# Patient Record
Sex: MALE | Race: WHITE | NOT HISPANIC OR LATINO | Employment: FULL TIME | URBAN - METROPOLITAN AREA
[De-identification: names, ages, dates, MRNs, and addresses within clinical notes are randomized per-mention and may not be internally consistent; named-entity substitution may affect disease eponyms.]

---

## 2017-01-02 ENCOUNTER — ALLSCRIPTS OFFICE VISIT (OUTPATIENT)
Dept: OTHER | Facility: OTHER | Age: 57
End: 2017-01-02

## 2017-01-02 DIAGNOSIS — L02.511 CUTANEOUS ABSCESS OF RIGHT HAND: ICD-10-CM

## 2017-01-03 ENCOUNTER — APPOINTMENT (OUTPATIENT)
Dept: LAB | Facility: HOSPITAL | Age: 57
End: 2017-01-03
Payer: COMMERCIAL

## 2017-01-03 DIAGNOSIS — L02.511 CUTANEOUS ABSCESS OF RIGHT HAND: ICD-10-CM

## 2017-01-03 PROCEDURE — 87147 CULTURE TYPE IMMUNOLOGIC: CPT

## 2017-01-03 PROCEDURE — 87070 CULTURE OTHR SPECIMN AEROBIC: CPT

## 2017-01-03 PROCEDURE — 87205 SMEAR GRAM STAIN: CPT

## 2017-01-03 PROCEDURE — 87186 SC STD MICRODIL/AGAR DIL: CPT

## 2017-01-05 LAB
BACTERIA WND AEROBE CULT: NORMAL
BACTERIA WND AEROBE CULT: NORMAL
GRAM STN SPEC: NORMAL
GRAM STN SPEC: NORMAL

## 2017-06-27 ENCOUNTER — ALLSCRIPTS OFFICE VISIT (OUTPATIENT)
Dept: OTHER | Facility: OTHER | Age: 57
End: 2017-06-27

## 2018-01-12 VITALS
WEIGHT: 257.06 LBS | HEIGHT: 72 IN | RESPIRATION RATE: 18 BRPM | DIASTOLIC BLOOD PRESSURE: 78 MMHG | HEART RATE: 65 BPM | OXYGEN SATURATION: 98 % | BODY MASS INDEX: 34.82 KG/M2 | TEMPERATURE: 98.2 F | SYSTOLIC BLOOD PRESSURE: 118 MMHG

## 2018-01-14 VITALS
HEART RATE: 97 BPM | HEIGHT: 73 IN | SYSTOLIC BLOOD PRESSURE: 112 MMHG | TEMPERATURE: 96.6 F | DIASTOLIC BLOOD PRESSURE: 68 MMHG | OXYGEN SATURATION: 98 % | RESPIRATION RATE: 18 BRPM | BODY MASS INDEX: 32.76 KG/M2 | WEIGHT: 247.19 LBS

## 2018-06-12 DIAGNOSIS — K21.9 GASTROESOPHAGEAL REFLUX DISEASE WITHOUT ESOPHAGITIS: Primary | ICD-10-CM

## 2018-06-12 RX ORDER — PANTOPRAZOLE SODIUM 40 MG/1
TABLET, DELAYED RELEASE ORAL
Qty: 90 TABLET | Refills: 1 | Status: SHIPPED | OUTPATIENT
Start: 2018-06-12 | End: 2018-12-09 | Stop reason: SDUPTHER

## 2018-07-06 ENCOUNTER — OFFICE VISIT (OUTPATIENT)
Dept: FAMILY MEDICINE CLINIC | Facility: CLINIC | Age: 58
End: 2018-07-06
Payer: COMMERCIAL

## 2018-07-06 VITALS
SYSTOLIC BLOOD PRESSURE: 142 MMHG | WEIGHT: 239.2 LBS | OXYGEN SATURATION: 97 % | TEMPERATURE: 99 F | DIASTOLIC BLOOD PRESSURE: 88 MMHG | BODY MASS INDEX: 31.73 KG/M2 | HEART RATE: 89 BPM

## 2018-07-06 DIAGNOSIS — K52.9 GASTROENTERITIS: Primary | ICD-10-CM

## 2018-07-06 PROBLEM — K27.9 PEPTIC ULCER: Status: ACTIVE | Noted: 2017-01-02

## 2018-07-06 PROCEDURE — 99214 OFFICE O/P EST MOD 30 MIN: CPT | Performed by: FAMILY MEDICINE

## 2018-07-06 RX ORDER — DICYCLOMINE HCL 20 MG
20 TABLET ORAL 3 TIMES DAILY PRN
Qty: 30 TABLET | Refills: 0 | Status: SHIPPED | OUTPATIENT
Start: 2018-07-06 | End: 2019-01-23

## 2018-07-06 RX ORDER — DIPHENOXYLATE HYDROCHLORIDE AND ATROPINE SULFATE 2.5; .025 MG/1; MG/1
1 TABLET ORAL DAILY
COMMUNITY
End: 2018-09-01 | Stop reason: SDUPTHER

## 2018-07-06 RX ORDER — COVID-19 ANTIGEN TEST
KIT MISCELLANEOUS
COMMUNITY
End: 2020-02-12 | Stop reason: ALTCHOICE

## 2018-07-06 NOTE — PROGRESS NOTES
Assessment/Plan:   1  Gastroenteritis  Patient's symptoms appear likely secondary to gastroenteritis  Start bland diet slowly advancing as tolerated  Will start treatment with dicyclomine to take p r n  for symptom relief  If any of his symptoms should persist or worsen, he was advised to follow up  ER precautions were given today  - dicyclomine (BENTYL) 20 mg tablet; Take 1 tablet (20 mg total) by mouth 3 (three) times a day as needed (for abdominal cramping)  Dispense: 30 tablet; Refill: 0     There are no diagnoses linked to this encounter  Subjective:    Chief Complaint   Patient presents with    Diarrhea     x 1 week        Patient ID: Malik Garcia is a 65 y o  male  Patient is a 60-year-old male presents today with CC of diarrhea for the past week  He states that his symptoms started gradually  He states he initially had upper GI symptoms including reflux however was also developing lower GI discomfort  He has been having loose bowel movements  Denies melena or hematochezia  He also denies abdominal discomfort  He states he has not been taking any medications for his current symptoms  Review of Systems   Constitutional: Negative for activity change, chills, fatigue and fever  HENT: Negative for congestion, ear pain, sinus pressure and sore throat  Eyes: Negative for redness, itching and visual disturbance  Respiratory: Negative for cough and shortness of breath  Cardiovascular: Negative for chest pain and palpitations  Gastrointestinal: Negative for abdominal pain, diarrhea and nausea  Endocrine: Negative for cold intolerance and heat intolerance  Genitourinary: Negative for dysuria, flank pain and frequency  Musculoskeletal: Negative for arthralgias, back pain, gait problem and myalgias  Skin: Negative for color change  Allergic/Immunologic: Negative for environmental allergies  Neurological: Negative for dizziness, numbness and headaches  Psychiatric/Behavioral: Negative for behavioral problems and sleep disturbance  The following portions of the patient's history were reviewed and updated as appropriate : past family history, past medical history, past social history and past surgical history  Current Outpatient Prescriptions:     multivitamin (THERAGRAN) TABS, Take 1 tablet by mouth daily, Disp: , Rfl:     Naproxen Sodium (ALEVE) 220 MG CAPS, Take by mouth, Disp: , Rfl:     pantoprazole (PROTONIX) 40 mg tablet, TAKE 1 TABLET DAILY, Disp: 90 tablet, Rfl: 1    Objective:    Vitals:    07/06/18 1526   BP: 142/88   BP Location: Left arm   Patient Position: Sitting   Pulse: 89   Temp: 99 °F (37 2 °C)   TempSrc: Tympanic   SpO2: 97%   Weight: 109 kg (239 lb 3 2 oz)        Physical Exam   Constitutional: He is oriented to person, place, and time  He appears well-developed and well-nourished  HENT:   Head: Normocephalic and atraumatic  Nose: Nose normal    Mouth/Throat: No oropharyngeal exudate  Eyes: Pupils are equal, round, and reactive to light  Right eye exhibits no discharge  Left eye exhibits no discharge  Neck: Normal range of motion  Neck supple  No tracheal deviation present  Cardiovascular: Normal rate, regular rhythm and intact distal pulses  Exam reveals no gallop and no friction rub  No murmur heard  Pulses:       Dorsalis pedis pulses are 2+ on the right side, and 2+ on the left side  Posterior tibial pulses are 2+ on the right side, and 2+ on the left side  Pulmonary/Chest: Effort normal and breath sounds normal  No respiratory distress  He has no wheezes  He has no rales  Abdominal: Soft  Bowel sounds are normal  He exhibits no distension  There is no tenderness  There is no rebound and no guarding  Musculoskeletal: Normal range of motion  He exhibits no edema  Lymphadenopathy:        Head (right side): No submental and no submandibular adenopathy present          Head (left side): No submental and no submandibular adenopathy present  He has no cervical adenopathy  Right cervical: No superficial cervical, no deep cervical and no posterior cervical adenopathy present  Left cervical: No superficial cervical, no deep cervical and no posterior cervical adenopathy present  Neurological: He is alert and oriented to person, place, and time  No cranial nerve deficit or sensory deficit  Skin: Skin is warm, dry and intact  Psychiatric: His speech is normal and behavior is normal  Judgment normal  His mood appears not anxious  Cognition and memory are normal  He does not exhibit a depressed mood  Vitals reviewed

## 2018-08-26 ENCOUNTER — TELEPHONE (OUTPATIENT)
Dept: FAMILY MEDICINE CLINIC | Facility: CLINIC | Age: 58
End: 2018-08-26

## 2018-08-26 NOTE — TELEPHONE ENCOUNTER
Spouse's wife called asking why he was transferred to [facility]  Reason was given since pt's spouse is on his consent

## 2018-08-30 ENCOUNTER — TRANSITIONAL CARE MANAGEMENT (OUTPATIENT)
Dept: FAMILY MEDICINE CLINIC | Facility: CLINIC | Age: 58
End: 2018-08-30

## 2018-08-30 RX ORDER — ACETAMINOPHEN 500 MG
1000 TABLET ORAL
COMMUNITY
Start: 2018-08-29 | End: 2018-09-08

## 2018-09-01 ENCOUNTER — OFFICE VISIT (OUTPATIENT)
Dept: FAMILY MEDICINE CLINIC | Facility: CLINIC | Age: 58
End: 2018-09-01
Payer: COMMERCIAL

## 2018-09-01 VITALS
BODY MASS INDEX: 32.45 KG/M2 | WEIGHT: 244.6 LBS | DIASTOLIC BLOOD PRESSURE: 82 MMHG | SYSTOLIC BLOOD PRESSURE: 122 MMHG | HEART RATE: 66 BPM | OXYGEN SATURATION: 98 % | TEMPERATURE: 97.4 F

## 2018-09-01 DIAGNOSIS — K27.9 PEPTIC ULCER: ICD-10-CM

## 2018-09-01 DIAGNOSIS — H53.9 VISUAL CHANGES: ICD-10-CM

## 2018-09-01 DIAGNOSIS — Z01.818 PREOPERATIVE CLEARANCE: ICD-10-CM

## 2018-09-01 DIAGNOSIS — H40.051 RAISED INTRAOCULAR PRESSURE OF RIGHT EYE: ICD-10-CM
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
## 2018-09-01 DIAGNOSIS — IMO0001: Primary | ICD-10-CM

## 2018-09-01 DIAGNOSIS — Z13.1 SCREENING FOR DIABETES MELLITUS: ICD-10-CM

## 2018-09-01 LAB — SL AMB POCT HEMOGLOBIN AIC: 5.1

## 2018-09-01 PROCEDURE — 99495 TRANSJ CARE MGMT MOD F2F 14D: CPT | Performed by: FAMILY MEDICINE

## 2018-09-01 PROCEDURE — 83036 HEMOGLOBIN GLYCOSYLATED A1C: CPT | Performed by: FAMILY MEDICINE

## 2018-09-01 NOTE — PROGRESS NOTES
Assessment/Plan:    1  Preoperative clearance/Orbit fracture, right, sequela ([address] )  Patient appears well today  He has a good functional capacity and no active cardiac problems  Reviewed his recent testing which was at [facility]   His EKG appeared to show normal sinus rhythm, no signs of ST-elevation MI  His blood work as well appears stable  His electrolyte levels have stabilized  This type of procedure is considered intermediate risk  Patient is cleared with intermediate perioperative cardiovascular risk  No further testing needed today  2    Blood alcohol level of 240 mg/100 ml or more  It appears the patient has been consuming approximately 6-8 beers per day  The he states that he does not believe that he has an alcohol problem  He never uses alcohol for sleep in the morning to start his day  He has been off of any alcohol consumption over the past week  Family has been working on eliminating all alcohol from the household as well he was advised on the importance of refraining from alcohol consumption completely  This is likely the cause of his recent orbital fracture    3  Peptic ulcer  Patient's symptoms appears stable today  He was incidentally found to have a abnormality on his recent CT scan  He was unable to determine the exact cause of this abnormality  This may likely be a mass verses esophageal collapse  Patient was advised that he would need to follow up with Gastroenterology for an upper endoscopy  He states that he will be scheduling         There are no diagnoses linked to this encounter  Subjective:    Chief Complaint   Patient presents with    Transition of Care Management     fell and hit R eye/side of face on concrete birdbath        Patient ID: Ashwini Rodney is a 65 y o  male      Shauna Lao  65 y o   male    SURGEON: Dr Bullock Ke:   Open reduction internal fixation of zygomatic arch right    DATE OF SURGERY: 9/4/18    PRIOR ANESTHESIA:yes    COMPLICATION: no    BLEEDING PROBLEM: no    PERTINENT PMH: no    EXERCISE CAPACITY:   CAN WALK 4 BLOCKS AND OR CLIMB 2 FLIGHTS: Yes    HOME LIVING SITUATION SAFE AND SECURE: Yes      TOBACCO: no     ETOH: no, patient states that he has quit his alcohol consumption 1 week ago  He was drinking approximately 6-8 beers per day  ILLEGAL DRUGS: no        Review of Systems   Constitutional: Negative for activity change, chills, fatigue and fever  HENT: Negative for congestion, ear pain, sinus pressure and sore throat  Eyes: Positive for photophobia, pain, redness and visual disturbance  Negative for itching  Respiratory: Negative for cough and shortness of breath  Cardiovascular: Negative for chest pain and palpitations  Gastrointestinal: Negative for abdominal pain, diarrhea and nausea  Endocrine: Negative for cold intolerance and heat intolerance  Genitourinary: Negative for dysuria, flank pain and frequency  Musculoskeletal: Negative for arthralgias, back pain, gait problem and myalgias  Skin: Negative for color change  Allergic/Immunologic: Negative for environmental allergies  Neurological: Negative for dizziness, numbness and headaches  Psychiatric/Behavioral: Negative for behavioral problems and sleep disturbance  The following portions of the patient's history were reviewed and updated as appropriate : past family history, past medical history, past social history and past surgical history        Current Outpatient Prescriptions:     acetaminophen (TYLENOL) 500 mg tablet, Take 1,000 mg by mouth, Disp: , Rfl:     dicyclomine (BENTYL) 20 mg tablet, Take 1 tablet (20 mg total) by mouth 3 (three) times a day as needed (for abdominal cramping), Disp: 30 tablet, Rfl: 0    Multiple Vitamins-Minerals (MULTIVITAMIN ADULT EXTRA C PO), Take 1 capsule by mouth, Disp: , Rfl:     pantoprazole (PROTONIX) 40 mg tablet, TAKE 1 TABLET DAILY, Disp: 90 tablet, Rfl: 1    Naproxen Sodium (ALEVE) 220 MG CAPS, Take by mouth, Disp: , Rfl:     Objective:    Vitals:    09/01/18 1007   BP: 122/82   BP Location: Left arm   Patient Position: Sitting   Pulse: 66   Temp: (!) 97 4 °F (36 3 °C)   TempSrc: Tympanic   SpO2: 98%   Weight: 111 kg (244 lb 9 6 oz)        Physical Exam   Constitutional: He is oriented to person, place, and time  He appears well-developed and well-nourished  HENT:   Head: Normocephalic and atraumatic  Nose: Nose normal    Mouth/Throat: No oropharyngeal exudate  Eyes: Pupils are equal, round, and reactive to light  Right eye exhibits no discharge  Left eye exhibits no discharge  Neck: Normal range of motion  Neck supple  No tracheal deviation present  Cardiovascular: Normal rate, regular rhythm and intact distal pulses  Exam reveals no gallop and no friction rub  No murmur heard  Pulses:       Dorsalis pedis pulses are 2+ on the right side, and 2+ on the left side  Posterior tibial pulses are 2+ on the right side, and 2+ on the left side  Pulmonary/Chest: Effort normal and breath sounds normal  No respiratory distress  He has no wheezes  He has no rales  Abdominal: Soft  Bowel sounds are normal  He exhibits no distension  There is no tenderness  There is no rebound and no guarding  Musculoskeletal: Normal range of motion  He exhibits no edema  Lymphadenopathy:        Head (right side): No submental and no submandibular adenopathy present  Head (left side): No submental and no submandibular adenopathy present  He has no cervical adenopathy  Right cervical: No superficial cervical, no deep cervical and no posterior cervical adenopathy present  Left cervical: No superficial cervical, no deep cervical and no posterior cervical adenopathy present  Neurological: He is alert and oriented to person, place, and time  No cranial nerve deficit or sensory deficit  Skin: Skin is warm, dry and intact     Psychiatric: His speech is normal and behavior is normal  Judgment normal  His mood appears not anxious  Cognition and memory are normal  He does not exhibit a depressed mood  Vitals reviewed

## 2018-09-01 NOTE — PROGRESS NOTES
Assessment/Plan:   1  Orbit fracture, right, sequela (HCC)/Raised intraocular pressure of right eye/Visual changes  Reviewed patient's records from [facility] as well as [facility] patient states that he is currently not in pain  He has been following up regularly with Ophthalmology/facial surgery  He will be havingORIF this coming week  At this time, he was advised that he should not be driving  At this time he cannot work  After his surgery, he will be following up with Occupational therapy for further evaluation patient visual changes  2  Blood alcohol level of 240 mg/100 ml or more  It appears the patient has been consuming approximately 6-8 beers per day  The he states that he does not believe that he has an alcohol problem  He never uses alcohol for sleep in the morning to start his day  He has been off of any alcohol consumption over the past week  Family has been working on eliminating all alcohol from the household as well he was advised on the importance of refraining from alcohol consumption completely  This is likely the cause of his recent orbital fracture    3  Peptic ulcer  Patient's symptoms appears stable today  He was incidentally found to have a abnormality on his recent CT scan  He was unable to determine the exact cause of this abnormality  This may likely be a mass verses esophageal collapse  Patient was advised that he would need to follow up with Gastroenterology for an upper endoscopy  He states that he will be scheduling after his surgery  4  Screening for diabetes mellitus  Patient's blood sugar was mildly elevated while inpatient  He was also found to have glucose in his urine  His recent A1c today appeared stable at 5 1  Continue with routine monitoring   - POCT hemoglobin A1c     There are no diagnoses linked to this encounter        Subjective:    Chief Complaint   Patient presents with    Transition of Care Management     fell and hit R eye/side of face on concrete [facility]        Patient ID: Yarelis Gomez is a 65 y o  male  Patient is a 67-year-old male presents today for a hospital follow-up  He states that he recently was admitted to [facility] on 08/26 secondary to a traumatic fall  Patient states that he does not recall much of the follow-up  He states that he was consuming alcohol at a party prior to this event  He states that he did fall and struck his face on a bird bath  He believes that he struck his chest on the floor as well  He was taken to the ED and was found to have a right orbital fracture  He also developed significant pressure in his orbit as well secondary to hematoma development  Since his discharge, he states that he has not had any alcohol for 1 week  Due to this trauma, he has been having visual disturbances  He states that he will be having a surgery for open reduction internal fixation of his orbit/zygomatic arch [facility] dated by a facial/plastic surgery and Ophthalmology dot                                                                            Date and time hospital follow up call was made:  8/30/2018  1:12 PM  Hospital care reviewed:  Records reviewed  Patient was hopsitalized at: Other (comment)  Date of admission:  8/26/18  Date of discharge:  8/28/18  Diagnosis:  Orbic fracture   Disposition:  Home  Were the patients medicaitons reviewed and updated:  Yes  Current symptoms:  None  Post hospital issues:  None  Should patient be enrolled in anticoag monitoring?:  No  Scheduled for follow up?:  Yes  Referrals needed:  None   Did you obtain your prescribed medications:  Yes  Do you need help managing your perscriptions or medications:  No  Is transportation to your appointments needed:  No  I have advised the patient to call PCP with any new or worsening symptoms   (please type in name along with any credentials):  David Carter MA   Comments:  pt is scheduled for a TCM  Review of Systems   Constitutional: Negative for activity change, chills, fatigue and fever  HENT: Negative for congestion, ear pain, sinus pressure and sore throat  Eyes: Positive for photophobia, pain, redness and visual disturbance  Negative for itching  Respiratory: Negative for cough and shortness of breath  Cardiovascular: Negative for chest pain and palpitations  Gastrointestinal: Negative for abdominal pain, diarrhea and nausea  Endocrine: Negative for cold intolerance and heat intolerance  Genitourinary: Negative for dysuria, flank pain and frequency  Musculoskeletal: Negative for arthralgias, back pain, gait problem and myalgias  Skin: Negative for color change  Allergic/Immunologic: Negative for environmental allergies  Neurological: Negative for dizziness, numbness and headaches  Psychiatric/Behavioral: Negative for behavioral problems and sleep disturbance  The following portions of the patient's history were reviewed and updated as appropriate : past family history, past medical history, past social history and past surgical history  Current Outpatient Prescriptions:     acetaminophen (TYLENOL) 500 mg tablet, Take 1,000 mg by mouth, Disp: , Rfl:     dicyclomine (BENTYL) 20 mg tablet, Take 1 tablet (20 mg total) by mouth 3 (three) times a day as needed (for abdominal cramping), Disp: 30 tablet, Rfl: 0    Multiple Vitamins-Minerals (MULTIVITAMIN ADULT EXTRA C PO), Take 1 capsule by mouth, Disp: , Rfl:     pantoprazole (PROTONIX) 40 mg tablet, TAKE 1 TABLET DAILY, Disp: 90 tablet, Rfl: 1    Naproxen Sodium (ALEVE) 220 MG CAPS, Take by mouth, Disp: , Rfl:     Objective:    Vitals:    09/01/18 1007   BP: 122/82   BP Location: Left arm   Patient Position: Sitting   Pulse: 66   Temp: (!) 97 4 °F (36 3 °C)   TempSrc: Tympanic   SpO2: 98%   Weight: 111 kg (244 lb 9 6 oz)        Physical Exam   Constitutional: He is oriented to person, place, and time   He appears well-developed and well-nourished  HENT:   Head: Normocephalic and atraumatic  Nose: Nose normal    Mouth/Throat: No oropharyngeal exudate  Eyes: Pupils are equal, round, and reactive to light  Right eye exhibits no discharge  Left eye exhibits no discharge  Neck: Normal range of motion  Neck supple  No tracheal deviation present  Cardiovascular: Normal rate, regular rhythm and intact distal pulses  Exam reveals no gallop and no friction rub  No murmur heard  Pulses:       Dorsalis pedis pulses are 2+ on the right side, and 2+ on the left side  Posterior tibial pulses are 2+ on the right side, and 2+ on the left side  Pulmonary/Chest: Effort normal and breath sounds normal  No respiratory distress  He has no wheezes  He has no rales  Abdominal: Soft  Bowel sounds are normal  He exhibits no distension  There is no tenderness  There is no rebound and no guarding  Musculoskeletal: Normal range of motion  He exhibits no edema  Lymphadenopathy:        Head (right side): No submental and no submandibular adenopathy present  Head (left side): No submental and no submandibular adenopathy present  He has no cervical adenopathy  Right cervical: No superficial cervical, no deep cervical and no posterior cervical adenopathy present  Left cervical: No superficial cervical, no deep cervical and no posterior cervical adenopathy present  Neurological: He is alert and oriented to person, place, and time  No cranial nerve deficit or sensory deficit  Skin: Skin is warm, dry and intact  Psychiatric: His speech is normal and behavior is normal  Judgment normal  His mood appears not anxious  Cognition and memory are normal  He does not exhibit a depressed mood  Vitals reviewed

## 2018-12-09 DIAGNOSIS — K21.9 GASTROESOPHAGEAL REFLUX DISEASE WITHOUT ESOPHAGITIS: ICD-10-CM

## 2018-12-10 RX ORDER — PANTOPRAZOLE SODIUM 40 MG/1
TABLET, DELAYED RELEASE ORAL
Qty: 90 TABLET | Refills: 1 | Status: SHIPPED | OUTPATIENT
Start: 2018-12-10 | End: 2019-06-08 | Stop reason: SDUPTHER

## 2019-01-23 ENCOUNTER — OFFICE VISIT (OUTPATIENT)
Dept: FAMILY MEDICINE CLINIC | Facility: CLINIC | Age: 59
End: 2019-01-23
Payer: COMMERCIAL

## 2019-01-23 VITALS
HEIGHT: 70 IN | TEMPERATURE: 98.3 F | WEIGHT: 245 LBS | HEART RATE: 70 BPM | DIASTOLIC BLOOD PRESSURE: 80 MMHG | RESPIRATION RATE: 16 BRPM | SYSTOLIC BLOOD PRESSURE: 120 MMHG | BODY MASS INDEX: 35.07 KG/M2 | OXYGEN SATURATION: 95 %

## 2019-01-23 DIAGNOSIS — K52.9 GASTROENTERITIS: Primary | ICD-10-CM

## 2019-01-23 PROCEDURE — 99213 OFFICE O/P EST LOW 20 MIN: CPT | Performed by: NURSE PRACTITIONER

## 2019-01-23 NOTE — PROGRESS NOTES
[facility]    ASSESSMENT AND PLAN     1  Gastroenteritis  60-year-old male presents today with signs and symptoms suggestive of a gastroenteritis  Physical assessment is benign, with the exception of mildly increased bowel sounds  As his symptoms are slowly resolving throughout today, I feel this is likely viral in nature and self-limiting  Symptom management reviewed: Increase fluids, may use a Vaseline or Neosporin on his anus for protection and soreness relief, reintroduce foods slowly, BRAT diet  He is to be re-evaluated should his symptoms persist and/or worsen  SUBJECTIVE       Patient ID: Shell Srinivasan is a 63 y o  male  Chief Complaint   Patient presents with    Cold Like Symptoms     chills, vomit  x 2 days, diarrhea       HISTORY OF PRESENT ILLNESS    Patient presents today with diarrhea that started yesterday  He states he had 20 loose stools yesterday that has been slowly resolving today  He states he only had 1 or 2 this morning and they have been "less watery"  He states that his anus quite sore  He  can't think of any food that he had that might have been spoiled  He has not eaten any true food, he has just been drinking tea water and chicken broth  He did have 2 episodes yesterday where he vomited a small amount  No vomiting today  Denies fever  Denies any sinus and/or respiratory distress  The following portions of the patient's history were reviewed and updated as appropriate: allergies, current medications, past family history, past medical history, past social history, past surgical history and problem list     REVIEW OF SYSTEMS  Review of Systems   Constitutional: Negative  Gastrointestinal: Positive for diarrhea, nausea ( intermittent) and vomiting (X2)  Negative for abdominal distention, abdominal pain, anal bleeding and blood in stool  States his rectum hurts from the repeated bowel movements         OBJECTIVE      VITAL SIGNS  /80 (BP Location: Left arm, Patient Position: Sitting, Cuff Size: Adult)   Pulse 70   Temp 98 3 °F (36 8 °C) (Tympanic)   Resp 16   Ht 5' 10 47" (1 79 m)   Wt 111 kg (245 lb)   SpO2 95%   BMI 34 68 kg/m²       PHYSICAL EXAMINATION   Physical Exam   Constitutional: He is oriented to person, place, and time  He appears well-developed and well-nourished  Cardiovascular: Normal rate, regular rhythm and normal heart sounds  Pulmonary/Chest: Effort normal and breath sounds normal  No respiratory distress  He has no decreased breath sounds  He has no wheezes  Abdominal: Soft  Normal appearance  Bowel sounds are increased  There is no tenderness  There is no rigidity, no rebound and no guarding  Neurological: He is alert and oriented to person, place, and time  Psychiatric: He has a normal mood and affect  His speech is normal and behavior is normal  Judgment and thought content normal  Cognition and memory are normal    Nursing note and vitals reviewed

## 2019-03-04 ENCOUNTER — APPOINTMENT (EMERGENCY)
Dept: RADIOLOGY | Facility: HOSPITAL | Age: 59
End: 2019-03-04
Payer: COMMERCIAL

## 2019-03-04 ENCOUNTER — HOSPITAL ENCOUNTER (OUTPATIENT)
Facility: HOSPITAL | Age: 59
Setting detail: OBSERVATION
Discharge: HOME | End: 2019-03-05
Attending: EMERGENCY MEDICINE | Admitting: SURGERY
Payer: COMMERCIAL

## 2019-03-04 DIAGNOSIS — S22.32XA LEFT RIB FRACTURE: Primary | ICD-10-CM
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
## 2019-03-04 LAB
ANION GAP BLD CALC-SCNC: 17 MMOL/L (ref 4–13)
BUN BLD-MCNC: 14 MG/DL (ref 5–25)
CA-I BLD-SCNC: 1.14 MMOL/L (ref 1.12–1.32)
CHLORIDE BLD-SCNC: 102 MMOL/L (ref 100–108)
CREAT BLD-MCNC: 0.9 MG/DL (ref 0.6–1.3)
GFR SERPL CREATININE-BSD FRML MDRD: 94 ML/MIN/1.73SQ M
GLUCOSE SERPL-MCNC: 105 MG/DL (ref 65–140)
HCT VFR BLD CALC: 47 % (ref 36.5–49.3)
HGB BLDA-MCNC: 16 G/DL (ref 12–17)
PCO2 BLD: 26 MMOL/L (ref 21–32)
POTASSIUM BLD-SCNC: 4.1 MMOL/L (ref 3.5–5.3)
SODIUM BLD-SCNC: 139 MMOL/L (ref 136–145)
SPECIMEN SOURCE: ABNORMAL

## 2019-03-04 PROCEDURE — 85014 HEMATOCRIT: CPT

## 2019-03-04 PROCEDURE — 71101 X-RAY EXAM UNILAT RIBS/CHEST: CPT

## 2019-03-04 PROCEDURE — 99285 EMERGENCY DEPT VISIT HI MDM: CPT

## 2019-03-04 PROCEDURE — 74177 CT ABD & PELVIS W/CONTRAST: CPT

## 2019-03-04 PROCEDURE — 71260 CT THORAX DX C+: CPT

## 2019-03-04 PROCEDURE — 73030 X-RAY EXAM OF SHOULDER: CPT

## 2019-03-04 PROCEDURE — 96375 TX/PRO/DX INJ NEW DRUG ADDON: CPT

## 2019-03-04 PROCEDURE — 96374 THER/PROPH/DIAG INJ IV PUSH: CPT

## 2019-03-04 PROCEDURE — 80047 BASIC METABLC PNL IONIZED CA: CPT

## 2019-03-04 PROCEDURE — 99219 PR INITIAL OBSERVATION CARE/DAY 50 MINUTES: CPT | Performed by: SURGERY

## 2019-03-04 RX ORDER — LIDOCAINE 50 MG/G
1 PATCH TOPICAL DAILY
Status: DISCONTINUED | OUTPATIENT
Start: 2019-03-05 | End: 2019-03-05 | Stop reason: HOSPADM

## 2019-03-04 RX ORDER — METHOCARBAMOL 500 MG/1
500 TABLET, FILM COATED ORAL EVERY 6 HOURS SCHEDULED
Status: DISCONTINUED | OUTPATIENT
Start: 2019-03-04 | End: 2019-03-05

## 2019-03-04 RX ORDER — IBUPROFEN 600 MG/1
600 TABLET ORAL EVERY 6 HOURS SCHEDULED
Status: DISCONTINUED | OUTPATIENT
Start: 2019-03-06 | End: 2019-03-04

## 2019-03-04 RX ORDER — ACETAMINOPHEN 325 MG/1
975 TABLET ORAL EVERY 8 HOURS SCHEDULED
Status: DISCONTINUED | OUTPATIENT
Start: 2019-03-04 | End: 2019-03-05 | Stop reason: HOSPADM

## 2019-03-04 RX ORDER — LIDOCAINE 50 MG/G
1 PATCH TOPICAL ONCE
Status: COMPLETED | OUTPATIENT
Start: 2019-03-04 | End: 2019-03-04

## 2019-03-04 RX ORDER — KETOROLAC TROMETHAMINE 30 MG/ML
15 INJECTION, SOLUTION INTRAMUSCULAR; INTRAVENOUS ONCE
Status: COMPLETED | OUTPATIENT
Start: 2019-03-04 | End: 2019-03-04

## 2019-03-04 RX ORDER — MORPHINE SULFATE 10 MG/ML
5 INJECTION, SOLUTION INTRAMUSCULAR; INTRAVENOUS ONCE
Status: COMPLETED | OUTPATIENT
Start: 2019-03-04 | End: 2019-03-04

## 2019-03-04 RX ORDER — METHOCARBAMOL 500 MG/1
1000 TABLET, FILM COATED ORAL ONCE
Status: COMPLETED | OUTPATIENT
Start: 2019-03-04 | End: 2019-03-04

## 2019-03-04 RX ORDER — OXYCODONE HYDROCHLORIDE 10 MG/1
10 TABLET ORAL EVERY 4 HOURS PRN
Status: DISCONTINUED | OUTPATIENT
Start: 2019-03-04 | End: 2019-03-05 | Stop reason: HOSPADM

## 2019-03-04 RX ORDER — KETOROLAC TROMETHAMINE 30 MG/ML
15 INJECTION, SOLUTION INTRAMUSCULAR; INTRAVENOUS EVERY 6 HOURS SCHEDULED
Status: DISCONTINUED | OUTPATIENT
Start: 2019-03-04 | End: 2019-03-04

## 2019-03-04 RX ORDER — HYDROMORPHONE HCL/PF 1 MG/ML
0.5 SYRINGE (ML) INJECTION
Status: DISCONTINUED | OUTPATIENT
Start: 2019-03-04 | End: 2019-03-05

## 2019-03-04 RX ORDER — PANTOPRAZOLE SODIUM 40 MG/1
40 TABLET, DELAYED RELEASE ORAL
Status: DISCONTINUED | OUTPATIENT
Start: 2019-03-04 | End: 2019-03-05 | Stop reason: HOSPADM

## 2019-03-04 RX ORDER — OXYCODONE HYDROCHLORIDE 5 MG/1
5 TABLET ORAL EVERY 4 HOURS PRN
Status: DISCONTINUED | OUTPATIENT
Start: 2019-03-04 | End: 2019-03-05 | Stop reason: HOSPADM

## 2019-03-04 RX ADMIN — HYDROMORPHONE HYDROCHLORIDE 0.5 MG: 1 INJECTION, SOLUTION INTRAMUSCULAR; INTRAVENOUS; SUBCUTANEOUS at 15:07

## 2019-03-04 RX ADMIN — KETOROLAC TROMETHAMINE 15 MG: 30 INJECTION, SOLUTION INTRAMUSCULAR at 13:03

## 2019-03-04 RX ADMIN — OXYCODONE HYDROCHLORIDE 10 MG: 10 TABLET ORAL at 16:50

## 2019-03-04 RX ADMIN — LIDOCAINE 1 PATCH: 50 PATCH CUTANEOUS at 10:58

## 2019-03-04 RX ADMIN — MORPHINE SULFATE 5 MG: 10 INJECTION INTRAVENOUS at 10:58

## 2019-03-04 RX ADMIN — METHOCARBAMOL 500 MG: 500 TABLET, FILM COATED ORAL at 18:16

## 2019-03-04 RX ADMIN — HYDROMORPHONE HYDROCHLORIDE 0.5 MG: 1 INJECTION, SOLUTION INTRAMUSCULAR; INTRAVENOUS; SUBCUTANEOUS at 18:16

## 2019-03-04 RX ADMIN — ACETAMINOPHEN 975 MG: 325 TABLET ORAL at 15:07

## 2019-03-04 RX ADMIN — METHOCARBAMOL 1000 MG: 500 TABLET, FILM COATED ORAL at 13:03

## 2019-03-04 RX ADMIN — METHOCARBAMOL 500 MG: 500 TABLET, FILM COATED ORAL at 23:51

## 2019-03-04 RX ADMIN — ACETAMINOPHEN 975 MG: 325 TABLET ORAL at 22:49

## 2019-03-04 RX ADMIN — IOHEXOL 100 ML: 350 INJECTION, SOLUTION INTRAVENOUS at 12:16

## 2019-03-04 RX ADMIN — PANTOPRAZOLE SODIUM 40 MG: 40 TABLET, DELAYED RELEASE ORAL at 15:07
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
## 2019-03-04 NOTE — ED NOTES
Patient given incentive spirometry at this time as well as instructions on use of incentive spirometry  Patient demonstrated correct use at this time        Girish Gr RN  03/04/19 9134

## 2019-03-04 NOTE — ED NOTES
Patient ambulatory to the bathroom without difficulty at this time        Katelynn Peguero RN  03/04/19 7281

## 2019-03-04 NOTE — ED NOTES
Patient continues to use incentive spirometer without difficulty        Diamond Barroso, RN  03/04/19 9274
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
## 2019-03-05 ENCOUNTER — APPOINTMENT (OUTPATIENT)
Dept: RADIOLOGY | Facility: HOSPITAL | Age: 59
End: 2019-03-05
Payer: COMMERCIAL

## 2019-03-05 VITALS
HEIGHT: 74 IN | WEIGHT: 245 LBS | OXYGEN SATURATION: 96 % | HEART RATE: 54 BPM | DIASTOLIC BLOOD PRESSURE: 57 MMHG | SYSTOLIC BLOOD PRESSURE: 102 MMHG | BODY MASS INDEX: 31.44 KG/M2 | TEMPERATURE: 98.2 F | RESPIRATION RATE: 18 BRPM

## 2019-03-05 PROBLEM — S22.42XA CLOSED FRACTURE OF MULTIPLE RIBS OF LEFT SIDE: Status: ACTIVE | Noted: 2019-03-05

## 2019-03-05 LAB
ANION GAP SERPL CALCULATED.3IONS-SCNC: 7 MMOL/L (ref 4–13)
BASOPHILS # BLD AUTO: 0.03 THOUSANDS/ΜL (ref 0–0.1)
BASOPHILS NFR BLD AUTO: 0 % (ref 0–1)
BUN SERPL-MCNC: 14 MG/DL (ref 5–25)
CALCIUM SERPL-MCNC: 8 MG/DL (ref 8.3–10.1)
CHLORIDE SERPL-SCNC: 104 MMOL/L (ref 100–108)
CO2 SERPL-SCNC: 26 MMOL/L (ref 21–32)
CREAT SERPL-MCNC: 0.85 MG/DL (ref 0.6–1.3)
EOSINOPHIL # BLD AUTO: 0.23 THOUSAND/ΜL (ref 0–0.61)
EOSINOPHIL NFR BLD AUTO: 3 % (ref 0–6)
ERYTHROCYTE [DISTWIDTH] IN BLOOD BY AUTOMATED COUNT: 12.4 % (ref 11.6–15.1)
GFR SERPL CREATININE-BSD FRML MDRD: 97 ML/MIN/1.73SQ M
GLUCOSE SERPL-MCNC: 100 MG/DL (ref 65–140)
HCT VFR BLD AUTO: 41 % (ref 36.5–49.3)
HCV AB SER QL: NORMAL
HGB BLD-MCNC: 13.7 G/DL (ref 12–17)
IMM GRANULOCYTES # BLD AUTO: 0.04 THOUSAND/UL (ref 0–0.2)
IMM GRANULOCYTES NFR BLD AUTO: 0 % (ref 0–2)
LYMPHOCYTES # BLD AUTO: 1.36 THOUSANDS/ΜL (ref 0.6–4.47)
LYMPHOCYTES NFR BLD AUTO: 15 % (ref 14–44)
MCH RBC QN AUTO: 30.9 PG (ref 26.8–34.3)
MCHC RBC AUTO-ENTMCNC: 33.4 G/DL (ref 31.4–37.4)
MCV RBC AUTO: 93 FL (ref 82–98)
MONOCYTES # BLD AUTO: 0.69 THOUSAND/ΜL (ref 0.17–1.22)
MONOCYTES NFR BLD AUTO: 7 % (ref 4–12)
NEUTROPHILS # BLD AUTO: 6.92 THOUSANDS/ΜL (ref 1.85–7.62)
NEUTS SEG NFR BLD AUTO: 75 % (ref 43–75)
NRBC BLD AUTO-RTO: 0 /100 WBCS
PLATELET # BLD AUTO: 207 THOUSANDS/UL (ref 149–390)
PMV BLD AUTO: 10.6 FL (ref 8.9–12.7)
POTASSIUM SERPL-SCNC: 3.6 MMOL/L (ref 3.5–5.3)
RBC # BLD AUTO: 4.43 MILLION/UL (ref 3.88–5.62)
SODIUM SERPL-SCNC: 137 MMOL/L (ref 136–145)
WBC # BLD AUTO: 9.27 THOUSAND/UL (ref 4.31–10.16)

## 2019-03-05 PROCEDURE — 80048 BASIC METABOLIC PNL TOTAL CA: CPT | Performed by: EMERGENCY MEDICINE

## 2019-03-05 PROCEDURE — 97163 PT EVAL HIGH COMPLEX 45 MIN: CPT

## 2019-03-05 PROCEDURE — G8988 SELF CARE GOAL STATUS: HCPCS

## 2019-03-05 PROCEDURE — 99217 PR OBSERVATION CARE DISCHARGE MANAGEMENT: CPT | Performed by: SURGERY

## 2019-03-05 PROCEDURE — 86803 HEPATITIS C AB TEST: CPT | Performed by: ORTHOPAEDIC SURGERY

## 2019-03-05 PROCEDURE — G8979 MOBILITY GOAL STATUS: HCPCS

## 2019-03-05 PROCEDURE — G8978 MOBILITY CURRENT STATUS: HCPCS

## 2019-03-05 PROCEDURE — G8980 MOBILITY D/C STATUS: HCPCS

## 2019-03-05 PROCEDURE — 85025 COMPLETE CBC W/AUTO DIFF WBC: CPT | Performed by: EMERGENCY MEDICINE

## 2019-03-05 PROCEDURE — 97166 OT EVAL MOD COMPLEX 45 MIN: CPT

## 2019-03-05 PROCEDURE — 71046 X-RAY EXAM CHEST 2 VIEWS: CPT

## 2019-03-05 PROCEDURE — G8987 SELF CARE CURRENT STATUS: HCPCS

## 2019-03-05 PROCEDURE — G8989 SELF CARE D/C STATUS: HCPCS

## 2019-03-05 RX ORDER — OXYCODONE HYDROCHLORIDE 5 MG/1
5 TABLET ORAL EVERY 4 HOURS PRN
Qty: 20 TABLET | Refills: 0 | Status: SHIPPED | OUTPATIENT
Start: 2019-03-05 | End: 2019-03-15

## 2019-03-05 RX ORDER — GABAPENTIN 100 MG/1
100 CAPSULE ORAL 3 TIMES DAILY
Status: DISCONTINUED | OUTPATIENT
Start: 2019-03-05 | End: 2019-03-05 | Stop reason: HOSPADM

## 2019-03-05 RX ORDER — METHOCARBAMOL 750 MG/1
750 TABLET, FILM COATED ORAL EVERY 6 HOURS SCHEDULED
Qty: 30 TABLET | Refills: 0 | Status: SHIPPED | OUTPATIENT
Start: 2019-03-05 | End: 2019-03-21 | Stop reason: SDUPTHER

## 2019-03-05 RX ORDER — METHOCARBAMOL 750 MG/1
750 TABLET, FILM COATED ORAL EVERY 6 HOURS SCHEDULED
Status: DISCONTINUED | OUTPATIENT
Start: 2019-03-05 | End: 2019-03-05 | Stop reason: HOSPADM

## 2019-03-05 RX ORDER — GABAPENTIN 100 MG/1
100 CAPSULE ORAL 3 TIMES DAILY
Qty: 30 CAPSULE | Refills: 0 | Status: SHIPPED | OUTPATIENT
Start: 2019-03-05 | End: 2020-02-12 | Stop reason: ALTCHOICE

## 2019-03-05 RX ORDER — ACETAMINOPHEN 325 MG/1
975 TABLET ORAL EVERY 8 HOURS SCHEDULED
Qty: 30 TABLET | Refills: 0 | Status: ON HOLD | OUTPATIENT
Start: 2019-03-05 | End: 2020-02-15 | Stop reason: SDUPTHER

## 2019-03-05 RX ADMIN — OXYCODONE HYDROCHLORIDE 5 MG: 5 TABLET ORAL at 12:40

## 2019-03-05 RX ADMIN — METHOCARBAMOL 750 MG: 750 TABLET, FILM COATED ORAL at 11:33

## 2019-03-05 RX ADMIN — ACETAMINOPHEN 975 MG: 325 TABLET ORAL at 14:21

## 2019-03-05 RX ADMIN — PANTOPRAZOLE SODIUM 40 MG: 40 TABLET, DELAYED RELEASE ORAL at 06:07

## 2019-03-05 RX ADMIN — LIDOCAINE 1 PATCH: 50 PATCH CUTANEOUS at 08:01

## 2019-03-05 RX ADMIN — ACETAMINOPHEN 975 MG: 325 TABLET ORAL at 06:07

## 2019-03-05 RX ADMIN — OXYCODONE HYDROCHLORIDE 10 MG: 10 TABLET ORAL at 08:00

## 2019-03-05 RX ADMIN — HYDROMORPHONE HYDROCHLORIDE 0.5 MG: 1 INJECTION, SOLUTION INTRAMUSCULAR; INTRAVENOUS; SUBCUTANEOUS at 10:11

## 2019-03-05 RX ADMIN — METHOCARBAMOL 500 MG: 500 TABLET, FILM COATED ORAL at 06:07

## 2019-03-05 RX ADMIN — ENOXAPARIN SODIUM 40 MG: 40 INJECTION SUBCUTANEOUS at 08:02

## 2019-03-05 RX ADMIN — GABAPENTIN 100 MG: 100 CAPSULE ORAL at 11:33

## 2019-03-05 RX ADMIN — OXYCODONE HYDROCHLORIDE 5 MG: 5 TABLET ORAL at 03:59

## 2019-03-05 NOTE — PLAN OF CARE
Problem: DISCHARGE PLANNING - CARE MANAGEMENT  Goal: Discharge to post-acute care or home with appropriate resources  Description  INTERVENTIONS:  - Conduct assessment to determine patient/family and health care team treatment goals, and need for post-acute services based on payer coverage, community resources, and patient preferences, and barriers to discharge  - Address psychosocial, clinical, and financial barriers to discharge as identified in assessment in conjunction with the patient/family and health care team  - Arrange appropriate level of post-acute services according to patient's   needs and preference and payer coverage in collaboration with the physician and health care team  - Communicate with and update the patient/family, physician, and health care team regarding progress on the discharge plan  - Arrange appropriate transportation to post-acute venues  -Patient to be evaluated  Pt anticipated for rehab vs home     Outcome: Progressing
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
## 2019-03-05 NOTE — PLAN OF CARE
Problem: PAIN - ADULT  Goal: Verbalizes/displays adequate comfort level or baseline comfort level  Description  Interventions:  - Encourage patient to monitor pain and request assistance  - Assess pain using appropriate pain scale  - Administer analgesics based on type and severity of pain and evaluate response  - Implement non-pharmacological measures as appropriate and evaluate response  - Consider cultural and social influences on pain and pain management  - Notify physician/advanced practitioner if interventions unsuccessful or patient reports new pain  Outcome: Progressing     Problem: INFECTION - ADULT  Goal: Absence or prevention of progression during hospitalization  Description  INTERVENTIONS:  - Assess and monitor for signs and symptoms of infection  - Monitor lab/diagnostic results  - Monitor all insertion sites, i e  indwelling lines, tubes, and drains  - Monitor endotracheal (as able) and nasal secretions for changes in amount and color  - [place] appropriate cooling/warming therapies per order  - Administer medications as ordered  - Instruct and encourage patient and family to use good hand hygiene technique  - Identify and instruct in appropriate isolation precautions for identified infection/condition  Outcome: Progressing  Goal: Absence of fever/infection during neutropenic period  Description  INTERVENTIONS:  - Monitor WBC   Outcome: Progressing     Problem: SAFETY ADULT  Goal: Patient will remain free of falls  Description  INTERVENTIONS:  - Assess patient frequently for physical needs  -  Identify cognitive and physical deficits and behaviors that affect risk of falls    -  [place] fall precautions as indicated by assessment   - Educate patient/family on patient safety including physical limitations  - Instruct patient to call for assistance with activity based on assessment  - Modify environment to reduce risk of injury  - Consider OT/PT consult to assist with strengthening/mobility  Outcome: Progressing  Goal: Maintain or return to baseline ADL function  Description  INTERVENTIONS:  -  Assess patient's ability to carry out ADLs; assess patient's baseline for ADL function and identify physical deficits which impact ability to perform ADLs (bathing, care of mouth/teeth, toileting, grooming, dressing, etc )  - Assess/evaluate cause of self-care deficits   - Assess range of motion  - Assess patient's mobility; develop plan if impaired  - Assess patient's need for assistive devices and provide as appropriate  - Encourage maximum independence but intervene and supervise when necessary  ¯ Involve family in performance of ADLs  ¯ Assess for home care needs following discharge   ¯ Request OT consult to assist with ADL evaluation and planning for discharge  ¯ Provide patient education as appropriate  Outcome: Progressing  Goal: Maintain or return mobility status to optimal level  Description  INTERVENTIONS:  - Assess patient's baseline mobility status (ambulation, transfers, stairs, etc )    - Identify cognitive and physical deficits and behaviors that affect mobility  - Identify mobility aids required to assist with transfers and/or ambulation (gait belt, sit-to-stand, lift, walker, cane, etc )  - [place] fall precautions as indicated by assessment  - Record patient progress and toleration of activity level on Mobility SBAR; progress patient to next Phase/Stage  - Instruct patient to call for assistance with activity based on assessment  - Request Rehabilitation consult to assist with strengthening/weightbearing, etc   Outcome: Progressing     Problem: DISCHARGE PLANNING  Goal: Discharge to home or other facility with appropriate resources  Description  INTERVENTIONS:  - Identify barriers to discharge w/patient and caregiver  - Arrange for needed discharge resources and transportation as appropriate  - Identify discharge learning needs (meds, wound care, etc )  - Arrange for interpretive services to assist at discharge as needed  - Refer to Case Management Department for coordinating discharge planning if the patient needs post-hospital services based on physician/advanced practitioner order or complex needs related to functional status, cognitive ability, or social support system  Outcome: Progressing     Problem: Knowledge Deficit  Goal: Patient/family/caregiver demonstrates understanding of disease process, treatment plan, medications, and discharge instructions  Description  Complete learning assessment and assess knowledge base    Interventions:  - Provide teaching at level of understanding  - Provide teaching via preferred learning methods  Outcome: Progressing     Problem: RESPIRATORY - ADULT  Goal: Achieves optimal ventilation and oxygenation  Description  INTERVENTIONS:  - Assess for changes in respiratory status  - Assess for changes in mentation and behavior  - Position to facilitate oxygenation and minimize respiratory effort  - Oxygen administration by appropriate delivery method based on oxygen saturation (per order) or ABGs  - Initiate smoking cessation education as indicated  - Encourage broncho-pulmonary hygiene including cough, deep breathe, Incentive Spirometry  - Assess the need for suctioning and aspirate as needed  - Assess and instruct to report SOB or any respiratory difficulty  - Respiratory Therapy support as indicated  Outcome: Progressing     Problem: SKIN/TISSUE INTEGRITY - ADULT  Goal: Skin integrity remains intact  Description  INTERVENTIONS  - Identify patients at risk for skin breakdown  - Assess and monitor skin integrity  - Assess and monitor nutrition and hydration status  - Monitor labs (i e  albumin)  - Assess for incontinence   - Turn and reposition patient  - Assist with mobility/ambulation  - Relieve pressure over bony prominences  - Avoid friction and shearing  - Provide appropriate hygiene as needed including keeping skin clean and dry  - Evaluate need for skin moisturizer/barrier cream  - Collaborate with interdisciplinary team (i e  Nutrition, Rehabilitation, etc )   - Patient/family teaching  Outcome: Progressing     Problem: HEMATOLOGIC - ADULT  Goal: Maintains hematologic stability  Description  INTERVENTIONS  - Assess for signs and symptoms of bleeding or hemorrhage  - Monitor labs  - Administer supportive blood products/factors as ordered and appropriate  Outcome: Progressing     Problem: MUSCULOSKELETAL - ADULT  Goal: Maintain or return mobility to safest level of function  Description  INTERVENTIONS:  - Assess patient's ability to carry out ADLs; assess patient's baseline for ADL function and identify physical deficits which impact ability to perform ADLs (bathing, care of mouth/teeth, toileting, grooming, dressing, etc )  - Assess/evaluate cause of self-care deficits   - Assess range of motion  - Assess patient's mobility; develop plan if impaired  - Assess patient's need for assistive devices and provide as appropriate  - Encourage maximum independence but intervene and supervise when necessary  - Involve family in performance of ADLs  - Assess for home care needs following discharge   - Request OT consult to assist with ADL evaluation and planning for discharge  - Provide patient education as appropriate  Outcome: Progressing  Goal: Maintain proper alignment of affected body part  Description  INTERVENTIONS:  - Support, maintain and protect limb and body alignment  - Provide pt/fam with appropriate education  Outcome: Progressing
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
## 2019-03-05 NOTE — PLAN OF CARE
Problem: PAIN - ADULT  Goal: Verbalizes/displays adequate comfort level or baseline comfort level  Description  Interventions:  - Encourage patient to monitor pain and request assistance  - Assess pain using appropriate pain scale  - Administer analgesics based on type and severity of pain and evaluate response  - Implement non-pharmacological measures as appropriate and evaluate response  - Consider cultural and social influences on pain and pain management  - Notify physician/advanced practitioner if interventions unsuccessful or patient reports new pain  Outcome: Progressing     Problem: INFECTION - ADULT  Goal: Absence or prevention of progression during hospitalization  Description  INTERVENTIONS:  - Assess and monitor for signs and symptoms of infection  - Monitor lab/diagnostic results  - Monitor all insertion sites, i e  indwelling lines, tubes, and drains  - Monitor endotracheal (as able) and nasal secretions for changes in amount and color  - [place] appropriate cooling/warming therapies per order  - Administer medications as ordered  - Instruct and encourage patient and family to use good hand hygiene technique  - Identify and instruct in appropriate isolation precautions for identified infection/condition  Outcome: Progressing  Goal: Absence of fever/infection during neutropenic period  Description  INTERVENTIONS:  - Monitor WBC   Outcome: Progressing     Problem: SAFETY ADULT  Goal: Patient will remain free of falls  Description  INTERVENTIONS:  - Assess patient frequently for physical needs  -  Identify cognitive and physical deficits and behaviors that affect risk of falls    -  [place] fall precautions as indicated by assessment   - Educate patient/family on patient safety including physical limitations  - Instruct patient to call for assistance with activity based on assessment  - Modify environment to reduce risk of injury  - Consider OT/PT consult to assist with strengthening/mobility  Outcome: Progressing  Goal: Maintain or return to baseline ADL function  Description  INTERVENTIONS:  -  Assess patient's ability to carry out ADLs; assess patient's baseline for ADL function and identify physical deficits which impact ability to perform ADLs (bathing, care of mouth/teeth, toileting, grooming, dressing, etc )  - Assess/evaluate cause of self-care deficits   - Assess range of motion  - Assess patient's mobility; develop plan if impaired  - Assess patient's need for assistive devices and provide as appropriate  - Encourage maximum independence but intervene and supervise when necessary  ¯ Involve family in performance of ADLs  ¯ Assess for home care needs following discharge   ¯ Request OT consult to assist with ADL evaluation and planning for discharge  ¯ Provide patient education as appropriate  Outcome: Progressing  Goal: Maintain or return mobility status to optimal level  Description  INTERVENTIONS:  - Assess patient's baseline mobility status (ambulation, transfers, stairs, etc )    - Identify cognitive and physical deficits and behaviors that affect mobility  - Identify mobility aids required to assist with transfers and/or ambulation (gait belt, sit-to-stand, lift, walker, cane, etc )  - [place] fall precautions as indicated by assessment  - Record patient progress and toleration of activity level on Mobility SBAR; progress patient to next Phase/Stage  - Instruct patient to call for assistance with activity based on assessment  - Request Rehabilitation consult to assist with strengthening/weightbearing, etc   Outcome: Progressing     Problem: DISCHARGE PLANNING  Goal: Discharge to home or other facility with appropriate resources  Description  INTERVENTIONS:  - Identify barriers to discharge w/patient and caregiver  - Arrange for needed discharge resources and transportation as appropriate  - Identify discharge learning needs (meds, wound care, etc )  - Arrange for interpretive services to assist at discharge as needed  - Refer to Case Management Department for coordinating discharge planning if the patient needs post-hospital services based on physician/advanced practitioner order or complex needs related to functional status, cognitive ability, or social support system  Outcome: Progressing     Problem: Knowledge Deficit  Goal: Patient/family/caregiver demonstrates understanding of disease process, treatment plan, medications, and discharge instructions  Description  Complete learning assessment and assess knowledge base    Interventions:  - Provide teaching at level of understanding  - Provide teaching via preferred learning methods  Outcome: Progressing     Problem: RESPIRATORY - ADULT  Goal: Achieves optimal ventilation and oxygenation  Description  INTERVENTIONS:  - Assess for changes in respiratory status  - Assess for changes in mentation and behavior  - Position to facilitate oxygenation and minimize respiratory effort  - Oxygen administration by appropriate delivery method based on oxygen saturation (per order) or ABGs  - Initiate smoking cessation education as indicated  - Encourage broncho-pulmonary hygiene including cough, deep breathe, Incentive Spirometry  - Assess the need for suctioning and aspirate as needed  - Assess and instruct to report SOB or any respiratory difficulty  - Respiratory Therapy support as indicated  Outcome: Progressing     Problem: SKIN/TISSUE INTEGRITY - ADULT  Goal: Skin integrity remains intact  Description  INTERVENTIONS  - Identify patients at risk for skin breakdown  - Assess and monitor skin integrity  - Assess and monitor nutrition and hydration status  - Monitor labs (i e  albumin)  - Assess for incontinence   - Turn and reposition patient  - Assist with mobility/ambulation  - Relieve pressure over bony prominences  - Avoid friction and shearing  - Provide appropriate hygiene as needed including keeping skin clean and dry  - Evaluate need for skin moisturizer/barrier cream  - Collaborate with interdisciplinary team (i e  Nutrition, Rehabilitation, etc )   - Patient/family teaching  Outcome: Progressing     Problem: HEMATOLOGIC - ADULT  Goal: Maintains hematologic stability  Description  INTERVENTIONS  - Assess for signs and symptoms of bleeding or hemorrhage  - Monitor labs  - Administer supportive blood products/factors as ordered and appropriate  Outcome: Progressing     Problem: MUSCULOSKELETAL - ADULT  Goal: Maintain or return mobility to safest level of function  Description  INTERVENTIONS:  - Assess patient's ability to carry out ADLs; assess patient's baseline for ADL function and identify physical deficits which impact ability to perform ADLs (bathing, care of mouth/teeth, toileting, grooming, dressing, etc )  - Assess/evaluate cause of self-care deficits   - Assess range of motion  - Assess patient's mobility; develop plan if impaired  - Assess patient's need for assistive devices and provide as appropriate  - Encourage maximum independence but intervene and supervise when necessary  - Involve family in performance of ADLs  - Assess for home care needs following discharge   - Request OT consult to assist with ADL evaluation and planning for discharge  - Provide patient education as appropriate  Outcome: Progressing  Goal: Maintain proper alignment of affected body part  Description  INTERVENTIONS:  - Support, maintain and protect limb and body alignment  - Provide pt/fam with appropriate education  Outcome: Progressing
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
## 2019-03-05 NOTE — PLAN OF CARE
Problem: PAIN - ADULT  Goal: Verbalizes/displays adequate comfort level or baseline comfort level  Description  Interventions:  - Encourage patient to monitor pain and request assistance  - Assess pain using appropriate pain scale  - Administer analgesics based on type and severity of pain and evaluate response  - Implement non-pharmacological measures as appropriate and evaluate response  - Consider cultural and social influences on pain and pain management  - Notify physician/advanced practitioner if interventions unsuccessful or patient reports new pain  Outcome: Adequate for Discharge     Problem: INFECTION - ADULT  Goal: Absence or prevention of progression during hospitalization  Description  INTERVENTIONS:  - Assess and monitor for signs and symptoms of infection  - Monitor lab/diagnostic results  - Monitor all insertion sites, i e  indwelling lines, tubes, and drains  - Monitor endotracheal (as able) and nasal secretions for changes in amount and color  - [place] appropriate cooling/warming therapies per order  - Administer medications as ordered  - Instruct and encourage patient and family to use good hand hygiene technique  - Identify and instruct in appropriate isolation precautions for identified infection/condition  Outcome: Adequate for Discharge  Goal: Absence of fever/infection during neutropenic period  Description  INTERVENTIONS:  - Monitor WBC   Outcome: Adequate for Discharge     Problem: SAFETY ADULT  Goal: Patient will remain free of falls  Description  INTERVENTIONS:  - Assess patient frequently for physical needs  -  Identify cognitive and physical deficits and behaviors that affect risk of falls    -  [place] fall precautions as indicated by assessment   - Educate patient/family on patient safety including physical limitations  - Instruct patient to call for assistance with activity based on assessment  - Modify environment to reduce risk of injury  - Consider OT/PT consult to assist with strengthening/mobility  Outcome: Adequate for Discharge  Goal: Maintain or return to baseline ADL function  Description  INTERVENTIONS:  -  Assess patient's ability to carry out ADLs; assess patient's baseline for ADL function and identify physical deficits which impact ability to perform ADLs (bathing, care of mouth/teeth, toileting, grooming, dressing, etc )  - Assess/evaluate cause of self-care deficits   - Assess range of motion  - Assess patient's mobility; develop plan if impaired  - Assess patient's need for assistive devices and provide as appropriate  - Encourage maximum independence but intervene and supervise when necessary  ¯ Involve family in performance of ADLs  ¯ Assess for home care needs following discharge   ¯ Request OT consult to assist with ADL evaluation and planning for discharge  ¯ Provide patient education as appropriate  Outcome: Adequate for Discharge  Goal: Maintain or return mobility status to optimal level  Description  INTERVENTIONS:  - Assess patient's baseline mobility status (ambulation, transfers, stairs, etc )    - Identify cognitive and physical deficits and behaviors that affect mobility  - Identify mobility aids required to assist with transfers and/or ambulation (gait belt, sit-to-stand, lift, walker, cane, etc )  - [place] fall precautions as indicated by assessment  - Record patient progress and toleration of activity level on Mobility SBAR; progress patient to next Phase/Stage  - Instruct patient to call for assistance with activity based on assessment  - Request Rehabilitation consult to assist with strengthening/weightbearing, etc   Outcome: Adequate for Discharge     Problem: DISCHARGE PLANNING  Goal: Discharge to home or other facility with appropriate resources  Description  INTERVENTIONS:  - Identify barriers to discharge w/patient and caregiver  - Arrange for needed discharge resources and transportation as appropriate  - Identify discharge learning needs (meds, wound care, etc )  - Arrange for interpretive services to assist at discharge as needed  - Refer to Case Management Department for coordinating discharge planning if the patient needs post-hospital services based on physician/advanced practitioner order or complex needs related to functional status, cognitive ability, or social support system  Outcome: Adequate for Discharge     Problem: Knowledge Deficit  Goal: Patient/family/caregiver demonstrates understanding of disease process, treatment plan, medications, and discharge instructions  Description  Complete learning assessment and assess knowledge base    Interventions:  - Provide teaching at level of understanding  - Provide teaching via preferred learning methods  Outcome: Adequate for Discharge     Problem: RESPIRATORY - ADULT  Goal: Achieves optimal ventilation and oxygenation  Description  INTERVENTIONS:  - Assess for changes in respiratory status  - Assess for changes in mentation and behavior  - Position to facilitate oxygenation and minimize respiratory effort  - Oxygen administration by appropriate delivery method based on oxygen saturation (per order) or ABGs  - Initiate smoking cessation education as indicated  - Encourage broncho-pulmonary hygiene including cough, deep breathe, Incentive Spirometry  - Assess the need for suctioning and aspirate as needed  - Assess and instruct to report SOB or any respiratory difficulty  - Respiratory Therapy support as indicated  Outcome: Adequate for Discharge     Problem: SKIN/TISSUE INTEGRITY - ADULT  Goal: Skin integrity remains intact  Description  INTERVENTIONS  - Identify patients at risk for skin breakdown  - Assess and monitor skin integrity  - Assess and monitor nutrition and hydration status  - Monitor labs (i e  albumin)  - Assess for incontinence   - Turn and reposition patient  - Assist with mobility/ambulation  - Relieve pressure over bony prominences  - Avoid friction and shearing  - Provide appropriate hygiene as needed including keeping skin clean and dry  - Evaluate need for skin moisturizer/barrier cream  - Collaborate with interdisciplinary team (i e  Nutrition, Rehabilitation, etc )   - Patient/family teaching  Outcome: Adequate for Discharge     Problem: HEMATOLOGIC - ADULT  Goal: Maintains hematologic stability  Description  INTERVENTIONS  - Assess for signs and symptoms of bleeding or hemorrhage  - Monitor labs  - Administer supportive blood products/factors as ordered and appropriate  Outcome: Adequate for Discharge     Problem: MUSCULOSKELETAL - ADULT  Goal: Maintain or return mobility to safest level of function  Description  INTERVENTIONS:  - Assess patient's ability to carry out ADLs; assess patient's baseline for ADL function and identify physical deficits which impact ability to perform ADLs (bathing, care of mouth/teeth, toileting, grooming, dressing, etc )  - Assess/evaluate cause of self-care deficits   - Assess range of motion  - Assess patient's mobility; develop plan if impaired  - Assess patient's need for assistive devices and provide as appropriate  - Encourage maximum independence but intervene and supervise when necessary  - Involve family in performance of ADLs  - Assess for home care needs following discharge   - Request OT consult to assist with ADL evaluation and planning for discharge  - Provide patient education as appropriate  Outcome: Adequate for Discharge  Goal: Maintain proper alignment of affected body part  Description  INTERVENTIONS:  - Support, maintain and protect limb and body alignment  - Provide pt/fam with appropriate education  Outcome: Adequate for Discharge     Problem: DISCHARGE PLANNING - CARE MANAGEMENT  Goal: Discharge to post-acute care or home with appropriate resources  Description  INTERVENTIONS:  - Conduct assessment to determine patient/family and health care team treatment goals, and need for post-acute services based on payer coverage, community resources, and patient preferences, and barriers to discharge  - Address psychosocial, clinical, and financial barriers to discharge as identified in assessment in conjunction with the patient/family and health care team  - Arrange appropriate level of post-acute services according to patient's   needs and preference and payer coverage in collaboration with the physician and health care team  - Communicate with and update the patient/family, physician, and health care team regarding progress on the discharge plan  - Arrange appropriate transportation to post-acute venues  -Patient to be evaluated  Pt anticipated for rehab vs home  Outcome: Adequate for Discharge     Problem: Nutrition/Hydration-ADULT  Goal: Nutrient/Hydration intake appropriate for improving, restoring or maintaining nutritional needs  Description  Monitor and assess patient's nutrition/hydration status for malnutrition (ex- brittle hair, bruises, dry skin, pale skin and conjunctiva, muscle wasting, smooth red tongue, and disorientation)  Collaborate with interdisciplinary team and initiate plan and interventions as ordered  Monitor patient's weight and dietary intake as ordered or per policy  Utilize nutrition screening tool and intervene per policy  Determine patient's food preferences and provide high-protein, high-caloric foods as appropriate       INTERVENTIONS:  - Monitor oral intake, urinary output, labs, and treatment plans  - Assess nutrition and hydration status and recommend course of action  - Evaluate amount of meals eaten  - Assist patient with eating if necessary   - Allow adequate time for meals  - Recommend/ encourage appropriate diets, oral nutritional supplements, and vitamin/mineral supplements  - Order, calculate, and assess calorie counts as needed  - Recommend, monitor, and adjust tube feedings and TPN/PPN based on assessed needs  - Assess need for intravenous fluids  - Provide specific nutrition/hydration education as appropriate  - Include patient/family/caregiver in decisions related to nutrition  Outcome: Adequate for Discharge
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
## 2019-03-06 ENCOUNTER — TRANSITIONAL CARE MANAGEMENT (OUTPATIENT)
Dept: FAMILY MEDICINE CLINIC | Facility: CLINIC | Age: 59
End: 2019-03-06

## 2019-03-07 ENCOUNTER — TELEPHONE (OUTPATIENT)
Dept: FAMILY MEDICINE CLINIC | Facility: CLINIC | Age: 59
End: 2019-03-07
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
## 2019-03-12 ENCOUNTER — OFFICE VISIT (OUTPATIENT)
Dept: FAMILY MEDICINE CLINIC | Facility: CLINIC | Age: 59
End: 2019-03-12
Payer: COMMERCIAL

## 2019-03-12 VITALS
OXYGEN SATURATION: 96 % | HEART RATE: 61 BPM | HEIGHT: 74 IN | WEIGHT: 253 LBS | SYSTOLIC BLOOD PRESSURE: 120 MMHG | TEMPERATURE: 98.4 F | BODY MASS INDEX: 32.47 KG/M2 | DIASTOLIC BLOOD PRESSURE: 80 MMHG

## 2019-03-12 DIAGNOSIS — S22.42XA CLOSED FRACTURE OF MULTIPLE RIBS OF LEFT SIDE, INITIAL ENCOUNTER: Primary | ICD-10-CM

## 2019-03-12 PROCEDURE — 99496 TRANSJ CARE MGMT HIGH F2F 7D: CPT | Performed by: FAMILY MEDICINE
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
## 2019-03-21 ENCOUNTER — OFFICE VISIT (OUTPATIENT)
Dept: SURGERY | Facility: CLINIC | Age: 59
End: 2019-03-21
Payer: COMMERCIAL

## 2019-03-21 VITALS
DIASTOLIC BLOOD PRESSURE: 72 MMHG | SYSTOLIC BLOOD PRESSURE: 114 MMHG | WEIGHT: 255 LBS | BODY MASS INDEX: 32.73 KG/M2 | TEMPERATURE: 98.6 F | HEIGHT: 74 IN

## 2019-03-21 DIAGNOSIS — S22.42XD CLOSED FRACTURE OF MULTIPLE RIBS OF LEFT SIDE WITH ROUTINE HEALING, SUBSEQUENT ENCOUNTER: Primary | ICD-10-CM

## 2019-03-21 DIAGNOSIS — S22.32XA LEFT RIB FRACTURE: ICD-10-CM

## 2019-03-21 PROCEDURE — 99213 OFFICE O/P EST LOW 20 MIN: CPT | Performed by: EMERGENCY MEDICINE

## 2019-03-21 RX ORDER — METHOCARBAMOL 750 MG/1
750 TABLET, FILM COATED ORAL EVERY 6 HOURS SCHEDULED
Qty: 30 TABLET | Refills: 1 | Status: SHIPPED | OUTPATIENT
Start: 2019-03-21 | End: 2020-02-12 | Stop reason: ALTCHOICE

## 2019-03-21 NOTE — LETTER
March 21, 2019     Patient: Roxana Velazquez   YOB: 1960   Date of Visit: 3/21/2019       To Whom it May Concern:    Rosemarie Serrano is under my professional care  He was seen in my office on 3/21/2019  He may return to work on April 1, 2019       If you have any questions or concerns, please don't hesitate to call           Sincerely,          TRUJILLO TRAUMA PROVIDER    Janene SCHMID         CC: Roxana Velazquez

## 2019-03-21 NOTE — PATIENT INSTRUCTIONS
- Add robaxin (muscle relaxer) 750 mg every 6 hours as needed for spasms - for the next 3 days, take three times a day, then decrease to twice a day for a day or two then decrease to PRN dosing as above  - continue to use the incentive spirometer (breathing machine) every hour while awake for the full 6 weeks  - can increase your activity slowly back to your normal activity  - Please call us with any questions or concerns  If you feel you are progressing and getting better you do not need to come back for another appointment but please call us if you feel that you are not healing for any reason or with any concerns

## 2019-03-21 NOTE — ASSESSMENT & PLAN NOTE
- Reports was doing well, describes muscle spasm occurred Tuesday and he remains sore since then but has had no problems with shortness of breath, dyspnea on exertion or with increasing his activity level  - will reorder robaxin for muscle spasms, take TID x 3 days, then BID x 2 days then PRN q 6 hours and encouraged heat compresses and/or bengay/icy hot to the area with gentle range of motion exercises  - pulse ox 100% today  HR 95 bpm    - Advised ok to return to work April 1, 2019 as long as muscle spasms are improved by that time     - PRN follow-up with trauma
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
## 2019-06-08 DIAGNOSIS — K21.9 GASTROESOPHAGEAL REFLUX DISEASE WITHOUT ESOPHAGITIS: ICD-10-CM

## 2019-06-10 RX ORDER — PANTOPRAZOLE SODIUM 40 MG/1
TABLET, DELAYED RELEASE ORAL
Qty: 90 TABLET | Refills: 1 | Status: SHIPPED | OUTPATIENT
Start: 2019-06-10 | End: 2019-12-07 | Stop reason: SDUPTHER
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
## 2019-12-07 DIAGNOSIS — K21.9 GASTROESOPHAGEAL REFLUX DISEASE WITHOUT ESOPHAGITIS: ICD-10-CM

## 2019-12-09 RX ORDER — PANTOPRAZOLE SODIUM 40 MG/1
TABLET, DELAYED RELEASE ORAL
Qty: 90 TABLET | Refills: 4 | Status: SHIPPED | OUTPATIENT
Start: 2019-12-09 | End: 2020-02-15 | Stop reason: HOSPADM

## 2020-02-12 ENCOUNTER — TELEPHONE (OUTPATIENT)
Dept: FAMILY MEDICINE CLINIC | Facility: CLINIC | Age: 60
End: 2020-02-12

## 2020-02-12 ENCOUNTER — ANESTHESIA EVENT (INPATIENT)
Dept: GASTROENTEROLOGY | Facility: HOSPITAL | Age: 60
DRG: 393 | End: 2020-02-12
Payer: COMMERCIAL

## 2020-02-12 ENCOUNTER — APPOINTMENT (EMERGENCY)
Dept: CT IMAGING | Facility: HOSPITAL | Age: 60
DRG: 393 | End: 2020-02-12
Payer: COMMERCIAL

## 2020-02-12 ENCOUNTER — HOSPITAL ENCOUNTER (INPATIENT)
Facility: HOSPITAL | Age: 60
LOS: 3 days | Discharge: HOME | DRG: 393 | End: 2020-02-15
Attending: EMERGENCY MEDICINE | Admitting: INTERNAL MEDICINE
Payer: COMMERCIAL

## 2020-02-12 DIAGNOSIS — K92.2 GI BLEED: Primary | ICD-10-CM

## 2020-02-12 DIAGNOSIS — S22.32XA LEFT RIB FRACTURE: ICD-10-CM

## 2020-02-12 DIAGNOSIS — R93.429 ABNORMAL CT SCAN, KIDNEY: ICD-10-CM

## 2020-02-12 DIAGNOSIS — D62 ACUTE BLOOD LOSS ANEMIA: ICD-10-CM

## 2020-02-12 PROBLEM — Z72.89 ALCOHOL USE: Status: ACTIVE | Noted: 2020-02-12

## 2020-02-12 PROBLEM — Z78.9 ALCOHOL USE: Status: ACTIVE | Noted: 2020-02-12

## 2020-02-12 PROBLEM — Z98.84 HISTORY OF ROUX-EN-Y GASTRIC BYPASS: Status: ACTIVE | Noted: 2020-02-12

## 2020-02-12 PROBLEM — K92.1 GASTROINTESTINAL HEMORRHAGE WITH MELENA: Status: ACTIVE | Noted: 2020-02-12

## 2020-02-12 PROBLEM — R93.5 ABNORMAL CT OF THE ABDOMEN: Status: ACTIVE | Noted: 2020-02-12

## 2020-02-12 LAB
ABO GROUP BLD: NORMAL
ABO GROUP BLD: NORMAL
ALBUMIN SERPL BCP-MCNC: 2.5 G/DL (ref 3.5–5)
ALP SERPL-CCNC: 41 U/L (ref 46–116)
ALT SERPL W P-5'-P-CCNC: 20 U/L (ref 12–78)
ANION GAP SERPL CALCULATED.3IONS-SCNC: 10 MMOL/L (ref 4–13)
AST SERPL W P-5'-P-CCNC: 17 U/L (ref 5–45)
ATRIAL RATE: 78 BPM
BACTERIA UR QL AUTO: NORMAL /HPF
BASOPHILS # BLD AUTO: 0.02 THOUSANDS/ΜL (ref 0–0.1)
BASOPHILS NFR BLD AUTO: 0 % (ref 0–1)
BILIRUB SERPL-MCNC: 0.21 MG/DL (ref 0.2–1)
BILIRUB UR QL STRIP: NEGATIVE
BILIRUB UR QL STRIP: NEGATIVE
BLD GP AB SCN SERPL QL: NEGATIVE
BUN SERPL-MCNC: 25 MG/DL (ref 5–25)
CALCIUM SERPL-MCNC: 7.7 MG/DL (ref 8.3–10.1)
CHLORIDE SERPL-SCNC: 107 MMOL/L (ref 100–108)
CLARITY UR: CLEAR
CLARITY UR: CLEAR
CLARITY, POC: CLEAR
CO2 SERPL-SCNC: 24 MMOL/L (ref 21–32)
COLOR UR: YELLOW
COLOR UR: YELLOW
COLOR, POC: YELLOW
CREAT SERPL-MCNC: 0.93 MG/DL (ref 0.6–1.3)
EOSINOPHIL # BLD AUTO: 0.12 THOUSAND/ΜL (ref 0–0.61)
EOSINOPHIL NFR BLD AUTO: 1 % (ref 0–6)
ERYTHROCYTE [DISTWIDTH] IN BLOOD BY AUTOMATED COUNT: 12.4 % (ref 11.6–15.1)
GFR SERPL CREATININE-BSD FRML MDRD: 90 ML/MIN/1.73SQ M
GLUCOSE SERPL-MCNC: 113 MG/DL (ref 65–140)
GLUCOSE SERPL-MCNC: 175 MG/DL (ref 65–140)
GLUCOSE UR STRIP-MCNC: NEGATIVE MG/DL
GLUCOSE UR STRIP-MCNC: NEGATIVE MG/DL
HCT VFR BLD AUTO: 17.7 % (ref 36.5–49.3)
HCT VFR BLD AUTO: 17.7 % (ref 36.5–49.3)
HGB BLD-MCNC: 5.8 G/DL (ref 12–17)
HGB BLD-MCNC: 5.9 G/DL (ref 12–17)
HGB UR QL STRIP.AUTO: NEGATIVE
HGB UR QL STRIP.AUTO: NEGATIVE
IMM GRANULOCYTES # BLD AUTO: 0.08 THOUSAND/UL (ref 0–0.2)
IMM GRANULOCYTES NFR BLD AUTO: 1 % (ref 0–2)
KETONES UR STRIP-MCNC: NEGATIVE MG/DL
KETONES UR STRIP-MCNC: NEGATIVE MG/DL
LEUKOCYTE ESTERASE UR QL STRIP: NEGATIVE
LEUKOCYTE ESTERASE UR QL STRIP: NEGATIVE
LYMPHOCYTES # BLD AUTO: 1.55 THOUSANDS/ΜL (ref 0.6–4.47)
LYMPHOCYTES NFR BLD AUTO: 17 % (ref 14–44)
MAGNESIUM SERPL-MCNC: 1.9 MG/DL (ref 1.6–2.6)
MCH RBC QN AUTO: 33.5 PG (ref 26.8–34.3)
MCHC RBC AUTO-ENTMCNC: 33.3 G/DL (ref 31.4–37.4)
MCV RBC AUTO: 101 FL (ref 82–98)
MONOCYTES # BLD AUTO: 0.53 THOUSAND/ΜL (ref 0.17–1.22)
MONOCYTES NFR BLD AUTO: 6 % (ref 4–12)
NEUTROPHILS # BLD AUTO: 6.85 THOUSANDS/ΜL (ref 1.85–7.62)
NEUTS SEG NFR BLD AUTO: 75 % (ref 43–75)
NITRITE UR QL STRIP: NEGATIVE
NITRITE UR QL STRIP: NEGATIVE
NON-SQ EPI CELLS URNS QL MICRO: NORMAL /HPF
NRBC BLD AUTO-RTO: 0 /100 WBCS
P AXIS: 46 DEGREES
PH UR STRIP.AUTO: 6.5 [PH]
PH UR STRIP.AUTO: 7 [PH] (ref 4.5–8)
PLATELET # BLD AUTO: 204 THOUSANDS/UL (ref 149–390)
PMV BLD AUTO: 10.8 FL (ref 8.9–12.7)
POTASSIUM SERPL-SCNC: 3.9 MMOL/L (ref 3.5–5.3)
PR INTERVAL: 166 MS
PROT SERPL-MCNC: 5.2 G/DL (ref 6.4–8.2)
PROT UR STRIP-MCNC: NEGATIVE MG/DL
PROT UR STRIP-MCNC: NEGATIVE MG/DL
QRS AXIS: 59 DEGREES
QRSD INTERVAL: 82 MS
QT INTERVAL: 362 MS
QTC INTERVAL: 412 MS
RBC # BLD AUTO: 1.76 MILLION/UL (ref 3.88–5.62)
RBC #/AREA URNS AUTO: NORMAL /HPF
RH BLD: NEGATIVE
RH BLD: NEGATIVE
SODIUM SERPL-SCNC: 141 MMOL/L (ref 136–145)
SP GR UR STRIP.AUTO: 1.01 (ref 1–1.03)
SP GR UR STRIP.AUTO: 1.01 (ref 1–1.03)
SPECIMEN EXPIRATION DATE: NORMAL
T WAVE AXIS: 15 DEGREES
UROBILINOGEN UR QL STRIP.AUTO: 0.2 E.U./DL
UROBILINOGEN UR QL STRIP.AUTO: 0.2 E.U./DL
VENTRICULAR RATE: 78 BPM
WBC # BLD AUTO: 9.15 THOUSAND/UL (ref 4.31–10.16)
WBC #/AREA URNS AUTO: NORMAL /HPF

## 2020-02-12 PROCEDURE — 90682 RIV4 VACC RECOMBINANT DNA IM: CPT | Performed by: STUDENT IN AN ORGANIZED HEALTH CARE EDUCATION/TRAINING PROGRAM

## 2020-02-12 PROCEDURE — 80053 COMPREHEN METABOLIC PANEL: CPT | Performed by: EMERGENCY MEDICINE

## 2020-02-12 PROCEDURE — 85018 HEMOGLOBIN: CPT | Performed by: NURSE PRACTITIONER

## 2020-02-12 PROCEDURE — 86850 RBC ANTIBODY SCREEN: CPT | Performed by: EMERGENCY MEDICINE

## 2020-02-12 PROCEDURE — 81001 URINALYSIS AUTO W/SCOPE: CPT | Performed by: NURSE PRACTITIONER

## 2020-02-12 PROCEDURE — 74177 CT ABD & PELVIS W/CONTRAST: CPT

## 2020-02-12 PROCEDURE — 86920 COMPATIBILITY TEST SPIN: CPT

## 2020-02-12 PROCEDURE — P9016 RBC LEUKOCYTES REDUCED: HCPCS

## 2020-02-12 PROCEDURE — 99285 EMERGENCY DEPT VISIT HI MDM: CPT

## 2020-02-12 PROCEDURE — 86901 BLOOD TYPING SEROLOGIC RH(D): CPT | Performed by: EMERGENCY MEDICINE

## 2020-02-12 PROCEDURE — C9113 INJ PANTOPRAZOLE SODIUM, VIA: HCPCS | Performed by: NURSE PRACTITIONER

## 2020-02-12 PROCEDURE — 82948 REAGENT STRIP/BLOOD GLUCOSE: CPT

## 2020-02-12 PROCEDURE — 83735 ASSAY OF MAGNESIUM: CPT | Performed by: NURSE PRACTITIONER

## 2020-02-12 PROCEDURE — 93005 ELECTROCARDIOGRAM TRACING: CPT

## 2020-02-12 PROCEDURE — 93010 ELECTROCARDIOGRAM REPORT: CPT | Performed by: INTERNAL MEDICINE

## 2020-02-12 PROCEDURE — 36415 COLL VENOUS BLD VENIPUNCTURE: CPT | Performed by: EMERGENCY MEDICINE

## 2020-02-12 PROCEDURE — C9113 INJ PANTOPRAZOLE SODIUM, VIA: HCPCS | Performed by: EMERGENCY MEDICINE

## 2020-02-12 PROCEDURE — 90471 IMMUNIZATION ADMIN: CPT | Performed by: STUDENT IN AN ORGANIZED HEALTH CARE EDUCATION/TRAINING PROGRAM

## 2020-02-12 PROCEDURE — 85025 COMPLETE CBC W/AUTO DIFF WBC: CPT | Performed by: EMERGENCY MEDICINE

## 2020-02-12 PROCEDURE — 30233N1 TRANSFUSION OF NONAUTOLOGOUS RED BLOOD CELLS INTO PERIPHERAL VEIN, PERCUTANEOUS APPROACH: ICD-10-PCS | Performed by: EMERGENCY MEDICINE

## 2020-02-12 PROCEDURE — 85014 HEMATOCRIT: CPT | Performed by: NURSE PRACTITIONER

## 2020-02-12 PROCEDURE — 36430 TRANSFUSION BLD/BLD COMPNT: CPT

## 2020-02-12 PROCEDURE — 99223 1ST HOSP IP/OBS HIGH 75: CPT | Performed by: STUDENT IN AN ORGANIZED HEALTH CARE EDUCATION/TRAINING PROGRAM

## 2020-02-12 PROCEDURE — 99291 CRITICAL CARE FIRST HOUR: CPT | Performed by: EMERGENCY MEDICINE

## 2020-02-12 PROCEDURE — 86900 BLOOD TYPING SEROLOGIC ABO: CPT | Performed by: EMERGENCY MEDICINE

## 2020-02-12 PROCEDURE — 81003 URINALYSIS AUTO W/O SCOPE: CPT

## 2020-02-12 RX ORDER — PANTOPRAZOLE SODIUM 40 MG/1
40 INJECTION, POWDER, FOR SOLUTION INTRAVENOUS EVERY 12 HOURS SCHEDULED
Status: DISCONTINUED | OUTPATIENT
Start: 2020-02-12 | End: 2020-02-14

## 2020-02-12 RX ORDER — PANTOPRAZOLE SODIUM 40 MG/1
40 INJECTION, POWDER, FOR SOLUTION INTRAVENOUS ONCE
Status: COMPLETED | OUTPATIENT
Start: 2020-02-12 | End: 2020-02-12

## 2020-02-12 RX ORDER — ACETAMINOPHEN 325 MG/1
650 TABLET ORAL EVERY 6 HOURS PRN
Status: DISCONTINUED | OUTPATIENT
Start: 2020-02-12 | End: 2020-02-15 | Stop reason: HOSPADM

## 2020-02-12 RX ORDER — SODIUM CHLORIDE 9 MG/ML
125 INJECTION, SOLUTION INTRAVENOUS CONTINUOUS
Status: CANCELLED | OUTPATIENT
Start: 2020-02-12

## 2020-02-12 RX ORDER — ONDANSETRON 2 MG/ML
4 INJECTION INTRAMUSCULAR; INTRAVENOUS EVERY 6 HOURS PRN
Status: DISCONTINUED | OUTPATIENT
Start: 2020-02-12 | End: 2020-02-15 | Stop reason: HOSPADM

## 2020-02-12 RX ORDER — SODIUM CHLORIDE 9 MG/ML
100 INJECTION, SOLUTION INTRAVENOUS CONTINUOUS
Status: DISCONTINUED | OUTPATIENT
Start: 2020-02-12 | End: 2020-02-13

## 2020-02-12 RX ORDER — PANTOPRAZOLE SODIUM 40 MG/1
40 INJECTION, POWDER, FOR SOLUTION INTRAVENOUS EVERY 12 HOURS SCHEDULED
Status: DISCONTINUED | OUTPATIENT
Start: 2020-02-12 | End: 2020-02-12 | Stop reason: SDUPTHER

## 2020-02-12 RX ADMIN — IOHEXOL 100 ML: 350 INJECTION, SOLUTION INTRAVENOUS at 11:13

## 2020-02-12 RX ADMIN — ACETAMINOPHEN 650 MG: 325 TABLET ORAL at 21:15

## 2020-02-12 RX ADMIN — SODIUM CHLORIDE 100 ML/HR: 0.9 INJECTION, SOLUTION INTRAVENOUS at 12:38

## 2020-02-12 RX ADMIN — PANTOPRAZOLE SODIUM 40 MG: 40 INJECTION, POWDER, FOR SOLUTION INTRAVENOUS at 21:15

## 2020-02-12 RX ADMIN — SODIUM CHLORIDE 100 ML/HR: 0.9 INJECTION, SOLUTION INTRAVENOUS at 22:26

## 2020-02-12 RX ADMIN — PANTOPRAZOLE SODIUM 40 MG: 40 INJECTION, POWDER, FOR SOLUTION INTRAVENOUS at 12:39

## 2020-02-12 RX ADMIN — INFLUENZA A VIRUS A/BRISBANE/02/2018 (H1N1) RECOMBINANT HEMAGGLUTININ ANTIGEN, INFLUENZA A VIRUS A/KANSAS/14/2017 (H3N2) RECOMBINANT HEMAGGLUTININ ANTIGEN, INFLUENZA B VIRUS B/PHUKET/3073/2013 RECOMBINANT HEMAGGLUTININ ANTIGEN, AND INFLUENZA B VIRUS B/MARYLAND/15/2016 RECOMBINANT HEMAGGLUTININ ANTIGEN 0.5 ML: 45; 45; 45; 45 INJECTION INTRAMUSCULAR at 16:38

## 2020-02-12 NOTE — ASSESSMENT & PLAN NOTE
· Previous ulcer history and has been maintained on Protonix  He does not take this daily    · IV Protonix BID while here  · Pending GI evaluation  · No abdominal pain

## 2020-02-12 NOTE — ASSESSMENT & PLAN NOTE
· CT with incidental finding of new subtle area of diminished attenuation lower pole of the right kidney is difficult to characterize  Some mild inflammation is possible  Correlation with urinalysis may be helpful as well as follow-up examination in a few months time to exclude other etiologies    · No urinary symptoms  · UA pending

## 2020-02-12 NOTE — ANESTHESIA PREPROCEDURE EVALUATION
Review of Systems/Medical History  Patient summary reviewed  Chart reviewed      Cardiovascular  Negative cardio ROS    Pulmonary  Negative pulmonary ROS        GI/Hepatic    GI bleeding , active, PUD, GERD well controlled, Bariatric surgery,   Comment: Hx alcohol abuse     Negative  ROS        Endo/Other  Negative endo/other ROS      GYN  Negative gynecology ROS          Hematology  Anemia ,     Musculoskeletal  Negative musculoskeletal ROS        Neurology  Negative neurology ROS      Psychology   Negative psychology ROS              Physical Exam    Airway    Mallampati score: II  TM Distance: >3 FB  Neck ROM: full     Dental   No notable dental hx     Cardiovascular  Comment: Negative ROS, Rhythm: regular, Rate: normal, Cardiovascular exam normal    Pulmonary  Pulmonary exam normal Breath sounds clear to auscultation,     Other Findings        Anesthesia Plan  ASA Score- 3 Emergent    Anesthesia Type- IV sedation with anesthesia with ASA Monitors  Additional Monitors:   Airway Plan:     Comment: Patient transfused  Hgb=7 8 today        Plan Factors-Patient not instructed to abstain from smoking on day of procedure  Patient did not smoke on day of surgery  Induction- intravenous  Postoperative Plan-     Informed Consent- Anesthetic plan and risks discussed with patient

## 2020-02-12 NOTE — ED PROVIDER NOTES
History  Chief Complaint   Patient presents with    Dizziness     Pt reports vertigo like dizziness, reports dizzy with positional changes and ambulation described as a room spinning sensation  began on sunday  reports a slip and fall on Wednesday, struck head and lost consciousness for a few seconds  headaches and dizziness since sunday   Shortness of Breath     Pt reports feeling SOB that is worst with exertion, reports unable to walk up flight of stairs without stopping, reports chest discomfort as wel    Black or Bloody Stool     Pt reports black diarrhea like stools, denies vomiting  - blood thinners  Hx of stomach ulcers  62 yo male with h/o R&Y gastric bypass alomst 20 years ago presents with multiple complaints  Starts with onset of BURROWS over the last 4 days, characterized by fatigue and dyspnea with any amount of exertion, increasing since onset, without associated chest pain or diaphoresis  He c/o sensation of lightheadedness and dizziness, upon standing and in the shower this morning, he felt like he was going to pass out  He denies N/V  Onset seems to be after he first noticed after several episodes dark black stool on 2/9/20  He took Immodium and has not any bowel movement since only increased gas  He denies abdominal pain, although he localize discomfort to bilateral hips and groin area  He recalls having an ulcer shortly after his gastric bypass, that was treated with medication, did not require any procedures nor blood transfusion, and has been asymptomatic since then  He also fell about 1 week ago, slipped on his deck, recalls hitting back of head on deck, no LOC, and no c/o dizziness, no extremity pain or neck pain  History provided by:  Patient      Prior to Admission Medications   Prescriptions Last Dose Informant Patient Reported? Taking?    Multiple Vitamins-Minerals (MULTIVITAMIN ADULT EXTRA C PO) Not Taking at Unknown time  Yes No   Sig: Take 1 capsule by mouth acetaminophen (TYLENOL) 325 mg tablet Not Taking at Unknown time  No No   Sig: Take 3 tablets (975 mg total) by mouth every 8 (eight) hours   Patient not taking: Reported on 3/21/2019   pantoprazole (PROTONIX) 40 mg tablet 2/11/2020 at Unknown time  No Yes   Sig: TAKE 1 TABLET DAILY      Facility-Administered Medications: None       Past Medical History:   Diagnosis Date    Acute ulcer of stomach     Rib fractures        Past Surgical History:   Procedure Laterality Date    GASTRIC BYPASS      MANDIBLE FRACTURE SURGERY         Family History   Problem Relation Age of Onset    Seizures Father      I have reviewed and agree with the history as documented  Social History     Tobacco Use    Smoking status: Current Some Day Smoker     Types: Cigars    Smokeless tobacco: Current User   Substance Use Topics    Alcohol use: Yes     Alcohol/week: 7 0 standard drinks     Types: 7 Cans of beer per week    Drug use: No       Review of Systems   Constitutional: Positive for fatigue  Negative for appetite change, chills and fever  HENT: Negative for sore throat  Respiratory: Positive for shortness of breath  Negative for cough and wheezing  Cardiovascular: Negative for chest pain and palpitations  Gastrointestinal: Positive for blood in stool and diarrhea  Negative for abdominal pain, constipation, nausea, rectal pain and vomiting  Genitourinary: Negative for dysuria and hematuria  Musculoskeletal: Negative for neck pain  Skin: Negative for rash  Neurological: Positive for dizziness and light-headedness  Negative for weakness and headaches  Psychiatric/Behavioral: Negative for suicidal ideas  All other systems reviewed and are negative  Physical Exam  Physical Exam   Constitutional: He is oriented to person, place, and time  He appears well-developed and well-nourished  Non-toxic appearance  HENT:   Head: Normocephalic     Right Ear: Tympanic membrane and external ear normal    Left Ear: External ear normal    Nose: Nose normal    Mouth/Throat: Oropharynx is clear and moist    Eyes: Pupils are equal, round, and reactive to light  Conjunctivae, EOM and lids are normal    Neck: Normal range of motion  Neck supple  No JVD present  No Brudzinski's sign and no Kernig's sign noted  Cardiovascular: Normal rate, regular rhythm and normal heart sounds  No murmur heard  Pulmonary/Chest: Effort normal and breath sounds normal  No accessory muscle usage  No tachypnea  No respiratory distress  He has no wheezes  Abdominal: Soft  Normal appearance and bowel sounds are normal  He exhibits no distension and no mass  There is no tenderness  There is no rigidity, no rebound and no guarding  Genitourinary: Rectal exam shows guaiac positive stool (positive, maroon colored melanotic)  Musculoskeletal: Normal range of motion  Lymphadenopathy:        Head (right side): No submental, no submandibular, no preauricular and no posterior auricular adenopathy present  Head (left side): No submental, no submandibular, no preauricular and no posterior auricular adenopathy present  He has no cervical adenopathy  Neurological: He is alert and oriented to person, place, and time  He has normal strength and normal reflexes  No cranial nerve deficit or sensory deficit  Coordination normal  GCS eye subscore is 4  GCS verbal subscore is 5  GCS motor subscore is 6  Skin: Skin is warm and dry  Capillary refill takes more than 3 seconds  No rash noted  He is not diaphoretic  There is pallor  Psychiatric: He has a normal mood and affect  His speech is normal and behavior is normal  Thought content normal  Cognition and memory are normal    Nursing note and vitals reviewed        Vital Signs  ED Triage Vitals   Temperature Pulse Respirations Blood Pressure SpO2   02/12/20 0935 02/12/20 0935 02/12/20 0935 02/12/20 0935 02/12/20 0935   97 8 °F (36 6 °C) 86 (!) 26 128/65 99 %      Temp Source Heart Rate Source Patient Position - Orthostatic VS BP Location FiO2 (%)   02/12/20 0935 02/12/20 0935 02/12/20 1303 02/12/20 0935 --   Oral Monitor Sitting Right arm       Pain Score       02/12/20 0935       3           Vitals:    02/12/20 1230 02/12/20 1243 02/12/20 1303 02/12/20 1315   BP: 111/57 104/51 98/50 103/51   Pulse: 79 83 74 76   Patient Position - Orthostatic VS:   Sitting          Visual Acuity  Visual Acuity      Most Recent Value   L Pupil Size (mm)  4   R Pupil Size (mm)  3          ED Medications  Medications   iohexol (OMNIPAQUE) 350 MG/ML injection (MULTI-DOSE) 100 mL (has no administration in time range)   sodium chloride 0 9 % infusion (100 mL/hr Intravenous New Bag 2/12/20 1238)   ondansetron (ZOFRAN) injection 4 mg (has no administration in time range)   acetaminophen (TYLENOL) tablet 650 mg (has no administration in time range)   pantoprazole (PROTONIX) injection 40 mg (has no administration in time range)   iohexol (OMNIPAQUE) 350 MG/ML injection (SINGLE-DOSE) 100 mL (100 mL Intravenous Given 2/12/20 1113)   pantoprazole (PROTONIX) injection 40 mg (40 mg Intravenous Given 2/12/20 1239)       Diagnostic Studies  Results Reviewed     Procedure Component Value Units Date/Time    Hemoglobin and hematocrit, blood [[id number]]     Lab Status:  No result Specimen:  Blood     Magnesium [[id number]]  (Normal) Collected:  02/12/20 0935    Lab Status:  Final result Specimen:  Blood from Arm, Left Updated:  02/12/20 1252     Magnesium 1 9 mg/dL     POCT urinalysis dipstick [[id number]]  (Normal) Resulted:  02/12/20 1203    Lab Status:  Final result Specimen:  Urine Updated:  02/12/20 1203     Color, UA YELLOW     Clarity, UA CLEAR    Urine Macroscopic, POC [[id number]] Collected:  02/12/20 1200    Lab Status:  Final result Specimen:  Urine Updated:  02/12/20 1202     Color, UA Yellow     Clarity, UA Clear     pH, UA 7 0     Leukocytes, UA Negative     Nitrite, UA Negative     Protein, UA Negative mg/dl      Glucose, UA Negative mg/dl      Ketones, UA Negative mg/dl      Urobilinogen, UA 0 2 E U /dl      Bilirubin, UA Negative     Blood, UA Negative     Specific Gravity, UA 1 010    Narrative:       CLINITEK RESULT    Comprehensive metabolic panel [[id number]]  (Abnormal) Collected:  02/12/20 0935    Lab Status:  Final result Specimen:  Blood from Arm, Left Updated:  02/12/20 1053     Sodium 141 mmol/L      Potassium 3 9 mmol/L      Chloride 107 mmol/L      CO2 24 mmol/L      ANION GAP 10 mmol/L      BUN 25 mg/dL      Creatinine 0 93 mg/dL      Glucose 113 mg/dL      Calcium 7 7 mg/dL      AST 17 U/L      ALT 20 U/L      Alkaline Phosphatase 41 U/L      Total Protein 5 2 g/dL      Albumin 2 5 g/dL      Total Bilirubin 0 21 mg/dL      eGFR 90 ml/min/1 73sq m     Narrative:       National Kid[employer] Disease [employer] guidelines for Chronic Kidney Disease (CKD):     Stage 1 with normal or high GFR (GFR > 90 mL/min/1 73 square meters)    Stage 2 Mild CKD (GFR = 60-89 mL/min/1 73 square meters)    Stage 3A Moderate CKD (GFR = 45-59 mL/min/1 73 square meters)    Stage 3B Moderate CKD (GFR = 30-44 mL/min/1 73 square meters)    Stage 4 Severe CKD (GFR = 15-29 mL/min/1 73 square meters)    Stage 5 End Stage CKD (GFR <15 mL/min/1 73 square meters)  Note: GFR calculation is accurate only with a steady state creatinine    CBC and differential [[id number]]  (Abnormal) Collected:  02/12/20 0935    Lab Status:  Final result Specimen:  Blood from Arm, Left Updated:  02/12/20 1045     WBC 9 15 Thousand/uL      RBC 1 76 Million/uL      Hemoglobin 5 9 g/dL      Hematocrit 17 7 %       fL      MCH 33 5 pg      MCHC 33 3 g/dL      RDW 12 4 %      MPV 10 8 fL      Platelets 054 Thousands/uL      nRBC 0 /100 WBCs      Neutrophils Relative 75 %      Immat GRANS % 1 %      Lymphocytes Relative 17 %      Monocytes Relative 6 %      Eosinophils Relative 1 %      Basophils Relative 0 %      Neutrophils Absolute 6 85 Thousands/µL      Immature Grans Absolute 0 08 Thousand/uL      Lymphocytes Absolute 1 55 Thousands/µL      Monocytes Absolute 0 53 Thousand/µL      Eosinophils Absolute 0 12 Thousand/µL      Basophils Absolute 0 02 Thousands/µL                  CT abdomen pelvis with contrast   Final Result by Julienne Quiñonez MD (02/12 1129)      No evidence of bowel dilatation to suggest obstruction  No obvious mass is seen  New subtle area of diminished attenuation lower pole of the right kidney is difficult to characterize  Some mild inflammation is possible  Correlation with urinalysis may be helpful as well as follow-up examination in a few months time to exclude other    etiologies  The study was marked in EPIC for significant notification with follow-up  Workstation performed: [id number]                    Procedures  ECG 12 Lead Documentation Only  Date/Time: 2/12/2020 9:44 AM  Performed by: Jesse Suarez MD  Authorized by: Jesse Suarez MD     CriticalCare Time  Performed by: Jesse Suarez MD  Authorized by: Jesse Suarez MD     Critical care provider statement:     Critical care time (minutes):  30    Critical care time was exclusive of:  Separately billable procedures and treating other patients and teaching time    Critical care was necessary to treat or prevent imminent or life-threatening deterioration of the following conditions: blood loss anemia, GI bleed      Critical care was time spent personally by me on the following activities:  Blood draw for specimens, obtaining history from patient or surrogate, development of treatment plan with patient or surrogate, discussions with consultants, evaluation of patient's response to treatment, examination of patient, interpretation of cardiac output measurements, ordering and performing treatments and interventions, ordering and review of laboratory studies, ordering and review of radiographic studies, re-evaluation of patient's condition and review of old charts    I assumed direction of critical care for this patient from another provider in my specialty: no               ED Course  ED Course as of Feb 12 1328   Wed Feb 12, 2020   1053 Suspected, T&S sent, I have already discussed transfusion   Hemoglobin(!!): 5 9   1205 No findings requiring urgent intervention, abnormal renal findings, so I added UA for information  I suspect UGI bleeding from ulcer   CT abdomen pelvis with contrast                               MDM      Disposition  Final diagnoses:   GI bleed   Acute blood loss anemia   Abnormal CT scan, kidney     Time reflects when diagnosis was documented in both MDM as applicable and the Disposition within this note     Time User Action Codes Description Comment    2/12/2020 11:50 AM Neva ISBELL Add [K92 2] GI bleed     2/12/2020 11:50 AM Neva Rushi SUKHI Add [D62] Acute blood loss anemia     2/12/2020 11:50 AM Luz Elena Vargas Add [R93 429] Abnormal CT scan, kidney       ED Disposition     ED Disposition Condition Date/Time Comment    Admit Stable Wed Feb 12, 2020 11:50 AM Case was discussed with Bernie Seay and the patient's admission status was agreed to be Admission Status: inpatient status to the service of Dr Joann Proctor          Follow-up Information    None         Patient's Medications   Discharge Prescriptions    No medications on file     No discharge procedures on file      PDMP Review     None          ED Provider  Electronically Signed by           Abhijit Hooper MD  02/12/20 6332

## 2020-02-12 NOTE — CONSULTS
Patient MRN: [id number]  Date of Service: 2/12/2020  Referring Physician: Eleuterio Dominguez  Provider Creating Note: BINU Kerr  PCP: Benjamín Ramirez  Reason for Consult:  GI bleed  HPI  Shaun Christina is a 63 y o  male who was admitted with Gastrointestinal hemorrhage with melena  He states that 4 days ago he passed multiple episodes of black tar rate loose stool  He had no abdominal pain associated with this and no nausea or vomiting  He tells me that he fell a week ago and had hit his head was taking some Advil for this  He stopped taking it but then took 2 more yesterday when he had some shoulder pain  He says that he felt lightheaded yesterday but today when he was preparing to go to his doctor's office he had a near syncopal episode in the shower in decided he should come to the ER  Upon admission he was noted to have and hemoglobin of 5 9 with high normal bilirubin of 25  He does have a history of gastric bypass as well as anastomotic ulcers 18 years ago  He also had strictures dilated twice  He has been on Protonix since that time but does not take it regularly  He says he has not taken it for 2 days  He did have a colonoscopy in 2012 which he reports was normal     Past Medical History:   Diagnosis Date    Acute ulcer of stomach     Rib fractures      Past Surgical History:   Procedure Laterality Date    GASTRIC BYPASS      MANDIBLE FRACTURE SURGERY       Medications  Home Medications:   Prior to Admission medications    Medication Sig Start Date End Date Taking?  Authorizing Provider   pantoprazole (PROTONIX) 40 mg tablet TAKE 1 TABLET DAILY 12/9/19  Yes Bria Trevino DO   acetaminophen (TYLENOL) 325 mg tablet Take 3 tablets (975 mg total) by mouth every 8 (eight) hours  Patient not taking: Reported on 3/21/2019 3/5/19   BINU Haji   Multiple Vitamins-Minerals (MULTIVITAMIN ADULT EXTRA C PO) Take 1 capsule by mouth    Historical Provider, MD   gabapentin (NEURONTIN) 100 mg capsule Take 1 capsule (100 mg total) by mouth 3 (three) times a day for 10 days 3/5/19 2/12/20  BINU Watson   methocarbamol (ROBAXIN) 750 mg tablet Take 1 tablet (750 mg total) by mouth every 6 (six) hours 3/21/19 2/12/20  BINU Beard   Naproxen Sodium (ALEVE) 220 MG CAPS Take by mouth  2/12/20  Historical Provider, MD       Inhouse Medications    Current Facility-Administered Medications:     acetaminophen (TYLENOL) tablet 650 mg, 650 mg, Oral, Q6H PRN    iohexol (OMNIPAQUE) 350 MG/ML injection (MULTI-DOSE) 100 mL, 100 mL, Intravenous, Once in imaging    ondansetron (ZOFRAN) injection 4 mg, 4 mg, Intravenous, Q6H PRN    pantoprazole (PROTONIX) injection 40 mg, 40 mg, Intravenous, Q12H [address]    sodium chloride 0 9 % infusion, 100 mL/hr, Intravenous, Continuous, 100 mL/hr at 02/12/20 1238    Current Outpatient Medications:     pantoprazole (PROTONIX) 40 mg tablet    acetaminophen (TYLENOL) 325 mg tablet    Multiple Vitamins-Minerals (MULTIVITAMIN ADULT EXTRA C PO)    Allergies  No Known Allergies  Social History   reports that he has been smoking cigars  He uses smokeless tobacco  He reports that he drinks about 7 0 standard drinks of alcohol per week  He reports that he does not use drugs  Family History  Family History   Problem Relation Age of Onset    Seizures Father      ROS  ROS: Denies CP, nausea or vomiting or epigastric pain  Positive black tarry stool, lightheadedness and dizziness, shortness of breath All others negative except as noted in HPI  Objective   Vitals  Blood pressure 103/51, pulse 76, temperature 99 1 °F (37 3 °C), temperature source Oral, resp  rate 22, height 6' 3" (1 905 m), weight 116 kg (255 lb 11 7 oz), SpO2 99 %  General: Alert, no apparent distress  Eyes: No scleral icterus  ENT: MMM  Card: RRR no murmur  Lungs: Clear to ascultation b/l  No wheezes, rales, rhonchi  Abdomen: Soft  Nontender  Nondistended  Bowel sounds present and normoactive     Skin: No jaundice, pain  Neuro: Alert and oriented x3        Laboratory Studies  Lab Results   Component Value Date    WBC 9 15 02/12/2020    HGB 5 9 (LL) 02/12/2020    HCT 17 7 (L) 02/12/2020     02/12/2020     (H) 02/12/2020     Lab Results   Component Value Date    CREATININE 0 93 02/12/2020    BUN 25 02/12/2020    SODIUM 141 02/12/2020    K 3 9 02/12/2020     02/12/2020    CO2 24 02/12/2020    GLUCOSE 105 03/04/2019    CALCIUM 7 7 (L) 02/12/2020    ALKPHOS 41 (L) 02/12/2020    AST 17 02/12/2020    ALT 20 02/12/2020     No results found for: PROTIME, INR, APTT    Imaging and Other Studies      Assessment and Plan:  1  Acute upper GI bleed with symptomatic anemia  Patient to receive several units of packed red blood cells to achieve hemoglobin greater than 7  PPI IV b i d  Clear liquid diet  NPO after midnight  Patient will be scheduled for EGD for tomorrow      Frequent H&H    Principal Problem:    Gastrointestinal hemorrhage with melena  Active Problems:    Peptic ulcer    Acute blood loss anemia    History of Eliud-en-Y gastric bypass    Alcohol use    Abnormal CT of the abdomen      BINU Adams

## 2020-02-12 NOTE — PLAN OF CARE
Problem: Potential for Falls  Goal: Patient will remain free of falls  Description  INTERVENTIONS:  - Assess patient frequently for physical needs  -  Identify cognitive and physical deficits and behaviors that affect risk of falls    -  [place] fall precautions as indicated by assessment   - Educate patient/family on patient safety including physical limitations  - Instruct patient to call for assistance with activity based on assessment  - Modify environment to reduce risk of injury  - Consider OT/PT consult to assist with strengthening/mobility  Outcome: Progressing     Problem: PAIN - ADULT  Goal: Verbalizes/displays adequate comfort level or baseline comfort level  Description  Interventions:  - Encourage patient to monitor pain and request assistance  - Assess pain using appropriate pain scale  - Administer analgesics based on type and severity of pain and evaluate response  - Implement non-pharmacological measures as appropriate and evaluate response  - Consider cultural and social influences on pain and pain management  - Notify physician/advanced practitioner if interventions unsuccessful or patient reports new pain  Outcome: Progressing     Problem: INFECTION - ADULT  Goal: Absence or prevention of progression during hospitalization  Description  INTERVENTIONS:  - Assess and monitor for signs and symptoms of infection  - Monitor lab/diagnostic results  - Monitor all insertion sites, i e  indwelling lines, tubes, and drains  - Monitor endotracheal if appropriate and nasal secretions for changes in amount and color  - [place] appropriate cooling/warming therapies per order  - Administer medications as ordered  - Instruct and encourage patient and family to use good hand hygiene technique  - Identify and instruct in appropriate isolation precautions for identified infection/condition  Outcome: Progressing     Problem: SAFETY ADULT  Goal: Patient will remain free of falls  Description  INTERVENTIONS:  - Assess patient frequently for physical needs  -  Identify cognitive and physical deficits and behaviors that affect risk of falls    -  [place] fall precautions as indicated by assessment   - Educate patient/family on patient safety including physical limitations  - Instruct patient to call for assistance with activity based on assessment  - Modify environment to reduce risk of injury  - Consider OT/PT consult to assist with strengthening/mobility  Outcome: Progressing     Problem: DISCHARGE PLANNING  Goal: Discharge to home or other facility with appropriate resources  Description  INTERVENTIONS:  - Identify barriers to discharge w/patient and caregiver  - Arrange for needed discharge resources and transportation as appropriate  - Identify discharge learning needs (meds, wound care, etc )  - Arrange for interpretive services to assist at discharge as needed  - Refer to Case Management Department for coordinating discharge planning if the patient needs post-hospital services based on physician/advanced practitioner order or complex needs related to functional status, cognitive ability, or social support system  Outcome: Progressing     Problem: DISCHARGE PLANNING  Goal: Discharge to home or other facility with appropriate resources  Description  INTERVENTIONS:  - Identify barriers to discharge w/patient and caregiver  - Arrange for needed discharge resources and transportation as appropriate  - Identify discharge learning needs (meds, wound care, etc )  - Arrange for interpretive services to assist at discharge as needed  - Refer to Case Management Department for coordinating discharge planning if the patient needs post-hospital services based on physician/advanced practitioner order or complex needs related to functional status, cognitive ability, or social support system  Outcome: Progressing     Problem: Knowledge Deficit  Goal: Patient/family/caregiver demonstrates understanding of disease process, treatment plan, medications, and discharge instructions  Description  Complete learning assessment and assess knowledge base    Interventions:  - Provide teaching at level of understanding  - Provide teaching via preferred learning methods  Outcome: Progressing     Problem: CARDIOVASCULAR - ADULT  Goal: Absence of cardiac dysrhythmias or at baseline rhythm  Description  INTERVENTIONS:  - Continuous cardiac monitoring, vital signs, obtain 12 lead EKG if ordered  - Administer antiarrhythmic and heart rate control medications as ordered  - Monitor electrolytes and administer replacement therapy as ordered  Outcome: Progressing     Problem: RESPIRATORY - ADULT  Goal: Achieves optimal ventilation and oxygenation  Description  INTERVENTIONS:  - Assess for changes in respiratory status  - Assess for changes in mentation and behavior  - Position to facilitate oxygenation and minimize respiratory effort  - Oxygen administered by appropriate delivery if ordered  - Initiate smoking cessation education as indicated  - Encourage broncho-pulmonary hygiene including cough, deep breathe, Incentive Spirometry  - Assess the need for suctioning and aspirate as needed  - Assess and instruct to report SOB or any respiratory difficulty  - Respiratory Therapy support as indicated  Outcome: Progressing     Problem: GASTROINTESTINAL - ADULT  Goal: Minimal or absence of nausea and/or vomiting  Description  INTERVENTIONS:  - Administer IV fluids if ordered to ensure adequate hydration  - Maintain NPO status until nausea and vomiting are resolved  - Nasogastric tube if ordered  - Administer ordered antiemetic medications as needed  - Provide nonpharmacologic comfort measures as appropriate  - Advance diet as tolerated, if ordered  - Consider nutrition services referral to assist patient with adequate nutrition and appropriate food choices  Outcome: Progressing     Problem: SKIN/TISSUE INTEGRITY - ADULT  Goal: Skin integrity remains intact  Description  INTERVENTIONS  - Identify patients at risk for skin breakdown  - Assess and monitor skin integrity  - Assess and monitor nutrition and hydration status  - Monitor labs (i e  albumin)  - Assess for incontinence   - Turn and reposition patient  - Assist with mobility/ambulation  - Relieve pressure over bony prominences  - Avoid friction and shearing  - Provide appropriate hygiene as needed including keeping skin clean and dry  - Evaluate need for skin moisturizer/barrier cream  - Collaborate with interdisciplinary team (i e  Nutrition, Rehabilitation, etc )   - Patient/family teaching  Outcome: Progressing     Problem: HEMATOLOGIC - ADULT  Goal: Maintains hematologic stability  Description  INTERVENTIONS  - Assess for signs and symptoms of bleeding or hemorrhage  - Monitor labs  - Administer supportive blood products/factors as ordered and appropriate  Outcome: Progressing

## 2020-02-12 NOTE — ASSESSMENT & PLAN NOTE
· Gastric bypass 18-20 years ago at [facility]  · Has had no issues and had substantial weight loss

## 2020-02-12 NOTE — TELEPHONE ENCOUNTER
Patients wife called stating patient is experiencing SOB when he stands up along with dizzyness  Wanted to know if she should keep his appointment today or go to the ER  Spoke with Dr Jacinda Ames and was advised to let patient know if it is extreme SOB when standing then he should be seen in an ER instead of coming in to office  Patients wife will be taking him to ER

## 2020-02-12 NOTE — ASSESSMENT & PLAN NOTE
· Reported use of 3 beers per day, last drink on Friday  · No history of alcohol withdrawal  · Monitor for symptoms

## 2020-02-12 NOTE — H&P
[address] [facility]   H&P- [place] 1960, 63 y o  male MRN: [id number]    Unit/Bed#: ED 19 Encounter: [id number]    Primary Care Provider: Mary Yates DO   Date and time admitted to hospital: 2/12/2020  9:21 AM        * Gastrointestinal hemorrhage with melena  Assessment & Plan  · With 4 episodes of large amount of loose black, tarry stool on Sunday  Today with near syncope / dizziness  · Low hemoglobin on arrival with (+) guaiac on exam  · Consult to GI for endoscopy  · Clear liquids, NPO after midnight  · IV fluid hydration  · Protonix IV BID    Alcohol use  Assessment & Plan  · Reported use of 3 beers per day, last drink on Friday  · No history of alcohol withdrawal  · Monitor for symptoms    History of Eliud-en-Y gastric bypass  Assessment & Plan  · Gastric bypass 18-20 years ago at [facility]  · Has had no issues and had substantial weight loss    Acute blood loss anemia  Assessment & Plan  · Hgb 5 9 on arrival likely 2/2 upper GI bleed with recent melena  · Transfuse 2 units prbc's  · Serial H&H  · GI evaluation    Peptic ulcer  Assessment & Plan  · Previous ulcer history and has been maintained on Protonix  He does not take this daily  · IV Protonix BID while here  · Pending GI evaluation  · No abdominal pain    Abnormal CT of the abdomen  Assessment & Plan  · CT with incidental finding of new subtle area of diminished attenuation lower pole of the right kidney is difficult to characterize  Some mild inflammation is possible  Correlation with urinalysis may be helpful as well as follow-up examination in a few months time to exclude other etiologies    · No urinary symptoms  · UA pending        VTE Prophylaxis: Pharmacologic VTE Prophylaxis contraindicated due to GI bleed  / sequential compression device and reason for no mechanical VTE prophylaxis ambulatory / low risk   Code Status: Full code  POLST: POLST is not applicable to this patient  Discussion with family: Plan of care with patient    Anticipated Length of Stay:  Patient will be admitted on an Inpatient basis with an anticipated length of stay of  More than 2 midnights  Justification for Hospital Stay: treatment for acute blood loss anemia / GI evaluation    Total Time for Visit, including Counseling / Coordination of Care: 1 hour  Greater than 50% of this total time spent on direct patient counseling and coordination of care  Chief Complaint:   Dizziness / dark stool    History of Present Illness:    Martha Back is a 63 y o  male who presents with dizziness / dark stool  Patient reports large amount of loose tarry, black stool x 4 episodes on Sunday  Today, he was showering to go see his primary care provider and had a near syncopal episode with dizziness  He decided to seek emergency medical attention  His history is pertinent for previous peptic ulcer, Eliud-en-Y gastric bypass surgery, daily alcohol use  He was taking Protonix but not daily  He denied abdominal pain  No other complaints  He was evaluated in the ER and found to have a low hemoglobin  CT was without GI findings  He is being transfused with 2 units of packed cells  Serial H&H's will be monitored  He will be started on IV Protonix BID and offered clear liquids  A GI consultation has been requested and are planning on performing EGD tomorrrow  Due to incidental renal finding on CT, a urinalysis is pending  Review of Systems:    Review of Systems   Constitutional: Negative for activity change, appetite change, chills, fatigue and fever  HENT: Negative for congestion and sore throat  Respiratory: Negative for cough, shortness of breath and wheezing  Cardiovascular: Negative for chest pain and palpitations  Gastrointestinal: Positive for blood in stool and diarrhea  Negative for abdominal distention, abdominal pain, constipation, nausea and vomiting  Genitourinary: Negative for difficulty urinating, dysuria, frequency and urgency  Musculoskeletal: Negative for arthralgias and joint swelling  Skin: Negative for rash  Neurological: Positive for dizziness and light-headedness  Negative for syncope, weakness, numbness and headaches  All other systems reviewed and are negative  Past Medical and Surgical History:     Past Medical History:   Diagnosis Date    Acute ulcer of stomach     Rib fractures        Past Surgical History:   Procedure Laterality Date    GASTRIC BYPASS      MANDIBLE FRACTURE SURGERY         Meds/Allergies:    Prior to Admission medications    Medication Sig Start Date End Date Taking? Authorizing Provider   pantoprazole (PROTONIX) 40 mg tablet TAKE 1 TABLET DAILY 12/9/19  Yes Angie Mcghee DO   acetaminophen (TYLENOL) 325 mg tablet Take 3 tablets (975 mg total) by mouth every 8 (eight) hours  Patient not taking: Reported on 3/21/2019 3/5/19   BINU Waldron   Multiple Vitamins-Minerals (MULTIVITAMIN ADULT EXTRA C PO) Take 1 capsule by mouth    Historical Provider, MD   gabapentin (NEURONTIN) 100 mg capsule Take 1 capsule (100 mg total) by mouth 3 (three) times a day for 10 days 3/5/19 2/12/20  BINU Waldron   methocarbamol (ROBAXIN) 750 mg tablet Take 1 tablet (750 mg total) by mouth every 6 (six) hours 3/21/19 2/12/20  BINU Beard   Naproxen Sodium (ALEVE) 220 MG CAPS Take by mouth  2/12/20  Historical Provider, MD MOISE have reviewed home medications with patient personally      Allergies: No Known Allergies    Social History:     Marital Status: /Civil Union   Occupation:   Patient Pre-hospital Living Situation: with spouse  Patient Pre-hospital Level of Mobility: No limitations  Patient Pre-hospital Diet Restrictions: No limitations  Substance Use History:   Social History     Substance and Sexual Activity   Alcohol Use Yes    Alcohol/week: 7 0 standard drinks    Types: 7 Cans of beer per week     Social History     Tobacco Use   Smoking Status Current Some Day Smoker    Types: Cigars   Smokeless Tobacco Current User     Social History     Substance and Sexual Activity   Drug Use No       Family History:    Family History   Problem Relation Age of Onset    Seizures Father        Physical Exam:     Vitals:   Blood Pressure: 111/57 (02/12/20 1230)  Pulse: 79 (02/12/20 1230)  Temperature: 99 4 °F (37 4 °C) (02/12/20 1230)  Temp Source: Oral (02/12/20 1230)  Respirations: 20 (02/12/20 1230)  Height: 6' 3" (190 5 cm) (02/12/20 0935)  Weight - Scale: 116 kg (255 lb 11 7 oz) (02/12/20 0935)  SpO2: 98 % (02/12/20 1230)    Physical Exam   Constitutional: He is oriented to person, place, and time  He appears well-developed and well-nourished  No distress  HENT:   Head: Normocephalic and atraumatic  Eyes: Conjunctivae are normal  No scleral icterus  Cardiovascular: Normal rate, regular rhythm and normal heart sounds  No murmur heard  Pulmonary/Chest: Effort normal and breath sounds normal  No respiratory distress  He has no wheezes  He has no rales  Abdominal: Soft  Bowel sounds are normal  He exhibits no distension  There is no tenderness  Musculoskeletal: Normal range of motion  He exhibits no edema or tenderness  Neurological: He is alert and oriented to person, place, and time  Skin: Skin is warm and dry  He is not diaphoretic  There is pallor  Psychiatric: He has a normal mood and affect  His behavior is normal    Nursing note and vitals reviewed  Additional Data:     Lab Results: I have personally reviewed pertinent reports        Results from last 7 days   Lab Units 02/12/20  0935   WBC Thousand/uL 9 15   HEMOGLOBIN g/dL 5 9*   HEMATOCRIT % 17 7*   PLATELETS Thousands/uL 204   NEUTROS PCT % 75   LYMPHS PCT % 17   MONOS PCT % 6   EOS PCT % 1     Results from last 7 days   Lab Units 02/12/20  0935   SODIUM mmol/L 141   POTASSIUM mmol/L 3 9   CHLORIDE mmol/L 107   CO2 mmol/L 24   BUN mg/dL 25   CREATININE mg/dL 0 93   ANION GAP mmol/L 10   CALCIUM mg/dL 7 7*   ALBUMIN g/dL 2 5*   TOTAL BILIRUBIN mg/dL 0 21   ALK PHOS U/L 41*   ALT U/L 20   AST U/L 17   GLUCOSE RANDOM mg/dL 113                       Imaging: I have personally reviewed pertinent reports  CT abdomen pelvis with contrast   Final Result by Joe Elena MD (02/12 1129)      No evidence of bowel dilatation to suggest obstruction  No obvious mass is seen  New subtle area of diminished attenuation lower pole of the right kidney is difficult to characterize  Some mild inflammation is possible  Correlation with urinalysis may be helpful as well as follow-up examination in a few months time to exclude other    etiologies  The study was marked in EPIC for significant notification with follow-up  Workstation performed: [id number]             EKG, Pathology, and Other Studies Reviewed on Admission:   · EKG: None    Allscripts / Epic Records Reviewed: Yes     ** Please Note: This note has been constructed using a voice recognition system   **

## 2020-02-12 NOTE — ASSESSMENT & PLAN NOTE
· With 4 episodes of large amount of loose black, tarry stool on Sunday  Today with near syncope / dizziness    · Low hemoglobin on arrival with (+) guaiac on exam  · Consult to GI for endoscopy  · Clear liquids, NPO after midnight  · IV fluid hydration  · Protonix IV BID

## 2020-02-12 NOTE — H&P (VIEW-ONLY)
Patient MRN: [id number]  Date of Service: 2/12/2020  Referring Physician: Joel Ortega  Provider Creating Note: Rumalda Romberg, CRNP  PCP: Manfred Camacho  Reason for Consult:  GI bleed  HPI  Zaid Gupta is a 63 y o  male who was admitted with Gastrointestinal hemorrhage with melena  He states that 4 days ago he passed multiple episodes of black tar rate loose stool  He had no abdominal pain associated with this and no nausea or vomiting  He tells me that he fell a week ago and had hit his head was taking some Advil for this  He stopped taking it but then took 2 more yesterday when he had some shoulder pain  He says that he felt lightheaded yesterday but today when he was preparing to go to his doctor's office he had a near syncopal episode in the shower in decided he should come to the ER  Upon admission he was noted to have and hemoglobin of 5 9 with high normal bilirubin of 25  He does have a history of gastric bypass as well as anastomotic ulcers 18 years ago  He also had strictures dilated twice  He has been on Protonix since that time but does not take it regularly  He says he has not taken it for 2 days  He did have a colonoscopy in 2012 which he reports was normal     Past Medical History:   Diagnosis Date    Acute ulcer of stomach     Rib fractures      Past Surgical History:   Procedure Laterality Date    GASTRIC BYPASS      MANDIBLE FRACTURE SURGERY       Medications  Home Medications:   Prior to Admission medications    Medication Sig Start Date End Date Taking?  Authorizing Provider   pantoprazole (PROTONIX) 40 mg tablet TAKE 1 TABLET DAILY 12/9/19  Yes João Metcalf DO   acetaminophen (TYLENOL) 325 mg tablet Take 3 tablets (975 mg total) by mouth every 8 (eight) hours  Patient not taking: Reported on 3/21/2019 3/5/19   Cherylene Fury, CRNP   Multiple Vitamins-Minerals (MULTIVITAMIN ADULT EXTRA C PO) Take 1 capsule by mouth    Historical Provider, MD   gabapentin (NEURONTIN) 100 mg capsule Take 1 capsule (100 mg total) by mouth 3 (three) times a day for 10 days 3/5/19 2/12/20  BINU Tejada   methocarbamol (ROBAXIN) 750 mg tablet Take 1 tablet (750 mg total) by mouth every 6 (six) hours 3/21/19 2/12/20  BINU Beard   Naproxen Sodium (ALEVE) 220 MG CAPS Take by mouth  2/12/20  Historical Provider, MD       Inhouse Medications    Current Facility-Administered Medications:     acetaminophen (TYLENOL) tablet 650 mg, 650 mg, Oral, Q6H PRN    iohexol (OMNIPAQUE) 350 MG/ML injection (MULTI-DOSE) 100 mL, 100 mL, Intravenous, Once in imaging    ondansetron (ZOFRAN) injection 4 mg, 4 mg, Intravenous, Q6H PRN    pantoprazole (PROTONIX) injection 40 mg, 40 mg, Intravenous, Q12H [facility]    sodium chloride 0 9 % infusion, 100 mL/hr, Intravenous, Continuous, 100 mL/hr at 02/12/20 1238    Current Outpatient Medications:     pantoprazole (PROTONIX) 40 mg tablet    acetaminophen (TYLENOL) 325 mg tablet    Multiple Vitamins-Minerals (MULTIVITAMIN ADULT EXTRA C PO)    Allergies  No Known Allergies  Social History   reports that he has been smoking cigars  He uses smokeless tobacco  He reports that he drinks about 7 0 standard drinks of alcohol per week  He reports that he does not use drugs  Family History  Family History   Problem Relation Age of Onset    Seizures Father      ROS  ROS: Denies CP, nausea or vomiting or epigastric pain  Positive black tarry stool, lightheadedness and dizziness, shortness of breath All others negative except as noted in HPI  Objective   Vitals  Blood pressure 103/51, pulse 76, temperature 99 1 °F (37 3 °C), temperature source Oral, resp  rate 22, height 6' 3" (1 905 m), weight 116 kg (255 lb 11 7 oz), SpO2 99 %  General: Alert, no apparent distress  Eyes: No scleral icterus  ENT: MMM  Card: RRR no murmur  Lungs: Clear to ascultation b/l  No wheezes, rales, rhonchi  Abdomen: Soft  Nontender  Nondistended  Bowel sounds present and normoactive     Skin: No jaundice, pain  Neuro: Alert and oriented x3        Laboratory Studies  Lab Results   Component Value Date    WBC 9 15 02/12/2020    HGB 5 9 (LL) 02/12/2020    HCT 17 7 (L) 02/12/2020     02/12/2020     (H) 02/12/2020     Lab Results   Component Value Date    CREATININE 0 93 02/12/2020    BUN 25 02/12/2020    SODIUM 141 02/12/2020    K 3 9 02/12/2020     02/12/2020    CO2 24 02/12/2020    GLUCOSE 105 03/04/2019    CALCIUM 7 7 (L) 02/12/2020    ALKPHOS 41 (L) 02/12/2020    AST 17 02/12/2020    ALT 20 02/12/2020     No results found for: PROTIME, INR, APTT    Imaging and Other Studies      Assessment and Plan:  1  Acute upper GI bleed with symptomatic anemia  Patient to receive several units of packed red blood cells to achieve hemoglobin greater than 7  PPI IV b i d  Clear liquid diet  NPO after midnight  Patient will be scheduled for EGD for tomorrow      Frequent H&H    Principal Problem:    Gastrointestinal hemorrhage with melena  Active Problems:    Peptic ulcer    Acute blood loss anemia    History of Eliud-en-Y gastric bypass    Alcohol use    Abnormal CT of the abdomen      BINU Garza

## 2020-02-13 ENCOUNTER — APPOINTMENT (INPATIENT)
Dept: GASTROENTEROLOGY | Facility: HOSPITAL | Age: 60
DRG: 393 | End: 2020-02-13
Payer: COMMERCIAL

## 2020-02-13 ENCOUNTER — ANESTHESIA (INPATIENT)
Dept: GASTROENTEROLOGY | Facility: HOSPITAL | Age: 60
DRG: 393 | End: 2020-02-13
Payer: COMMERCIAL

## 2020-02-13 LAB
ABO GROUP BLD BPU: NORMAL
ANION GAP SERPL CALCULATED.3IONS-SCNC: 7 MMOL/L (ref 4–13)
BPU ID: NORMAL
BUN SERPL-MCNC: 15 MG/DL (ref 5–25)
CALCIUM SERPL-MCNC: 7.4 MG/DL (ref 8.3–10.1)
CHLORIDE SERPL-SCNC: 110 MMOL/L (ref 100–108)
CO2 SERPL-SCNC: 23 MMOL/L (ref 21–32)
CREAT SERPL-MCNC: 0.84 MG/DL (ref 0.6–1.3)
CROSSMATCH: NORMAL
ERYTHROCYTE [DISTWIDTH] IN BLOOD BY AUTOMATED COUNT: 14.3 % (ref 11.6–15.1)
FOLATE SERPL-MCNC: >20 NG/ML (ref 3.1–17.5)
GFR SERPL CREATININE-BSD FRML MDRD: 97 ML/MIN/1.73SQ M
GLUCOSE SERPL-MCNC: 98 MG/DL (ref 65–140)
HCT VFR BLD AUTO: 21.6 % (ref 36.5–49.3)
HCT VFR BLD AUTO: 21.6 % (ref 36.5–49.3)
HCT VFR BLD AUTO: 23.5 % (ref 36.5–49.3)
HCT VFR BLD AUTO: 26.1 % (ref 36.5–49.3)
HGB BLD-MCNC: 7.2 G/DL (ref 12–17)
HGB BLD-MCNC: 7.3 G/DL (ref 12–17)
HGB BLD-MCNC: 7.8 G/DL (ref 12–17)
HGB BLD-MCNC: 8.7 G/DL (ref 12–17)
IRON SATN MFR SERPL: 6 %
IRON SERPL-MCNC: 21 UG/DL (ref 65–175)
MCH RBC QN AUTO: 32.4 PG (ref 26.8–34.3)
MCHC RBC AUTO-ENTMCNC: 33.8 G/DL (ref 31.4–37.4)
MCV RBC AUTO: 96 FL (ref 82–98)
PLATELET # BLD AUTO: 142 THOUSANDS/UL (ref 149–390)
PMV BLD AUTO: 10.1 FL (ref 8.9–12.7)
POTASSIUM SERPL-SCNC: 4 MMOL/L (ref 3.5–5.3)
RBC # BLD AUTO: 2.25 MILLION/UL (ref 3.88–5.62)
SODIUM SERPL-SCNC: 140 MMOL/L (ref 136–145)
TIBC SERPL-MCNC: 355 UG/DL (ref 250–450)
UNIT DISPENSE STATUS: NORMAL
UNIT PRODUCT CODE: NORMAL
UNIT RH: NORMAL
VIT B12 SERPL-MCNC: 174 PG/ML (ref 100–900)
WBC # BLD AUTO: 6.68 THOUSAND/UL (ref 4.31–10.16)

## 2020-02-13 PROCEDURE — 85014 HEMATOCRIT: CPT | Performed by: INTERNAL MEDICINE

## 2020-02-13 PROCEDURE — 0DB68ZX EXCISION OF STOMACH, VIA NATURAL OR ARTIFICIAL OPENING ENDOSCOPIC, DIAGNOSTIC: ICD-10-PCS | Performed by: INTERNAL MEDICINE

## 2020-02-13 PROCEDURE — 88305 TISSUE EXAM BY PATHOLOGIST: CPT | Performed by: PATHOLOGY

## 2020-02-13 PROCEDURE — 99232 SBSQ HOSP IP/OBS MODERATE 35: CPT | Performed by: INTERNAL MEDICINE

## 2020-02-13 PROCEDURE — 82607 VITAMIN B-12: CPT | Performed by: INTERNAL MEDICINE

## 2020-02-13 PROCEDURE — C9113 INJ PANTOPRAZOLE SODIUM, VIA: HCPCS | Performed by: NURSE PRACTITIONER

## 2020-02-13 PROCEDURE — 85018 HEMOGLOBIN: CPT | Performed by: NURSE PRACTITIONER

## 2020-02-13 PROCEDURE — 85018 HEMOGLOBIN: CPT | Performed by: INTERNAL MEDICINE

## 2020-02-13 PROCEDURE — P9016 RBC LEUKOCYTES REDUCED: HCPCS

## 2020-02-13 PROCEDURE — 85014 HEMATOCRIT: CPT | Performed by: NURSE PRACTITIONER

## 2020-02-13 PROCEDURE — 80048 BASIC METABOLIC PNL TOTAL CA: CPT | Performed by: NURSE PRACTITIONER

## 2020-02-13 PROCEDURE — 83540 ASSAY OF IRON: CPT | Performed by: INTERNAL MEDICINE

## 2020-02-13 PROCEDURE — 82746 ASSAY OF FOLIC ACID SERUM: CPT | Performed by: INTERNAL MEDICINE

## 2020-02-13 PROCEDURE — 83550 IRON BINDING TEST: CPT | Performed by: INTERNAL MEDICINE

## 2020-02-13 PROCEDURE — 85027 COMPLETE CBC AUTOMATED: CPT | Performed by: NURSE PRACTITIONER

## 2020-02-13 RX ORDER — PROPOFOL 10 MG/ML
INJECTION, EMULSION INTRAVENOUS AS NEEDED
Status: DISCONTINUED | OUTPATIENT
Start: 2020-02-13 | End: 2020-02-13 | Stop reason: SURG

## 2020-02-13 RX ORDER — SODIUM CHLORIDE 9 MG/ML
125 INJECTION, SOLUTION INTRAVENOUS CONTINUOUS
Status: DISCONTINUED | OUTPATIENT
Start: 2020-02-13 | End: 2020-02-13

## 2020-02-13 RX ADMIN — PROPOFOL 150 MG: 10 INJECTION, EMULSION INTRAVENOUS at 15:49

## 2020-02-13 RX ADMIN — PANTOPRAZOLE SODIUM 40 MG: 40 INJECTION, POWDER, FOR SOLUTION INTRAVENOUS at 20:12

## 2020-02-13 RX ADMIN — IRON SUCROSE 200 MG: 20 INJECTION, SOLUTION INTRAVENOUS at 20:11

## 2020-02-13 RX ADMIN — LIDOCAINE HYDROCHLORIDE 100 MG: 20 INJECTION, SOLUTION INTRAVENOUS at 15:48

## 2020-02-13 RX ADMIN — PROPOFOL 50 MG: 10 INJECTION, EMULSION INTRAVENOUS at 15:53

## 2020-02-13 RX ADMIN — SODIUM CHLORIDE: 0.9 INJECTION, SOLUTION INTRAVENOUS at 15:40

## 2020-02-13 RX ADMIN — PANTOPRAZOLE SODIUM 40 MG: 40 INJECTION, POWDER, FOR SOLUTION INTRAVENOUS at 08:14

## 2020-02-13 NOTE — ASSESSMENT & PLAN NOTE
· CT findings with diminished attenuation right kidney lower pole  UA unremarkable      Results from last 7 days   Lab Units 02/12/20  1410   COLOR UA  Yellow   CLARITY UA  Clear   SPEC GRAV UA  1 010   PH UA  6 5   LEUKOCYTES UA  Negative   NITRITE UA  Negative   GLUCOSE UA mg/dl Negative   KETONES UA mg/dl Negative   BILIRUBIN UA  Negative   BLOOD UA  Negative      Results from last 7 days   Lab Units 02/12/20  1410   RBC UA /hpf None Seen   WBC UA /hpf None Seen   EPITHELIAL CELLS WET PREP /hpf None Seen   BACTERIA UA /hpf None Seen

## 2020-02-13 NOTE — PLAN OF CARE
Problem: Potential for Falls  Goal: Patient will remain free of falls  Description  INTERVENTIONS:  - Assess patient frequently for physical needs  -  Identify cognitive and physical deficits and behaviors that affect risk of falls    -  [place] fall precautions as indicated by assessment   - Educate patient/family on patient safety including physical limitations  - Instruct patient to call for assistance with activity based on assessment  - Modify environment to reduce risk of injury  - Consider OT/PT consult to assist with strengthening/mobility  Outcome: Progressing

## 2020-02-13 NOTE — PROGRESS NOTES
Progress Note - Brian Rhodes 1960, 63 y o  male MRN: [id number]    Unit/Bed#: E5 -01 Encounter: [id number]    Primary Care Provider: Jm Mayfield DO   Date and time admitted to hospital: 2/12/2020  9:21 AM        * Gastrointestinal hemorrhage with melena  Assessment & Plan  · GI bleed with melena  For EGD today  · Continue IV pantoprazole  Acute blood loss anemia  Assessment & Plan  · Acute blood loss anemia secondary to upper GI bleed with melena  · For EGD today  Continue pantoprazole  · Status post 4 unit PRBC  Will transfuse additional unit today  Results from last 7 days   Lab Units 02/13/20  0534 02/13/20  0141 02/12/20  1732 02/12/20  0935   HEMOGLOBIN g/dL 7 3* 7 2* 5 8* 5 9*       Abnormal CT of the abdomen  Assessment & Plan  · CT findings with diminished attenuation right kidney lower pole  UA unremarkable  Results from last 7 days   Lab Units 02/12/20  1410   COLOR UA  Yellow   CLARITY UA  Clear   SPEC GRAV UA  1 010   PH UA  6 5   LEUKOCYTES UA  Negative   NITRITE UA  Negative   GLUCOSE UA mg/dl Negative   KETONES UA mg/dl Negative   BILIRUBIN UA  Negative   BLOOD UA  Negative      Results from last 7 days   Lab Units 02/12/20  1410   RBC UA /hpf None Seen   WBC UA /hpf None Seen   EPITHELIAL CELLS WET PREP /hpf None Seen   BACTERIA UA /hpf None Seen       Alcohol use  Assessment & Plan  · Alcohol use without any withdrawal symptoms  History of Eliud-en-Y gastric bypass  Assessment & Plan  · History of gastric bypass 20 years ago at [facility]      VTE Pharmacologic Prophylaxis: Pharmacologic VTE Prophylaxis contraindicated due to GI bleed    Patient Centered Rounds: I have performed bedside rounds with nursing staff today  Discussions with Specialists or Other Care Team Provider:  Gastroenterology  Education and Discussions with Family / Patient:     Time Spent for Care: 25 mins    More than 50% of total time spent on counseling and coordination of care as described above     Current Length of Stay: 1 day(s)  Current Patient Status: Inpatient     Certification Statement: The patient will continue to require additional inpatient hospital stay due to Gastrointestinal hemorrhage with melena  Discharge Plan / Estimated Discharge Date:     Code Status: Level 1 - Full Code  ______________________________________________________________________________    Subjective:   Patient seen and examined  No new complaints  Objective:   Vitals: Blood pressure 97/67, pulse 65, temperature 97 8 °F (36 6 °C), temperature source Temporal, resp  rate 12, height 6' 3" (1 905 m), weight 116 kg (255 lb 11 7 oz), SpO2 100 %      Physical Exam:   General appearance: alert, appears stated age and cooperative  Head: Normocephalic, without obvious abnormality, atraumatic  Lungs: clear to auscultation bilaterally  Heart: regular rate and rhythm  Abdomen: soft, non-tender, positive bowel sounds   Back: negative  Extremities: extremities atraumatic, no cyanosis or edema  Neurologic: Grossly normal    Additional Data:   Labs:  Results from last 7 days   Lab Units 02/13/20  0534 02/13/20  0141 02/12/20  1732 02/12/20  0935   WBC Thousand/uL 6 68  --   --  9 15   HEMOGLOBIN g/dL 7 3* 7 2* 5 8* 5 9*   HEMATOCRIT % 21 6* 21 6* 17 7* 17 7*   MCV fL 96  --   --  101*   PLATELETS Thousands/uL 142*  --   --  204     Results from last 7 days   Lab Units 02/13/20  0534 02/12/20  0935   SODIUM mmol/L 140 141   POTASSIUM mmol/L 4 0 3 9   CHLORIDE mmol/L 110* 107   CO2 mmol/L 23 24   ANION GAP mmol/L 7 10   BUN mg/dL 15 25   CREATININE mg/dL 0 84 0 93   CALCIUM mg/dL 7 4* 7 7*   ALBUMIN g/dL  --  2 5*   TOTAL BILIRUBIN mg/dL  --  0 21   ALK PHOS U/L  --  41*   ALT U/L  --  20   AST U/L  --  17   EGFR ml/min/1 73sq m 97 90   GLUCOSE RANDOM mg/dL 98 113     Results from last 7 days   Lab Units 02/12/20  0935   MAGNESIUM mg/dL 1 9                  Results from last 7 days   Lab Units 02/12/20  1722   POC GLUCOSE mg/dl 175* * I Have Reviewed All Lab Data Listed Above  Cultures:               Imaging:  Imaging Reports Reviewed Today Include:   Ct Abdomen Pelvis With Contrast    Result Date: 2/12/2020  Impression: No evidence of bowel dilatation to suggest obstruction  No obvious mass is seen  New subtle area of diminished attenuation lower pole of the right kidney is difficult to characterize  Some mild inflammation is possible  Correlation with urinalysis may be helpful as well as follow-up examination in a few months time to exclude other  etiologies  The study was marked in EPIC for significant notification with follow-up  Workstation performed: [id number]     Scheduled Meds:  Current Facility-Administered Medications:  acetaminophen 650 mg Oral Q6H PRN Romilda Duster, CRNP    iohexol 100 mL Intravenous Once in imaging Romilda Duster, CRNP    ondansetron 4 mg Intravenous Q6H PRN Romilda Duster, CRNP    pantoprazole 40 mg Intravenous Q12H [address] [employer], CRNP    sodium chloride 100 mL/hr Intravenous Continuous Romilda Duster, CRNP Last Rate: 100 mL/hr (02/12/20 2223)       DO Fausto Tapia 73 Internal Medicine  Hospitalist    ** Please Note: This note has been constructed using a voice recognition system   **

## 2020-02-13 NOTE — ASSESSMENT & PLAN NOTE
· Acute blood loss anemia secondary to upper GI bleed with melena  · For EGD today  Continue pantoprazole  · Status post 4 unit PRBC  Will transfuse additional unit today      Results from last 7 days   Lab Units 02/13/20  0534 02/13/20  0141 02/12/20  1732 02/12/20  0935   HEMOGLOBIN g/dL 7 3* 7 2* 5 8* 5 9*

## 2020-02-13 NOTE — NURSING NOTE
Agree with previous nurse's assessment  Patient resting comfortably in bed with no complaints of pain or discomfort at this time  Bed low and locked with call bell and belongings within reach, bed alarm activated  Will continue to monitor

## 2020-02-13 NOTE — UTILIZATION REVIEW
Initial Clinical Review    Admission: Date/Time/Statement: Admission Orders (From admission, onward)     Ordered        02/12/20 1155  Inpatient Admission  Once         02/12/20 1151  Inpatient Admission  Once                   02/12/20 1152  Inpatient Admission Once     Transfer Service: Hospitalist       Question Answer Comment   Admitting Physician Santy Forrester of Care Med Surg    Estimated length of stay More than 2 Midnights    Certification I certify that inpatient services are medically necessary for this patient for a duration of greater than two midnights  See H&P and MD Progress Notes for additional information about the patient's course of treatment  02/12/20 1155         ED Arrival Information     Expected Arrival Acuity Means of Arrival Escorted By Service Admission Type    - 2/12/2020 09:17 Emergent Walk-In Self Hospitalist Emergency    Arrival Complaint    weakness, dizzyness, difficulty in breathing, hip pain        Chief Complaint   Patient presents with    Dizziness     Pt reports vertigo like dizziness, reports dizzy with positional changes and ambulation described as a room spinning sensation  began on sunday  reports a slip and fall on Wednesday, struck head and lost consciousness for a few seconds  headaches and dizziness since sunday   Shortness of Breath     Pt reports feeling SOB that is worst with exertion, reports unable to walk up flight of stairs without stopping, reports chest discomfort as wel    Black or Bloody Stool     Pt reports black diarrhea like stools, denies vomiting  - blood thinners  Hx of stomach ulcers  Assessment/Plan:      63 Y O male  Presents to ED from home  With dizziness and dark stools  Had  4 episodes  Of loose, tarry, black stools 3 days  Prior to admission  The day of admission, was  Getting ready to go to PCP and had a  Near syncopal episode with dizziness  Decided to go to ED    PMH  Is  Peptic  Ulcer, daily  Alcohol use and  Eliud en Y gastric  Bypass surgery  Ed labs  Showed a low  Hemoglobin  CT   Unremarkable for  GI findings  Admit  Ip with   Gastrointestinal hemorrhage  With  Melena, acute blood loss anemia, peptic  Ulcer and  Alcohol use and plan is   IVF, GI consult, 2 U PRBC,  Frequent    Hemoglobin check  And plan  Endoscopy  Per  GI Consult:  Presents with likely  Upper  GI bleed  Over several days  Needs  Transfusion and  IVF  Hemoglobin  Needs to be  >  7  Plan  EGD  2/14          ED Triage Vitals   Temperature Pulse Respirations Blood Pressure SpO2   02/12/20 0935 02/12/20 0935 02/12/20 0935 02/12/20 0935 02/12/20 0935   97 8 °F (36 6 °C) 86 (!) 26 128/65 99 %      Temp Source Heart Rate Source Patient Position - Orthostatic VS BP Location FiO2 (%)   02/12/20 0935 02/12/20 0935 02/12/20 1303 02/12/20 0935 --   Oral Monitor Sitting Right arm       Pain Score       02/12/20 0935       3        Wt Readings from Last 1 Encounters:   02/12/20 116 kg (255 lb 11 7 oz)     Additional Vital Signs:   02/12/20 2155  98 8 °F (37 1 °C)    20  115/55    98 %  None (Room air)  Lying   02/12/20 2022  98 9 °F (37 2 °C)  78  20  114/52    100 %  None (Room air)  Lying   02/12/20 1927              None (Room air)     02/12/20 1915  98 4 °F (36 9 °C)  78  20  113/65    98 %  None (Room air)     02/12/20 1543  98 5 °F (36 9 °C)  75  20  110/52    100 %  None (Room air)  Sitting   02/12/20 1440  97 5 °F (36 4 °C)  66  20  121/67    100 %  None (Room air)  Sitting   02/12/20 1415  99 4 °F (37 4 °C)  78  20  111/56  77  100 %  None (Room air)     02/12/20 1407  99 5 °F (37 5 °C)  85  20  108/49Abnormal     100 %  None (Room air)     02/12/20 1348  99 8 °F (37 7 °C)  76  18  104/51    99 %       02/12/20 1315    76  22  103/51  73  99 %       02/12/20 1303  99 1 °F (37 3 °C)  74  16  98/50    99 %  None (Room air)  Sitting   02/12/20 1243  99 6 °F (37 6 °C)  83  20  104/51    98 %  None (Room air)     02/12/20 1230 99 4 °F (37 4 °C)  79  20  111/57    98 %  None (Room air)     02/12/20 1212  99 3 °F (37 4 °C)  80  20  111/54    98 %  None (Room air)     02/12/20 1149    85  20  111/55    98 %  None (Room air)     02/12/20 1053    88  22  112/56  80  100 %  None (Room air)     02/12/20 0935  97 8 °F (36 6 °C)  86  26Abnormal   128/65  88  99 %  None (Room air)           Pertinent Labs/Diagnostic Test Results:   Ct  Abd/pelvis  ( 2/12)     No evidence of bowel dilatation to suggest obstruction   No obvious mass is seen  New subtle area of diminished attenuation lower pole of the right kidney is difficult to characterize   Some mild inflammation is possible   Correlation with urinalysis may be helpful as well as follow-up examination in a few months time to exclude other   etiologies    Results from last 7 days   Lab Units 02/13/20  0534 02/13/20  0141 02/12/20  1732 02/12/20  0935   WBC Thousand/uL 6 68  --   --  9 15   HEMOGLOBIN g/dL 7 3* 7 2* 5 8* 5 9*   HEMATOCRIT % 21 6* 21 6* 17 7* 17 7*   PLATELETS Thousands/uL 142*  --   --  204   NEUTROS ABS Thousands/µL  --   --   --  6 85         Results from last 7 days   Lab Units 02/13/20  0534 02/12/20  0935   SODIUM mmol/L 140 141   POTASSIUM mmol/L 4 0 3 9   CHLORIDE mmol/L 110* 107   CO2 mmol/L 23 24   ANION GAP mmol/L 7 10   BUN mg/dL 15 25   CREATININE mg/dL 0 84 0 93   EGFR ml/min/1 73sq m 97 90   CALCIUM mg/dL 7 4* 7 7*   MAGNESIUM mg/dL  --  1 9     Results from last 7 days   Lab Units 02/12/20  0935   AST U/L 17   ALT U/L 20   ALK PHOS U/L 41*   TOTAL PROTEIN g/dL 5 2*   ALBUMIN g/dL 2 5*   TOTAL BILIRUBIN mg/dL 0 21     Results from last 7 days   Lab Units 02/12/20  1722   POC GLUCOSE mg/dl 175*     Results from last 7 days   Lab Units 02/13/20  0534 02/12/20  0935   GLUCOSE RANDOM mg/dL 98 113           Results from last 7 days   Lab Units 02/12/20  1410 02/12/20  1203 02/12/20  1200   CLARITY UA  Clear CLEAR Clear   COLOR UA  Yellow YELLOW Yellow   SPEC GRAV UA  1 010  --  1 010   PH UA  6 5  --  7 0   GLUCOSE UA mg/dl Negative  --  Negative   KETONES UA mg/dl Negative  --  Negative   BLOOD UA  Negative  --  Negative   PROTEIN UA mg/dl Negative  --  Negative   NITRITE UA  Negative  --  Negative   BILIRUBIN UA  Negative  --  Negative   UROBILINOGEN UA E U /dl 0 2  --  0 2   LEUKOCYTES UA  Negative  --  Negative   WBC UA /hpf None Seen  --   --    RBC UA /hpf None Seen  --   --    BACTERIA UA /hpf None Seen  --   --    EPITHELIAL CELLS WET PREP /hpf None Seen  --   --          ED Treatment:   Medication Administration from 02/12/2020 0917 to 02/12/2020 1424       Date/Time Order Dose Route Action Comments     02/12/2020 1113 iohexol (OMNIPAQUE) 350 MG/ML injection (SINGLE-DOSE) 100 mL 100 mL Intravenous Given      02/12/2020 1238 sodium chloride 0 9 % infusion 100 mL/hr Intravenous New Bag      02/12/2020 1239 pantoprazole (PROTONIX) injection 40 mg 40 mg Intravenous Given         Present on Admission:   Peptic ulcer      Admitting Diagnosis: Acute blood loss anemia [D62]  Dizziness [R42]  Hip pain [M25 559]  Weakness [R53 1]  GI bleed [K92 2]  Difficulty breathing [R06 89]  Abnormal CT scan, kidney [R93 429]  Age/Sex: 63 y o  male  Admission Orders:  Scheduled Medications:    Medications:  pantoprazole 40 mg Intravenous Q12H [address]     Continuous IV Infusions:    sodium chloride 100 mL/hr Intravenous Continuous     PRN Meds:    acetaminophen 650 mg Oral Q6H PRN   iohexol 100 mL Intravenous Once in imaging   ondansetron 4 mg Intravenous Q6H PRN       IP CONSULT TO GASTROENTEROLOGY   NPO  Orthostatic  Blood pressure X 1  H/H  Q 8 hrs  1 U PRBC    Network Utilization Review Department  [email or web address]  ATTENTION: Please call with any questions or concerns to [phone] and carefully listen to the prompts so that you are directed to the right person   All voicemails are confidential   Tian Avendaño all requests for admission clinical reviews, approved or denied determinations and any other requests to dedicated fax number below belonging to the campus where the patient is receiving treatment   List of dedicated fax numbers for the Facilities:  [address] [place] [address] DENIALS (Administrative/Medical Necessity) [phone]   [address]  (Maternity/NICU/Pediatrics) [phone]   Maralsidra Seymourippo [phone]   Tulio Guerrier [phone]   Martha Cruz [phone]   Jes Ureña [phone]   [address] [place] [address] [phone]   [facility]  [phone]   [address] [facility] [address]  [address] [facility] [address] [facility] [phone]

## 2020-02-13 NOTE — PROGRESS NOTES
Pt had family member notify nurse that he was seeing stars  Upon entering room pt stated that this happens every once in a while but because he is receiving blood wanted to notify the nurse  Provider notified  Vitals signs are stable, lungs are clear, pt resting comfortably vision is now clear  Pt in bed call bell within reach will continue to monitor

## 2020-02-13 NOTE — QUICK NOTE
Patient had small anastomotic ulcer  No blood seen  Okay to feed and discharge when tolerates  These b i d  PPI  Would make sure has repeat hemoglobin in 1 week    Will Rx the H pylori if positive

## 2020-02-13 NOTE — INTERVAL H&P NOTE
H&P reviewed  After examining the patient I find no changes in the patients condition since the H&P had been written      Vitals:    02/13/20 1212   BP: 102/63   Pulse: 66   Resp: 18   Temp: 97 8 °F (36 6 °C)   SpO2: 99%

## 2020-02-13 NOTE — ANESTHESIA POSTPROCEDURE EVALUATION
Post-Op Assessment Note    CV Status:  Stable    Pain management: adequate     Mental Status:  Alert and awake   Hydration Status:  Euvolemic   PONV Controlled:  Controlled   Airway Patency:  Patent   Post Op Vitals Reviewed: Yes      Staff: Anesthesiologist           /52 (02/13/20 1603)    Temp      Pulse 69 (02/13/20 1603)   Resp 18 (02/13/20 1603)    SpO2 97 % (02/13/20 1603)

## 2020-02-13 NOTE — PLAN OF CARE
Problem: Potential for Falls  Goal: Patient will remain free of falls  Description  INTERVENTIONS:  - Assess patient frequently for physical needs  -  Identify cognitive and physical deficits and behaviors that affect risk of falls    -  [address] fall precautions as indicated by assessment   - Educate patient/family on patient safety including physical limitations  - Instruct patient to call for assistance with activity based on assessment  - Modify environment to reduce risk of injury  - Consider OT/PT consult to assist with strengthening/mobility  Outcome: Progressing     Problem: PAIN - ADULT  Goal: Verbalizes/displays adequate comfort level or baseline comfort level  Description  Interventions:  - Encourage patient to monitor pain and request assistance  - Assess pain using appropriate pain scale  - Administer analgesics based on type and severity of pain and evaluate response  - Implement non-pharmacological measures as appropriate and evaluate response  - Consider cultural and social influences on pain and pain management  - Notify physician/advanced practitioner if interventions unsuccessful or patient reports new pain  Outcome: Progressing     Problem: INFECTION - ADULT  Goal: Absence or prevention of progression during hospitalization  Description  INTERVENTIONS:  - Assess and monitor for signs and symptoms of infection  - Monitor lab/diagnostic results  - Monitor all insertion sites, i e  indwelling lines, tubes, and drains  - Monitor endotracheal if appropriate and nasal secretions for changes in amount and color  - [address] appropriate cooling/warming therapies per order  - Administer medications as ordered  - Instruct and encourage patient and family to use good hand hygiene technique  - Identify and instruct in appropriate isolation precautions for identified infection/condition  Outcome: Progressing     Problem: SAFETY ADULT  Goal: Patient will remain free of falls  Description  INTERVENTIONS:  - Assess patient frequently for physical needs  -  Identify cognitive and physical deficits and behaviors that affect risk of falls  -  [place] fall precautions as indicated by assessment   - Educate patient/family on patient safety including physical limitations  - Instruct patient to call for assistance with activity based on assessment  - Modify environment to reduce risk of injury  - Consider OT/PT consult to assist with strengthening/mobility  Outcome: Progressing     Problem: DISCHARGE PLANNING  Goal: Discharge to home or other facility with appropriate resources  Description  INTERVENTIONS:  - Identify barriers to discharge w/patient and caregiver  - Arrange for needed discharge resources and transportation as appropriate  - Identify discharge learning needs (meds, wound care, etc )  - Arrange for interpretive services to assist at discharge as needed  - Refer to Case Management Department for coordinating discharge planning if the patient needs post-hospital services based on physician/advanced practitioner order or complex needs related to functional status, cognitive ability, or social support system  Outcome: Progressing     Problem: Knowledge Deficit  Goal: Patient/family/caregiver demonstrates understanding of disease process, treatment plan, medications, and discharge instructions  Description  Complete learning assessment and assess knowledge base    Interventions:  - Provide teaching at level of understanding  - Provide teaching via preferred learning methods  Outcome: Progressing     Problem: CARDIOVASCULAR - ADULT  Goal: Absence of cardiac dysrhythmias or at baseline rhythm  Description  INTERVENTIONS:  - Continuous cardiac monitoring, vital signs, obtain 12 lead EKG if ordered  - Administer antiarrhythmic and heart rate control medications as ordered  - Monitor electrolytes and administer replacement therapy as ordered  Outcome: Progressing     Problem: RESPIRATORY - ADULT  Goal: Achieves optimal ventilation and oxygenation  Description  INTERVENTIONS:  - Assess for changes in respiratory status  - Assess for changes in mentation and behavior  - Position to facilitate oxygenation and minimize respiratory effort  - Oxygen administered by appropriate delivery if ordered  - Initiate smoking cessation education as indicated  - Encourage broncho-pulmonary hygiene including cough, deep breathe, Incentive Spirometry  - Assess the need for suctioning and aspirate as needed  - Assess and instruct to report SOB or any respiratory difficulty  - Respiratory Therapy support as indicated  Outcome: Progressing     Problem: GASTROINTESTINAL - ADULT  Goal: Minimal or absence of nausea and/or vomiting  Description  INTERVENTIONS:  - Administer IV fluids if ordered to ensure adequate hydration  - Maintain NPO status until nausea and vomiting are resolved  - Nasogastric tube if ordered  - Administer ordered antiemetic medications as needed  - Provide nonpharmacologic comfort measures as appropriate  - Advance diet as tolerated, if ordered  - Consider nutrition services referral to assist patient with adequate nutrition and appropriate food choices  Outcome: Progressing     Problem: SKIN/TISSUE INTEGRITY - ADULT  Goal: Skin integrity remains intact  Description  INTERVENTIONS  - Identify patients at risk for skin breakdown  - Assess and monitor skin integrity  - Assess and monitor nutrition and hydration status  - Monitor labs (i e  albumin)  - Assess for incontinence   - Turn and reposition patient  - Assist with mobility/ambulation  - Relieve pressure over bony prominences  - Avoid friction and shearing  - Provide appropriate hygiene as needed including keeping skin clean and dry  - Evaluate need for skin moisturizer/barrier cream  - Collaborate with interdisciplinary team (i e  Nutrition, Rehabilitation, etc )   - Patient/family teaching  Outcome: Progressing     Problem: HEMATOLOGIC - ADULT  Goal: Maintains hematologic stability  Description  INTERVENTIONS  - Assess for signs and symptoms of bleeding or hemorrhage  - Monitor labs  - Administer supportive blood products/factors as ordered and appropriate  Outcome: Progressing

## 2020-02-13 NOTE — QUICK NOTE
Would likely greatly benefit from IV iron as he would likely also has an inability to absorb iron really well secondary to gastric bypass   Would give before discharge

## 2020-02-13 NOTE — PROGRESS NOTES
Patient Name: Carmen Gipson  Patient MRN: [id number]  Date: 02/13/20  Service: [facility]    Subjective   Carmen Gipson is a 63 y o  male who was admitted with Gastrointestinal hemorrhage with melena  He received 4 units of packed red blood cells and currently his hemoglobin is 7 3  He did have an episode of loose black stool this morning, he has no complaints of any abdominal pain  Objective     Vitals  Blood pressure 107/61, pulse 62, temperature (!) 97 1 °F (36 2 °C), temperature source Temporal, resp  rate 20, height 6' 3" (1 905 m), weight 116 kg (255 lb 11 7 oz), SpO2 99 %  General: Alert, no apparent distress  Eyes: No scleral icterus  ENT: MMM  Card: RRR no murmur  Lungs: Clear to ascultation b/l  No wheezes, rales, rhonchi  Abdomen: Soft  Nontender  Nondistended  Bowel sounds present and normoactive    Skin: No jaundice  Neuro: Alert and oriented x3        Laboratory Studies  Lab Results   Component Value Date    CREATININE 0 84 02/13/2020    BUN 15 02/13/2020    SODIUM 140 02/13/2020    K 4 0 02/13/2020     (H) 02/13/2020    CO2 23 02/13/2020    GLUCOSE 105 03/04/2019    CALCIUM 7 4 (L) 02/13/2020    ALKPHOS 41 (L) 02/12/2020    AST 17 02/12/2020    ALT 20 02/12/2020     Lab Results   Component Value Date    WBC 6 68 02/13/2020    HGB 7 3 (L) 02/13/2020    HCT 21 6 (L) 02/13/2020     (L) 02/13/2020    MCV 96 02/13/2020     No results found for: PROTIME, INR, APTT    Imaging and Other Studies    Inhouse Medications       Current Facility-Administered Medications:     acetaminophen (TYLENOL) tablet 650 mg, 650 mg, Oral, Q6H PRN, 650 mg at 02/12/20 2115    iohexol (OMNIPAQUE) 350 MG/ML injection (MULTI-DOSE) 100 mL, 100 mL, Intravenous, Once in imaging    ondansetron (ZOFRAN) injection 4 mg, 4 mg, Intravenous, Q6H PRN    pantoprazole (PROTONIX) injection 40 mg, 40 mg, Intravenous, Q12H DAMION, 40 mg at 02/13/20 0814    sodium chloride 0 9 % infusion, 100 mL/hr, Intravenous, Continuous, 100 mL/hr at 02/12/20 5884      Assessment/Plan:  1  Symptomatic anemia with melena  Patient is scheduled for an EGD today  Continue to monitor hemoglobin a frequently and transfuse as needed  NPO with IV hydration  PPI  IV b i d      BINU Kim

## 2020-02-13 NOTE — ASSESSMENT & PLAN NOTE
· History of gastric bypass 20 years ago at [facility] 321

## 2020-02-14 LAB
ABO GROUP BLD BPU: NORMAL
ANION GAP SERPL CALCULATED.3IONS-SCNC: 8 MMOL/L (ref 4–13)
BPU ID: NORMAL
BUN SERPL-MCNC: 10 MG/DL (ref 5–25)
CALCIUM SERPL-MCNC: 7.5 MG/DL (ref 8.3–10.1)
CHLORIDE SERPL-SCNC: 107 MMOL/L (ref 100–108)
CO2 SERPL-SCNC: 24 MMOL/L (ref 21–32)
CREAT SERPL-MCNC: 0.8 MG/DL (ref 0.6–1.3)
CROSSMATCH: NORMAL
GFR SERPL CREATININE-BSD FRML MDRD: 98 ML/MIN/1.73SQ M
GLUCOSE SERPL-MCNC: 94 MG/DL (ref 65–140)
HCT VFR BLD AUTO: 23.8 % (ref 36.5–49.3)
HGB BLD-MCNC: 7.9 G/DL (ref 12–17)
POTASSIUM SERPL-SCNC: 3.8 MMOL/L (ref 3.5–5.3)
SODIUM SERPL-SCNC: 139 MMOL/L (ref 136–145)
UNIT DISPENSE STATUS: NORMAL
UNIT PRODUCT CODE: NORMAL
UNIT RH: NORMAL

## 2020-02-14 PROCEDURE — 85018 HEMOGLOBIN: CPT | Performed by: INTERNAL MEDICINE

## 2020-02-14 PROCEDURE — C9113 INJ PANTOPRAZOLE SODIUM, VIA: HCPCS | Performed by: NURSE PRACTITIONER

## 2020-02-14 PROCEDURE — 99232 SBSQ HOSP IP/OBS MODERATE 35: CPT | Performed by: INTERNAL MEDICINE

## 2020-02-14 PROCEDURE — 80048 BASIC METABOLIC PNL TOTAL CA: CPT | Performed by: INTERNAL MEDICINE

## 2020-02-14 PROCEDURE — 85014 HEMATOCRIT: CPT | Performed by: INTERNAL MEDICINE

## 2020-02-14 RX ORDER — PANTOPRAZOLE SODIUM 40 MG/1
40 TABLET, DELAYED RELEASE ORAL EVERY 12 HOURS
Status: DISCONTINUED | OUTPATIENT
Start: 2020-02-14 | End: 2020-02-15 | Stop reason: HOSPADM

## 2020-02-14 RX ADMIN — PANTOPRAZOLE SODIUM 40 MG: 40 TABLET, DELAYED RELEASE ORAL at 20:49

## 2020-02-14 RX ADMIN — PANTOPRAZOLE SODIUM 40 MG: 40 INJECTION, POWDER, FOR SOLUTION INTRAVENOUS at 08:21

## 2020-02-14 NOTE — ASSESSMENT & PLAN NOTE
· History of gastric bypass 20 years ago at [facility] [place] [facility] 321

## 2020-02-14 NOTE — ASSESSMENT & PLAN NOTE
· Acute blood loss anemia secondary to upper GI bleed with melena  · Continue pantoprazole  · Status post 5 unit PRBC       Results from last 7 days   Lab Units 02/14/20  0536 02/13/20  2205 02/13/20  1414 02/13/20  0534 02/13/20  0141 02/12/20  1732 02/12/20  0935   HEMOGLOBIN g/dL 7 9* 8 7* 7 8* 7 3* 7 2* 5 8* 5 9*

## 2020-02-14 NOTE — PLAN OF CARE
Problem: Potential for Falls  Goal: Patient will remain free of falls  Description  INTERVENTIONS:  - Assess patient frequently for physical needs  -  Identify cognitive and physical deficits and behaviors that affect risk of falls    -  [place] fall precautions as indicated by assessment   - Educate patient/family on patient safety including physical limitations  - Instruct patient to call for assistance with activity based on assessment  - Modify environment to reduce risk of injury  - Consider OT/PT consult to assist with strengthening/mobility  Outcome: Progressing     Problem: PAIN - ADULT  Goal: Verbalizes/displays adequate comfort level or baseline comfort level  Description  Interventions:  - Encourage patient to monitor pain and request assistance  - Assess pain using appropriate pain scale  - Administer analgesics based on type and severity of pain and evaluate response  - Implement non-pharmacological measures as appropriate and evaluate response  - Consider cultural and social influences on pain and pain management  - Notify physician/advanced practitioner if interventions unsuccessful or patient reports new pain  Outcome: Progressing     Problem: INFECTION - ADULT  Goal: Absence or prevention of progression during hospitalization  Description  INTERVENTIONS:  - Assess and monitor for signs and symptoms of infection  - Monitor lab/diagnostic results  - Monitor all insertion sites, i e  indwelling lines, tubes, and drains  - Monitor endotracheal if appropriate and nasal secretions for changes in amount and color  - [place] appropriate cooling/warming therapies per order  - Administer medications as ordered  - Instruct and encourage patient and family to use good hand hygiene technique  - Identify and instruct in appropriate isolation precautions for identified infection/condition  Outcome: Progressing     Problem: SAFETY ADULT  Goal: Patient will remain free of falls  Description  INTERVENTIONS:  - Assess patient frequently for physical needs  -  Identify cognitive and physical deficits and behaviors that affect risk of falls  -  [place] fall precautions as indicated by assessment   - Educate patient/family on patient safety including physical limitations  - Instruct patient to call for assistance with activity based on assessment  - Modify environment to reduce risk of injury  - Consider OT/PT consult to assist with strengthening/mobility  Outcome: Progressing     Problem: DISCHARGE PLANNING  Goal: Discharge to home or other facility with appropriate resources  Description  INTERVENTIONS:  - Identify barriers to discharge w/patient and caregiver  - Arrange for needed discharge resources and transportation as appropriate  - Identify discharge learning needs (meds, wound care, etc )  - Arrange for interpretive services to assist at discharge as needed  - Refer to Case Management Department for coordinating discharge planning if the patient needs post-hospital services based on physician/advanced practitioner order or complex needs related to functional status, cognitive ability, or social support system  Outcome: Progressing     Problem: Knowledge Deficit  Goal: Patient/family/caregiver demonstrates understanding of disease process, treatment plan, medications, and discharge instructions  Description  Complete learning assessment and assess knowledge base    Interventions:  - Provide teaching at level of understanding  - Provide teaching via preferred learning methods  Outcome: Progressing     Problem: CARDIOVASCULAR - ADULT  Goal: Absence of cardiac dysrhythmias or at baseline rhythm  Description  INTERVENTIONS:  - Continuous cardiac monitoring, vital signs, obtain 12 lead EKG if ordered  - Administer antiarrhythmic and heart rate control medications as ordered  - Monitor electrolytes and administer replacement therapy as ordered  Outcome: Progressing     Problem: RESPIRATORY - ADULT  Goal: Achieves optimal ventilation and oxygenation  Description  INTERVENTIONS:  - Assess for changes in respiratory status  - Assess for changes in mentation and behavior  - Position to facilitate oxygenation and minimize respiratory effort  - Oxygen administered by appropriate delivery if ordered  - Initiate smoking cessation education as indicated  - Encourage broncho-pulmonary hygiene including cough, deep breathe, Incentive Spirometry  - Assess the need for suctioning and aspirate as needed  - Assess and instruct to report SOB or any respiratory difficulty  - Respiratory Therapy support as indicated  Outcome: Progressing     Problem: GASTROINTESTINAL - ADULT  Goal: Minimal or absence of nausea and/or vomiting  Description  INTERVENTIONS:  - Administer IV fluids if ordered to ensure adequate hydration  - Maintain NPO status until nausea and vomiting are resolved  - Nasogastric tube if ordered  - Administer ordered antiemetic medications as needed  - Provide nonpharmacologic comfort measures as appropriate  - Advance diet as tolerated, if ordered  - Consider nutrition services referral to assist patient with adequate nutrition and appropriate food choices  Outcome: Progressing     Problem: SKIN/TISSUE INTEGRITY - ADULT  Goal: Skin integrity remains intact  Description  INTERVENTIONS  - Identify patients at risk for skin breakdown  - Assess and monitor skin integrity  - Assess and monitor nutrition and hydration status  - Monitor labs (i e  albumin)  - Assess for incontinence   - Turn and reposition patient  - Assist with mobility/ambulation  - Relieve pressure over bony prominences  - Avoid friction and shearing  - Provide appropriate hygiene as needed including keeping skin clean and dry  - Evaluate need for skin moisturizer/barrier cream  - Collaborate with interdisciplinary team (i e  Nutrition, Rehabilitation, etc )   - Patient/family teaching  Outcome: Progressing     Problem: HEMATOLOGIC - ADULT  Goal: Maintains hematologic stability  Description  INTERVENTIONS  - Assess for signs and symptoms of bleeding or hemorrhage  - Monitor labs  - Administer supportive blood products/factors as ordered and appropriate  Outcome: Progressing

## 2020-02-14 NOTE — PROGRESS NOTES
Progress Note - Amy William 1960, 63 y o  male MRN: [id number]    Unit/Bed#: E5 -01 Encounter: [id number]    Primary Care Provider: Sabiha Almaraz DO   Date and time admitted to hospital: 2/12/2020  9:21 AM        * Gastrointestinal hemorrhage with melena  Assessment & Plan  · GI bleed with melena  Status post EGD with healing ulcer  · Patient was taking NSAIDs and only pantoprazole sporadically  · Transition IV to oral pantoprazole  Acute blood loss anemia  Assessment & Plan  · Acute blood loss anemia secondary to upper GI bleed with melena  · Continue pantoprazole  · Status post 5 unit PRBC  Results from last 7 days   Lab Units 02/14/20  0536 02/13/20  2205 02/13/20  1414 02/13/20  0534 02/13/20  0141 02/12/20  1732 02/12/20  0935   HEMOGLOBIN g/dL 7 9* 8 7* 7 8* 7 3* 7 2* 5 8* 5 9*       Abnormal CT of the abdomen  Assessment & Plan  · CT findings with diminished attenuation right kidney lower pole  UA unremarkable  Results from last 7 days   Lab Units 02/12/20  1410   COLOR UA  Yellow   CLARITY UA  Clear   SPEC GRAV UA  1 010   PH UA  6 5   LEUKOCYTES UA  Negative   NITRITE UA  Negative   GLUCOSE UA mg/dl Negative   KETONES UA mg/dl Negative   BILIRUBIN UA  Negative   BLOOD UA  Negative      Results from last 7 days   Lab Units 02/12/20  1410   RBC UA /hpf None Seen   WBC UA /hpf None Seen   EPITHELIAL CELLS WET PREP /hpf None Seen   BACTERIA UA /hpf None Seen       Alcohol use  Assessment & Plan  · Alcohol use without any withdrawal symptoms  History of Eliud-en-Y gastric bypass  Assessment & Plan  · History of gastric bypass 20 years ago at [facility]      VTE Pharmacologic Prophylaxis: Pharmacologic VTE Prophylaxis contraindicated due to GI bleed    Patient Centered Rounds: I have performed bedside rounds with nursing staff today  Discussions with Specialists or Other Care Team Provider:   Education and Discussions with Family / Patient:  Spouse    Time Spent for Care: 25 mins  More than 50% of total time spent on counseling and coordination of care as described above  Current Length of Stay: 2 day(s)  Current Patient Status: Inpatient     Certification Statement: The patient will continue to require additional inpatient hospital stay due to Gastrointestinal hemorrhage with melena  Discharge Plan / Estimated Discharge Date:  If hemoglobin stable possibly next 24 hours    Code Status: Level 1 - Full Code  ______________________________________________________________________________    Subjective:   Patient seen and examined  No new complaints  Tolerating solid diet    Objective:   Vitals: Blood pressure 104/55, pulse 70, temperature 97 6 °F (36 4 °C), temperature source Temporal, resp  rate 18, height 6' 3" (1 905 m), weight 111 kg (245 lb), SpO2 95 %      Physical Exam:   General appearance: alert, appears stated age and cooperative  Head: Normocephalic, without obvious abnormality, atraumatic  Lungs: clear to auscultation bilaterally  Heart: regular rate and rhythm  Abdomen: soft, non-tender, positive bowel sounds   Back: negative  Extremities: extremities atraumatic, no cyanosis or edema  Neurologic: Grossly normal    Additional Data:   Labs:  Results from last 7 days   Lab Units 02/14/20  0536 02/13/20  2205 02/13/20  1414 02/13/20  0534 02/13/20  0141 02/12/20  1732 02/12/20  0935   WBC Thousand/uL  --   --   --  6 68  --   --  9 15   HEMOGLOBIN g/dL 7 9* 8 7* 7 8* 7 3* 7 2* 5 8* 5 9*   HEMATOCRIT % 23 8* 26 1* 23 5* 21 6* 21 6* 17 7* 17 7*   MCV fL  --   --   --  96  --   --  101*   PLATELETS Thousands/uL  --   --   --  142*  --   --  204     Results from last 7 days   Lab Units 02/14/20  0536 02/13/20  0534 02/12/20  0935   SODIUM mmol/L 139 140 141   POTASSIUM mmol/L 3 8 4 0 3 9   CHLORIDE mmol/L 107 110* 107   CO2 mmol/L 24 23 24   ANION GAP mmol/L 8 7 10   BUN mg/dL 10 15 25   CREATININE mg/dL 0 80 0 84 0 93   CALCIUM mg/dL 7 5* 7 4* 7 7*   ALBUMIN g/dL  --   --  2 5* TOTAL BILIRUBIN mg/dL  --   --  0 21   ALK PHOS U/L  --   --  41*   ALT U/L  --   --  20   AST U/L  --   --  17   EGFR ml/min/1 73sq m 98 97 90   GLUCOSE RANDOM mg/dL 94 98 113     Results from last 7 days   Lab Units 02/12/20  0935   MAGNESIUM mg/dL 1 9                  Results from last 7 days   Lab Units 02/12/20  1722   POC GLUCOSE mg/dl 175*             * I Have Reviewed All Lab Data Listed Above  Cultures:               Imaging:  Imaging Reports Reviewed Today Include:   Ct Abdomen Pelvis With Contrast    Result Date: 2/12/2020  Impression: No evidence of bowel dilatation to suggest obstruction  No obvious mass is seen  New subtle area of diminished attenuation lower pole of the right kidney is difficult to characterize  Some mild inflammation is possible  Correlation with urinalysis may be helpful as well as follow-up examination in a few months time to exclude other  etiologies  The study was marked in EPIC for significant notification with follow-up  Workstation performed: [id number]     Scheduled Meds:  Current Facility-Administered Medications:  acetaminophen 650 mg Oral Q6H PRN BINU Sweet   iohexol 100 mL Intravenous Once in imaging BINU Sweet   ondansetron 4 mg Intravenous Q6H PRN BINU Sweet   pantoprazole 40 mg Intravenous Q12H BINU [place]       [address] [facility]  Hospitalist    ** Please Note: This note has been constructed using a voice recognition system   **

## 2020-02-14 NOTE — ASSESSMENT & PLAN NOTE
· GI bleed with melena  Status post EGD with healing ulcer  · Patient was taking NSAIDs and only pantoprazole sporadically  · Transition IV to oral pantoprazole

## 2020-02-14 NOTE — PLAN OF CARE
Problem: Potential for Falls  Goal: Patient will remain free of falls  Description  INTERVENTIONS:  - Assess patient frequently for physical needs  -  Identify cognitive and physical deficits and behaviors that affect risk of falls    -  [place] fall precautions as indicated by assessment   - Educate patient/family on patient safety including physical limitations  - Instruct patient to call for assistance with activity based on assessment  - Modify environment to reduce risk of injury  - Consider OT/PT consult to assist with strengthening/mobility  2/14/2020 0024 by Gaylene Dance, RN  Outcome: Progressing  2/14/2020 0023 by Gaylene Dance, RN  Outcome: Progressing     Problem: PAIN - ADULT  Goal: Verbalizes/displays adequate comfort level or baseline comfort level  Description  Interventions:  - Encourage patient to monitor pain and request assistance  - Assess pain using appropriate pain scale  - Administer analgesics based on type and severity of pain and evaluate response  - Implement non-pharmacological measures as appropriate and evaluate response  - Consider cultural and social influences on pain and pain management  - Notify physician/advanced practitioner if interventions unsuccessful or patient reports new pain  2/14/2020 0024 by Gaylene Dance, RN  Outcome: Progressing  2/14/2020 0023 by Gaylene Dance, RN  Outcome: Progressing     Problem: INFECTION - ADULT  Goal: Absence or prevention of progression during hospitalization  Description  INTERVENTIONS:  - Assess and monitor for signs and symptoms of infection  - Monitor lab/diagnostic results  - Monitor all insertion sites, i e  indwelling lines, tubes, and drains  - Monitor endotracheal if appropriate and nasal secretions for changes in amount and color  - [place] appropriate cooling/warming therapies per order  - Administer medications as ordered  - Instruct and encourage patient and family to use good hand hygiene technique  - Identify and instruct in appropriate isolation precautions for identified infection/condition  2/14/2020 0024 by Ousmane Davila RN  Outcome: Progressing  2/14/2020 0023 by Ousmane Davila RN  Outcome: Progressing     Problem: SAFETY ADULT  Goal: Patient will remain free of falls  Description  INTERVENTIONS:  - Assess patient frequently for physical needs  -  Identify cognitive and physical deficits and behaviors that affect risk of falls  -  [place] fall precautions as indicated by assessment   - Educate patient/family on patient safety including physical limitations  - Instruct patient to call for assistance with activity based on assessment  - Modify environment to reduce risk of injury  - Consider OT/PT consult to assist with strengthening/mobility  2/14/2020 0024 by Ousmane Davila RN  Outcome: Progressing  2/14/2020 0023 by Ousmane Davila RN  Outcome: Progressing     Problem: DISCHARGE PLANNING  Goal: Discharge to home or other facility with appropriate resources  Description  INTERVENTIONS:  - Identify barriers to discharge w/patient and caregiver  - Arrange for needed discharge resources and transportation as appropriate  - Identify discharge learning needs (meds, wound care, etc )  - Arrange for interpretive services to assist at discharge as needed  - Refer to Case Management Department for coordinating discharge planning if the patient needs post-hospital services based on physician/advanced practitioner order or complex needs related to functional status, cognitive ability, or social support system  2/14/2020 0024 by Ousmane Davila RN  Outcome: Progressing  2/14/2020 0023 by Ousmane Davila RN  Outcome: Progressing     Problem: Knowledge Deficit  Goal: Patient/family/caregiver demonstrates understanding of disease process, treatment plan, medications, and discharge instructions  Description  Complete learning assessment and assess knowledge base    Interventions:  - Provide teaching at level of understanding  - Provide teaching via preferred learning methods  2/14/2020 0024 by Aby Marley RN  Outcome: Progressing  2/14/2020 0023 by Aby Marley RN  Outcome: Progressing     Problem: CARDIOVASCULAR - ADULT  Goal: Absence of cardiac dysrhythmias or at baseline rhythm  Description  INTERVENTIONS:  - Continuous cardiac monitoring, vital signs, obtain 12 lead EKG if ordered  - Administer antiarrhythmic and heart rate control medications as ordered  - Monitor electrolytes and administer replacement therapy as ordered  2/14/2020 0024 by Aby Marley RN  Outcome: Progressing  2/14/2020 0023 by Aby Marley RN  Outcome: Progressing     Problem: RESPIRATORY - ADULT  Goal: Achieves optimal ventilation and oxygenation  Description  INTERVENTIONS:  - Assess for changes in respiratory status  - Assess for changes in mentation and behavior  - Position to facilitate oxygenation and minimize respiratory effort  - Oxygen administered by appropriate delivery if ordered  - Initiate smoking cessation education as indicated  - Encourage broncho-pulmonary hygiene including cough, deep breathe, Incentive Spirometry  - Assess the need for suctioning and aspirate as needed  - Assess and instruct to report SOB or any respiratory difficulty  - Respiratory Therapy support as indicated  2/14/2020 0024 by Aby Marley RN  Outcome: Progressing  2/14/2020 0023 by Aby Marley RN  Outcome: Progressing     Problem: GASTROINTESTINAL - ADULT  Goal: Minimal or absence of nausea and/or vomiting  Description  INTERVENTIONS:  - Administer IV fluids if ordered to ensure adequate hydration  - Maintain NPO status until nausea and vomiting are resolved  - Nasogastric tube if ordered  - Administer ordered antiemetic medications as needed  - Provide nonpharmacologic comfort measures as appropriate  - Advance diet as tolerated, if ordered  - Consider nutrition services referral to assist patient with adequate nutrition and appropriate food choices  2/14/2020 0024 by Roshni Drummond RN  Outcome: Progressing  2/14/2020 0023 by Roshni Drummond RN  Outcome: Progressing     Problem: SKIN/TISSUE INTEGRITY - ADULT  Goal: Skin integrity remains intact  Description  INTERVENTIONS  - Identify patients at risk for skin breakdown  - Assess and monitor skin integrity  - Assess and monitor nutrition and hydration status  - Monitor labs (i e  albumin)  - Assess for incontinence   - Turn and reposition patient  - Assist with mobility/ambulation  - Relieve pressure over bony prominences  - Avoid friction and shearing  - Provide appropriate hygiene as needed including keeping skin clean and dry  - Evaluate need for skin moisturizer/barrier cream  - Collaborate with interdisciplinary team (i e  Nutrition, Rehabilitation, etc )   - Patient/family teaching  2/14/2020 0024 by Roshni Drummond RN  Outcome: Progressing  2/14/2020 0023 by Roshni Drummond RN  Outcome: Progressing     Problem: HEMATOLOGIC - ADULT  Goal: Maintains hematologic stability  Description  INTERVENTIONS  - Assess for signs and symptoms of bleeding or hemorrhage  - Monitor labs  - Administer supportive blood products/factors as ordered and appropriate  2/14/2020 0024 by Roshni Drummond RN  Outcome: Progressing  2/14/2020 0023 by Roshni Drummond RN  Outcome: Progressing

## 2020-02-14 NOTE — PLAN OF CARE
Problem: Potential for Falls  Goal: Patient will remain free of falls  Description  INTERVENTIONS:  - Assess patient frequently for physical needs  -  Identify cognitive and physical deficits and behaviors that affect risk of falls    -  [place] fall precautions as indicated by assessment   - Educate patient/family on patient safety including physical limitations  - Instruct patient to call for assistance with activity based on assessment  - Modify environment to reduce risk of injury  - Consider OT/PT consult to assist with strengthening/mobility  Outcome: Progressing     Problem: PAIN - ADULT  Goal: Verbalizes/displays adequate comfort level or baseline comfort level  Description  Interventions:  - Encourage patient to monitor pain and request assistance  - Assess pain using appropriate pain scale  - Administer analgesics based on type and severity of pain and evaluate response  - Implement non-pharmacological measures as appropriate and evaluate response  - Consider cultural and social influences on pain and pain management  - Notify physician/advanced practitioner if interventions unsuccessful or patient reports new pain  Outcome: Progressing     Problem: INFECTION - ADULT  Goal: Absence or prevention of progression during hospitalization  Description  INTERVENTIONS:  - Assess and monitor for signs and symptoms of infection  - Monitor lab/diagnostic results  - Monitor all insertion sites, i e  indwelling lines, tubes, and drains  - Monitor endotracheal if appropriate and nasal secretions for changes in amount and color  - [place] appropriate cooling/warming therapies per order  - Administer medications as ordered  - Instruct and encourage patient and family to use good hand hygiene technique  - Identify and instruct in appropriate isolation precautions for identified infection/condition  Outcome: Progressing     Problem: SAFETY ADULT  Goal: Patient will remain free of falls  Description  INTERVENTIONS:  - Assess patient frequently for physical needs  -  Identify cognitive and physical deficits and behaviors that affect risk of falls  -  [place] fall precautions as indicated by assessment   - Educate patient/family on patient safety including physical limitations  - Instruct patient to call for assistance with activity based on assessment  - Modify environment to reduce risk of injury  - Consider OT/PT consult to assist with strengthening/mobility  Outcome: Progressing     Problem: DISCHARGE PLANNING  Goal: Discharge to home or other facility with appropriate resources  Description  INTERVENTIONS:  - Identify barriers to discharge w/patient and caregiver  - Arrange for needed discharge resources and transportation as appropriate  - Identify discharge learning needs (meds, wound care, etc )  - Arrange for interpretive services to assist at discharge as needed  - Refer to Case Management Department for coordinating discharge planning if the patient needs post-hospital services based on physician/advanced practitioner order or complex needs related to functional status, cognitive ability, or social support system  Outcome: Progressing     Problem: Knowledge Deficit  Goal: Patient/family/caregiver demonstrates understanding of disease process, treatment plan, medications, and discharge instructions  Description  Complete learning assessment and assess knowledge base    Interventions:  - Provide teaching at level of understanding  - Provide teaching via preferred learning methods  Outcome: Progressing     Problem: CARDIOVASCULAR - ADULT  Goal: Absence of cardiac dysrhythmias or at baseline rhythm  Description  INTERVENTIONS:  - Continuous cardiac monitoring, vital signs, obtain 12 lead EKG if ordered  - Administer antiarrhythmic and heart rate control medications as ordered  - Monitor electrolytes and administer replacement therapy as ordered  Outcome: Progressing     Problem: RESPIRATORY - ADULT  Goal: Achieves optimal ventilation and oxygenation  Description  INTERVENTIONS:  - Assess for changes in respiratory status  - Assess for changes in mentation and behavior  - Position to facilitate oxygenation and minimize respiratory effort  - Oxygen administered by appropriate delivery if ordered  - Initiate smoking cessation education as indicated  - Encourage broncho-pulmonary hygiene including cough, deep breathe, Incentive Spirometry  - Assess the need for suctioning and aspirate as needed  - Assess and instruct to report SOB or any respiratory difficulty  - Respiratory Therapy support as indicated  Outcome: Progressing     Problem: GASTROINTESTINAL - ADULT  Goal: Minimal or absence of nausea and/or vomiting  Description  INTERVENTIONS:  - Administer IV fluids if ordered to ensure adequate hydration  - Maintain NPO status until nausea and vomiting are resolved  - Nasogastric tube if ordered  - Administer ordered antiemetic medications as needed  - Provide nonpharmacologic comfort measures as appropriate  - Advance diet as tolerated, if ordered  - Consider nutrition services referral to assist patient with adequate nutrition and appropriate food choices  Outcome: Progressing     Problem: SKIN/TISSUE INTEGRITY - ADULT  Goal: Skin integrity remains intact  Description  INTERVENTIONS  - Identify patients at risk for skin breakdown  - Assess and monitor skin integrity  - Assess and monitor nutrition and hydration status  - Monitor labs (i e  albumin)  - Assess for incontinence   - Turn and reposition patient  - Assist with mobility/ambulation  - Relieve pressure over bony prominences  - Avoid friction and shearing  - Provide appropriate hygiene as needed including keeping skin clean and dry  - Evaluate need for skin moisturizer/barrier cream  - Collaborate with interdisciplinary team (i e  Nutrition, Rehabilitation, etc )   - Patient/family teaching  Outcome: Progressing     Problem: HEMATOLOGIC - ADULT  Goal: Maintains hematologic stability  Description  INTERVENTIONS  - Assess for signs and symptoms of bleeding or hemorrhage  - Monitor labs  - Administer supportive blood products/factors as ordered and appropriate  Outcome: Progressing

## 2020-02-14 NOTE — PROGRESS NOTES
Patient Name: Jenny Adame  Patient MRN: [id number]  Date: 02/14/20  Service: [facility]    Subjective  patient states stools are getting lighter cannot is dark  Hemoglobin noted relatively stable  One over findings of endoscopy again with him  Also discussed need for eventual colonoscopy as outpatient  He apparently did receive IV iron      Vitals  Blood pressure 119/61, pulse (!) 53, temperature 97 6 °F (36 4 °C), temperature source Temporal, resp  rate 18, height 6' 3" (1 905 m), weight 111 kg (245 lb), SpO2 97 %  HEENT negative icterus  Abdomen soft nontender without masses  Extremities well clubbing cyanosis edema  Neurologic a and O times three    Laboratory Studies  Results from last 7 days   Lab Units 02/14/20  0536 02/13/20  2205 02/13/20  1414 02/13/20  0534 02/13/20  0141 02/12/20  1732 02/12/20  0935   WBC Thousand/uL  --   --   --  6 68  --   --  9 15   HEMOGLOBIN g/dL 7 9* 8 7* 7 8* 7 3* 7 2* 5 8* 5 9*   HEMATOCRIT % 23 8* 26 1* 23 5* 21 6* 21 6* 17 7* 17 7*   PLATELETS Thousands/uL  --   --   --  142*  --   --  204     Results from last 7 days   Lab Units 02/14/20  0536 02/13/20  0534 02/12/20  0935   POTASSIUM mmol/L 3 8 4 0 3 9   CHLORIDE mmol/L 107 110* 107   CO2 mmol/L 24 23 24   BUN mg/dL 10 15 25   CREATININE mg/dL 0 80 0 84 0 93   CALCIUM mg/dL 7 5* 7 4* 7 7*   ALK PHOS U/L  --   --  41*   ALT U/L  --   --  20   AST U/L  --   --  17       Imaging and Other Studies      Inhouse Medications     Current Facility-Administered Medications:     acetaminophen (TYLENOL) tablet 650 mg, 650 mg, Oral, Q6H PRN, 650 mg at 02/12/20 2115    iohexol (OMNIPAQUE) 350 MG/ML injection (MULTI-DOSE) 100 mL, 100 mL, Intravenous, Once in imaging    ondansetron (ZOFRAN) injection 4 mg, 4 mg, Intravenous, Q6H PRN    pantoprazole (PROTONIX) injection 40 mg, 40 mg, Intravenous, Q12H DAMION, 40 mg at 02/14/20 0276      Assessment/Plan:  1   Status post GI bleed probably from anastomotic ulcer although no active bleeding seen at ulcer did not have stigmata  2  Probable element of iron deficiency status post IV iron  From my standpoint okay for discharge will need follow-up CBC in 1 week  We will be happy to see him as an outpatient for colonoscopy    Would continue PPI b i d  PO at discharge until recently in our office          Kermit Rodney MD

## 2020-02-15 VITALS
TEMPERATURE: 98.7 F | WEIGHT: 245 LBS | SYSTOLIC BLOOD PRESSURE: 119 MMHG | RESPIRATION RATE: 18 BRPM | OXYGEN SATURATION: 99 % | HEART RATE: 67 BPM | BODY MASS INDEX: 30.46 KG/M2 | HEIGHT: 75 IN | DIASTOLIC BLOOD PRESSURE: 69 MMHG

## 2020-02-15 LAB
GLUCOSE SERPL-MCNC: 131 MG/DL (ref 65–140)
HCT VFR BLD AUTO: 26.8 % (ref 36.5–49.3)
HGB BLD-MCNC: 8.8 G/DL (ref 12–17)

## 2020-02-15 PROCEDURE — 99239 HOSP IP/OBS DSCHRG MGMT >30: CPT | Performed by: INTERNAL MEDICINE

## 2020-02-15 PROCEDURE — 82948 REAGENT STRIP/BLOOD GLUCOSE: CPT

## 2020-02-15 PROCEDURE — 85018 HEMOGLOBIN: CPT | Performed by: INTERNAL MEDICINE

## 2020-02-15 PROCEDURE — 85014 HEMATOCRIT: CPT | Performed by: INTERNAL MEDICINE

## 2020-02-15 PROCEDURE — 99232 SBSQ HOSP IP/OBS MODERATE 35: CPT | Performed by: INTERNAL MEDICINE

## 2020-02-15 RX ORDER — ACETAMINOPHEN 325 MG/1
650 TABLET ORAL EVERY 6 HOURS PRN
Qty: 30 TABLET | Refills: 0 | Status: SHIPPED | OUTPATIENT
Start: 2020-02-15 | End: 2020-02-15 | Stop reason: SDUPTHER

## 2020-02-15 RX ORDER — ACETAMINOPHEN 325 MG/1
650 TABLET ORAL EVERY 6 HOURS PRN
Qty: 30 TABLET | Refills: 0
Start: 2020-02-15 | End: 2022-02-26

## 2020-02-15 RX ORDER — PANTOPRAZOLE SODIUM 40 MG/1
40 TABLET, DELAYED RELEASE ORAL EVERY 12 HOURS
Qty: 60 TABLET | Refills: 2 | Status: SHIPPED | OUTPATIENT
Start: 2020-02-15 | End: 2020-11-19 | Stop reason: SDUPTHER

## 2020-02-15 RX ADMIN — PANTOPRAZOLE SODIUM 40 MG: 40 TABLET, DELAYED RELEASE ORAL at 05:45

## 2020-02-15 NOTE — PLAN OF CARE
Problem: Potential for Falls  Goal: Patient will remain free of falls  Description  INTERVENTIONS:  - Assess patient frequently for physical needs  -  Identify cognitive and physical deficits and behaviors that affect risk of falls    -  [place] fall precautions as indicated by assessment   - Educate patient/family on patient safety including physical limitations  - Instruct patient to call for assistance with activity based on assessment  - Modify environment to reduce risk of injury  - Consider OT/PT consult to assist with strengthening/mobility  Outcome: Progressing     Problem: PAIN - ADULT  Goal: Verbalizes/displays adequate comfort level or baseline comfort level  Description  Interventions:  - Encourage patient to monitor pain and request assistance  - Assess pain using appropriate pain scale  - Administer analgesics based on type and severity of pain and evaluate response  - Implement non-pharmacological measures as appropriate and evaluate response  - Consider cultural and social influences on pain and pain management  - Notify physician/advanced practitioner if interventions unsuccessful or patient reports new pain  Outcome: Progressing     Problem: INFECTION - ADULT  Goal: Absence or prevention of progression during hospitalization  Description  INTERVENTIONS:  - Assess and monitor for signs and symptoms of infection  - Monitor lab/diagnostic results  - Monitor all insertion sites, i e  indwelling lines, tubes, and drains  - Monitor endotracheal if appropriate and nasal secretions for changes in amount and color  - [place] appropriate cooling/warming therapies per order  - Administer medications as ordered  - Instruct and encourage patient and family to use good hand hygiene technique  - Identify and instruct in appropriate isolation precautions for identified infection/condition  Outcome: Progressing     Problem: SAFETY ADULT  Goal: Patient will remain free of falls  Description  INTERVENTIONS:  - Assess patient frequently for physical needs  -  Identify cognitive and physical deficits and behaviors that affect risk of falls  -  [place] fall precautions as indicated by assessment   - Educate patient/family on patient safety including physical limitations  - Instruct patient to call for assistance with activity based on assessment  - Modify environment to reduce risk of injury  - Consider OT/PT consult to assist with strengthening/mobility  Outcome: Progressing     Problem: DISCHARGE PLANNING  Goal: Discharge to home or other facility with appropriate resources  Description  INTERVENTIONS:  - Identify barriers to discharge w/patient and caregiver  - Arrange for needed discharge resources and transportation as appropriate  - Identify discharge learning needs (meds, wound care, etc )  - Arrange for interpretive services to assist at discharge as needed  - Refer to Case Management Department for coordinating discharge planning if the patient needs post-hospital services based on physician/advanced practitioner order or complex needs related to functional status, cognitive ability, or social support system  Outcome: Progressing     Problem: Knowledge Deficit  Goal: Patient/family/caregiver demonstrates understanding of disease process, treatment plan, medications, and discharge instructions  Description  Complete learning assessment and assess knowledge base    Interventions:  - Provide teaching at level of understanding  - Provide teaching via preferred learning methods  Outcome: Progressing     Problem: CARDIOVASCULAR - ADULT  Goal: Absence of cardiac dysrhythmias or at baseline rhythm  Description  INTERVENTIONS:  - Continuous cardiac monitoring, vital signs, obtain 12 lead EKG if ordered  - Administer antiarrhythmic and heart rate control medications as ordered  - Monitor electrolytes and administer replacement therapy as ordered  Outcome: Progressing     Problem: GASTROINTESTINAL - ADULT  Goal: Minimal or absence of nausea and/or vomiting  Description  INTERVENTIONS:  - Administer IV fluids if ordered to ensure adequate hydration  - Maintain NPO status until nausea and vomiting are resolved  - Nasogastric tube if ordered  - Administer ordered antiemetic medications as needed  - Provide nonpharmacologic comfort measures as appropriate  - Advance diet as tolerated, if ordered  - Consider nutrition services referral to assist patient with adequate nutrition and appropriate food choices  Outcome: Progressing     Problem: SKIN/TISSUE INTEGRITY - ADULT  Goal: Skin integrity remains intact  Description  INTERVENTIONS  - Identify patients at risk for skin breakdown  - Assess and monitor skin integrity  - Assess and monitor nutrition and hydration status  - Monitor labs (i e  albumin)  - Assess for incontinence   - Turn and reposition patient  - Assist with mobility/ambulation  - Relieve pressure over bony prominences  - Avoid friction and shearing  - Provide appropriate hygiene as needed including keeping skin clean and dry  - Evaluate need for skin moisturizer/barrier cream  - Collaborate with interdisciplinary team (i e  Nutrition, Rehabilitation, etc )   - Patient/family teaching  Outcome: Progressing     Problem: HEMATOLOGIC - ADULT  Goal: Maintains hematologic stability  Description  INTERVENTIONS  - Assess for signs and symptoms of bleeding or hemorrhage  - Monitor labs  - Administer supportive blood products/factors as ordered and appropriate  Outcome: Progressing

## 2020-02-15 NOTE — PROGRESS NOTES
Progress Note - Amy William 63 y o  male MRN: [id number]    Unit/Bed#: E5 -01 Encounter: [id number]    Subjective:     Patient seen and examined at bedside  He reports feeling well  His stool is brown but there is red blood in the toilet water likely hemorrhoidal bleeding  Objective:     Vitals: Blood pressure 105/62, pulse 62, temperature 97 6 °F (36 4 °C), temperature source Temporal, resp  rate 18, height 6' 3" (1 905 m), weight 111 kg (245 lb), SpO2 99 %  ,Body mass index is 30 62 kg/m²  No intake or output data in the 24 hours ending 02/15/20 1520    Physical Exam:     General Appearance: Alert, appears stated age and cooperative  Lungs: Clear to auscultation bilaterally  Heart: Regular rate and rhythm  Abdomen: Soft, non-tender, non-distended; bowel sounds normal  Extremities: No cyanosis, edema    Invasive Devices     Peripheral Intravenous Line            Peripheral IV 02/12/20 Left Antecubital 3 days    Peripheral IV 02/12/20 Right Hand 3 days                Lab Results:  Results from last 7 days   Lab Units 02/15/20  0444  02/13/20  0534  02/12/20  0935   WBC Thousand/uL  --   --  6 68  --  9 15   HEMOGLOBIN g/dL 8 8*   < > 7 3*   < > 5 9*   HEMATOCRIT % 26 8*   < > 21 6*   < > 17 7*   PLATELETS Thousands/uL  --   --  142*  --  204   NEUTROS PCT %  --   --   --   --  75   LYMPHS PCT %  --   --   --   --  17   MONOS PCT %  --   --   --   --  6   EOS PCT %  --   --   --   --  1    < > = values in this interval not displayed  Results from last 7 days   Lab Units 02/14/20  0536  02/12/20  0935   POTASSIUM mmol/L 3 8   < > 3 9   CHLORIDE mmol/L 107   < > 107   CO2 mmol/L 24   < > 24   BUN mg/dL 10   < > 25   CREATININE mg/dL 0 80   < > 0 93   CALCIUM mg/dL 7 5*   < > 7 7*   ALK PHOS U/L  --   --  41*   ALT U/L  --   --  20   AST U/L  --   --  17    < > = values in this interval not displayed  Imaging Studies: I have personally reviewed pertinent imaging studies      Ct Abdomen Pelvis With Contrast    Result Date: 2/12/2020  Impression: No evidence of bowel dilatation to suggest obstruction  No obvious mass is seen  New subtle area of diminished attenuation lower pole of the right kidney is difficult to characterize  Some mild inflammation is possible  Correlation with urinalysis may be helpful as well as follow-up examination in a few months time to exclude other  etiologies  The study was marked in EPIC for significant notification with follow-up  Workstation performed: [id number]       Assessment and Plan:    1) Melena and blood loss anemia: EGD 2/13/20 showed anastomotic ulcer without active bleeding  He denies further melena, but small amount of BRBPR likely hemorrhoidal bleeding  He was transfused total 4 units of blood during admission  Hemoglobin stable 8 8  Vital signs stable  -Continue PPI twice daily  -Avoid NSAIDs    2) Bright red blood per rectum: Likely hemorrhoids given stable hemoglobin    -He will need colonoosocpy in the next few weeks to rule out AVM, polyp, malignancy  -He can follow-up with Dr Dylan Adams for discharge from GI standpoint

## 2020-02-15 NOTE — PLAN OF CARE
Problem: Potential for Falls  Goal: Patient will remain free of falls  Description  INTERVENTIONS:  - Assess patient frequently for physical needs  -  Identify cognitive and physical deficits and behaviors that affect risk of falls  -  [place] fall precautions as indicated by assessment   - Educate patient/family on patient safety including physical limitations  - Instruct patient to call for assistance with activity based on assessment  - Modify environment to reduce risk of injury  - Consider OT/PT consult to assist with strengthening/mobility  Outcome: Progressing     Problem: SAFETY ADULT  Goal: Patient will remain free of falls  Description  INTERVENTIONS:  - Assess patient frequently for physical needs  -  Identify cognitive and physical deficits and behaviors that affect risk of falls    -  [place] fall precautions as indicated by assessment   - Educate patient/family on patient safety including physical limitations  - Instruct patient to call for assistance with activity based on assessment  - Modify environment to reduce risk of injury  - Consider OT/PT consult to assist with strengthening/mobility  Outcome: Progressing     Problem: DISCHARGE PLANNING  Goal: Discharge to home or other facility with appropriate resources  Description  INTERVENTIONS:  - Identify barriers to discharge w/patient and caregiver  - Arrange for needed discharge resources and transportation as appropriate  - Identify discharge learning needs (meds, wound care, etc )  - Arrange for interpretive services to assist at discharge as needed  - Refer to Case Management Department for coordinating discharge planning if the patient needs post-hospital services based on physician/advanced practitioner order or complex needs related to functional status, cognitive ability, or social support system  Outcome: Progressing     Problem: Knowledge Deficit  Goal: Patient/family/caregiver demonstrates understanding of disease process, treatment plan, medications, and discharge instructions  Description  Complete learning assessment and assess knowledge base    Interventions:  - Provide teaching at level of understanding  - Provide teaching via preferred learning methods  Outcome: Progressing     Problem: CARDIOVASCULAR - ADULT  Goal: Absence of cardiac dysrhythmias or at baseline rhythm  Description  INTERVENTIONS:  - Continuous cardiac monitoring, vital signs, obtain 12 lead EKG if ordered  - Administer antiarrhythmic and heart rate control medications as ordered  - Monitor electrolytes and administer replacement therapy as ordered  Outcome: Progressing     Problem: GASTROINTESTINAL - ADULT  Goal: Minimal or absence of nausea and/or vomiting  Description  INTERVENTIONS:  - Administer IV fluids if ordered to ensure adequate hydration  - Maintain NPO status until nausea and vomiting are resolved  - Nasogastric tube if ordered  - Administer ordered antiemetic medications as needed  - Provide nonpharmacologic comfort measures as appropriate  - Advance diet as tolerated, if ordered  - Consider nutrition services referral to assist patient with adequate nutrition and appropriate food choices  Outcome: Progressing     Problem: HEMATOLOGIC - ADULT  Goal: Maintains hematologic stability  Description  INTERVENTIONS  - Assess for signs and symptoms of bleeding or hemorrhage  - Monitor labs  - Administer supportive blood products/factors as ordered and appropriate  Outcome: Progressing

## 2020-02-15 NOTE — DISCHARGE SUMMARY
Discharge- Linda Loyd 1960, 63 y o  male MRN: [id number]  Unit/Bed#: E5 -01 Encounter: [id number]  Primary Care Provider: Jairo Burnett DO   Date and time admitted to hospital: 2/12/2020  9:21 AM      Admitting Provider:  Shauna Sharif DO  Discharge Provider:  Shauna Sharif DO  Admission Date: 2/12/2020       Discharge Date: 02/15/20   LOS: 3  Primary Care Physician at Discharge: Jairo Burnett, [id number]:  Linda Loyd is a 63 y o  male who presented to the hospital for dark tardy stool  The patient does have a history of Eliud-en-Y gastric bypass who was an  and fell off a porch two weeks ago  He has been taking extra ibuprofen and has not been using pantoprazole as prescribed only up to 3 times weekly  He came to the emergency department where he was admitted and found to have hemoglobin 5 8  He did undergo EGD which did demonstrate anastomotic ulcer without active bleeding  His hemoglobin stabilized after transfusion  He did have BRBPR at time of discharge however it was likely hemorrhoidal given stable hemoglobin and was RE-evaluated by GI  Will need colonoscopy after discharge    Please see problem list listed below  DISCHARGE DIAGNOSES    CONSULTING PROVIDERS   IP CONSULT TO GASTROENTEROLOGY    PROCEDURES PERFORMED  * No surgery found *    RADIOLOGY RESULTS  Ct Abdomen Pelvis With Contrast    Result Date: 2/12/2020  Impression: No evidence of bowel dilatation to suggest obstruction  No obvious mass is seen  New subtle area of diminished attenuation lower pole of the right kidney is difficult to characterize  Some mild inflammation is possible  Correlation with urinalysis may be helpful as well as follow-up examination in a few months time to exclude other  etiologies  The study was marked in EPIC for significant notification with follow-up   Workstation performed: [id number]       LABS  Results from last 7 days   Lab Units 02/15/20  0442 02/14/20  0536 02/13/20  2205 02/13/20  1414 02/13/20  0534 02/13/20  0141 02/12/20  1732 02/12/20  0935   WBC Thousand/uL  --   --   --   --  6 68  --   --  9 15   HEMOGLOBIN g/dL 8 8* 7 9* 8 7* 7 8* 7 3* 7 2* 5 8* 5 9*   HEMATOCRIT % 26 8* 23 8* 26 1* 23 5* 21 6* 21 6* 17 7* 17 7*   MCV fL  --   --   --   --  96  --   --  101*   PLATELETS Thousands/uL  --   --   --   --  142*  --   --  204     Results from last 7 days   Lab Units 02/14/20  0536 02/13/20  0534 02/12/20  0935   SODIUM mmol/L 139 140 141   POTASSIUM mmol/L 3 8 4 0 3 9   CHLORIDE mmol/L 107 110* 107   CO2 mmol/L 24 23 24   BUN mg/dL 10 15 25   CREATININE mg/dL 0 80 0 84 0 93   CALCIUM mg/dL 7 5* 7 4* 7 7*   ALBUMIN g/dL  --   --  2 5*   TOTAL BILIRUBIN mg/dL  --   --  0 21   ALK PHOS U/L  --   --  41*   ALT U/L  --   --  20   AST U/L  --   --  17   EGFR ml/min/1 73sq m 98 97 90   GLUCOSE RANDOM mg/dL 94 98 113                  Results from last 7 days   Lab Units 02/15/20  1125 02/12/20  1722   POC GLUCOSE mg/dl 131 175*       Results from last 7 days   Lab Units 02/12/20  1410   COLOR UA  Yellow   CLARITY UA  Clear   SPEC GRAV UA  1 010   PH UA  6 5   LEUKOCYTES UA  Negative   NITRITE UA  Negative   GLUCOSE UA mg/dl Negative   KETONES UA mg/dl Negative   BILIRUBIN UA  Negative   BLOOD UA  Negative      Results from last 7 days   Lab Units 02/12/20  1410   RBC UA /hpf None Seen   WBC UA /hpf None Seen   EPITHELIAL CELLS WET PREP /hpf None Seen   BACTERIA UA /hpf None Seen            PHYSICAL EXAM:  Vitals:   Blood Pressure: 119/69 (02/15/20 1500)  Pulse: 67 (02/15/20 1500)  Temperature: 98 7 °F (37 1 °C) (02/15/20 1500)  Temp Source: Tympanic (02/15/20 1500)  Respirations: 18 (02/15/20 1500)  Height: 6' 3" (190 5 cm) (02/13/20 1504)  Weight - Scale: 111 kg (245 lb) (02/13/20 1504)  SpO2: 99 % (02/15/20 1500)    General appearance: alert, appears stated age and cooperative  Eyes: conjunctivae/corneas clear  PERRL, EOM's intact    Lungs: diminished breath sounds  Heart: regular rate and rhythm  Abdomen: soft, non-tender; bowel sounds normal; no masses,  no organomegaly  Back: symmetric, no curvature  ROM normal  No CVA tenderness  Extremities: extremities normal, atraumatic, no cyanosis or edema  Neurologic: Grossly normal    Planned Re-admission:  No  Discharge Disposition: Home/Self Care    Test Results Pending at Discharge:    Order Current Status    Tissue Exam In process      Incidental findings:     Medications   · Summary of Medication Adjustments made as a result of this hospitalization:   · Peptic ulcer disease  Pantoprazole b i d   · Medication Dosing Tapers - Please refer to Discharge Medication List for details on any medication dosing tapers (if applicable to patient)  · Discharge Medication List: See after visit summary for reconciled discharge medications  Diet restrictions:  Bariatric diet   Activity restrictions: No strenuous activity  Discharge Condition: stable    Outpatient Follow-Up and Discharge Instructions  See after visit summary section titled Discharge Instructions for information provided to patient and family  Code Status: Level 1 - Full Code  Discharge Statement   I spent 35 minutes discharging the patient  This time was spent on the day of discharge  Greater than 50% of total time was spent with the patient and / or family counseling and / or coordination of care  ** Please Note: This note has been constructed using a voice recognition system   **

## 2020-02-15 NOTE — DISCHARGE INSTRUCTIONS
How to Stop Smoking   WHAT YOU NEED TO KNOW:   You will improve your health and the health of others around you if you stop smoking  Your risk for heart and lung disease, cancer, stroke, heart attack, and vision problems will also decrease  You can benefit from quitting no matter how long you have smoked  DISCHARGE INSTRUCTIONS:   Prepare to stop smoking:  Nicotine is a highly addictive drug found in cigarettes  Withdrawal symptoms can happen when you stop smoking and make it hard to quit  These include anxiety, depression, irritability, trouble sleeping, and increased appetite  You increase your chances of success if you prepare to quit  · Set a quit date  Usha Hamlin a date that is within the next 2 weeks  Do not pick a day that you think may be stressful or busy  Write down the day or [place] it on your calender  · Tell friends and family that you plan to quit  Explain that you may have withdrawal symptoms when you try to quit  Ask them to support you  They may be able to encourage you and help reduce your stress to make it easier for you to quit  · Make a list of your reasons for quitting  Put the list somewhere you will see it every day, such as your refrigerator  You can look at the list when you have a craving  · Remove all tobacco and nicotine products from your home, car, and workplace  Also, remove anything else that will tempt you to smoke, such as lighters, matches, or ashtrays  Clean your car, home, and places at work that smell like smoke  The smell of smoke can trigger a craving  · Identify triggers that make you want to smoke  This may include activities, feelings, or people  Also write down 1 way you can deal with each of your triggers  For example, if you want to smoke as soon as you wake up, plan another activity during this time, such as exercise  · Make a plan for how you will quit    Learn about the tools that can help you quit, such as medicine, counseling, or nicotine replacement therapy  Choose at least 2 options to help you quit  Tools to help you stop smoking:   · Counseling  from a trained healthcare provider can provide you with support and skills to quit smoking  The provider will also teach you to manage your withdrawal symptoms and cravings  You may receive counseling from one counselor, in group therapy, or through phone therapy called a quit line  · Nicotine replacement therapy (NRT)  such as nicotine patches, gum, or lozenges may help reduce your nicotine cravings  You may get these without a doctor's order  Do not use e-cigarettes or smokeless tobacco in place of cigarettes or to help you quit  They still contain nicotine  · Prescription medicines  such as nasal sprays or nicotine inhalers may help reduce your withdrawal symptoms  Other medicines may also be used to reduce your urge to smoke  Ask your healthcare provider about these medicines  You may need to start certain medicines 2 weeks before your quit date for them to work well  · Hypnosis  is a practice that helps guide you through thoughts and feelings  Hypnosis may help decrease your cravings and make you more willing to quit  · Acupuncture therapy  uses very thin needles to balance energy channels in the body  This is thought to help decrease cravings and symptoms of nicotine withdrawal      · Support groups  let you talk to others who are trying to quit or have already quit  It may be helpful to speak with others about how they quit  Manage your cravings:   · Avoid situations, people, and places that tempt you to smoke  Go to nonsmoking places, such as libraries or restaurants  Understand what tempts you and try to avoid these things  · Keep your hands busy  Hold things such as a stress ball or pen  · Put candy or toothpicks in your mouth  Keep lollipops, sugarless gum, or toothpicks with you at all times  · Do not have alcohol or caffeine    These drinks may tempt you to smoke  Drink healthy liquids such as water or juice instead  · Reward yourself when you resist your cravings  Rewards will motivate you and help you stay positive  · Do an activity that distracts you from your craving  Examples include going for a walk, exercising, or cleaning  Prevent weight gain after you quit:  You may gain a few pounds after you quit smoking  It is healthier for you to gain a few pounds than to continue to smoke  The following can help you prevent weight gain:  · Eat healthy foods  These include fruits, vegetables, whole-grain breads, low-fat dairy products, beans, lean meats, and fish  Eat healthy snacks, such as low-fat yogurt, if you get hungry between meals  · Drink water before, during, and between meals  This will make your stomach feel full and help prevent you from overeating  Ask your healthcare provider how much liquid to drink each day and which liquids are best for you  · Exercise  Take a walk or do some kind of exercise every day  Ask your healthcare provider what exercise is right for you  This may help reduce your cravings and reduce stress  For support and more information:   · Smokefree  gov  Phone: 2- 821 - 701-5845  Web Address: www smokefree  gov  © 2017 [address] Information is for End User's use only and may not be sold, redistributed or otherwise used for commercial purposes  All illustrations and images included in CareNotes® are the copyrighted property of [employer]  or [employer]  The above information is an  only  It is not intended as medical advice for individual conditions or treatments  Talk to your doctor, nurse or pharmacist before following any medical regimen to see if it is safe and effective for you

## 2020-02-15 NOTE — ASSESSMENT & PLAN NOTE
· GI bleed with melena  Status post EGD with healing ulcer  · Patient was taking NSAIDs and only pantoprazole sporadically  · Transitioned IV to oral pantoprazole    · Discussed with gastroenterology; did have BRBPR but likely hemorrhoidal and given stable hemoglobin recommendation is outpatient colonoscopy

## 2020-02-15 NOTE — ASSESSMENT & PLAN NOTE
· Acute blood loss anemia secondary to upper GI bleed with melena  · Continue pantoprazole  · Status post 5 unit PRBC       Results from last 7 days   Lab Units 02/15/20  0444 02/14/20  0536 02/13/20  2205 02/13/20  1414 02/13/20  0534 02/13/20  0141 02/12/20  1732 02/12/20  0935   HEMOGLOBIN g/dL 8 8* 7 9* 8 7* 7 8* 7 3* 7 2* 5 8* 5 9*

## 2020-02-19 ENCOUNTER — APPOINTMENT (OUTPATIENT)
Dept: LAB | Facility: CLINIC | Age: 60
End: 2020-02-19
Payer: COMMERCIAL

## 2020-02-19 ENCOUNTER — TRANSITIONAL CARE MANAGEMENT (OUTPATIENT)
Dept: FAMILY MEDICINE CLINIC | Facility: CLINIC | Age: 60
End: 2020-02-19

## 2020-02-19 DIAGNOSIS — K92.2 GI BLEED: ICD-10-CM

## 2020-02-19 LAB
ERYTHROCYTE [DISTWIDTH] IN BLOOD BY AUTOMATED COUNT: 13.8 % (ref 11.6–15.1)
HCT VFR BLD AUTO: 28 % (ref 36.5–49.3)
HGB BLD-MCNC: 9.1 G/DL (ref 12–17)
MCH RBC QN AUTO: 31.2 PG (ref 26.8–34.3)
MCHC RBC AUTO-ENTMCNC: 32.5 G/DL (ref 31.4–37.4)
MCV RBC AUTO: 96 FL (ref 82–98)
PLATELET # BLD AUTO: 370 THOUSANDS/UL (ref 149–390)
PMV BLD AUTO: 10.1 FL (ref 8.9–12.7)
RBC # BLD AUTO: 2.92 MILLION/UL (ref 3.88–5.62)
WBC # BLD AUTO: 7.37 THOUSAND/UL (ref 4.31–10.16)

## 2020-02-19 PROCEDURE — 85027 COMPLETE CBC AUTOMATED: CPT

## 2020-02-19 PROCEDURE — 36415 COLL VENOUS BLD VENIPUNCTURE: CPT

## 2020-02-29 ENCOUNTER — OFFICE VISIT (OUTPATIENT)
Dept: FAMILY MEDICINE CLINIC | Facility: CLINIC | Age: 60
End: 2020-02-29
Payer: COMMERCIAL

## 2020-02-29 VITALS
OXYGEN SATURATION: 97 % | TEMPERATURE: 97.9 F | DIASTOLIC BLOOD PRESSURE: 70 MMHG | BODY MASS INDEX: 34.27 KG/M2 | HEIGHT: 73 IN | HEART RATE: 79 BPM | WEIGHT: 258.6 LBS | SYSTOLIC BLOOD PRESSURE: 130 MMHG

## 2020-02-29 DIAGNOSIS — K92.1 GASTROINTESTINAL HEMORRHAGE WITH MELENA: Primary | ICD-10-CM

## 2020-02-29 DIAGNOSIS — K27.9 PEPTIC ULCER: ICD-10-CM

## 2020-02-29 DIAGNOSIS — D62 ACUTE BLOOD LOSS ANEMIA: ICD-10-CM

## 2020-02-29 PROCEDURE — 99495 TRANSJ CARE MGMT MOD F2F 14D: CPT | Performed by: FAMILY MEDICINE

## 2020-02-29 PROCEDURE — 1111F DSCHRG MED/CURRENT MED MERGE: CPT | Performed by: FAMILY MEDICINE

## 2020-02-29 RX ORDER — FERROUS SULFATE 325(65) MG
TABLET ORAL
COMMUNITY
Start: 2015-03-05 | End: 2022-04-24

## 2020-02-29 NOTE — PROGRESS NOTES
Assessment/Plan:   1  Gastrointestinal hemorrhage with melena/Peptic ulcer/Acute blood loss anemia  Reviewed patient's hospital record with him  At this time, he does not have any active signs of bleeding  His symptoms appear likely secondary to gastric ulcer with active bleeding  He did have a EGD while inpatient  At this time, he was restarted on his pantoprazole  He was advised he should avoid all NSAIDs completely  He must avoid alcohol use as well as chewing on his cigars as well  Will continue with his current treatment  He was advised he would benefit from having a colonoscopy as an outpatient  He will be following up with [facility] this week further evaluate this problem  Will recheck his CBC to assess proper resolution of his anemia  BMI Counseling: Body mass index is 34 12 kg/m²  The BMI is above normal  Nutrition recommendations include decreasing portion sizes, encouraging healthy choices of fruits and vegetables, decreasing fast food intake, consuming healthier snacks and limiting drinks that contain sugar  Exercise recommendations include moderate physical activity 150 minutes/week and exercising 3-5 times per week  No pharmacotherapy was ordered  Tobacco Cessation Counseling: Tobacco cessation counseling was provided  The patient is sincerely urged to quit consumption of tobacco  He is not ready to quit tobacco          There are no diagnoses linked to this encounter  Subjective:       Chief Complaint   Patient presents with    Transition of Care Management     SLA 2/12-2/15 GI bleed      Patient ID: Filiberto Viramontes is a 63 y o  male  He presents today for a hospital follow-up  He was admitted on February 12th and subsequently discharged on February 25th  He was admitted secondary to gastrointestinal hemorrhage with melena  He presented to the ED with black tarry stools  Upon his admission, he was found to have a hemoglobin of 5 8    Gastroenterology was consulted during his hospital stay and he did have an upper EGD which did show a active bleeding ulcer  During his hospital stay he was found to have hematochezia as well  He was advised at that time that he would need to have a colonoscopy however this can be addressed as an outpatient as his symptoms appear likely secondary to hemorrhoid formation  TCM Call (since 1/29/2020)     Date and time call was made  2/18/2020  2:30 PM    Hospital care reviewed  Records reviewed    [facility]    Date of Admission  02/12/20    Date of discharge  02/15/20    Diagnosis  GI bleed    Disposition  Home    Were the patients medications reviewed and updated  Yes    Current Symptoms  None      TCM Call (since 1/29/2020)     Post hospital issues  None    Should patient be enrolled in anticoag monitoring? No    Scheduled for follow up? Yes    Patients specialists  Other (comment)    Other specialists names  Dr Frantz Arshad you need help managing your prescriptions or medications  No    Is transportation to your appointment needed  No    I have advised the patient to call PCP with any new or worsening symptoms    [facility] or [facility] other    Are you recieving any outpatient services  No    Are you recieving home care services  No    Are you using any community resources  No    Current waiver services  No    Have you fallen in the last 12 months  No    Interperter language line needed  No            Review of Systems   Constitutional: Negative for activity change, chills, fatigue and fever  HENT: Negative for congestion, ear pain, sinus pressure and sore throat  Eyes: Negative for redness, itching and visual disturbance  Respiratory: Negative for cough and shortness of breath  Cardiovascular: Negative for chest pain and palpitations  Gastrointestinal: Negative for abdominal pain, diarrhea and nausea     Endocrine: Negative for cold intolerance and heat intolerance  Genitourinary: Negative for dysuria, flank pain and frequency  Musculoskeletal: Negative for arthralgias, back pain, gait problem and myalgias  Skin: Negative for color change  Allergic/Immunologic: Negative for environmental allergies  Neurological: Negative for dizziness, numbness and headaches  Psychiatric/Behavioral: Negative for behavioral problems and sleep disturbance  The following portions of the patient's history were reviewed and updated as appropriate : past family history, past medical history, past social history and past surgical history  Current Outpatient Medications:     ferrous sulfate 325 (65 Fe) mg tablet, 1 PO QD, Disp: , Rfl:     Multiple Vitamins-Minerals (MULTIVITAMIN ADULT EXTRA C PO), Take 1 capsule by mouth, Disp: , Rfl:     pantoprazole (PROTONIX) 40 mg tablet, Take 1 tablet (40 mg total) by mouth every 12 (twelve) hours, Disp: 60 tablet, Rfl: 2    acetaminophen (TYLENOL) 325 mg tablet, Take 2 tablets (650 mg total) by mouth every 6 (six) hours as needed for mild pain (Patient not taking: Reported on 2/29/2020), Disp: 30 tablet, Rfl: 0         Objective:         Vitals:    02/29/20 0935   BP: 130/70   BP Location: Left arm   Patient Position: Sitting   Cuff Size: Adult   Pulse: 79   Temp: 97 9 °F (36 6 °C)   TempSrc: Tympanic   SpO2: 97%   Weight: 117 kg (258 lb 9 6 oz)   Height: 6' 1" (1 854 m)     Physical Exam   Constitutional: He is oriented to person, place, and time  He appears well-developed and well-nourished  HENT:   Head: Normocephalic and atraumatic  Nose: Nose normal    Mouth/Throat: No oropharyngeal exudate  Eyes: Pupils are equal, round, and reactive to light  Right eye exhibits no discharge  Left eye exhibits no discharge  Neck: Normal range of motion  Neck supple  No tracheal deviation present  Cardiovascular: Normal rate, regular rhythm and intact distal pulses   Exam reveals no gallop and no friction rub    No murmur heard  Pulses:       Dorsalis pedis pulses are 2+ on the right side, and 2+ on the left side  Posterior tibial pulses are 2+ on the right side, and 2+ on the left side  Pulmonary/Chest: Effort normal and breath sounds normal  No respiratory distress  He has no wheezes  He has no rales  Abdominal: Soft  Bowel sounds are normal  He exhibits no distension  There is no tenderness  There is no rebound and no guarding  Musculoskeletal: Normal range of motion  He exhibits no edema  Lymphadenopathy:        Head (right side): No submental and no submandibular adenopathy present  Head (left side): No submental and no submandibular adenopathy present  He has no cervical adenopathy  Right cervical: No superficial cervical, no deep cervical and no posterior cervical adenopathy present  Left cervical: No superficial cervical, no deep cervical and no posterior cervical adenopathy present  Neurological: He is alert and oriented to person, place, and time  No cranial nerve deficit or sensory deficit  Skin: Skin is warm, dry and intact  Psychiatric: His speech is normal and behavior is normal  Judgment normal  His mood appears not anxious  Cognition and memory are normal  He does not exhibit a depressed mood  Vitals reviewed

## 2020-03-11 ENCOUNTER — APPOINTMENT (OUTPATIENT)
Dept: LAB | Facility: CLINIC | Age: 60
End: 2020-03-11
Payer: COMMERCIAL

## 2020-03-11 DIAGNOSIS — D62 ACUTE BLOOD LOSS ANEMIA: ICD-10-CM

## 2020-03-11 DIAGNOSIS — K27.9 PEPTIC ULCER: ICD-10-CM

## 2020-03-11 DIAGNOSIS — K92.1 GASTROINTESTINAL HEMORRHAGE WITH MELENA: ICD-10-CM

## 2020-03-11 LAB
ERYTHROCYTE [DISTWIDTH] IN BLOOD BY AUTOMATED COUNT: 14.6 % (ref 11.6–15.1)
HCT VFR BLD AUTO: 32.8 % (ref 36.5–49.3)
HGB BLD-MCNC: 10.2 G/DL (ref 12–17)
MCH RBC QN AUTO: 27.4 PG (ref 26.8–34.3)
MCHC RBC AUTO-ENTMCNC: 31.1 G/DL (ref 31.4–37.4)
MCV RBC AUTO: 88 FL (ref 82–98)
PLATELET # BLD AUTO: 281 THOUSANDS/UL (ref 149–390)
PMV BLD AUTO: 9.9 FL (ref 8.9–12.7)
RBC # BLD AUTO: 3.72 MILLION/UL (ref 3.88–5.62)
WBC # BLD AUTO: 6.88 THOUSAND/UL (ref 4.31–10.16)

## 2020-03-11 PROCEDURE — 85027 COMPLETE CBC AUTOMATED: CPT

## 2020-03-11 PROCEDURE — 36415 COLL VENOUS BLD VENIPUNCTURE: CPT

## 2020-03-19 ENCOUNTER — TRANSCRIBE ORDERS (OUTPATIENT)
Dept: ADMINISTRATIVE | Facility: HOSPITAL | Age: 60
End: 2020-03-19

## 2020-03-19 ENCOUNTER — APPOINTMENT (OUTPATIENT)
Dept: LAB | Facility: CLINIC | Age: 60
End: 2020-03-19
Payer: COMMERCIAL

## 2020-03-19 DIAGNOSIS — Z83.71 FAMILY HISTORY OF COLONIC POLYPS: ICD-10-CM

## 2020-03-19 DIAGNOSIS — D50.9 IRON DEFICIENCY ANEMIA, UNSPECIFIED IRON DEFICIENCY ANEMIA TYPE: ICD-10-CM

## 2020-03-19 DIAGNOSIS — K92.2 GASTRIC HEMORRHAGE: ICD-10-CM

## 2020-03-19 DIAGNOSIS — K25.9 GASTRIC ULCER, UNSPECIFIED CHRONICITY, UNSPECIFIED WHETHER GASTRIC ULCER HEMORRHAGE OR PERFORATION PRESENT: ICD-10-CM

## 2020-03-19 DIAGNOSIS — K25.9 GASTRIC ULCER, UNSPECIFIED CHRONICITY, UNSPECIFIED WHETHER GASTRIC ULCER HEMORRHAGE OR PERFORATION PRESENT: Primary | ICD-10-CM

## 2020-03-19 LAB
BASOPHILS # BLD AUTO: 0.05 THOUSANDS/ΜL (ref 0–0.1)
BASOPHILS NFR BLD AUTO: 1 % (ref 0–1)
EOSINOPHIL # BLD AUTO: 0.23 THOUSAND/ΜL (ref 0–0.61)
EOSINOPHIL NFR BLD AUTO: 3 % (ref 0–6)
ERYTHROCYTE [DISTWIDTH] IN BLOOD BY AUTOMATED COUNT: 15.1 % (ref 11.6–15.1)
HCT VFR BLD AUTO: 34.5 % (ref 36.5–49.3)
HGB BLD-MCNC: 10.8 G/DL (ref 12–17)
IMM GRANULOCYTES # BLD AUTO: 0.01 THOUSAND/UL (ref 0–0.2)
IMM GRANULOCYTES NFR BLD AUTO: 0 % (ref 0–2)
LYMPHOCYTES # BLD AUTO: 1.5 THOUSANDS/ΜL (ref 0.6–4.47)
LYMPHOCYTES NFR BLD AUTO: 20 % (ref 14–44)
MCH RBC QN AUTO: 26.9 PG (ref 26.8–34.3)
MCHC RBC AUTO-ENTMCNC: 31.3 G/DL (ref 31.4–37.4)
MCV RBC AUTO: 86 FL (ref 82–98)
MONOCYTES # BLD AUTO: 0.72 THOUSAND/ΜL (ref 0.17–1.22)
MONOCYTES NFR BLD AUTO: 10 % (ref 4–12)
NEUTROPHILS # BLD AUTO: 5.03 THOUSANDS/ΜL (ref 1.85–7.62)
NEUTS SEG NFR BLD AUTO: 66 % (ref 43–75)
NRBC BLD AUTO-RTO: 0 /100 WBCS
PLATELET # BLD AUTO: 382 THOUSANDS/UL (ref 149–390)
PMV BLD AUTO: 10.2 FL (ref 8.9–12.7)
RBC # BLD AUTO: 4.01 MILLION/UL (ref 3.88–5.62)
WBC # BLD AUTO: 7.54 THOUSAND/UL (ref 4.31–10.16)

## 2020-03-19 PROCEDURE — 36415 COLL VENOUS BLD VENIPUNCTURE: CPT

## 2020-03-19 PROCEDURE — 85025 COMPLETE CBC W/AUTO DIFF WBC: CPT

## 2020-04-07 ENCOUNTER — TELEPHONE (OUTPATIENT)
Dept: GASTROENTEROLOGY | Facility: CLINIC | Age: 60
End: 2020-04-07

## 2020-06-05 ENCOUNTER — TRANSCRIBE ORDERS (OUTPATIENT)
Dept: ADMINISTRATIVE | Facility: HOSPITAL | Age: 60
End: 2020-06-05

## 2020-06-05 ENCOUNTER — APPOINTMENT (OUTPATIENT)
Dept: LAB | Facility: CLINIC | Age: 60
End: 2020-06-05
Payer: COMMERCIAL

## 2020-06-05 DIAGNOSIS — D50.9 IRON DEFICIENCY ANEMIA, UNSPECIFIED IRON DEFICIENCY ANEMIA TYPE: ICD-10-CM

## 2020-06-05 DIAGNOSIS — D50.9 IRON DEFICIENCY ANEMIA, UNSPECIFIED IRON DEFICIENCY ANEMIA TYPE: Primary | ICD-10-CM

## 2020-06-05 LAB
BASOPHILS # BLD AUTO: 0.04 THOUSANDS/ΜL (ref 0–0.1)
BASOPHILS NFR BLD AUTO: 1 % (ref 0–1)
EOSINOPHIL # BLD AUTO: 0.29 THOUSAND/ΜL (ref 0–0.61)
EOSINOPHIL NFR BLD AUTO: 4 % (ref 0–6)
ERYTHROCYTE [DISTWIDTH] IN BLOOD BY AUTOMATED COUNT: 18.9 % (ref 11.6–15.1)
HCT VFR BLD AUTO: 36.9 % (ref 36.5–49.3)
HGB BLD-MCNC: 11 G/DL (ref 12–17)
IMM GRANULOCYTES # BLD AUTO: 0.02 THOUSAND/UL (ref 0–0.2)
IMM GRANULOCYTES NFR BLD AUTO: 0 % (ref 0–2)
LYMPHOCYTES # BLD AUTO: 1.82 THOUSANDS/ΜL (ref 0.6–4.47)
LYMPHOCYTES NFR BLD AUTO: 22 % (ref 14–44)
MCH RBC QN AUTO: 22 PG (ref 26.8–34.3)
MCHC RBC AUTO-ENTMCNC: 29.8 G/DL (ref 31.4–37.4)
MCV RBC AUTO: 74 FL (ref 82–98)
MONOCYTES # BLD AUTO: 0.67 THOUSAND/ΜL (ref 0.17–1.22)
MONOCYTES NFR BLD AUTO: 8 % (ref 4–12)
NEUTROPHILS # BLD AUTO: 5.28 THOUSANDS/ΜL (ref 1.85–7.62)
NEUTS SEG NFR BLD AUTO: 65 % (ref 43–75)
NRBC BLD AUTO-RTO: 0 /100 WBCS
PLATELET # BLD AUTO: 341 THOUSANDS/UL (ref 149–390)
PMV BLD AUTO: 10.3 FL (ref 8.9–12.7)
RBC # BLD AUTO: 5 MILLION/UL (ref 3.88–5.62)
WBC # BLD AUTO: 8.12 THOUSAND/UL (ref 4.31–10.16)

## 2020-06-05 PROCEDURE — 85025 COMPLETE CBC W/AUTO DIFF WBC: CPT

## 2020-06-05 PROCEDURE — 36415 COLL VENOUS BLD VENIPUNCTURE: CPT

## 2020-10-07 ENCOUNTER — TELEPHONE (OUTPATIENT)
Dept: FAMILY MEDICINE CLINIC | Facility: CLINIC | Age: 60
End: 2020-10-07

## 2020-10-12 ENCOUNTER — TELEPHONE (OUTPATIENT)
Dept: FAMILY MEDICINE CLINIC | Facility: CLINIC | Age: 60
End: 2020-10-12

## 2020-11-19 DIAGNOSIS — K92.2 GI BLEED: ICD-10-CM

## 2020-11-21 RX ORDER — PANTOPRAZOLE SODIUM 40 MG/1
40 TABLET, DELAYED RELEASE ORAL EVERY 12 HOURS
Qty: 180 TABLET | Refills: 1 | Status: SHIPPED | OUTPATIENT
Start: 2020-11-21 | End: 2020-11-23 | Stop reason: SDUPTHER

## 2020-11-23 ENCOUNTER — OFFICE VISIT (OUTPATIENT)
Dept: FAMILY MEDICINE CLINIC | Facility: CLINIC | Age: 60
End: 2020-11-23
Payer: COMMERCIAL

## 2020-11-23 VITALS
SYSTOLIC BLOOD PRESSURE: 138 MMHG | RESPIRATION RATE: 19 BRPM | HEIGHT: 73 IN | DIASTOLIC BLOOD PRESSURE: 82 MMHG | BODY MASS INDEX: 35.41 KG/M2 | TEMPERATURE: 98.6 F | OXYGEN SATURATION: 97 % | HEART RATE: 69 BPM | WEIGHT: 267.2 LBS

## 2020-11-23 DIAGNOSIS — K27.9 PEPTIC ULCER: ICD-10-CM

## 2020-11-23 DIAGNOSIS — K92.1 GASTROINTESTINAL HEMORRHAGE WITH MELENA: Primary | ICD-10-CM

## 2020-11-23 DIAGNOSIS — K92.2 GI BLEED: ICD-10-CM

## 2020-11-23 DIAGNOSIS — Z13.1 SCREENING FOR DIABETES MELLITUS: ICD-10-CM

## 2020-11-23 DIAGNOSIS — Z13.220 SCREENING FOR CHOLESTEROL LEVEL: ICD-10-CM

## 2020-11-23 DIAGNOSIS — Z13.29 SCREENING FOR THYROID DISORDER: ICD-10-CM

## 2020-11-23 DIAGNOSIS — Z13.0 SCREENING FOR IRON DEFICIENCY ANEMIA: ICD-10-CM

## 2020-11-23 DIAGNOSIS — J31.0 CHRONIC RHINITIS: ICD-10-CM

## 2020-11-23 PROCEDURE — 99213 OFFICE O/P EST LOW 20 MIN: CPT | Performed by: FAMILY MEDICINE

## 2020-11-23 PROCEDURE — 1036F TOBACCO NON-USER: CPT | Performed by: FAMILY MEDICINE

## 2020-11-23 RX ORDER — PANTOPRAZOLE SODIUM 40 MG/1
40 TABLET, DELAYED RELEASE ORAL EVERY 12 HOURS
Qty: 180 TABLET | Refills: 1 | Status: SHIPPED | OUTPATIENT
Start: 2020-11-23 | End: 2021-05-23

## 2021-05-11 ENCOUNTER — OFFICE VISIT (OUTPATIENT)
Dept: FAMILY MEDICINE CLINIC | Facility: CLINIC | Age: 61
End: 2021-05-11
Payer: COMMERCIAL

## 2021-05-11 VITALS
WEIGHT: 266.6 LBS | HEIGHT: 72 IN | HEART RATE: 78 BPM | TEMPERATURE: 98.8 F | RESPIRATION RATE: 18 BRPM | OXYGEN SATURATION: 98 % | DIASTOLIC BLOOD PRESSURE: 84 MMHG | SYSTOLIC BLOOD PRESSURE: 138 MMHG | BODY MASS INDEX: 36.11 KG/M2

## 2021-05-11 DIAGNOSIS — Z13.1 SCREENING FOR DIABETES MELLITUS: ICD-10-CM

## 2021-05-11 DIAGNOSIS — Z13.220 SCREENING FOR CHOLESTEROL LEVEL: ICD-10-CM

## 2021-05-11 DIAGNOSIS — R53.83 FATIGUE, UNSPECIFIED TYPE: ICD-10-CM

## 2021-05-11 DIAGNOSIS — D62 ACUTE BLOOD LOSS ANEMIA: ICD-10-CM

## 2021-05-11 DIAGNOSIS — M89.8X6 TIBIAL MASS: ICD-10-CM

## 2021-05-11 DIAGNOSIS — R06.02 SOB (SHORTNESS OF BREATH): Primary | ICD-10-CM

## 2021-05-11 DIAGNOSIS — Z13.29 SCREENING FOR THYROID DISORDER: ICD-10-CM

## 2021-05-11 PROCEDURE — 3008F BODY MASS INDEX DOCD: CPT | Performed by: FAMILY MEDICINE

## 2021-05-11 PROCEDURE — 1036F TOBACCO NON-USER: CPT | Performed by: FAMILY MEDICINE

## 2021-05-11 PROCEDURE — 3725F SCREEN DEPRESSION PERFORMED: CPT | Performed by: FAMILY MEDICINE

## 2021-05-11 PROCEDURE — 99214 OFFICE O/P EST MOD 30 MIN: CPT | Performed by: FAMILY MEDICINE

## 2021-05-11 NOTE — PROGRESS NOTES
Assessment/Plan:   1  SOB (shortness of breath)/Fatigue, unspecified type  Is reviewed patient's symptoms today  At this time, the exact cause of his shortness breast   At this time, symptoms may possibly be due to deconditioning  He has had history of GI bleeds in the past   This time, will check routine blood work to further rule out any gross abnormalities  If he notes any worsening symptoms, he was advised to call or follow up immediately  2  Tibial mass    Reviewed patient's symptoms today  At this time, he has had the symptoms for a significant duration  He had a crushing injury many years ago  He has noticed swelling in this area  He denies any pain or changes in size  He would like to have this assessed  At this time, he was advised he may highly benefit from a ultrasound to further evaluate this area  He would like to discuss this further with his wife  BMI Counseling: Body mass index is 35 91 kg/m²  The BMI is above normal  Nutrition recommendations include decreasing portion sizes, encouraging healthy choices of fruits and vegetables, decreasing fast food intake, consuming healthier snacks and limiting drinks that contain sugar  Exercise recommendations include moderate physical activity 150 minutes/week and exercising 3-5 times per week  No pharmacotherapy was ordered  Patient referred to PCP due to patient being overweight  Depression Screening and Follow-up Plan: Patient's depression screening was positive with a PHQ-2 score of 0  Clincally patient does not have depression  No treatment is required  Patient assessed for underlying major depression  Brief counseling provided and recommend additional follow-up/re-evaluation next office visit  There are no diagnoses linked to this encounter  Subjective:       Chief Complaint   Patient presents with    Shortness of Breath    Fatigue    Mass     R brunson       Patient ID: Magali Cardoso is a 62 y o  male      Shortness of Breath  This is a new problem  Episode onset: few months  The problem occurs intermittently  The problem has been unchanged  Pertinent negatives include no abdominal pain, chest pain, claudication, ear pain, fever, headaches, hemoptysis, leg pain, rhinorrhea, sore throat, sputum production or wheezing  The symptoms are aggravated by any activity  Associated symptoms comments: dizziness  He has tried rest for the symptoms  Fatigue  Associated symptoms include fatigue  Pertinent negatives include no abdominal pain, arthralgias, chest pain, chills, congestion, coughing, fever, headaches, myalgias, nausea, numbness or sore throat  Review of Systems   Constitutional: Positive for fatigue  Negative for activity change, chills and fever  HENT: Negative for congestion, ear pain, rhinorrhea, sinus pressure and sore throat  Eyes: Negative for redness, itching and visual disturbance  Respiratory: Positive for shortness of breath  Negative for cough, hemoptysis, sputum production and wheezing  Cardiovascular: Negative for chest pain, palpitations and claudication  Gastrointestinal: Negative for abdominal pain, diarrhea and nausea  Endocrine: Negative for cold intolerance and heat intolerance  Genitourinary: Negative for dysuria, flank pain and frequency  Musculoskeletal: Negative for arthralgias, back pain, gait problem and myalgias  Skin: Negative for color change  Allergic/Immunologic: Negative for environmental allergies  Neurological: Negative for dizziness, numbness and headaches  Psychiatric/Behavioral: Negative for behavioral problems and sleep disturbance  The following portions of the patient's history were reviewed and updated as appropriate : past family history, past medical history, past social history and past surgical history      Current Outpatient Medications:     acetaminophen (TYLENOL) 325 mg tablet, Take 2 tablets (650 mg total) by mouth every 6 (six) hours as needed for mild pain, Disp: 30 tablet, Rfl: 0    Chlorpheniramine-Phenylephrine (SINUS/ALLERGY PE PO), Take by mouth, Disp: , Rfl:     Multiple Vitamins-Minerals (MULTIVITAMIN ADULT EXTRA C PO), Take 1 capsule by mouth, Disp: , Rfl:     pantoprazole (PROTONIX) 40 mg tablet, Take 1 tablet (40 mg total) by mouth every 12 (twelve) hours, Disp: 180 tablet, Rfl: 1    ferrous sulfate 325 (65 Fe) mg tablet, 1 PO QD, Disp: , Rfl:          Objective:         Vitals:    05/11/21 1835   BP: 138/84   BP Location: Left arm   Patient Position: Sitting   Cuff Size: Large   Pulse: 78   Resp: 18   Temp: 98 8 °F (37 1 °C)   TempSrc: Tympanic   SpO2: 98%   Weight: 121 kg (266 lb 9 6 oz)   Height: 6' 0 25" (1 835 m)     Physical Exam  Vitals signs reviewed  Constitutional:       Appearance: He is well-developed  HENT:      Head: Normocephalic and atraumatic  Nose: Nose normal       Mouth/Throat:      Pharynx: No oropharyngeal exudate  Eyes:      General: No scleral icterus  Right eye: No discharge  Left eye: No discharge  Pupils: Pupils are equal, round, and reactive to light  Neck:      Musculoskeletal: Normal range of motion and neck supple  Trachea: No tracheal deviation  Cardiovascular:      Rate and Rhythm: Normal rate and regular rhythm  Pulses:           Dorsalis pedis pulses are 2+ on the right side and 2+ on the left side  Posterior tibial pulses are 2+ on the right side and 2+ on the left side  Heart sounds: Normal heart sounds  No murmur  No friction rub  No gallop  Pulmonary:      Effort: Pulmonary effort is normal  No respiratory distress  Breath sounds: Normal breath sounds  No wheezing or rales  Abdominal:      General: Bowel sounds are normal  There is no distension  Palpations: Abdomen is soft  Tenderness: There is no abdominal tenderness  There is no guarding or rebound  Musculoskeletal: Normal range of motion     Lymphadenopathy: Head:      Right side of head: No submental or submandibular adenopathy  Left side of head: No submental or submandibular adenopathy  Cervical: No cervical adenopathy  Right cervical: No superficial, deep or posterior cervical adenopathy  Left cervical: No superficial, deep or posterior cervical adenopathy  Skin:     General: Skin is warm and dry  Findings: No erythema  Neurological:      Mental Status: He is alert and oriented to person, place, and time  Cranial Nerves: No cranial nerve deficit  Sensory: No sensory deficit  Psychiatric:         Mood and Affect: Mood is not anxious or depressed  Speech: Speech normal          Behavior: Behavior normal          Thought Content:  Thought content normal          Judgment: Judgment normal

## 2021-05-12 ENCOUNTER — TELEPHONE (OUTPATIENT)
Dept: FAMILY MEDICINE CLINIC | Facility: CLINIC | Age: 61
End: 2021-05-12

## 2021-05-12 DIAGNOSIS — Z12.5 SCREENING FOR PROSTATE CANCER: Primary | ICD-10-CM

## 2021-05-13 ENCOUNTER — APPOINTMENT (OUTPATIENT)
Dept: LAB | Facility: CLINIC | Age: 61
End: 2021-05-13
Payer: COMMERCIAL

## 2021-05-13 DIAGNOSIS — Z13.1 SCREENING FOR DIABETES MELLITUS: ICD-10-CM

## 2021-05-13 DIAGNOSIS — Z13.220 SCREENING FOR CHOLESTEROL LEVEL: ICD-10-CM

## 2021-05-13 DIAGNOSIS — Z13.29 SCREENING FOR THYROID DISORDER: ICD-10-CM

## 2021-05-13 DIAGNOSIS — Z12.5 SCREENING FOR PROSTATE CANCER: ICD-10-CM

## 2021-05-13 DIAGNOSIS — D62 ACUTE BLOOD LOSS ANEMIA: ICD-10-CM

## 2021-05-13 LAB
ALBUMIN SERPL BCP-MCNC: 3.6 G/DL (ref 3.5–5)
ALP SERPL-CCNC: 63 U/L (ref 46–116)
ALT SERPL W P-5'-P-CCNC: 28 U/L (ref 12–78)
ANION GAP SERPL CALCULATED.3IONS-SCNC: 7 MMOL/L (ref 4–13)
AST SERPL W P-5'-P-CCNC: 19 U/L (ref 5–45)
BILIRUB SERPL-MCNC: 0.43 MG/DL (ref 0.2–1)
BUN SERPL-MCNC: 14 MG/DL (ref 5–25)
CALCIUM SERPL-MCNC: 8.8 MG/DL (ref 8.3–10.1)
CHLORIDE SERPL-SCNC: 107 MMOL/L (ref 100–108)
CHOLEST SERPL-MCNC: 185 MG/DL (ref 50–200)
CO2 SERPL-SCNC: 25 MMOL/L (ref 21–32)
CREAT SERPL-MCNC: 0.89 MG/DL (ref 0.6–1.3)
ERYTHROCYTE [DISTWIDTH] IN BLOOD BY AUTOMATED COUNT: 15.9 % (ref 11.6–15.1)
EST. AVERAGE GLUCOSE BLD GHB EST-MCNC: 111 MG/DL
FERRITIN SERPL-MCNC: 6 NG/ML (ref 8–388)
GFR SERPL CREATININE-BSD FRML MDRD: 94 ML/MIN/1.73SQ M
GLUCOSE P FAST SERPL-MCNC: 80 MG/DL (ref 65–99)
HBA1C MFR BLD: 5.5 %
HCT VFR BLD AUTO: 41.4 % (ref 36.5–49.3)
HDLC SERPL-MCNC: 74 MG/DL
HGB BLD-MCNC: 12.7 G/DL (ref 12–17)
IRON SATN MFR SERPL: 4 %
IRON SERPL-MCNC: 20 UG/DL (ref 65–175)
LDLC SERPL CALC-MCNC: 100 MG/DL (ref 0–100)
MCH RBC QN AUTO: 25.6 PG (ref 26.8–34.3)
MCHC RBC AUTO-ENTMCNC: 30.7 G/DL (ref 31.4–37.4)
MCV RBC AUTO: 83 FL (ref 82–98)
PLATELET # BLD AUTO: 313 THOUSANDS/UL (ref 149–390)
PMV BLD AUTO: 10.4 FL (ref 8.9–12.7)
POTASSIUM SERPL-SCNC: 4.4 MMOL/L (ref 3.5–5.3)
PROT SERPL-MCNC: 6.9 G/DL (ref 6.4–8.2)
PSA SERPL-MCNC: 0.8 NG/ML (ref 0–4)
RBC # BLD AUTO: 4.97 MILLION/UL (ref 3.88–5.62)
SODIUM SERPL-SCNC: 139 MMOL/L (ref 136–145)
TIBC SERPL-MCNC: 487 UG/DL (ref 250–450)
TRIGL SERPL-MCNC: 55 MG/DL
TSH SERPL DL<=0.05 MIU/L-ACNC: 1.91 UIU/ML (ref 0.36–3.74)
WBC # BLD AUTO: 6.77 THOUSAND/UL (ref 4.31–10.16)

## 2021-05-13 PROCEDURE — 83036 HEMOGLOBIN GLYCOSYLATED A1C: CPT

## 2021-05-13 PROCEDURE — 82728 ASSAY OF FERRITIN: CPT

## 2021-05-13 PROCEDURE — 85027 COMPLETE CBC AUTOMATED: CPT

## 2021-05-13 PROCEDURE — 84443 ASSAY THYROID STIM HORMONE: CPT

## 2021-05-13 PROCEDURE — G0103 PSA SCREENING: HCPCS

## 2021-05-13 PROCEDURE — 36415 COLL VENOUS BLD VENIPUNCTURE: CPT

## 2021-05-13 PROCEDURE — 80053 COMPREHEN METABOLIC PANEL: CPT

## 2021-05-13 PROCEDURE — 80061 LIPID PANEL: CPT

## 2021-05-13 PROCEDURE — 83540 ASSAY OF IRON: CPT

## 2021-05-13 PROCEDURE — 83550 IRON BINDING TEST: CPT

## 2021-05-22 DIAGNOSIS — K27.9 PEPTIC ULCER: ICD-10-CM

## 2021-05-22 DIAGNOSIS — K92.1 GASTROINTESTINAL HEMORRHAGE WITH MELENA: ICD-10-CM

## 2021-05-23 RX ORDER — PANTOPRAZOLE SODIUM 40 MG/1
TABLET, DELAYED RELEASE ORAL
Qty: 180 TABLET | Refills: 1 | Status: SHIPPED | OUTPATIENT
Start: 2021-05-23 | End: 2021-11-19

## 2021-08-21 ENCOUNTER — OFFICE VISIT (OUTPATIENT)
Dept: URGENT CARE | Facility: MEDICAL CENTER | Age: 61
End: 2021-08-21
Payer: COMMERCIAL

## 2021-08-21 VITALS
OXYGEN SATURATION: 97 % | WEIGHT: 255 LBS | HEART RATE: 65 BPM | RESPIRATION RATE: 20 BRPM | TEMPERATURE: 98.3 F | BODY MASS INDEX: 31.71 KG/M2 | HEIGHT: 75 IN

## 2021-08-21 DIAGNOSIS — Z11.59 SPECIAL SCREENING EXAMINATION FOR UNSPECIFIED VIRAL DISEASE: Primary | ICD-10-CM

## 2021-08-21 LAB — SARS-COV-2 RNA RESP QL NAA+PROBE: NEGATIVE

## 2021-08-21 PROCEDURE — 99213 OFFICE O/P EST LOW 20 MIN: CPT | Performed by: PHYSICIAN ASSISTANT

## 2021-08-21 PROCEDURE — 87635 SARS-COV-2 COVID-19 AMP PRB: CPT | Performed by: PHYSICIAN ASSISTANT

## 2021-08-21 NOTE — PATIENT INSTRUCTIONS
Viral Syndrome   WHAT YOU NEED TO KNOW:   Viral syndrome is a term used for symptoms of an infection caused by a virus  Viruses are spread easily from person to person through the air and on shared items  DISCHARGE INSTRUCTIONS:   Call your local emergency number (911 in the [address] [facility]) or have someone else call if:   · You have a seizure  · You cannot be woken  · You have chest pain or trouble breathing  Return to the emergency department if:   · You have a stiff neck, a bad headache, and sensitivity to light  · You feel weak, dizzy, or confused  · You stop urinating or urinate a lot less than usual     · You cough up blood or thick yellow or green mucus  · You have severe abdominal pain or your abdomen is larger than usual     Call your doctor if:   · Your symptoms do not get better with treatment or get worse after 3 days  · You have a rash or ear pain  · You have burning when you urinate  · You have questions or concerns about your condition or care  Medicines: You may  need any of the following:  · Acetaminophen  decreases pain and fever  It is available without a doctor's order  Ask how much to take and how often to take it  Follow directions  Read the labels of all other medicines you are using to see if they also contain acetaminophen, or ask your doctor or pharmacist  Acetaminophen can cause liver damage if not taken correctly  Do not use more than 4 grams (4,000 milligrams) total of acetaminophen in one day  · NSAIDs , such as ibuprofen, help decrease swelling, pain, and fever  NSAIDs can cause stomach bleeding or kidney problems in certain people  If you take blood thinner medicine, always ask your healthcare provider if NSAIDs are safe for you  Always read the medicine label and follow directions  · Cold medicine  helps decrease swelling, control a cough, and relieve chest or nasal congestion  · Saline nasal spray  helps decrease nasal congestion       · Take your medicine as directed  Contact your healthcare provider if you think your medicine is not helping or if you have side effects  Tell him of her if you are allergic to any medicine  Keep a list of the medicines, vitamins, and herbs you take  Include the amounts, and when and why you take them  Bring the list or the pill bottles to follow-up visits  Carry your medicine list with you in case of an emergency  Manage your symptoms:   · Drink liquids as directed to prevent dehydration  Ask how much liquid to drink each day and which liquids are best for you  Ask if you should drink an oral rehydration solution (ORS)  An ORS has the right amounts of water, salts, and sugar you need to replace body fluids  This may help prevent dehydration caused by vomiting or diarrhea  Do not drink liquids with caffeine  Liquids with caffeine can make dehydration worse  · Get plenty of rest to help your body heal   Take naps throughout the day  Ask your healthcare provider when you can return to work and your normal activities  · Use a cool mist humidifier to help you breathe easier  Ask your healthcare provider how to use a cool mist humidifier  · Eat honey or use cough drops for a sore throat  Cough drops are available without a doctor's order  Follow directions for taking cough drops  · Do not smoke or be close to anyone who is smoking  Nicotine and other chemicals in cigarettes and cigars can cause lung damage  Smoking can also delay healing  Ask your healthcare provider for information if you currently smoke and need help to quit  E-cigarettes or smokeless tobacco still contain nicotine  Talk to your healthcare provider before you use these products  Prevent the spread of germs:       · Wash your hands often  Wash your hands several times each day  Wash after you use the bathroom, change a child's diaper, and before you prepare or eat food  Use soap and water every time   Rub your soapy hands together, lacing your fingers  Wash the front and back of your hands, and in between your fingers  Use the fingers of one hand to scrub under the fingernails of the other hand  Wash for at least 20 seconds  Rinse with warm, running water for several seconds  Then dry your hands with a clean towel or paper towel  Use hand  that contains alcohol if soap and water are not available  Do not touch your eyes, nose, or mouth without washing your hands first          · Cover a sneeze or cough  Use a tissue that covers your mouth and nose  Throw the tissue away in a trash can right away  Use the bend of your arm if a tissue is not available  Wash your hands well with soap and water or use a hand   · Stay away from others while you are sick  Avoid crowds as much as possible  · Ask about vaccines you may need  Talk to your healthcare provider about your vaccine history  He or she will tell you which vaccines you need, and when to get them  ? Get the influenza (flu) vaccine as soon as recommended each year  The flu vaccine is available starting in September or October  Flu viruses change, so it is important to get a flu vaccine every year  ? Get the pneumonia vaccine if recommended  This vaccine is usually recommended every 5 years  Your provider will tell you when to get this vaccine, if needed  Follow up with your doctor as directed:  Write down your questions so you remember to ask them during your visits  © Copyright [employer] 2021 Information is for End User's use only and may not be sold, redistributed or otherwise used for commercial purposes  All illustrations and images included in CareNotes® are the copyrighted property of A D A M [employer] Inc  or Karen Olivarez  The above information is an  only  It is not intended as medical advice for individual conditions or treatments   Talk to your doctor, nurse or pharmacist before following any medical regimen to see if it is safe and effective for you

## 2021-08-21 NOTE — PROGRESS NOTES
[facility]        NAME: Mandi Arshad is a 62 y o  male  : 1960    MRN: [id number]  DATE: 2021  TIME: 9:55 AM    Assessment and Plan   Special screening examination for unspecified viral disease [Z11 59]  1  Special screening examination for unspecified viral disease  Novel Coronavirus (Covid-19),PCR [facility] - Office Collection         Patient Instructions        increase fluids  Remain quarantined and   Until report is back  Chief Complaint     Chief Complaint   Patient presents with    Fatigue     Patient states he was exposed to children who are sick  He started Thursday with fatigue, sore throat, and cough         History of Present Illness        Patient has been seen both of his COVID-19 vaccines  He was exposed to COVID-19 in the household from a grandson's friend  He complains of myalgias headaches and sore throat  Exposure was 5 days ago  He also complains of fatigue  No other complaints  Review of Systems   Review of Systems   Constitutional: Positive for fatigue  All other systems reviewed and are negative          Current Medications       Current Outpatient Medications:     acetaminophen (TYLENOL) 325 mg tablet, Take 2 tablets (650 mg total) by mouth every 6 (six) hours as needed for mild pain, Disp: 30 tablet, Rfl: 0    Chlorpheniramine-Phenylephrine (SINUS/ALLERGY PE PO), Take by mouth, Disp: , Rfl:     ferrous sulfate 325 (65 Fe) mg tablet, 1 PO QD, Disp: , Rfl:     Multiple Vitamins-Minerals (MULTIVITAMIN ADULT EXTRA C PO), Take 1 capsule by mouth, Disp: , Rfl:     pantoprazole (PROTONIX) 40 mg tablet, TAKE 1 TABLET EVERY 12 HOURS, Disp: 180 tablet, Rfl: 1    Current Allergies     Allergies as of 2021    (No Known Allergies)            The following portions of the patient's history were reviewed and updated as appropriate: allergies, current medications, past family history, past medical history, past social history, past surgical history and problem list      Past Medical History:   Diagnosis Date    Acute ulcer of stomach     GERD (gastroesophageal reflux disease)     Rib fractures        Past Surgical History:   Procedure Laterality Date    COLONOSCOPY      ESOPHAGOGASTRODUODENOSCOPY      GASTRIC BYPASS      MANDIBLE FRACTURE SURGERY         Family History   Problem Relation Age of Onset    Seizures Father          Medications have been verified  Objective   Pulse 65   Temp 98 3 °F (36 8 °C)   Resp 20   Ht 6' 3" (1 905 m)   Wt 116 kg (255 lb)   SpO2 97%   BMI 31 87 kg/m²   No LMP for male patient  Physical Exam     Physical Exam  Vitals and nursing note reviewed  Constitutional:       Appearance: Normal appearance  He is obese  HENT:      Right Ear: Tympanic membrane normal       Left Ear: Tympanic membrane normal       Nose: Congestion and rhinorrhea present  Cardiovascular:      Rate and Rhythm: Normal rate and regular rhythm  Pulses: Normal pulses  Heart sounds: Normal heart sounds  Pulmonary:      Effort: Pulmonary effort is normal       Breath sounds: Normal breath sounds  Lymphadenopathy:      Cervical: No cervical adenopathy  Neurological:      Mental Status: He is alert

## 2021-11-19 DIAGNOSIS — K27.9 PEPTIC ULCER: ICD-10-CM

## 2021-11-19 DIAGNOSIS — K92.1 GASTROINTESTINAL HEMORRHAGE WITH MELENA: ICD-10-CM

## 2021-11-19 RX ORDER — PANTOPRAZOLE SODIUM 40 MG/1
TABLET, DELAYED RELEASE ORAL
Qty: 180 TABLET | Refills: 3 | Status: SHIPPED | OUTPATIENT
Start: 2021-11-19

## 2021-12-19 ENCOUNTER — OFFICE VISIT (OUTPATIENT)
Dept: URGENT CARE | Facility: MEDICAL CENTER | Age: 61
End: 2021-12-19
Payer: COMMERCIAL

## 2021-12-19 VITALS
HEART RATE: 71 BPM | TEMPERATURE: 99 F | BODY MASS INDEX: 31.08 KG/M2 | OXYGEN SATURATION: 97 % | RESPIRATION RATE: 18 BRPM | HEIGHT: 75 IN | WEIGHT: 250 LBS

## 2021-12-19 DIAGNOSIS — R68.89 FLU-LIKE SYMPTOMS: ICD-10-CM

## 2021-12-19 DIAGNOSIS — J01.90 ACUTE NON-RECURRENT SINUSITIS, UNSPECIFIED LOCATION: Primary | ICD-10-CM

## 2021-12-19 PROCEDURE — 99213 OFFICE O/P EST LOW 20 MIN: CPT | Performed by: PHYSICIAN ASSISTANT

## 2021-12-19 PROCEDURE — 87636 SARSCOV2 & INF A&B AMP PRB: CPT | Performed by: PHYSICIAN ASSISTANT

## 2021-12-19 RX ORDER — AZITHROMYCIN 250 MG/1
TABLET, FILM COATED ORAL
Qty: 6 TABLET | Refills: 0 | Status: SHIPPED | OUTPATIENT
Start: 2021-12-19 | End: 2021-12-23

## 2021-12-19 RX ORDER — FLUTICASONE PROPIONATE 50 MCG
2 SPRAY, SUSPENSION (ML) NASAL DAILY
Qty: 16 G | Refills: 0 | Status: SHIPPED | OUTPATIENT
Start: 2021-12-19 | End: 2022-04-24

## 2021-12-19 NOTE — LETTER
December 19, 2021     Patient: Roel Burnett   YOB: 1960   Date of Visit: 12/19/2021       To Whom it May Concern:    Bryanna Flower was seen in my clinic on 12/19/2021  He may return to work provided he has a negative COVID/Influenza test and is fever free for at least 24 hours  If you have any questions or concerns, please don't hesitate to call           Sincerely,          Lucina Salas PA-C        CC: No Recipients

## 2021-12-19 NOTE — PROGRESS NOTES
[facility]        NAME: Holly Mckeon is a 62 y o  male  : 1960    MRN: [id number]  DATE: 2021  TIME: 5:34 PM    Assessment and Plan   Acute non-recurrent sinusitis, unspecified location [J01 90]  1  Acute non-recurrent sinusitis, unspecified location  COVID/FLU- 24 hour TAT    COVID/FLU- 24 hour TAT    azithromycin (ZITHROMAX) 250 mg tablet    fluticasone (FLONASE) 50 mcg/act nasal spray   2  Flu-like symptoms           Patient Instructions     Discussed condition with patient  He/she has recent onset of URI/flu-like symptoms with/without direct exposure to COVID-19  He/she will be swabbed for COVID-19 today and quarantine instructions were given  Will be notified of result once obtained  He also appears to have sinusitis which I will treat with a combination of Z-Meño and Flonase  Rec hydration, rest, discussed OTC cough/cold meds, fever management, and close observation  Should be reevaluated if condition persists/worsens  Follow up with PCP in 3-5 days  Proceed to  ER if symptoms worsen  Chief Complaint     Chief Complaint   Patient presents with    Cold Like Symptoms     fever, chills, runny nose, and congestion x yesterday  is covid vaccinated, not flu vaccinated  History of Present Illness       Patient presents recent onset of congestion and sinus issues that has now gotten worse and he now has fever and chills  Denies any significant cough, N/V/D, or known direct COVID exposure in his vaccinated for COVID but not influenza  Has been managing symptoms conservatively since onset  Review of Systems   Review of Systems   Constitutional: Positive for chills and fever  HENT: Positive for congestion and rhinorrhea  Respiratory: Negative  Cardiovascular: Negative  Gastrointestinal: Negative  Genitourinary: Negative            Current Medications       Current Outpatient Medications:     acetaminophen (TYLENOL) 325 mg tablet, Take 2 tablets (650 mg total) by mouth every 6 (six) hours as needed for mild pain, Disp: 30 tablet, Rfl: 0    Chlorpheniramine-Phenylephrine (SINUS/ALLERGY PE PO), Take by mouth, Disp: , Rfl:     ferrous sulfate 325 (65 Fe) mg tablet, 1 PO QD, Disp: , Rfl:     Multiple Vitamins-Minerals (MULTIVITAMIN ADULT EXTRA C PO), Take 1 capsule by mouth, Disp: , Rfl:     pantoprazole (PROTONIX) 40 mg tablet, TAKE 1 TABLET EVERY 12 HOURS, Disp: 180 tablet, Rfl: 3    fluticasone (FLONASE) 50 mcg/act nasal spray, 2 sprays into each nostril daily, Disp: 16 g, Rfl: 0    Current Allergies     Allergies as of 12/19/2021    (No Known Allergies)            The following portions of the patient's history were reviewed and updated as appropriate: allergies, current medications, past family history, past medical history, past social history, past surgical history and problem list      Past Medical History:   Diagnosis Date    Acute ulcer of stomach     GERD (gastroesophageal reflux disease)     Rib fractures        Past Surgical History:   Procedure Laterality Date    COLONOSCOPY      ESOPHAGOGASTRODUODENOSCOPY      GASTRIC BYPASS      MANDIBLE FRACTURE SURGERY         Family History   Problem Relation Age of Onset    Seizures Father          Medications have been verified  Objective   Pulse 71   Temp 99 °F (37 2 °C)   Resp 18   Ht 6' 3" (1 905 m)   Wt 113 kg (250 lb)   SpO2 97%   BMI 31 25 kg/m²   No LMP for male patient  Physical Exam     Physical Exam  Vitals reviewed  Constitutional:       General: He is not in acute distress  Appearance: He is well-developed  HENT:      Right Ear: Hearing, tympanic membrane, ear canal and external ear normal       Left Ear: Hearing, tympanic membrane, ear canal and external ear normal       Nose: Mucosal edema (B/L boggy turbinates) and congestion present  Mouth/Throat:      Mouth: Mucous membranes are moist       Pharynx: Oropharynx is clear     Cardiovascular: Rate and Rhythm: Normal rate and regular rhythm  Heart sounds: Normal heart sounds  No murmur heard  Pulmonary:      Effort: Pulmonary effort is normal  No respiratory distress  Breath sounds: Normal breath sounds  Musculoskeletal:      Cervical back: Neck supple  Lymphadenopathy:      Cervical: No cervical adenopathy  Neurological:      Mental Status: He is alert and oriented to person, place, and time

## 2021-12-19 NOTE — PATIENT INSTRUCTIONS

## 2021-12-21 LAB
FLUAV RNA RESP QL NAA+PROBE: NEGATIVE
FLUBV RNA RESP QL NAA+PROBE: NEGATIVE
SARS-COV-2 RNA RESP QL NAA+PROBE: NEGATIVE

## 2022-01-17 PROCEDURE — U0005 INFEC AGEN DETEC AMPLI PROBE: HCPCS | Performed by: FAMILY MEDICINE

## 2022-01-17 PROCEDURE — U0003 INFECTIOUS AGENT DETECTION BY NUCLEIC ACID (DNA OR RNA); SEVERE ACUTE RESPIRATORY SYNDROME CORONAVIRUS 2 (SARS-COV-2) (CORONAVIRUS DISEASE [COVID-19]), AMPLIFIED PROBE TECHNIQUE, MAKING USE OF HIGH THROUGHPUT TECHNOLOGIES AS DESCRIBED BY CMS-2020-01-R: HCPCS | Performed by: FAMILY MEDICINE

## 2022-02-10 ENCOUNTER — TELEMEDICINE (OUTPATIENT)
Dept: FAMILY MEDICINE CLINIC | Facility: CLINIC | Age: 62
End: 2022-02-10
Payer: COMMERCIAL

## 2022-02-10 DIAGNOSIS — U07.1 COVID-19: Primary | ICD-10-CM

## 2022-02-10 PROCEDURE — 99213 OFFICE O/P EST LOW 20 MIN: CPT | Performed by: NURSE PRACTITIONER

## 2022-02-10 RX ORDER — ALBUTEROL SULFATE 90 UG/1
2 AEROSOL, METERED RESPIRATORY (INHALATION) EVERY 6 HOURS PRN
Qty: 6.7 G | Refills: 0 | Status: SHIPPED | OUTPATIENT
Start: 2022-02-10 | End: 2022-03-01

## 2022-02-10 NOTE — PROGRESS NOTES
COVID-19 Outpatient Progress Note    Assessment/Plan:    Problem List Items Addressed This Visit     None         Disposition:     Home test positive today  symptoms started Sunday and have been slowly resolving  CDC guidelines/isolation recommendations reviewed  Has been staying home  Advised to maintain good hydration  Discussed vitamins/ immune building supplements  May continue Sabina Mike  Rx for albuterol sent - dose/use reviewed  Discussed infusion therapy - pt declined  Advised to follow up with any further problems concerns    I have spent 20 minutes directly with the patient  Greater than 50% of this time was spent in counseling/coordination of care regarding: instructions for management and patient and family education  Encounter provider BINU Joshua    Provider located at [place] [address]  [address] [facility] [address] [place] [address]  [phone]    Recent Visits  No visits were found meeting these conditions  Showing recent visits within past 7 days and meeting all other requirements  Future Appointments  No visits were found meeting these conditions  Showing future appointments within next 150 days and meeting all other requirements     This virtual check-in was done via [employer] and patient was informed that this is a secure, HIPAA-compliant platform  He agrees to proceed  Patient agrees to participate in a virtual check in via telephone or video visit instead of presenting to the office to address urgent/immediate medical needs  Patient is aware this is a billable service  After connecting through [facility], the patient was identified by name and date of birth  Ra Nice was informed that this was a telemedicine visit and that the exam was being conducted confidentially over secure lines  My office door was closed  No one else was in the room   Ra Nice acknowledged consent and understanding of privacy and security of the telemedicine visit  I informed the patient that I have reviewed his record in Epic and presented the opportunity for him to ask any questions regarding the visit today  The patient agreed to participate  Verification of patient location:  Patient is located in the following state in which I hold an active license: [place]    Subjective:   Eden Sarabia is a 62 y o  male who is concerned about COVID-19  Patient's symptoms include chills, fatigue, nasal congestion, sore throat, loss of taste, cough, shortness of breath and headache  Patient denies anosmia, abdominal pain, nausea (Sunday/monday), vomiting (sunday/monday) and diarrhea (resolved)  Fever: unsure   Chest tightness: feels like he is wheezing      - Date of symptom onset: 2/6/2022      COVID-19 vaccination status: Fully vaccinated (primary series)    Exposure:   Contact with a person who is under investigation (PUI) for or who is positive for COVID-19 within the last 14 days?: No    Hospitalized recently for fever and/or lower respiratory symptoms?: No      Currently a healthcare worker that is involved in direct patient care?: No      Works in a special setting where the risk of COVID-19 transmission may be high? (this may include long-term care, correctional and penitentiary facilities; homeless shelters; assisted-living facilities and group homes ): No      Resident in a special setting where the risk of COVID-19 transmission may be high? (this may include long-term care, correctional and penitentiary facilities; homeless shelters; assisted-living facilities and group homes ): No      Home test positive today  Symptoms started Sunday  Slowly improving  Taking Mucinex DM and Sabina Davis night time  Using Vicks vapo rub    Lab Results   Component Value Date    SARSCOV2 Negative 01/17/2022     Past Medical History:   Diagnosis Date    Acute ulcer of stomach     GERD (gastroesophageal reflux disease)     Rib fractures      Past Surgical History: Procedure Laterality Date    COLONOSCOPY      ESOPHAGOGASTRODUODENOSCOPY      GASTRIC BYPASS      MANDIBLE FRACTURE SURGERY       Current Outpatient Medications   Medication Sig Dispense Refill    acetaminophen (TYLENOL) 325 mg tablet Take 2 tablets (650 mg total) by mouth every 6 (six) hours as needed for mild pain 30 tablet 0    Chlorpheniramine-Phenylephrine (SINUS/ALLERGY PE PO) Take by mouth      ferrous sulfate 325 (65 Fe) mg tablet 1 PO QD      fluticasone (FLONASE) 50 mcg/act nasal spray 2 sprays into each nostril daily 16 g 0    Multiple Vitamins-Minerals (MULTIVITAMIN ADULT EXTRA C PO) Take 1 capsule by mouth      pantoprazole (PROTONIX) 40 mg tablet TAKE 1 TABLET EVERY 12 HOURS 180 tablet 3     No current facility-administered medications for this visit  No Known Allergies    Review of Systems   Constitutional: Positive for chills and fatigue  Fever: unsure  HENT: Positive for congestion and sore throat  Respiratory: Positive for cough and shortness of breath  Chest tightness: feels like he is wheezing  Gastrointestinal: Negative for abdominal pain, diarrhea (resolved), nausea (Sunday/monday) and vomiting (sunday/monday)  Neurological: Positive for headaches  Objective: There were no vitals filed for this visit  Physical Exam  Constitutional:       General: He is not in acute distress  Appearance: Normal appearance  He is not ill-appearing  Pulmonary:      Effort: Pulmonary effort is normal  No respiratory distress  Neurological:      Mental Status: He is alert and oriented to person, place, and time  Psychiatric:         Attention and Perception: Attention normal          Mood and Affect: Mood normal          Speech: Speech normal          Behavior: Behavior normal          VIRTUAL VISIT DISCLAIMER    Devanprudencesal Mitchell verbally agrees to participate in [employer]   Pt is aware that Virtual Care Services could be limited without vital signs or the ability to perform a full hands-on physical exam  Kiko Morris Hopes understands he or the provider may request at any time to terminate the video visit and request the patient to seek care or treatment in person

## 2022-02-13 ENCOUNTER — APPOINTMENT (EMERGENCY)
Dept: RADIOLOGY | Facility: HOSPITAL | Age: 62
DRG: 309 | End: 2022-02-13
Payer: COMMERCIAL

## 2022-02-13 ENCOUNTER — HOSPITAL ENCOUNTER (INPATIENT)
Facility: HOSPITAL | Age: 62
LOS: 1 days | Discharge: HOME | DRG: 309 | End: 2022-02-14
Attending: EMERGENCY MEDICINE | Admitting: INTERNAL MEDICINE
Payer: COMMERCIAL

## 2022-02-13 DIAGNOSIS — U07.1 COVID: ICD-10-CM

## 2022-02-13 DIAGNOSIS — I48.91 ATRIAL FIBRILLATION WITH RVR (HCC): Primary | ICD-10-CM

## 2022-02-13 PROBLEM — D58.2 ELEVATED HEMOGLOBIN (HCC): Status: ACTIVE | Noted: 2022-02-13

## 2022-02-13 LAB
2HR DELTA HS TROPONIN: -2 NG/L
4HR DELTA HS TROPONIN: -1 NG/L
ALBUMIN SERPL BCP-MCNC: 3.2 G/DL (ref 3.5–5)
ALP SERPL-CCNC: 75 U/L (ref 46–116)
ALT SERPL W P-5'-P-CCNC: 34 U/L (ref 12–78)
ANION GAP SERPL CALCULATED.3IONS-SCNC: 9 MMOL/L (ref 4–13)
APTT PPP: 28 SECONDS (ref 23–37)
AST SERPL W P-5'-P-CCNC: 28 U/L (ref 5–45)
ATRIAL RATE: 174 BPM
ATRIAL RATE: 416 BPM
BASOPHILS # BLD AUTO: 0.02 THOUSANDS/ΜL (ref 0–0.1)
BASOPHILS NFR BLD AUTO: 0 % (ref 0–1)
BILIRUB SERPL-MCNC: 0.61 MG/DL (ref 0.2–1)
BUN SERPL-MCNC: 17 MG/DL (ref 5–25)
CALCIUM ALBUM COR SERPL-MCNC: 9.2 MG/DL (ref 8.3–10.1)
CALCIUM SERPL-MCNC: 8.6 MG/DL (ref 8.3–10.1)
CARDIAC TROPONIN I PNL SERPL HS: 7 NG/L
CARDIAC TROPONIN I PNL SERPL HS: 8 NG/L
CARDIAC TROPONIN I PNL SERPL HS: 9 NG/L
CHLORIDE SERPL-SCNC: 105 MMOL/L (ref 100–108)
CO2 SERPL-SCNC: 26 MMOL/L (ref 21–32)
CREAT SERPL-MCNC: 1.08 MG/DL (ref 0.6–1.3)
D DIMER PPP FEU-MCNC: 0.66 UG/ML FEU
EOSINOPHIL # BLD AUTO: 0.06 THOUSAND/ΜL (ref 0–0.61)
EOSINOPHIL NFR BLD AUTO: 1 % (ref 0–6)
ERYTHROCYTE [DISTWIDTH] IN BLOOD BY AUTOMATED COUNT: 12.1 % (ref 11.6–15.1)
FLUAV RNA RESP QL NAA+PROBE: NEGATIVE
FLUBV RNA RESP QL NAA+PROBE: NEGATIVE
GFR SERPL CREATININE-BSD FRML MDRD: 74 ML/MIN/1.73SQ M
GLUCOSE SERPL-MCNC: 94 MG/DL (ref 65–140)
HCT VFR BLD AUTO: 49.4 % (ref 36.5–49.3)
HGB BLD-MCNC: 17.1 G/DL (ref 12–17)
IMM GRANULOCYTES # BLD AUTO: 0.02 THOUSAND/UL (ref 0–0.2)
IMM GRANULOCYTES NFR BLD AUTO: 0 % (ref 0–2)
INR PPP: 1.05 (ref 0.84–1.19)
LYMPHOCYTES # BLD AUTO: 1.23 THOUSANDS/ΜL (ref 0.6–4.47)
LYMPHOCYTES NFR BLD AUTO: 18 % (ref 14–44)
MCH RBC QN AUTO: 32.9 PG (ref 26.8–34.3)
MCHC RBC AUTO-ENTMCNC: 34.6 G/DL (ref 31.4–37.4)
MCV RBC AUTO: 95 FL (ref 82–98)
MONOCYTES # BLD AUTO: 0.65 THOUSAND/ΜL (ref 0.17–1.22)
MONOCYTES NFR BLD AUTO: 10 % (ref 4–12)
NEUTROPHILS # BLD AUTO: 4.83 THOUSANDS/ΜL (ref 1.85–7.62)
NEUTS SEG NFR BLD AUTO: 71 % (ref 43–75)
NRBC BLD AUTO-RTO: 0 /100 WBCS
PLATELET # BLD AUTO: 273 THOUSANDS/UL (ref 149–390)
PMV BLD AUTO: 10.3 FL (ref 8.9–12.7)
POTASSIUM SERPL-SCNC: 4.2 MMOL/L (ref 3.5–5.3)
PROT SERPL-MCNC: 7.3 G/DL (ref 6.4–8.2)
PROTHROMBIN TIME: 13.5 SECONDS (ref 11.6–14.5)
QRS AXIS: 57 DEGREES
QRS AXIS: 59 DEGREES
QRSD INTERVAL: 82 MS
QRSD INTERVAL: 82 MS
QT INTERVAL: 278 MS
QT INTERVAL: 288 MS
QTC INTERVAL: 398 MS
QTC INTERVAL: 409 MS
RBC # BLD AUTO: 5.2 MILLION/UL (ref 3.88–5.62)
RSV RNA RESP QL NAA+PROBE: NEGATIVE
SARS-COV-2 RNA RESP QL NAA+PROBE: POSITIVE
SODIUM SERPL-SCNC: 140 MMOL/L (ref 136–145)
T WAVE AXIS: 123 DEGREES
T WAVE AXIS: 269 DEGREES
TSH SERPL DL<=0.05 MIU/L-ACNC: 2.94 UIU/ML (ref 0.36–3.74)
VENTRICULAR RATE: 115 BPM
VENTRICULAR RATE: 130 BPM
WBC # BLD AUTO: 6.81 THOUSAND/UL (ref 4.31–10.16)

## 2022-02-13 PROCEDURE — 99285 EMERGENCY DEPT VISIT HI MDM: CPT | Performed by: PHYSICIAN ASSISTANT

## 2022-02-13 PROCEDURE — 85025 COMPLETE CBC W/AUTO DIFF WBC: CPT | Performed by: PHYSICIAN ASSISTANT

## 2022-02-13 PROCEDURE — 93010 ELECTROCARDIOGRAM REPORT: CPT | Performed by: INTERNAL MEDICINE

## 2022-02-13 PROCEDURE — 85379 FIBRIN DEGRADATION QUANT: CPT | Performed by: PHYSICIAN ASSISTANT

## 2022-02-13 PROCEDURE — 99285 EMERGENCY DEPT VISIT HI MDM: CPT

## 2022-02-13 PROCEDURE — 96361 HYDRATE IV INFUSION ADD-ON: CPT

## 2022-02-13 PROCEDURE — 96374 THER/PROPH/DIAG INJ IV PUSH: CPT

## 2022-02-13 PROCEDURE — 84443 ASSAY THYROID STIM HORMONE: CPT | Performed by: PHYSICIAN ASSISTANT

## 2022-02-13 PROCEDURE — 93005 ELECTROCARDIOGRAM TRACING: CPT

## 2022-02-13 PROCEDURE — 71045 X-RAY EXAM CHEST 1 VIEW: CPT

## 2022-02-13 PROCEDURE — 85610 PROTHROMBIN TIME: CPT | Performed by: PHYSICIAN ASSISTANT

## 2022-02-13 PROCEDURE — 85730 THROMBOPLASTIN TIME PARTIAL: CPT | Performed by: PHYSICIAN ASSISTANT

## 2022-02-13 PROCEDURE — 0241U HB NFCT DS VIR RESP RNA 4 TRGT: CPT | Performed by: PHYSICIAN ASSISTANT

## 2022-02-13 PROCEDURE — 99223 1ST HOSP IP/OBS HIGH 75: CPT | Performed by: PHYSICIAN ASSISTANT

## 2022-02-13 PROCEDURE — 84484 ASSAY OF TROPONIN QUANT: CPT | Performed by: PHYSICIAN ASSISTANT

## 2022-02-13 PROCEDURE — 80053 COMPREHEN METABOLIC PANEL: CPT | Performed by: PHYSICIAN ASSISTANT

## 2022-02-13 PROCEDURE — 36415 COLL VENOUS BLD VENIPUNCTURE: CPT | Performed by: PHYSICIAN ASSISTANT

## 2022-02-13 RX ORDER — CALCIUM CARBONATE 200(500)MG
1000 TABLET,CHEWABLE ORAL DAILY PRN
Status: DISCONTINUED | OUTPATIENT
Start: 2022-02-13 | End: 2022-02-14 | Stop reason: HOSPADM

## 2022-02-13 RX ORDER — ALBUTEROL SULFATE 90 UG/1
2 AEROSOL, METERED RESPIRATORY (INHALATION) EVERY 4 HOURS PRN
Status: DISCONTINUED | OUTPATIENT
Start: 2022-02-13 | End: 2022-02-14 | Stop reason: HOSPADM

## 2022-02-13 RX ORDER — ASPIRIN 81 MG/1
81 TABLET ORAL DAILY
Status: DISCONTINUED | OUTPATIENT
Start: 2022-02-14 | End: 2022-02-14 | Stop reason: HOSPADM

## 2022-02-13 RX ORDER — PANTOPRAZOLE SODIUM 40 MG/1
40 TABLET, DELAYED RELEASE ORAL
Status: DISCONTINUED | OUTPATIENT
Start: 2022-02-13 | End: 2022-02-14 | Stop reason: HOSPADM

## 2022-02-13 RX ORDER — DILTIAZEM HYDROCHLORIDE 5 MG/ML
15 INJECTION INTRAVENOUS ONCE
Status: COMPLETED | OUTPATIENT
Start: 2022-02-13 | End: 2022-02-13

## 2022-02-13 RX ORDER — ONDANSETRON 2 MG/ML
4 INJECTION INTRAMUSCULAR; INTRAVENOUS EVERY 6 HOURS PRN
Status: DISCONTINUED | OUTPATIENT
Start: 2022-02-13 | End: 2022-02-14 | Stop reason: HOSPADM

## 2022-02-13 RX ORDER — ACETAMINOPHEN 325 MG/1
650 TABLET ORAL EVERY 6 HOURS PRN
Status: DISCONTINUED | OUTPATIENT
Start: 2022-02-13 | End: 2022-02-14 | Stop reason: HOSPADM

## 2022-02-13 RX ADMIN — ENOXAPARIN SODIUM 30 MG: 30 INJECTION, SOLUTION INTRAVENOUS; SUBCUTANEOUS at 20:52

## 2022-02-13 RX ADMIN — SODIUM CHLORIDE 500 ML: 0.9 INJECTION, SOLUTION INTRAVENOUS at 13:31

## 2022-02-13 RX ADMIN — DILTIAZEM HYDROCHLORIDE 15 MG: 5 INJECTION INTRAVENOUS at 13:36

## 2022-02-13 RX ADMIN — DILTIAZEM HYDROCHLORIDE 2.5 MG/HR: 5 INJECTION INTRAVENOUS at 14:49

## 2022-02-13 RX ADMIN — PANTOPRAZOLE SODIUM 40 MG: 40 TABLET, DELAYED RELEASE ORAL at 17:04

## 2022-02-13 RX ADMIN — METOPROLOL TARTRATE 25 MG: 25 TABLET, FILM COATED ORAL at 17:04

## 2022-02-13 NOTE — ASSESSMENT & PLAN NOTE
· Patient tested positive for COVID-19 in the emergency room  · He reports he also took a home test on Wednesday which was positive  · He also notes his entire family was positive in mid January, question at this is a continued positive test versus new infection  · Patient is not requiring supplemental oxygen at time of admission, hold off on IV remdesivir and steroids  · May be contributing to patient's AFib with RVR although he notes these similar symptoms since last May intermittently  · Will continue mucinex, tessalon and albuterol for patient's continued cough  · Does note he was having vomiting and diarrhea earlier this week but notes resolution

## 2022-02-13 NOTE — ASSESSMENT & PLAN NOTE
· Hospital of palpitations, shortness of breath and dizziness  · Was found to be in atrial fibrillation rapid ventricular response and the 160s  · Patient notes he has been experiencing intermittent symptoms nearly daily for the past year, likely undiagnosed AFib  · Initially improved with bolus Cardizem, requiring Cardizem drip at time of admission, continue  · Will start metoprolol 25 mg q 12 hours orally  · Cardiology consult  · Telemetry monitoring  · Chads Vasc score of 0, will defer need for anticoagulation to Cardiology  · DVT prophylaxis with Lovenox

## 2022-02-13 NOTE — ED NOTES
Report called to [facility] on S2, aware pt will be en route shortly        [place] JILLIAN Davis  02/13/22 8522

## 2022-02-13 NOTE — ASSESSMENT & PLAN NOTE
· Hemoglobin 17 1 at time of admission  · Likely secondary to dehydration in the setting of nausea, vomiting and diarrhea for the past few days  · IV fluids  · Recheck in a m

## 2022-02-13 NOTE — PLAN OF CARE
Problem: CARDIOVASCULAR - ADULT  Goal: Maintains optimal cardiac output and hemodynamic stability  Description: INTERVENTIONS:  - Monitor I/O, vital signs and rhythm  - Monitor for S/S and trends of decreased cardiac output  - Administer and titrate ordered vasoactive medications to optimize hemodynamic stability  - Assess quality of pulses, skin color and temperature  - Assess for signs of decreased coronary artery perfusion  - Instruct patient to report change in severity of symptoms  Outcome: Progressing  Goal: Absence of cardiac dysrhythmias or at baseline rhythm  Description: INTERVENTIONS:  - Continuous cardiac monitoring, vital signs, obtain 12 lead EKG if ordered  - Administer antiarrhythmic and heart rate control medications as ordered  - Monitor electrolytes and administer replacement therapy as ordered  Outcome: Progressing     Problem: RESPIRATORY - ADULT  Goal: Achieves optimal ventilation and oxygenation  Description: INTERVENTIONS:  - Assess for changes in respiratory status  - Assess for changes in mentation and behavior  - Position to facilitate oxygenation and minimize respiratory effort  - Oxygen administered by appropriate delivery if ordered  - Initiate smoking cessation education as indicated  - Encourage broncho-pulmonary hygiene including cough, deep breathe, Incentive Spirometry  - Assess the need for suctioning and aspirate as needed  - Assess and instruct to report SOB or any respiratory difficulty  - Respiratory Therapy support as indicated  Outcome: Progressing     Problem: PAIN - ADULT  Goal: Verbalizes/displays adequate comfort level or baseline comfort level  Description: Interventions:  - Encourage patient to monitor pain and request assistance  - Assess pain using appropriate pain scale  - Administer analgesics based on type and severity of pain and evaluate response  - Implement non-pharmacological measures as appropriate and evaluate response  - Consider cultural and social influences on pain and pain management  - Notify physician/advanced practitioner if interventions unsuccessful or patient reports new pain  Outcome: Progressing     Problem: DISCHARGE PLANNING  Goal: Discharge to home or other facility with appropriate resources  Description: INTERVENTIONS:  - Identify barriers to discharge w/patient and caregiver  - Arrange for needed discharge resources and transportation as appropriate  - Identify discharge learning needs (meds, wound care, etc )  - Arrange for interpretive services to assist at discharge as needed  - Refer to Case Management Department for coordinating discharge planning if the patient needs post-hospital services based on physician/advanced practitioner order or complex needs related to functional status, cognitive ability, or social support system  Outcome: Progressing     Problem: Knowledge Deficit  Goal: Patient/family/caregiver demonstrates understanding of disease process, treatment plan, medications, and discharge instructions  Description: Complete learning assessment and assess knowledge base    Interventions:  - Provide teaching at level of understanding  - Provide teaching via preferred learning methods  Outcome: Progressing

## 2022-02-13 NOTE — H&P
[facility]  H&P- Amanda Duran 1960, 62 y o  male MRN: [id number]  Unit/Bed#: ED 04 Encounter: [id number]  Primary Care Provider: Zeke Pineda DO   Date and time admitted to hospital: 2/13/2022 12:22 PM    * Atrial fibrillation with RVR [facility])  Assessment & Plan  · Hospital of palpitations, shortness of breath and dizziness  · Was found to be in atrial fibrillation rapid ventricular response and the 160s  · Patient notes he has been experiencing intermittent symptoms nearly daily for the past year, likely undiagnosed AFib  · Initially improved with bolus Cardizem, requiring Cardizem drip at time of admission, continue  · Will start metoprolol 25 mg q 12 hours orally  · Cardiology consult  · Telemetry monitoring  · Chads Vasc score of 0, will defer need for anticoagulation to Cardiology  · DVT prophylaxis with Lovenox    Elevated hemoglobin (HCC)  Assessment & Plan  · Hemoglobin 17 1 at time of admission  · Likely secondary to dehydration in the setting of nausea, vomiting and diarrhea for the past few days  · IV fluids  · Recheck in a m      COVID-19  Assessment & Plan  · Patient tested positive for COVID-19 in the emergency room  · He reports he also took a home test on Wednesday which was positive  · He also notes his entire family was positive in mid January, question at this is a continued positive test versus new infection  · Patient is not requiring supplemental oxygen at time of admission, hold off on IV remdesivir and steroids  · May be contributing to patient's AFib with RVR although he notes these similar symptoms since last May intermittently  · Will continue mucinex, tessalon and albuterol for patient's continued cough  · Does note he was having vomiting and diarrhea earlier this week but notes resolution     History of Eliud-en-Y gastric bypass  Assessment & Plan  · Noted history of Eliud-en-Y gastric bypass  · Does have history of GI bleed with NSAID use after his surgery, notes no further bleeding and denies NSAID use    VTE Pharmacologic Prophylaxis: VTE Score: 3 Moderate Risk (Score 3-4) - Pharmacological DVT Prophylaxis Ordered: enoxaparin (Lovenox)  Code Status: Prior full code  Discussion with family: None  Anticipated Length of Stay: Patient will be admitted on an inpatient basis with an anticipated length of stay of greater than 2 midnights secondary to AFib with RVR  Total Time for Visit, including Counseling / Coordination of Care: 60 minutes Greater than 50% of this total time spent on direct patient counseling and coordination of care  Chief Complaint:  Palpitations, dizziness and shortness of breath    History of Present Illness:  Yenni Mcclure is a 62 y o  male with a PMH of obesity status post Eliud-en-Y gastric bypass who presents with palpitations, dizziness and shortness of breath  Patient reports he was working around his house trying to get his generator set up when he developed acute onset dizziness and became very short of breath  He did note associated palpitations as well as numbness and tingling in his fingers and toes  He reports this was after he climbed the stairs many times  He does report he has been feeling similar symptoms for the past 9 months when he exerts himself  Upon ED presentation, he was found to be in AFib with RVR  He denies any prior history of known arrhythmia as  He did test positive for COVID on Wednesday, was tested as he was having vomiting and diarrhea as well as a persistent cough  He notes he was also around his COVID positive family in the middle of January and was told to quarantine as a presumed positive  He denies any recent fevers or chills  He denies home oxygen use, no prior history of SRIKANTH  Review of Systems:  Review of Systems   Constitutional: Negative for chills and fever  HENT: Negative for trouble swallowing  Eyes: Negative for visual disturbance     Respiratory: Positive for cough and shortness of breath  Cardiovascular: Positive for palpitations  Negative for chest pain  Gastrointestinal: Positive for diarrhea  Negative for abdominal pain, nausea and vomiting  Genitourinary: Negative for difficulty urinating  Musculoskeletal: Negative for arthralgias and back pain  Skin: Negative for rash  Neurological: Positive for dizziness and numbness  Psychiatric/Behavioral: Negative for sleep disturbance  Past Medical and Surgical History:   Past Medical History:   Diagnosis Date    Acute ulcer of stomach     GERD (gastroesophageal reflux disease)     Rib fractures        Past Surgical History:   Procedure Laterality Date    COLONOSCOPY      ESOPHAGOGASTRODUODENOSCOPY      GASTRIC BYPASS      MANDIBLE FRACTURE SURGERY         Meds/Allergies:  Prior to Admission medications    Medication Sig Start Date End Date Taking? Authorizing Provider   albuterol (Proventil HFA) 90 mcg/act inhaler Inhale 2 puffs every 6 (six) hours as needed for wheezing 2/10/22  Yes BINU Osman   Multiple Vitamins-Minerals (MULTIVITAMIN ADULT EXTRA C PO) Take 1 capsule by mouth   Yes Historical Provider, MD   pantoprazole (PROTONIX) 40 mg tablet TAKE 1 TABLET EVERY 12 HOURS 11/19/21  Yes Rigoberto Hernandez DO   acetaminophen (TYLENOL) 325 mg tablet Take 2 tablets (650 mg total) by mouth every 6 (six) hours as needed for mild pain 2/15/20   Chau Zuñiga DO   Chlorpheniramine-Phenylephrine (SINUS/ALLERGY PE PO) Take by mouth    Historical Provider, MD   ferrous sulfate 325 (65 Fe) mg tablet 1 PO QD 3/5/15   Historical Provider, MD   fluticasone (FLONASE) 50 mcg/act nasal spray 2 sprays into each nostril daily 12/19/21   Ger Morris PA-C     I have reviewed home medications with patient personally      Allergies: No Known Allergies    Social History:  Marital Status: /Civil Union   Occupation:    Patient Pre-hospital Living Situation: Home  Patient Pre-hospital Level of Mobility: bethany  Patient Pre-hospital Diet Restrictions: none  Substance Use History:   Social History     Substance and Sexual Activity   Alcohol Use Yes    Alcohol/week: 7 0 standard drinks    Types: 7 Cans of beer per week     Social History     Tobacco Use   Smoking Status Former Smoker    Types: Cigars   Smokeless Tobacco Former User    Types: Chew     Social History     Substance and Sexual Activity   Drug Use No       Family History:  Family History   Problem Relation Age of Onset    Seizures Father        Physical Exam:     Vitals:   Blood Pressure: 126/74 (02/13/22 1528)  Pulse: 103 (02/13/22 1528)  Temperature: 97 9 °F (36 6 °C) (02/13/22 1207)  Temp Source: Oral (02/13/22 1207)  Respirations: 18 (02/13/22 1528)  Weight - Scale: 125 kg (276 lb 0 3 oz) (02/13/22 1207)  SpO2: 98 % (02/13/22 1528)    Physical Exam  Vitals reviewed  Constitutional:       General: He is not in acute distress  HENT:      Head: Normocephalic and atraumatic  Eyes:      General: No scleral icterus  Conjunctiva/sclera: Conjunctivae normal    Cardiovascular:      Rate and Rhythm: Tachycardia present  Rhythm irregular  Heart sounds: No murmur heard  Pulmonary:      Effort: Pulmonary effort is normal  No respiratory distress  Breath sounds: Normal breath sounds  Abdominal:      General: Bowel sounds are normal  There is no distension  Palpations: Abdomen is soft  Tenderness: There is no abdominal tenderness  Musculoskeletal:      Cervical back: Neck supple  Right lower leg: No edema  Left lower leg: No edema  Skin:     General: Skin is warm and dry  Neurological:      Mental Status: He is alert and oriented to person, place, and time     Psychiatric:         Mood and Affect: Mood normal          Behavior: Behavior normal           Additional Data:     Lab Results:  Results from last 7 days   Lab Units 02/13/22  1312   WBC Thousand/uL 6 81   HEMOGLOBIN g/dL 17 1*   HEMATOCRIT % 49 4* PLATELETS Thousands/uL 273   NEUTROS PCT % 71   LYMPHS PCT % 18   MONOS PCT % 10   EOS PCT % 1     Results from last 7 days   Lab Units 02/13/22  1312   SODIUM mmol/L 140   POTASSIUM mmol/L 4 2   CHLORIDE mmol/L 105   CO2 mmol/L 26   BUN mg/dL 17   CREATININE mg/dL 1 08   ANION GAP mmol/L 9   CALCIUM mg/dL 8 6   ALBUMIN g/dL 3 2*   TOTAL BILIRUBIN mg/dL 0 61   ALK PHOS U/L 75   ALT U/L 34   AST U/L 28   GLUCOSE RANDOM mg/dL 94     Results from last 7 days   Lab Units 02/13/22  1312   INR  1 05                   Imaging: Personally reviewed the following imaging: chest xray  XR chest 1 view portable    (Results Pending)       EKG and Other Studies Reviewed on Admission:   · EKG: Atrial fibrillation    ** Please Note: This note has been constructed using a voice recognition system   **

## 2022-02-13 NOTE — ED PROVIDER NOTES
History  Chief Complaint   Patient presents with    Shortness of Breath     pt c/o SOB that started this morning at 10:00 this morning  pt states that he has covid but has felt good up to this point  pt states that his symptoms have gotten bettere  pt denies cp/sob, fevers/n/v/d     Patient is a 63 y/o male with a PMH of GERD, hx gastric bypass surgery 20yrs ago, hx of GI bleed after NSAID use in 2020 require transfusion presents with 1 week hx of nausea, vomiting, diarrhea, bodyaches, and subjective fever/chills  He denies blood in the emesis or stools  Denies dark tarry stools, melana, coffee ground emesis, or bright red blood  He states he took OTC medications and was doing better  He states he took an OTC home covid test that was positive  He does note that other family members are also currently sick  He states that in mid Jan he and his family were sick and were all covid tested- He was negative at that time but other members of his household were positive so he was told to assume he was a false negative  He's unsure if this week's positive covid test is a new positive or if it is still positive from last month  He states he was feeling better and the nausea/vomiting/diarrhea was improving  He states today the power went out and he went outside to [facility] on the generator  He states he was going up and down stairs and getting ready to snow blow but he had an episode of shortness of breath, lightheadness, dizziness, and near syncope  He states he was able to sit down and rest  He states his symptoms resolved on the way to the ED  He states his symptoms lasted about 30 mins  He denies other associated symptoms during this episode such as diaphoresis, chest pain, palpitations, headache, vision changes, LOC, weakness  He does admit that he has been getting episodes of exertional dyspnea and lightheadedness/dizziness over the past year   He states he thought it was just a result of when he had the blood loss from the bleeding ulcer  His doctor checked some bloodwork which was normal so he didn't get it checked further  He has not seen cardiology  Denies hx of asthma  Denies recent surgeries/immobilizations, hemoptysis, leg swelling/calf pain  No hx of DVT/PE or MI/CAD patient states he is not currently having any symptoms  Prior to Admission Medications   Prescriptions Last Dose Informant Patient Reported? Taking? Chlorpheniramine-Phenylephrine (SINUS/ALLERGY PE PO)   Yes No   Sig: Take by mouth   Multiple Vitamins-Minerals (MULTIVITAMIN ADULT EXTRA C PO)   Yes Yes   Sig: Take 1 capsule by mouth   acetaminophen (TYLENOL) 325 mg tablet   No No   Sig: Take 2 tablets (650 mg total) by mouth every 6 (six) hours as needed for mild pain   albuterol (Proventil HFA) 90 mcg/act inhaler   No Yes   Sig: Inhale 2 puffs every 6 (six) hours as needed for wheezing   ferrous sulfate 325 (65 Fe) mg tablet   Yes No   Si PO QD   fluticasone (FLONASE) 50 mcg/act nasal spray   No No   Si sprays into each nostril daily   pantoprazole (PROTONIX) 40 mg tablet   No Yes   Sig: TAKE 1 TABLET EVERY 12 HOURS      Facility-Administered Medications: None       Past Medical History:   Diagnosis Date    Acute ulcer of stomach     GERD (gastroesophageal reflux disease)     Rib fractures        Past Surgical History:   Procedure Laterality Date    COLONOSCOPY      ESOPHAGOGASTRODUODENOSCOPY      GASTRIC BYPASS      MANDIBLE FRACTURE SURGERY         Family History   Problem Relation Age of Onset    Seizures Father      I have reviewed and agree with the history as documented  E-Cigarette/Vaping    E-Cigarette Use Never User      E-Cigarette/Vaping Substances     Social History     Tobacco Use    Smoking status: Former Smoker     Types: Cigars    Smokeless tobacco: Former User     Types: Chew   Vaping Use    Vaping Use: Never used   Substance Use Topics    Alcohol use:  Yes     Alcohol/week: 7 0 standard drinks     Types: 7 Cans of beer per week    Drug use: No       Review of Systems   Constitutional: Positive for chills and fever  HENT: Positive for congestion and sore throat  Eyes: Negative for visual disturbance  Respiratory: Positive for cough and shortness of breath  Cardiovascular: Negative for chest pain, palpitations and leg swelling  Gastrointestinal: Positive for diarrhea, nausea and vomiting  Negative for abdominal pain  Genitourinary: Negative for difficulty urinating, dysuria, frequency and hematuria  Neurological: Positive for dizziness and light-headedness  Negative for syncope, weakness, numbness and headaches  All other systems reviewed and are negative  Physical Exam  Physical Exam  Vitals and nursing note reviewed  Constitutional:       General: He is not in acute distress  Appearance: He is well-developed and normal weight  He is not ill-appearing, toxic-appearing or diaphoretic  HENT:      Head: Normocephalic and atraumatic  Right Ear: External ear normal       Left Ear: External ear normal    Eyes:      Conjunctiva/sclera: Conjunctivae normal    Cardiovascular:      Rate and Rhythm: Rhythm irregularly irregular  Heart sounds: Normal heart sounds  No murmur heard  Pulmonary:      Effort: Pulmonary effort is normal  No respiratory distress  Breath sounds: Normal breath sounds  No stridor  No wheezing or rhonchi  Abdominal:      General: Abdomen is flat  Bowel sounds are normal  There is no distension  Palpations: Abdomen is soft  Tenderness: There is no abdominal tenderness  There is no guarding  Musculoskeletal:         General: Normal range of motion  Cervical back: Normal range of motion  Skin:     General: Skin is warm and dry  Neurological:      Mental Status: He is alert     Psychiatric:         Mood and Affect: Mood normal          Vital Signs  ED Triage Vitals [02/13/22 1207]   Temperature Pulse Respirations Blood Pressure SpO2   97 9 °F (36 6 °C) 57 18 128/84 99 %      Temp Source Heart Rate Source Patient Position - Orthostatic VS BP Location FiO2 (%)   Oral Monitor Sitting Right arm --      Pain Score       No Pain           Vitals:    02/13/22 1528 02/13/22 1551 02/13/22 1612 02/13/22 1638   BP: 126/74 151/71 124/67 119/71   Pulse: 103 (!) 130 (!) 124 (!) 122   Patient Position - Orthostatic VS:             Visual Acuity      ED Medications  Medications   diltiazem (CARDIZEM) 125 mg in sodium chloride 0 9 % 125 mL infusion (has no administration in time range)   acetaminophen (TYLENOL) tablet 650 mg (has no administration in time range)   albuterol (PROVENTIL HFA,VENTOLIN HFA) inhaler 2 puff (has no administration in time range)   pantoprazole (PROTONIX) EC tablet 40 mg (has no administration in time range)   ondansetron (ZOFRAN) injection 4 mg (has no administration in time range)   calcium carbonate (TUMS) chewable tablet 1,000 mg (has no administration in time range)   enoxaparin (LOVENOX) subcutaneous injection 30 mg (has no administration in time range)   metoprolol tartrate (LOPRESSOR) tablet 25 mg (has no administration in time range)   sodium chloride 0 9 % bolus 500 mL (0 mL Intravenous Stopped 2/13/22 1454)   diltiazem (CARDIZEM) injection 15 mg (15 mg Intravenous Given 2/13/22 1336)   diltiazem (CARDIZEM) 125 mg in sodium chloride 0 9 % 125 mL infusion (5 mg/hr Intravenous Rate/Dose Change 2/13/22 1548)       Diagnostic Studies  Results Reviewed     Procedure Component Value Units Date/Time    HS Troponin I 2hr [[id number]]  (Normal) Collected: 02/13/22 1519    Lab Status: Final result Specimen: Blood from Arm, Right Updated: 02/13/22 1549     hs TnI 2hr 7 ng/L      Delta 2hr hsTnI -2 ng/L     HS Troponin I 4hr [[id number]]     Lab Status: No result Specimen: Blood     D-Dimer [[id number]]  (Abnormal) Collected: 02/13/22 1312    Lab Status: Final result Specimen: Blood from Arm, Left Updated: 02/13/22 1410     D-Dimer, Quant 0 66 ug/ml FEU     COVID/FLU/RSV - 2 hour TAT [[id number]]  (Abnormal) Collected: 02/13/22 1312    Lab Status: Final result Specimen: Nares from Nose Updated: 02/13/22 1402     SARS-CoV-2 Positive     INFLUENZA A PCR Negative     INFLUENZA B PCR Negative     RSV PCR Negative    Narrative:      FOR PEDIATRIC PATIENTS - copy/paste COVID Guidelines URL to browser: [email or web address]    SARS-CoV-2 assay is a Nucleic Acid Amplification assay intended for the  qualitative detection of nucleic acid from SARS-CoV-2 in nasopharyngeal  swabs  Results are for the presumptive identification of SARS-CoV-2 RNA  Positive results are indicative of infection with SARS-CoV-2, the virus  causing COVID-19, but do not rule out bacterial infection or co-infection  with other viruses  Laboratories within the [place] and its  territories are required to report all positive results to the appropriate  public health authorities  Negative results do not preclude SARS-CoV-2  infection and should not be used as the sole basis for treatment or other  patient management decisions  Negative results must be combined with  clinical observations, patient history, and epidemiological information  This test has not been FDA cleared or approved  This test has been authorized by FDA under an Emergency Use Authorization  (EUA)  This test is only authorized for the duration of time the  declaration that circumstances exist justifying the authorization of the  emergency use of an in vitro diagnostic tests for detection of SARS-CoV-2  virus and/or diagnosis of COVID-19 infection under section 564(b)(1) of  the Act, 21 U  S C  423XHU-4(D)(4), unless the authorization is terminated  or revoked sooner  The test has been validated but independent review by FDA  and CLIA is pending  Test performed using [employer]: This RT-PCR assay targets N2,  a region unique to SARS-CoV-2   A conserved region in the E-gene was chosen  for pan-Sarbecovirus detection which includes SARS-CoV-2  TSH [[id number]]  (Normal) Collected: 02/13/22 1312    Lab Status: Final result Specimen: Blood from Arm, Left Updated: 02/13/22 1349     TSH 3RD GENERATON 2 945 uIU/mL     Narrative:      Patients undergoing fluorescein dye angiography may retain small amounts of fluorescein in the body for 48-72 hours post procedure  Samples containing fluorescein can produce falsely depressed TSH values  If the patient had this procedure,a specimen should be resubmitted post fluorescein clearance        HS Troponin 0hr (reflex protocol) [[id number]]  (Normal) Collected: 02/13/22 1312    Lab Status: Final result Specimen: Blood from Arm, Left Updated: 02/13/22 1346     hs TnI 0hr 9 ng/L     Comprehensive metabolic panel [[id number]]  (Abnormal) Collected: 02/13/22 1312    Lab Status: Final result Specimen: Blood from Arm, Left Updated: 02/13/22 1340     Sodium 140 mmol/L      Potassium 4 2 mmol/L      Chloride 105 mmol/L      CO2 26 mmol/L      ANION GAP 9 mmol/L      BUN 17 mg/dL      Creatinine 1 08 mg/dL      Glucose 94 mg/dL      Calcium 8 6 mg/dL      Corrected Calcium 9 2 mg/dL      AST 28 U/L      ALT 34 U/L      Alkaline Phosphatase 75 U/L      Total Protein 7 3 g/dL      Albumin 3 2 g/dL      Total Bilirubin 0 61 mg/dL      eGFR 74 ml/min/1 73sq m     Narrative:      [place] guidelines for Chronic Kidney Disease (CKD):     Stage 1 with normal or high GFR (GFR > 90 mL/min/1 73 square meters)    Stage 2 Mild CKD (GFR = 60-89 mL/min/1 73 square meters)    Stage 3A Moderate CKD (GFR = 45-59 mL/min/1 73 square meters)    Stage 3B Moderate CKD (GFR = 30-44 mL/min/1 73 square meters)    Stage 4 Severe CKD (GFR = 15-29 mL/min/1 73 square meters)    Stage 5 End Stage CKD (GFR <15 mL/min/1 73 square meters)  Note: GFR calculation is accurate only with a steady state creatinine    Protime-INR [[id number]] (Normal) Collected: 02/13/22 1312    Lab Status: Final result Specimen: Blood from Arm, Left Updated: 02/13/22 1335     Protime 13 5 seconds      INR 1 05    APTT [[id number]]  (Normal) Collected: 02/13/22 1312    Lab Status: Final result Specimen: Blood from Arm, Left Updated: 02/13/22 1335     PTT 28 seconds     CBC and differential [[id number]]  (Abnormal) Collected: 02/13/22 1312    Lab Status: Final result Specimen: Blood from Arm, Left Updated: 02/13/22 1318     WBC 6 81 Thousand/uL      RBC 5 20 Million/uL      Hemoglobin 17 1 g/dL      Hematocrit 49 4 %      MCV 95 fL      MCH 32 9 pg      MCHC 34 6 g/dL      RDW 12 1 %      MPV 10 3 fL      Platelets 033 Thousands/uL      nRBC 0 /100 WBCs      Neutrophils Relative 71 %      Immat GRANS % 0 %      Lymphocytes Relative 18 %      Monocytes Relative 10 %      Eosinophils Relative 1 %      Basophils Relative 0 %      Neutrophils Absolute 4 83 Thousands/µL      Immature Grans Absolute 0 02 Thousand/uL      Lymphocytes Absolute 1 23 Thousands/µL      Monocytes Absolute 0 65 Thousand/µL      Eosinophils Absolute 0 06 Thousand/µL      Basophils Absolute 0 02 Thousands/µL                  XR chest 1 view portable   Final Result by Cm Barry MD (02/13 1555)      No acute cardiopulmonary disease  Workstation performed: [id number]                    Procedures  Procedures         ED Course                               SBIRT 22yo+      Most Recent Value   SBIRT (25 yo +)    In order to provide better care to our patients, we are screening all of our patients for alcohol and drug use  Would it be okay to ask you these screening questions? No Filed at: 02/13/2022 1324   Initial Alcohol Screen: US AUDIT-C     1  How often do you have a drink containing alcohol? 0 Filed at: 02/13/2022 1324   2  How many drinks containing alcohol do you have on a typical day you are drinking? 0 Filed at: 02/13/2022 1324   3a  Male UNDER 65:  How often do you have five or more drinks on one occasion? 0 Filed at: 02/13/2022 1324   3b  FEMALE Any Age, or MALE 65+: How often do you have 4 or more drinks on one occassion? 0 Filed at: 02/13/2022 1324   Audit-C Score 0 Filed at: 02/13/2022 1324   VITO: How many times in the past year have you    Used an illegal drug or used a prescription medication for non-medical reasons? Never Filed at: 02/13/2022 1324                    MDM  Number of Diagnoses or Management Options  Atrial fibrillation with RVR ([facility] [address] )  COVID  Diagnosis management comments: Given history and PE suspect patient is in A  Fib and having episodes of rapid A Fib/symptomatic A  Fib  Will check labs, EKG, troponin, CXR and send covid/flu/rsv  Continuous cardiac monitoring  Patient currently asymptomatic  EKG with A  Fib with RVR rate 130  CBC with no leukocytosis  Hemeconcentrated with hgb 17 1- may be from dehydration given hx of NVD-Will start with 500ml bolus given possible hx of covid  (Patient had negative covid test in 1/17/22 but states had a positive home test on 2/9/22)  Will order cardizem bolus and infusion  Chest x-ray reviewed by me and interpreted as no acute cardiopulmonary disease  Remainder of lab evaluation unremarkable except for a mildly elevated d dimer at 0 66 however may be related to covid diagnosis  No s/sx of DVT, no recent surgery/immobilzation, no hx of PE, No hemoptysis  No other risk factors  Will hold off on CT at this time and discuss with [facility] for admission  Discussed all results, diagnosis, and plan with patient who is in agreement with admission  Rate improved with cardizem  Discussed with Dr Ric Cook for admission- will admit tele    He is well appearing, non toxic and in NAD         Amount and/or Complexity of Data Reviewed  Clinical lab tests: ordered and reviewed  Tests in the radiology section of CPT®: ordered and reviewed    Patient Progress  Patient progress: stable      Disposition  Final diagnoses:   Atrial fibrillation with RVR ([facility] [address] )   COVID     Time reflects when diagnosis was documented in both MDM as applicable and the Disposition within this note     Time User Action Codes Description Comment    2/13/2022  2:25 PM Whitney Show Add [I48 [id number]] Atrial fibrillation with RVR ([address] )     2/13/2022  2:25 PM Whitney Show [facility]U07 1] [facility]       ED Disposition     ED Disposition Condition Date/Time Comment    Admit Stable Sun Feb 13, 2022  2:28 PM Case was discussed with Dr Zach Cortez and the patient's admission status was agreed to be Admission Status: inpatient status to the service of Dr Zach Cortez   Follow-up Information    None         Current Discharge Medication List      CONTINUE these medications which have NOT CHANGED    Details   albuterol (Proventil HFA) 90 mcg/act inhaler Inhale 2 puffs every 6 (six) hours as needed for wheezing  Qty: 6 7 g, Refills: 0    Comments: Substitution to a formulary equivalent within the same pharmaceutical class is authorized  Associated Diagnoses: COVID-19      Multiple Vitamins-Minerals (MULTIVITAMIN ADULT EXTRA C PO) Take 1 capsule by mouth      pantoprazole (PROTONIX) 40 mg tablet TAKE 1 TABLET EVERY 12 HOURS  Qty: 180 tablet, Refills: 3    Associated Diagnoses: Gastrointestinal hemorrhage with melena; Peptic ulcer      acetaminophen (TYLENOL) 325 mg tablet Take 2 tablets (650 mg total) by mouth every 6 (six) hours as needed for mild pain  Qty: 30 tablet, Refills: 0    Associated Diagnoses: Left rib fracture      Chlorpheniramine-Phenylephrine (SINUS/ALLERGY PE PO) Take by mouth      ferrous sulfate 325 (65 Fe) mg tablet 1 PO QD      fluticasone (FLONASE) 50 mcg/act nasal spray 2 sprays into each nostril daily  Qty: 16 g, Refills: 0    Associated Diagnoses: Acute non-recurrent sinusitis, unspecified location             No discharge procedures on file      PDMP Review     None          ED Provider  Electronically Signed by           Eyal Rock PA-C  02/13/22 2203

## 2022-02-13 NOTE — ASSESSMENT & PLAN NOTE
· Noted history of Eliud-en-Y gastric bypass  · Does have history of GI bleed with NSAID use after his surgery, notes no further bleeding and denies NSAID use

## 2022-02-14 ENCOUNTER — APPOINTMENT (INPATIENT)
Dept: CT IMAGING | Facility: HOSPITAL | Age: 62
DRG: 309 | End: 2022-02-14
Payer: COMMERCIAL

## 2022-02-14 VITALS
RESPIRATION RATE: 18 BRPM | OXYGEN SATURATION: 96 % | BODY MASS INDEX: 34.5 KG/M2 | WEIGHT: 276.02 LBS | HEART RATE: 72 BPM | DIASTOLIC BLOOD PRESSURE: 72 MMHG | TEMPERATURE: 97.7 F | SYSTOLIC BLOOD PRESSURE: 119 MMHG

## 2022-02-14 PROBLEM — R93.89 ABNORMAL CT OF THE CHEST: Status: ACTIVE | Noted: 2022-02-14

## 2022-02-14 PROBLEM — D58.2 ELEVATED HEMOGLOBIN (HCC): Status: RESOLVED | Noted: 2022-02-13 | Resolved: 2022-02-14

## 2022-02-14 LAB
ANION GAP SERPL CALCULATED.3IONS-SCNC: 7 MMOL/L (ref 4–13)
BUN SERPL-MCNC: 14 MG/DL (ref 5–25)
CALCIUM SERPL-MCNC: 8.4 MG/DL (ref 8.3–10.1)
CHLORIDE SERPL-SCNC: 105 MMOL/L (ref 100–108)
CO2 SERPL-SCNC: 26 MMOL/L (ref 21–32)
CREAT SERPL-MCNC: 0.99 MG/DL (ref 0.6–1.3)
ERYTHROCYTE [DISTWIDTH] IN BLOOD BY AUTOMATED COUNT: 12.4 % (ref 11.6–15.1)
GFR SERPL CREATININE-BSD FRML MDRD: 82 ML/MIN/1.73SQ M
GLUCOSE SERPL-MCNC: 98 MG/DL (ref 65–140)
HCT VFR BLD AUTO: 43 % (ref 36.5–49.3)
HGB BLD-MCNC: 14.8 G/DL (ref 12–17)
MCH RBC QN AUTO: 32.2 PG (ref 26.8–34.3)
MCHC RBC AUTO-ENTMCNC: 34.4 G/DL (ref 31.4–37.4)
MCV RBC AUTO: 94 FL (ref 82–98)
PLATELET # BLD AUTO: 266 THOUSANDS/UL (ref 149–390)
PMV BLD AUTO: 9.8 FL (ref 8.9–12.7)
POTASSIUM SERPL-SCNC: 3.9 MMOL/L (ref 3.5–5.3)
RBC # BLD AUTO: 4.6 MILLION/UL (ref 3.88–5.62)
SODIUM SERPL-SCNC: 138 MMOL/L (ref 136–145)
WBC # BLD AUTO: 6.86 THOUSAND/UL (ref 4.31–10.16)

## 2022-02-14 PROCEDURE — 85027 COMPLETE CBC AUTOMATED: CPT | Performed by: PHYSICIAN ASSISTANT

## 2022-02-14 PROCEDURE — 80048 BASIC METABOLIC PNL TOTAL CA: CPT | Performed by: PHYSICIAN ASSISTANT

## 2022-02-14 PROCEDURE — RECHECK: Performed by: PHYSICIAN ASSISTANT

## 2022-02-14 PROCEDURE — 71275 CT ANGIOGRAPHY CHEST: CPT

## 2022-02-14 PROCEDURE — G1004 CDSM NDSC: HCPCS

## 2022-02-14 PROCEDURE — 99239 HOSP IP/OBS DSCHRG MGMT >30: CPT | Performed by: PHYSICIAN ASSISTANT

## 2022-02-14 PROCEDURE — 99222 1ST HOSP IP/OBS MODERATE 55: CPT | Performed by: PHYSICIAN ASSISTANT

## 2022-02-14 RX ADMIN — METOPROLOL TARTRATE 25 MG: 25 TABLET, FILM COATED ORAL at 08:26

## 2022-02-14 RX ADMIN — PANTOPRAZOLE SODIUM 40 MG: 40 TABLET, DELAYED RELEASE ORAL at 06:36

## 2022-02-14 RX ADMIN — ENOXAPARIN SODIUM 30 MG: 30 INJECTION, SOLUTION INTRAVENOUS; SUBCUTANEOUS at 08:26

## 2022-02-14 RX ADMIN — IOHEXOL 85 ML: 350 INJECTION, SOLUTION INTRAVENOUS at 11:04

## 2022-02-14 RX ADMIN — ASPIRIN 81 MG: 81 TABLET, COATED ORAL at 08:26

## 2022-02-14 NOTE — CONSULTS
Consult - Cardiology   Jase Lao 62 y o  male MRN: [id number]  Unit/Bed#: [facility] 2 -01 Encounter: [id number]        Reason For Consult:  Atrial fibrillation                 Assessment:  Atrial fibrillation - Accelerated ventricular rate present on arrival:  Spontaneous return of sinus rhythm   New diagnosis   Heart rate rhythm does seem reliably felt by the patient   TSH within normal limits   Patient denies history, signs/symptoms of SRIKANTH   ETOH - approximately 3/d  COVID-19 infection   Positive home test 2/10/2022    Other  History Eliud-en-Y gastric bypass  Hx GI bleed requiring transfusion after NSAID use-2020    Discussion / Plan:  #  new diagnosis of paroxysmal atrial fibrillation accelerated ventricular rate present on arrival now spontaneous return of sinus rhythm        With chads Vasc score of <1 and spontaneous return of sinus rhythm anticoagulation not currently indicated or begun   Agree with initiation of beta-blocker with recommendation to continue same at time of discharge   Echocardiogram advised though this can probably be deferred to the outpatient setting        History Of Present Illness:  Shantel Hall is a 59-year-old patient of Stacy Mart (PCP) without regular care by cardiologist     On Thursday, 2/10, patient contacted his PCP reporting a positive COVID test that day and 3-4 days of loss of taste, resolving emesis and diarrhea, mild dyspnea, and improving cold-type symptoms  Infusion therapy was offered to the patient but he declined  He was prescribed albuterol with recommendation for home isolation and hydration  On 2/13/2022 the patient was experiencing several hours of ncreased symptoms of effort dyspnea for which he came to the emergency department to be evaluated  He presented with normal room air saturations which have since admission remained at or above 95%    A CTA of the chest showed no pulmonary embolism with bilateral multilobar infiltrates in a pattern seen with COVID-19  In the emergency department he was observed to be tachycardic with a new diagnosis of atrial fibrillation  He was initiated on a diltiazem infusion and aspirin and a consultation has been requested  High sensitivity troponin levels are 9, 7, and 8    Patient denies to me any symptoms of chest pain  He does describe some episodes of varying effort tolerance but without any complaints of dyspnea at rest, orthopnea, PND, or lower extremity edema  He may have felt some mild tachycardia on the day of admission though this does not seem reliably perceived  Past Medical History:        Past Medical History:   Diagnosis Date    Acute ulcer of stomach     GERD (gastroesophageal reflux disease)     Rib fractures       Past Surgical History:   Procedure Laterality Date    COLONOSCOPY      ESOPHAGOGASTRODUODENOSCOPY      GASTRIC BYPASS      MANDIBLE FRACTURE SURGERY          Allergy:        No Known Allergies    Medications:       Prior to Admission medications    Medication Sig Start Date End Date Taking?  Authorizing Provider   albuterol (Proventil HFA) 90 mcg/act inhaler Inhale 2 puffs every 6 (six) hours as needed for wheezing 2/10/22  Yes BINU Osman   Multiple Vitamins-Minerals (MULTIVITAMIN ADULT EXTRA C PO) Take 1 capsule by mouth   Yes Historical Provider, MD   pantoprazole (PROTONIX) 40 mg tablet TAKE 1 TABLET EVERY 12 HOURS 11/19/21  Yes Ihab Abdelaal, DO   acetaminophen (TYLENOL) 325 mg tablet Take 2 tablets (650 mg total) by mouth every 6 (six) hours as needed for mild pain 2/15/20   Og Player, DO   Chlorpheniramine-Phenylephrine (SINUS/ALLERGY PE PO) Take by mouth    Historical Provider, MD   ferrous sulfate 325 (65 Fe) mg tablet 1 PO QD 3/5/15   Historical Provider, MD   fluticasone (FLONASE) 50 mcg/act nasal spray 2 sprays into each nostril daily 12/19/21   Calos Del Angel PA-C       Family History:     Family History   Problem Relation Age of Onset    Seizures Father         Social History:       Social History     Socioeconomic History    Marital status: /Civil Union     Spouse name: None    Number of children: None    Years of education: None    Highest education level: None   Occupational History    None   Tobacco Use    Smoking status: Former Smoker     Types: Cigars    Smokeless tobacco: Former User     Types: Chew   Vaping Use    Vaping Use: Never used   Substance and Sexual Activity    Alcohol use: Yes     Alcohol/week: 7 0 standard drinks     Types: 7 Cans of beer per week    Drug use: No    Sexual activity: None   Other Topics Concern    None   Social History Narrative    Always uses seat belt    Feels safe at home    No guns in the home     Social Determinants of Health     Financial Resource Strain: Not on file   Food Insecurity: Not on file   Transportation Needs: Not on file   Physical Activity: Not on file   Stress: Not on file   Social Connections: Not on file   Intimate Partner Violence: Not on file   Housing Stability: Not on file       ROS:  Symptoms per HPI  The remainder of the review of systems is negative    Exam:  General:  Alert, normally conversant, comfortable appearing  Head: Normocephalic, atraumatic  Eyes:  EOMI  Pupils - equal, round, reactive to accomodation  No icterus  Normal Conjunctiva  Oropharynx: Moist without lesion  Neck:  No gross bruit, JVD, thyromegaly, or lymphadenopathy  Heart:  Regular with controlled rate  No rub nor pathologic murmur  Lungs:  Clear without rales/rhonchi/wheeze  Abdomen:  Soft and nontender with normal bowel sounds  No organomegaly or mass  Lower Limbs:  No edema  Pulses[de-identified]  RLE - DP:  2+                 LLE - DP:  2+  Musculoskeletal: Independent movement of limbs observed, Formal ROM and strength eval not performed  Neurologic:    Oriented to: person, place, situation  Cranial Nerves: grossly intact - vision, smell, taste, and hearing were not tested       Motor function: grossly normal, symmetric   Sensation: Was not tested    Vitals:    02/14/22 0329 02/14/22 0748 02/14/22 0748 02/14/22 0800   BP: 106/62 122/77 122/77    BP Location:   Right arm    Pulse: 68 70 69    Resp: 16      Temp: 98 6 °F (37 °C) (!) 97 °F (36 1 °C) (!) 97 °F (36 1 °C)    TempSrc:   Oral    SpO2: 96% 96% 96% 95%   Weight:               DATA:      -----------    ECG:                      -----------------------------------------------------------------------------------------------------------------------------------------------  Telemetry:   Atrial fibrillation with ventricular rate approximately 100 beats per minute with spontaneous return of sinus rhythm with heart rate trend in the 60s         -----------------------------------------------------------------------------------------------------------------------------------------------  Echocardiogram    -----------------------------------------------------------------------------------------------------------------------------------------------  Ischemic Testing:  Stress test         Catheterization    -----------------------------------------------------------------------------------------------------------------------------------------------  Weights: Wt Readings from Last 20 Encounters:   02/13/22 125 kg (276 lb 0 3 oz)   12/19/21 113 kg (250 lb)   08/21/21 116 kg (255 lb)   05/11/21 121 kg (266 lb 9 6 oz)   11/23/20 121 kg (267 lb 3 2 oz)   02/29/20 117 kg (258 lb 9 6 oz)   02/13/20 111 kg (245 lb)   03/21/19 116 kg (255 lb)   03/12/19 115 kg (253 lb)   03/04/19 111 kg (245 lb)   01/23/19 111 kg (245 lb)   09/01/18 111 kg (244 lb 9 6 oz)   07/06/18 109 kg (239 lb 3 2 oz)   06/27/17 112 kg (247 lb 3 oz)   01/02/17 117 kg (257 lb 0 9 oz)   , Body mass index is 34 5 kg/m²           Lab Studies:               Results from last 7 days   Lab Units 02/14/22  0517 02/13/22  1312   WBC Thousand/uL 6 86 6 81   HEMOGLOBIN g/dL 14 8 17 1*   HEMATOCRIT % 43 0 49 4* PLATELETS Thousands/uL 266 273   ,   Results from last 7 days   Lab Units 02/14/22  0517 02/13/22  1312   POTASSIUM mmol/L 3 9 4 2   CHLORIDE mmol/L 105 105   CO2 mmol/L 26 26   BUN mg/dL 14 17   CREATININE mg/dL 0 99 1 08   CALCIUM mg/dL 8 4 8 6   ALK PHOS U/L  --  75   ALT U/L  --  34   AST U/L  --  28

## 2022-02-14 NOTE — ASSESSMENT & PLAN NOTE
· Patient tested positive for COVID-19 in the emergency room  · He reports he also took a home test on Wednesday which was positive  · He also notes his entire family was positive in mid January, question at this is a continued positive test versus new infection  · He has remained stable on room air since admission   · No indication for treatment   · D-dimer 0 66, obtained CTA chest negative for PE   · Continue supportive care  · Return to the hospital with any shortness of breath, difficulty breathing

## 2022-02-14 NOTE — UTILIZATION REVIEW
Initial Clinical Review    Admission: Date/Time/Statement:   Admission Orders (From admission, onward)     Ordered        02/13/22 1454  [address] [place]  Once                      Orders Placed This Encounter   Procedures    INPATIENT ADMISSION     Standing Status:   Standing     Number of Occurrences:   1     Order Specific Question:   Level of Care     Answer:   Level 2 Stepdown / HOT [14]     Order Specific Question:   Estimated length of stay     Answer:   More than 2 Midnights     Order Specific Question:   Certification     Answer:   I certify that inpatient services are medically necessary for this patient for a duration of greater than two midnights  See H&P and MD Progress Notes for additional information about the patient's course of treatment  ED Arrival Information     Expected Arrival Acuity    - 2/13/2022 12:00 Urgent         Means of arrival Escorted by Service Admission type    Walk-In Family Member Hospitalist Urgent         Arrival complaint    SOB         Chief Complaint   Patient presents with    Shortness of Breath     pt c/o SOB that started this morning at 10:00 this morning  pt states that he has covid but has felt good up to this point  pt states that his symptoms have gotten bettere  pt denies cp/sob, fevers/n/v/d       Initial Presentation: 63 yo male to ED from home w/ palpitations, dizziness and SOB   In ED found to be in afib w/ RVR   Tested + COVID on Wed , having vomiting and diarrhea and persistent cough   Family was + mid Jan, unsure if new infection or cont +   Admitted IP status w? afib w/ RVR plan to start lopressor , cardiology consult , tele , DVT prophy w/ lovenox  Hgb 17 1 likely sec to dehydration , IVF and recheck in am   Cont mucinex , tessalon and albuterol for cough   Date:  2/14  Day 2: cont stay for monitoring of afib and COVID   Cont supportive care    Start lopressor and asa      2/14 Cardiology Consult   new diagnosis of paroxysmal atrial fibrillation accelerated ventricular rate present on arrival now spontaneous return of sinus rhythm  Agree w/ initiation of BB and cont on DC   Echo can be done as OP   ED Triage Vitals [02/13/22 1207]   Temperature Pulse Respirations Blood Pressure SpO2   97 9 °F (36 6 °C) 57 18 128/84 99 %      Temp Source Heart Rate Source Patient Position - Orthostatic VS BP Location FiO2 (%)   Oral Monitor Sitting Right arm --      Pain Score       No Pain          Wt Readings from Last 1 Encounters:   02/13/22 125 kg (276 lb 0 3 oz)     Additional Vital Signs:   02/14/22 07:48:32 97 °F (36 1 °C) Abnormal  69 -- 122/77 92 96 % None (Room air) Lying   02/14/22 07:48:14 97 °F (36 1 °C) Abnormal  70 -- 122/77 92 96 % -- --   02/14/22 03:29:09 98 6 °F (37 °C) 68 16 106/62 77 96 % -- --   02/14/22 00:17:19 -- 67 -- 105/62 76 95 % -- --   02/13/22 2259 -- 80 -- -- -- -- -- --   02/13/22 20:59:59 97 8 °F (36 6 °C) 50 Abnormal  -- 104/60 75 95 % -- --   02/13/22 1945 -- 75 -- -- -- -- -- --   02/13/22 1930 -- 105 -- -- -- -- -- --   02/13/22 1915 -- 97 -- -- -- -- -- --   02/13/22 1900 -- 71 -- -- -- -- -- --   02/13/22 18:08:19 -- 115 Abnormal  19 147/85 106 95 % -- --   02/13/22 17:12:46 -- 120 Abnormal  -- 135/87 103 96 % -- --   02/13/22 16:38:47 -- 122 Abnormal  -- 119/71 87 94 % -- --   02/13/22 1630 -- -- -- -- -- -- None (Room air) --   02/13/22 1612 -- 124 Abnormal  18 124/67 -- 97 % None (Room air) --   02/13/22 1551 -- 130 Abnormal  18 151/71 -- 97 % None (Room air) --   02/13/22 1528 -- 103 18 126/74 -- 98 % None (Room air) --   02/13/22 1406 -- 105 16 115/58 -- -- -- --   02/13/22 1350 -- 98 18 100/64 -- 96 % None (Room air) Sitting   02/13/22 1345 -- 101 16 112/60 -- 98 % None (Room air) Sitting   02/13/22 1329 -- 166 Abnormal  18 -- -- -- -- --   02/13/22 1322 -- -- 18 125/77 -- 97 % None (Room air) Lying       Pertinent Labs/Diagnostic Test Results:   2/12 CT abd No evidence of bowel dilatation to suggest obstruction    No obvious mass is seen    New subtle area of diminished attenuation lower pole of the right kidney is difficult to characterize  Some mild inflammation is possible  Correlation with urinalysis may be helpful as well as follow-up examination in a few months time to exclude other   etiologies    2/13 PCXR No acute cardiopulmonary disease  2/13 EKG NSR   Results from last 7 days   Lab Units 02/13/22  1312   SARS-COV-2  Positive*     Results from last 7 days   Lab Units 02/14/22  0517 02/13/22  1312   WBC Thousand/uL 6 86 6 81   HEMOGLOBIN g/dL 14 8 17 1*   HEMATOCRIT % 43 0 49 4*   PLATELETS Thousands/uL 266 273   NEUTROS ABS Thousands/µL  --  4 83     Results from last 7 days   Lab Units 02/14/22  0517 02/13/22  1312   SODIUM mmol/L 138 140   POTASSIUM mmol/L 3 9 4 2   CHLORIDE mmol/L 105 105   CO2 mmol/L 26 26   ANION GAP mmol/L 7 9   BUN mg/dL 14 17   CREATININE mg/dL 0 99 1 08   EGFR ml/min/1 73sq m 82 74   CALCIUM mg/dL 8 4 8 6     Results from last 7 days   Lab Units 02/13/22  1312   AST U/L 28   ALT U/L 34   ALK PHOS U/L 75   TOTAL PROTEIN g/dL 7 3   ALBUMIN g/dL 3 2*   TOTAL BILIRUBIN mg/dL 0 61     Results from last 7 days   Lab Units 02/14/22  0517 02/13/22  1312   GLUCOSE RANDOM mg/dL 98 94     Results from last 7 days   Lab Units 02/13/22  1719 02/13/22  1519 02/13/22  1312   HS TNI 0HR ng/L  --   --  9   HS TNI 2HR ng/L  --  7  --    HSTNI D2 ng/L  --  -2  --    HS TNI 4HR ng/L 8  --   --    HSTNI D4 ng/L -1  --   --      Results from last 7 days   Lab Units 02/13/22  1312   D-DIMER QUANTITATIVE ug/ml FEU 0 66*     Results from last 7 days   Lab Units 02/13/22  1312   PROTIME seconds 13 5   INR  1 05   PTT seconds 28     Results from last 7 days   Lab Units 02/13/22  1312   TSH 3RD GENERATON uIU/mL 2 945       Results from last 7 days   Lab Units 02/13/22  1312   INFLUENZA A PCR  Negative   INFLUENZA B PCR  Negative   RSV PCR  Negative     ED Treatment:   Medication Administration from 02/13/2022 1200 to 02/13/2022 1632       Date/Time Order Dose Route Action     02/13/2022 1331 sodium chloride 0 9 % bolus 500 mL 500 mL Intravenous New Bag     02/13/2022 1336 diltiazem (CARDIZEM) injection 15 mg 15 mg Intravenous Given     02/13/2022 1548 diltiazem (CARDIZEM) 125 mg in sodium chloride 0 9 % 125 mL infusion 5 mg/hr Intravenous Rate/Dose Change     02/13/2022 1449 diltiazem (CARDIZEM) 125 mg in sodium chloride 0 9 % 125 mL infusion 2 5 mg/hr Intravenous New Bag        Past Medical History:   Diagnosis Date    Acute ulcer of stomach     GERD (gastroesophageal reflux disease)     Rib fractures      Present on Admission:  **None**      Admitting Diagnosis: Shortness of breath [R06 02]  Atrial fibrillation with RVR (HCC) [I48 91]  COVID [U07 1]  Age/Sex: 62 y o  male  Admission Orders:  Scheduled Medications:  aspirin, 81 mg, Oral, Daily  enoxaparin, 30 mg, Subcutaneous, Q12H [address]  metoprolol tartrate, 25 mg, Oral, Q12H [place]  pantoprazole, 40 mg, Oral, BID AC      Continuous IV Infusions:  diltiazem, 1-15 mg/hr, Intravenous, Titrated      PRN Meds:  acetaminophen, 650 mg, Oral, Q6H PRN  albuterol, 2 puff, Inhalation, Q4H PRN  calcium carbonate, 1,000 mg, Oral, Daily PRN  ondansetron, 4 mg, Intravenous, Q6H PRN    Tele   Keep O2 sat >90 %     IP CONSULT TO CARDIOLOGY    Network Utilization Review Department  ATTENTION: Please call with any questions or concerns to [phone] and carefully listen to the prompts so that you are directed to the right person  All voicemails are confidential   Meghna Dennis all requests for admission clinical reviews, approved or denied determinations and any other requests to dedicated fax number below belonging to the campus where the patient is receiving treatment   List of dedicated fax numbers for the Facilities:  [address] DENIALS (Administrative/Medical Necessity) [phone]   [address] (Maternity/NICU/Pediatrics) [phone] 5000 [facility] Megan Toro [phone]   [address] [phone]   [address] [place] [address] [facility] [id number] [place] [address] [address] [phone]   [address] [phone]

## 2022-02-14 NOTE — ASSESSMENT & PLAN NOTE
· Patient tested positive for COVID-19 in the emergency room  · He reports he also took a home test on Wednesday which was positive  · He also notes his entire family was positive in mid January, question at this is a continued positive test versus new infection  · He has remained stable on room air since admission   · No indication for treatment   · D-dimer 0 66, obtained CTA chest  · Continue supportive care

## 2022-02-14 NOTE — ASSESSMENT & PLAN NOTE
· Noted history of Eliud-en-Y gastric bypass  · Does have history of GI bleed with NSAID use after his surgery, notes no further bleeding  · Continue protonix

## 2022-02-14 NOTE — ASSESSMENT & PLAN NOTE
CT chest: 4 mm nodule in the left upper lobe new since March 2019 likely reactive intrapulmonary lymph node  Based on current Fleischner Society 2017 Guidelines on incidental pulmonary nodule, no routine follow-up is needed if the patient is low risk  If the patient is high risk, optional follow-up chest CT at 12 months can be considered    New mild mediastinal adenopathy likely reactive  This could be followed up with chest CT in 3-6 months if clinically warranted

## 2022-02-14 NOTE — DISCHARGE INSTRUCTIONS
You were started on a medication to control your hear rate: metoprolol tartrate 25 mg twice daily  If you experience any worsening shortness of breath, trouble breathing or chest pain please return to the nearest ED  You will need Cardiology follow up outpatient within 2 weeks  You should avoid caffeine beverages, energy drinks, amphetamines, cocaine,  alcohol and any other stimulants  It is advised that you quit all tobacco products  As we discussed, your CT chest noted incidental finding of a lung nodule in your left lung as well as mild mediastinal adenopathy  These can be benign findings however a repeat CT is recommended outpatient with your Primary care doctor in 3-6 months  Given your history of smoking the lung nodule will need to monitored with CT chest in 12 months as well  A-fib (Atrial Fibrillation)   WHAT YOU NEED TO KNOW:   A-fib may come and go, or it may be a long-term condition  A-fib can cause blood clots, stroke, or heart failure  These conditions may become life-threatening  It is important to treat and manage A-fib to help prevent a blood clot, stroke, or heart failure  DISCHARGE INSTRUCTIONS:   Call your local emergency number (911 in the [address] [place] [address],[address]) or have someone call if:   · You have any of the following signs of a heart attack:      ? Squeezing, pressure, or pain in your chest    ? You may  also have any of the following:     § Discomfort or pain in your back, neck, jaw, stomach, or arm    § Shortness of breath    § Nausea or vomiting    § Lightheadedness or a sudden cold sweat    · You have any of the following signs of a stroke:      ? Numbness or drooping on one side of your face     ? Weakness in an arm or leg    ? Confusion or difficulty speaking    ? Dizziness, a severe headache, or vision loss    Call your doctor or cardiologist if:   · Your arm or leg feels warm, tender, and painful  It may look swollen and red      · Your heart rate is more than 110 beats per minute  · You have new or worsening swelling in your legs, feet, ankles, or abdomen  · You are short of breath, even at rest     · You have questions or concerns about your condition or care  Medicines: You may need any of the following:  · Heart medicines  help control your heart rate or rhythm  You may need more than one medicine to treat your symptoms  · Blood thinners  help prevent blood clots  Clots can cause strokes, heart attacks, and death  The following are general safety guidelines to follow while you are taking a blood thinner:    ? Watch for bleeding and bruising while you take blood thinners  Watch for bleeding from your gums or nose  Watch for blood in your urine and bowel movements  Use a soft washcloth on your skin, and a soft toothbrush to brush your teeth  This can keep your skin and gums from bleeding  If you shave, use an electric shaver  Do not play contact sports  ? Tell your dentist and other healthcare providers that you take a blood thinner  Wear a bracelet or necklace that says you take this medicine  ? Do not start or stop any other medicines unless your healthcare provider tells you to  Many medicines cannot be used with blood thinners  ? Take your blood thinner exactly as prescribed by your healthcare provider  Do not skip does or take less than prescribed  Tell your provider right away if you forget to take your blood thinner, or if you take too much  ? Warfarin  is a blood thinner that you may need to take  The following are things you should be aware of if you take warfarin:     § Foods and medicines can affect the amount of warfarin in your blood  Do not make major changes to your diet while you take warfarin  Warfarin works best when you eat about the same amount of vitamin K every day  Vitamin K is found in green leafy vegetables and certain other foods  Ask for more information about what to eat when you are taking warfarin      § You will need to see your healthcare provider for follow-up visits when you are on warfarin  You will need regular blood tests  These tests are used to decide how much medicine you need  · Antiplatelets , such as aspirin, help prevent blood clots  Take your antiplatelet medicine exactly as directed  These medicines make it more likely for you to bleed or bruise  If you are told to take aspirin, do not take acetaminophen or ibuprofen instead  · Take your medicine as directed  Contact your healthcare provider if you think your medicine is not helping or if you have side effects  Tell him or her if you are allergic to any medicine  Keep a list of the medicines, vitamins, and herbs you take  Include the amounts, and when and why you take them  Bring the list or the pill bottles to follow-up visits  Carry your medicine list with you in case of an emergency  Manage A-fib:   · Know your target heart rate  Learn how to check your pulse and monitor your heart rate  · Know the risks if you choose to drink alcohol  Alcohol can increase your risk for A-fib or make A-fib harder to manage  Ask your healthcare provider if it is okay for you to drink any alcohol  He or she can help you set limits for the number of drinks you have in 24 hours and in a week  A drink of alcohol is 12 ounces of beer, 5 ounces of wine, or 1½ ounces of liquor  · Do not smoke  Nicotine can cause heart damage and make it more difficult to manage your A-fib  Do not use e-cigarettes or smokeless tobacco in place of cigarettes or to help you quit  They still contain nicotine  Ask your healthcare provider for information if you currently smoke and need help quitting  · Eat heart-healthy foods  Heart healthy foods will help keep your cholesterol low  These include fruits, vegetables, whole-grain breads, low-fat dairy products, beans, lean meats, and fish  Replace butter and margarine with heart-healthy oils such as olive oil and canola oil  · Maintain a healthy weight  Ask your healthcare provider what a healthy weight is for you  Ask him or her to help you create a safe weight loss plan if you are overweight  Even a small goal of a 10% weight loss can improve your heart health  · Get regular physical activity  Physical activity helps improve your heart health  Get at least 150 minutes of moderate aerobic physical activity each week  Your healthcare provider can help you create an activity plan  · Manage other health conditions  This includes high blood pressure or cholesterol, sleep apnea, diabetes, and other heart conditions  Take medicine as directed and follow your treatment plan  Your healthcare provider may need to change a medicine you are taking if it is causing your A-fib  Do not  stop taking any medicine unless directed by your provider  Follow up with your doctor or cardiologist as directed: You will need regular blood tests and monitoring  Write down your questions so you remember to ask them during your visits  © [employer] 2021 Information is for End User's use only and may not be sold, redistributed or otherwise used for commercial purposes  All illustrations and images included in CareNotes® are the copyrighted property of [employer]  or [facility] Felicitas Oquendo   The above information is an  only  It is not intended as medical advice for individual conditions or treatments  Talk to your doctor, nurse or pharmacist before following any medical regimen to see if it is safe and effective for you

## 2022-02-14 NOTE — INCIDENTAL FINDINGS
The following findings require follow up:  Radiographic finding   Finding and Follow up Required:    4 mm nodule in the left upper lobe new since March 2019 likely reactive intrapulmonary lymph node  Based on current Fleischner Society 2017 Guidelines on incidental pulmonary nodule, no routine follow-up is needed if the patient is low risk  If the   patient is high risk, optional follow-up chest CT at 12 months can be considered      New mild mediastinal adenopathy likely reactive  This could be followed up with chest CT in 3-6 months if clinically warranted       Follow up should be done within 1 week(s)    Please notify the following clinician to assist with the follow up:   Dr Mary Stoddard

## 2022-02-14 NOTE — DISCHARGE SUMMARY
[facility]  Discharge- Roel Burnett 1960, 62 y o  male MRN: [id number]  Unit/Bed#: [place] [address] -01 Encounter: [id number]  Primary Care Provider: Meseret Quinones DO   Date and time admitted to hospital: 2/13/2022 12:22 PM    * Atrial fibrillation with RVR [facility])  Assessment & Plan  · Hospital of palpitations for about 2 years intermittently  · Presented with acute palpitations symptoms, shortness of breath and dizziness  · Was found to be in atrial fibrillation rapid ventricular response with HR in 160's   · Placed on cardizem drip on admission and converted back to NSR   · remains in NSR with HR in 60's off cardizem drip   · Started on metoprolol tartrate 25 mg q 12 hours orally  · Cardiology consult  · Low Chadsvasc score, no need for anticoagulation   · TSH 2 9   · Given COVID positive status plan for outpatient echo once off isolation with cardiology outpatient     Abnormal CT of the chest  Assessment & Plan  CT chest: 4 mm nodule in the left upper lobe new since March 2019 likely reactive intrapulmonary lymph node  Based on current Fleischner Society 2017 Guidelines on incidental pulmonary nodule, no routine follow-up is needed if the patient is low risk  If the patient is high risk, optional follow-up chest CT at 12 months can be considered    New mild mediastinal adenopathy likely reactive  This could be followed up with chest CT in 3-6 months if clinically warranted    Given you have a history of smoking you should have outpatient surveillance of the lung nodule with repeat CT chest in 12 months as well   A message has been sent to your PCP for follow up   Above was discussed with patient during hospital course and he demonstrated understanding     COVID-19  Assessment & Plan  · Patient tested positive for COVID-19 in the emergency room  · He reports he also took a home test on Wednesday which was positive  · He also notes his entire family was positive in mid January, question at this is a continued positive test versus new infection  · He has remained stable on room air since admission   · No indication for treatment   · D-dimer 0 66, obtained CTA chest negative for PE   · Continue supportive care  · Return to the hospital with any shortness of breath, difficulty breathing        History of Eliud-en-Y gastric bypass  Assessment & Plan  · Noted history of Eliud-en-Y gastric bypass  · Does have history of GI bleed with NSAID use after his surgery, notes no further bleeding  · Continue protonix      Medical Problems             Resolved Problems  Date Reviewed: 2/14/2022          Resolved    Elevated hemoglobin ([facility] [address] ) 2/14/2022     Resolved by  Damon Schilder, PA-C              Discharging Physician / Practitioner: Damon Schilder, PA-C  PCP: Nasrin Rosales DO  Admission Date:   Admission Orders (From admission, onward)     Ordered        02/13/22 1454  INPATIENT ADMISSION  Once                      Discharge Date: 02/14/22    Consultations During Hospital Stay:  · Cardiology     Procedures Performed:   · none    Significant Findings / Test Results:   · CTA chest:   FINDINGS:     PULMONARY ARTERIAL TREE:  No pulmonary embolus is seen       LUNGS:  Multi lobar peripheral predominant groundglass infiltrates more prominent in the lower lobes  No cavitary airspace disease  New 4 mm nodule posterior left upper lobe likely intrapulmonary lymph node associated with an accessory fissure image 57   series 3 and image 57 series 603      Central airways are clear      PLEURA:  Unremarkable      HEART/GREAT VESSELS:  Unremarkable for patient's age   No thoracic aortic aneurysm      MEDIASTINUM AND TONY:  New enlarged right subcarinal lymph node measuring 1 4 cm short axis and only prominent subcentimeter short axis mediastinal and left hilar lymph nodes likely reactive      CHEST WALL AND LOWER NECK:   Stable minimal bilateral gynecomastia, left greater than right      VISUALIZED STRUCTURES IN THE UPPER ABDOMEN:  Post Eliud-en-Y gastric bypass  Probable cholelithiasis      OSSEOUS STRUCTURES:  No acute fracture or osseous destructive lesion identified  Mild degenerative changes of the spine  Old healed left rib fractures      IMPRESSION:     No pulmonary embolus      Bilateral multi lobar peripheral predominant infiltrates  In the setting of clinically suspected/proven COVID-19, the above lung parenchymal findings on CT indicate high confidence level for COVID-19      4 mm nodule in the left upper lobe new since March 2019 likely reactive intrapulmonary lymph node  Based on current Fleischner Society 2017 Guidelines on incidental pulmonary nodule, no routine follow-up is needed if the patient is low risk  If the   patient is high risk, optional follow-up chest CT at 12 months can be considered      New mild mediastinal adenopathy likely reactive  This could be followed up with chest CT in 3-6 months if clinically warranted      This study demonstrates a significant  finding and was documented as such in [facility] for liaison and referring practitioner notification  · CXR: no acute cardiopulmonary disease   · TSH 2 9     Incidental Findings:   CT chest:   4 mm nodule in the left upper lobe new since March 2019 likely reactive intrapulmonary lymph node  Based on current Fleischner Society 2017 Guidelines on incidental pulmonary nodule, no routine follow-up is needed if the patient is low risk  If the   patient is high risk, optional follow-up chest CT at 12 months can be considered      New mild mediastinal adenopathy likely reactive  This could be followed up with chest CT in 3-6 months if clinically warranted      Test Results Pending at Discharge (will require follow up):   · Outpatient echo with cardiology      Outpatient Tests Requested:  · Cardiology   · PCP     Complications:      Reason for Admission: Atrial Fibrillation with RVR     Hospital Course:   Shantel Hall is a 62 y o  male patient who originally presented to the hospital on 2/13/2022 due to shortness of breath, palpitations, dizziness  He reports 2 years of intermittent palpitations  He presented in Atrial Fibrillation with RVR, this is a new formal diagnosis for the patient  Hi heart rate was in the 160's on arrival therefore he was placed on cardizem gtt  He has spontaneous conversion back to normal sinus rhythm  He was started on metoprolol tartrate 25 mg q 12 hours  He will need an echocardiogram outpatient with Cardiology once off isolation from [facility]  His chadsvasc score <1 therefore anticoagulation was not indicated especially in setting of history of GI bleeding  He will need outpatient follow up with cardiology in the office     He was noted to be COVID positive on admission with exposure at home  He was stable on room air during his hospital course and did not require any specific medical treatment  He should continue supportive care  He should return to the hospital with any worsening or increased shortness of breath or difficulty breathing  Patient d-dimer was elevated at 0 66 on admission  CTA was performed without evidence for PE  It did note incidental finding of "4 mm nodule in the left upper lobe new since March 2019 likely reactive intrapulmonary lymph node  New mild mediastinal adenopathy likely reactive  This could be followed up with chest CT in 3-6 months if clinically warranted  " A message was sen to PCP upon discharge for appropriate follow up  Patient was notified of the incidental findings as noted above  Patient is being discharged earlier than originally expected as he has converted to normal sinus rhythm and is cleared from Cardiology standpoint  Please see above list of diagnoses and related plan for additional information  Condition at Discharge: stable    Discharge Day Visit / Exam:   Subjective:  Doing well  No acute complaints  Patient is resting in bed  No acute complaitns   No further palitations or SOB  No chest pain  He is on room air  No leg swelling or pain  Vitals: Blood Pressure: 122/77 (02/14/22 0748)  Pulse: 69 (02/14/22 0748)  Temperature: (!) 97 °F (36 1 °C) (02/14/22 0748)  Temp Source: Oral (02/14/22 0748)  Respirations: 16 (02/14/22 0329)  Weight - Scale: 125 kg (276 lb 0 3 oz) (02/13/22 1207)  SpO2: 95 % (02/14/22 0800)  Exam:   Physical Exam   Vitals and nursing note reviewed  Constitutional:       General: He is not in acute distress  Appearance: He is not ill-appearing or toxic-appearing  HENT:      Head: Normocephalic  Eyes:      Conjunctiva/sclera: Conjunctivae normal    Cardiovascular:      Rate and Rhythm: Normal rate and regular rhythm  Pulmonary:      Effort: Pulmonary effort is normal  No respiratory distress  Breath sounds: Normal breath sounds  No wheezing or rales  Comments: Room air   Abdominal:      General: Bowel sounds are normal       Palpations: Abdomen is soft  Musculoskeletal:         General: No tenderness  Left lower leg: No edema  Skin:     General: Skin is warm  Neurological:      Mental Status: He is alert  Mental status is at baseline  Psychiatric:         Mood and Affect: Mood normal       Comments: Very pleasant      Called wife twice today, no answer, left voice message       Discharge instructions/Information to patient and family:   See after visit summary for information provided to patient and family  Provisions for Follow-Up Care:  See after visit summary for information related to follow-up care and any pertinent home health orders  Disposition:   Home    Planned Readmission: none     Discharge Statement:  I spent 38 minutes discharging the patient  This time was spent on the day of discharge  I had direct contact with the patient on the day of discharge   Greater than 50% of the total time was spent examining patient, answering all patient questions, arranging and discussing plan of care with patient as well as directly providing post-discharge instructions  Additional time then spent on discharge activities  Discharge Medications:  See after visit summary for reconciled discharge medications provided to patient and/or family        **Please Note: This note may have been constructed using a voice recognition system**

## 2022-02-14 NOTE — PLAN OF CARE
Problem: CARDIOVASCULAR - ADULT  Goal: Maintains optimal cardiac output and hemodynamic stability  Description: INTERVENTIONS:  - Monitor I/O, vital signs and rhythm  - Monitor for S/S and trends of decreased cardiac output  - Administer and titrate ordered vasoactive medications to optimize hemodynamic stability  - Assess quality of pulses, skin color and temperature  - Assess for signs of decreased coronary artery perfusion  - Instruct patient to report change in severity of symptoms  2/14/2022 1720 by Yajaira Santiago RN  Outcome: Adequate for Discharge  2/14/2022 1122 by Yajaira Santiago RN  Outcome: Progressing  Goal: Absence of cardiac dysrhythmias or at baseline rhythm  Description: INTERVENTIONS:  - Continuous cardiac monitoring, vital signs, obtain 12 lead EKG if ordered  - Administer antiarrhythmic and heart rate control medications as ordered  - Monitor electrolytes and administer replacement therapy as ordered  2/14/2022 1720 by Yajaira Santiago RN  Outcome: Adequate for Discharge  2/14/2022 1122 by Yajaira Santiago RN  Outcome: Progressing     Problem: RESPIRATORY - ADULT  Goal: Achieves optimal ventilation and oxygenation  Description: INTERVENTIONS:  - Assess for changes in respiratory status  - Assess for changes in mentation and behavior  - Position to facilitate oxygenation and minimize respiratory effort  - Oxygen administered by appropriate delivery if ordered  - Initiate smoking cessation education as indicated  - Encourage broncho-pulmonary hygiene including cough, deep breathe, Incentive Spirometry  - Assess the need for suctioning and aspirate as needed  - Assess and instruct to report SOB or any respiratory difficulty  - Respiratory Therapy support as indicated  2/14/2022 1720 by Yajaira Santiago RN  Outcome: Adequate for Discharge  2/14/2022 1122 by Yajaira Santiago RN  Outcome: Progressing     Problem: PAIN - ADULT  Goal: Verbalizes/displays adequate comfort level or baseline comfort level  Description: Interventions:  - Encourage patient to monitor pain and request assistance  - Assess pain using appropriate pain scale  - Administer analgesics based on type and severity of pain and evaluate response  - Implement non-pharmacological measures as appropriate and evaluate response  - Consider cultural and social influences on pain and pain management  - Notify physician/advanced practitioner if interventions unsuccessful or patient reports new pain  2/14/2022 1720 by Mt Tinoco RN  Outcome: Adequate for Discharge  2/14/2022 1122 by tM Tinoco RN  Outcome: Progressing     Problem: DISCHARGE PLANNING  Goal: Discharge to home or other facility with appropriate resources  Description: INTERVENTIONS:  - Identify barriers to discharge w/patient and caregiver  - Arrange for needed discharge resources and transportation as appropriate  - Identify discharge learning needs (meds, wound care, etc )  - Arrange for interpretive services to assist at discharge as needed  - Refer to Case Management Department for coordinating discharge planning if the patient needs post-hospital services based on physician/advanced practitioner order or complex needs related to functional status, cognitive ability, or social support system  2/14/2022 1720 by Mt Tinoco RN  Outcome: Adequate for Discharge  2/14/2022 1122 by Mt Tinoco RN  Outcome: Progressing     Problem: Knowledge Deficit  Goal: Patient/family/caregiver demonstrates understanding of disease process, treatment plan, medications, and discharge instructions  Description: Complete learning assessment and assess knowledge base    Interventions:  - Provide teaching at level of understanding  - Provide teaching via preferred learning methods  2/14/2022 1720 by Mt Tinoco RN  Outcome: Adequate for Discharge  2/14/2022 1122 by Mt Tinoco RN  Outcome: Progressing

## 2022-02-14 NOTE — ASSESSMENT & PLAN NOTE
· Hospital [facility]ations for about 2 years intermittently  · Presented with acute palpitations symptoms, shortness of breath and dizziness  · Was found to be in atrial fibrillation rapid ventricular response with HR in 160's   · Placed on cardizem drip on admission   · Is now in NSR with HR in 60's off cardizem drip   · Started on metoprolol tartrate 25 mg q 12 hours orally  · Cardiology consult  · TSH 2 9   · Given COVID positive status plan for outpatient echo once off isolation   · Chads Vasc score of 0  · Started on aspirin 81 mg daily

## 2022-02-14 NOTE — NURSING NOTE
Patient discharged to home  Discharge instructions were reviewed with patient  Patient was instructed to follow up with PCP and cardiology in 1 week  IV was removed and patient was ambulatory @ time of d/c

## 2022-02-14 NOTE — ASSESSMENT & PLAN NOTE
· Hospital of pal[facility]ations for about 2 years intermittently  · Presented with acute palpitations symptoms, shortness of breath and dizziness  · Was found to be in atrial fibrillation rapid ventricular response with HR in 160's   · Placed on cardizem drip on admission and converted back to NSR   · remains in NSR with HR in 60's off cardizem drip   · Started on metoprolol tartrate 25 mg q 12 hours orally  · Cardiology consult  · Low Chadsvasc score, no need for anticoagulation   · TSH 2 9   · Given COVID positive status plan for outpatient echo once off isolation with cardiology outpatient

## 2022-02-14 NOTE — PLAN OF CARE
Problem: CARDIOVASCULAR - ADULT  Goal: Maintains optimal cardiac output and hemodynamic stability  Description: INTERVENTIONS:  - Monitor I/O, vital signs and rhythm  - Monitor for S/S and trends of decreased cardiac output  - Administer and titrate ordered vasoactive medications to optimize hemodynamic stability  - Assess quality of pulses, skin color and temperature  - Assess for signs of decreased coronary artery perfusion  - Instruct patient to report change in severity of symptoms  Outcome: Progressing  Goal: Absence of cardiac dysrhythmias or at baseline rhythm  Description: INTERVENTIONS:  - Continuous cardiac monitoring, vital signs, obtain 12 lead EKG if ordered  - Administer antiarrhythmic and heart rate control medications as ordered  - Monitor electrolytes and administer replacement therapy as ordered  Outcome: Progressing     Problem: PAIN - ADULT  Goal: Verbalizes/displays adequate comfort level or baseline comfort level  Description: Interventions:  - Encourage patient to monitor pain and request assistance  - Assess pain using appropriate pain scale  - Administer analgesics based on type and severity of pain and evaluate response  - Implement non-pharmacological measures as appropriate and evaluate response  - Consider cultural and social influences on pain and pain management  - Notify physician/advanced practitioner if interventions unsuccessful or patient reports new pain  Outcome: Progressing     Problem: DISCHARGE PLANNING  Goal: Discharge to home or other facility with appropriate resources  Description: INTERVENTIONS:  - Identify barriers to discharge w/patient and caregiver  - Arrange for needed discharge resources and transportation as appropriate  - Identify discharge learning needs (meds, wound care, etc )  - Arrange for interpretive services to assist at discharge as needed  - Refer to Case Management Department for coordinating discharge planning if the patient needs post-hospital services based on physician/advanced practitioner order or complex needs related to functional status, cognitive ability, or social support system  Outcome: Progressing     Problem: Knowledge Deficit  Goal: Patient/family/caregiver demonstrates understanding of disease process, treatment plan, medications, and discharge instructions  Description: Complete learning assessment and assess knowledge base    Interventions:  - Provide teaching at level of understanding  - Provide teaching via preferred learning methods  Outcome: Progressing     Problem: RESPIRATORY - ADULT  Goal: Achieves optimal ventilation and oxygenation  Description: INTERVENTIONS:  - Assess for changes in respiratory status  - Assess for changes in mentation and behavior  - Position to facilitate oxygenation and minimize respiratory effort  - Oxygen administered by appropriate delivery if ordered  - Initiate smoking cessation education as indicated  - Encourage broncho-pulmonary hygiene including cough, deep breathe, Incentive Spirometry  - Assess the need for suctioning and aspirate as needed  - Assess and instruct to report SOB or any respiratory difficulty  - Respiratory Therapy support as indicated  Outcome: Progressing

## 2022-02-14 NOTE — PROGRESS NOTES
[facility]  Progress Note - Amanda Duran 1960, 62 y o  male MRN: [id number]  Unit/Bed#: [place] [address] -01 Encounter: [id number]  Primary Care Provider: Zeke Pineda DO   Date and time admitted to hospital: 2/13/2022 12:22 PM    * Atrial fibrillation with RVR [facility])  Assessment & Plan  · Hospital of palpitations for about 2 years intermittently  · Presented with acute palpitations symptoms, shortness of breath and dizziness  · Was found to be in atrial fibrillation rapid ventricular response with HR in 160's   · Placed on cardizem drip on admission   · Is now in NSR with HR in 60's off cardizem drip   · Started on metoprolol tartrate 25 mg q 12 hours orally  · Cardiology consult  · TSH 2 9   · Given COVID positive status defer echo here for outpatient once off isolation to cardiology team   · Chads Vasc score of 0  · Started on aspirin 81 mg daily   · Await cards recs,   · Will need outpatient Cardiology follow, possible discharge later today versus tomorrow   · Follows up CTA chest     COVID-19  Assessment & Plan  · Patient tested positive for COVID-19 in the emergency room  · He reports he also took a home test on Wednesday which was positive  · He also notes his entire family was positive in mid January, question at this is a continued positive test versus new infection  · He has remained stable on room air since admission   · No indication for treatment   · D-dimer 0 66, obtained CTA chest  · Continue supportive care       History of Eliud-en-Y gastric bypass  Assessment & Plan  · Noted history of Eliud-en-Y gastric bypass  · Does have history of GI bleed with NSAID use after his surgery, notes no further bleeding and denies NSAID use      VTE Pharmacologic Prophylaxis: VTE Score: 3 Moderate Risk (Score 3-4) - Pharmacological DVT Prophylaxis Ordered: enoxaparin (Lovenox)  Patient Centered Rounds: I performed bedside rounds with nursing staff today      Education and Discussions with Family / Patient: Attempted to update  (wife) via phone  Left voicemail  called and left voice message at 11:33 am     Time Spent for Care: 20 minutes  More than 50% of total time spent on counseling and coordination of care as described above  Current Length of Stay: 1 day(s)  Current Patient Status: Inpatient   Certification Statement: The patient will continue to require additional inpatient hospital stay due to Afib, COVID  Discharge Plan: Anticipate discharge tomorrow to home  Code Status: Level 1 - Full Code    Subjective:   Patient is resting in bed  No acute complaitns  No further palitations or SOB  No chest pain  He is on room air  No leg swelling or pain  Objective:     Vitals:   Temp (24hrs), Av 7 °F (36 5 °C), Min:97 °F (36 1 °C), Max:98 6 °F (37 °C)    Temp:  [97 °F (36 1 °C)-98 6 °F (37 °C)] 97 °F (36 1 °C)  HR:  [] 69  Resp:  [16-19] 16  BP: (100-151)/(58-87) 122/77  SpO2:  [94 %-99 %] 95 %  Body mass index is 34 5 kg/m²  Input and Output Summary (last 24 hours): Intake/Output Summary (Last 24 hours) at 2022 1130  Last data filed at 2022 1454  Gross per 24 hour   Intake 500 ml   Output --   Net 500 ml       Physical Exam:   Physical Exam  Vitals and nursing note reviewed  Constitutional:       General: He is not in acute distress  Appearance: He is not ill-appearing or toxic-appearing  HENT:      Head: Normocephalic  Eyes:      Conjunctiva/sclera: Conjunctivae normal    Cardiovascular:      Rate and Rhythm: Normal rate and regular rhythm  Pulmonary:      Effort: Pulmonary effort is normal  No respiratory distress  Breath sounds: Normal breath sounds  No wheezing or rales  Comments: Room air   Abdominal:      General: Bowel sounds are normal       Palpations: Abdomen is soft  Musculoskeletal:         General: No tenderness  Left lower leg: No edema  Skin:     General: Skin is warm     Neurological: Mental Status: He is alert  Mental status is at baseline  Psychiatric:         Mood and Affect: Mood normal       Comments: Very pleasant             Additional Data:     Labs:  Results from last 7 days   Lab Units 02/14/22 0517 02/13/22  1312 02/13/22  1312   WBC Thousand/uL 6 86   < > 6 81   HEMOGLOBIN g/dL 14 8   < > 17 1*   HEMATOCRIT % 43 0   < > 49 4*   PLATELETS Thousands/uL 266   < > 273   NEUTROS PCT %  --   --  71   LYMPHS PCT %  --   --  18   MONOS PCT %  --   --  10   EOS PCT %  --   --  1    < > = values in this interval not displayed  Results from last 7 days   Lab Units 02/14/22 0517 02/13/22  1312 02/13/22  1312   SODIUM mmol/L 138   < > 140   POTASSIUM mmol/L 3 9   < > 4 2   CHLORIDE mmol/L 105   < > 105   CO2 mmol/L 26   < > 26   BUN mg/dL 14   < > 17   CREATININE mg/dL 0 99   < > 1 08   ANION GAP mmol/L 7   < > 9   CALCIUM mg/dL 8 4   < > 8 6   ALBUMIN g/dL  --   --  3 2*   TOTAL BILIRUBIN mg/dL  --   --  0 61   ALK PHOS U/L  --   --  75   ALT U/L  --   --  34   AST U/L  --   --  28   GLUCOSE RANDOM mg/dL 98   < > 94    < > = values in this interval not displayed       Results from last 7 days   Lab Units 02/13/22  1312   INR  1 05                   Lines/Drains:  Invasive Devices  Report    Peripheral Intravenous Line            Peripheral IV 02/13/22 Distal;Left;Upper;Ventral (anterior) Arm <1 day                  Telemetry:  Telemetry Orders (From admission, onward)             48 Hour Telemetry Monitoring  Continuous x 48 hours        References:    Telemetry Guidelines   Question:  Reason for 48 Hour Telemetry  Answer:  Arrhythmias Requiring Medical Therapy (eg  SVT, Vtach/fib, Bradycardia, Uncontrolled A-fib)                 Telemetry Reviewed: Normal Sinus Rhythm               Imaging: Reviewed radiology reports from this admission including: chest xray    Recent Cultures (last 7 days):         Last 24 Hours Medication List:   Current Facility-Administered Medications   Medication Dose Route Frequency Provider Last Rate    acetaminophen  650 mg Oral Q6H PRN Francies Quick, JENIFER      albuterol  2 puff Inhalation Q4H PRN Francies QuickJENIFER      aspirin  81 mg Oral Daily [place] Gabrielle, DO      calcium carbonate  1,000 mg Oral Daily PRN Francies Quick, JENIFER      diltiazem  1-15 mg/hr Intravenous Titrated Jalen Arias PA-C Stopped (02/14/22 0322)    enoxaparin  30 mg Subcutaneous Q12H [address] Francies QuickJENIFER      metoprolol tartrate  25 mg Oral Q12H [address] Francies QuickJENIFER      ondansetron  4 mg Intravenous Q6H PRN Jalen QuickJENIFER      pantoprazole  40 mg Oral BID AC Jalen Arias PA-C          Today, Patient Was Seen By: Ximena Lora PA-C    **Please Note: This note may have been constructed using a voice recognition system  **

## 2022-02-15 ENCOUNTER — TRANSITIONAL CARE MANAGEMENT (OUTPATIENT)
Dept: FAMILY MEDICINE CLINIC | Facility: CLINIC | Age: 62
End: 2022-02-15

## 2022-02-21 ENCOUNTER — RA CDI HCC (OUTPATIENT)
Dept: OTHER | Facility: HOSPITAL | Age: 62
End: 2022-02-21

## 2022-02-21 NOTE — PROGRESS NOTES
[facility]  coding opportunities       Chart reviewed, no opportunity found: CHART REVIEWED, NO OPPORTUNITY FOUND                        Patients insurance company: [employer] (Medicare Advantage and Commercial)

## 2022-02-24 ENCOUNTER — TELEPHONE (OUTPATIENT)
Dept: CARDIOLOGY CLINIC | Facility: CLINIC | Age: 62
End: 2022-02-24

## 2022-02-24 ENCOUNTER — APPOINTMENT (EMERGENCY)
Dept: RADIOLOGY | Facility: HOSPITAL | Age: 62
End: 2022-02-24
Payer: COMMERCIAL

## 2022-02-24 ENCOUNTER — OFFICE VISIT (OUTPATIENT)
Dept: CARDIOLOGY CLINIC | Facility: CLINIC | Age: 62
End: 2022-02-24
Payer: COMMERCIAL

## 2022-02-24 ENCOUNTER — HOSPITAL ENCOUNTER (EMERGENCY)
Facility: HOSPITAL | Age: 62
Discharge: HOME | End: 2022-02-24
Attending: EMERGENCY MEDICINE | Admitting: EMERGENCY MEDICINE
Payer: COMMERCIAL

## 2022-02-24 VITALS
OXYGEN SATURATION: 99 % | RESPIRATION RATE: 18 BRPM | DIASTOLIC BLOOD PRESSURE: 76 MMHG | HEART RATE: 90 BPM | TEMPERATURE: 97.9 F | SYSTOLIC BLOOD PRESSURE: 120 MMHG

## 2022-02-24 VITALS
HEART RATE: 121 BPM | BODY MASS INDEX: 35 KG/M2 | DIASTOLIC BLOOD PRESSURE: 60 MMHG | WEIGHT: 280 LBS | SYSTOLIC BLOOD PRESSURE: 90 MMHG

## 2022-02-24 DIAGNOSIS — I48.91 ATRIAL FIBRILLATION WITH RVR (HCC): ICD-10-CM

## 2022-02-24 DIAGNOSIS — I48.91 ATRIAL FIBRILLATION WITH RAPID VENTRICULAR RESPONSE (HCC): Primary | ICD-10-CM

## 2022-02-24 LAB
ANION GAP SERPL CALCULATED.3IONS-SCNC: 8 MMOL/L (ref 4–13)
ATRIAL RATE: 234 BPM
BASOPHILS # BLD AUTO: 0.03 THOUSANDS/ΜL (ref 0–0.1)
BASOPHILS NFR BLD AUTO: 0 % (ref 0–1)
BUN SERPL-MCNC: 13 MG/DL (ref 5–25)
CALCIUM SERPL-MCNC: 8.5 MG/DL (ref 8.3–10.1)
CARDIAC TROPONIN I PNL SERPL HS: 3 NG/L
CHLORIDE SERPL-SCNC: 105 MMOL/L (ref 100–108)
CO2 SERPL-SCNC: 26 MMOL/L (ref 21–32)
CREAT SERPL-MCNC: 0.96 MG/DL (ref 0.6–1.3)
EOSINOPHIL # BLD AUTO: 0.14 THOUSAND/ΜL (ref 0–0.61)
EOSINOPHIL NFR BLD AUTO: 2 % (ref 0–6)
ERYTHROCYTE [DISTWIDTH] IN BLOOD BY AUTOMATED COUNT: 11.9 % (ref 11.6–15.1)
GFR SERPL CREATININE-BSD FRML MDRD: 85 ML/MIN/1.73SQ M
GLUCOSE SERPL-MCNC: 91 MG/DL (ref 65–140)
HCT VFR BLD AUTO: 42.4 % (ref 36.5–49.3)
HGB BLD-MCNC: 15.1 G/DL (ref 12–17)
IMM GRANULOCYTES # BLD AUTO: 0.03 THOUSAND/UL (ref 0–0.2)
IMM GRANULOCYTES NFR BLD AUTO: 0 % (ref 0–2)
LYMPHOCYTES # BLD AUTO: 1.57 THOUSANDS/ΜL (ref 0.6–4.47)
LYMPHOCYTES NFR BLD AUTO: 21 % (ref 14–44)
MCH RBC QN AUTO: 32.6 PG (ref 26.8–34.3)
MCHC RBC AUTO-ENTMCNC: 35.6 G/DL (ref 31.4–37.4)
MCV RBC AUTO: 92 FL (ref 82–98)
MONOCYTES # BLD AUTO: 0.62 THOUSAND/ΜL (ref 0.17–1.22)
MONOCYTES NFR BLD AUTO: 8 % (ref 4–12)
NEUTROPHILS # BLD AUTO: 5.15 THOUSANDS/ΜL (ref 1.85–7.62)
NEUTS SEG NFR BLD AUTO: 69 % (ref 43–75)
NRBC BLD AUTO-RTO: 0 /100 WBCS
PLATELET # BLD AUTO: 276 THOUSANDS/UL (ref 149–390)
PMV BLD AUTO: 9.9 FL (ref 8.9–12.7)
POTASSIUM SERPL-SCNC: 4 MMOL/L (ref 3.5–5.3)
QRS AXIS: 46 DEGREES
QRSD INTERVAL: 82 MS
QT INTERVAL: 292 MS
QTC INTERVAL: 405 MS
RBC # BLD AUTO: 4.63 MILLION/UL (ref 3.88–5.62)
SODIUM SERPL-SCNC: 139 MMOL/L (ref 136–145)
T WAVE AXIS: 117 DEGREES
VENTRICULAR RATE: 116 BPM
WBC # BLD AUTO: 7.54 THOUSAND/UL (ref 4.31–10.16)

## 2022-02-24 PROCEDURE — 85025 COMPLETE CBC W/AUTO DIFF WBC: CPT | Performed by: EMERGENCY MEDICINE

## 2022-02-24 PROCEDURE — 99285 EMERGENCY DEPT VISIT HI MDM: CPT

## 2022-02-24 PROCEDURE — 84484 ASSAY OF TROPONIN QUANT: CPT | Performed by: EMERGENCY MEDICINE

## 2022-02-24 PROCEDURE — 93010 ELECTROCARDIOGRAM REPORT: CPT | Performed by: INTERNAL MEDICINE

## 2022-02-24 PROCEDURE — 93000 ELECTROCARDIOGRAM COMPLETE: CPT | Performed by: INTERNAL MEDICINE

## 2022-02-24 PROCEDURE — 99291 CRITICAL CARE FIRST HOUR: CPT | Performed by: EMERGENCY MEDICINE

## 2022-02-24 PROCEDURE — 99214 OFFICE O/P EST MOD 30 MIN: CPT | Performed by: INTERNAL MEDICINE

## 2022-02-24 PROCEDURE — 1111F DSCHRG MED/CURRENT MED MERGE: CPT | Performed by: INTERNAL MEDICINE

## 2022-02-24 PROCEDURE — 80048 BASIC METABOLIC PNL TOTAL CA: CPT | Performed by: EMERGENCY MEDICINE

## 2022-02-24 PROCEDURE — 96376 TX/PRO/DX INJ SAME DRUG ADON: CPT

## 2022-02-24 PROCEDURE — 93005 ELECTROCARDIOGRAM TRACING: CPT

## 2022-02-24 PROCEDURE — 71045 X-RAY EXAM CHEST 1 VIEW: CPT

## 2022-02-24 PROCEDURE — 96374 THER/PROPH/DIAG INJ IV PUSH: CPT

## 2022-02-24 PROCEDURE — 36415 COLL VENOUS BLD VENIPUNCTURE: CPT | Performed by: EMERGENCY MEDICINE

## 2022-02-24 RX ORDER — METOPROLOL TARTRATE 5 MG/5ML
5 INJECTION INTRAVENOUS ONCE
Status: COMPLETED | OUTPATIENT
Start: 2022-02-24 | End: 2022-02-24

## 2022-02-24 RX ORDER — SODIUM CHLORIDE 9 MG/ML
3 INJECTION INTRAVENOUS
Status: DISCONTINUED | OUTPATIENT
Start: 2022-02-24 | End: 2022-02-24 | Stop reason: HOSPADM

## 2022-02-24 RX ADMIN — METOROPROLOL TARTRATE 5 MG: 5 INJECTION, SOLUTION INTRAVENOUS at 15:41

## 2022-02-24 RX ADMIN — METOPROLOL TARTRATE 25 MG: 25 TABLET, FILM COATED ORAL at 15:34

## 2022-02-24 RX ADMIN — METOROPROLOL TARTRATE 5 MG: 5 INJECTION, SOLUTION INTRAVENOUS at 16:36

## 2022-02-24 NOTE — DISCHARGE INSTRUCTIONS
A-fib (Atrial Fibrillation)   Call 911 for any of the following: You have any of the following signs of a heart attack:      Squeezing, pressure, or pain in your chest that lasts longer than 5 minutes or returns    Discomfort or pain in your back, neck, jaw, stomach, or arm     Trouble breathing    Nausea or vomiting    Lightheadedness or a sudden cold sweat, especially with chest pain or trouble breathing    You have any of the following signs of a stroke:      Numbness or drooping on one side of your face     Weakness in an arm or leg    Confusion or difficulty speaking    Dizziness, a severe headache, or vision loss  You have questions or concerns about your condition or care

## 2022-02-24 NOTE — TELEPHONE ENCOUNTER
Left message patient voice mail to move appt up from 4:20pm to 2PM   Requested patient to return call

## 2022-02-24 NOTE — ED PROCEDURE NOTE
PROCEDURE  CriticalCare Time  Performed by: Rebecca Estrada DO  Authorized by:  Rebecca Estrada DO     Critical care provider statement:     Critical care time (minutes):  32    Critical care was necessary to treat or prevent imminent or life-threatening deterioration of the following conditions:  Cardiac failure (atrial fibrillation with RVR)    Critical care was time spent personally by me on the following activities:  Blood draw for specimens, obtaining history from patient or surrogate, development of treatment plan with patient or surrogate, evaluation of patient's response to treatment, examination of patient, review of old charts, re-evaluation of patient's condition, ordering and review of radiographic studies, ordering and review of laboratory studies and ordering and performing treatments and interventions    I assumed direction of critical care for this patient from another provider in my specialty: no           Rebecca Estrada DO  02/24/22 7936

## 2022-02-24 NOTE — ED PROVIDER NOTES
Emergency Department Note- Dejan Medrano 62 y o  male MRN: [id number]    Unit/Bed#: ED 30 Encounter: [id number]        History of Present Illness     Patient is a 81-year-old male, hospitalized February 13th through 14th for AFib with RVR  Has a history of paroxysmal atrial fibrillation, found to be in AFib with RVR, treated with IV Cardizem bolus and drip, subsequently converted to sinus rhythm  Patient was COVID positive, previous home test were positive, said the entire family was positive in mid January, unclear whether this was a new acute of infection verses residual positive test result  CTA of the chest was negative for pulmonary embolism  Was discharged home on metoprolol 25 mg b i d  Orally  Not felt to require anticoagulation given hx of prior GI bleed and CHADSVASC 1   Today was seen for follow-up in the office by cardiology GIDEON, noted to be again in AFib with RVR,blood pressure of 90/60 so was sent to the ED  Patient says he feels about the same as he felt when he went to the emergency department the 1st time on February 13th, he can occasionally feel some palpitations, currently does not feel dizzy or lightheaded  He is able stand and ambulate, was able to drive himself to the urgent care  He says that starting about noon he noticed a feeling of a little heartburn and indigestion, it has not been there before, feels like palpitations somewhat  It is not pleuritic in nature, not knife-like, ripping, or tearing  There is no orthopnea  No chest pain, no diaphoresis or nausea      REVIEW OF SYSTEMS     Constitutional:  No recent weight  gains or losses   Eyes:  No visual changes   ENT:  No tinnitus or hearing changes   Cardiac: As per HPI   Respiratory:  No cough or shortness of breath   Abdominal:  No nausea or vomiting   Urinary: No dysuria or hematuria   Hematologic: No easy bruising or bleeding   Skin: No rash   Musculoskeletal: No aches or pains   Neurologic: No weakness or sensory changes   Psychiatric: No mood changes      Historical Information   Past Medical History:   Diagnosis Date    Acute ulcer of stomach     GERD (gastroesophageal reflux disease)     Rib fractures      Past Surgical History:   Procedure Laterality Date    COLONOSCOPY      ESOPHAGOGASTRODUODENOSCOPY      GASTRIC BYPASS      MANDIBLE FRACTURE SURGERY       Social History   Social History     Substance and Sexual Activity   Alcohol Use Yes    Alcohol/week: 7 0 standard drinks    Types: 7 Cans of beer per week     Social History     Substance and Sexual Activity   Drug Use No     Social History     Tobacco Use   Smoking Status Former Smoker    Types: Cigars   Smokeless Tobacco Former User    Types: Chew     Family History:   Family History   Problem Relation Age of Onset    Seizures Father        MEDICATIONS:  No current facility-administered medications on file prior to encounter       Current Outpatient Medications on File Prior to Encounter   Medication Sig Dispense Refill    acetaminophen (TYLENOL) 325 mg tablet Take 2 tablets (650 mg total) by mouth every 6 (six) hours as needed for mild pain 30 tablet 0    albuterol (Proventil HFA) 90 mcg/act inhaler Inhale 2 puffs every 6 (six) hours as needed for wheezing 6 7 g 0    Chlorpheniramine-Phenylephrine (SINUS/ALLERGY PE PO) Take by mouth      ferrous sulfate 325 (65 Fe) mg tablet 1 PO QD      fluticasone (FLONASE) 50 mcg/act nasal spray 2 sprays into each nostril daily 16 g 0    metoprolol tartrate (LOPRESSOR) 25 mg tablet Take 1 tablet (25 mg total) by mouth every 12 (twelve) hours 60 tablet 0    Multiple Vitamins-Minerals (MULTIVITAMIN ADULT EXTRA C PO) Take 1 capsule by mouth      pantoprazole (PROTONIX) 40 mg tablet TAKE 1 TABLET EVERY 12 HOURS (Patient taking differently: daily One tablet daily ) 180 tablet 3     ALLERGIES:  No Known Allergies    Vitals:    02/24/22 1519 02/24/22 1520 02/24/22 1635 02/24/22 1658   BP: 114/97  120/76    TempSrc: Oral     Pulse: (!) 130  (!) 128 90   Resp: 18  18    Patient Position - Orthostatic VS: Sitting  Lying    Temp:  97 9 °F (36 6 °C)         PHYSICAL EXAM    General:  Patient is well-appearing  Head:  Atraumatic  Eyes:  Conjunctiva pink  ENT:  Mucous membranes are moist  Neck:  Supple  Cardiac:  S1-S2, without murmurs  Lungs:  Clear to auscultation bilaterally  Abdomen:  Soft, nontender, normal bowel sounds, no CVA tenderness, no tympany, no rigidity, no guarding  Extremities:  Normal range of motion, no pedal edema or calf asymmetry, radial pulses are equal and symmetric bilaterally  Neurologic:  Awake, fluent speech, normal comprehension, AAOx3  Skin:  Pink warm and dry  Psychiatric:  Alert, pleasant, cooperative      Labs Reviewed   HS TROPONIN I 0HR - Normal       Result Value Ref Range Status    hs TnI 0hr 3  "Refer to ACS Flowchart"- see link ng/L Final    Comment:                                              Initial (time 0) result  If >=50 ng/L, Myocardial injury suggested ;  Type of myocardial injury and treatment strategy  to be determined  If 5-49 ng/L, a delta result at 2 hours and or 4 hours will be needed to further evaluate  If <4 ng/L, and chest pain has been >3 hours since onset, patient may qualify for discharge based on the HEART score in the ED  If <5 ng/L and <3hours since onset of chest pain, a delta result at 2 hours will be needed to further evaluate  HS Troponin 99th Percentile URL of a Health Population=12 ng/L with a 95% Confidence Interval of 8-18 ng/L  Second Troponin (time 2 hours)  If calculated delta >= 20 ng/L,  Myocardial injury suggested ; Type of myocardial injury and treatment strategy to be determined  If 5-49 ng/L and the calculated delta is 5-19 ng/L, consult medical service for evaluation  Continue evaluation for ischemia on ecg and other possible etiology and repeat hs troponin at 4 hours    If delta is <5 ng/L at 2 hours, consider discharge based on risk stratification via the HEART score (if in ED), or WILD risk score in IP/Observation  HS Troponin 99th Percentile URL of a Health Population=12 ng/L with a 95% Confidence Interval of 8-18 ng/L    CBC AND DIFFERENTIAL    WBC 7 54  4 31 - 10 16 Thousand/uL Final    RBC 4 63  3 88 - 5 62 Million/uL Final    Hemoglobin 15 1  12 0 - 17 0 g/dL Final    Hematocrit 42 4  36 5 - 49 3 % Final    MCV 92  82 - 98 fL Final    MCH 32 6  26 8 - 34 3 pg Final    MCHC 35 6  31 4 - 37 4 g/dL Final    RDW 11 9  11 6 - 15 1 % Final    MPV 9 9  8 9 - 12 7 fL Final    Platelets 316  033 - 390 Thousands/uL Final    nRBC 0  /100 WBCs Final    Neutrophils Relative 69  43 - 75 % Final    Immat GRANS % 0  0 - 2 % Final    Lymphocytes Relative 21  14 - 44 % Final    Monocytes Relative 8  4 - 12 % Final    Eosinophils Relative 2  0 - 6 % Final    Basophils Relative 0  0 - 1 % Final    Neutrophils Absolute 5 15  1 85 - 7 62 Thousands/µL Final    Immature Grans Absolute 0 03  0 00 - 0 20 Thousand/uL Final    Lymphocytes Absolute 1 57  0 60 - 4 47 Thousands/µL Final    Monocytes Absolute 0 62  0 17 - 1 22 Thousand/µL Final    Eosinophils Absolute 0 14  0 00 - 0 61 Thousand/µL Final    Basophils Absolute 0 03  0 00 - 0 10 Thousands/µL Final   BASIC METABOLIC PANEL    Sodium 045  136 - 145 mmol/L Final    Potassium 4 0  3 5 - 5 3 mmol/L Final    Chloride 105  100 - 108 mmol/L Final    CO2 26  21 - 32 mmol/L Final    ANION GAP 8  4 - 13 mmol/L Final    BUN 13  5 - 25 mg/dL Final    Creatinine 0 96  0 60 - 1 30 mg/dL Final    Comment: Standardized to IDMS reference method    Glucose 91  65 - 140 mg/dL Final    Comment: If the patient is fasting, the ADA then defines impaired fasting glucose as > 100 mg/dL and diabetes as > or equal to 123 mg/dL  Specimen collection should occur prior to Sulfasalazine administration due to the potential for falsely depressed results   Specimen collection should occur prior to Sulfapyridine administration due to the potential for falsely elevated results  Calcium 8 5  8 3 - 10 1 mg/dL Final    eGFR 85  ml/min/1 73sq m Final    Narrative:     [place] guidelines for Chronic Kidney Disease (CKD):     Stage 1 with normal or high GFR (GFR > 90 mL/min/1 73 square meters)    Stage 2 Mild CKD (GFR = 60-89 mL/min/1 73 square meters)    Stage 3A Moderate CKD (GFR = 45-59 mL/min/1 73 square meters)    Stage 3B Moderate CKD (GFR = 30-44 mL/min/1 73 square meters)    Stage 4 Severe CKD (GFR = 15-29 mL/min/1 73 square meters)    Stage 5 End Stage CKD (GFR <15 mL/min/1 73 square meters)  Note: GFR calculation is accurate only with a steady state creatinine       Medications   sodium chloride (PF) 0 9 % injection 3 mL (has no administration in time range)   metoprolol tartrate (LOPRESSOR) tablet 25 mg (25 mg Oral Given 2/24/22 1534)   metoprolol (LOPRESSOR) injection 5 mg (5 mg Intravenous Given 2/24/22 1541)   metoprolol (LOPRESSOR) injection 5 mg (5 mg Intravenous Given 2/24/22 1636)       X-ray chest 1 view portable   ED Interpretation   Chest x-ray interpreted me shows no acute cardiopulmonary disease, no change from February 13, 2022          ED Course as of 02/24/22 1702   Thu Feb 24, 2022   1527 ECG interpreted me, atrial fibrillation, rapid ventricular response, rate between 100-150, normal axis, no acute change from February 13, 2012   1609 On reassessment, no change from the above findings, patient said he still felt a little bit of heartburn and occasional palpitations but no chest pressure or discomfort  Did not feel lightheaded    His heart rate was between 100-130, still in atrial fibrillation   1655 On reassessment, patient feeling comfortable, no symptoms, no palpitations, not lightheaded       Assessment/Plan     ED Medical Decision Making:    Patient presented tachycardic in atrial fibrillation with rapid ventricular response, given metoprolol IV and oral metoprolol, reassessed again required additional dose of IV medication  After 2nd dose of IV metoprolol, heart rate well controlled, patient feeling comfortable, asymptomatic  Suspect patient is having exacerbation of his underlying paroxysmal atrial fibrillation  ACS unlikely given patient's unremarkable troponin  Patient recently had cardiology evaluation Hospital, felt not to require anticoagulation at this point  Believe discharge home with outpatient follow-up and return with worsening signs or symptoms appropriate and patient agrees with that plan  Supportive care, importance of follow-up and return precautions were discussed with the patient, who expressed understanding  Critical care time:  32 minutes  Please see my separate procedure note for details      Time reflects when diagnosis was documented in both MDM as applicable and the Disposition within this note     Time User Action Codes Description Comment    2/24/2022  4:58 PM Nelida Goodpasture Add [I48 91] Atrial fibrillation with rapid ventricular response [facility])       ED Disposition     ED Disposition Condition Date/Time Comment    Discharge Stable u Feb 24, 2022  4:58 PM [address] [place] [address] discharge to home/self care              Follow-up Information     Follow up With Specialties Details Why Contact Info    Your cardiologist   Make an appointment to be seen for follow up within the next 1 week           New Prescriptions    No medications on file            Stacie Lee DO  02/24/22 2051

## 2022-02-24 NOTE — PROGRESS NOTES
Cardiology Office Follow Up  [address] [place] [address]  1960  [id number]    ASSESSMENT:  Newly discovered atrial fibrillation with rapid ventricular response   - with spontaneous return of sinus rhythm during hospitalization  - with recurrence during office visit  - given chads Vasc score of < 1, spontaneous return of sinus rhythm, anticoagulation was not indicated or initiated inpatient  - current AV blocking Rx, metoprolol tartrate 25 mg BID  Hypotension  Dyspnea on exertion   Lightheadedness   Recent COVID-19 infection, positive home test on 2/10/22  History of GI bleed during transfusion after NSAID use, 2020  History of Eliud-en-Y gastric bypass    PLAN:  · ADT order has been placed and patient is going to the emergency department for evaluation  · In addition to rate and rhythm control, patient will also need echocardiogram to assess cardiac structure and function  · Patient has already been scheduled a follow-up appointment with Dr Brenda Jaimes on 3/28/22  Interval History/ HPI:   79-year-old male presented to [facility] due to palpitations, dizziness, shortness of breath  Patient reports that he has has intermittent palpitations for 2 years  During hospitalization, patient was found to be in rapid atrial fibrillation  He did have a spontaneous return to sinus rhythm with the use of IV Cardizem  Patient was discharged on metoprolol tartrate 25 mg BID  As noted above, anticoagulation was not warranted  Upon assessment and evaluation, patient continues to have dizziness, lightheadedness, dyspnea on exertion, and lethargy  He has been unable to climb the ladder at work due to this  At this time, he does not endorse palpitations  At present, patient is in atrial fibrillation with RVR with a heart rate of 121 beats per minute; he is currently hypotensive with a blood pressure of 90/60   Given hypotension in addition to dizziness, lightheadedness and dyspnea on exertion, recommend that patient be evaluated in the emergency department  Vitals:  BP 90/60 (BP Location: Left arm, Patient Position: Sitting, Cuff Size: Large)   Pulse (!) 121   Wt 127 kg (280 lb)   BMI 35 00 kg/m²     Past Medical History:   Diagnosis Date    Acute ulcer of stomach     GERD (gastroesophageal reflux disease)     Rib fractures      Social History     Socioeconomic History    Marital status: /Civil Union     Spouse name: Not on file    Number of children: Not on file    Years of education: Not on file    Highest education level: Not on file   Occupational History    Not on file   Tobacco Use    Smoking status: Former Smoker     Types: Cigars    Smokeless tobacco: Former User     Types: Chew   Vaping Use    Vaping Use: Never used   Substance and Sexual Activity    Alcohol use: Yes     Alcohol/week: 7 0 standard drinks     Types: 7 Cans of beer per week    Drug use: No    Sexual activity: Not on file   Other Topics Concern    Not on file   Social History Narrative    Always uses seat belt    Feels safe at home    No guns in the home     Social Determinants of Health     Financial Resource Strain: Not on file   Food Insecurity: Not on file   Transportation Needs: Not on file   Physical Activity: Not on file   Stress: Not on file   Social Connections: Not on file   Intimate Partner Violence: Not on file   Housing Stability: Not on file      Family History   Problem Relation Age of Onset    Seizures Father      Past Surgical History:   Procedure Laterality Date    COLONOSCOPY      ESOPHAGOGASTRODUODENOSCOPY      GASTRIC BYPASS      MANDIBLE FRACTURE SURGERY       No current facility-administered medications for this visit      Current Outpatient Medications:     acetaminophen (TYLENOL) 325 mg tablet, Take 2 tablets (650 mg total) by mouth every 6 (six) hours as needed for mild pain, Disp: 30 tablet, Rfl: 0    albuterol (Proventil HFA) 90 mcg/act inhaler, Inhale 2 puffs every 6 (six) hours as needed for wheezing, Disp: 6 7 g, Rfl: 0    Chlorpheniramine-Phenylephrine (SINUS/ALLERGY PE PO), Take by mouth, Disp: , Rfl:     ferrous sulfate 325 (65 Fe) mg tablet, 1 PO QD, Disp: , Rfl:     fluticasone (FLONASE) 50 mcg/act nasal spray, 2 sprays into each nostril daily, Disp: 16 g, Rfl: 0    metoprolol tartrate (LOPRESSOR) 25 mg tablet, Take 1 tablet (25 mg total) by mouth every 12 (twelve) hours, Disp: 60 tablet, Rfl: 0    Multiple Vitamins-Minerals (MULTIVITAMIN ADULT EXTRA C PO), Take 1 capsule by mouth, Disp: , Rfl:     pantoprazole (PROTONIX) 40 mg tablet, TAKE 1 TABLET EVERY 12 HOURS (Patient taking differently: daily One tablet daily ), Disp: 180 tablet, Rfl: 3    Facility-Administered Medications Ordered in Other Visits:     Insert peripheral IV, , , Once **AND** sodium chloride (PF) 0 9 % injection 3 mL, 3 mL, Intravenous, Q1H PRN, Alexandra Tinoco DO      Review of Systems:  Review of Systems   Constitutional: Positive for activity change  Negative for appetite change  Respiratory: Positive for shortness of breath  Negative for chest tightness and wheezing  Cardiovascular: Negative for chest pain, palpitations and leg swelling  Gastrointestinal: Negative for abdominal distention and abdominal pain  Neurological: Positive for dizziness, weakness and light-headedness  Negative for syncope  Psychiatric/Behavioral: Negative for confusion  Physical Exam:  Physical Exam  Constitutional:       Appearance: Normal appearance  Cardiovascular:      Comments: Irregularly irregular  Atrial fibrillation with RVR with heart rate of 121 beats per minute  Pulmonary:      Effort: No respiratory distress  Breath sounds: Normal breath sounds  Abdominal:      General: Abdomen is flat  Palpations: Abdomen is soft  Musculoskeletal:         General: No swelling  Cervical back: Normal range of motion and neck supple  Right lower leg: No edema  Left lower leg: No edema     Neurological: General: No focal deficit present  Mental Status: He is alert and oriented to person, place, and time  Psychiatric:         Mood and Affect: Mood normal          Behavior: Behavior normal          This note was completed in part utilizing [employer] Software  Grammatical errors, random word insertions, spelling mistakes, and incomplete sentences can be an occasional consequence of this system secondary to software limitations, ambient noise, and hardware issues  If you have any questions or concerns about the content, text, or information contained within the body of this dictation, please contact the provider for clarification

## 2022-02-25 ENCOUNTER — OFFICE VISIT (OUTPATIENT)
Dept: FAMILY MEDICINE CLINIC | Facility: CLINIC | Age: 62
End: 2022-02-25
Payer: COMMERCIAL

## 2022-02-25 VITALS
HEART RATE: 61 BPM | OXYGEN SATURATION: 99 % | BODY MASS INDEX: 38.36 KG/M2 | SYSTOLIC BLOOD PRESSURE: 122 MMHG | DIASTOLIC BLOOD PRESSURE: 86 MMHG | RESPIRATION RATE: 19 BRPM | HEIGHT: 72 IN | WEIGHT: 283.2 LBS

## 2022-02-25 DIAGNOSIS — R91.1 NODULE OF LEFT LUNG: ICD-10-CM

## 2022-02-25 DIAGNOSIS — I48.91 ATRIAL FIBRILLATION WITH RVR (HCC): Primary | ICD-10-CM

## 2022-02-25 PROCEDURE — 1111F DSCHRG MED/CURRENT MED MERGE: CPT | Performed by: FAMILY MEDICINE

## 2022-02-25 PROCEDURE — 93000 ELECTROCARDIOGRAM COMPLETE: CPT | Performed by: FAMILY MEDICINE

## 2022-02-25 PROCEDURE — 3008F BODY MASS INDEX DOCD: CPT | Performed by: INTERNAL MEDICINE

## 2022-02-25 PROCEDURE — 99495 TRANSJ CARE MGMT MOD F2F 14D: CPT | Performed by: FAMILY MEDICINE

## 2022-02-25 RX ORDER — DILTIAZEM HYDROCHLORIDE 60 MG/1
60 TABLET, FILM COATED ORAL 2 TIMES DAILY
Qty: 60 TABLET | Refills: 0 | Status: SHIPPED | OUTPATIENT
Start: 2022-02-25 | End: 2022-03-11 | Stop reason: SDUPTHER

## 2022-02-25 NOTE — PROGRESS NOTES
Assessment/Plan:   1  Atrial fibrillation with RVR ([address] )  Reviewed patient's previous evaluations with him  At this time, on his exam he was found to have an irregular rhythm  EKG appeared to show atrial fibrillation with rapid ventricular response  He currently appears asymptomatic  He denies chest pain, palpitations, shortness of breath or dizziness  Discussed patient's case with Dr Daniel Story from Cardiology  At this time, is unclear why he was discharged from the ED yesterday  At this time, as he is asymptomatic, will start treatment with diltiazem 60 mg b i d  He will be contacted by Cardiology to schedule a appointment for Monday to be evaluated  He was a advised very specifically that if he should develop any chest pain, palpitations, shortness of breath or dizziness that he should call an ambulance immediately and be taken directly to the ED  patient voiced understanding of this plan  - POCT ECG  - diltiazem (CARDIZEM) 60 mg tablet; Take 1 tablet (60 mg total) by mouth 2 (two) times a day  Dispense: 60 tablet; Refill: 0    2  Nodule of left lung  Reviewed patient's CT imaging  At this time, he was found to have a lung nodule  He will be due for a repeat CT a in 6 months   - CT lung nodule follow-up; Future           There are no diagnoses linked to this encounter  Subjective:       Chief Complaint   Patient presents with    Transition of Care Management     2/13-2/14 then ER 2/24       Patient ID: Kenn Islas is a 62 y o  male presents today for a hospital follow-up  He was admitted on February 13th and subsequently discharged on February 14th  He was admitted secondary to the development of atrial fibrillation  Prior to his admission, he did develop shortness of breath, palpitations and dizziness  Upon his admission he was found to be COVID positive  He was seen and evaluated by Cardiology  He was started on a Cardizem drip and spontaneously converted back to sinus rhythm    He was started on treatment with metoprolol 25 mg b i d  He was subsequently discharged with follow-up with Cardiology  He was seen by Cardiology yesterday  At his visit in his visit, he was sent back to the ED directly as he was found to be in atrial fibrillation with RVR again  While in the ED, he was evaluated and started on a Cardizem drip which also cause spontaneous conversion back to normal sinus rhythm  In the ED, he was deemed appropriate by ED for discharge home  He states that he has not been having any chest pain or palpitations over the past day  He denies any shortness of breath or dizziness  He states today that he only has a slight discomfort over the right lateral aspect of his ribs  He states that he did fracture multiple ribs in the past       TCM Call (since 1/25/2022)     Date and time call was made  2/15/2022  9:51 AM    Hospital care reviewed  Records reviewed        Patient was hospitialized at  [facility] - CLOSED        Date of Admission  02/13/22    Date of discharge  02/14/22    Diagnosis  A-fib with RVR, COVID    Disposition  Home    Were the patients medications reviewed and updated  No    Current Symptoms  None      TCM Call (since 1/25/2022)     Post hospital issues  None    Should patient be enrolled in anticoag monitoring? No    Scheduled for follow up?   Yes    Did you obtain your prescribed medications  Yes    Do you need help managing your prescriptions or medications  No    Is transportation to your appointment needed  No    I have advised the patient to call PCP with any new or worsening symptoms    Kiera Quiñones MA    Living Arrangements  Spouse or Significiant other    Are you recieving any outpatient services  No    Are you recieving home care services  No    Are you using any community resources  No    Current waiver services  No    Have you fallen in the last 12 months  No    Interperter language line needed  No            Review of Systems   Constitutional: Negative for activity change, chills, fatigue and fever  HENT: Negative for congestion, ear pain, sinus pressure and sore throat  Eyes: Negative for redness, itching and visual disturbance  Respiratory: Negative for cough and shortness of breath  Cardiovascular: Negative for chest pain and palpitations  Gastrointestinal: Negative for abdominal pain, diarrhea and nausea  Endocrine: Negative for cold intolerance and heat intolerance  Genitourinary: Negative for dysuria, flank pain and frequency  Musculoskeletal: Negative for arthralgias, back pain, gait problem and myalgias  Skin: Negative for color change  Allergic/Immunologic: Negative for environmental allergies  Neurological: Negative for dizziness, numbness and headaches  Psychiatric/Behavioral: Negative for behavioral problems and sleep disturbance  The following portions of the patient's history were reviewed and updated as appropriate : past family history, past medical history, past social history and past surgical history      Current Outpatient Medications:     acetaminophen (TYLENOL) 325 mg tablet, Take 2 tablets (650 mg total) by mouth every 6 (six) hours as needed for mild pain, Disp: 30 tablet, Rfl: 0    albuterol (Proventil HFA) 90 mcg/act inhaler, Inhale 2 puffs every 6 (six) hours as needed for wheezing, Disp: 6 7 g, Rfl: 0    Chlorpheniramine-Phenylephrine (SINUS/ALLERGY PE PO), Take by mouth, Disp: , Rfl:     ferrous sulfate 325 (65 Fe) mg tablet, 1 PO QD, Disp: , Rfl:     fluticasone (FLONASE) 50 mcg/act nasal spray, 2 sprays into each nostril daily, Disp: 16 g, Rfl: 0    metoprolol tartrate (LOPRESSOR) 25 mg tablet, Take 1 tablet (25 mg total) by mouth every 12 (twelve) hours, Disp: 60 tablet, Rfl: 0    Multiple Vitamins-Minerals (MULTIVITAMIN ADULT EXTRA C PO), Take 1 capsule by mouth, Disp: , Rfl:     pantoprazole (PROTONIX) 40 mg tablet, TAKE 1 TABLET EVERY 12 HOURS (Patient taking differently: daily One tablet daily ), Disp: 180 tablet, Rfl: 3  No current facility-administered medications for this visit  Objective:         Vitals:    02/25/22 1535   BP: 122/86   BP Location: Left arm   Patient Position: Sitting   Cuff Size: Large   Pulse: 61   Resp: 19   SpO2: 99%   Weight: 128 kg (283 lb 3 2 oz)   Height: 6' 0 25" (1 835 m)     Physical Exam  Vitals reviewed  Constitutional:       Appearance: He is well-developed  HENT:      Head: Normocephalic and atraumatic  Nose: Nose normal       Mouth/Throat:      Pharynx: No oropharyngeal exudate  Eyes:      General: No scleral icterus  Right eye: No discharge  Left eye: No discharge  Pupils: Pupils are equal, round, and reactive to light  Neck:      Trachea: No tracheal deviation  Cardiovascular:      Rate and Rhythm: Tachycardia present  Rhythm irregularly irregular  Pulses:           Dorsalis pedis pulses are 2+ on the right side and 2+ on the left side  Posterior tibial pulses are 2+ on the right side and 2+ on the left side  Heart sounds: Normal heart sounds  No murmur heard  No friction rub  No gallop  Pulmonary:      Effort: Pulmonary effort is normal  No respiratory distress  Breath sounds: Normal breath sounds  No wheezing or rales  Abdominal:      General: Bowel sounds are normal  There is no distension  Palpations: Abdomen is soft  Tenderness: There is no abdominal tenderness  There is no guarding or rebound  Musculoskeletal:         General: Normal range of motion  Cervical back: Normal range of motion and neck supple  Lymphadenopathy:      Head:      Right side of head: No submental or submandibular adenopathy  Left side of head: No submental or submandibular adenopathy  Cervical: No cervical adenopathy  Right cervical: No superficial, deep or posterior cervical adenopathy  Left cervical: No superficial, deep or posterior cervical adenopathy     Skin: General: Skin is warm and dry  Findings: No erythema  Neurological:      Mental Status: He is alert and oriented to person, place, and time  Cranial Nerves: No cranial nerve deficit  Sensory: No sensory deficit  Psychiatric:         Mood and Affect: Mood is not anxious or depressed  Speech: Speech normal          Behavior: Behavior normal          Thought Content:  Thought content normal          Judgment: Judgment normal

## 2022-02-26 ENCOUNTER — APPOINTMENT (EMERGENCY)
Dept: RADIOLOGY | Facility: HOSPITAL | Age: 62
End: 2022-02-26
Payer: COMMERCIAL

## 2022-02-26 ENCOUNTER — HOSPITAL ENCOUNTER (EMERGENCY)
Facility: HOSPITAL | Age: 62
Discharge: HOME | End: 2022-02-26
Attending: EMERGENCY MEDICINE | Admitting: EMERGENCY MEDICINE
Payer: COMMERCIAL

## 2022-02-26 VITALS
OXYGEN SATURATION: 100 % | RESPIRATION RATE: 20 BRPM | TEMPERATURE: 98 F | HEART RATE: 76 BPM | DIASTOLIC BLOOD PRESSURE: 66 MMHG | SYSTOLIC BLOOD PRESSURE: 103 MMHG

## 2022-02-26 DIAGNOSIS — R07.9 CHEST PAIN: Primary | ICD-10-CM

## 2022-02-26 LAB
2HR DELTA HS TROPONIN: 2 NG/L
ANION GAP SERPL CALCULATED.3IONS-SCNC: 6 MMOL/L (ref 4–13)
BASOPHILS # BLD AUTO: 0.04 THOUSANDS/ΜL (ref 0–0.1)
BASOPHILS NFR BLD AUTO: 1 % (ref 0–1)
BUN SERPL-MCNC: 17 MG/DL (ref 5–25)
CALCIUM SERPL-MCNC: 8.6 MG/DL (ref 8.3–10.1)
CARDIAC TROPONIN I PNL SERPL HS: 3 NG/L
CARDIAC TROPONIN I PNL SERPL HS: 5 NG/L
CHLORIDE SERPL-SCNC: 106 MMOL/L (ref 100–108)
CO2 SERPL-SCNC: 29 MMOL/L (ref 21–32)
CREAT SERPL-MCNC: 1.2 MG/DL (ref 0.6–1.3)
EOSINOPHIL # BLD AUTO: 0.2 THOUSAND/ΜL (ref 0–0.61)
EOSINOPHIL NFR BLD AUTO: 3 % (ref 0–6)
ERYTHROCYTE [DISTWIDTH] IN BLOOD BY AUTOMATED COUNT: 12 % (ref 11.6–15.1)
GFR SERPL CREATININE-BSD FRML MDRD: 65 ML/MIN/1.73SQ M
GLUCOSE SERPL-MCNC: 97 MG/DL (ref 65–140)
HCT VFR BLD AUTO: 44.6 % (ref 36.5–49.3)
HGB BLD-MCNC: 15.1 G/DL (ref 12–17)
IMM GRANULOCYTES # BLD AUTO: 0.04 THOUSAND/UL (ref 0–0.2)
IMM GRANULOCYTES NFR BLD AUTO: 1 % (ref 0–2)
LYMPHOCYTES # BLD AUTO: 1.8 THOUSANDS/ΜL (ref 0.6–4.47)
LYMPHOCYTES NFR BLD AUTO: 22 % (ref 14–44)
MCH RBC QN AUTO: 32.9 PG (ref 26.8–34.3)
MCHC RBC AUTO-ENTMCNC: 33.9 G/DL (ref 31.4–37.4)
MCV RBC AUTO: 97 FL (ref 82–98)
MONOCYTES # BLD AUTO: 0.78 THOUSAND/ΜL (ref 0.17–1.22)
MONOCYTES NFR BLD AUTO: 10 % (ref 4–12)
NEUTROPHILS # BLD AUTO: 5.27 THOUSANDS/ΜL (ref 1.85–7.62)
NEUTS SEG NFR BLD AUTO: 63 % (ref 43–75)
NRBC BLD AUTO-RTO: 0 /100 WBCS
PLATELET # BLD AUTO: 297 THOUSANDS/UL (ref 149–390)
PMV BLD AUTO: 10.3 FL (ref 8.9–12.7)
POTASSIUM SERPL-SCNC: 4.2 MMOL/L (ref 3.5–5.3)
RBC # BLD AUTO: 4.59 MILLION/UL (ref 3.88–5.62)
SODIUM SERPL-SCNC: 141 MMOL/L (ref 136–145)
WBC # BLD AUTO: 8.13 THOUSAND/UL (ref 4.31–10.16)

## 2022-02-26 PROCEDURE — 36415 COLL VENOUS BLD VENIPUNCTURE: CPT | Performed by: EMERGENCY MEDICINE

## 2022-02-26 PROCEDURE — 84484 ASSAY OF TROPONIN QUANT: CPT | Performed by: EMERGENCY MEDICINE

## 2022-02-26 PROCEDURE — 93005 ELECTROCARDIOGRAM TRACING: CPT

## 2022-02-26 PROCEDURE — 80048 BASIC METABOLIC PNL TOTAL CA: CPT | Performed by: EMERGENCY MEDICINE

## 2022-02-26 PROCEDURE — 85025 COMPLETE CBC W/AUTO DIFF WBC: CPT | Performed by: EMERGENCY MEDICINE

## 2022-02-26 PROCEDURE — 71045 X-RAY EXAM CHEST 1 VIEW: CPT

## 2022-02-26 PROCEDURE — 99285 EMERGENCY DEPT VISIT HI MDM: CPT | Performed by: EMERGENCY MEDICINE

## 2022-02-26 PROCEDURE — 99285 EMERGENCY DEPT VISIT HI MDM: CPT

## 2022-02-26 RX ORDER — SODIUM CHLORIDE 9 MG/ML
3 INJECTION INTRAVENOUS
Status: DISCONTINUED | OUTPATIENT
Start: 2022-02-26 | End: 2022-02-26 | Stop reason: HOSPADM

## 2022-02-26 NOTE — ED ATTENDING ATTESTATION
2/26/2022  IKatherin MD, saw and evaluated the patient  I have discussed the patient with the resident/non-physician practitioner and agree with the resident's/non-physician practitioner's findings, Plan of Care, and MDM as documented in the resident's/non-physician practitioner's note, except where noted  All available labs and Radiology studies were reviewed  I was present for key portions of any procedure(s) performed by the resident/non-physician practitioner and I was immediately available to provide assistance  At this point I agree with the current assessment done in the Emergency Department  I have conducted an independent evaluation of this patient a history and physical is as follows:  63 yo M recently admitted for afib with RVR and started on treatment, c/o sensation of chest discomfort today, not pain, but described as "pinch" without dyspnea, N/V, nor diaphoresis  Due to the recent issues, he wasn't sure if he wasn't experiencing afib again, or another complication, so he followed discharge recommendation to seek evaluation for new symptoms  VS today are reassuring  EKG NSR, nonischemic  Chest clear, regular rhythm  It is reasonable to do ACS evaluation, confirm normal troponin, likely can be discharged if negative x1 or 2        ED Course         Critical Care Time  Procedures

## 2022-02-27 LAB
ATRIAL RATE: 62 BPM
ATRIAL RATE: 64 BPM
P AXIS: 67 DEGREES
P AXIS: 85 DEGREES
PR INTERVAL: 176 MS
PR INTERVAL: 182 MS
QRS AXIS: 60 DEGREES
QRS AXIS: 68 DEGREES
QRSD INTERVAL: 78 MS
QRSD INTERVAL: 84 MS
QT INTERVAL: 400 MS
QT INTERVAL: 402 MS
QTC INTERVAL: 408 MS
QTC INTERVAL: 412 MS
T WAVE AXIS: 85 DEGREES
T WAVE AXIS: 85 DEGREES
VENTRICULAR RATE: 62 BPM
VENTRICULAR RATE: 64 BPM

## 2022-02-27 PROCEDURE — 93010 ELECTROCARDIOGRAM REPORT: CPT | Performed by: INTERNAL MEDICINE

## 2022-02-27 NOTE — ED PROVIDER NOTES
Final Diagnosis:  1  Chest pain         Chief Complaint   Patient presents with    Chest Pain     Pt with two recent visits for a fib  c/o finger and toe numbness and tingling  c/o a "pinch in my chest"     Numbness     Procedure Note: EKG  Date/Time: 02/26/22 1900  Interpreted by: Anthony Mortensen  Indications / Diagnosis: CP  ECG reviewed by me, the ED Provider: yes   The EKG demonstrates:  Rhythm: normal sinus with PACs  Intervals: normal intervals  Axis: normal axis  QRS/Blocks: normal QRS  ST Changes: No acute ST Changes, no STD/CAROLE  ASSESSMENT + PLAN:   - Nursing note reviewed  1  Chest pain  -cardiac workup  -monitor as read by me for evaluation of possible AFib shows sinus rhythm  -EKG also shows sinus rhythm  -offered pain medication, but patient declined  -cardiac workup with 2 troponins were negative  -discharge    Final Dispo   Patient was reassessed  Vital signs stable  Patient and/or family given discharge instructions and return precautions  Patient and/or family was reassured  The patient and/or family vocalizes understanding  Answered all of the patient's and/or family's questions  Will follow up with PCP/cards  Patient and/or family are agreeable to the plan  Medications   sodium chloride (PF) 0 9 % injection 3 mL (has no administration in time range)     Time reflects when diagnosis was documented in both MDM as applicable and the Disposition within this note     Time User Action Codes Description Comment    2/26/2022  8:56 PM Anthony Mortensen Add [R07 9] Chest pain       ED Disposition     ED Disposition Condition Date/Time Comment    Discharge Stable Sat Feb 26, 2022 [address] [place] [address] discharge to home/self care              Follow-up Information     Follow up With Specialties Details Why Contact Info    iJmi Ivey DO Family Medicine Call   [address] [place] [address]  [phone]          Discharge Medication List as of 2/26/2022  8:57 PM      CONTINUE these medications which have NOT CHANGED    Details   albuterol (Proventil HFA) 90 mcg/act inhaler Inhale 2 puffs every 6 (six) hours as needed for wheezing, Starting Thu 2/10/2022, Normal      Chlorpheniramine-Phenylephrine (SINUS/ALLERGY PE PO) Take by mouth, Historical Med      diltiazem (CARDIZEM) 60 mg tablet Take 1 tablet (60 mg total) by mouth 2 (two) times a day, Starting 2022, Normal      ferrous sulfate 325 (65 Fe) mg tablet 1 PO QD, Historical Med      fluticasone (FLONASE) 50 mcg/act nasal spray 2 sprays into each nostril daily, Starting Sun 2021, Normal      metoprolol tartrate (LOPRESSOR) 25 mg tablet Take 1 tablet (25 mg total) by mouth every 12 (twelve) hours, Starting Mon 2022, Until Wed 3/16/2022, Normal      Multiple Vitamins-Minerals (MULTIVITAMIN ADULT EXTRA C PO) Take 1 capsule by mouth, Historical Med      pantoprazole (PROTONIX) 40 mg tablet TAKE 1 TABLET EVERY 12 HOURS, Normal           No discharge procedures on file  Prior to Admission Medications   Prescriptions Last Dose Informant Patient Reported? Taking?    Chlorpheniramine-Phenylephrine (SINUS/ALLERGY PE PO)   Yes Yes   Sig: Take by mouth   Multiple Vitamins-Minerals (MULTIVITAMIN ADULT EXTRA C PO)   Yes Yes   Sig: Take 1 capsule by mouth   albuterol (Proventil HFA) 90 mcg/act inhaler   No Yes   Sig: Inhale 2 puffs every 6 (six) hours as needed for wheezing   diltiazem (CARDIZEM) 60 mg tablet   No Yes   Sig: Take 1 tablet (60 mg total) by mouth 2 (two) times a day   ferrous sulfate 325 (65 Fe) mg tablet   Yes Yes   Si PO QD   fluticasone (FLONASE) 50 mcg/act nasal spray   No Yes   Si sprays into each nostril daily   metoprolol tartrate (LOPRESSOR) 25 mg tablet   No Yes   Sig: Take 1 tablet (25 mg total) by mouth every 12 (twelve) hours   pantoprazole (PROTONIX) 40 mg tablet   No Yes   Sig: TAKE 1 TABLET EVERY 12 HOURS   Patient taking differently: daily One tablet daily       Facility-Administered Medications: None       History of Present Illness: This is a 62 y o  male presenting today for evaluation of chest pain  Patient was found to be in atrial fibrillation on multiple emergency department visits past couple days and so he thought that any of chest pain today he went back into AFib  He said that he walked around earlier today and came home  About an hour before he got here he notices get some mild chest discomfort  No shortness of breath  No diaphoresis  No nausea or vomiting  Patient denies any history of DVTs or PEs, significant family history of DVT/PE, OCPs/exogenous estrogen use, pregnancy, trauma, recent travel, surgery, or malignancy  CT PE on 2/14 was negative for PEs     - No language barrier    - History obtained from patient and chart   - There are no limitations to the history obtained  - Previous charting was reviewed  Some data reviewed included below for ease of access whether or not it is relevant to this patient encounter  Past Medical, Past Surgical History:    has a past medical history of Acute ulcer of stomach, GERD (gastroesophageal reflux disease), and Rib fractures  has a past surgical history that includes Gastric bypass; Mandible fracture surgery; Esophagogastroduodenoscopy; and Colonoscopy  Allergies:   No Known Allergies    Social and Family History:     Social History     Substance and Sexual Activity   Alcohol Use Yes    Alcohol/week: 7 0 standard drinks    Types: 7 Cans of beer per week     Social History     Tobacco Use   Smoking Status Former Smoker    Types: Cigars   Smokeless Tobacco Former User    Types: Chew     Social History     Substance and Sexual Activity   Drug Use No       Review of Systems:   Review of Systems   Constitutional: Negative for chills, diaphoresis and fever  HENT: Negative  Eyes: Negative  Negative for visual disturbance  Respiratory: Negative  Negative for shortness of breath  Cardiovascular: Positive for chest pain  Gastrointestinal: Negative  Negative for abdominal pain, nausea and vomiting  Endocrine: Negative  Genitourinary: Negative  Musculoskeletal: Negative  Negative for myalgias  Skin: Negative  Negative for rash  Allergic/Immunologic: Negative  Neurological: Negative  Negative for weakness, light-headedness, numbness and headaches  Hematological: Negative  Psychiatric/Behavioral: Negative  All other systems reviewed and are negative  Physical Examination     Vitals:    02/26/22 1757 02/26/22 1804 02/26/22 2009   BP: 143/82  103/66   BP Location:   Right arm   Pulse: 66  76   Resp: 20  20   Temp:  98 °F (36 7 °C)    TempSrc:  Oral    SpO2: 100%  100%     Vitals reviewed by me  Physical Exam  Vitals and nursing note reviewed  Constitutional:       Appearance: He is well-developed  HENT:      Head: Normocephalic and atraumatic  Eyes:      General: No scleral icterus  Cardiovascular:      Rate and Rhythm: Normal rate and regular rhythm  Pulmonary:      Effort: Pulmonary effort is normal       Breath sounds: Normal breath sounds  Abdominal:      General: There is no distension  Palpations: Abdomen is soft  Tenderness: There is no abdominal tenderness  Musculoskeletal:         General: Normal range of motion  Cervical back: Normal range of motion and neck supple  Skin:     General: Skin is warm and dry  Neurological:      Mental Status: He is alert and oriented to person, place, and time  Comments: Grossly neuro intact;  Able to move all extremities            Risk Stratification Tools     HEART Risk Score      Most Recent Value   Heart Score Risk Calculator    History 0 Filed at: 02/26/2022 2054   ECG 0 Filed at: 02/26/2022 2054   Age 1 Filed at: 02/26/2022 2054   Risk Factors 1 Filed at: 02/26/2022 2054   Troponin 0 Filed at: 02/26/2022 2054   HEART Score 2 Filed at: 02/26/2022 2054                      X-ray chest 1 view portable    (Results Pending) Orders Placed This Encounter   Procedures    X-ray chest 1 view portable    CBC and differential    Basic metabolic panel    HS Troponin 0hr (reflex protocol)    HS Troponin I 2hr    Continuous cardiac monitoring    Continuous pulse oximetry    Insert peripheral IV    ECG 12 lead       Labs:     Labs Reviewed   HS TROPONIN I 0HR - Normal       Result Value Ref Range Status    hs TnI 0hr 3  "Refer to ACS Flowchart"- see link ng/L Final    Comment:                                              Initial (time 0) result  If >=50 ng/L, Myocardial injury suggested ;  Type of myocardial injury and treatment strategy  to be determined  If 5-49 ng/L, a delta result at 2 hours and or 4 hours will be needed to further evaluate  If <4 ng/L, and chest pain has been >3 hours since onset, patient may qualify for discharge based on the HEART score in the ED  If <5 ng/L and <3hours since onset of chest pain, a delta result at 2 hours will be needed to further evaluate  HS Troponin 99th Percentile URL of a Health Population=12 ng/L with a 95% Confidence Interval of 8-18 ng/L  Second Troponin (time 2 hours)  If calculated delta >= 20 ng/L,  Myocardial injury suggested ; Type of myocardial injury and treatment strategy to be determined  If 5-49 ng/L and the calculated delta is 5-19 ng/L, consult medical service for evaluation  Continue evaluation for ischemia on ecg and other possible etiology and repeat hs troponin at 4 hours  If delta is <5 ng/L at 2 hours, consider discharge based on risk stratification via the HEART score (if in ED), or WILD risk score in IP/Observation      HS Troponin 99th Percentile URL of a Health Population=12 ng/L with a 95% Confidence Interval of 8-18 ng/L    HS TROPONIN I 2HR - Normal    hs TnI 2hr 5  "Refer to ACS Flowchart"- see link ng/L Final    Comment:                                              Initial (time 0) result  If >=50 ng/L, Myocardial injury suggested ;  Type of myocardial injury and treatment strategy  to be determined  If 5-49 ng/L, a delta result at 2 hours and or 4 hours will be needed to further evaluate  If <4 ng/L, and chest pain has been >3 hours since onset, patient may qualify for discharge based on the HEART score in the ED  If <5 ng/L and <3hours since onset of chest pain, a delta result at 2 hours will be needed to further evaluate  HS Troponin 99th Percentile URL of a Health Population=12 ng/L with a 95% Confidence Interval of 8-18 ng/L  Second Troponin (time 2 hours)  If calculated delta >= 20 ng/L,  Myocardial injury suggested ; Type of myocardial injury and treatment strategy to be determined  If 5-49 ng/L and the calculated delta is 5-19 ng/L, consult medical service for evaluation  Continue evaluation for ischemia on ecg and other possible etiology and repeat hs troponin at 4 hours  If delta is <5 ng/L at 2 hours, consider discharge based on risk stratification via the HEART score (if in ED), or WILD risk score in IP/Observation  HS Troponin 99th Percentile URL of a Health Population=12 ng/L with a 95% Confidence Interval of 8-18 ng/L      Delta 2hr hsTnI 2  <20 ng/L Final   CBC AND DIFFERENTIAL    WBC 8 13  4 31 - 10 16 Thousand/uL Final    RBC 4 59  3 88 - 5 62 Million/uL Final    Hemoglobin 15 1  12 0 - 17 0 g/dL Final    Hematocrit 44 6  36 5 - 49 3 % Final    MCV 97  82 - 98 fL Final    MCH 32 9  26 8 - 34 3 pg Final    MCHC 33 9  31 4 - 37 4 g/dL Final    RDW 12 0  11 6 - 15 1 % Final    MPV 10 3  8 9 - 12 7 fL Final    Platelets 073  548 - 390 Thousands/uL Final    nRBC 0  /100 WBCs Final    Neutrophils Relative 63  43 - 75 % Final    Immat GRANS % 1  0 - 2 % Final    Lymphocytes Relative 22  14 - 44 % Final    Monocytes Relative 10  4 - 12 % Final    Eosinophils Relative 3  0 - 6 % Final    Basophils Relative 1  0 - 1 % Final    Neutrophils Absolute 5 27  1 85 - 7 62 Thousands/µL Final    Immature Grans Absolute 0 04  0 00 - 0 20 Thousand/uL Final    Lymphocytes Absolute 1 80  0 60 - 4 47 Thousands/µL Final    Monocytes Absolute 0 78  0 17 - 1 22 Thousand/µL Final    Eosinophils Absolute 0 20  0 00 - 0 61 Thousand/µL Final    Basophils Absolute 0 04  0 00 - 0 10 Thousands/µL Final   BASIC METABOLIC PANEL    Sodium 923  136 - 145 mmol/L Final    Potassium 4 2  3 5 - 5 3 mmol/L Final    Chloride 106  100 - 108 mmol/L Final    CO2 29  21 - 32 mmol/L Final    ANION GAP 6  4 - 13 mmol/L Final    BUN 17  5 - 25 mg/dL Final    Creatinine 1 20  0 60 - 1 30 mg/dL Final    Comment: Standardized to IDMS reference method    Glucose 97  65 - 140 mg/dL Final    Comment: If the patient is fasting, the ADA then defines impaired fasting glucose as > 100 mg/dL and diabetes as > or equal to 123 mg/dL  Specimen collection should occur prior to Sulfasalazine administration due to the potential for falsely depressed results  Specimen collection should occur prior to Sulfapyridine administration due to the potential for falsely elevated results  Calcium 8 6  8 3 - 10 1 mg/dL Final    eGFR 65  ml/min/1 73sq m Final    Narrative:     [place] guidelines for Chronic Kidney Disease (CKD):     Stage 1 with normal or high GFR (GFR > 90 mL/min/1 73 square meters)    Stage 2 Mild CKD (GFR = 60-89 mL/min/1 73 square meters)    Stage 3A Moderate CKD (GFR = 45-59 mL/min/1 73 square meters)    Stage 3B Moderate CKD (GFR = 30-44 mL/min/1 73 square meters)    Stage 4 Severe CKD (GFR = 15-29 mL/min/1 73 square meters)    Stage 5 End Stage CKD (GFR <15 mL/min/1 73 square meters)  Note: GFR calculation is accurate only with a steady state creatinine       Imaging:   X-ray chest 1 view portable    Result Date: 2/24/2022  Narrative: CHEST INDICATION:   chest pain  Patient has confirmed COVID-19  Recent CTA of the chest demonstrated bilateral peripheral opacities, consistent with COVID-19 pneumonia   COMPARISON:  Portable chest from February 13, 2022   CTA of the chest from February 14, 2022  EXAM PERFORMED/VIEWS:  XR CHEST PORTABLE FINDINGS: Cardiomediastinal silhouette appears unremarkable  Lungs appear clear  No evidence of the peripheral opacities demonstrated on the prior CTA of the chest  Several old left-sided rib fractures  Imaged bones otherwise unremarkable  Impression: No acute disease in the chest  Workstation performed: [id number]     XR chest 1 view portable    Result Date: 2/13/2022  Narrative: CHEST INDICATION:   hx covid, SOB, near syncope  Patient has confirmed COVID-19  COMPARISON:  3/5/2019 EXAM PERFORMED/VIEWS:  XR CHEST PORTABLE  AP semierect FINDINGS: Cardiomediastinal silhouette appears unremarkable  The lungs are clear  No pneumothorax or pleural effusion  No acute osseous abnormalities  Impression: No acute cardiopulmonary disease  Workstation performed: [id number]     CTA chest pe study    Result Date: 2/14/2022  Narrative: CTA - CHEST WITH IV CONTRAST - PULMONARY ANGIOGRAM INDICATION:   Pulmonary embolism (PE) suspected, unknown D-dimer elevated d-dimer  COMPARISON: CT chest 3/4/2019 TECHNIQUE: CTA examination of the chest was performed using angiographic technique according to a protocol specifically tailored to evaluate for pulmonary embolism  Axial, sagittal, and coronal 2D reformatted images were created from the source data and  submitted for interpretation  In addition, coronal 3D MIP postprocessing was performed on the acquisition scanner  Radiation dose length product (DLP) for this visit:  570 mGy-cm   This examination, like all CT scans performed in the [facility], was performed utilizing techniques to minimize radiation dose exposure, including the use of iterative reconstruction and automated exposure control  IV Contrast:  85 mL of iohexol (OMNIPAQUE)  350  FINDINGS: PULMONARY ARTERIAL TREE:  No pulmonary embolus is seen   LUNGS:  Multi lobar peripheral predominant groundglass infiltrates more prominent in the lower lobes  No cavitary airspace disease  New 4 mm nodule posterior left upper lobe likely intrapulmonary lymph node associated with an accessory fissure image 57 series 3 and image 57 series 603  Central airways are clear  PLEURA:  Unremarkable  HEART/GREAT VESSELS:  Unremarkable for patient's age  No thoracic aortic aneurysm  MEDIASTINUM AND TONY:  New enlarged right subcarinal lymph node measuring 1 4 cm short axis and only prominent subcentimeter short axis mediastinal and left hilar lymph nodes likely reactive  CHEST WALL AND LOWER NECK:   Stable minimal bilateral gynecomastia, left greater than right  VISUALIZED STRUCTURES IN THE UPPER ABDOMEN:  Post Eliud-en-Y gastric bypass  Probable cholelithiasis  OSSEOUS STRUCTURES:  No acute fracture or osseous destructive lesion identified  Mild degenerative changes of the spine  Old healed left rib fractures  Impression: No pulmonary embolus  Bilateral multi lobar peripheral predominant infiltrates  In the setting of clinically suspected/proven COVID-19, the above lung parenchymal findings on CT indicate high confidence level for COVID-19  4 mm nodule in the left upper lobe new since March 2019 likely reactive intrapulmonary lymph node  Based on current Fleischner Society 2017 Guidelines on incidental pulmonary nodule, no routine follow-up is needed if the patient is low risk  If the patient is high risk, optional follow-up chest CT at 12 months can be considered  New mild mediastinal adenopathy likely reactive  This could be followed up with chest CT in 3-6 months if clinically warranted  This study demonstrates a significant  finding and was documented as such in [facility] for liaison and referring practitioner notification  Workstation performed: [id number]        Final Diagnosis:  1  Chest pain        Code Status: Prior    Portions of the record may have been created with voice recognition software   Occasional wrong word or "sound a like" substitutions may have occurred due to the inherent limitations of voice recognition software  Read the chart carefully and recognize, using context, where substitutions have occurred      Electronically signed by:  Selam Ma, PGY 3, MD Андрей Jones MD  02/26/22 8910

## 2022-02-28 ENCOUNTER — OFFICE VISIT (OUTPATIENT)
Dept: CARDIOLOGY CLINIC | Facility: CLINIC | Age: 62
End: 2022-02-28
Payer: COMMERCIAL

## 2022-02-28 VITALS
DIASTOLIC BLOOD PRESSURE: 70 MMHG | WEIGHT: 281 LBS | SYSTOLIC BLOOD PRESSURE: 98 MMHG | BODY MASS INDEX: 37.85 KG/M2 | HEART RATE: 62 BPM

## 2022-02-28 DIAGNOSIS — R07.9 CHEST PAIN, UNSPECIFIED TYPE: ICD-10-CM

## 2022-02-28 DIAGNOSIS — I48.91 ATRIAL FIBRILLATION, UNSPECIFIED TYPE (HCC): Primary | ICD-10-CM

## 2022-02-28 DIAGNOSIS — R06.00 DOE (DYSPNEA ON EXERTION): ICD-10-CM

## 2022-02-28 PROCEDURE — 99214 OFFICE O/P EST MOD 30 MIN: CPT | Performed by: INTERNAL MEDICINE

## 2022-02-28 PROCEDURE — 93000 ELECTROCARDIOGRAM COMPLETE: CPT | Performed by: INTERNAL MEDICINE

## 2022-02-28 PROCEDURE — 1036F TOBACCO NON-USER: CPT | Performed by: INTERNAL MEDICINE

## 2022-02-28 NOTE — PROGRESS NOTES
Cardiology             Severino Lao  1960  [id number]            Assessment/Plan:    1  Paroxysmal atrial fibrillation, currently rate controlled, chads Vasc score 0   2  Obesity   3  Probable obstructive sleep apnea syndrome  4  Prior heavy alcohol use, significant BUN daily up until 02/2022   5  COVID-19 infection 02/2022   6  Prior bariatric surgery      · Patient with paroxysmal atrial fibrillation, initially hospitalized 02/2022 after COVID-19 pneumonia at which time he spontaneously converted to sinus rhythm with IV diltiazem with recurrence 2/24/2022, at which time he went back to the ER with spontaneous conversion with IV diltiazem  Now he is rate controlled on p o  diltiazem and p o  metoprolol without symptoms  Will continue the same  Will schedule nuclear stress test and if negative, will start flecainide to maintain sinus rhythm  Before then, will also check echocardiogram   · Recommend sleep study as he does admit to daytime hypersomnolence  He has a high neck circumference as well  · Encouraged alcohol cessation  · Encouraged heart healthy diet, exercise, weight loss        Follow-up in 1 month      Interval History: This is a very pleasant 62-year-old male hospitalized 2/2022 shortly after he had a positive home COVID test and symptoms of vomiting, diarrhea, mild dyspnea, and upper respiratory symptoms  He came to the ED 2/13/2022, with exertional dyspnea  ECG demonstrated a new diagnosis of atrial fibrillation with RVR  CT chest revealed no evidence of pulmonary embolism, although bilateral multilobar infiltrates were present, suggestive of COVID-19 pneumonia  He converted to sinus rhythm with diltiazem IV  His chads Vasc score was 0, therefore he was maintained off anticoagulation  Beta-blockade was initiated  Subsequently, he was seen by our nurse practitioner on 02/24/2022, and rapid atrial fibrillation with symptoms of lightheadedness and exertional dyspnea  He was sent back to the ED where he was given IV diltiazem once again and converted back to sinus rhythm with symptomatic improvement  He was discharged without adjustment of his medications  He was seen by his PCP 02/25/2022 rate was noted to have recurrent atrial fibrillation with RVR at 125 beats per minute and minimal symptoms  Diltiazem 60 mg p o  b i d  was initiated  He went to the ER Saturday 02/26/2022 for atypical chest pain  He was ruled out for myocardial injury with negative high sensitivity troponins x2 and no ischemic ECG changes  He presents today for follow-up with no complaints  He feels well without exertional chest pain, shortness of breath, dizziness, palpitations, lower extremity edema  He lives at home with his wife  He admits to daytime hypersomnolence and fatigue  He was drinking a 6 pack of beer on a daily basis up until his COVID infection in February and since then has not been drinking alcohol  Vitals:  Vitals:    02/28/22 0933   BP: 98/70   BP Location: Right arm   Patient Position: Sitting   Cuff Size: Large   Pulse: 62   Weight: 127 kg (281 lb)         Past Medical History:   Diagnosis Date    Acute ulcer of stomach     GERD (gastroesophageal reflux disease)     Rib fractures      Social History     Socioeconomic History    Marital status: /Civil Union     Spouse name: Not on file    Number of children: Not on file    Years of education: Not on file    Highest education level: Not on file   Occupational History    Not on file   Tobacco Use    Smoking status: Former Smoker     Types: Cigars    Smokeless tobacco: Former User     Types: Chew   Vaping Use    Vaping Use: Never used   Substance and Sexual Activity    Alcohol use:  Yes     Alcohol/week: 7 0 standard drinks     Types: 7 Cans of beer per week    Drug use: No    Sexual activity: Not on file   Other Topics Concern    Not on file   Social History Narrative    Always uses seat belt Feels safe at home    No guns in the home     Social Determinants of Health     Financial Resource Strain: Not on file   Food Insecurity: Not on file   Transportation Needs: Not on file   Physical Activity: Not on file   Stress: Not on file   Social Connections: Not on file   Intimate Partner Violence: Not on file   Housing Stability: Not on file      Family History   Problem Relation Age of Onset    Seizures Father      Past Surgical History:   Procedure Laterality Date    COLONOSCOPY      ESOPHAGOGASTRODUODENOSCOPY      GASTRIC BYPASS      MANDIBLE FRACTURE SURGERY         Current Outpatient Medications:     albuterol (Proventil HFA) 90 mcg/act inhaler, Inhale 2 puffs every 6 (six) hours as needed for wheezing, Disp: 6 7 g, Rfl: 0    Chlorpheniramine-Phenylephrine (SINUS/ALLERGY PE PO), Take by mouth, Disp: , Rfl:     diltiazem (CARDIZEM) 60 mg tablet, Take 1 tablet (60 mg total) by mouth 2 (two) times a day, Disp: 60 tablet, Rfl: 0    ferrous sulfate 325 (65 Fe) mg tablet, 1 PO QD, Disp: , Rfl:     fluticasone (FLONASE) 50 mcg/act nasal spray, 2 sprays into each nostril daily (Patient taking differently: 2 sprays into each nostril daily as needed  ), Disp: 16 g, Rfl: 0    metoprolol tartrate (LOPRESSOR) 25 mg tablet, Take 1 tablet (25 mg total) by mouth every 12 (twelve) hours, Disp: 60 tablet, Rfl: 0    Multiple Vitamins-Minerals (MULTIVITAMIN ADULT EXTRA C PO), Take 1 capsule by mouth, Disp: , Rfl:     pantoprazole (PROTONIX) 40 mg tablet, TAKE 1 TABLET EVERY 12 HOURS (Patient taking differently: daily One tablet daily ), Disp: 180 tablet, Rfl: 3        Review of Systems:  Review of Systems   Respiratory: Negative  Cardiovascular: Negative  All other systems reviewed and are negative  Physical Exam:  Physical Exam  Constitutional:       General: He is not in acute distress  Appearance: He is well-developed  He is not diaphoretic  HENT:      Head: Normocephalic and atraumatic  Eyes:      General: No scleral icterus  Right eye: No discharge  Pupils: Pupils are equal, round, and reactive to light  Neck:      Thyroid: No thyromegaly  Cardiovascular:      Rate and Rhythm: Normal rate and regular rhythm  Heart sounds: Normal heart sounds  No murmur heard  No friction rub  No gallop  Pulmonary:      Effort: Pulmonary effort is normal       Breath sounds: Normal breath sounds  Abdominal:      General: There is no distension  Tenderness: There is no abdominal tenderness  There is no guarding or rebound  Musculoskeletal:         General: Normal range of motion  Cervical back: Normal range of motion and neck supple  Skin:     General: Skin is warm and dry  Coloration: Skin is not pale  Findings: No erythema or rash  Neurological:      Mental Status: He is alert and oriented to person, place, and time  Coordination: Coordination normal    Psychiatric:         Behavior: Behavior normal          Thought Content: Thought content normal          Judgment: Judgment normal          This note was completed in part utilizing [employer] Fluency Direct Software  Grammatical errors, random word insertions, spelling mistakes, and incomplete sentences can be an occasional consequence of this system secondary to software limitations, ambient noise, and hardware issues  If you have any questions or concerns about the content, text, or information contained within the body of this dictation, please contact the provider for clarification

## 2022-03-01 DIAGNOSIS — U07.1 COVID-19: ICD-10-CM

## 2022-03-01 RX ORDER — ALBUTEROL SULFATE 90 UG/1
AEROSOL, METERED RESPIRATORY (INHALATION)
Qty: 18 G | Refills: 0 | Status: SHIPPED | OUTPATIENT
Start: 2022-03-01

## 2022-03-07 ENCOUNTER — HOSPITAL ENCOUNTER (OUTPATIENT)
Dept: SLEEP CENTER | Facility: CLINIC | Age: 62
Discharge: HOME | End: 2022-03-07
Payer: COMMERCIAL

## 2022-03-07 DIAGNOSIS — I48.91 ATRIAL FIBRILLATION, UNSPECIFIED TYPE (HCC): ICD-10-CM

## 2022-03-07 PROCEDURE — 95810 POLYSOM 6/> YRS 4/> PARAM: CPT

## 2022-03-08 NOTE — PROGRESS NOTES
Sleep Study Documentation    Pre-Sleep Study       Sleep testing procedure explained to patient:YES    Patient napped prior to study:NO    Caffeine:Dayshift worker after 12PM   Caffeine use:YES- ice tea  12 to 26 ounces    Alcohol:Dayshift workers after 5PM: Alcohol use:NO    Typical day for patient:YES       Study Documentation    Sleep Study Indications: snoring and excessive daytime sleepiness    Sleep Study: Diagnostic   Snore: Moderate  Supplemental O2: no    Minimum SaO2 81  Baseline SaO2 93          EKG abnormalities: yes:  Occasional PVC's noted     EEG abnormalities: no    Sleep Study Recorded < 2 hours: N/A    Sleep Study Recorded > 2 hours but incomplete study: N/A    Sleep Study Recorded 6 hours but no sleep obtained: NO        Post-Sleep Study    Medication used at bedtime or during sleep study:NO    Patient reports time it took to fall asleep:less than 20 minutes    Patient reports waking up during study:1 to 2 times  Patient reports returning to sleep without difficulty  Patient reports sleeping 6 to 8 hours without dreaming  Patient reports sleep during study:typical    Patient rated sleepiness: Somewhat sleepy or tired    PAP treatment:no

## 2022-03-11 DIAGNOSIS — I48.91 ATRIAL FIBRILLATION WITH RVR (HCC): ICD-10-CM

## 2022-03-14 ENCOUNTER — TELEPHONE (OUTPATIENT)
Dept: SLEEP CENTER | Facility: CLINIC | Age: 62
End: 2022-03-14

## 2022-03-14 RX ORDER — DILTIAZEM HYDROCHLORIDE 60 MG/1
60 TABLET, FILM COATED ORAL 2 TIMES DAILY
Qty: 60 TABLET | Refills: 3 | Status: SHIPPED | OUTPATIENT
Start: 2022-03-14 | End: 2022-05-01 | Stop reason: HOSPADM

## 2022-03-14 NOTE — TELEPHONE ENCOUNTER
Left message for patient to call office to review sleep study results    Study shows moderate to severe SRIKANTH and hypoxia        Needs to schedule consult

## 2022-03-21 ENCOUNTER — TELEPHONE (OUTPATIENT)
Dept: SLEEP CENTER | Facility: CLINIC | Age: 62
End: 2022-03-21

## 2022-03-21 NOTE — TELEPHONE ENCOUNTER
----- Message from Tati Carney DO sent at 3/20/2022 10:55 PM EDT -----  approved  ----- Message -----  From: Salud Dudley  Sent: 3/1/2022  11:23 AM EDT  To: Sleep Medicine [facility] Provider    This sleep study needs approval      If approved please sign and return to clerical pool  If denied please include reasons why  Also provide alternative testing if warranted  Please sign and return to clerical pool

## 2022-04-07 ENCOUNTER — HOSPITAL ENCOUNTER (OUTPATIENT)
Dept: NON INVASIVE DIAGNOSTICS | Facility: HOSPITAL | Age: 62
Discharge: HOME | End: 2022-04-07
Attending: INTERNAL MEDICINE
Payer: COMMERCIAL

## 2022-04-07 ENCOUNTER — HOSPITAL ENCOUNTER (OUTPATIENT)
Dept: NUCLEAR MEDICINE | Facility: HOSPITAL | Age: 62
Discharge: HOME | End: 2022-04-07
Attending: INTERNAL MEDICINE
Payer: COMMERCIAL

## 2022-04-07 VITALS
HEART RATE: 91 BPM | HEIGHT: 72 IN | SYSTOLIC BLOOD PRESSURE: 98 MMHG | WEIGHT: 281 LBS | BODY MASS INDEX: 38.06 KG/M2 | DIASTOLIC BLOOD PRESSURE: 70 MMHG

## 2022-04-07 VITALS — HEIGHT: 72 IN | WEIGHT: 281 LBS | BODY MASS INDEX: 38.06 KG/M2

## 2022-04-07 DIAGNOSIS — I48.91 ATRIAL FIBRILLATION, UNSPECIFIED TYPE (HCC): ICD-10-CM

## 2022-04-07 DIAGNOSIS — R07.9 CHEST PAIN, UNSPECIFIED TYPE: ICD-10-CM

## 2022-04-07 DIAGNOSIS — R06.00 DOE (DYSPNEA ON EXERTION): ICD-10-CM

## 2022-04-07 LAB
MAX HR PERCENT: 88 %
NUC STRESS EJECTION FRACTION: 32 %
RATE PRESSURE PRODUCT: NORMAL
SL CV REST NUCLEAR ISOTOPE DOSE: 15.8 MCI
SL CV STRESS NUCLEAR ISOTOPE DOSE: 47.3 MCI
SL CV STRESS RECOVERY BP: NORMAL MMHG
SL CV STRESS RECOVERY HR: 105 BPM
SL CV STRESS RECOVERY O2 SAT: 98 %
STRESS ANGINA INDEX: 1
STRESS BASELINE BP: NORMAL MMHG
STRESS BASELINE HR: 93 BPM
STRESS O2 SAT REST: 97 %
STRESS PEAK HR: 142 BPM
STRESS PERCENT HR: 88 %
STRESS POST ESTIMATED WORKLOAD: 1.5 METS
STRESS POST O2 SAT PEAK: 97 %
STRESS POST PEAK BP: 108 MMHG
STRESS TARGET HR: 142 BPM
STRESS/REST PERFUSION RATIO: 0.96

## 2022-04-07 PROCEDURE — 78452 HT MUSCLE IMAGE SPECT MULT: CPT

## 2022-04-07 PROCEDURE — A9502 TC99M TETROFOSMIN: HCPCS

## 2022-04-07 PROCEDURE — 93018 CV STRESS TEST I&R ONLY: CPT | Performed by: INTERNAL MEDICINE

## 2022-04-07 PROCEDURE — 93016 CV STRESS TEST SUPVJ ONLY: CPT | Performed by: INTERNAL MEDICINE

## 2022-04-07 PROCEDURE — 93017 CV STRESS TEST TRACING ONLY: CPT

## 2022-04-07 PROCEDURE — 78452 HT MUSCLE IMAGE SPECT MULT: CPT | Performed by: INTERNAL MEDICINE

## 2022-04-07 PROCEDURE — 93306 TTE W/DOPPLER COMPLETE: CPT

## 2022-04-07 PROCEDURE — G1004 CDSM NDSC: HCPCS

## 2022-04-07 RX ADMIN — PERFLUTREN 0.6 ML/MIN: 6.52 INJECTION, SUSPENSION INTRAVENOUS at 09:00

## 2022-04-07 RX ADMIN — REGADENOSON 0.4 MG: 0.08 INJECTION, SOLUTION INTRAVENOUS at 10:20

## 2022-04-08 LAB
CHEST PAIN STATEMENT: NORMAL
MAX DIASTOLIC BP: 70 MMHG
MAX HEART RATE: 142 BPM
MAX PREDICTED HEART RATE: 160 BPM
MAX. SYSTOLIC BP: 120 MMHG
PROTOCOL NAME: NORMAL
REASON FOR TERMINATION: NORMAL
TARGET HR FORMULA: NORMAL
TIME IN EXERCISE PHASE: NORMAL

## 2022-04-09 LAB
AORTIC ROOT: 3.2 CM
AORTIC VALVE MEAN VELOCITY: 9.1 M/S
APICAL FOUR CHAMBER EJECTION FRACTION: 42 %
ASCENDING AORTA: 3.3 CM (ref 2.31–3.46)
AV LVOT MEAN GRADIENT: 3 MMHG
AV LVOT PEAK GRADIENT: 5 MMHG
AV MEAN GRADIENT: 4 MMHG
AV PEAK GRADIENT: 10 MMHG
AV VELOCITY RATIO: 0.68
DOP CALC AO PEAK VEL: 1.61 M/S
DOP CALC AO VTI: 26.9 CM
DOP CALC LVOT PEAK VEL VTI: 19.25 CM
DOP CALC LVOT PEAK VEL: 1.09 M/S
FRACTIONAL SHORTENING: 22 % (ref 28–44)
INTERVENTRICULAR SEPTUM IN DIASTOLE (PARASTERNAL SHORT AXIS VIEW): 1.2 CM
INTERVENTRICULAR SEPTUM: 1.2 CM (ref 0.59–1.11)
LAAS-AP2: 38.1 CM2
LAAS-AP4: 26.4 CM2
LEFT ATRIUM SIZE: 4.4 CM
LEFT INTERNAL DIMENSION IN SYSTOLE: 4.2 CM (ref 8.8–13.35)
LEFT VENTRICLE DIASTOLIC VOLUME (MOD BIPLANE): 170 ML (ref 132.09–297.48)
LEFT VENTRICLE SYSTOLIC VOLUME (MOD BIPLANE): 89 ML
LEFT VENTRICULAR INTERNAL DIMENSION IN DIASTOLE: 5.4 CM (ref 15.12–22.55)
LEFT VENTRICULAR POSTERIOR WALL IN END DIASTOLE: 1 CM (ref 0.58–1.09)
LEFT VENTRICULAR STROKE VOLUME: 63 ML
LV EF: 48 %
LVSV (TEICH): 63 ML
RIGHT ATRIAL 2D VOLUME: 146 ML
RIGHT ATRIUM AREA SYSTOLE A4C: 37 CM2
RIGHT VENTRICLE ID DIMENSION: 5.3 CM
SL CV LEFT ATRIUM LENGTH A2C: 7.1 CM
SL CV LV DIAS VOL ENDO Z SCORE: -1.08
SL CV LV EF: 30
SL CV PED ECHO LEFT VENTRICLE DIASTOLIC VOLUME (MOD BIPLANE) 2D: 143 ML
SL CV PED ECHO LEFT VENTRICLE SYSTOLIC VOLUME (MOD BIPLANE) 2D: 80 ML
TR MAX PG: 23 MMHG
TR PEAK VELOCITY: 2.2 M/S
TRICUSPID VALVE PEAK REGURGITATION VELOCITY: 2.17 M/S
Z-SCORE OF ASCENDING AORTA: 1.43
Z-SCORE OF INTERVENTRICULAR SEPTUM IN END DIASTOLE: 2.65
Z-SCORE OF LEFT VENTRICULAR DIMENSION IN END DIASTOLE: -12.3
Z-SCORE OF LEFT VENTRICULAR DIMENSION IN END SYSTOLE: -7.49
Z-SCORE OF LEFT VENTRICULAR POSTERIOR WALL IN END DIASTOLE: 1.25

## 2022-04-09 PROCEDURE — 93306 TTE W/DOPPLER COMPLETE: CPT | Performed by: INTERNAL MEDICINE

## 2022-04-12 ENCOUNTER — TELEPHONE (OUTPATIENT)
Dept: CARDIOLOGY CLINIC | Facility: CLINIC | Age: 62
End: 2022-04-12

## 2022-04-24 ENCOUNTER — HOSPITAL ENCOUNTER (INPATIENT)
Facility: HOSPITAL | Age: 62
LOS: 7 days | Discharge: HOME | DRG: 292 | End: 2022-05-01
Attending: EMERGENCY MEDICINE | Admitting: INTERNAL MEDICINE
Payer: COMMERCIAL

## 2022-04-24 ENCOUNTER — APPOINTMENT (EMERGENCY)
Dept: RADIOLOGY | Facility: HOSPITAL | Age: 62
DRG: 292 | End: 2022-04-24
Payer: COMMERCIAL

## 2022-04-24 DIAGNOSIS — R06.00 DYSPNEA ON EXERTION: ICD-10-CM

## 2022-04-24 DIAGNOSIS — R07.9 CHEST PAIN, RULE OUT ACUTE MYOCARDIAL INFARCTION: ICD-10-CM

## 2022-04-24 DIAGNOSIS — R07.9 CHEST PAIN: Primary | ICD-10-CM

## 2022-04-24 DIAGNOSIS — I48.91 ATRIAL FIBRILLATION (HCC): ICD-10-CM

## 2022-04-24 DIAGNOSIS — I50.9 CHF (CONGESTIVE HEART FAILURE) (HCC): ICD-10-CM

## 2022-04-24 PROBLEM — I48.0 PAF (PAROXYSMAL ATRIAL FIBRILLATION) (HCC): Status: ACTIVE | Noted: 2022-02-13

## 2022-04-24 PROBLEM — I50.23 ACUTE ON CHRONIC SYSTOLIC CHF (CONGESTIVE HEART FAILURE) (HCC): Status: ACTIVE | Noted: 2022-04-24

## 2022-04-24 LAB
2HR DELTA HS TROPONIN: 2 NG/L
4HR DELTA HS TROPONIN: 0 NG/L
ANION GAP SERPL CALCULATED.3IONS-SCNC: 10 MMOL/L (ref 4–13)
ATRIAL RATE: 150 BPM
ATRIAL RATE: 340 BPM
BASOPHILS # BLD AUTO: 0.03 THOUSANDS/ΜL (ref 0–0.1)
BASOPHILS NFR BLD AUTO: 0 % (ref 0–1)
BUN SERPL-MCNC: 21 MG/DL (ref 5–25)
CALCIUM SERPL-MCNC: 7.8 MG/DL (ref 8.3–10.1)
CARDIAC TROPONIN I PNL SERPL HS: 11 NG/L
CARDIAC TROPONIN I PNL SERPL HS: 11 NG/L
CARDIAC TROPONIN I PNL SERPL HS: 13 NG/L
CHLORIDE SERPL-SCNC: 106 MMOL/L (ref 100–108)
CO2 SERPL-SCNC: 24 MMOL/L (ref 21–32)
CREAT SERPL-MCNC: 1.38 MG/DL (ref 0.6–1.3)
EOSINOPHIL # BLD AUTO: 0.23 THOUSAND/ΜL (ref 0–0.61)
EOSINOPHIL NFR BLD AUTO: 3 % (ref 0–6)
ERYTHROCYTE [DISTWIDTH] IN BLOOD BY AUTOMATED COUNT: 12.4 % (ref 11.6–15.1)
GFR SERPL CREATININE-BSD FRML MDRD: 55 ML/MIN/1.73SQ M
GLUCOSE SERPL-MCNC: 109 MG/DL (ref 65–140)
HCT VFR BLD AUTO: 44.5 % (ref 36.5–49.3)
HGB BLD-MCNC: 15.1 G/DL (ref 12–17)
IMM GRANULOCYTES # BLD AUTO: 0.03 THOUSAND/UL (ref 0–0.2)
IMM GRANULOCYTES NFR BLD AUTO: 0 % (ref 0–2)
LYMPHOCYTES # BLD AUTO: 1.23 THOUSANDS/ΜL (ref 0.6–4.47)
LYMPHOCYTES NFR BLD AUTO: 14 % (ref 14–44)
MCH RBC QN AUTO: 33.2 PG (ref 26.8–34.3)
MCHC RBC AUTO-ENTMCNC: 33.9 G/DL (ref 31.4–37.4)
MCV RBC AUTO: 98 FL (ref 82–98)
MONOCYTES # BLD AUTO: 0.72 THOUSAND/ΜL (ref 0.17–1.22)
MONOCYTES NFR BLD AUTO: 8 % (ref 4–12)
NEUTROPHILS # BLD AUTO: 6.64 THOUSANDS/ΜL (ref 1.85–7.62)
NEUTS SEG NFR BLD AUTO: 75 % (ref 43–75)
NRBC BLD AUTO-RTO: 0 /100 WBCS
NT-PROBNP SERPL-MCNC: 1992 PG/ML
PLATELET # BLD AUTO: 196 THOUSANDS/UL (ref 149–390)
PMV BLD AUTO: 11 FL (ref 8.9–12.7)
POTASSIUM SERPL-SCNC: 4.4 MMOL/L (ref 3.5–5.3)
QRS AXIS: 89 DEGREES
QRS AXIS: 90 DEGREES
QRSD INTERVAL: 86 MS
QRSD INTERVAL: 88 MS
QT INTERVAL: 334 MS
QT INTERVAL: 348 MS
QTC INTERVAL: 408 MS
QTC INTERVAL: 411 MS
RBC # BLD AUTO: 4.55 MILLION/UL (ref 3.88–5.62)
SODIUM SERPL-SCNC: 140 MMOL/L (ref 136–145)
T WAVE AXIS: 73 DEGREES
T WAVE AXIS: 77 DEGREES
VENTRICULAR RATE: 84 BPM
VENTRICULAR RATE: 90 BPM
WBC # BLD AUTO: 8.88 THOUSAND/UL (ref 4.31–10.16)

## 2022-04-24 PROCEDURE — 93010 ELECTROCARDIOGRAM REPORT: CPT | Performed by: INTERNAL MEDICINE

## 2022-04-24 PROCEDURE — 99223 1ST HOSP IP/OBS HIGH 75: CPT | Performed by: INTERNAL MEDICINE

## 2022-04-24 PROCEDURE — 80048 BASIC METABOLIC PNL TOTAL CA: CPT | Performed by: EMERGENCY MEDICINE

## 2022-04-24 PROCEDURE — 99222 1ST HOSP IP/OBS MODERATE 55: CPT | Performed by: INTERNAL MEDICINE

## 2022-04-24 PROCEDURE — 36415 COLL VENOUS BLD VENIPUNCTURE: CPT | Performed by: EMERGENCY MEDICINE

## 2022-04-24 PROCEDURE — 99285 EMERGENCY DEPT VISIT HI MDM: CPT | Performed by: EMERGENCY MEDICINE

## 2022-04-24 PROCEDURE — 85025 COMPLETE CBC W/AUTO DIFF WBC: CPT | Performed by: EMERGENCY MEDICINE

## 2022-04-24 PROCEDURE — 93005 ELECTROCARDIOGRAM TRACING: CPT

## 2022-04-24 PROCEDURE — 83880 ASSAY OF NATRIURETIC PEPTIDE: CPT | Performed by: EMERGENCY MEDICINE

## 2022-04-24 PROCEDURE — 71046 X-RAY EXAM CHEST 2 VIEWS: CPT

## 2022-04-24 PROCEDURE — 99285 EMERGENCY DEPT VISIT HI MDM: CPT

## 2022-04-24 PROCEDURE — 90471 IMMUNIZATION ADMIN: CPT | Performed by: PHYSICIAN ASSISTANT

## 2022-04-24 PROCEDURE — 84484 ASSAY OF TROPONIN QUANT: CPT | Performed by: PHYSICIAN ASSISTANT

## 2022-04-24 PROCEDURE — 90682 RIV4 VACC RECOMBINANT DNA IM: CPT | Performed by: PHYSICIAN ASSISTANT

## 2022-04-24 PROCEDURE — 84484 ASSAY OF TROPONIN QUANT: CPT | Performed by: EMERGENCY MEDICINE

## 2022-04-24 RX ORDER — DILTIAZEM HYDROCHLORIDE 60 MG/1
60 TABLET, FILM COATED ORAL 2 TIMES DAILY
Status: DISCONTINUED | OUTPATIENT
Start: 2022-04-24 | End: 2022-04-24

## 2022-04-24 RX ORDER — ASPIRIN 81 MG/1
81 TABLET ORAL DAILY
Status: DISCONTINUED | OUTPATIENT
Start: 2022-04-25 | End: 2022-04-25

## 2022-04-24 RX ORDER — ATORVASTATIN CALCIUM 40 MG/1
40 TABLET, FILM COATED ORAL
Status: DISCONTINUED | OUTPATIENT
Start: 2022-04-24 | End: 2022-05-01 | Stop reason: HOSPADM

## 2022-04-24 RX ORDER — FUROSEMIDE 10 MG/ML
40 INJECTION INTRAMUSCULAR; INTRAVENOUS ONCE
Status: COMPLETED | OUTPATIENT
Start: 2022-04-24 | End: 2022-04-24

## 2022-04-24 RX ORDER — PANTOPRAZOLE SODIUM 40 MG/1
40 TABLET, DELAYED RELEASE ORAL
Status: DISCONTINUED | OUTPATIENT
Start: 2022-04-25 | End: 2022-05-01 | Stop reason: HOSPADM

## 2022-04-24 RX ORDER — FUROSEMIDE 10 MG/ML
40 INJECTION INTRAMUSCULAR; INTRAVENOUS 2 TIMES DAILY
Status: DISCONTINUED | OUTPATIENT
Start: 2022-04-24 | End: 2022-04-28

## 2022-04-24 RX ORDER — ONDANSETRON 2 MG/ML
4 INJECTION INTRAMUSCULAR; INTRAVENOUS EVERY 6 HOURS PRN
Status: DISCONTINUED | OUTPATIENT
Start: 2022-04-24 | End: 2022-05-01 | Stop reason: HOSPADM

## 2022-04-24 RX ORDER — ACETAMINOPHEN 325 MG/1
650 TABLET ORAL EVERY 6 HOURS PRN
Status: DISCONTINUED | OUTPATIENT
Start: 2022-04-24 | End: 2022-05-01 | Stop reason: HOSPADM

## 2022-04-24 RX ORDER — POTASSIUM CHLORIDE 20 MEQ/1
20 TABLET, EXTENDED RELEASE ORAL DAILY
Status: DISCONTINUED | OUTPATIENT
Start: 2022-04-24 | End: 2022-04-30

## 2022-04-24 RX ORDER — ASPIRIN 81 MG/1
324 TABLET, CHEWABLE ORAL ONCE
Status: DISCONTINUED | OUTPATIENT
Start: 2022-04-24 | End: 2022-04-24

## 2022-04-24 RX ORDER — METOPROLOL TARTRATE 5 MG/5ML
5 INJECTION INTRAVENOUS EVERY 4 HOURS PRN
Status: DISCONTINUED | OUTPATIENT
Start: 2022-04-24 | End: 2022-05-01 | Stop reason: HOSPADM

## 2022-04-24 RX ORDER — ALBUTEROL SULFATE 90 UG/1
1 AEROSOL, METERED RESPIRATORY (INHALATION) EVERY 4 HOURS PRN
Status: DISCONTINUED | OUTPATIENT
Start: 2022-04-24 | End: 2022-05-01 | Stop reason: HOSPADM

## 2022-04-24 RX ADMIN — FUROSEMIDE 40 MG: 10 INJECTION, SOLUTION INTRAVENOUS at 17:20

## 2022-04-24 RX ADMIN — NITROGLYCERIN 1 INCH: 20 OINTMENT TOPICAL at 12:21

## 2022-04-24 RX ADMIN — ENOXAPARIN SODIUM 40 MG: 40 INJECTION SUBCUTANEOUS at 15:44

## 2022-04-24 RX ADMIN — INFLUENZA A VIRUS A/WISCONSIN/588/2019 (H1N1) RECOMBINANT HEMAGGLUTININ ANTIGEN, INFLUENZA A VIRUS A/TASMANIA/503/2020 (H3N2) RECOMBINANT HEMAGGLUTININ ANTIGEN, INFLUENZA B VIRUS B/WASHINGTON/02/2019 RECOMBINANT HEMAGGLUTININ ANTIGEN, AND INFLUENZA B VIRUS B/PHUKET/3073/2013 RECOMBINANT HEMAGGLUTININ ANTIGEN 0.5 ML: 45; 45; 45; 45 INJECTION INTRAMUSCULAR at 15:44

## 2022-04-24 RX ADMIN — METOPROLOL TARTRATE 5 MG: 1 INJECTION, SOLUTION INTRAVENOUS at 19:03

## 2022-04-24 RX ADMIN — POTASSIUM CHLORIDE 20 MEQ: 20 TABLET, EXTENDED RELEASE ORAL at 15:44

## 2022-04-24 RX ADMIN — ACETAMINOPHEN 325MG 650 MG: 325 TABLET ORAL at 19:03

## 2022-04-24 RX ADMIN — DILTIAZEM HYDROCHLORIDE 60 MG: 60 TABLET, FILM COATED ORAL at 22:24

## 2022-04-24 RX ADMIN — METOPROLOL TARTRATE 25 MG: 25 TABLET, FILM COATED ORAL at 22:24

## 2022-04-24 RX ADMIN — FUROSEMIDE 40 MG: 10 INJECTION, SOLUTION INTRAVENOUS at 13:20

## 2022-04-24 RX ADMIN — ATORVASTATIN CALCIUM 40 MG: 40 TABLET, FILM COATED ORAL at 17:20

## 2022-04-24 NOTE — CONSULTS
Cardiology Consultation  Si Second, MD Apolinar Light DO, MD Tc Denny DO, Sung ePña DO, [facility]  ----------------------------------------------------------------  [address]  [address] [place] [address]    Linda Rach 62 y o  male MRN: [id number]  Unit/Bed#: E4 -01 Encounter: [id number]      Reason for Consultation:  Heart failure/chest pain      ASSESSMENT:    Acute on chronic combined systolic and diastolic heart failure   Paroxysmal atrial fibrillation w/ [id number] score of 1   Precordial chest pain likely secondary to heart failure   Nonischemic cardiomyopathy   Abnormal pharmacologic nuclear stress test negative for myocardial ischemia with presumed nonischemic cardiomyopathy, gated EF 32%, April 2022   LVEF 30%, probable diastolic dysfunction, moderate RV dilatation with mildly reduced function, moderate biatrial dilatation, AV sclerosis, mild MAC, mild MR/TR w/ PASP 35-40 mm Hg, April 2022   Severe obesity s/p gastric bypass surgery   Peptic ulcer disease   History of heavy alcohol use   CKD stage III    PLAN:  Patient has shortness of breath with lower extremity edema and pulmonary edema on chest x-ray  Recent echocardiogram demonstrates severely reduced left ventricular systolic function and some right ventricular systolic dysfunction  Clinically the patient is in acute heart failure  Start Lasix 40 mg IV b i d    Strict I/Os and daily weights  Will discontinue diltiazem and increase metoprolol to 25 mg Q 6 hours with holding parameters  Continue aspirin and high-intensity statin  Cardiac enzymes negative over 3 sets; doubt ACS  Keep potassium greater than 4 and magnesium greater than 2  Have discussed the addition of anticoagulation for atrial fibrillation protection as patient has CHADSVASc score now of 1 and family history of CVA; patient will think about it tonight and discuss decision in the morning    Signed: Adiel Garcia DO, [facility], FACP      History of Present Illness:  Kenneth Orozco is a 62 y o  male with nonischemic cardiomyopathy, paroxysmal atrial fibrillation, chronic combined systolic and diastolic heart failure, former alcohol user and obesity with prior gastric bypass surgery presented with significant shortness of breath  He became very short of breath this morning while outside on his tractor and EMS was called  He felt his heart was pounding extremely hard within not in his chest   He also admitted to gating over 10 lb with lower extremity swelling and abdominal swelling  Subsequently he was brought to [facility] for evaluation  As an outpatient, follows with Dr Palma Grissom  Due to his history of atrial fibrillation, he was sent for echocardiogram and stress test   The echocardiogram demonstrated severely reduced left ventricular systolic function, right ventricular systolic dysfunction and mildly elevated pulmonary pressures  Stress test was found to be negative for myocardial ischemia, but did demonstrate significant cardiomyopathy  Due to the patient's chest discomfort and symptoms, he was given aspirin and IV diuretic  Currently, patient is without any episodes of chest discomfort  Denies paroxysmal nocturnal dyspnea  Denies lightheadedness, dizziness or palpitations  Feels somewhat improved with IV diuresis  Review of Systems:  Review of Systems   Constitutional: Negative for decreased appetite, fever, weight gain and weight loss  HENT: Negative for congestion and sore throat  Eyes: Negative for visual disturbance  Cardiovascular: Positive for chest pain, dyspnea on exertion and leg swelling  Negative for near-syncope and palpitations  Respiratory: Positive for shortness of breath  Negative for cough  Hematologic/Lymphatic: Negative for bleeding problem  Skin: Negative for rash  Musculoskeletal: Negative for myalgias and neck pain  Gastrointestinal: Negative for abdominal pain and nausea  Neurological: Negative for light-headedness and weakness  Psychiatric/Behavioral: Negative for depression  Past Medical History:   Diagnosis Date    [facility]-fib [facility])     Acute ulcer of stomach     GERD (gastroesophageal reflux disease)     Rib fractures        Past Surgical History:   Procedure Laterality Date    COLONOSCOPY      ESOPHAGOGASTRODUODENOSCOPY      GASTRIC BYPASS      MANDIBLE FRACTURE SURGERY         Social History     Socioeconomic History    Marital status: /Civil Union     Spouse name: None    Number of children: None    Years of education: None    Highest education level: None   Occupational History    None   Tobacco Use    Smoking status: Former Smoker     Types: Cigars    Smokeless tobacco: Former User     Types: Chew   Vaping Use    Vaping Use: Never used   Substance and Sexual Activity    Alcohol use: Yes     Alcohol/week: 7 0 standard drinks     Types: 7 Cans of beer per week    Drug use: No    Sexual activity: None   Other Topics Concern    None   Social History Narrative    Always uses seat belt    Feels safe at home    No guns in the home     Social Determinants of Health     Financial Resource Strain: Not on file   Food Insecurity: Not on file   Transportation Needs: Not on file   Physical Activity: Not on file   Stress: Not on file   Social Connections: Not on file   Intimate Partner Violence: Not on file   Housing Stability: Not on file       Family History   Problem Relation Age of Onset    Seizures Father        No Known Allergies    No current facility-administered medications on file prior to encounter       Current Outpatient Medications on File Prior to Encounter   Medication Sig    diltiazem (CARDIZEM) 60 mg tablet Take 1 tablet (60 mg total) by mouth 2 (two) times a day    metoprolol tartrate (LOPRESSOR) 25 mg tablet Take 1 tablet (25 mg total) by mouth every 12 (twelve) hours    Multiple Vitamins-Minerals (MULTIVITAMIN ADULT EXTRA C PO) Take 1 capsule by mouth    pantoprazole (PROTONIX) 40 mg tablet TAKE 1 TABLET EVERY 12 HOURS (Patient taking differently: daily One tablet daily )    albuterol (PROVENTIL HFA,VENTOLIN HFA) 90 mcg/act inhaler INHALE 2 PUFFS EVERY 6 HOURS AS NEEDED FOR WHEEZING (Patient not taking: Reported on 4/24/2022)    Chlorpheniramine-Phenylephrine (SINUS/ALLERGY PE PO) Take by mouth    [DISCONTINUED] ferrous sulfate 325 (65 Fe) mg tablet 1 PO QD    [DISCONTINUED] fluticasone (FLONASE) 50 mcg/act nasal spray 2 sprays into each nostril daily (Patient taking differently: 2 sprays into each nostril daily as needed  )        Current Facility-Administered Medications   Medication Dose Route Frequency Provider Last Rate    acetaminophen  650 mg Oral Q6H PRN Evia Distance, PA-NATE      albuterol  1 puff Inhalation Q4H PRN Evia Distance, JENIFER      [START ON 4/25/2022] aspirin  81 mg Oral Daily Yue Garcia PA-C      atorvastatin  40 mg Oral Daily With Dinner Evia Distance, JENIFER      diltiazem  60 mg Oral BID Evia Distance, JENIFER      enoxaparin  40 mg Subcutaneous Daily Yue Garcia PA-C      furosemide  40 mg Intravenous BID Evia Distance, JENIFER      influenza vaccine  0 5 mL Intramuscular Once Evia Distance, JENIFER      metoprolol tartrate  25 mg Oral Q12H [facility] Yue Garcia PA-C      ondansetron  4 mg Intravenous Q6H PRN Yue Garcia PA-C      [START ON 4/25/2022] pantoprazole  40 mg Oral Early Morning Yue Garcia PA-C      potassium chloride  20 mEq Oral Daily Yue Garcia PA-C              Vitals:    04/24/22 1148 04/24/22 1223 04/24/22 1405 04/24/22 1510   BP:  112/60 113/65 118/62   BP Location:  Right arm Right arm Right arm   Pulse:  82 72 68   Resp:  18 20 20   Temp: 98 6 °F (37 °C)  97 8 °F (36 6 °C) 98 °F (36 7 °C)   TempSrc: Oral  Temporal Temporal   SpO2:  98% 97% 96%       No intake/output data recorded      No intake or output data in the 24 hours ending 04/24/22 1525    Weight change:     PHYSICAL EXAMINATION:  Gen: Awake, Alert, NAD  Head/eyes: AT/NC, pupils equal and round, Anicteric  ENT: mmm  Neck: Supple, +JVP, trachea midline  Resp:  Decreased breath sounds bilaterally  CV: irreg irreg+S1, S2, No m/r/g  Abd: Soft, obese, NT/ND + BS  Ext: +2 pitting LE edema bilaterally  Neuro: Follows commands, moves all extermities  Psych: Appropriate affect, normal mood, pleasant attitude, non-combative  Skin: warm; no rash, erythema or venous stasis changes on exposed skin    Lab Results:  Results from last 7 days   Lab Units 04/24/22  1154   WBC Thousand/uL 8 88   HEMOGLOBIN g/dL 15 1   HEMATOCRIT % 44 5   PLATELETS Thousands/uL 196     Results from last 7 days   Lab Units 04/24/22  1154   POTASSIUM mmol/L 4 4   CHLORIDE mmol/L 106   CO2 mmol/L 24   BUN mg/dL 21   CREATININE mg/dL 1 38*   CALCIUM mg/dL 7 8*     No results found for: TROPONINT      Results from last 7 days   Lab Units 04/24/22  1344 04/24/22  1154   HS TNI 0HR ng/L  --  11   HS TNI 2HR ng/L 13  --                    No results found for this or any previous visit  No results found for this or any previous visit  No results found for this or any previous visit  Results for orders placed during the hospital encounter of 04/07/22    NM myocardial perfusion spect (rx stress and/or rest)    Interpretation Summary    Abnormal pharmacologic nuclear stress test   Dilated left ventricle with moderately reduced left ventricular systolic function  Ejection fraction 32%  Normal perfusion  These findings are suggestive of a nonischemic cardiomyopathy    Stress ECG: The stress ECG is negative for ischemia after pharmacologic stress    Perfusion Defect Conclusion: The rest end diastolic cavity size is enlarged  The stress end diastolic cavity size is dilated    Stress Function: Left ventricular function post-stress is abnormal  Global function is moderately reduced  Post-stress ejection fraction is 32 %  CXR: Results for orders placed during the hospital encounter of 04/24/22    XR chest 2 views    Narrative  CHEST    INDICATION:   chest pain  Patient has confirmed COVID-19   2/13/2022    COMPARISON:  2/26/2022    EXAM PERFORMED/VIEWS:  XR CHEST PA & LATERAL  PA and lateral erect  Images: 2    FINDINGS:    Heart shadow is enlarged but unchanged from prior exam     Vascular congestion and interstitial prominence  Tiny effusions  Fissural fluid noted  Old left rib fractures  No acute osseous abnormalities  Impression  There are findings consistent with mild CHF and pulmonary edema  Tiny effusions  The study was marked in [facility] for immediate notification  Workstation performed: [id number]    Results for orders placed during the hospital encounter of 02/26/22    X-ray chest 1 view portable    Narrative  CHEST    INDICATION:   chest pain  COMPARISON:  2/24/2022    EXAM PERFORMED/VIEWS:  XR CHEST PORTABLE      FINDINGS:    Cardiomediastinal silhouette appears unremarkable  The lungs are clear  No pneumothorax or pleural effusion  Multiple old left rib fractures again noted  Impression  No acute cardiopulmonary disease  Workstation performed: [id number]      ECG:  Atrial fibrillation, RAD, poor R-wave progression    This note was completed in part utilizing [employer] Fluency Direct Software  Grammatical errors, random word insertions, spelling mistakes, and incomplete sentences may be an occasional consequence of this system secondary to software limitations, ambient noise, and hardware issues  If you have any questions or concerns about the content, text, or information contained within the body of this dictation, please contact the provider for clarification

## 2022-04-24 NOTE — ASSESSMENT & PLAN NOTE
Wt Readings from Last 3 Encounters:   04/07/22 127 kg (281 lb)   04/07/22 127 kg (281 lb)   02/28/22 127 kg (281 lb)     Patient presents with acute shortness of breath, 10 lb weight gain, leg swelling abdominal distention  BNP 1900  Fortunately is hemodynamically stable on room air at this time  Not maintained on diuretics outpatient  Recently had nuclear stress test and echo 04/07/2022 revealing ejection fraction 32%, stress test was negative for ischemia  Follows with Dr Rey Christie cardiology   Start IV Lasix 40 mg b i d    Continue Cardizem and Lopressor  Loaded with aspirin by EMS, continue aspirin 81 mg daily and Lipitor 40 mg daily  Check A1c and lipid panel  Telemetry monitoring  Daily weight, I/O  Cardiac diet, fluid restriction, nutrition consulted   Discussed low-sodium diet on admission

## 2022-04-24 NOTE — PROGRESS NOTES
Called to evaluate telemetry as patient tachycardic  Will utilize as needed IV metoprolol for now given cardiomyopathy    If needed then would consider diltiazem infusion

## 2022-04-24 NOTE — PLAN OF CARE
Problem: PAIN - ADULT  Goal: Verbalizes/displays adequate comfort level or baseline comfort level  Description: Interventions:  - Encourage patient to monitor pain and request assistance  - Assess pain using appropriate pain scale  - Administer analgesics based on type and severity of pain and evaluate response  - Implement non-pharmacological measures as appropriate and evaluate response  - Consider cultural and social influences on pain and pain management  - Notify physician/advanced practitioner if interventions unsuccessful or patient reports new pain  Outcome: Progressing     Problem: INFECTION - ADULT  Goal: Absence or prevention of progression during hospitalization  Description: INTERVENTIONS:  - Assess and monitor for signs and symptoms of infection  - Monitor lab/diagnostic results  - Monitor all insertion sites, i e  indwelling lines, tubes, and drains  - Monitor endotracheal if appropriate and nasal secretions for changes in amount and color  - [place] appropriate cooling/warming therapies per order  - Administer medications as ordered  - Instruct and encourage patient and family to use good hand hygiene technique  - Identify and instruct in appropriate isolation precautions for identified infection/condition  Outcome: Progressing  Goal: Absence of fever/infection during neutropenic period  Description: INTERVENTIONS:  - Monitor WBC    Outcome: Progressing     Problem: SAFETY ADULT  Goal: Patient will remain free of falls  Description: INTERVENTIONS:  - Educate patient/family on patient safety including physical limitations  - Instruct patient to call for assistance with activity   - Consult OT/PT to assist with strengthening/mobility   - Keep Call bell within reach  - Keep bed low and locked with side rails adjusted as appropriate  - Keep care items and personal belongings within reach  - Initiate and maintain comfort rounds  - Make Fall Risk Sign visible to staff  - Offer Toileting every  Hours, in advance of need  - Initiate/Maintain alarm  - Obtain necessary fall risk management equipment:   - Apply yellow socks and bracelet for high fall risk patients  - Consider moving patient to room near nurses station  Outcome: Progressing  Goal: Maintain or return to baseline ADL function  Description: INTERVENTIONS:  -  Assess patient's ability to carry out ADLs; assess patient's baseline for ADL function and identify physical deficits which impact ability to perform ADLs (bathing, care of mouth/teeth, toileting, grooming, dressing, etc )  - Assess/evaluate cause of self-care deficits   - Assess range of motion  - Assess patient's mobility; develop plan if impaired  - Assess patient's need for assistive devices and provide as appropriate  - Encourage maximum independence but intervene and supervise when necessary  - Involve family in performance of ADLs  - Assess for home care needs following discharge   - Consider OT consult to assist with ADL evaluation and planning for discharge  - Provide patient education as appropriate  Outcome: Progressing  Goal: Maintains/Returns to pre admission functional level  Description: INTERVENTIONS:  - Perform BMAT or MOVE assessment daily    - Set and communicate daily mobility goal to care team and patient/family/caregiver  - Collaborate with rehabilitation services on mobility goals if consulted  - Perform Range of Motion  times a day  - Reposition patient every  hours    - Dangle patient  times a day  - Stand patient  times a day  - Ambulate patient  times a day  - Out of bed to chair  times a day   - Out of bed for meals times a day  - Out of bed for toileting  - Record patient progress and toleration of activity level   Outcome: Progressing     Problem: DISCHARGE PLANNING  Goal: Discharge to home or other facility with appropriate resources  Description: INTERVENTIONS:  - Identify barriers to discharge w/patient and caregiver  - Arrange for needed discharge resources and transportation as appropriate  - Identify discharge learning needs (meds, wound care, etc )  - Arrange for interpretive services to assist at discharge as needed  - Refer to Case Management Department for coordinating discharge planning if the patient needs post-hospital services based on physician/advanced practitioner order or complex needs related to functional status, cognitive ability, or social support system  Outcome: Progressing     Problem: Knowledge Deficit  Goal: Patient/family/caregiver demonstrates understanding of disease process, treatment plan, medications, and discharge instructions  Description: Complete learning assessment and assess knowledge base    Interventions:  - Provide teaching at level of understanding  - Provide teaching via preferred learning methods  Outcome: Progressing     Problem: CARDIOVASCULAR - ADULT  Goal: Maintains optimal cardiac output and hemodynamic stability  Description: INTERVENTIONS:  - Monitor I/O, vital signs and rhythm  - Monitor for S/S and trends of decreased cardiac output  - Administer and titrate ordered vasoactive medications to optimize hemodynamic stability  - Assess quality of pulses, skin color and temperature  - Assess for signs of decreased coronary artery perfusion  - Instruct patient to report change in severity of symptoms  Outcome: Progressing  Goal: Absence of cardiac dysrhythmias or at baseline rhythm  Description: INTERVENTIONS:  - Continuous cardiac monitoring, vital signs, obtain 12 lead EKG if ordered  - Administer antiarrhythmic and heart rate control medications as ordered  - Monitor electrolytes and administer replacement therapy as ordered  Outcome: Progressing

## 2022-04-24 NOTE — ASSESSMENT & PLAN NOTE
History of paroxysmal atrial fibrillation initially hospitalized in February  Currently rate controlled on oral Cardizem 60 mg b i d  and Lopressor 25 mg q 12  Not on anticoagulation outpatient  Telemetry monitoring

## 2022-04-24 NOTE — ED PROVIDER NOTES
History  Chief Complaint   Patient presents with    Chest Pain     Sudden onset of chest pain while mowing the grass  Also reporting SOB and b/l lower extremetiy edema  Hx of afib  62-year-old male presents for evaluation chest pain and shortness of breath  Patient states he has been having an aching substernal/left-sided chest pain for the past several weeks  States is constant, moderate intensity, worse with exertion and without true alleviating factors  Associated with exertional dyspnea as well  Patient was mowing the lawn today we had sudden onset of severe chest pain which is persistent and shortness of breath which resolved with rest         History provided by:  Patient      Prior to Admission Medications   Prescriptions Last Dose Informant Patient Reported? Taking?    Chlorpheniramine-Phenylephrine (SINUS/ALLERGY PE PO)   Yes Yes   Sig: Take by mouth   Multiple Vitamins-Minerals (MULTIVITAMIN ADULT EXTRA C PO)   Yes Yes   Sig: Take 1 capsule by mouth   albuterol (PROVENTIL HFA,VENTOLIN HFA) 90 mcg/act inhaler   No Yes   Sig: INHALE 2 PUFFS EVERY 6 HOURS AS NEEDED FOR WHEEZING   diltiazem (CARDIZEM) 60 mg tablet   No Yes   Sig: Take 1 tablet (60 mg total) by mouth 2 (two) times a day   metoprolol tartrate (LOPRESSOR) 25 mg tablet   No Yes   Sig: Take 1 tablet (25 mg total) by mouth every 12 (twelve) hours   pantoprazole (PROTONIX) 40 mg tablet   No Yes   Sig: TAKE 1 TABLET EVERY 12 HOURS   Patient taking differently: daily One tablet daily       Facility-Administered Medications: None       Past Medical History:   Diagnosis Date    [facility]     Acute ulcer of stomach     GERD (gastroesophageal reflux disease)     Rib fractures        Past Surgical History:   Procedure Laterality Date    COLONOSCOPY      ESOPHAGOGASTRODUODENOSCOPY      GASTRIC BYPASS      MANDIBLE FRACTURE SURGERY         Family History   Problem Relation Age of Onset    Seizures Father      I have reviewed and agree with the history as documented  E-Cigarette/Vaping    E-Cigarette Use Never User      E-Cigarette/Vaping Substances     Social History     Tobacco Use    Smoking status: Former Smoker     Types: Cigars    Smokeless tobacco: Former User     Types: Chew   Vaping Use    Vaping Use: Never used   Substance Use Topics    Alcohol use: Yes     Alcohol/week: 7 0 standard drinks     Types: 7 Cans of beer per week    Drug use: No       Review of Systems   Constitutional: Negative for activity change, appetite change, fatigue and fever  HENT: Negative for congestion, dental problem, ear pain, rhinorrhea and sore throat  Eyes: Negative for pain and redness  Respiratory: Positive for shortness of breath  Negative for chest tightness and wheezing  Cardiovascular: Positive for chest pain and leg swelling  Negative for palpitations  Gastrointestinal: Negative for abdominal pain, blood in stool, constipation, diarrhea, nausea and vomiting  Endocrine: Negative for cold intolerance and heat intolerance  Genitourinary: Negative for dysuria, frequency and hematuria  Musculoskeletal: Negative for arthralgias and myalgias  Skin: Negative for color change, pallor and rash  Neurological: Negative for weakness and numbness  Hematological: Does not bruise/bleed easily  Psychiatric/Behavioral: Negative for agitation, hallucinations and suicidal ideas  Physical Exam  Physical Exam  Constitutional:       Appearance: He is well-developed  HENT:      Head: Normocephalic and atraumatic  Eyes:      General: No scleral icterus  Conjunctiva/sclera: Conjunctivae normal    Neck:      Vascular: No JVD  Trachea: No tracheal deviation  Cardiovascular:      Rate and Rhythm: Normal rate  Rhythm irregular  Heart sounds: Normal heart sounds  Pulmonary:      Effort: Pulmonary effort is normal  No respiratory distress  Breath sounds: Normal breath sounds     Abdominal:      General: There is no distension  Musculoskeletal:         General: No deformity  Normal range of motion  Cervical back: Normal range of motion  Right lower leg: Edema present  Left lower leg: Edema present  Skin:     Coloration: Skin is not pale  Findings: No erythema or rash  Neurological:      Mental Status: He is alert and oriented to person, place, and time     Psychiatric:         Behavior: Behavior normal          Vital Signs  ED Triage Vitals   Temperature Pulse Respirations Blood Pressure SpO2   04/24/22 1148 04/24/22 1145 04/24/22 1145 04/24/22 1145 04/24/22 1145   98 6 °F (37 °C) 90 18 114/85 97 %      Temp Source Heart Rate Source Patient Position - Orthostatic VS BP Location FiO2 (%)   04/24/22 1148 04/24/22 1145 04/24/22 1145 04/24/22 1145 --   Oral Monitor Lying Right arm       Pain Score       04/24/22 1145       5           Vitals:    04/24/22 1145   BP: 114/85   Pulse: 90   Patient Position - Orthostatic VS: Lying         Visual Acuity      ED Medications  Medications   nitroglycerin (NITRO-BID) 2 % TD ointment 1 inch (has no administration in time range)       Diagnostic Studies  Results Reviewed     Procedure Component Value Units Date/Time    CBC and differential [[id number]] Collected: 04/24/22 1154    Lab Status: Final result Specimen: Blood from Line, Venous Updated: 04/24/22 1203     WBC 8 88 Thousand/uL      RBC 4 55 Million/uL      Hemoglobin 15 1 g/dL      Hematocrit 44 5 %      MCV 98 fL      MCH 33 2 pg      MCHC 33 9 g/dL      RDW 12 4 %      MPV 11 0 fL      Platelets 517 Thousands/uL      nRBC 0 /100 WBCs      Neutrophils Relative 75 %      Immat GRANS % 0 %      Lymphocytes Relative 14 %      Monocytes Relative 8 %      Eosinophils Relative 3 %      Basophils Relative 0 %      Neutrophils Absolute 6 64 Thousands/µL      Immature Grans Absolute 0 03 Thousand/uL      Lymphocytes Absolute 1 23 Thousands/µL      Monocytes Absolute 0 72 Thousand/µL      Eosinophils Absolute 0 23 Thousand/µL      Basophils Absolute 0 03 Thousands/µL     HS Troponin 0hr (reflex protocol) [[id number]] Collected: 04/24/22 1154    Lab Status: In process Specimen: Blood from Line, Venous Updated: 04/24/22 1218    Basic metabolic panel [[id number]] Collected: 04/24/22 1154    Lab Status: In process Specimen: Blood from Line, Venous Updated: 04/24/22 1201    NT-BNP PRO [[id number]] Collected: 04/24/22 1154    Lab Status: In process Specimen: Blood from Line, Venous Updated: 04/24/22 1201                 XR chest 2 views   ED Interpretation by Chichi Donnelly MD (04/24 1211)   Primary reviewed no acute abnormality                 Procedures  ECG 12 Lead Documentation Only    Date/Time: 4/24/2022 11:57 AM  Performed by: Chichi Donnelly MD  Authorized by: Chichi Donnelly MD     ECG reviewed by me, the ED Provider: yes    Patient location:  ED  Rate:     ECG rate:  90    ECG rate assessment: normal    Rhythm:     Rhythm: atrial fibrillation    Ectopy:     Ectopy: none    QRS:     QRS axis:  Normal    QRS intervals:  Normal  Conduction:     Conduction: normal    ST segments:     ST segments:  Normal  T waves:     T waves: normal               ED Course                               SBIRT 22yo+      Most Recent Value   SBIRT (24 yo +)    In order to provide better care to our patients, we are screening all of our patients for alcohol and drug use  Would it be okay to ask you these screening questions? No Filed at: 04/24/2022 1146                    MDM  Number of Diagnoses or Management Options  Diagnosis management comments: Cp-will do cardiac work up with delta ekg/trop      Disposition  Final diagnoses:   None     ED Disposition     None      Follow-up Information    None         Patient's Medications   Discharge Prescriptions    No medications on file       No discharge procedures on file      PDMP Review     None          ED Provider  Electronically Signed by           Chichi Donnelly MD  04/25/22 1016

## 2022-04-24 NOTE — ASSESSMENT & PLAN NOTE
Admits to constant substernal chest pain described as a knot since his nuclear stress test on 04/07 which was negative for ischemia  Loaded with aspirin by EMS   Will continue on ASA and statin for now   Check lipid panel   Initial HS trop 11, trend   Prn ECG for worsening chest pain   Tele

## 2022-04-24 NOTE — H&P
[address] [facility]  H&P- Amy William 1960, 62 y o  male MRN: [id number]  Unit/Bed#: ED 14 Encounter: [id number]  Primary Care Provider: Sabiha Almaraz DO   Date and time admitted to hospital: 4/24/2022 11:42 AM    * Acute on chronic systolic CHF (congestive heart failure) (HCC)  Assessment & Plan  Wt Readings from Last 3 Encounters:   04/07/22 127 kg (281 lb)   04/07/22 127 kg (281 lb)   02/28/22 127 kg (281 lb)     Patient presents with acute shortness of breath, 10 lb weight gain, leg swelling abdominal distention  BNP 1900  Fortunately is hemodynamically stable on room air at this time  Not maintained on diuretics outpatient  Recently had nuclear stress test and echo 04/07/2022 revealing ejection fraction 32%, stress test was negative for ischemia  Follows with Dr Fernando Berger cardiology   Start IV Lasix 40 mg b i d    Continue Cardizem and Lopressor  Loaded with aspirin by EMS, continue aspirin 81 mg daily and Lipitor 40 mg daily  Check A1c and lipid panel  Telemetry monitoring  Daily weight, I/O  Cardiac diet, fluid restriction, nutrition consulted   Discussed low-sodium diet on admission        PAF (paroxysmal atrial fibrillation) ([facility])  Assessment & Plan  History of paroxysmal atrial fibrillation initially hospitalized in February  Currently rate controlled on oral Cardizem 60 mg b i d  and Lopressor 25 mg q 12  Not on anticoagulation outpatient  Telemetry monitoring    Chest pain  Assessment & Plan  Admits to constant substernal chest pain described as a knot since his nuclear stress test on 04/07 which was negative for ischemia  Loaded with aspirin by EMS   Will continue on ASA and statin for now   Check lipid panel   Initial HS trop 11, trend   Prn ECG for worsening chest pain   Tele       SRIKANTH (obstructive sleep apnea)  Assessment & Plan  Has been referred for outpatient sleep study     History of Eliud-en-Y gastric bypass  Assessment & Plan  noted      VTE Pharmacologic Prophylaxis: VTE Score: 3 Moderate Risk (Score 3-4) - Pharmacological DVT Prophylaxis Ordered: enoxaparin (Lovenox)  Code Status: Prior level 1   Discussion with family: daughter and wife at bedside   Anticipated Length of Stay: Patient will be admitted on an inpatient basis with an anticipated length of stay of greater than 2 midnights secondary to acute CHF  Total Time for Visit, including Counseling / Coordination of Care: 45 minutes Greater than 50% of this total time spent on direct patient counseling and coordination of care  Chief Complaint:  Chest pain, acute shortness of breath    History of Present Illness:  Salisburyrohan Arevalo is a 62 y o  male with a PMH of chronic systolic CHF, paroxysmal atrial fibrillation, history of gastric bypass who presents with acute shortness of breath     The patient states he started moaning morning and was sitting on his tractor  He stopped to get off and when he got off he got acutely short of breath  She came inside insert yelling for his family who then called EMS  He states all of his symptoms resolved once EMS got there  He felt like his heart was going to jump out of his chest  He states he has had substernal chest pain which she describes as a knot in the center of his chest nm stress test on the 7th of this month  He does admit to about 10 lb weight gain and increased leg swelling and abdominal distention  Evaluated he states he has not reviewed his nuclear stress test were echocardiogram with his cardiologist yet as he was scheduled to follow up with Dr Lieutenant Garcia on Thursday  I reviewed the nuclear stress and echo with patient and his family at bedside revealing ejection fraction of 32% and has regular stress test negative for ischemia  Patient initial troponin of 11, BNP 1900  Concern for 1st admission for acute CHF will start on IV diuretics  I educated him on low-sodium diet we will consult Nutrition for education      He was given 4 baby aspirin by EMS prior to arrival   He does not take aspirin daily with the pannus not on anticoagulation  Review of Systems:  Review of Systems   Constitutional: Negative for chills and fever  Respiratory: Positive for chest tightness and shortness of breath  Negative for cough and wheezing  Cardiovascular: Positive for chest pain and leg swelling  Negative for palpitations  Gastrointestinal: Positive for abdominal distention  Negative for abdominal pain, diarrhea, nausea and vomiting  Genitourinary: Negative for difficulty urinating  Neurological: Negative for dizziness and light-headedness  All other systems reviewed and are negative  Past Medical and Surgical History:   Past Medical History:   Diagnosis Date    [facility]     Acute ulcer of stomach     GERD (gastroesophageal reflux disease)     Rib fractures        Past Surgical History:   Procedure Laterality Date    COLONOSCOPY      ESOPHAGOGASTRODUODENOSCOPY      GASTRIC BYPASS      MANDIBLE FRACTURE SURGERY         Meds/Allergies:  Prior to Admission medications    Medication Sig Start Date End Date Taking?  Authorizing Provider   albuterol (PROVENTIL HFA,VENTOLIN HFA) 90 mcg/act inhaler INHALE 2 PUFFS EVERY 6 HOURS AS NEEDED FOR WHEEZING 3/1/22  Yes Rigoberto Hernandez DO   Chlorpheniramine-Phenylephrine (SINUS/ALLERGY PE PO) Take by mouth   Yes Historical Provider, MD   diltiazem (CARDIZEM) 60 mg tablet Take 1 tablet (60 mg total) by mouth 2 (two) times a day 3/14/22  Yes Imani Myers DO   metoprolol tartrate (LOPRESSOR) 25 mg tablet Take 1 tablet (25 mg total) by mouth every 12 (twelve) hours 3/14/22 4/24/22 Yes Imani Myers DO   Multiple Vitamins-Minerals (MULTIVITAMIN ADULT EXTRA C PO) Take 1 capsule by mouth   Yes Historical Provider, MD   pantoprazole (PROTONIX) 40 mg tablet TAKE 1 TABLET EVERY 12 HOURS  Patient taking differently: daily One tablet daily  11/19/21  Yes Rigoberto Hernandez DO   ferrous sulfate 325 (65 Fe) mg tablet 1 PO QD 3/5/15 4/24/22  Historical Provider, MD   fluticasone (FLONASE) 50 mcg/act nasal spray 2 sprays into each nostril daily  Patient taking differently: 2 sprays into each nostril daily as needed   12/19/21 4/24/22  Aisha Powell PA-C     I have reviewed home medications with patient personally  Allergies: No Known Allergies    Social History:  Marital Status: /Civil Union   Occupation:   Patient Pre-hospital Living Situation: Home  Patient Pre-hospital Level of Mobility: walks  Patient Pre-hospital Diet Restrictions:   Substance Use History:   Social History     Substance and Sexual Activity   Alcohol Use Yes    Alcohol/week: 7 0 standard drinks    Types: 7 Cans of beer per week     Social History     Tobacco Use   Smoking Status Former Smoker    Types: Cigars   Smokeless Tobacco Former User    Types: Chew     Social History     Substance and Sexual Activity   Drug Use No       Family History:  Family History   Problem Relation Age of Onset    Seizures Father        Physical Exam:     Vitals:   Blood Pressure: 112/60 (04/24/22 1223)  Pulse: 82 (04/24/22 1223)  Temperature: 98 6 °F (37 °C) (04/24/22 1148)  Temp Source: Oral (04/24/22 1148)  Respirations: 18 (04/24/22 1223)  SpO2: 98 % (04/24/22 1223)    Physical Exam  Vitals and nursing note reviewed  Constitutional:       General: He is not in acute distress  Appearance: He is obese  He is not ill-appearing or toxic-appearing  HENT:      Head: Normocephalic  Eyes:      Conjunctiva/sclera: Conjunctivae normal    Cardiovascular:      Rate and Rhythm: Normal rate  Rhythm irregularly irregular  Pulmonary:      Effort: Pulmonary effort is normal  No respiratory distress  Breath sounds: No wheezing, rhonchi or rales  Comments: Room air  Abdominal:      General: Bowel sounds are normal       Palpations: Abdomen is soft  Tenderness: There is no abdominal tenderness  There is no rebound     Musculoskeletal:      Right lower leg: Edema present  Left lower leg: Edema present  Comments: 2+ pitting edema lower extremities bilaterally   Neurological:      Mental Status: He is alert  Mental status is at baseline  Psychiatric:         Mood and Affect: Mood normal           Additional Data:     Lab Results:  Results from last 7 days   Lab Units 04/24/22  1154   WBC Thousand/uL 8 88   HEMOGLOBIN g/dL 15 1   HEMATOCRIT % 44 5   PLATELETS Thousands/uL 196   NEUTROS PCT % 75   LYMPHS PCT % 14   MONOS PCT % 8   EOS PCT % 3     Results from last 7 days   Lab Units 04/24/22  1154   SODIUM mmol/L 140   POTASSIUM mmol/L 4 4   CHLORIDE mmol/L 106   CO2 mmol/L 24   BUN mg/dL 21   CREATININE mg/dL 1 38*   ANION GAP mmol/L 10   CALCIUM mg/dL 7 8*   GLUCOSE RANDOM mg/dL 109                       Imaging: Reviewed radiology reports from this admission including: chest xray  XR chest 2 views   ED Interpretation by Kadie Ortega MD (04/24 1211)   Primary reviewed no acute abnormality          EKG and Other Studies Reviewed on Admission:   · EKG:  Atrial fibrillation    ** Please Note: This note has been constructed using a voice recognition system   **

## 2022-04-25 ENCOUNTER — ANESTHESIA EVENT (INPATIENT)
Dept: NON INVASIVE DIAGNOSTICS | Facility: HOSPITAL | Age: 62
DRG: 292 | End: 2022-04-25
Payer: COMMERCIAL

## 2022-04-25 LAB
ANION GAP SERPL CALCULATED.3IONS-SCNC: 8 MMOL/L (ref 4–13)
ATRIAL RATE: 250 BPM
BASOPHILS # BLD AUTO: 0.03 THOUSANDS/ΜL (ref 0–0.1)
BASOPHILS NFR BLD AUTO: 0 % (ref 0–1)
BUN SERPL-MCNC: 20 MG/DL (ref 5–25)
CALCIUM SERPL-MCNC: 8 MG/DL (ref 8.3–10.1)
CHLORIDE SERPL-SCNC: 106 MMOL/L (ref 100–108)
CHOLEST SERPL-MCNC: 144 MG/DL
CO2 SERPL-SCNC: 26 MMOL/L (ref 21–32)
CREAT SERPL-MCNC: 1.04 MG/DL (ref 0.6–1.3)
EOSINOPHIL # BLD AUTO: 0.25 THOUSAND/ΜL (ref 0–0.61)
EOSINOPHIL NFR BLD AUTO: 4 % (ref 0–6)
ERYTHROCYTE [DISTWIDTH] IN BLOOD BY AUTOMATED COUNT: 12.5 % (ref 11.6–15.1)
EST. AVERAGE GLUCOSE BLD GHB EST-MCNC: 117 MG/DL
GFR SERPL CREATININE-BSD FRML MDRD: 77 ML/MIN/1.73SQ M
GLUCOSE SERPL-MCNC: 102 MG/DL (ref 65–140)
HBA1C MFR BLD: 5.7 %
HCT VFR BLD AUTO: 44.3 % (ref 36.5–49.3)
HDLC SERPL-MCNC: 50 MG/DL
HGB BLD-MCNC: 14.8 G/DL (ref 12–17)
IMM GRANULOCYTES # BLD AUTO: 0.02 THOUSAND/UL (ref 0–0.2)
IMM GRANULOCYTES NFR BLD AUTO: 0 % (ref 0–2)
LDLC SERPL CALC-MCNC: 78 MG/DL (ref 0–100)
LYMPHOCYTES # BLD AUTO: 1.37 THOUSANDS/ΜL (ref 0.6–4.47)
LYMPHOCYTES NFR BLD AUTO: 20 % (ref 14–44)
MCH RBC QN AUTO: 31.9 PG (ref 26.8–34.3)
MCHC RBC AUTO-ENTMCNC: 33.4 G/DL (ref 31.4–37.4)
MCV RBC AUTO: 96 FL (ref 82–98)
MONOCYTES # BLD AUTO: 0.64 THOUSAND/ΜL (ref 0.17–1.22)
MONOCYTES NFR BLD AUTO: 9 % (ref 4–12)
NEUTROPHILS # BLD AUTO: 4.47 THOUSANDS/ΜL (ref 1.85–7.62)
NEUTS SEG NFR BLD AUTO: 67 % (ref 43–75)
NONHDLC SERPL-MCNC: 94 MG/DL
NRBC BLD AUTO-RTO: 0 /100 WBCS
PLATELET # BLD AUTO: 187 THOUSANDS/UL (ref 149–390)
PMV BLD AUTO: 10.7 FL (ref 8.9–12.7)
POTASSIUM SERPL-SCNC: 3.7 MMOL/L (ref 3.5–5.3)
QRS AXIS: 50 DEGREES
QRSD INTERVAL: 84 MS
QT INTERVAL: 340 MS
QTC INTERVAL: 503 MS
RBC # BLD AUTO: 4.64 MILLION/UL (ref 3.88–5.62)
SODIUM SERPL-SCNC: 140 MMOL/L (ref 136–145)
T WAVE AXIS: 150 DEGREES
TRIGL SERPL-MCNC: 82 MG/DL
VENTRICULAR RATE: 132 BPM
WBC # BLD AUTO: 6.78 THOUSAND/UL (ref 4.31–10.16)

## 2022-04-25 PROCEDURE — 85025 COMPLETE CBC W/AUTO DIFF WBC: CPT | Performed by: PHYSICIAN ASSISTANT

## 2022-04-25 PROCEDURE — 99232 SBSQ HOSP IP/OBS MODERATE 35: CPT | Performed by: INTERNAL MEDICINE

## 2022-04-25 PROCEDURE — 93005 ELECTROCARDIOGRAM TRACING: CPT

## 2022-04-25 PROCEDURE — 80048 BASIC METABOLIC PNL TOTAL CA: CPT | Performed by: PHYSICIAN ASSISTANT

## 2022-04-25 PROCEDURE — 83036 HEMOGLOBIN GLYCOSYLATED A1C: CPT | Performed by: PHYSICIAN ASSISTANT

## 2022-04-25 PROCEDURE — 93010 ELECTROCARDIOGRAM REPORT: CPT | Performed by: INTERNAL MEDICINE

## 2022-04-25 PROCEDURE — 80061 LIPID PANEL: CPT | Performed by: PHYSICIAN ASSISTANT

## 2022-04-25 RX ORDER — LOSARTAN POTASSIUM 25 MG/1
25 TABLET ORAL DAILY
Status: DISCONTINUED | OUTPATIENT
Start: 2022-04-25 | End: 2022-04-28

## 2022-04-25 RX ADMIN — APIXABAN 5 MG: 5 TABLET, FILM COATED ORAL at 17:02

## 2022-04-25 RX ADMIN — POTASSIUM CHLORIDE 20 MEQ: 20 TABLET, EXTENDED RELEASE ORAL at 08:54

## 2022-04-25 RX ADMIN — FUROSEMIDE 40 MG: 10 INJECTION, SOLUTION INTRAVENOUS at 08:54

## 2022-04-25 RX ADMIN — ENOXAPARIN SODIUM 40 MG: 40 INJECTION SUBCUTANEOUS at 08:54

## 2022-04-25 RX ADMIN — METOPROLOL TARTRATE 25 MG: 25 TABLET, FILM COATED ORAL at 17:16

## 2022-04-25 RX ADMIN — APIXABAN 5 MG: 5 TABLET, FILM COATED ORAL at 13:10

## 2022-04-25 RX ADMIN — ATORVASTATIN CALCIUM 40 MG: 40 TABLET, FILM COATED ORAL at 15:41

## 2022-04-25 RX ADMIN — FUROSEMIDE 40 MG: 10 INJECTION, SOLUTION INTRAVENOUS at 17:02

## 2022-04-25 RX ADMIN — METOPROLOL TARTRATE 5 MG: 1 INJECTION, SOLUTION INTRAVENOUS at 17:30

## 2022-04-25 RX ADMIN — LOSARTAN POTASSIUM 25 MG: 25 TABLET, FILM COATED ORAL at 13:10

## 2022-04-25 RX ADMIN — METOPROLOL TARTRATE 25 MG: 25 TABLET, FILM COATED ORAL at 22:21

## 2022-04-25 RX ADMIN — ASPIRIN 81 MG: 81 TABLET, COATED ORAL at 08:54

## 2022-04-25 RX ADMIN — METOPROLOL TARTRATE 25 MG: 25 TABLET, FILM COATED ORAL at 10:36

## 2022-04-25 RX ADMIN — PANTOPRAZOLE SODIUM 40 MG: 40 TABLET, DELAYED RELEASE ORAL at 05:20

## 2022-04-25 NOTE — PROGRESS NOTES
Cardiology         Progress Note - Cardiology   Ho Albright 62 y o  male MRN: [id number]  Unit/Bed#: E4 -01 Encounter: [id number]          Assessment/Recommendations/Discussion:   1  Acute on chronic systolic and diastolic CHF  2  Proximal atrial fibrillation, chads Vasc score 1  3  Nonischemic cardiomyopathy, ejection fraction 30%  4  Severe obesity status post gastric bypass surgery  5   History of heavy alcohol use      · Continue IV Lasix, remains mildly volume overloaded  · Continue metoprolol to help with rate control  · Will pursue TI/cardioversion tomorrow, then start short-term amiodarone with plans to follow-up with electrophysiology for consideration of ablation  · Strongly recommended strict and indefinite alcohol cessation  · Offered life vest therapy, he will consider this before discharge  · He is agreeable to starting Eliquis after indications, risks, and benefits were reviewed with him for chads Vasc score of 1, and prior to cardioversion  · Add losartan in setting of DCM          Subjective:  Patient seen and examined, admits to shortness of breath with walking in the room today                Physical Exam:  GEN:  NAD  HEENT:  MMM, NCAT, pink conjunctiva, EOMI, nonicteric sclera  CV:  NO JVD/HJR, irregularly irregular, mildly tachycardic, NO M/R/G, +S1/S2, NO PARASTERNAL HEAVE/THRILL, NO LE EDEMA, NO HEPATIC SYSTOLIC PULSATION, WARM EXTREMITIES  RESP:  CTAB/L  ABD:  SOFT, NT, NO GROSS ORGANOMEGALY        Vitals:   /73 (BP Location: Right arm)   Pulse 61   Temp (!) 97 4 °F (36 3 °C) (Temporal)   Resp 20   Wt 127 kg (279 lb 15 8 oz)   SpO2 98%   BMI 37 97 kg/m²   Vitals:    04/25/22 0600   Weight: 127 kg (279 lb 15 8 oz)       Intake/Output Summary (Last 24 hours) at 4/25/2022 1147  Last data filed at 4/25/2022 1010  Gross per 24 hour   Intake 420 ml   Output 2775 ml   Net -2355 ml       TELEMETRY:  Atrial fibrillation, mild RVR  Lab Results:  Results from last 7 days   Lab Units 04/25/22  0514   WBC Thousand/uL 6 78   HEMOGLOBIN g/dL 14 8   HEMATOCRIT % 44 3   PLATELETS Thousands/uL 187     Results from last 7 days   Lab Units 04/25/22  0515   POTASSIUM mmol/L 3 7   CHLORIDE mmol/L 106   CO2 mmol/L 26   BUN mg/dL 20   CREATININE mg/dL 1 04   CALCIUM mg/dL 8 0*     Results from last 7 days   Lab Units 04/25/22  0515   POTASSIUM mmol/L 3 7   CHLORIDE mmol/L 106   CO2 mmol/L 26   BUN mg/dL 20   CREATININE mg/dL 1 04   CALCIUM mg/dL 8 0*           Medications:    Current Facility-Administered Medications:     acetaminophen (TYLENOL) tablet 650 mg, 650 mg, Oral, Q6H PRN, Yue Garcia PA-C, 650 mg at 04/24/22 1903    albuterol (PROVENTIL HFA,VENTOLIN HFA) inhaler 1 puff, 1 puff, Inhalation, Q4H PRN, Lorenzo Polo PA-C    aspirin (ECOTRIN LOW STRENGTH) EC tablet 81 mg, 81 mg, Oral, Daily, Yue Garcia PA-C, 81 mg at 04/25/22 0854    atorvastatin (LIPITOR) tablet 40 mg, 40 mg, Oral, Daily With Dinner, Lorenzo Polo PA-C, 40 mg at 04/24/22 1720    enoxaparin (LOVENOX) subcutaneous injection 40 mg, 40 mg, Subcutaneous, Daily, Yue Garcia PA-C, 40 mg at 04/25/22 0854    furosemide (LASIX) injection 40 mg, 40 mg, Intravenous, BID, Yue Garcia PA-C, 40 mg at 04/25/22 0854    metoprolol (LOPRESSOR) injection 5 mg, 5 mg, Intravenous, Q4H PRN, Pleasant [place], DO, 5 mg at 04/24/22 1903    metoprolol tartrate (LOPRESSOR) tablet 25 mg, 25 mg, Oral, Q6H, Lana Gun, DO, 25 mg at 04/25/22 1036    ondansetron (ZOFRAN) injection 4 mg, 4 mg, Intravenous, Q6H PRN, Yue Garcia PA-C    pantoprazole (PROTONIX) EC tablet 40 mg, 40 mg, Oral, Early Morning, Yue Garcia PA-C, 40 mg at 04/25/22 0520    potassium chloride (K-DUR,KLOR-CON) CR tablet 20 mEq, 20 mEq, Oral, Daily, Yue Garcia PA-C, 20 mEq at 04/25/22 3485    This note was completed in part utilizing [employer] Flu[employer] Direct Software    Grammatical errors, random word insertions, spelling mistakes, and incomplete sentences may be an occasional consequence of this system secondary to software limitations, ambient noise, and hardware issues  If you have any questions or concerns about the content, text, or information contained within the body of this dictation, please contact the provider for clarification

## 2022-04-25 NOTE — ANESTHESIA PREPROCEDURE EVALUATION
Procedure:  TI    Relevant Problems   CARDIO  afib  ef 30%   (+) Chest pain   (+) PAF (paroxysmal atrial fibrillation) (HCC)      GI/HEPATIC   (+) Gastrointestinal hemorrhage with melena   (+) Peptic ulcer      HEMATOLOGY   (+) Acute blood loss anemia      NEURO/PSYCH  etoh abuse      PULMONARY   (+) SRIKANTH (obstructive sleep apnea)      Other   (+) History of Eliud-en-Y gastric bypass   (+) Raised intraocular pressure of right eye        Physical Exam    Airway    Mallampati score: II  TM Distance: >3 FB  Neck ROM: full     Dental       Cardiovascular  Rhythm: irregular, Rate: abnormal,     Pulmonary  Breath sounds clear to auscultation,     Other Findings        Anesthesia Plan  ASA Score- 4     Anesthesia Type- IV sedation with anesthesia with ASA Monitors  Additional Monitors:   Airway Plan:           Plan Factors-    Chart reviewed  Patient is not a current smoker  Patient instructed to abstain from smoking on day of procedure  Induction- intravenous  Postoperative Plan-     Informed Consent- Anesthetic plan and risks discussed with patient

## 2022-04-25 NOTE — UTILIZATION REVIEW
Initial Clinical Review    Pt initially admitted as Observation on 4/24 @ 1259  Changed to Inpatient on 4/24 @ 1317  Pt requiring continued stay d/t CHF exacerbation       Admission: Date/Time/Statement:   Admission Orders (From admission, onward)     Ordered        04/24/22 1317  Inpatient Admission  Once            04/24/22 1259  Place in Observation  Once                      Orders Placed This Encounter   Procedures    Inpatient Admission     Standing Status:   Standing     Number of Occurrences:   1     Order Specific Question:   Level of Care     Answer:   Med Surg [16]     Order Specific Question:   Estimated length of stay     Answer:   More than 2 Midnights     Order Specific Question:   Certification     Answer:   I certify that inpatient services are medically necessary for this patient for a duration of greater than two midnights  See H&P and MD Progress Notes for additional information about the patient's course of treatment  ED Arrival Information     Expected Arrival Acuity    - 4/24/2022 11:42 Urgent         Means of arrival Escorted by Service Admission type    Ambulance [facility] Hospitalist Urgent         Arrival complaint    Atrial Fibrilation         Chief Complaint   Patient presents with    Chest Pain     Sudden onset of chest pain while mowing the grass  Also reporting SOB and b/l lower extremetiy edema  Hx of afib  Initial Presentation: 62 y o  male who presented by EMS to [facility] ED  Initially admitted as OBS, but within 30 minutes changed to inpatient admission for evaluation and treatment of acute CHF  PMHx: A-fib, GERD, SRIKANTH, Eliud-en-Y gastric bypass  Presented w/ SOB w/ substernal chest pain  Notes 10 lb weight gain and increased leg and abdominal swelling  On exam, obese, irregularly irregular rhythm, b/l 2+ edema of LE  proBNP 1992  Plan: sodium & fluid restrictions, IV lasix BID, continue home meds, check labs, replete electrolytes prn, telemetry  Cardiology consulted  4/24 - Cardiology Consulty: Pt w/ nonischemic cardiomyoopathy, afib, combined systolic and diastolic HF  Recent outpatient echo showed severely reduced L ventricular systolic function, R ventricular dysfunction  On exam, b/l LE 2+ edema, irregularly irregular rhythm, decreased breath sounds, JVP  Plan: IV lasix 40 mg BID, I&O, DW, discontinue diltiazem, increase metoprolol, continue ASA/statin, Trend labs, replete electrolytes as needed for K > 4, Mag > 2  Date: 04/25/22   Day 2: Pt reports breathing is improved  Notes he does not take diuretics or anticoagulation  Physical exam unremarkable  On telemetry, afib w/ some RVR  Plan: continue IV lasix BID, TI/cardioversion tomorrow, start losartan, pt is considering life-vest, continue home meds, add Eliquis, trend labs       ED Triage Vitals   Temperature Pulse Respirations Blood Pressure SpO2   04/24/22 1148 04/24/22 1145 04/24/22 1145 04/24/22 1145 04/24/22 1145   98 6 °F (37 °C) 90 18 114/85 97 %      Temp Source Heart Rate Source Patient Position - Orthostatic VS BP Location FiO2 (%)   04/24/22 1148 04/24/22 1145 04/24/22 1145 04/24/22 1145 --   Oral Monitor Lying Right arm       Pain Score       04/24/22 1145       5          Wt Readings from Last 1 Encounters:   04/25/22 127 kg (279 lb 15 8 oz)     Additional Vital Signs:   Date/Time Temp Pulse Resp BP MAP (mmHg) SpO2 O2 Device   04/25/22 1034 97 4 °F (36 3 °C) 61 20 114/73 86 98 % None (Room air)   04/25/22 0700 97 6 °F (36 4 °C) 74 20 109/73 81 94 % None (Room air)   04/25/22 0520 -- 75 -- 101/70 -- 96 % --   04/25/22 0254 97 5 °F (36 4 °C) 75 22 115/61 81 95 % --   04/25/22 0012 97 9 °F (36 6 °C) 77 22 98/67 78 94 % --   04/24/22 2215 -- 101 -- 103/78 -- -- --   04/24/22 2119 -- 106 Abnormal  -- 96/65 -- -- --   04/24/22 1924 97 2 °F (36 2 °C) Abnormal  93 22 99/82 87 96 % None (Room air)   04/24/22 1903 -- -- -- -- -- -- None (Room air)   04/24/22 1856 97 3 °F (36 3 °C) Abnormal  118 Abnormal  22 127/89 -- -- --   04/24/22 1510 98 °F (36 7 °C) 68 20 118/62 84 96 % None (Room air)   04/24/22 1405 97 8 °F (36 6 °C) 72 20 113/65 81 97 % None (Room air)   04/24/22 1223 -- 82 18 112/60 -- 98 % None (Room air)       Pertinent Labs/Diagnostic Test Results:   4/24 - EKG  Atrial fibrillation with premature ventricular or aberrantly conducted complexes   Poor R wave progression      XR chest 2 views   ED Interpretation by Jazmine Sharp MD (04/24 1211)   Primary reviewed no acute abnormality      Final Result by Val Dhillon MD (04/24 1358)      There are findings consistent with mild CHF and pulmonary edema  Tiny effusions  The study was marked in [facility] for immediate notification                    Workstation performed: [id number]               Results from last 7 days   Lab Units 04/25/22  0514 04/24/22  1154   WBC Thousand/uL 6 78 8 88   HEMOGLOBIN g/dL 14 8 15 1   HEMATOCRIT % 44 3 44 5   PLATELETS Thousands/uL 187 196   NEUTROS ABS Thousands/µL 4 47 6 64         Results from last 7 days   Lab Units 04/25/22  0515 04/24/22  1154   SODIUM mmol/L 140 140   POTASSIUM mmol/L 3 7 4 4   CHLORIDE mmol/L 106 106   CO2 mmol/L 26 24   ANION GAP mmol/L 8 10   BUN mg/dL 20 21   CREATININE mg/dL 1 04 1 38*   EGFR ml/min/1 73sq m 77 55   CALCIUM mg/dL 8 0* 7 8*       Results from last 7 days   Lab Units 04/25/22  0515 04/24/22  1154   GLUCOSE RANDOM mg/dL 102 109     Results from last 7 days   Lab Units 04/24/22  1616 04/24/22  1344 04/24/22  1154   HS TNI 0HR ng/L  --   --  11   HS TNI 2HR ng/L  --  13  --    HSTNI D2 ng/L  --  2  --    HS TNI 4HR ng/L 11  --   --    HSTNI D4 ng/L 0  --   --      Results from last 7 days   Lab Units 04/24/22  1154   NT-PRO BNP pg/mL 1,992*         ED Treatment:   Medication Administration from 04/24/2022 1142 to 04/24/2022 1413       Date/Time Order Dose Route Action     04/24/2022 1221 nitroglycerin (NITRO-BID) 2 % TD ointment 1 inch 1 inch Topical Given 04/24/2022 1320 furosemide (LASIX) injection 40 mg 40 mg Intravenous Given        Past Medical History:   Diagnosis Date    A-fib ([facility] )     Acute ulcer of stomach     GERD (gastroesophageal reflux disease)     Rib fractures      Present on Admission:   PAF (paroxysmal atrial fibrillation) ([facility] )   SRIKANTH (obstructive sleep apnea)      Admitting Diagnosis: Atrial fibrillation (HCC) [I48 91]  CHF (congestive heart failure) (HCC) [I50 9]  Chest pain [R07 9]  Dyspnea on exertion [R06 00]  Chest pain, rule out acute myocardial infarction [R07 9]  Age/Sex: 62 y o  male  Admission Orders:  Cardiac Diet  1500 mL fluid restriction  Incentive Spirometry  DW  I&O  SCDs  Telemetry  Scheduled Medications:  apixaban, 5 mg, Oral, BID  atorvastatin, 40 mg, Oral, Daily With Dinner  furosemide, 40 mg, Intravenous, BID  losartan, 25 mg, Oral, Daily  metoprolol tartrate, 25 mg, Oral, Q6H  pantoprazole, 40 mg, Oral, Early Morning  potassium chloride, 20 mEq, Oral, Daily    Continuous IV Infusions: none    PRN Meds:  acetaminophen, 650 mg, Oral, Q6H PRN; 4/24 x1  albuterol, 1 puff, Inhalation, Q4H PRN  metoprolol, 5 mg, Intravenous, Q4H PRN; 4/24 x1  ondansetron, 4 mg, Intravenous, Q6H PRN        IP CONSULT TO NUTRITION SERVICES  IP CONSULT TO CARDIOLOGY    Network Utilization Review Department  ATTENTION: Please call with any questions or concerns to [phone] and carefully listen to the prompts so that you are directed to the right person  All voicemails are confidential   Ernie Schreiber all requests for admission clinical reviews, approved or denied determinations and any other requests to dedicated fax number below belonging to the campus where the patient is receiving treatment   List of dedicated fax numbers for the Facilities:  [address] DENIALS (Administrative/Medical Necessity) [phone]   [address] [place] [address] (Maternity/NICU/Pediatrics) [address] [place] [address],[address] [facility]  [phone]   [address] [facility] [place] [address] [id number] [place] [address] [address] [phone]

## 2022-04-25 NOTE — ASSESSMENT & PLAN NOTE
Admits to constant substernal chest pain described as a knot since his nuclear stress test on 04/07 which was negative for ischemia  Loaded with aspirin by EMS   Will continue on ASA and statin for now   Cholesterol 144, triglycerides 82, HDL 50, LDL 78  Troponins without elevation  Cardiology feels this is likely nonischemic cardiomyopathy

## 2022-04-25 NOTE — PLAN OF CARE
Problem: PAIN - ADULT  Goal: Verbalizes/displays adequate comfort level or baseline comfort level  Description: Interventions:  - Encourage patient to monitor pain and request assistance  - Assess pain using appropriate pain scale  - Administer analgesics based on type and severity of pain and evaluate response  - Implement non-pharmacological measures as appropriate and evaluate response  - Consider cultural and social influences on pain and pain management  - Notify physician/advanced practitioner if interventions unsuccessful or patient reports new pain  Outcome: Progressing     Problem: INFECTION - ADULT  Goal: Absence or prevention of progression during hospitalization  Description: INTERVENTIONS:  - Assess and monitor for signs and symptoms of infection  - Monitor lab/diagnostic results  - Monitor all insertion sites, i e  indwelling lines, tubes, and drains  - Monitor endotracheal if appropriate and nasal secretions for changes in amount and color  - [place] appropriate cooling/warming therapies per order  - Administer medications as ordered  - Instruct and encourage patient and family to use good hand hygiene technique  - Identify and instruct in appropriate isolation precautions for identified infection/condition  Outcome: Progressing  Goal: Absence of fever/infection during neutropenic period  Description: INTERVENTIONS:  - Monitor WBC    Outcome: Progressing     Problem: SAFETY ADULT  Goal: Patient will remain free of falls  Description: INTERVENTIONS:  - Educate patient/family on patient safety including physical limitations  - Instruct patient to call for assistance with activity   - Consult OT/PT to assist with strengthening/mobility   - Keep Call bell within reach  - Keep bed low and locked with side rails adjusted as appropriate  - Keep care items and personal belongings within reach  - Initiate and maintain comfort rounds  - Make Fall Risk Sign visible to staff  - Obtain necessary fall risk management equipment  - Apply yellow socks and bracelet for high fall risk patients  - Consider moving patient to room near nurses station  Outcome: Progressing  Goal: Maintain or return to baseline ADL function  Description: INTERVENTIONS:  -  Assess patient's ability to carry out ADLs; assess patient's baseline for ADL function and identify physical deficits which impact ability to perform ADLs (bathing, care of mouth/teeth, toileting, grooming, dressing, etc )  - Assess/evaluate cause of self-care deficits   - Assess range of motion  - Assess patient's mobility; develop plan if impaired  - Assess patient's need for assistive devices and provide as appropriate  - Encourage maximum independence but intervene and supervise when necessary  - Involve family in performance of ADLs  - Assess for home care needs following discharge   - Consider OT consult to assist with ADL evaluation and planning for discharge  - Provide patient education as appropriate  Outcome: Progressing  Goal: Maintains/Returns to pre admission functional level  Description: INTERVENTIONS:  - Perform BMAT or MOVE assessment daily    - Set and communicate daily mobility goal to care team and patient/family/caregiver  - Collaborate with rehabilitation services on mobility goals if consulted  - Perform Range of Motion 3 times a day    - Dangle patient 3 times a day  - Stand patient 3 times a day  - Ambulate patient 3 times a day  - Out of bed to chair 3 times a day   - Out of bed for meals 3 times a day  - Out of bed for toileting  - Record patient progress and toleration of activity level   Outcome: Progressing     Problem: DISCHARGE PLANNING  Goal: Discharge to home or other facility with appropriate resources  Description: INTERVENTIONS:  - Identify barriers to discharge w/patient and caregiver  - Arrange for needed discharge resources and transportation as appropriate  - Identify discharge learning needs (meds, wound care, etc )  - Arrange for interpretive services to assist at discharge as needed  - Refer to Case Management Department for coordinating discharge planning if the patient needs post-hospital services based on physician/advanced practitioner order or complex needs related to functional status, cognitive ability, or social support system  Outcome: Progressing     Problem: Knowledge Deficit  Goal: Patient/family/caregiver demonstrates understanding of disease process, treatment plan, medications, and discharge instructions  Description: Complete learning assessment and assess knowledge base    Interventions:  - Provide teaching at level of understanding  - Provide teaching via preferred learning methods  Outcome: Progressing     Problem: CARDIOVASCULAR - ADULT  Goal: Maintains optimal cardiac output and hemodynamic stability  Description: INTERVENTIONS:  - Monitor I/O, vital signs and rhythm  - Monitor for S/S and trends of decreased cardiac output  - Administer and titrate ordered vasoactive medications to optimize hemodynamic stability  - Assess quality of pulses, skin color and temperature  - Assess for signs of decreased coronary artery perfusion  - Instruct patient to report change in severity of symptoms  Outcome: Progressing  Goal: Absence of cardiac dysrhythmias or at baseline rhythm  Description: INTERVENTIONS:  - Continuous cardiac monitoring, vital signs, obtain 12 lead EKG if ordered  - Administer antiarrhythmic and heart rate control medications as ordered  - Monitor electrolytes and administer replacement therapy as ordered  Outcome: Progressing

## 2022-04-25 NOTE — ASSESSMENT & PLAN NOTE
Wt Readings from Last 3 Encounters:   04/25/22 127 kg (279 lb 15 8 oz)   04/07/22 127 kg (281 lb)   04/07/22 127 kg (281 lb)   Patient presents with acute shortness of breath, 10 lb weight gain, leg swelling abdominal distention  BNP elevated at 1900  Fortunately is hemodynamically stable on room air at this time  Not maintained on diuretics outpatient  Recently had nuclear stress test and echo 04/07/2022 revealing ejection fraction 32%, stress test was negative for ischemia  Follows with Dr Tricia Kamara cardiology here  Started on IV Lasix 40 mg b i d    Cardiology pursuing TI/cardioversion tomorrow and likely to start short-term amiodarone with plans to follow-up with EP as outpatient for consideration ablation  Cardiology added losartan in setting dilated cardiomyopathy  Was offered life vest and patient is considering this prior to d c

## 2022-04-25 NOTE — PROGRESS NOTES
[facility]  Progress Note - Beny oMra 1960, 62 y o  male MRN: [id number]  Unit/Bed#: E4 -01 Encounter: [id number]  Primary Care Provider: Jayshree Stevenson DO   Date and time admitted to hospital: 4/24/2022 11:42 AM    * Acute on chronic systolic CHF (congestive heart failure) ([facility])  Assessment & Plan  Wt Readings from Last 3 Encounters:   04/25/22 127 kg (279 lb 15 8 oz)   04/07/22 127 kg (281 lb)   04/07/22 127 kg (281 lb)   Patient presents with acute shortness of breath, 10 lb weight gain, leg swelling abdominal distention  BNP elevated at 1900  Fortunately is hemodynamically stable on room air at this time  Not maintained on diuretics outpatient  Recently had nuclear stress test and echo 04/07/2022 revealing ejection fraction 32%, stress test was negative for ischemia  Follows with Dr Robby Jewell cardiology here  Started on IV Lasix 40 mg b i d    Cardiology pursuing TI/cardioversion tomorrow and likely to start short-term amiodarone with plans to follow-up with EP as outpatient for consideration ablation  Cardiology added losartan in setting dilated cardiomyopathy  Was offered life vest and patient is considering this prior to d c    PAF (paroxysmal atrial fibrillation) ([facility])  Assessment & Plan  History of paroxysmal atrial fibrillation initially hospitalized in February  Home regimen Cardizem 60 mg b i d  and Lopressor 25 mg q 12  Not on anticoagulation outpatient   Agreeable to start Eliquis prior to cardioversion    Chest pain  Assessment & Plan  Admits to constant substernal chest pain described as a knot since his nuclear stress test on 04/07 which was negative for ischemia  Loaded with aspirin by EMS   Will continue on ASA and statin for now   Cholesterol 144, triglycerides 82, HDL 50, LDL 78  Troponins without elevation  Cardiology feels this is likely nonischemic cardiomyopathy    SRIKANTH (obstructive sleep apnea)  Assessment & Plan  Has been referred for outpatient sleep study     History of Eliud-en-Y gastric bypass  Assessment & Plan  History of obesity status post gastric      VTE Pharmacologic Prophylaxis:   Pharmacologic: Apixaban (Eliquis)  Mechanical VTE Prophylaxis in Place: Yes    Discharge Plan: With need for continued inpatient stay for TI cardioversion tomorrow    Discussions with Specialists or Other Care Team Provider:  Nursing, Cardiology    Education and Discussions with Family / Patient:  Patient    Time Spent for Care: 30 minutes  More than 50% of total time spent on counseling and coordination of care as described above  Current Length of Stay: 1 day(s)  Current Patient Status: Inpatient   Code Status: Level 1 - Full Code    Subjective:   Patient resting in bed  Seen and examined earlier during rounds  Reports his breathing is slightly better since starting IV water pulse here  He does not take water pills or any type of anticoagulation at home  Objective:     Vitals:   Temp (24hrs), Av 6 °F (36 4 °C), Min:97 2 °F (36 2 °C), Max:98 °F (36 7 °C)    Temp:  [97 2 °F (36 2 °C)-98 °F (36 7 °C)] 97 4 °F (36 3 °C)  HR:  [] 61  Resp:  [20-22] 20  BP: ()/(61-89) 114/73  SpO2:  [94 %-98 %] 98 %  Body mass index is 37 97 kg/m²  Input and Output Summary (last 24 hours): Intake/Output Summary (Last 24 hours) at 2022 1436  Last data filed at 2022 1401  Gross per 24 hour   Intake 540 ml   Output 3775 ml   Net -3235 ml       Physical Exam:     Physical Exam  Vitals and nursing note reviewed  Constitutional:       General: He is not in acute distress  Appearance: Normal appearance  He is normal weight  He is not ill-appearing, toxic-appearing or diaphoretic  HENT:      Head: Normocephalic and atraumatic  Eyes:      General: No scleral icterus  Cardiovascular:      Rate and Rhythm: Normal rate and regular rhythm  Pulmonary:      Effort: Pulmonary effort is normal  No respiratory distress  Breath sounds: Normal breath sounds  No stridor  No wheezing or rhonchi  Abdominal:      General: Bowel sounds are normal  There is no distension  Palpations: Abdomen is soft  There is no mass  Tenderness: There is no abdominal tenderness  Hernia: No hernia is present  Musculoskeletal:         General: No swelling  Cervical back: Neck supple  Skin:     General: Skin is warm and dry  Neurological:      Mental Status: He is alert and oriented to person, place, and time  Mental status is at baseline  Psychiatric:         Mood and Affect: Mood normal          Behavior: Behavior normal          Additional Data:     Labs:    Results from last 7 days   Lab Units 04/25/22  0514   WBC Thousand/uL 6 78   HEMOGLOBIN g/dL 14 8   HEMATOCRIT % 44 3   PLATELETS Thousands/uL 187   NEUTROS PCT % 67   LYMPHS PCT % 20   MONOS PCT % 9   EOS PCT % 4     Results from last 7 days   Lab Units 04/25/22  0515   POTASSIUM mmol/L 3 7   CHLORIDE mmol/L 106   CO2 mmol/L 26   BUN mg/dL 20   CREATININE mg/dL 1 04   CALCIUM mg/dL 8 0*           * I Have Reviewed All Lab Data Listed Above  * Additional Pertinent Lab Tests Reviewed:  Andry 67 Admission Reviewed    Imaging:    Imaging Reports Reviewed Today Include:   Imaging Personally Reviewed by Myself Includes:      Recent Cultures (last 7 days):           Last 24 Hours Medication List:   Current Facility-Administered Medications   Medication Dose Route Frequency Provider Last Rate    acetaminophen  650 mg Oral Q6H PRN Letty Strange PA-C      albuterol  1 puff Inhalation Q4H PRN Letty Strange PA-C      apixaban  5 mg Oral BID Rujul Myers, DO      atorvastatin  40 mg Oral Daily With Dinner Letty Strange PA-C      furosemide  40 mg Intravenous BID Yue Garcia PA-C      losartan  25 mg Oral Daily Rujul Myers, DO      metoprolol  5 mg Intravenous Q4H PRN Efren Thomson,       metoprolol tartrate  25 mg Oral Q6H Rujul Myers, DO  ondansetron  4 mg Intravenous Q6H PRN Shaun Hart PA-C      pantoprazole  40 mg Oral Early Morning Yue Garcia PA-C      potassium chloride  20 mEq Oral Daily Shaun Hart PA-C          Today, Patient Was Seen By: Sarah Gauthier PA-C    ** Please Note: This note has been constructed using a voice recognition system   **

## 2022-04-25 NOTE — ASSESSMENT & PLAN NOTE
History of paroxysmal atrial fibrillation initially hospitalized in February  Home regimen Cardizem 60 mg b i d  and Lopressor 25 mg q 12  Not on anticoagulation outpatient   Agreeable to start Eliquis prior to cardioversion

## 2022-04-25 NOTE — PLAN OF CARE
Problem: PAIN - ADULT  Goal: Verbalizes/displays adequate comfort level or baseline comfort level  Description: Interventions:  - Encourage patient to monitor pain and request assistance  - Assess pain using appropriate pain scale  - Administer analgesics based on type and severity of pain and evaluate response  - Implement non-pharmacological measures as appropriate and evaluate response  - Consider cultural and social influences on pain and pain management  - Notify physician/advanced practitioner if interventions unsuccessful or patient reports new pain  Outcome: Progressing     Problem: INFECTION - ADULT  Goal: Absence or prevention of progression during hospitalization  Description: INTERVENTIONS:  - Assess and monitor for signs and symptoms of infection  - Monitor lab/diagnostic results  - Monitor all insertion sites, i e  indwelling lines, tubes, and drains  - Monitor endotracheal if appropriate and nasal secretions for changes in amount and color  - [place] appropriate cooling/warming therapies per order  - Administer medications as ordered  - Instruct and encourage patient and family to use good hand hygiene technique  - Identify and instruct in appropriate isolation precautions for identified infection/condition  Outcome: Progressing     Problem: SAFETY ADULT  Goal: Patient will remain free of falls  Description: INTERVENTIONS:  - Educate patient/family on patient safety including physical limitations  - Instruct patient to call for assistance with activity   - Consult OT/PT to assist with strengthening/mobility   - Keep Call bell within reach  - Keep bed low and locked with side rails adjusted as appropriate  - Keep care items and personal belongings within reach  - Initiate and maintain comfort rounds  - Make Fall Risk Sign visible to staff  Outcome: Progressing  Goal: Maintain or return to baseline ADL function  Description: INTERVENTIONS:  -  Assess patient's ability to carry out ADLs; assess patient's baseline for ADL function and identify physical deficits which impact ability to perform ADLs (bathing, care of mouth/teeth, toileting, grooming, dressing, etc )  - Assess/evaluate cause of self-care deficits   - Assess range of motion  - Assess patient's mobility; develop plan if impaired  - Assess patient's need for assistive devices and provide as appropriate  - Encourage maximum independence but intervene and supervise when necessary  - Involve family in performance of ADLs  - Assess for home care needs following discharge   - Consider OT consult to assist with ADL evaluation and planning for discharge  - Provide patient education as appropriate  Outcome: Progressing  Goal: Maintains/Returns to pre admission functional level  Description: INTERVENTIONS:  - Perform BMAT or MOVE assessment daily    - Set and communicate daily mobility goal to care team and patient/family/caregiver     - Collaborate with rehabilitation services on mobility goals if consulted  - Ambulate patient 3 times a day  - Out of bed to chair 3 times a day   - Out of bed for meals 3 times a day  - Out of bed for toileting  - Record patient progress and toleration of activity level   Outcome: Progressing     Problem: DISCHARGE PLANNING  Goal: Discharge to home or other facility with appropriate resources  Description: INTERVENTIONS:  - Identify barriers to discharge w/patient and caregiver  - Arrange for needed discharge resources and transportation as appropriate  - Identify discharge learning needs (meds, wound care, etc )  - Arrange for interpretive services to assist at discharge as needed  - Refer to Case Management Department for coordinating discharge planning if the patient needs post-hospital services based on physician/advanced practitioner order or complex needs related to functional status, cognitive ability, or social support system  Outcome: Progressing     Problem: Knowledge Deficit  Goal: Patient/family/caregiver demonstrates understanding of disease process, treatment plan, medications, and discharge instructions  Description: Complete learning assessment and assess knowledge base    Interventions:  - Provide teaching at level of understanding  - Provide teaching via preferred learning methods  Outcome: Progressing     Problem: CARDIOVASCULAR - ADULT  Goal: Maintains optimal cardiac output and hemodynamic stability  Description: INTERVENTIONS:  - Monitor I/O, vital signs and rhythm  - Monitor for S/S and trends of decreased cardiac output  - Administer and titrate ordered vasoactive medications to optimize hemodynamic stability  - Assess quality of pulses, skin color and temperature  - Assess for signs of decreased coronary artery perfusion  - Instruct patient to report change in severity of symptoms  Outcome: Progressing  Goal: Absence of cardiac dysrhythmias or at baseline rhythm  Description: INTERVENTIONS:  - Continuous cardiac monitoring, vital signs, obtain 12 lead EKG if ordered  - Administer antiarrhythmic and heart rate control medications as ordered  - Monitor electrolytes and administer replacement therapy as ordered  Outcome: Progressing

## 2022-04-26 ENCOUNTER — ANESTHESIA (INPATIENT)
Dept: NON INVASIVE DIAGNOSTICS | Facility: HOSPITAL | Age: 62
DRG: 292 | End: 2022-04-26
Payer: COMMERCIAL

## 2022-04-26 ENCOUNTER — APPOINTMENT (INPATIENT)
Dept: NON INVASIVE DIAGNOSTICS | Facility: HOSPITAL | Age: 62
DRG: 292 | End: 2022-04-26
Attending: INTERNAL MEDICINE
Payer: COMMERCIAL

## 2022-04-26 LAB
2HR DELTA HS TROPONIN: 0 NG/L
4HR DELTA HS TROPONIN: 0 NG/L
ANION GAP SERPL CALCULATED.3IONS-SCNC: 9 MMOL/L (ref 4–13)
ATRIAL RATE: 93 BPM
BUN SERPL-MCNC: 21 MG/DL (ref 5–25)
CALCIUM SERPL-MCNC: 8.3 MG/DL (ref 8.3–10.1)
CARDIAC TROPONIN I PNL SERPL HS: 5 NG/L
CHLORIDE SERPL-SCNC: 105 MMOL/L (ref 100–108)
CO2 SERPL-SCNC: 25 MMOL/L (ref 21–32)
CREAT SERPL-MCNC: 1.1 MG/DL (ref 0.6–1.3)
GFR SERPL CREATININE-BSD FRML MDRD: 72 ML/MIN/1.73SQ M
GLUCOSE SERPL-MCNC: 102 MG/DL (ref 65–140)
POTASSIUM SERPL-SCNC: 4 MMOL/L (ref 3.5–5.3)
QRS AXIS: 52 DEGREES
QRSD INTERVAL: 84 MS
QT INTERVAL: 376 MS
QTC INTERVAL: 494 MS
SL CV LV EF: 15
SODIUM SERPL-SCNC: 139 MMOL/L (ref 136–145)
T WAVE AXIS: 117 DEGREES
VENTRICULAR RATE: 104 BPM

## 2022-04-26 PROCEDURE — 93321 DOPPLER ECHO F-UP/LMTD STD: CPT | Performed by: INTERNAL MEDICINE

## 2022-04-26 PROCEDURE — 93325 DOPPLER ECHO COLOR FLOW MAPG: CPT | Performed by: INTERNAL MEDICINE

## 2022-04-26 PROCEDURE — 93312 ECHO TRANSESOPHAGEAL: CPT

## 2022-04-26 PROCEDURE — 99232 SBSQ HOSP IP/OBS MODERATE 35: CPT | Performed by: PHYSICIAN ASSISTANT

## 2022-04-26 PROCEDURE — 80048 BASIC METABOLIC PNL TOTAL CA: CPT | Performed by: PHYSICIAN ASSISTANT

## 2022-04-26 PROCEDURE — 93005 ELECTROCARDIOGRAM TRACING: CPT

## 2022-04-26 PROCEDURE — 84484 ASSAY OF TROPONIN QUANT: CPT | Performed by: PHYSICIAN ASSISTANT

## 2022-04-26 PROCEDURE — 93312 ECHO TRANSESOPHAGEAL: CPT | Performed by: INTERNAL MEDICINE

## 2022-04-26 PROCEDURE — 93010 ELECTROCARDIOGRAM REPORT: CPT

## 2022-04-26 RX ORDER — METOPROLOL SUCCINATE 50 MG/1
50 TABLET, EXTENDED RELEASE ORAL 2 TIMES DAILY
Status: DISCONTINUED | OUTPATIENT
Start: 2022-04-26 | End: 2022-04-27

## 2022-04-26 RX ORDER — PROPOFOL 10 MG/ML
INJECTION, EMULSION INTRAVENOUS CONTINUOUS PRN
Status: DISCONTINUED | OUTPATIENT
Start: 2022-04-26 | End: 2022-04-26

## 2022-04-26 RX ORDER — PROPOFOL 10 MG/ML
INJECTION, EMULSION INTRAVENOUS AS NEEDED
Status: DISCONTINUED | OUTPATIENT
Start: 2022-04-26 | End: 2022-04-26

## 2022-04-26 RX ORDER — ALBUMIN (HUMAN) 12.5 G/50ML
25 SOLUTION INTRAVENOUS ONCE
Status: COMPLETED | OUTPATIENT
Start: 2022-04-26 | End: 2022-04-26

## 2022-04-26 RX ORDER — SODIUM CHLORIDE 9 MG/ML
INJECTION, SOLUTION INTRAVENOUS CONTINUOUS PRN
Status: DISCONTINUED | OUTPATIENT
Start: 2022-04-26 | End: 2022-04-26

## 2022-04-26 RX ADMIN — FUROSEMIDE 40 MG: 10 INJECTION, SOLUTION INTRAVENOUS at 17:31

## 2022-04-26 RX ADMIN — APIXABAN 5 MG: 5 TABLET, FILM COATED ORAL at 08:42

## 2022-04-26 RX ADMIN — PANTOPRAZOLE SODIUM 40 MG: 40 TABLET, DELAYED RELEASE ORAL at 05:03

## 2022-04-26 RX ADMIN — METOPROLOL SUCCINATE 50 MG: 50 TABLET, EXTENDED RELEASE ORAL at 15:13

## 2022-04-26 RX ADMIN — METOPROLOL SUCCINATE 50 MG: 50 TABLET, EXTENDED RELEASE ORAL at 20:02

## 2022-04-26 RX ADMIN — METOPROLOL TARTRATE 5 MG: 1 INJECTION, SOLUTION INTRAVENOUS at 23:32

## 2022-04-26 RX ADMIN — PROPOFOL 120 MCG/KG/MIN: 10 INJECTION, EMULSION INTRAVENOUS at 12:32

## 2022-04-26 RX ADMIN — PROPOFOL 30 MG: 10 INJECTION, EMULSION INTRAVENOUS at 12:36

## 2022-04-26 RX ADMIN — ALBUMIN (HUMAN) 25 G: 0.25 INJECTION, SOLUTION INTRAVENOUS at 22:04

## 2022-04-26 RX ADMIN — METOPROLOL TARTRATE 5 MG: 1 INJECTION, SOLUTION INTRAVENOUS at 15:10

## 2022-04-26 RX ADMIN — POTASSIUM CHLORIDE 20 MEQ: 20 TABLET, EXTENDED RELEASE ORAL at 08:42

## 2022-04-26 RX ADMIN — PROPOFOL 80 MG: 10 INJECTION, EMULSION INTRAVENOUS at 12:33

## 2022-04-26 RX ADMIN — PROPOFOL 20 MG: 10 INJECTION, EMULSION INTRAVENOUS at 12:35

## 2022-04-26 RX ADMIN — SODIUM CHLORIDE: 0.9 INJECTION, SOLUTION INTRAVENOUS at 12:27

## 2022-04-26 RX ADMIN — ATORVASTATIN CALCIUM 40 MG: 40 TABLET, FILM COATED ORAL at 16:20

## 2022-04-26 RX ADMIN — APIXABAN 5 MG: 5 TABLET, FILM COATED ORAL at 17:31

## 2022-04-26 NOTE — ANESTHESIA POSTPROCEDURE EVALUATION
Post-Op Assessment Note    CV Status:  Stable    Pain management: adequate     Mental Status:  Alert   PONV Controlled:  None   Airway Patency:  Patent      Post Op Vitals Reviewed: Yes      Staff: Anesthesiologist         No complications documented      BP      Temp (!) 97 3 °F (36 3 °C) (04/26/22 1344)    Pulse (!) 111 (04/26/22 1344)   Resp 20 (04/26/22 1344)    SpO2 98 % (04/26/22 1344)

## 2022-04-26 NOTE — CASE MANAGEMENT
Case Management Discharge Planning Note    Patient name Ho Albright  Location [address] [place] [address] Luite Jesus 87 434/E4 Timmy 40-* MRN [id number]  : 1960 Date 2022       Current Admission Date: 2022  Current Admission Diagnosis:Acute on chronic systolic CHF (congestive heart failure) [facility])   Patient Active Problem List    Diagnosis Date Noted    Acute on chronic systolic CHF (congestive heart failure) ([facility] ) 2022    Chest pain 2022    SRIKANTH (obstructive sleep apnea)     Abnormal CT of the chest 2022    PAF (paroxysmal atrial fibrillation) ([facility] ) 2022    COVID-19 2022    Acute blood loss anemia 2020    History of Eliud-en-Y gastric bypass 2020    Alcohol use 2020    Gastrointestinal hemorrhage with melena 2020    Abnormal CT of the abdomen 2020    Closed fracture of multiple ribs of left side 2019    Visual changes 2018    Blood alcohol level of 240 mg/100 ml or more 2018    Orbit fracture, right, sequela 2018    Raised intraocular pressure of right eye 2018    Peptic ulcer 2017      LOS (days): 2  Geometric Mean LOS (GMLOS) (days): 3 00  Days to GMLOS:1 1     OBJECTIVE:  Risk of Unplanned Readmission Score: 12         Current admission status: Inpatient   Preferred Pharmacy:   [id number] [place] [address]  [address]  [facility] [address]  Phone: [phone] Fax: [phone] [address] [place] [address]  Phone: [phone] Fax: [phone]    Primary Care Provider: Mary Yates DO    Primary Insurance: BLUE CROSS  Secondary Insurance:     DISCHARGE DETAILS:    Discharge planning discussed with[de-identified] pt  Freedom of Choice: Yes  Comments - Freedom of Choice: patient's choice is to return home and patient has declined VNA at this time    Will await to see if patient is agreeable to Life vest prior to D/C  CM contacted family/caregiver?: No- see comments (declined)  Were Treatment Team discharge recommendations reviewed with patient/caregiver?: Yes  Did patient/caregiver verbalize understanding of patient care needs?: Yes  Were patient/caregiver advised of the risks associated with not following Treatment Team discharge recommendations?: Yes         Requested [address]         Is the patient interested in [place] [address] at discharge?: No    DME Referral Provided  Referral made for DME?: No              Treatment Team Recommendation: Home with [address] [employer]  Discharge Destination Plan[de-identified] Home

## 2022-04-26 NOTE — PLAN OF CARE
Problem: PAIN - ADULT  Goal: Verbalizes/displays adequate comfort level or baseline comfort level  Description: Interventions:  - Encourage patient to monitor pain and request assistance  - Assess pain using appropriate pain scale  - Administer analgesics based on type and severity of pain and evaluate response  - Implement non-pharmacological measures as appropriate and evaluate response  - Consider cultural and social influences on pain and pain management  - Notify physician/advanced practitioner if interventions unsuccessful or patient reports new pain  Outcome: Progressing     Problem: INFECTION - ADULT  Goal: Absence or prevention of progression during hospitalization  Description: INTERVENTIONS:  - Assess and monitor for signs and symptoms of infection  - Monitor lab/diagnostic results  - Monitor all insertion sites, i e  indwelling lines, tubes, and drains  - Monitor endotracheal if appropriate and nasal secretions for changes in amount and color  - [place] appropriate cooling/warming therapies per order  - Administer medications as ordered  - Instruct and encourage patient and family to use good hand hygiene technique  - Identify and instruct in appropriate isolation precautions for identified infection/condition  Outcome: Progressing     Problem: SAFETY ADULT  Goal: Patient will remain free of falls  Description: INTERVENTIONS:  - Educate patient/family on patient safety including physical limitations  - Instruct patient to call for assistance with activity   - Consult OT/PT to assist with strengthening/mobility   - Keep Call bell within reach  - Keep bed low and locked with side rails adjusted as appropriate  - Keep care items and personal belongings within reach  - Initiate and maintain comfort rounds  - Make Fall Risk Sign visible to staff  Outcome: Progressing  Goal: Maintain or return to baseline ADL function  Description: INTERVENTIONS:  -  Assess patient's ability to carry out ADLs; assess patient's baseline for ADL function and identify physical deficits which impact ability to perform ADLs (bathing, care of mouth/teeth, toileting, grooming, dressing, etc )  - Assess/evaluate cause of self-care deficits   - Assess range of motion  - Assess patient's mobility; develop plan if impaired  - Assess patient's need for assistive devices and provide as appropriate  - Encourage maximum independence but intervene and supervise when necessary  - Involve family in performance of ADLs  - Assess for home care needs following discharge   - Consider OT consult to assist with ADL evaluation and planning for discharge  - Provide patient education as appropriate  Outcome: Progressing  Goal: Maintains/Returns to pre admission functional level  Description: INTERVENTIONS:  - Perform BMAT or MOVE assessment daily    - Set and communicate daily mobility goal to care team and patient/family/caregiver     - Collaborate with rehabilitation services on mobility goals if consulted  - Ambulate patient 3 times a day  - Out of bed to chair 3 times a day   - Out of bed for meals 3 times a day  - Out of bed for toileting  - Record patient progress and toleration of activity level   Outcome: Progressing     Problem: DISCHARGE PLANNING  Goal: Discharge to home or other facility with appropriate resources  Description: INTERVENTIONS:  - Identify barriers to discharge w/patient and caregiver  - Arrange for needed discharge resources and transportation as appropriate  - Identify discharge learning needs (meds, wound care, etc )  - Arrange for interpretive services to assist at discharge as needed  - Refer to Case Management Department for coordinating discharge planning if the patient needs post-hospital services based on physician/advanced practitioner order or complex needs related to functional status, cognitive ability, or social support system  Outcome: Progressing     Problem: Knowledge Deficit  Goal: Patient/family/caregiver demonstrates understanding of disease process, treatment plan, medications, and discharge instructions  Description: Complete learning assessment and assess knowledge base    Interventions:  - Provide teaching at level of understanding  - Provide teaching via preferred learning methods  Outcome: Progressing     Problem: CARDIOVASCULAR - ADULT  Goal: Maintains optimal cardiac output and hemodynamic stability  Description: INTERVENTIONS:  - Monitor I/O, vital signs and rhythm  - Monitor for S/S and trends of decreased cardiac output  - Administer and titrate ordered vasoactive medications to optimize hemodynamic stability  - Assess quality of pulses, skin color and temperature  - Assess for signs of decreased coronary artery perfusion  - Instruct patient to report change in severity of symptoms  Outcome: Progressing  Goal: Absence of cardiac dysrhythmias or at baseline rhythm  Description: INTERVENTIONS:  - Continuous cardiac monitoring, vital signs, obtain 12 lead EKG if ordered  - Administer antiarrhythmic and heart rate control medications as ordered  - Monitor electrolytes and administer replacement therapy as ordered  Outcome: Progressing

## 2022-04-26 NOTE — PROGRESS NOTES
[address] [facility]  Progress Note - Gina Me 1960, 62 y o  male MRN: [id number]  Unit/Bed#: E4 -01 Encounter: [id number]  Primary Care Provider: Eligio Reaves DO   Date and time admitted to hospital: 4/24/2022 11:42 AM    * Acute on chronic systolic CHF (congestive heart failure) ([facility])  Assessment & Plan  Wt Readings from Last 3 Encounters:   04/26/22 125 kg (275 lb 9 2 oz)   04/07/22 127 kg (281 lb)   04/07/22 127 kg (281 lb)   Patient presents with acute shortness of breath, 10 lb weight gain, leg swelling abdominal distention  BNP elevated at 1900  Fortunately is hemodynamically stable on room air at this time  Not maintained on diuretics outpatient  Recently had nuclear stress test and echo 04/07/2022 revealing ejection fraction 32%, stress test was negative for ischemia  Follows with Dr Jayshree Flood outpatient  [facility] cardiology here  Started on IV Lasix 40 mg b i d - volume status slowly improving  For TI/cardioversion today  May start short-term amiodarone but will wait for cards decision  Will need follow-up with EP as outpatient for consideration ablation  Cardiology added losartan in setting dilated cardiomyopathy  Was offered life vest and patient is considering this prior to d c    PAF (paroxysmal atrial fibrillation) ([facility])  Assessment & Plan  History of paroxysmal atrial fibrillation initially hospitalized in February  Home regimen Cardizem 60 mg b i d  and Lopressor 25 mg q 12  Not on anticoagulation outpatient   Agreeable to start Eliquis prior to cardioversion  Will [facility] to ensure coverage  Cardiology stopped cardizem and increased metoprolol  Await for further recommendations in regards to meds    Chest pain  Assessment & Plan  Admits to constant substernal chest pain described as a knot   This was present since his nuclear stress test 4/07/22 which was negative for ischemia  Loaded with aspirin by EMS   Continue on ASA and statin for now Cholesterol 144, triglycerides 82, HDL 50, LDL 78  Troponins without elevation  Cardiology feels this is likely nonischemic cardiomyopathy    SRIKANTH (obstructive sleep apnea)  Assessment & Plan  Has been referred for outpatient sleep study     History of Eliud-en-Y gastric bypass  Assessment & Plan  History of obesity status post gastric        VTE Pharmacologic Prophylaxis:   Pharmacologic: Apixaban (Eliquis)  Mechanical VTE Prophylaxis in Place: Yes    Discharge Plan: With need for continued inpatient stay for cardioversion today  Expect d c in the next 24-48 hrs depending on clinical course    Discussions with Specialists or Other Care Team Provider: nursing    Education and Discussions with Family / Patient: patient, wife via telephone - updated    Time Spent for Care: 30 minutes  More than 50% of total time spent on counseling and coordination of care as described above  Current Length of Stay: 2 day(s)  Current Patient Status: Inpatient   Code Status: Level 1 - Full Code    Subjective:   Patient seen and evaluated during rounds  He felt short of breath last night with lying down and requested oxygen  Turned down from 2 L to 1 L today  Patient is scheduled for TI cardioversion  Reports his breathing is slowly starting to improve  Lower extremity swelling starting to improve as well    Objective:     Vitals:   Temp (24hrs), Av 6 °F (36 4 °C), Min:97 4 °F (36 3 °C), Max:98 °F (36 7 °C)    Temp:  [97 4 °F (36 3 °C)-98 °F (36 7 °C)] 97 4 °F (36 3 °C)  HR:  [] 55  Resp:  [16-20] 16  BP: ()/(54-75) 95/60  SpO2:  [96 %-99 %] 96 %  Body mass index is 37 37 kg/m²  Input and Output Summary (last 24 hours): Intake/Output Summary (Last 24 hours) at 2022 1021  Last data filed at 2022 0129  Gross per 24 hour   Intake 720 ml   Output 3100 ml   Net -2380 ml       Physical Exam:     Physical Exam  Vitals and nursing note reviewed     Constitutional:       General: He is not in acute distress  Appearance: Normal appearance  He is obese  He is not ill-appearing, toxic-appearing or diaphoretic  HENT:      Head: Normocephalic and atraumatic  Eyes:      General: No scleral icterus  Cardiovascular:      Rate and Rhythm: Normal rate and regular rhythm  Pulmonary:      Effort: Pulmonary effort is normal  No respiratory distress  Breath sounds: Normal breath sounds  No stridor  No wheezing or rhonchi  Comments: Turned down from 2 L to 1 L oxygen nasal cannula  Abdominal:      General: Bowel sounds are normal  There is no distension  Palpations: Abdomen is soft  There is no mass  Tenderness: There is no abdominal tenderness  Hernia: No hernia is present  Musculoskeletal:         General: Swelling present  Cervical back: Neck supple  Skin:     General: Skin is warm and dry  Neurological:      Mental Status: He is alert and oriented to person, place, and time  Mental status is at baseline  Psychiatric:         Mood and Affect: Mood normal          Behavior: Behavior normal          Thought Content: Thought content normal          Additional Data:     Labs:    Results from last 7 days   Lab Units 04/25/22  0514   WBC Thousand/uL 6 78   HEMOGLOBIN g/dL 14 8   HEMATOCRIT % 44 3   PLATELETS Thousands/uL 187   NEUTROS PCT % 67   LYMPHS PCT % 20   MONOS PCT % 9   EOS PCT % 4     Results from last 7 days   Lab Units 04/26/22  0448   POTASSIUM mmol/L 4 0   CHLORIDE mmol/L 105   CO2 mmol/L 25   BUN mg/dL 21   CREATININE mg/dL 1 10   CALCIUM mg/dL 8 3           * I Have Reviewed All Lab Data Listed Above  * Additional Pertinent Lab Tests Reviewed:  Andry 66 Admission Reviewed    Imaging:    Imaging Reports Reviewed Today Include:   Imaging Personally Reviewed by Myself Includes:      Recent Cultures (last 7 days):           Last 24 Hours Medication List:   Current Facility-Administered Medications   Medication Dose Route Frequency Provider Last Rate    acetaminophen  650 mg Oral Q6H PRN Nicolle Snider PA-C      albuterol  1 puff Inhalation Q4H PRN Nicolle Snider PA-C      apixaban  5 mg Oral BID Rujul Myers, DO      atorvastatin  40 mg Oral Daily With Dinner Nicolle Snider PA-C      furosemide  40 mg Intravenous BID Nicolle Snider PA-C      losartan  25 mg Oral Daily Rujul Myers, DO      metoprolol  5 mg Intravenous Q4H PRN Raj Schulz, DO      ondansetron  4 mg Intravenous Q6H PRN Nicolle Snider PA-C      pantoprazole  40 mg Oral Early Morning Yue Garcia PA-C      potassium chloride  20 mEq Oral Daily Nicolle Snider PA-C          Today, Patient Was Seen By: [facility] JENIFER Rothman    ** Please Note: This note has been constructed using a voice recognition system   **

## 2022-04-26 NOTE — ASSESSMENT & PLAN NOTE
Admits to constant substernal chest pain described as a knot   This was present since his nuclear stress test 4/07/22 which was negative for ischemia  Loaded with aspirin by EMS   Continue on ASA and statin for now   Cholesterol 144, triglycerides 82, HDL 50, LDL 78  Troponins without elevation  Cardiology feels this is likely nonischemic cardiomyopathy

## 2022-04-26 NOTE — ASSESSMENT & PLAN NOTE
History of paroxysmal atrial fibrillation initially hospitalized in February  Home regimen Cardizem 60 mg b i d  and Lopressor 25 mg q 12  Not on anticoagulation outpatient   Agreeable to start Eliquis prior to cardioversion  Will send script to [facility] to ensure coverage  Cardiology stopped cardizem and increased metoprolol  Await for further recommendations in regards to meds

## 2022-04-26 NOTE — CASE MANAGEMENT
Case Management Progress Note    Patient name Susan Rascon  Location [place] 4 /E4 [address]-* MRN [id number]  : 1960 Date 2022       LOS (days): 2  Geometric Mean LOS (GMLOS) (days): 3 00  Days to GMLOS:1        OBJECTIVE:        Current admission status: Inpatient  Preferred Pharmacy:   [facility]/pharmacy #[id number] Ayon, [address] [place] [address] [place] [address]  [address] [place] [address]  [facility] [address]  Phone: [phone] Fax: [phone] [address] [place] [address]  Phone: [phone] Fax: [phone]    [address] [place] [address] [address] ,  [address] [place] [address] [address] [facility]  Phone: [phone] Fax: [phone]    Primary Care Provider: Russell Crockett DO    Primary Insurance: BLUE CROSS  Secondary Insurance:     PROGRESS NOTE:    [facility] called to inform CM that patient's insurance does not cover Eliquis    TT sent to Gissell Stanley PA-C to make her aware

## 2022-04-26 NOTE — CASE MANAGEMENT
Case Management Assessment    Patient name Chelsi Bruner  Location [address] [place] [address] [address] 434/E4 [address] 40-* MRN [id number]  : 1960 Date 2022       Current Admission Date: 2022  Current Admission Diagnosis:Acute on chronic systolic CHF (congestive heart failure) [facility])   Patient Active Problem List    Diagnosis Date Noted    Acute on chronic systolic CHF (congestive heart failure) ([facility] ) 2022    Chest pain 2022    SRIKANTH (obstructive sleep apnea)     Abnormal CT of the chest 2022    PAF (paroxysmal atrial fibrillation) ([facility] ) 2022    COVID-19 2022    Acute blood loss anemia 2020    History of Eliud-en-Y gastric bypass 2020    Alcohol use 2020    Gastrointestinal hemorrhage with melena 2020    Abnormal CT of the abdomen 2020    Closed fracture of multiple ribs of left side 2019    Visual changes 2018    Blood alcohol level of 240 mg/100 ml or more 2018    Orbit fracture, right, sequela 2018    Raised intraocular pressure of right eye 2018    Peptic ulcer 2017      LOS (days): 2  Geometric Mean LOS (GMLOS) (days): 3 00  Days to GMLOS:0 9     OBJECTIVE:    Risk of Unplanned Readmission Score: 12         Current admission status: Inpatient       Preferred Pharmacy:   [facility]/pharmacy #[id number] [place] [address] [address] [facility] [address]  [address] [place] [address]  [facility] [address]  Phone: [phone] Fax: [phone] [address] [facility], [address] [place] [address]  Phone: [phone] Fax: [phone]    [address] [place], [place] [address], [place] - [address] ,  [address] ,  [address] [facility] [address]   Phone: [phone] Fax: [phone]    Primary Care Provider: Nakia Fofana DO    Primary Insurance: BLUE CROSS  Secondary Insurance:     ASSESSMENT:  Active Health Care Proxies     HA, [address] Primary Phone: [phone] (Mobile)  Home Phone: [phone]                         Readmission Root Cause  30 Day Readmission: No    Patient Information  Admitted from[de-identified] Home  Mental Status: Alert  During Assessment patient was accompanied by: Not accompanied during assessment  Assessment information provided by[de-identified] Patient  Primary Caregiver: Self  Support Systems: Self,Spouse/significant other,Children,Family members  [place] of Residence: [address] [place] [address] do you live in?: [address] [facility] entry access options  Select all that apply : Stairs  Number of steps to enter home  : 1  Do the steps have railings?: Yes  Type of Current Residence: 2 story home (with basement)  Upon entering residence, is there a bedroom on the main floor (no further steps)?: No  A bedroom is located on the following floor levels of residence (select all that apply):: 2nd Floor  Upon entering residence, is there a bathroom on the main floor (no further steps)?: Yes  Number of steps to 2nd floor from main floor: One Flight  In the last 12 months, was there a time when you were not able to pay the mortgage or rent on time?: No  In the last 12 months, how many places have you lived?: 1  In the last 12 months, was there a time when you did not have a steady place to sleep or slept in a shelter (including now)?: No  Homeless/housing insecurity resource given?: N/A  Living Arrangements: Lives w/ Spouse/significant other (and children)  Is patient a ?: No    Activities of Daily Living Prior to Admission  Functional Status: Independent  Completes ADLs independently?: Yes  Ambulates independently?: Yes  Does patient use assisted devices?: Yes  Assisted Devices (DME) used: [employer]ina Kuhn  Does patient currently own DME?: Yes  What DME does the patient currently own?: Karina Kuhn  Does patient have a history of Outpatient Therapy (PT/OT)?: No  Does the patient have a history of Short-Term Rehab?: No  Does patient have a history of HHC?: Yes (reports over 20 yrs ago)  Does patient currently have [facility]?: No         Patient Information Continued  Does patient have prescription coverage?: Yes  Within the past 12 months, you worried that your food would run out before you got the money to buy more : Never true  Within the past 12 months, the food you bought just didnt last and you didnt have money to get more : Never true  Food insecurity resource given?: N/A  Does patient receive dialysis treatments?: No  Does patient have a history of substance abuse?: No  Does patient have a history of Mental Health Diagnosis?: No         Means of Transportation  Means of Transport to Appts[de-identified] Drives Self  In the past 12 months, has lack of transportation kept you from medical appointments or from getting medications?: No  In the past 12 months, has lack of transportation kept you from meetings, work, or from getting things needed for daily living?: No  Was application for public transport provided?: N/A

## 2022-04-26 NOTE — QUICK NOTE
TI with severely reduced LV systolic function and small thrombus about 0 6 cm noted in a very dilated KIAH    DCCV not performed  Continue Eliquis  Will request outpt EP evaluation for consideration of ablation after repeat TI after 6-8 weeks  Spoke w/ pt's wife over phone

## 2022-04-26 NOTE — NURSING NOTE
Pt can back from cardiac procedure  - HR  110- 150 's pt is now ordering lunch and talking to family - he says he can feel it  " my heart does this frequently at home and at work" "I have to stop and take a break for little bit  Lopressor 5 mg given

## 2022-04-26 NOTE — ASSESSMENT & PLAN NOTE
Wt Readings from Last 3 Encounters:   04/26/22 125 kg (275 lb 9 2 oz)   04/07/22 127 kg (281 lb)   04/07/22 127 kg (281 lb)   Patient presents with acute shortness of breath, 10 lb weight gain, leg swelling abdominal distention  BNP elevated at 1900  Fortunately is hemodynamically stable on room air at this time  Not maintained on diuretics outpatient  Recently had nuclear stress test and echo 04/07/2022 revealing ejection fraction 32%, stress test was negative for ischemia  Follows with Dr Claudean Bears outpatient  [facility] cardiology here  Started on IV Lasix 40 mg b i d - volume status slowly improving  For TI/cardioversion today  May start short-term amiodarone but will wait for cards decision  Will need follow-up with EP as outpatient for consideration ablation  Cardiology added losartan in setting dilated cardiomyopathy  Was offered life vest and patient is considering this prior to d c

## 2022-04-27 PROBLEM — R07.9 CHEST PAIN: Status: RESOLVED | Noted: 2022-04-24 | Resolved: 2022-04-27

## 2022-04-27 LAB
ANION GAP SERPL CALCULATED.3IONS-SCNC: 8 MMOL/L (ref 4–13)
BUN SERPL-MCNC: 20 MG/DL (ref 5–25)
CALCIUM SERPL-MCNC: 8.1 MG/DL (ref 8.3–10.1)
CHLORIDE SERPL-SCNC: 106 MMOL/L (ref 100–108)
CO2 SERPL-SCNC: 25 MMOL/L (ref 21–32)
CREAT SERPL-MCNC: 1.04 MG/DL (ref 0.6–1.3)
ERYTHROCYTE [DISTWIDTH] IN BLOOD BY AUTOMATED COUNT: 12.4 % (ref 11.6–15.1)
GFR SERPL CREATININE-BSD FRML MDRD: 77 ML/MIN/1.73SQ M
GLUCOSE SERPL-MCNC: 96 MG/DL (ref 65–140)
HCT VFR BLD AUTO: 46.9 % (ref 36.5–49.3)
HGB BLD-MCNC: 15.5 G/DL (ref 12–17)
MCH RBC QN AUTO: 31.6 PG (ref 26.8–34.3)
MCHC RBC AUTO-ENTMCNC: 33 G/DL (ref 31.4–37.4)
MCV RBC AUTO: 96 FL (ref 82–98)
PLATELET # BLD AUTO: 201 THOUSANDS/UL (ref 149–390)
PMV BLD AUTO: 10.6 FL (ref 8.9–12.7)
POTASSIUM SERPL-SCNC: 3.9 MMOL/L (ref 3.5–5.3)
RBC # BLD AUTO: 4.91 MILLION/UL (ref 3.88–5.62)
SODIUM SERPL-SCNC: 139 MMOL/L (ref 136–145)
WBC # BLD AUTO: 6.94 THOUSAND/UL (ref 4.31–10.16)

## 2022-04-27 PROCEDURE — 99232 SBSQ HOSP IP/OBS MODERATE 35: CPT

## 2022-04-27 PROCEDURE — 85027 COMPLETE CBC AUTOMATED: CPT | Performed by: INTERNAL MEDICINE

## 2022-04-27 PROCEDURE — 99232 SBSQ HOSP IP/OBS MODERATE 35: CPT | Performed by: INTERNAL MEDICINE

## 2022-04-27 PROCEDURE — 80048 BASIC METABOLIC PNL TOTAL CA: CPT | Performed by: INTERNAL MEDICINE

## 2022-04-27 RX ORDER — METOPROLOL SUCCINATE 50 MG/1
50 TABLET, EXTENDED RELEASE ORAL 2 TIMES DAILY
Status: DISCONTINUED | OUTPATIENT
Start: 2022-04-27 | End: 2022-04-27

## 2022-04-27 RX ORDER — METOPROLOL SUCCINATE 50 MG/1
50 TABLET, EXTENDED RELEASE ORAL 2 TIMES DAILY
Status: DISCONTINUED | OUTPATIENT
Start: 2022-04-27 | End: 2022-04-28

## 2022-04-27 RX ORDER — DIGOXIN 125 MCG
125 TABLET ORAL DAILY
Status: DISCONTINUED | OUTPATIENT
Start: 2022-04-28 | End: 2022-04-28

## 2022-04-27 RX ORDER — DIGOXIN 0.25 MG/ML
250 INJECTION INTRAMUSCULAR; INTRAVENOUS ONCE
Status: COMPLETED | OUTPATIENT
Start: 2022-04-27 | End: 2022-04-27

## 2022-04-27 RX ADMIN — APIXABAN 5 MG: 5 TABLET, FILM COATED ORAL at 08:48

## 2022-04-27 RX ADMIN — PANTOPRAZOLE SODIUM 40 MG: 40 TABLET, DELAYED RELEASE ORAL at 06:24

## 2022-04-27 RX ADMIN — METOPROLOL SUCCINATE 50 MG: 50 TABLET, EXTENDED RELEASE ORAL at 13:02

## 2022-04-27 RX ADMIN — DIGOXIN 250 MCG: 250 INJECTION, SOLUTION INTRAMUSCULAR; INTRAVENOUS; PARENTERAL at 15:43

## 2022-04-27 RX ADMIN — ATORVASTATIN CALCIUM 40 MG: 40 TABLET, FILM COATED ORAL at 15:48

## 2022-04-27 RX ADMIN — METOPROLOL SUCCINATE 50 MG: 50 TABLET, EXTENDED RELEASE ORAL at 20:46

## 2022-04-27 RX ADMIN — APIXABAN 5 MG: 5 TABLET, FILM COATED ORAL at 15:52

## 2022-04-27 RX ADMIN — POTASSIUM CHLORIDE 20 MEQ: 20 TABLET, EXTENDED RELEASE ORAL at 08:48

## 2022-04-27 NOTE — PROGRESS NOTES
Progress Note - Cardiology   [facility] 62 y o  male MRN: [id number]  Unit/Bed#: E4 -01 Encounter: [id number]      Assessment/Recommendations/Discussion:    Acute on chronic systolic and diastolic heart failure   Paroxysmal atrial fibrillation   Left atrial appendage thrombus   Nonischemic cardiomyopathy    Severe obesity status post gastric bypass surgery   History of heavy alcohol use    Results from last 7 days   Lab Units 04/26/22  1247   SL CV LV EF  15    TI with ejection fraction 15%    PLAN   Would continue with IV diuretics today   Heart rates are still elevated   Will add digoxin 250 IV x1 then 125 p o  daily   Continue Eliquis   Continue metoprolol succinate  Blood pressure will not support up titrating beta-blocker   Continue with Lipitor, losartan (may hold losartan if systolic blood pressure less than 100)   Recommend indefinite alcohol cessation      Subjective:   HPI  Heart rates still elevated today  He still notes extreme fatigue with any type of exertion      Review of Systems: As noted in HPI  Rest of ROS is negative  Vitals:   BP 90/64 (BP Location: Left arm)   Pulse 75   Temp 97 6 °F (36 4 °C) (Temporal)   Resp 20   Ht 6' (1 829 m)   Wt 124 kg (273 lb 13 oz)   SpO2 96%   BMI 37 14 kg/m²   Vitals:    04/26/22 1225 04/27/22 0600   Weight: 125 kg (275 lb) 124 kg (273 lb 13 oz)       Intake/Output Summary (Last 24 hours) at 4/27/2022 1425  Last data filed at 4/26/2022 2003  Gross per 24 hour   Intake 440 ml   Output 1350 ml   Net -910 ml       Physical Exam   Constitutional: awake, alert and oriented, in no acute distress, no obvious deformities  Head: Normocephalic, without obvious abnormality, atraumatic  Eyes: conjunctivae clear and moist  Sclera anicteric  No xanthelasmas  Pupils equal bilaterally  Extraocular motions are full    Ear nose mouth and throat: ears are symmetrical bilaterally, hearing appears to be equal bilaterally, no nasal discharge or epistaxis, oropharynx is clear with moist mucous membranes  Neck:  Trachea is midline, neck is supple, no thyromegaly or significant lymphadenopathy, there is full range of motion  Lungs: clear to auscultation bilaterally, no wheezes, no rales, no rhonchi, no accessory muscle use, breathing is nonlabored  Heart:  Irregularly irregular tachycardic rhythm, S1, S2 normal, no murmur, no click, no rub and no gallop, no lower extremity edema  Abdomen: soft, non-tender; bowel sounds normal; no masses,  no organomegaly  Psychiatric:  Patient is oriented to time, place, person, mood/affect is negative for depression, anxiety, agitation, appears to have appropriate insight  Skin: Skin is warm, dry, intact  No obvious rashes or lesions on exposed extremities  Nail beds are pink with no cyanosis or clubbing      TELEMETRY:  AFib RVR  Lab Results:  Results from last 7 days   Lab Units 04/27/22  0442   WBC Thousand/uL 6 94   HEMOGLOBIN g/dL 15 5   HEMATOCRIT % 46 9   PLATELETS Thousands/uL 201     Results from last 7 days   Lab Units 04/27/22  0442   POTASSIUM mmol/L 3 9   CHLORIDE mmol/L 106   CO2 mmol/L 25   BUN mg/dL 20   CREATININE mg/dL 1 04   CALCIUM mg/dL 8 1*     Results from last 7 days   Lab Units 04/27/22  0442   POTASSIUM mmol/L 3 9   CHLORIDE mmol/L 106   CO2 mmol/L 25   BUN mg/dL 20   CREATININE mg/dL 1 04   CALCIUM mg/dL 8 1*           Medications:    Current Facility-Administered Medications:     acetaminophen (TYLENOL) tablet 650 mg, 650 mg, Oral, Q6H PRN, Yue Garcia PA-C, 650 mg at 04/24/22 1903    albuterol (PROVENTIL HFA,VENTOLIN HFA) inhaler 1 puff, 1 puff, Inhalation, Q4H PRN, Felix Carnes PA-C    apixaban (ELIQUIS) tablet 5 mg, 5 mg, Oral, BID, Rujul Myers, DO, 5 mg at 04/27/22 0848    atorvastatin (LIPITOR) tablet 40 mg, 40 mg, Oral, Daily With Dinner, Felix Carnes PA-C, 40 mg at 04/26/22 1620    furosemide (LASIX) injection 40 mg, 40 mg, Intravenous, BID, Yue Garcia PA-C, 40 mg at 04/26/22 1731    losartan (COZAAR) tablet 25 mg, 25 mg, Oral, Daily, Rujul DO Louis, 25 mg at 04/25/22 1310    metoprolol (LOPRESSOR) injection 5 mg, 5 mg, Intravenous, Q4H PRN, Gwen Rodriguez DO, 5 mg at 04/26/22 2332    metoprolol succinate (TOPROL-XL) 24 hr tablet 50 mg, 50 mg, Oral, BID, Sourav Steward DO, 50 mg at 04/27/22 1302    ondansetron (ZOFRAN) injection 4 mg, 4 mg, Intravenous, Q6H PRN, Yue Garcia PA-C    pantoprazole (PROTONIX) EC tablet 40 mg, 40 mg, Oral, Early Morning, Yue Garcia PA-C, 40 mg at 04/27/22 9692    potassium chloride (K-DUR,KLOR-CON) CR tablet 20 mEq, 20 mEq, Oral, Daily, Yue Garcia PA-C, 20 mEq at 04/27/22 0848    This note was completed in part utilizing [employer] Software  Grammatical errors, random word insertions, spelling mistakes, and incomplete sentences may be an occasional consequence of this system secondary to software limitations, ambient noise, and hardware issues  If you have any questions or concerns about the content, text, or information contained within the body of this dictation, please contact the provider for clarification      Pascual Conner DO, [facility] [place]  4/27/2022 2:25 PM

## 2022-04-27 NOTE — ASSESSMENT & PLAN NOTE
Wt Readings from Last 3 Encounters:   04/27/22 124 kg (273 lb 13 oz)   04/07/22 127 kg (281 lb)   04/07/22 127 kg (281 lb)   Patient presents with acute shortness of breath, 10 lb weight gain, leg swelling abdominal distention  BNP elevated at 1900  Remains hemodynamically stable on room air  Not maintained on diuretics outpatient prior to admission  Recently had nuclear stress test and echo 04/07/2022 revealing ejection fraction 32%, stress test was negative for ischemia  Follows with Dr Maykel Gomes cardiology consult recommendations here  Patient remains in AFib with intermittent RVR  Started on IV Lasix 40 mg b i d   Patient attempted JESUS/cardioversion yesterday however were unable to perform cardioversion due to small and mobile thrombus in the left atrial appendage   Jesus also revealing ejection fraction 15%  I did discuss LifeVest with patient today but he continues to be skeptical  Patient heart rate remains poorly controlled  Maintain telemetry  Continue Eliquis 5 mg daily  Toprol XL 50 mg b i d    Losartan 25 mg daily  BP on the lower trend, consider starting digoxin today, will discuss with cardiology   Monitor electrolytes, BMP and Mag added for AM

## 2022-04-27 NOTE — RESTORATIVE TECHNICIAN NOTE
Restorative Technician Note      Patient Name: Jenn Yousif     Restorative Tech Visit Date: 04/27/22        Pt declined amb

## 2022-04-27 NOTE — ASSESSMENT & PLAN NOTE
History of paroxysmal atrial fibrillation initially hospitalized in February for this   Home regimen Cardizem 60 mg b i d  and Lopressor 25 mg q 12  Not on anticoagulation outpatient - now started on Eliquis 5 mg bid   Cardiology stopped cardizem and increased metoprolol succinate 50 mg b i d    Started on losartan  Still with poorly controlled rate, blood pressure on the lower trend consider starting digoxin  Tele monitoring   Discharge planning pending improvement in rate control

## 2022-04-27 NOTE — UTILIZATION REVIEW
Continued Stay Review    Date: 4/27                          Current Patient Class:  IP   Current Level of Care:   MS     HPI:61 y o  male initially admitted on  4/24 for management of  Acute on chronic combined Systolic and Diastolic HF  W/chest pain, SOB, b/l LE edema,  Pulmonary edema on CXR  wgt gain 10 lb  Treated w/IV lasix, increased metoprolol, fluid restriction   CE's neg x3 sets  Telemetry currently shows  AFib with intermittent RVR  H/o Afib - Not on anticoag or supplemental oxygen PTA  *Pt has required IV Lopressor  (prn for HR >100)   Daily w/2 doses yesterday  Assessment/Plan: 4/27     Telem  Remains AFIB w/average HR of 118  Pt states when he gets up and moves around he feels like his heart is racing w/palpitations  No SOB - weaned to room air this am    Concern for life-threatening arrhythmias given significant cardiomyopathy:  discussed LifeVest upon discharge - pt considering         Vital Signs:   04/27/22 0253 97 9 °F  98 20 105/71 83 98 % -- -- Lying   04/26/22 2249 97 1 °F  126 Abnormal  20 108/68 80 99 % 2 L/min Nasal cannula Sitting   04/26/22 2138 -- 132 Abnormal  -- 93/56 -- -- -- -- Lying   04/26/22 2002 -- 140 Abnormal   -- -- -- -- -- -- --   04/26/22 1916 97 4 °F  113 Abnormal  20 116/61 72 96 % -- None (Room air) Sitting   04/26/22 1735 -- 120 Abnormal  -- 111/71 -- -- -- -- --   04/26/22 1555 -- 125 Abnormal  -- 111/69 -- -- -- -- Lying - Orthostatic VS   04/26/22 1532 -- 120 Abnormal  -- 96/87 -- -- -- -- Lying   04/26/22 1523 -- 120 Abnormal  -- 89/63  -- -- -- -- Sitting   04/26/22 1514 97 6 °F (36 4 °C) 115 Abnormal  20 -- 89 97 % -- None (Room air) Lying   04/26/22 1512 97 8 °F () 130 Abnormal  -- 98/62 -- -- -- -- --   04/26/22 1500 -- -- -- 125/77 -- -- -- -- --   04/26/22 1344 97 3 °F  111 Abnormal  20 93/60 -- 98 % -- None (Room air) --   04/26/22 1329 97 3 °F  136 Abnormal  17 112/63 91 97 % -- None (Room air) --   04/26/22 1319 -- 138 Abnormal  18 104/63 78 95 % -- None (Room air) --   04/26/22 1300 96 9 °F  136 Abnormal   20 106/57 -- 94 % -- None (Room air) --   04/26/22 1225 -- 118 Abnormal  -- 102/58 -- -- -- --          Wt Readings from Last 3 Encounters:   04/27/22 124 kg (273 lb 13 oz)   04/07/22 127 kg (281 lb)         Pertinent Labs/Diagnostic Results:   nuclear stress test and echo 04/07/2022 revealing ejection fraction 32%, stress test was negative for ischemia            Results from last 7 days   Lab Units 04/27/22  0442 04/25/22  0514 04/24/22  1154   WBC Thousand/uL 6 94 6 78 8 88   HEMOGLOBIN g/dL 15 5 14 8 15 1   HEMATOCRIT % 46 9 44 3 44 5   PLATELETS Thousands/uL 201 187 196   NEUTROS ABS Thousands/µL  --  4 47 6 64         Results from last 7 days   Lab Units 04/27/22  0442 04/26/22  0448 04/25/22  0515 04/24/22  1154   SODIUM mmol/L 139 139 140 140   POTASSIUM mmol/L 3 9 4 0 3 7 4 4   CHLORIDE mmol/L 106 105 106 106   CO2 mmol/L 25 25 26 24   ANION GAP mmol/L 8 9 8 10   BUN mg/dL 20 21 20 21   CREATININE mg/dL 1 04 1 10 1 04 1 38*   EGFR ml/min/1 73sq m 77 72 77 55   CALCIUM mg/dL 8 1* 8 3 8 0* 7 8*             Results from last 7 days   Lab Units 04/27/22  0442 04/26/22  0448 04/25/22  0515 04/24/22  1154   GLUCOSE RANDOM mg/dL 96 102 102 109         Results from last 7 days   Lab Units 04/25/22  0514   HEMOGLOBIN A1C % 5 7*   EAG mg/dl 117     Results from last 7 days   Lab Units 04/26/22  0653 04/26/22  0448 04/26/22  0247 04/24/22  1616 04/24/22  1344 04/24/22  1154   HS TNI 0HR ng/L  --   --  5  --   --  11   HS TNI 2HR ng/L  --  5  --   --  13  --    HSTNI D2 ng/L  --  0  --   --  2  --    HS TNI 4HR ng/L 5  --   --  11  --   --    HSTNI D4 ng/L 0  --   --  0  --   --          Results from last 7 days   Lab Units 04/24/22  1154   NT-PRO BNP pg/mL 1,992*       Medications:   Scheduled Medications:  apixaban, 5 mg, Oral, BID  atorvastatin, 40 mg, Oral, Daily With Dinner  furosemide, 40 mg, Intravenous, BID  losartan, 25 mg, Oral, Daily  metoprolol succinate, 50 mg, Oral, BID  pantoprazole, 40 mg, Oral, Early Morning  potassium chloride, 20 mEq, Oral, Daily      Continuous IV Infusions:     PRN Meds:  acetaminophen, 650 mg, Oral, Q6H PRN  albuterol, 1 puff, Inhalation, Q4H PRN  metoprolol, 5 mg, Intravenous, Q4H PRN    4/26 @ 1510 & 2332  ondansetron, 4 mg, Intravenous, Q6H PRN        Discharge Plan: [facility]    Network Utilization Review Department  ATTENTION: Please call with any questions or concerns to [phone] and carefully listen to the prompts so that you are directed to the right person  All voicemails are confidential   Milagros Layton all requests for admission clinical reviews, approved or denied determinations and any other requests to dedicated fax number below belonging to the campus where the patient is receiving treatment   List of dedicated fax numbers for the Facilities:  [address] DENIALS (Administrative/Medical Necessity) [phone]   [address] (Maternity/NICU/Pediatrics) [phone]   [address] [place] [address]  [phone] [place] [address] [address] [facility] [phone]

## 2022-04-27 NOTE — PLAN OF CARE
Problem: PAIN - ADULT  Goal: Verbalizes/displays adequate comfort level or baseline comfort level  Description: Interventions:  - Encourage patient to monitor pain and request assistance  - Assess pain using appropriate pain scale  - Administer analgesics based on type and severity of pain and evaluate response  - Implement non-pharmacological measures as appropriate and evaluate response  - Consider cultural and social influences on pain and pain management  - Notify physician/advanced practitioner if interventions unsuccessful or patient reports new pain  Outcome: Progressing     Problem: INFECTION - ADULT  Goal: Absence or prevention of progression during hospitalization  Description: INTERVENTIONS:  - Assess and monitor for signs and symptoms of infection  - Monitor lab/diagnostic results  - Monitor all insertion sites, i e  indwelling lines, tubes, and drains  - Monitor endotracheal if appropriate and nasal secretions for changes in amount and color  - [place] appropriate cooling/warming therapies per order  - Administer medications as ordered  - Instruct and encourage patient and family to use good hand hygiene technique  - Identify and instruct in appropriate isolation precautions for identified infection/condition  Outcome: Progressing     Problem: SAFETY ADULT  Goal: Patient will remain free of falls  Description: INTERVENTIONS:  - Educate patient/family on patient safety including physical limitations  - Instruct patient to call for assistance with activity   - Consult OT/PT to assist with strengthening/mobility   - Keep Call bell within reach  - Keep bed low and locked with side rails adjusted as appropriate  - Keep care items and personal belongings within reach  - Initiate and maintain comfort rounds  - Make Fall Risk Sign visible to staff  Outcome: Progressing  Goal: Maintain or return to baseline ADL function  Description: INTERVENTIONS:  -  Assess patient's ability to carry out ADLs; assess patient's baseline for ADL function and identify physical deficits which impact ability to perform ADLs (bathing, care of mouth/teeth, toileting, grooming, dressing, etc )  - Assess/evaluate cause of self-care deficits   - Assess range of motion  - Assess patient's mobility; develop plan if impaired  - Assess patient's need for assistive devices and provide as appropriate  - Encourage maximum independence but intervene and supervise when necessary  - Involve family in performance of ADLs  - Assess for home care needs following discharge   - Consider OT consult to assist with ADL evaluation and planning for discharge  - Provide patient education as appropriate  Outcome: Progressing  Goal: Maintains/Returns to pre admission functional level  Description: INTERVENTIONS:  - Perform BMAT or MOVE assessment daily    - Set and communicate daily mobility goal to care team and patient/family/caregiver     - Collaborate with rehabilitation services on mobility goals if consulted  - Ambulate patient 3 times a day  - Out of bed to chair 3 times a day   - Out of bed for meals 3 times a day  - Out of bed for toileting  - Record patient progress and toleration of activity level   Outcome: Progressing     Problem: DISCHARGE PLANNING  Goal: Discharge to home or other facility with appropriate resources  Description: INTERVENTIONS:  - Identify barriers to discharge w/patient and caregiver  - Arrange for needed discharge resources and transportation as appropriate  - Identify discharge learning needs (meds, wound care, etc )  - Arrange for interpretive services to assist at discharge as needed  - Refer to Case Management Department for coordinating discharge planning if the patient needs post-hospital services based on physician/advanced practitioner order or complex needs related to functional status, cognitive ability, or social support system  Outcome: Progressing     Problem: Knowledge Deficit  Goal: Patient/family/caregiver demonstrates understanding of disease process, treatment plan, medications, and discharge instructions  Description: Complete learning assessment and assess knowledge base    Interventions:  - Provide teaching at level of understanding  - Provide teaching via preferred learning methods  Outcome: Progressing     Problem: CARDIOVASCULAR - ADULT  Goal: Maintains optimal cardiac output and hemodynamic stability  Description: INTERVENTIONS:  - Monitor I/O, vital signs and rhythm  - Monitor for S/S and trends of decreased cardiac output  - Administer and titrate ordered vasoactive medications to optimize hemodynamic stability  - Assess quality of pulses, skin color and temperature  - Assess for signs of decreased coronary artery perfusion  - Instruct patient to report change in severity of symptoms  Outcome: Progressing  Goal: Absence of cardiac dysrhythmias or at baseline rhythm  Description: INTERVENTIONS:  - Continuous cardiac monitoring, vital signs, obtain 12 lead EKG if ordered  - Administer antiarrhythmic and heart rate control medications as ordered  - Monitor electrolytes and administer replacement therapy as ordered  Outcome: Progressing

## 2022-04-27 NOTE — PROGRESS NOTES
[facility]  Progress Note - Susan Rascon 1960, 62 y o  male MRN: [id number]  Unit/Bed#: E4 -01 Encounter: [id number]  Primary Care Provider: Russell Crockett DO   Date and time admitted to hospital: 4/24/2022 11:42 AM    * Acute on chronic systolic CHF (congestive heart failure) ([facility])  Assessment & Plan  Wt Readings from Last 3 Encounters:   04/27/22 124 kg (273 lb 13 oz)   04/07/22 127 kg (281 lb)   04/07/22 127 kg (281 lb)   Patient presents with acute shortness of breath, 10 lb weight gain, leg swelling abdominal distention  BNP elevated at 1900  Remains hemodynamically stable on room air  Not maintained on diuretics outpatient prior to admission  Recently had nuclear stress test and echo 04/07/2022 revealing ejection fraction 32%, stress test was negative for ischemia  Follows with Dr Eartha Riedel cardiology consult recommendations here  Patient remains in AFib with intermittent RVR  Started on IV Lasix 40 mg b i d   Patient attempted JESUS/cardioversion yesterday however were unable to perform cardioversion due to small and mobile thrombus in the left atrial appendage   Jesus also revealing ejection fraction 15%  I did discuss LifeVest with patient today but he continues to be skeptical  Patient heart rate remains poorly controlled  Maintain telemetry  Continue Eliquis 5 mg daily  Toprol XL 50 mg b i d    Losartan 25 mg daily  Medications have been held due to hypotension, see below  Lasix was not given this morning, only tolerated 1 dose yesterday  Monitor electrolytes, BMP and Mag added for AM       PAF (paroxysmal atrial fibrillation) ([facility])  Assessment & Plan  History of paroxysmal atrial fibrillation initially hospitalized in February for this   Home regimen Cardizem 60 mg b i d  and Lopressor 25 mg q 12  Not on anticoagulation outpatient - now started on Eliquis 5 mg bid   Cardiology stopped cardizem and increased metoprolol succinate 50 mg b i d  Started on losartan  Still with poorly controlled rate, blood pressure on the lower trend requiring IV albumin yesterday  Heart rate is poorly controlled today likely secondary to inability to give beta-blocker due to hypotension   consider starting digoxin, will discuss with Dr Saravanan Edge  Repeat blood pressure now to see if we can not give beta-blocker  To goal blood pressures manually  Tele monitoring   Discharge planning pending improvement in rate control     SRIKANTH (obstructive sleep apnea)  Assessment & Plan  Has been referred for outpatient sleep study     History of Eliud-en-Y gastric bypass  Assessment & Plan  History of obesity status post gastric    Chest pain-resolved as of 4/27/2022  Assessment & Plan  Admits to constant substernal chest pain described as a knot on admission   Since resolved     Hypotension  Assessment & Plan  Blood pressures have been soft limiting precluding administration of antihypertensives and Lasix  Lasix, losartan and beta-blocker were all held this morning  Contributing to heart rate elevation today  Check all blood pressures manually  Repeat blood pressure now and see if we can get his beta-blocker to bring his heart rate down  Given dose of IV albumin yesterday      VTE Pharmacologic Prophylaxis: VTE Score: 3 Moderate Risk (Score 3-4) - Pharmacological DVT Prophylaxis Ordered: apixaban (Eliquis)  Discussions with Specialists or Other Care Team Provider: spoke with Dr Saravanan Edge     Education and Discussions with Family / Patient: Patient declined call to   Time Spent for Care: 20 minutes  More than 50% of total time spent on counseling and coordination of care as described above      Current Length of Stay: 3 day(s)  Current Patient Status: Inpatient   Certification Statement: The patient will continue to require additional inpatient hospital stay due to Proximal atrial fibrillation with RVR  Discharge Plan: Anticipate discharge in 24-48 hrs to home   Pending transition oral diuretics and improvement in her rate control    Code Status: Level 1 - Full Code    Subjective:   Patient is resting in bed  Denies any symptoms at this time  He does state when he gets up and moves around he feels like his heart is racing with some palpitations  No shortness of breath and difficulty breathing  No other acute complaints at this time  Denies any lightheadedness or dizziness  No chest pain  I discussed with patient his ejection fraction of 15% on TI and concern for life-threatening arrhythmias given significant cardiomyopathy  We discussed LifeVest upon discharge  He continues to be skeptical about doing this as he states he sweats too much drama summer coming up he does not feel like he will be compliant with the LifeVest   I did ask him to rethink about this and have another discussion with Cardiology today    Objective:     Vitals:   Temp (24hrs), Av 4 °F (36 3 °C), Min:96 9 °F (36 1 °C), Max:97 9 °F (36 6 °C)    Temp:  [96 9 °F (36 1 °C)-97 9 °F (36 6 °C)] 97 6 °F (36 4 °C)  HR:  [] 79  Resp:  [17-20] 20  BP: ()/(56-87) 105/71  SpO2:  [94 %-99 %] 98 %  Body mass index is 37 14 kg/m²  Input and Output Summary (last 24 hours): Intake/Output Summary (Last 24 hours) at 2022 1106  Last data filed at 2022  Gross per 24 hour   Intake 1065 ml   Output 1350 ml   Net -285 ml       Physical Exam:   Physical Exam  Vitals and nursing note reviewed  Constitutional:       General: He is not in acute distress  Appearance: He is not ill-appearing, toxic-appearing or diaphoretic  HENT:      Head: Normocephalic  Eyes:      Conjunctiva/sclera: Conjunctivae normal    Cardiovascular:      Rate and Rhythm: Tachycardia present  Rhythm irregularly irregular  Pulmonary:      Effort: Pulmonary effort is normal  No respiratory distress  Breath sounds: Normal breath sounds  No wheezing, rhonchi or rales        Comments: Room air Abdominal:      General: Bowel sounds are normal       Palpations: Abdomen is soft  Musculoskeletal:      Right lower leg: No edema  Left lower leg: No edema  Comments: Soft lesion on left shin   Neurological:      Mental Status: He is alert  Mental status is at baseline  Psychiatric:         Mood and Affect: Mood normal           Additional Data:     Labs:  Results from last 7 days   Lab Units 04/27/22  0442 04/25/22  0514 04/25/22  0514   WBC Thousand/uL 6 94   < > 6 78   HEMOGLOBIN g/dL 15 5   < > 14 8   HEMATOCRIT % 46 9   < > 44 3   PLATELETS Thousands/uL 201   < > 187   NEUTROS PCT %  --   --  67   LYMPHS PCT %  --   --  20   MONOS PCT %  --   --  9   EOS PCT %  --   --  4    < > = values in this interval not displayed  Results from last 7 days   Lab Units 04/27/22  0442   SODIUM mmol/L 139   POTASSIUM mmol/L 3 9   CHLORIDE mmol/L 106   CO2 mmol/L 25   BUN mg/dL 20   CREATININE mg/dL 1 04   ANION GAP mmol/L 8   CALCIUM mg/dL 8 1*   GLUCOSE RANDOM mg/dL 96             Results from last 7 days   Lab Units 04/25/22  0514   HEMOGLOBIN A1C % 5 7*           Lines/Drains:  Invasive Devices  Report    Peripheral Intravenous Line            Peripheral IV 04/24/22 Dorsal (posterior); Left Forearm 3 days                  Telemetry:  Telemetry Orders (From admission, onward)             48 Hour Telemetry Monitoring  Continuous x 48 hours        References:    Telemetry Guidelines   Question:  Reason for 48 Hour Telemetry  Answer:  Arrhythmias Requiring Medical Therapy (eg  SVT, Vtach/fib, Bradycardia, Uncontrolled A-fib)                 Telemetry Reviewed: Atrial fibrillation   HR averaging 115  Indication for Continued Telemetry Use: Arrthymias requiring medical therapy             Imaging: Reviewed radiology reports from this admission including: xray(s), procedure reports, ultrasound(s), ECHO and CXR and TI    Recent Cultures (last 7 days):         Last 24 Hours Medication List:   Current Facility-Administered Medications   Medication Dose Route Frequency Provider Last Rate    acetaminophen  650 mg Oral Q6H PRN Gretchen Vargas PA-C      albuterol  1 puff Inhalation Q4H PRN Gretchen Vargas PA-C      apixaban  5 mg Oral BID Rujul Myers, DO      atorvastatin  40 mg Oral Daily With Dinner Gretchen Vargas PA-C      furosemide  40 mg Intravenous BID Gretchen Vargas PA-C      losartan  25 mg Oral Daily Rujul Myers, DO      metoprolol  5 mg Intravenous Q4H PRN Borrego Mayank, DO      metoprolol succinate  50 mg Oral BID Rujul Myers, DO      ondansetron  4 mg Intravenous Q6H PRN Gretchen Vargas PA-C      pantoprazole  40 mg Oral Early Morning Yue Garcia PA-C      potassium chloride  20 mEq Oral Daily Gretchen Vargas PA-C          Today, Patient Was Seen By: Gretchen Vargas PA-C    **Please Note: This note may have been constructed using a voice recognition system  **

## 2022-04-28 LAB
ANION GAP SERPL CALCULATED.3IONS-SCNC: 10 MMOL/L (ref 4–13)
BUN SERPL-MCNC: 18 MG/DL (ref 5–25)
CALCIUM SERPL-MCNC: 8.4 MG/DL (ref 8.3–10.1)
CHLORIDE SERPL-SCNC: 106 MMOL/L (ref 100–108)
CO2 SERPL-SCNC: 23 MMOL/L (ref 21–32)
CREAT SERPL-MCNC: 1.06 MG/DL (ref 0.6–1.3)
ERYTHROCYTE [DISTWIDTH] IN BLOOD BY AUTOMATED COUNT: 12.1 % (ref 11.6–15.1)
GFR SERPL CREATININE-BSD FRML MDRD: 75 ML/MIN/1.73SQ M
GLUCOSE SERPL-MCNC: 90 MG/DL (ref 65–140)
GLUCOSE SERPL-MCNC: 95 MG/DL (ref 65–140)
HCT VFR BLD AUTO: 48.3 % (ref 36.5–49.3)
HGB BLD-MCNC: 16.3 G/DL (ref 12–17)
MAGNESIUM SERPL-MCNC: 2 MG/DL (ref 1.6–2.6)
MCH RBC QN AUTO: 32.6 PG (ref 26.8–34.3)
MCHC RBC AUTO-ENTMCNC: 33.7 G/DL (ref 31.4–37.4)
MCV RBC AUTO: 97 FL (ref 82–98)
PLATELET # BLD AUTO: 201 THOUSANDS/UL (ref 149–390)
PMV BLD AUTO: 11.1 FL (ref 8.9–12.7)
POTASSIUM SERPL-SCNC: 4.3 MMOL/L (ref 3.5–5.3)
RBC # BLD AUTO: 5 MILLION/UL (ref 3.88–5.62)
SODIUM SERPL-SCNC: 139 MMOL/L (ref 136–145)
WBC # BLD AUTO: 7 THOUSAND/UL (ref 4.31–10.16)

## 2022-04-28 PROCEDURE — 80048 BASIC METABOLIC PNL TOTAL CA: CPT | Performed by: PHYSICIAN ASSISTANT

## 2022-04-28 PROCEDURE — 99232 SBSQ HOSP IP/OBS MODERATE 35: CPT | Performed by: INTERNAL MEDICINE

## 2022-04-28 PROCEDURE — 85027 COMPLETE CBC AUTOMATED: CPT | Performed by: INTERNAL MEDICINE

## 2022-04-28 PROCEDURE — 83735 ASSAY OF MAGNESIUM: CPT | Performed by: PHYSICIAN ASSISTANT

## 2022-04-28 PROCEDURE — 82948 REAGENT STRIP/BLOOD GLUCOSE: CPT

## 2022-04-28 RX ORDER — METOPROLOL TARTRATE 50 MG/1
50 TABLET, FILM COATED ORAL EVERY 12 HOURS SCHEDULED
Status: DISCONTINUED | OUTPATIENT
Start: 2022-04-28 | End: 2022-04-30

## 2022-04-28 RX ORDER — DIGOXIN 0.25 MG/ML
250 INJECTION INTRAMUSCULAR; INTRAVENOUS ONCE
Status: COMPLETED | OUTPATIENT
Start: 2022-04-28 | End: 2022-04-28

## 2022-04-28 RX ADMIN — DIGOXIN 250 MCG: 250 INJECTION, SOLUTION INTRAMUSCULAR; INTRAVENOUS; PARENTERAL at 08:50

## 2022-04-28 RX ADMIN — ATORVASTATIN CALCIUM 40 MG: 40 TABLET, FILM COATED ORAL at 17:22

## 2022-04-28 RX ADMIN — ACETAMINOPHEN 325MG 650 MG: 325 TABLET ORAL at 08:59

## 2022-04-28 RX ADMIN — PANTOPRAZOLE SODIUM 40 MG: 40 TABLET, DELAYED RELEASE ORAL at 06:01

## 2022-04-28 RX ADMIN — APIXABAN 5 MG: 5 TABLET, FILM COATED ORAL at 08:48

## 2022-04-28 RX ADMIN — METOPROLOL TARTRATE 50 MG: 50 TABLET, FILM COATED ORAL at 21:38

## 2022-04-28 RX ADMIN — APIXABAN 5 MG: 5 TABLET, FILM COATED ORAL at 17:22

## 2022-04-28 RX ADMIN — POTASSIUM CHLORIDE 20 MEQ: 20 TABLET, EXTENDED RELEASE ORAL at 08:48

## 2022-04-28 RX ADMIN — METOPROLOL TARTRATE 50 MG: 50 TABLET, FILM COATED ORAL at 08:50

## 2022-04-28 NOTE — PROGRESS NOTES
Cardiology         Progress Note - Cardiology   Jeanne Childs 62 y o  male MRN: [id number]  Unit/Bed#: E4 -01 Encounter: [id number]          Assessment/Recommendations/Discussion:   1  Atrial fibrillation with rapid ventricular response  2  Left atrial appendage thrombus on TI 04/26/2022  3  Acute on chronic systolic and diastolic CHF  4  Nonischemic cardiomyopathy, likely tachycardia mediated exacerbate with heavy alcohol use  5  Intermittent left bundle branch block, likely rate related  6  Obesity status post gastric bypass surgery in the past      · Rapid ventricular response with telemetry today to 150s  Patient fortunately minimally symptomatic  He does has mild palpitations  He states his breathing feels comfortable even walking in the room  Will give 250 mcg digoxin this morning and re-dose later today if needed  Change metoprolol tartrate, 50 mg b i d   Can give additional metoprolol tartrate this afternoon if needed  · Can consider adding midodrine if needed for additional blood pressure support  Suspect blood pressure will improve with better heart rate control  · Continue Eliquis anticoagulation for left atrial appendage thrombus  · For now, hold losartan and furosemide to give additional blood pressure room to use AV node blockers for heart rate control    Eventually will add back on  · Continue atorvastatin          Subjective:  Patient seen and examined, feels fairly well, admits to occasional intermittent palpitations, denies exertional dyspnea, chest discomfort, lightheadedness                Physical Exam:  GEN:  NAD  HEENT:  MMM, NCAT, pink conjunctiva, EOMI, nonicteric sclera  CV:  NO JVD/HJR, irregularly irregular, tachycardic, NO M/R/G, +S1/S2, NO PARASTERNAL HEAVE/THRILL, NO LE EDEMA, NO HEPATIC SYSTOLIC PULSATION, WARM EXTREMITIES  RESP:  CTAB/L  ABD:  SOFT, NT, NO GROSS ORGANOMEGALY        Vitals:   /74 (BP Location: Left arm)   Pulse 73   Temp (!) 97 °F (36 1 °C) (Temporal)   Resp 18   Ht 6' (1 829 m)   Wt 124 kg (272 lb 4 3 oz)   SpO2 95%   BMI 36 93 kg/m²   Vitals:    04/27/22 0600 04/28/22 0600   Weight: 124 kg (273 lb 13 oz) 124 kg (272 lb 4 3 oz)     No intake or output data in the 24 hours ending 04/28/22 0848    TELEMETRY:  Atrial fibrillation with RVR  Lab Results:  Results from last 7 days   Lab Units 04/28/22  0601   WBC Thousand/uL 7 00   HEMOGLOBIN g/dL 16 3   HEMATOCRIT % 48 3   PLATELETS Thousands/uL 201     Results from last 7 days   Lab Units 04/28/22  0601   POTASSIUM mmol/L 4 3   CHLORIDE mmol/L 106   CO2 mmol/L 23   BUN mg/dL 18   CREATININE mg/dL 1 06   CALCIUM mg/dL 8 4     Results from last 7 days   Lab Units 04/28/22  0601   POTASSIUM mmol/L 4 3   CHLORIDE mmol/L 106   CO2 mmol/L 23   BUN mg/dL 18   CREATININE mg/dL 1 06   CALCIUM mg/dL 8 4           Medications:    Current Facility-Administered Medications:     acetaminophen (TYLENOL) tablet 650 mg, 650 mg, Oral, Q6H PRN, Yue Garcia PA-C, 650 mg at 04/24/22 1903    albuterol (PROVENTIL HFA,VENTOLIN HFA) inhaler 1 puff, 1 puff, Inhalation, Q4H PRN, Felix Carnes PA-C    apixaban (ELIQUIS) tablet 5 mg, 5 mg, Oral, BID, Rujul Myers, DO, 5 mg at 04/27/22 1552    atorvastatin (LIPITOR) tablet 40 mg, 40 mg, Oral, Daily With Dinner, Felix Carnes PA-C, 40 mg at 04/27/22 1548    digoxin (LANOXIN) injection 250 mcg, 250 mcg, Intravenous, Once, Rujul Myers, DO    metoprolol (LOPRESSOR) injection 5 mg, 5 mg, Intravenous, Q4H PRN, Edgardo Kelley DO, 5 mg at 04/26/22 2332    metoprolol tartrate (LOPRESSOR) tablet 50 mg, 50 mg, Oral, Q12H [facility], Rujul Myers, DO    ondansetron (ZOFRAN) injection 4 mg, 4 mg, Intravenous, Q6H PRN, Yue Garcia PA-C    pantoprazole (PROTONIX) EC tablet 40 mg, 40 mg, Oral, Early Morning, Yue Garcia PA-C, 40 mg at 04/28/22 0601    potassium chloride (K-DUR,KLOR-CON) CR tablet 20 mEq, 20 mEq, Oral, Daily, Yue Garcia PA-C, 20 mEq at 04/27/22 0867    This note was completed in part utilizing M-Modal Fluency Direct Software  Grammatical errors, random word insertions, spelling mistakes, and incomplete sentences may be an occasional consequence of this system secondary to software limitations, ambient noise, and hardware issues  If you have any questions or concerns about the content, text, or information contained within the body of this dictation, please contact the provider for clarification

## 2022-04-28 NOTE — PROGRESS NOTES
[facility]  Progress Note - Allen Pierre 1960, 62 y o  male MRN: [id number]  Unit/Bed#: E4 -01 Encounter: [id number]  Primary Care Provider: Joe Carrasquillo DO   Date and time admitted to hospital: 4/24/2022 11:42 AM    * Acute on chronic systolic CHF (congestive heart failure) ([facility])  Assessment & Plan  Wt Readings from Last 3 Encounters:   04/28/22 124 kg (272 lb 4 3 oz)   04/07/22 127 kg (281 lb)   04/07/22 127 kg (281 lb)   Presents with acute shortness of breath, 10 lb weight gain, leg swelling, abdominal distention  BNP elevated at 1900  Not maintained on diuretics outpatient prior to admission  Recently had nuclear stress test/echo 4/07/22 with EF 32%, stress test negative for ischemia  Follows with Dr Romain Delgado outpatient  Patient remains in AFib with intermittent RVR  Started on IV Lasix 40 mg b i d   Patient attempted TI/cardioversion 4/26/22 however were unable to perform cardioversion due to small and mobile thrombus in the left atrial appendage   -TI also revealing ejection fraction 15%  Life Vest discussed in dept with patient and he is agreeable  Patient heart rate remains poorly controlled and in the 150s today  Maintain telemetry  Continue Eliquis 5 mg daily  Home regimen metoprolol tartrate 25 BID --> now on Toprol XL 50 mg b i d  Losartan 25 mg daily- held yesterday given hypotension  Lasix- was also held yesterday due to hypotension    PAF (paroxysmal atrial fibrillation) ([facility])  Assessment & Plan  History of paroxysmal atrial fibrillation initially hospitalized in February for this   Home regimen Cardizem 60 mg b i d  and Lopressor 25 mg q 12   Not on anticoagulation outpatient - now started on Eliquis 5 mg bid  Continue Eliquis in the setting of left atrial appendage thrombus  Cardiology stopped cardizem and switched to metoprolol succinate 50 mg b i d    Started on losartan and Lasix here but held due to hypotension  Still with poorly controlled rate, blood pressure on the lower side  Tele monitoring   Given digoxin bolus 250 mcg 4/27/22  Repeat bolus 250 mcg today and possibly re-dose later if needed by Cardiology  Cardiology considering midodrine if needs additional blood pressure support, hoping BP to improve with better heart rate control  Cardiology considering adding back losartan and Lasix once BP improved    SRIKANTH (obstructive sleep apnea)  Assessment & Plan  Has been referred for outpatient sleep study     History of Eliud-en-Y gastric bypass  Assessment & Plan  History of obesity status post gastric    Chest pain-resolved as of 4/27/2022  Assessment & Plan  Admits to constant substernal chest pain described as a knot on admission   Since resolved       VTE Pharmacologic Prophylaxis:   Pharmacologic: Apixaban (Eliquis)  Mechanical VTE Prophylaxis in Place: Yes    Discharge Plan: With need for continued inpatient stay for medication titration by Cardiology given ongoing hypotension uncontrolled heart rates    Discussions with Specialists or Other Care Team Provider:  Nursing, Dr Maria A Trinh, Dr Teetee Quinonez    Education and Discussions with Family / Patient:  Patient, called wife via telephone-unable to reach    Time Spent for Care: 30 minutes  More than 50% of total time spent on counseling and coordination of care as described above  Current Length of Stay: 4 day(s)  Current Patient Status: Inpatient   Code Status: Level 1 - Full Code    Subjective:   Patient resting comfortably in bed  He remains with elevated heart rates in the 150s although he is fairly asymptomatic  Denies any shortness of breath and states this is better  Had in-depth discussion about Life Vest   Patient expresses concerns that he is an  and is wondering if he can still work with a Life Vest   Cardiology will discuss further-Dr Maria A Trinh made aware  Patient agreeable to Life Vest at this time  Attempted to update wife however unable to reach    Will try again later     Objective:     Vitals:   Temp (24hrs), Av 3 °F (36 3 °C), Min:96 8 °F (36 °C), Max:98 2 °F (36 8 °C)    Temp:  [96 8 °F (36 °C)-98 2 °F (36 8 °C)] 96 8 °F (36 °C)  HR:  [] 89  Resp:  [18-20] 18  BP: ()/(60-74) 100/60  SpO2:  [95 %-99 %] 99 %  Body mass index is 36 93 kg/m²  Input and Output Summary (last 24 hours): Intake/Output Summary (Last 24 hours) at 2022 1159  Last data filed at 2022 1001  Gross per 24 hour   Intake --   Output 1275 ml   Net -1275 ml       Physical Exam:     Physical Exam  Vitals and nursing note reviewed  Constitutional:       General: He is not in acute distress  Appearance: Normal appearance  He is obese  He is not ill-appearing, toxic-appearing or diaphoretic  HENT:      Head: Normocephalic and atraumatic  Eyes:      General: No scleral icterus  Cardiovascular:      Rate and Rhythm: Normal rate and regular rhythm  Pulmonary:      Effort: Pulmonary effort is normal  No respiratory distress  Breath sounds: Normal breath sounds  No stridor  No wheezing or rhonchi  Abdominal:      General: Bowel sounds are normal  There is no distension  Palpations: Abdomen is soft  There is no mass  Tenderness: There is no abdominal tenderness  Hernia: No hernia is present  Musculoskeletal:         General: No swelling  Cervical back: Neck supple  Skin:     General: Skin is warm and dry  Neurological:      Mental Status: He is alert and oriented to person, place, and time  Mental status is at baseline     Psychiatric:         Mood and Affect: Mood normal          Behavior: Behavior normal          Additional Data:     Labs:    Results from last 7 days   Lab Units 22  0601 22  0442 22  0514   WBC Thousand/uL 7 00   < > 6 78   HEMOGLOBIN g/dL 16 3   < > 14 8   HEMATOCRIT % 48 3   < > 44 3   PLATELETS Thousands/uL 201   < > 187   NEUTROS PCT %  --   --  67   LYMPHS PCT %  --   --  20   MONOS PCT %  -- --  9   EOS PCT %  --   --  4    < > = values in this interval not displayed  Results from last 7 days   Lab Units 04/28/22  0601   POTASSIUM mmol/L 4 3   CHLORIDE mmol/L 106   CO2 mmol/L 23   BUN mg/dL 18   CREATININE mg/dL 1 06   CALCIUM mg/dL 8 4           * I Have Reviewed All Lab Data Listed Above  * Additional Pertinent Lab Tests Reviewed: Andry 66 Admission Reviewed    Imaging:    Imaging Reports Reviewed Today Include:  TI  Imaging Personally Reviewed by Myself Includes:      Recent Cultures (last 7 days):           Last 24 Hours Medication List:   Current Facility-Administered Medications   Medication Dose Route Frequency Provider Last Rate    acetaminophen  650 mg Oral Q6H PRN Letty Strange PA-C      albuterol  1 puff Inhalation Q4H PRN Letty Strange PA-C      apixaban  5 mg Oral BID Rujul Myers, DO      atorvastatin  40 mg Oral Daily With Dinner Letty Strange PA-C      metoprolol  5 mg Intravenous Q4H PRN De Alix, DO      metoprolol tartrate  50 mg Oral Q12H [address] Rujul Myers, DO      ondansetron  4 mg Intravenous Q6H PRN Letty Strange PA-C      pantoprazole  40 mg Oral Early Morning Yue Garcia PA-C      potassium chloride  20 mEq Oral Daily Letty Strange PA-C          Today, Patient Was Seen By: Sparkle Whitney PA-C    ** Please Note: This note has been constructed using a voice recognition system   **

## 2022-04-28 NOTE — CASE MANAGEMENT
Case Management Discharge Planning Note    Patient name Gabrielle Higgins  Location [address]4 Timmy 40-* MRN [id number]  : 1960 Date 2022       Current Admission Date: 2022  Current Admission Diagnosis:Acute on chronic systolic CHF (congestive heart failure) [facility])   Patient Active Problem List    Diagnosis Date Noted    Acute on chronic systolic CHF (congestive heart failure) ([facility] ) 2022    SRIKANTH (obstructive sleep apnea)     Abnormal CT of the chest 2022    PAF (paroxysmal atrial fibrillation) ([facility] ) 2022    COVID-19 2022    Acute blood loss anemia 2020    History of Eliud-en-Y gastric bypass 2020    Alcohol use 2020    Gastrointestinal hemorrhage with melena 2020    Abnormal CT of the abdomen 2020    Closed fracture of multiple ribs of left side 2019    Visual changes 2018    Blood alcohol level of 240 mg/100 ml or more 2018    Orbit fracture, right, sequela 2018    Raised intraocular pressure of right eye 2018    Peptic ulcer 2017      LOS (days): 4  Geometric Mean LOS (GMLOS) (days): 3 00  Days to GMLOS:-1 1     OBJECTIVE:  Risk of Unplanned Readmission Score: 12         Current admission status: Inpatient   Preferred Pharmacy:   [id number], [address] [place] [address] [address]  [address] [place] [id number]  Phone: [phone] Fax: [phone] [address] [address]  Phone: [phone] Fax: [phone] [address] [facility] [place], [place] - [address] ,  [address] [facility] [address]  Phone: [phone] Fax: [phone]    Primary Care Provider: Vincent Meyers DO    Primary Insurance: BLUE CROSS  Secondary Insurance:     DISCHARGE DETAILS:    Discharge planning discussed with[de-identified] pt  Freedom of Choice: Yes        Additional Comments: Life vest order faxed to Payam Inman at this time for Life vest order

## 2022-04-28 NOTE — ASSESSMENT & PLAN NOTE
History of paroxysmal atrial fibrillation initially hospitalized in February for this   Home regimen Cardizem 60 mg b i d  and Lopressor 25 mg q 12   Not on anticoagulation outpatient - now started on Eliquis 5 mg bid  Continue Eliquis in the setting of left atrial appendage thrombus  Cardiology stopped cardizem and switched to metoprolol succinate 50 mg b i d  Started on losartan and Lasix here but held due to hypotension  Still with poorly controlled rate, blood pressure on the lower side  Tele monitoring   Given digoxin bolus 250 mcg 4/27/22  Repeat bolus 250 mcg today and possibly re-dose later if needed by Cardiology  Cardiology considering midodrine if needs additional blood pressure support, hoping BP to improve with better heart rate control    Cardiology considering adding back losartan and Lasix once BP improved

## 2022-04-28 NOTE — ASSESSMENT & PLAN NOTE
Wt Readings from Last 3 Encounters:   04/28/22 124 kg (272 lb 4 3 oz)   04/07/22 127 kg (281 lb)   04/07/22 127 kg (281 lb)   Presents with acute shortness of breath, 10 lb weight gain, leg swelling, abdominal distention  BNP elevated at 1900  Not maintained on diuretics outpatient prior to admission  Recently had nuclear stress test/echo 4/07/22 with EF 32%, stress test negative for ischemia  Follows with Dr Yon Calles outpatient  Patient remains in AFib with intermittent RVR  Started on IV Lasix 40 mg b i d   Patient attempted TI/cardioversion 4/26/22 however were unable to perform cardioversion due to small and mobile thrombus in the left atrial appendage   -TI also revealing ejection fraction 15%  Life Vest discussed in dept with patient and he is agreeable  Patient heart rate remains poorly controlled and in the 150s today  Maintain telemetry  Continue Eliquis 5 mg daily  Home regimen metoprolol tartrate 25 BID --> now on Toprol XL 50 mg b i d    Losartan 25 mg daily- held yesterday given hypotension  Lasix- was also held yesterday due to hypotension

## 2022-04-28 NOTE — PLAN OF CARE
Problem: PAIN - ADULT  Goal: Verbalizes/displays adequate comfort level or baseline comfort level  Description: Interventions:  - Encourage patient to monitor pain and request assistance  - Assess pain using appropriate pain scale  - Administer analgesics based on type and severity of pain and evaluate response  - Implement non-pharmacological measures as appropriate and evaluate response  - Consider cultural and social influences on pain and pain management  - Notify physician/advanced practitioner if interventions unsuccessful or patient reports new pain  Outcome: Progressing     Problem: INFECTION - ADULT  Goal: Absence or prevention of progression during hospitalization  Description: INTERVENTIONS:  - Assess and monitor for signs and symptoms of infection  - Monitor lab/diagnostic results  - Monitor all insertion sites, i e  indwelling lines, tubes, and drains  - Monitor endotracheal if appropriate and nasal secretions for changes in amount and color  - [place] appropriate cooling/warming therapies per order  - Administer medications as ordered  - Instruct and encourage patient and family to use good hand hygiene technique  - Identify and instruct in appropriate isolation precautions for identified infection/condition  Outcome: Progressing     Problem: SAFETY ADULT  Goal: Patient will remain free of falls  Description: INTERVENTIONS:  - Educate patient/family on patient safety including physical limitations  - Instruct patient to call for assistance with activity   - Consult OT/PT to assist with strengthening/mobility   - Keep Call bell within reach  - Keep bed low and locked with side rails adjusted as appropriate  - Keep care items and personal belongings within reach  - Initiate and maintain comfort rounds  - Make Fall Risk Sign visible to staff  Outcome: Progressing  Goal: Maintain or return to baseline ADL function  Description: INTERVENTIONS:  -  Assess patient's ability to carry out ADLs; assess patient's baseline for ADL function and identify physical deficits which impact ability to perform ADLs (bathing, care of mouth/teeth, toileting, grooming, dressing, etc )  - Assess/evaluate cause of self-care deficits   - Assess range of motion  - Assess patient's mobility; develop plan if impaired  - Assess patient's need for assistive devices and provide as appropriate  - Encourage maximum independence but intervene and supervise when necessary  - Involve family in performance of ADLs  - Assess for home care needs following discharge   - Consider OT consult to assist with ADL evaluation and planning for discharge  - Provide patient education as appropriate  Outcome: Progressing  Goal: Maintains/Returns to pre admission functional level  Description: INTERVENTIONS:  - Perform BMAT or MOVE assessment daily    - Set and communicate daily mobility goal to care team and patient/family/caregiver     - Collaborate with rehabilitation services on mobility goals if consulted  - Ambulate patient 3 times a day  - Out of bed to chair 3 times a day   - Out of bed for meals 3 times a day  - Out of bed for toileting  - Record patient progress and toleration of activity level   Outcome: Progressing     Problem: DISCHARGE PLANNING  Goal: Discharge to home or other facility with appropriate resources  Description: INTERVENTIONS:  - Identify barriers to discharge w/patient and caregiver  - Arrange for needed discharge resources and transportation as appropriate  - Identify discharge learning needs (meds, wound care, etc )  - Arrange for interpretive services to assist at discharge as needed  - Refer to Case Management Department for coordinating discharge planning if the patient needs post-hospital services based on physician/advanced practitioner order or complex needs related to functional status, cognitive ability, or social support system  Outcome: Progressing     Problem: Knowledge Deficit  Goal: Patient/family/caregiver demonstrates understanding of disease process, treatment plan, medications, and discharge instructions  Description: Complete learning assessment and assess knowledge base    Interventions:  - Provide teaching at level of understanding  - Provide teaching via preferred learning methods  Outcome: Progressing     Problem: CARDIOVASCULAR - ADULT  Goal: Maintains optimal cardiac output and hemodynamic stability  Description: INTERVENTIONS:  - Monitor I/O, vital signs and rhythm  - Monitor for S/S and trends of decreased cardiac output  - Administer and titrate ordered vasoactive medications to optimize hemodynamic stability  - Assess quality of pulses, skin color and temperature  - Assess for signs of decreased coronary artery perfusion  - Instruct patient to report change in severity of symptoms  Outcome: Progressing  Goal: Absence of cardiac dysrhythmias or at baseline rhythm  Description: INTERVENTIONS:  - Continuous cardiac monitoring, vital signs, obtain 12 lead EKG if ordered  - Administer antiarrhythmic and heart rate control medications as ordered  - Monitor electrolytes and administer replacement therapy as ordered  Outcome: Progressing     Problem: Potential for Falls  Goal: Patient will remain free of falls  Description: INTERVENTIONS:  - Educate patient/family on patient safety including physical limitations  - Instruct patient to call for assistance with activity   - Consult OT/PT to assist with strengthening/mobility   - Keep Call bell within reach  - Keep bed low and locked with side rails adjusted as appropriate  - Keep care items and personal belongings within reach  - Initiate and maintain comfort rounds  - Make Fall Risk Sign visible to staff  Outcome: Progressing

## 2022-04-29 LAB
DIGOXIN SERPL-MCNC: 0.5 NG/ML (ref 0.8–2)
ERYTHROCYTE [DISTWIDTH] IN BLOOD BY AUTOMATED COUNT: 11.9 % (ref 11.6–15.1)
HCT VFR BLD AUTO: 49.8 % (ref 36.5–49.3)
HGB BLD-MCNC: 16.8 G/DL (ref 12–17)
MCH RBC QN AUTO: 31.6 PG (ref 26.8–34.3)
MCHC RBC AUTO-ENTMCNC: 33.7 G/DL (ref 31.4–37.4)
MCV RBC AUTO: 94 FL (ref 82–98)
PLATELET # BLD AUTO: 214 THOUSANDS/UL (ref 149–390)
PMV BLD AUTO: 10.6 FL (ref 8.9–12.7)
RBC # BLD AUTO: 5.32 MILLION/UL (ref 3.88–5.62)
WBC # BLD AUTO: 7.01 THOUSAND/UL (ref 4.31–10.16)

## 2022-04-29 PROCEDURE — 99232 SBSQ HOSP IP/OBS MODERATE 35: CPT | Performed by: INTERNAL MEDICINE

## 2022-04-29 PROCEDURE — 80162 ASSAY OF DIGOXIN TOTAL: CPT | Performed by: INTERNAL MEDICINE

## 2022-04-29 PROCEDURE — 85027 COMPLETE CBC AUTOMATED: CPT | Performed by: INTERNAL MEDICINE

## 2022-04-29 RX ORDER — DIGOXIN 125 MCG
250 TABLET ORAL ONCE
Status: COMPLETED | OUTPATIENT
Start: 2022-04-29 | End: 2022-04-29

## 2022-04-29 RX ORDER — DIGOXIN 125 MCG
125 TABLET ORAL DAILY
Status: DISCONTINUED | OUTPATIENT
Start: 2022-04-30 | End: 2022-05-01 | Stop reason: HOSPADM

## 2022-04-29 RX ORDER — LOSARTAN POTASSIUM 25 MG/1
12.5 TABLET ORAL DAILY
Status: DISCONTINUED | OUTPATIENT
Start: 2022-04-29 | End: 2022-04-30

## 2022-04-29 RX ADMIN — DIGOXIN 250 MCG: 125 TABLET ORAL at 14:08

## 2022-04-29 RX ADMIN — POTASSIUM CHLORIDE 20 MEQ: 20 TABLET, EXTENDED RELEASE ORAL at 08:47

## 2022-04-29 RX ADMIN — DIGOXIN 250 MCG: 125 TABLET ORAL at 10:15

## 2022-04-29 RX ADMIN — LOSARTAN POTASSIUM 12.5 MG: 25 TABLET, FILM COATED ORAL at 10:15

## 2022-04-29 RX ADMIN — APIXABAN 5 MG: 5 TABLET, FILM COATED ORAL at 08:47

## 2022-04-29 RX ADMIN — ATORVASTATIN CALCIUM 40 MG: 40 TABLET, FILM COATED ORAL at 17:09

## 2022-04-29 RX ADMIN — PANTOPRAZOLE SODIUM 40 MG: 40 TABLET, DELAYED RELEASE ORAL at 06:19

## 2022-04-29 RX ADMIN — APIXABAN 5 MG: 5 TABLET, FILM COATED ORAL at 17:09

## 2022-04-29 RX ADMIN — METOPROLOL TARTRATE 50 MG: 50 TABLET, FILM COATED ORAL at 08:47

## 2022-04-29 NOTE — PLAN OF CARE
Problem: PAIN - ADULT  Goal: Verbalizes/displays adequate comfort level or baseline comfort level  Description: Interventions:  - Encourage patient to monitor pain and request assistance  - Assess pain using appropriate pain scale  - Administer analgesics based on type and severity of pain and evaluate response  - Implement non-pharmacological measures as appropriate and evaluate response  - Consider cultural and social influences on pain and pain management  - Notify physician/advanced practitioner if interventions unsuccessful or patient reports new pain  Outcome: Progressing     Problem: INFECTION - ADULT  Goal: Absence or prevention of progression during hospitalization  Description: INTERVENTIONS:  - Assess and monitor for signs and symptoms of infection  - Monitor lab/diagnostic results  - Monitor all insertion sites, i e  indwelling lines, tubes, and drains  - Monitor endotracheal if appropriate and nasal secretions for changes in amount and color  - [place] appropriate cooling/warming therapies per order  - Administer medications as ordered  - Instruct and encourage patient and family to use good hand hygiene technique  - Identify and instruct in appropriate isolation precautions for identified infection/condition  Outcome: Progressing     Problem: SAFETY ADULT  Goal: Patient will remain free of falls  Description: INTERVENTIONS:  - Educate patient/family on patient safety including physical limitations  - Instruct patient to call for assistance with activity   - Consult OT/PT to assist with strengthening/mobility   - Keep Call bell within reach  - Keep bed low and locked with side rails adjusted as appropriate  - Keep care items and personal belongings within reach  - Initiate and maintain comfort rounds  - Make Fall Risk Sign visible to staff  Outcome: Progressing  Goal: Maintain or return to baseline ADL function  Description: INTERVENTIONS:  -  Assess patient's ability to carry out ADLs; assess patient's baseline for ADL function and identify physical deficits which impact ability to perform ADLs (bathing, care of mouth/teeth, toileting, grooming, dressing, etc )  - Assess/evaluate cause of self-care deficits   - Assess range of motion  - Assess patient's mobility; develop plan if impaired  - Assess patient's need for assistive devices and provide as appropriate  - Encourage maximum independence but intervene and supervise when necessary  - Involve family in performance of ADLs  - Assess for home care needs following discharge   - Consider OT consult to assist with ADL evaluation and planning for discharge  - Provide patient education as appropriate  Outcome: Progressing  Goal: Maintains/Returns to pre admission functional level  Description: INTERVENTIONS:  - Perform BMAT or MOVE assessment daily    - Set and communicate daily mobility goal to care team and patient/family/caregiver     - Collaborate with rehabilitation services on mobility goals if consulted  - Ambulate patient 3 times a day  - Out of bed to chair 3 times a day   - Out of bed for meals 3 times a day  - Out of bed for toileting  - Record patient progress and toleration of activity level   Outcome: Progressing     Problem: DISCHARGE PLANNING  Goal: Discharge to home or other facility with appropriate resources  Description: INTERVENTIONS:  - Identify barriers to discharge w/patient and caregiver  - Arrange for needed discharge resources and transportation as appropriate  - Identify discharge learning needs (meds, wound care, etc )  - Arrange for interpretive services to assist at discharge as needed  - Refer to Case Management Department for coordinating discharge planning if the patient needs post-hospital services based on physician/advanced practitioner order or complex needs related to functional status, cognitive ability, or social support system  Outcome: Progressing     Problem: Knowledge Deficit  Goal: Patient/family/caregiver demonstrates understanding of disease process, treatment plan, medications, and discharge instructions  Description: Complete learning assessment and assess knowledge base    Interventions:  - Provide teaching at level of understanding  - Provide teaching via preferred learning methods  Outcome: Progressing     Problem: CARDIOVASCULAR - ADULT  Goal: Maintains optimal cardiac output and hemodynamic stability  Description: INTERVENTIONS:  - Monitor I/O, vital signs and rhythm  - Monitor for S/S and trends of decreased cardiac output  - Administer and titrate ordered vasoactive medications to optimize hemodynamic stability  - Assess quality of pulses, skin color and temperature  - Assess for signs of decreased coronary artery perfusion  - Instruct patient to report change in severity of symptoms  Outcome: Progressing  Goal: Absence of cardiac dysrhythmias or at baseline rhythm  Description: INTERVENTIONS:  - Continuous cardiac monitoring, vital signs, obtain 12 lead EKG if ordered  - Administer antiarrhythmic and heart rate control medications as ordered  - Monitor electrolytes and administer replacement therapy as ordered  Outcome: Progressing     Problem: Potential for Falls  Goal: Patient will remain free of falls  Description: INTERVENTIONS:  - Educate patient/family on patient safety including physical limitations  - Instruct patient to call for assistance with activity   - Consult OT/PT to assist with strengthening/mobility   - Keep Call bell within reach  - Keep bed low and locked with side rails adjusted as appropriate  - Keep care items and personal belongings within reach  - Initiate and maintain comfort rounds  - Make Fall Risk Sign visible to staff  Outcome: Progressing

## 2022-04-29 NOTE — PROGRESS NOTES
[facility], [place]  Progress Note - Jenny Adame 1960, 62 y o  male MRN: [id number]  Unit/Bed#: E4 -01 Encounter: [id number]  Primary Care Provider: Miguel Ángel Rodriguez DO   Date and time admitted to hospital: 4/24/2022 11:42 AM    * Acute on chronic systolic CHF (congestive heart failure) (HCC)  Assessment & Plan  Wt Readings from Last 3 Encounters:   04/29/22 124 kg (273 lb 9 6 oz)   04/07/22 127 kg (281 lb)   04/07/22 127 kg (281 lb)   Presents with acute shortness of breath, 10 lb weight gain, leg swelling, abdominal distention  BNP elevated at 1900  Not maintained on diuretics outpatient prior to admission  Recently had nuclear stress test/echo 4/07/22 with EF 32%, stress test negative for ischemia  Follows with Dr Rosetta Denny outpatient  Patient remains in AFib with intermittent RVR  Started on IV Lasix 40 mg b i d   Patient attempted TI/cardioversion 4/26/22 however were unable to perform cardioversion due to small and mobile thrombus in the left atrial appendage   -TI also revealing ejection fraction 15%  Life Vest discussed in dept with patient and he is agreeable - rep to come when HR more controlled if still deemed appropriate by Cardiology - now reconsidering   Home regimen metoprolol tartrate 25 BID  Started on Losartan 25 mg daily- now resumed   Lasix- held due to hypotension - remains on hold    PAF (paroxysmal atrial fibrillation) ([facility] [address] )  Assessment & Plan  History of paroxysmal atrial fibrillation initially hospitalized in February for this   Home regimen Cardizem 60 mg b i d  and metoprolol tartrate 25 mg q 12   Not on anticoagulation outpatient - now started on Eliquis 5 mg bid  Continue Eliquis in the setting of left atrial appendage thrombus   --> appears eliquis is not covered and may need to switch to [facility]  Cardiology stopped cardizem  Continued on metoprolol tartrate 50 mg b i d    Started on losartan and Lasix here but held due to hypotension 4/28  HR was in the 150s-160s yesterday  HR starting to improve and now in the 80-90s today   Given digoxin bolus 250 mcg , Repeat bolus 250 mcg   Continuing with digoxin 250 mcg bolus now, followed by 125 mcg daily  Now resumed on losartan by cards  Continue telemetry monitoring and Cardiology recommendations on med titration    SRIKANTH (obstructive sleep apnea)  Assessment & Plan  Has been referred for outpatient sleep study     History of Eliud-en-Y gastric bypass  Assessment & Plan  History of obesity status post gastric    Chest pain-resolved as of 2022  Assessment & Plan  Admits to constant substernal chest pain described as a knot on admission   Since resolved         VTE Pharmacologic Prophylaxis:   Pharmacologic: Apixaban (Eliquis)  Mechanical VTE Prophylaxis in Place: Yes    Discharge Plan: With need for continued inpatient stay for heart rate monitoring as well as medication titration by Cardiology  Suspect discharge within the next 48-72 hours  Discussions with Specialists or Other Care Team Provider:  Nursing    Education and Discussions with Family / Patient:  Patient, wife via telephone - updated extensively    Time Spent for Care: 30 minutes  More than 50% of total time spent on counseling and coordination of care as described above  Current Length of Stay: 5 day(s)  Current Patient Status: Inpatient   Code Status: Level 1 - Full Code    Subjective:   Patient resting in bed comfortably  Reports he feels good today and denies any shortness of breath, chest, chest pressure  Cardiology further adjusting medications  Discussed case inpatient in depth with wife via telephone  She reports having a lot going on with her own mother and is trying to comprehend everything medically with her   She is concerned about his further ability to work once he is discharged as he is an        Objective:     Vitals:   Temp (24hrs), Av 3 °F (36 3 °C), Min:97 °F (36 1 °C), Max:97 8 °F (36 6 °C)    Temp:  [97 °F (36 1 °C)-97 8 °F (36 6 °C)] 97 8 °F (36 6 °C)  HR:  [] 91  Resp:  [18] 18  BP: ()/(60-74) 92/64  SpO2:  [98 %-100 %] 98 %  Body mass index is 37 11 kg/m²  Input and Output Summary (last 24 hours): Intake/Output Summary (Last 24 hours) at 4/29/2022 1232  Last data filed at 4/29/2022 0620  Gross per 24 hour   Intake --   Output 1400 ml   Net -1400 ml       Physical Exam:     Physical Exam  Vitals and nursing note reviewed  Constitutional:       General: He is not in acute distress  Appearance: Normal appearance  He is obese  He is not ill-appearing, toxic-appearing or diaphoretic  HENT:      Head: Normocephalic and atraumatic  Eyes:      General: No scleral icterus  Cardiovascular:      Rate and Rhythm: Normal rate and regular rhythm  Pulmonary:      Effort: Pulmonary effort is normal  No respiratory distress  Breath sounds: Normal breath sounds  No stridor  No wheezing or rhonchi  Abdominal:      General: Bowel sounds are normal  There is no distension  Palpations: Abdomen is soft  There is no mass  Tenderness: There is no abdominal tenderness  Hernia: No hernia is present  Musculoskeletal:         General: No swelling  Cervical back: Neck supple  Skin:     General: Skin is warm and dry  Neurological:      Mental Status: He is alert and oriented to person, place, and time  Mental status is at baseline  Psychiatric:         Mood and Affect: Mood normal          Behavior: Behavior normal          Additional Data:     Labs:    Results from last 7 days   Lab Units 04/29/22  0617 04/27/22  0442 04/25/22  0514   WBC Thousand/uL 7 01   < > 6 78   HEMOGLOBIN g/dL 16 8   < > 14 8   HEMATOCRIT % 49 8*   < > 44 3   PLATELETS Thousands/uL 214   < > 187   NEUTROS PCT %  --   --  67   LYMPHS PCT %  --   --  20   MONOS PCT %  --   --  9   EOS PCT %  --   --  4    < > = values in this interval not displayed       Results from last 7 days   Lab Units 04/28/22  0601   POTASSIUM mmol/L 4 3   CHLORIDE mmol/L 106   CO2 mmol/L 23   BUN mg/dL 18   CREATININE mg/dL 1 06   CALCIUM mg/dL 8 4           * I Have Reviewed All Lab Data Listed Above  * Additional Pertinent Lab Tests Reviewed: Andry 67 Admission Reviewed    Imaging:    Imaging Reports Reviewed Today Include:   Imaging Personally Reviewed by Myself Includes:      Recent Cultures (last 7 days):           Last 24 Hours Medication List:   Current Facility-Administered Medications   Medication Dose Route Frequency Provider Last Rate    acetaminophen  650 mg Oral Q6H PRN Juan J Allen PA-C      albuterol  1 puff Inhalation Q4H PRN Juan J Allen PA-C      apixaban  5 mg Oral BID Rujul Myers, DO      atorvastatin  40 mg Oral Daily With [employer], PA-C      [START ON 4/30/2022] digoxin  125 mcg Oral Daily Rujul Myers, DO      digoxin  250 mcg Oral Once Rujul Myers, DO      losartan  12 5 mg Oral Daily Rujul Myers, DO      metoprolol  5 mg Intravenous Q4H PRN Ashtyn Korin, DO      metoprolol tartrate  50 mg Oral Q12H [facility] Rujul Myers, DO      ondansetron  4 mg Intravenous Q6H PRN Juan J Allen PA-C      pantoprazole  40 mg Oral Early Morning Yue Garcia PA-C      potassium chloride  20 mEq Oral Daily Juan J Allen PA-C          Today, Patient Was Seen By: Dona Garica PA-C    ** Please Note: This note has been constructed using a voice recognition system   **

## 2022-04-29 NOTE — ASSESSMENT & PLAN NOTE
History of paroxysmal atrial fibrillation initially hospitalized in February for this   Home regimen Cardizem 60 mg b i d  and metoprolol tartrate 25 mg q 12   Not on anticoagulation outpatient - now started on Eliquis 5 mg bid  Continue Eliquis in the setting of left atrial appendage thrombus  Cardiology stopped cardizem  Continued on metoprolol tartrate 50 mg b i d    Started on losartan and Lasix here but held due to hypotension 4/28  HR was in the 150s-160s yesterday  HR starting to improve and now in the 80-90s today   Given digoxin bolus 250 mcg 4/27, Repeat bolus 250 mcg 4/28  Continuing with digoxin 250 mcg bolus now, followed by 125 mcg daily  Now resumed on losartan by cards  Continue telemetry monitoring and Cardiology recommendations on med titration

## 2022-04-29 NOTE — PROGRESS NOTES
Cardiology         Progress Note - Cardiology   Prabha Murrieta 62 y o  male MRN: [id number]  Unit/Bed#: E4 -01 Encounter: [id number]          Assessment/Recommendations/Discussion:   1  Atrial fibrillation with rapid ventricular response  2  Left atrial appendage thrombus on TI 04/26/2022  3  Acute on chronic systolic and diastolic CHF  4  Nonischemic cardiomyopathy, likely tachycardia mediated exacerbate with heavy alcohol use  5  Intermittent left bundle branch block, likely rate related  6  Obesity status post gastric bypass surgery in the past          · Resting heart rate improved, now in the 80s 90s, although ambulatory heart rate increases to the 130s with mild symptoms  At rest patient asymptomatic at this time  Will give 250 mcg p o  digoxin now and again at 2:00 p m  Start digoxin 0 125 mg was tomorrow daily and check digoxin level in 2 days  Continue metoprolol 50 mg p o  b i d  Reluctant to increase dose any further in light of marginally low blood pressure and necessity to include ARB in the management of cardiomyopathy  · May consider starting midodrine for additional blood pressure support if needed  · Continue Eliquis anticoagulation for left atrial appendage thrombus  · Had long discussion with patient about indications, risks, and benefits of life vest therapy  His telemetry has revealed a rate related bundle branch block, and as he is still becoming tachycardic with ambulation, I am reluctant to continue life vest recommendations, in light of the risk of inappropriate shock, especially in light of his underlying intracardiac thrombus  At this time he is agreeable to hold off on LifeVest   · Check ambulatory heart rate tomorrow, and if acceptable, would be okay to discharge over weekend from cardiac standpoint on losartan, metoprolol, digoxin, Eliquis, and low-dose furosemide if tolerated    · Complete alcohol cessation has been recommended  · Follow-up with electrophysiology 05/16/2022          Subjective:  Patient seen and examined, feels well                Physical Exam:  GEN:  NAD  HEENT:  MMM, NCAT, pink conjunctiva, EOMI, nonicteric sclera  CV:  NO JVD/HJR, irregularly irregular, NO M/R/G, +S1/S2, NO PARASTERNAL HEAVE/THRILL, NO LE EDEMA, NO HEPATIC SYSTOLIC PULSATION, WARM EXTREMITIES  RESP:  CTAB/L  ABD:  SOFT, NT, NO GROSS ORGANOMEGALY        Vitals:   /60   Pulse 63   Temp (!) 97 2 °F (36 2 °C) (Temporal)   Resp 18   Ht 6' (1 829 m)   Wt 124 kg (273 lb 9 6 oz)   SpO2 99%   BMI 37 11 kg/m²   Vitals:    04/28/22 0600 04/29/22 0600   Weight: 124 kg (272 lb 4 3 oz) 124 kg (273 lb 9 6 oz)       Intake/Output Summary (Last 24 hours) at 4/29/2022 1004  Last data filed at 4/29/2022 0620  Gross per 24 hour   Intake --   Output 1400 ml   Net -1400 ml       TELEMETRY:  Atrial fibrillation  Lab Results:  Results from last 7 days   Lab Units 04/29/22  0617   WBC Thousand/uL 7 01   HEMOGLOBIN g/dL 16 8   HEMATOCRIT % 49 8*   PLATELETS Thousands/uL 214     Results from last 7 days   Lab Units 04/28/22  0601   POTASSIUM mmol/L 4 3   CHLORIDE mmol/L 106   CO2 mmol/L 23   BUN mg/dL 18   CREATININE mg/dL 1 06   CALCIUM mg/dL 8 4     Results from last 7 days   Lab Units 04/28/22  0601   POTASSIUM mmol/L 4 3   CHLORIDE mmol/L 106   CO2 mmol/L 23   BUN mg/dL 18   CREATININE mg/dL 1 06   CALCIUM mg/dL 8 4           Medications:    Current Facility-Administered Medications:     acetaminophen (TYLENOL) tablet 650 mg, 650 mg, Oral, Q6H PRN, Yue Garcia PA-C, 650 mg at 04/28/22 0859    albuterol (PROVENTIL HFA,VENTOLIN HFA) inhaler 1 puff, 1 puff, Inhalation, Q4H PRN, Ivan Adler PA-C    apixaban (ELIQUIS) tablet 5 mg, 5 mg, Oral, BID, Rujul Myers, DO, 5 mg at 04/29/22 0847    atorvastatin (LIPITOR) tablet 40 mg, 40 mg, Oral, Daily With Dinner, Ivan Adler PA-C, 40 mg at 04/28/22 1722    [START ON 4/30/2022] digoxin (LANOXIN) tablet 125 mcg, 125 mcg, Oral, Daily, Rujul Zamzam Barboza, DO    digoxin (LANOXIN) tablet 250 mcg, 250 mcg, Oral, Once, Rujul Myers, DO    digoxin (LANOXIN) tablet 250 mcg, 250 mcg, Oral, Once, Rujul Myers, DO    losartan (COZAAR) tablet 12 5 mg, 12 5 mg, Oral, Daily, Rujul Myers, DO    metoprolol (LOPRESSOR) injection 5 mg, 5 mg, Intravenous, Q4H PRN, Edgardo Kelley, DO, 5 mg at 04/26/22 2332    metoprolol tartrate (LOPRESSOR) tablet 50 mg, 50 mg, Oral, Q12H [facility], Rujul Myers, DO, 50 mg at 04/29/22 0847    ondansetron (ZOFRAN) injection 4 mg, 4 mg, Intravenous, Q6H PRN, Yue Garcia PA-C    pantoprazole (PROTONIX) EC tablet 40 mg, 40 mg, Oral, Early Morning, Yue Garcia PA-C, 40 mg at 04/29/22 2077    potassium chloride (K-DUR,KLOR-CON) CR tablet 20 mEq, 20 mEq, Oral, Daily, Yue Garcia PA-C, 20 mEq at 04/29/22 6854    This note was completed in part utilizing M-Modal Fluency Direct Software  Grammatical errors, random word insertions, spelling mistakes, and incomplete sentences may be an occasional consequence of this system secondary to software limitations, ambient noise, and hardware issues  If you have any questions or concerns about the content, text, or information contained within the body of this dictation, please contact the provider for clarification

## 2022-04-29 NOTE — ASSESSMENT & PLAN NOTE
Wt Readings from Last 3 Encounters:   04/29/22 124 kg (273 lb 9 6 oz)   04/07/22 127 kg (281 lb)   04/07/22 127 kg (281 lb)   Presents with acute shortness of breath, 10 lb weight gain, leg swelling, abdominal distention  BNP elevated at 1900  Not maintained on diuretics outpatient prior to admission  Recently had nuclear stress test/echo 4/07/22 with EF 32%, stress test negative for ischemia  Follows with Dr Jayshree Flood outpatient  Patient remains in AFib with intermittent RVR  Started on IV Lasix 40 mg b i d   Patient attempted TI/cardioversion 4/26/22 however were unable to perform cardioversion due to small and mobile thrombus in the left atrial appendage   -TI also revealing ejection fraction 15%  Life Vest discussed in dept with patient and he is agreeable - rep to come when HR more controlled  Continue Eliquis 5 mg daily  Home regimen metoprolol tartrate 25 BID  Started on Losartan 25 mg daily- now resumed   Lasix- held due to hypotension - remains on hold

## 2022-04-29 NOTE — PLAN OF CARE
Problem: PAIN - ADULT  Goal: Verbalizes/displays adequate comfort level or baseline comfort level  Description: Interventions:  - Encourage patient to monitor pain and request assistance  - Assess pain using appropriate pain scale  - Administer analgesics based on type and severity of pain and evaluate response  - Implement non-pharmacological measures as appropriate and evaluate response  - Consider cultural and social influences on pain and pain management  - Notify physician/advanced practitioner if interventions unsuccessful or patient reports new pain  Outcome: Progressing     Problem: INFECTION - ADULT  Goal: Absence or prevention of progression during hospitalization  Description: INTERVENTIONS:  - Assess and monitor for signs and symptoms of infection  - Monitor lab/diagnostic results  - Monitor all insertion sites, i e  indwelling lines, tubes, and drains  - Monitor endotracheal if appropriate and nasal secretions for changes in amount and color  - [place] appropriate cooling/warming therapies per order  - Administer medications as ordered  - Instruct and encourage patient and family to use good hand hygiene technique  - Identify and instruct in appropriate isolation precautions for identified infection/condition  Outcome: Progressing     Problem: SAFETY ADULT  Goal: Patient will remain free of falls  Description: INTERVENTIONS:  - Educate patient/family on patient safety including physical limitations  - Instruct patient to call for assistance with activity   - Consult OT/PT to assist with strengthening/mobility   - Keep Call bell within reach  - Keep bed low and locked with side rails adjusted as appropriate  - Keep care items and personal belongings within reach  - Initiate and maintain comfort rounds  - Make Fall Risk Sign visible to staff  Outcome: Progressing  Goal: Maintain or return to baseline ADL function  Description: INTERVENTIONS:  -  Assess patient's ability to carry out ADLs; assess patient's baseline for ADL function and identify physical deficits which impact ability to perform ADLs (bathing, care of mouth/teeth, toileting, grooming, dressing, etc )  - Assess/evaluate cause of self-care deficits   - Assess range of motion  - Assess patient's mobility; develop plan if impaired  - Assess patient's need for assistive devices and provide as appropriate  - Encourage maximum independence but intervene and supervise when necessary  - Involve family in performance of ADLs  - Assess for home care needs following discharge   - Consider OT consult to assist with ADL evaluation and planning for discharge  - Provide patient education as appropriate  Outcome: Progressing  Goal: Maintains/Returns to pre admission functional level  Description: INTERVENTIONS:  - Perform BMAT or MOVE assessment daily    - Set and communicate daily mobility goal to care team and patient/family/caregiver     - Collaborate with rehabilitation services on mobility goals if consulted  - Ambulate patient 3 times a day  - Out of bed to chair 3 times a day   - Out of bed for meals 3 times a day  - Out of bed for toileting  - Record patient progress and toleration of activity level   Outcome: Progressing     Problem: DISCHARGE PLANNING  Goal: Discharge to home or other facility with appropriate resources  Description: INTERVENTIONS:  - Identify barriers to discharge w/patient and caregiver  - Arrange for needed discharge resources and transportation as appropriate  - Identify discharge learning needs (meds, wound care, etc )  - Arrange for interpretive services to assist at discharge as needed  - Refer to Case Management Department for coordinating discharge planning if the patient needs post-hospital services based on physician/advanced practitioner order or complex needs related to functional status, cognitive ability, or social support system  Outcome: Progressing     Problem: Knowledge Deficit  Goal: Patient/family/caregiver demonstrates understanding of disease process, treatment plan, medications, and discharge instructions  Description: Complete learning assessment and assess knowledge base    Interventions:  - Provide teaching at level of understanding  - Provide teaching via preferred learning methods  Outcome: Progressing     Problem: CARDIOVASCULAR - ADULT  Goal: Maintains optimal cardiac output and hemodynamic stability  Description: INTERVENTIONS:  - Monitor I/O, vital signs and rhythm  - Monitor for S/S and trends of decreased cardiac output  - Administer and titrate ordered vasoactive medications to optimize hemodynamic stability  - Assess quality of pulses, skin color and temperature  - Assess for signs of decreased coronary artery perfusion  - Instruct patient to report change in severity of symptoms  Outcome: Progressing  Goal: Absence of cardiac dysrhythmias or at baseline rhythm  Description: INTERVENTIONS:  - Continuous cardiac monitoring, vital signs, obtain 12 lead EKG if ordered  - Administer antiarrhythmic and heart rate control medications as ordered  - Monitor electrolytes and administer replacement therapy as ordered  Outcome: Progressing     Problem: Potential for Falls  Goal: Patient will remain free of falls  Description: INTERVENTIONS:  - Educate patient/family on patient safety including physical limitations  - Instruct patient to call for assistance with activity   - Consult OT/PT to assist with strengthening/mobility   - Keep Call bell within reach  - Keep bed low and locked with side rails adjusted as appropriate  - Keep care items and personal belongings within reach  - Initiate and maintain comfort rounds  - Make Fall Risk Sign visible to staff  Outcome: Progressing

## 2022-04-29 NOTE — RESTORATIVE TECHNICIAN NOTE
Restorative Technician Note      Patient Name: Susan Rascon     Restorative Tech Visit Date: 04/29/22    Pt was not able to amb this morning because his HR was elevated at 110  Nurse is aware

## 2022-04-30 PROCEDURE — 99232 SBSQ HOSP IP/OBS MODERATE 35: CPT | Performed by: INTERNAL MEDICINE

## 2022-04-30 RX ORDER — METOPROLOL TARTRATE 100 MG/1
100 TABLET ORAL EVERY 12 HOURS SCHEDULED
Status: DISCONTINUED | OUTPATIENT
Start: 2022-04-30 | End: 2022-05-01 | Stop reason: HOSPADM

## 2022-04-30 RX ADMIN — RIVAROXABAN 20 MG: 20 TABLET, FILM COATED ORAL at 08:34

## 2022-04-30 RX ADMIN — METOPROLOL TARTRATE 100 MG: 100 TABLET, FILM COATED ORAL at 20:32

## 2022-04-30 RX ADMIN — PANTOPRAZOLE SODIUM 40 MG: 40 TABLET, DELAYED RELEASE ORAL at 05:51

## 2022-04-30 RX ADMIN — METOPROLOL TARTRATE 100 MG: 100 TABLET, FILM COATED ORAL at 08:34

## 2022-04-30 RX ADMIN — DIGOXIN 125 MCG: 125 TABLET ORAL at 08:34

## 2022-04-30 RX ADMIN — ATORVASTATIN CALCIUM 40 MG: 40 TABLET, FILM COATED ORAL at 16:24

## 2022-04-30 NOTE — PLAN OF CARE
Problem: PAIN - ADULT  Goal: Verbalizes/displays adequate comfort level or baseline comfort level  Description: Interventions:  - Encourage patient to monitor pain and request assistance  - Assess pain using appropriate pain scale  - Administer analgesics based on type and severity of pain and evaluate response  - Implement non-pharmacological measures as appropriate and evaluate response  - Consider cultural and social influences on pain and pain management  - Notify physician/advanced practitioner if interventions unsuccessful or patient reports new pain  Outcome: Progressing     Problem: INFECTION - ADULT  Goal: Absence or prevention of progression during hospitalization  Description: INTERVENTIONS:  - Assess and monitor for signs and symptoms of infection  - Monitor lab/diagnostic results  - Monitor all insertion sites, i e  indwelling lines, tubes, and drains  - Monitor endotracheal if appropriate and nasal secretions for changes in amount and color  - [place] appropriate cooling/warming therapies per order  - Administer medications as ordered  - Instruct and encourage patient and family to use good hand hygiene technique  - Identify and instruct in appropriate isolation precautions for identified infection/condition  Outcome: Progressing     Problem: SAFETY ADULT  Goal: Patient will remain free of falls  Description: INTERVENTIONS:  - Educate patient/family on patient safety including physical limitations  - Instruct patient to call for assistance with activity   - Consult OT/PT to assist with strengthening/mobility   - Keep Call bell within reach  - Keep bed low and locked with side rails adjusted as appropriate  - Keep care items and personal belongings within reach  - Initiate and maintain comfort rounds  - Make Fall Risk Sign visible to staff  Outcome: Progressing  Goal: Maintain or return to baseline ADL function  Description: INTERVENTIONS:  -  Assess patient's ability to carry out ADLs; assess patient's baseline for ADL function and identify physical deficits which impact ability to perform ADLs (bathing, care of mouth/teeth, toileting, grooming, dressing, etc )  - Assess/evaluate cause of self-care deficits   - Assess range of motion  - Assess patient's mobility; develop plan if impaired  - Assess patient's need for assistive devices and provide as appropriate  - Encourage maximum independence but intervene and supervise when necessary  - Involve family in performance of ADLs  - Assess for home care needs following discharge   - Consider OT consult to assist with ADL evaluation and planning for discharge  - Provide patient education as appropriate  Outcome: Progressing  Goal: Maintains/Returns to pre admission functional level  Description: INTERVENTIONS:  - Perform BMAT or MOVE assessment daily    - Set and communicate daily mobility goal to care team and patient/family/caregiver     - Collaborate with rehabilitation services on mobility goals if consulted  - Ambulate patient 3 times a day  - Out of bed to chair 3 times a day   - Out of bed for meals 3 times a day  - Out of bed for toileting  - Record patient progress and toleration of activity level   Outcome: Progressing     Problem: DISCHARGE PLANNING  Goal: Discharge to home or other facility with appropriate resources  Description: INTERVENTIONS:  - Identify barriers to discharge w/patient and caregiver  - Arrange for needed discharge resources and transportation as appropriate  - Identify discharge learning needs (meds, wound care, etc )  - Arrange for interpretive services to assist at discharge as needed  - Refer to Case Management Department for coordinating discharge planning if the patient needs post-hospital services based on physician/advanced practitioner order or complex needs related to functional status, cognitive ability, or social support system  Outcome: Progressing     Problem: Knowledge Deficit  Goal: Patient/family/caregiver demonstrates understanding of disease process, treatment plan, medications, and discharge instructions  Description: Complete learning assessment and assess knowledge base    Interventions:  - Provide teaching at level of understanding  - Provide teaching via preferred learning methods  Outcome: Progressing     Problem: CARDIOVASCULAR - ADULT  Goal: Maintains optimal cardiac output and hemodynamic stability  Description: INTERVENTIONS:  - Monitor I/O, vital signs and rhythm  - Monitor for S/S and trends of decreased cardiac output  - Administer and titrate ordered vasoactive medications to optimize hemodynamic stability  - Assess quality of pulses, skin color and temperature  - Assess for signs of decreased coronary artery perfusion  - Instruct patient to report change in severity of symptoms  Outcome: Progressing  Goal: Absence of cardiac dysrhythmias or at baseline rhythm  Description: INTERVENTIONS:  - Continuous cardiac monitoring, vital signs, obtain 12 lead EKG if ordered  - Administer antiarrhythmic and heart rate control medications as ordered  - Monitor electrolytes and administer replacement therapy as ordered  Outcome: Progressing     Problem: Potential for Falls  Goal: Patient will remain free of falls  Description: INTERVENTIONS:  - Educate patient/family on patient safety including physical limitations  - Instruct patient to call for assistance with activity   - Consult OT/PT to assist with strengthening/mobility   - Keep Call bell within reach  - Keep bed low and locked with side rails adjusted as appropriate  - Keep care items and personal belongings within reach  - Initiate and maintain comfort rounds  - Make Fall Risk Sign visible to staff  Outcome: Progressing

## 2022-04-30 NOTE — ASSESSMENT & PLAN NOTE
Wt Readings from Last 3 Encounters:   04/30/22 123 kg (271 lb 2 7 oz)   04/07/22 127 kg (281 lb)   04/07/22 127 kg (281 lb)   Presents with acute shortness of breath, 10 lb weight gain, leg swelling, abdominal distention  BNP elevated at 1900  Not maintained on diuretics outpatient prior to admission  Had nuclear stress test/echo 4/07/22 with EF 32%, stress test negative for ischemia  Follows with Dr Georgina Langston outpatient  Patient remains in AFib with intermittent RVR  Recieved IV Lasix 40 mg b i d   Patient attempted TI/cardioversion 4/26/22 however were unable to perform cardioversion due to small and mobile thrombus in the left atrial appendage   -TI also revealing ejection fraction 15%  Life Vest discussed but cardiology no longer recommending with poorly controlled heart rate and risk of inappropriate shock jw in the setting of his intracardiac thrombus    Anticoagulation: Eliquis 5 mg BID - not covered by insurance --> changed to xarelto  Home regimen metoprolol tartrate 25 BID --> titrating up for better rate control  Started on Losartan here- but now being held in favor of better heart rate control  Lasix- held due to hypotension - remains on hold

## 2022-04-30 NOTE — PLAN OF CARE
Problem: PAIN - ADULT  Goal: Verbalizes/displays adequate comfort level or baseline comfort level  Description: Interventions:  - Encourage patient to monitor pain and request assistance  - Assess pain using appropriate pain scale  - Administer analgesics based on type and severity of pain and evaluate response  - Implement non-pharmacological measures as appropriate and evaluate response  - Consider cultural and social influences on pain and pain management  - Notify physician/advanced practitioner if interventions unsuccessful or patient reports new pain  Outcome: Progressing     Problem: INFECTION - ADULT  Goal: Absence or prevention of progression during hospitalization  Description: INTERVENTIONS:  - Assess and monitor for signs and symptoms of infection  - Monitor lab/diagnostic results  - Monitor all insertion sites, i e  indwelling lines, tubes, and drains  - Monitor endotracheal if appropriate and nasal secretions for changes in amount and color  - [place] appropriate cooling/warming therapies per order  - Administer medications as ordered  - Instruct and encourage patient and family to use good hand hygiene technique  - Identify and instruct in appropriate isolation precautions for identified infection/condition  Outcome: Progressing     Problem: SAFETY ADULT  Goal: Patient will remain free of falls  Description: INTERVENTIONS:  - Educate patient/family on patient safety including physical limitations  - Instruct patient to call for assistance with activity   - Consult OT/PT to assist with strengthening/mobility   - Keep Call bell within reach  - Keep bed low and locked with side rails adjusted as appropriate  - Keep care items and personal belongings within reach  - Initiate and maintain comfort rounds  - Make Fall Risk Sign visible to staff  Outcome: Progressing  Goal: Maintain or return to baseline ADL function  Description: INTERVENTIONS:  -  Assess patient's ability to carry out ADLs; assess patient's baseline for ADL function and identify physical deficits which impact ability to perform ADLs (bathing, care of mouth/teeth, toileting, grooming, dressing, etc )  - Assess/evaluate cause of self-care deficits   - Assess range of motion  - Assess patient's mobility; develop plan if impaired  - Assess patient's need for assistive devices and provide as appropriate  - Encourage maximum independence but intervene and supervise when necessary  - Involve family in performance of ADLs  - Assess for home care needs following discharge   - Consider OT consult to assist with ADL evaluation and planning for discharge  - Provide patient education as appropriate  Outcome: Progressing  Goal: Maintains/Returns to pre admission functional level  Description: INTERVENTIONS:  - Perform BMAT or MOVE assessment daily    - Set and communicate daily mobility goal to care team and patient/family/caregiver     - Collaborate with rehabilitation services on mobility goals if consulted  - Ambulate patient 3 times a day  - Out of bed to chair 3 times a day   - Out of bed for meals 3 times a day  - Out of bed for toileting  - Record patient progress and toleration of activity level   Outcome: Progressing     Problem: DISCHARGE PLANNING  Goal: Discharge to home or other facility with appropriate resources  Description: INTERVENTIONS:  - Identify barriers to discharge w/patient and caregiver  - Arrange for needed discharge resources and transportation as appropriate  - Identify discharge learning needs (meds, wound care, etc )  - Arrange for interpretive services to assist at discharge as needed  - Refer to Case Management Department for coordinating discharge planning if the patient needs post-hospital services based on physician/advanced practitioner order or complex needs related to functional status, cognitive ability, or social support system  Outcome: Progressing     Problem: Knowledge Deficit  Goal: Patient/family/caregiver demonstrates understanding of disease process, treatment plan, medications, and discharge instructions  Description: Complete learning assessment and assess knowledge base    Interventions:  - Provide teaching at level of understanding  - Provide teaching via preferred learning methods  Outcome: Progressing     Problem: CARDIOVASCULAR - ADULT  Goal: Maintains optimal cardiac output and hemodynamic stability  Description: INTERVENTIONS:  - Monitor I/O, vital signs and rhythm  - Monitor for S/S and trends of decreased cardiac output  - Administer and titrate ordered vasoactive medications to optimize hemodynamic stability  - Assess quality of pulses, skin color and temperature  - Assess for signs of decreased coronary artery perfusion  - Instruct patient to report change in severity of symptoms  Outcome: Progressing  Goal: Absence of cardiac dysrhythmias or at baseline rhythm  Description: INTERVENTIONS:  - Continuous cardiac monitoring, vital signs, obtain 12 lead EKG if ordered  - Administer antiarrhythmic and heart rate control medications as ordered  - Monitor electrolytes and administer replacement therapy as ordered  Outcome: Progressing     Problem: Potential for Falls  Goal: Patient will remain free of falls  Description: INTERVENTIONS:  - Educate patient/family on patient safety including physical limitations  - Instruct patient to call for assistance with activity   - Consult OT/PT to assist with strengthening/mobility   - Keep Call bell within reach  - Keep bed low and locked with side rails adjusted as appropriate  - Keep care items and personal belongings within reach  - Initiate and maintain comfort rounds  - Make Fall Risk Sign visible to staff  Outcome: Progressing

## 2022-04-30 NOTE — PLAN OF CARE
Problem: PAIN - ADULT  Goal: Verbalizes/displays adequate comfort level or baseline comfort level  Description: Interventions:  - Encourage patient to monitor pain and request assistance  - Assess pain using appropriate pain scale  - Administer analgesics based on type and severity of pain and evaluate response  - Implement non-pharmacological measures as appropriate and evaluate response  - Consider cultural and social influences on pain and pain management  - Notify physician/advanced practitioner if interventions unsuccessful or patient reports new pain  Outcome: Progressing     Problem: INFECTION - ADULT  Goal: Absence or prevention of progression during hospitalization  Description: INTERVENTIONS:  - Assess and monitor for signs and symptoms of infection  - Monitor lab/diagnostic results  - Monitor all insertion sites, i e  indwelling lines, tubes, and drains  - Monitor endotracheal if appropriate and nasal secretions for changes in amount and color  - [place] appropriate cooling/warming therapies per order  - Administer medications as ordered  - Instruct and encourage patient and family to use good hand hygiene technique  - Identify and instruct in appropriate isolation precautions for identified infection/condition  Outcome: Progressing     Problem: SAFETY ADULT  Goal: Patient will remain free of falls  Description: INTERVENTIONS:  - Educate patient/family on patient safety including physical limitations  - Instruct patient to call for assistance with activity   - Consult OT/PT to assist with strengthening/mobility   - Keep Call bell within reach  - Keep bed low and locked with side rails adjusted as appropriate  - Keep care items and personal belongings within reach  - Initiate and maintain comfort rounds  - Make Fall Risk Sign visible to staff  Outcome: Progressing  Goal: Maintain or return to baseline ADL function  Description: INTERVENTIONS:  -  Assess patient's ability to carry out ADLs; assess patient's baseline for ADL function and identify physical deficits which impact ability to perform ADLs (bathing, care of mouth/teeth, toileting, grooming, dressing, etc )  - Assess/evaluate cause of self-care deficits   - Assess range of motion  - Assess patient's mobility; develop plan if impaired  - Assess patient's need for assistive devices and provide as appropriate  - Encourage maximum independence but intervene and supervise when necessary  - Involve family in performance of ADLs  - Assess for home care needs following discharge   - Consider OT consult to assist with ADL evaluation and planning for discharge  - Provide patient education as appropriate  Outcome: Progressing  Goal: Maintains/Returns to pre admission functional level  Description: INTERVENTIONS:  - Perform BMAT or MOVE assessment daily    - Set and communicate daily mobility goal to care team and patient/family/caregiver     - Collaborate with rehabilitation services on mobility goals if consulted  - Ambulate patient 3 times a day  - Out of bed to chair 3 times a day   - Out of bed for meals 3 times a day  - Out of bed for toileting  - Record patient progress and toleration of activity level   Outcome: Progressing     Problem: DISCHARGE PLANNING  Goal: Discharge to home or other facility with appropriate resources  Description: INTERVENTIONS:  - Identify barriers to discharge w/patient and caregiver  - Arrange for needed discharge resources and transportation as appropriate  - Identify discharge learning needs (meds, wound care, etc )  - Arrange for interpretive services to assist at discharge as needed  - Refer to Case Management Department for coordinating discharge planning if the patient needs post-hospital services based on physician/advanced practitioner order or complex needs related to functional status, cognitive ability, or social support system  Outcome: Progressing     Problem: Knowledge Deficit  Goal: Patient/family/caregiver demonstrates understanding of disease process, treatment plan, medications, and discharge instructions  Description: Complete learning assessment and assess knowledge base    Interventions:  - Provide teaching at level of understanding  - Provide teaching via preferred learning methods  Outcome: Progressing     Problem: CARDIOVASCULAR - ADULT  Goal: Maintains optimal cardiac output and hemodynamic stability  Description: INTERVENTIONS:  - Monitor I/O, vital signs and rhythm  - Monitor for S/S and trends of decreased cardiac output  - Administer and titrate ordered vasoactive medications to optimize hemodynamic stability  - Assess quality of pulses, skin color and temperature  - Assess for signs of decreased coronary artery perfusion  - Instruct patient to report change in severity of symptoms  Outcome: Progressing  Goal: Absence of cardiac dysrhythmias or at baseline rhythm  Description: INTERVENTIONS:  - Continuous cardiac monitoring, vital signs, obtain 12 lead EKG if ordered  - Administer antiarrhythmic and heart rate control medications as ordered  - Monitor electrolytes and administer replacement therapy as ordered  Outcome: Progressing     Problem: Potential for Falls  Goal: Patient will remain free of falls  Description: INTERVENTIONS:  - Educate patient/family on patient safety including physical limitations  - Instruct patient to call for assistance with activity   - Consult OT/PT to assist with strengthening/mobility   - Keep Call bell within reach  - Keep bed low and locked with side rails adjusted as appropriate  - Keep care items and personal belongings within reach  - Initiate and maintain comfort rounds  - Make Fall Risk Sign visible to staff  Outcome: Progressing

## 2022-04-30 NOTE — ASSESSMENT & PLAN NOTE
History of paroxysmal atrial fibrillation initially hospitalized in February for this   Home regimen Cardizem 60 mg b i d  and metoprolol tartrate 25 mg q 12   Not on anticoagulation outpatient - now started on Eliquis 5 mg bid  Continue anticoagulation in the setting of left atrial appendage thrombus  Elquis not covered by insurance so switched to [facility]  Cardiology stopped cardizem  Continued on metoprolol but dose increased up to 100 mg BID for better rate control  Started on losartan and Lasix here but now both on hold   Given digoxin bolus 250 mcg 4/27, Repeat bolus 250 mcg 4/28, repeat bolus 250 mcg BID 4/29  Now on digoxin 125 mcg daily - needs repeat dig level in 2 days   Most recent dig level being 0 5  Continue telemetry monitoring and Cardiology recommendations on med titration

## 2022-04-30 NOTE — PROGRESS NOTES
[address] [facility]  Progress Note - Gina Baeza 1960, 62 y o  male MRN: [id number]  Unit/Bed#: E4 -01 Encounter: [id number]  Primary Care Provider: Eligio Reaves DO   Date and time admitted to hospital: 4/24/2022 11:42 AM    * Acute on chronic systolic CHF (congestive heart failure) ([facility])  Assessment & Plan  Wt Readings from Last 3 Encounters:   04/30/22 123 kg (271 lb 2 7 oz)   04/07/22 127 kg (281 lb)   04/07/22 127 kg (281 lb)   Presents with acute shortness of breath, 10 lb weight gain, leg swelling, abdominal distention  BNP elevated at 1900  Not maintained on diuretics outpatient prior to admission  Had nuclear stress test/echo 4/07/22 with EF 32%, stress test negative for ischemia  Follows with Dr Jayshree Flood outpatient  Patient remains in AFib with intermittent RVR  Recieved IV Lasix 40 mg b i d   Patient attempted TI/cardioversion 4/26/22 however were unable to perform cardioversion due to small and mobile thrombus in the left atrial appendage   -TI also revealing ejection fraction 15%  Life Vest discussed but cardiology no longer recommending with poorly controlled heart rate and risk of inappropriate shock jw in the setting of his intracardiac thrombus    Anticoagulation: Eliquis 5 mg BID - not covered by insurance --> changed to xarelto  Home regimen metoprolol tartrate 25 BID --> titrating up for better rate control  Started on Losartan here- but now being held in favor of better heart rate control  Lasix- held due to hypotension - remains on hold    PAF (paroxysmal atrial fibrillation) ([facility])  Assessment & Plan  History of paroxysmal atrial fibrillation initially hospitalized in February for this   Home regimen Cardizem 60 mg b i d  and metoprolol tartrate 25 mg q 12   Not on anticoagulation outpatient - now started on Eliquis 5 mg bid  Continue anticoagulation in the setting of left atrial appendage thrombus  Elquis not covered by insurance so switched to Jarrett Kelly 55  Cardiology stopped cardizem  Continued on metoprolol but dose increased up to 100 mg BID for better rate control  Started on losartan and Lasix here but now both on hold   Given digoxin bolus 250 mcg , Repeat bolus 250 mcg , repeat bolus 250 mcg BID   Now on digoxin 125 mcg daily - needs repeat dig level in 2 days   Most recent dig level being 0 5  Continue telemetry monitoring and Cardiology recommendations on med titration    SRIKANTH (obstructive sleep apnea)  Assessment & Plan  Has been referred for outpatient sleep study     History of Eliud-en-Y gastric bypass  Assessment & Plan  History of obesity status post gastric    Chest pain-resolved as of 2022  Assessment & Plan  Admits to constant substernal chest pain described as a knot on admission   Since resolved     VTE Pharmacologic Prophylaxis:   Pharmacologic: Rivaroxaban (Xarelto)  Mechanical VTE Prophylaxis in Place: Yes    Discharge Plan: With need for continued inpatient stay for better rate control    Discussions with Specialists or Other Care Team Provider: nursing, Dr Katiana Mchugh    Education and Discussions with Family / Patient: patient, patients wife via telephone - called - unable to reach    Time Spent for Care: 30 minutes  More than 50% of total time spent on counseling and coordination of care as described above  Current Length of Stay: 6 day(s)  Current Patient Status: Inpatient   Code Status: Level 1 - Full Code    Subjective:   Seen and evaluated earlier during rounds  Patient reports feeling okay  Discussed fluttering of heart rate will and  strange sensations in chest  Attempting to achieve better rate control and cardiology adjusting meds       Objective:     Vitals:   Temp (24hrs), Av 5 °F (36 4 °C), Min:97 °F (36 1 °C), Max:98 1 °F (36 7 °C)    Temp:  [97 °F (36 1 °C)-98 1 °F (36 7 °C)] 97 6 °F (36 4 °C)  HR:  [52-88] 55  Resp:  [16-18] 18  BP: ()/(60-78) 102/69  SpO2:  [97 %-98 %] 98 %  Body mass index is 36 78 kg/m²  Input and Output Summary (last 24 hours): Intake/Output Summary (Last 24 hours) at 4/30/2022 1453  Last data filed at 4/30/2022 1311  Gross per 24 hour   Intake 720 ml   Output 1700 ml   Net -980 ml       Physical Exam:     Physical Exam  Vitals and nursing note reviewed  Constitutional:       General: He is not in acute distress  Appearance: Normal appearance  He is normal weight  He is not ill-appearing, toxic-appearing or diaphoretic  HENT:      Head: Normocephalic and atraumatic  Eyes:      General: No scleral icterus  Cardiovascular:      Rate and Rhythm: Normal rate and regular rhythm  Pulmonary:      Effort: Pulmonary effort is normal  No respiratory distress  Breath sounds: Normal breath sounds  No stridor  No wheezing or rhonchi  Abdominal:      General: Bowel sounds are normal  There is no distension  Palpations: Abdomen is soft  There is no mass  Tenderness: There is no abdominal tenderness  Hernia: No hernia is present  Musculoskeletal:      Cervical back: Neck supple  Skin:     General: Skin is warm and dry  Neurological:      Mental Status: He is oriented to person, place, and time  Mental status is at baseline  Psychiatric:         Mood and Affect: Mood normal          Behavior: Behavior normal          Additional Data:     Labs:    Results from last 7 days   Lab Units 04/29/22  0617 04/27/22  0442 04/25/22  0514   WBC Thousand/uL 7 01   < > 6 78   HEMOGLOBIN g/dL 16 8   < > 14 8   HEMATOCRIT % 49 8*   < > 44 3   PLATELETS Thousands/uL 214   < > 187   NEUTROS PCT %  --   --  67   LYMPHS PCT %  --   --  20   MONOS PCT %  --   --  9   EOS PCT %  --   --  4    < > = values in this interval not displayed  Results from last 7 days   Lab Units 04/28/22  0601   POTASSIUM mmol/L 4 3   CHLORIDE mmol/L 106   CO2 mmol/L 23   BUN mg/dL 18   CREATININE mg/dL 1 06   CALCIUM mg/dL 8 4           * I Have Reviewed All Lab Data Listed Above    * Additional Pertinent Lab Tests Reviewed: [facility] 67 Admission Reviewed    Imaging:    Imaging Reports Reviewed Today Include:   Imaging Personally Reviewed by Myself Includes:      Recent Cultures (last 7 days):           Last 24 Hours Medication List:   Current Facility-Administered Medications   Medication Dose Route Frequency Provider Last Rate    acetaminophen  650 mg Oral Q6H PRN Penelope Davila PA-C      albuterol  1 puff Inhalation Q4H PRN Penelope Davila PA-C      atorvastatin  40 mg Oral Daily With Dinner Penelope Davila PA-C      digoxin  125 mcg Oral Daily Imani Myers, DO      metoprolol  5 mg Intravenous Q4H PRN Tk Dukj, DO      metoprolol tartrate  100 mg Oral Q12H [address] Royer David, DO      ondansetron  4 mg Intravenous Q6H PRN Penelope Davila PA-C      pantoprazole  40 mg Oral Early Morning Yue Garcia PA-C      rivaroxaban  20 mg Oral Daily With Breakfast Reema Momin PA-C          Today, Patient Was Seen By: Reema Momin PA-C    ** Please Note: This note has been constructed using a voice recognition system   **

## 2022-04-30 NOTE — PROGRESS NOTES
EPS Progress Note - Brian Rhodes 62 y o  male MRN: [id number]    Unit/Bed#: E4 -01 Encounter: [id number]      Assessment:  Principal Problem:    Acute on chronic systolic CHF (congestive heart failure) ([facility])  Active Problems:    History of Eliud-en-Y gastric bypass    PAF (paroxysmal atrial fibrillation) ([facility])    SRIKANTH (obstructive sleep apnea)      Plan:  His heart rate is still too elevated  I am going to increase his metoprolol to 100 mg twice daily  Hold SBP less than 90 hold his losartan for now as his bigger issue is tachycardia  Subjective:   Patient seen and examined  No significant events overnight  He feels much better    Objective:     Vitals: Blood pressure 112/74, pulse 63, temperature 97 7 °F (36 5 °C), temperature source Temporal, resp  rate 18, height 6' (1 829 m), weight 123 kg (271 lb 2 7 oz), SpO2 98 %  , Body mass index is 36 78 kg/m² ,   Orthostatic Blood Pressures      Most Recent Value   Blood Pressure 112/74 filed at 04/30/2022 0756   Patient Position - Orthostatic VS Sitting filed at 04/30/2022 0756            Intake/Output Summary (Last 24 hours) at 4/30/2022 1078  Last data filed at 4/30/2022 0750  Gross per 24 hour   Intake 600 ml   Output 1200 ml   Net -600 ml       Telemetry showing atrial fibrillation with rapid ventricular response heart rate in the 130s      Physical Exam:    GEN: Brian Rhodes appears well, alert and oriented x 3, pleasant and cooperative   HEENT: pupils equal, round, and reactive to light; extraocular muscles intact  NECK: supple, no carotid bruits   HEART: irregular rhythm, normal S1 and S2, no murmurs, clicks, gallops or rubs he has tachycardia  LUNGS: clear to auscultation bilaterally; no wheezes, rales, or rhonchi   ABDOMEN: normal bowel sounds, soft, no tenderness, no distention  EXTREMITIES: peripheral pulses normal; no clubbing, cyanosis, or edema  NEURO: no focal findings   SKIN: normal without suspicious lesions on exposed skin    Medications:      Current Facility-Administered Medications:     acetaminophen (TYLENOL) tablet 650 mg, 650 mg, Oral, Q6H PRN, Yue Garcia PA-C, 650 mg at 04/28/22 0859    albuterol (PROVENTIL HFA,VENTOLIN HFA) inhaler 1 puff, 1 puff, Inhalation, Q4H PRN, Princess Duglas PA-C    apixaban (ELIQUIS) tablet 5 mg, 5 mg, Oral, BID, Rujul Myers, DO, 5 mg at 04/29/22 1709    atorvastatin (LIPITOR) tablet 40 mg, 40 mg, Oral, Daily With Dinner, Princess Duglas PA-C, 40 mg at 04/29/22 1709    digoxin (LANOXIN) tablet 125 mcg, 125 mcg, Oral, Daily, Rujul Myers, DO    metoprolol (LOPRESSOR) injection 5 mg, 5 mg, Intravenous, Q4H PRN, Praveen Rodrigues DO, 5 mg at 04/26/22 2332    metoprolol tartrate (LOPRESSOR) tablet 100 mg, 100 mg, Oral, Q12H [address], Royer Shauna Felipe, DO    ondansetron (ZOFRAN) injection 4 mg, 4 mg, Intravenous, Q6H PRN, Yue Garcia PA-C    pantoprazole (PROTONIX) EC tablet 40 mg, 40 mg, Oral, Early Morning, Yue Garcia PA-C, 40 mg at 04/30/22 0551     Labs & Results:        Results from last 7 days   Lab Units 04/29/22  0617 04/28/22  0601 04/27/22  0442   WBC Thousand/uL 7 01 7 00 6 94   HEMOGLOBIN g/dL 16 8 16 3 15 5   HEMATOCRIT % 49 8* 48 3 46 9   PLATELETS Thousands/uL 214 201 201     Results from last 7 days   Lab Units 04/25/22  0515   TRIGLYCERIDES mg/dL 82   HDL mg/dL 50     Results from last 7 days   Lab Units 04/28/22  0601 04/27/22  0442 04/26/22  0448   POTASSIUM mmol/L 4 3 3 9 4 0   CHLORIDE mmol/L 106 106 105   CO2 mmol/L 23 25 25   BUN mg/dL 18 20 21   CREATININE mg/dL 1 06 1 04 1 10   CALCIUM mg/dL 8 4 8 1* 8 3         Results from last 7 days   Lab Units 04/28/22  0601   MAGNESIUM mg/dL 2 0        Cardiac testing:   No results found for this or any previous visit  No results found for this or any previous visit  No results found for this or any previous visit  No results found for this or any previous visit

## 2022-05-01 VITALS
WEIGHT: 271.83 LBS | DIASTOLIC BLOOD PRESSURE: 70 MMHG | TEMPERATURE: 97.7 F | RESPIRATION RATE: 18 BRPM | SYSTOLIC BLOOD PRESSURE: 138 MMHG | OXYGEN SATURATION: 98 % | HEART RATE: 59 BPM | HEIGHT: 72 IN | BODY MASS INDEX: 36.82 KG/M2

## 2022-05-01 PROCEDURE — 99239 HOSP IP/OBS DSCHRG MGMT >30: CPT | Performed by: INTERNAL MEDICINE

## 2022-05-01 RX ORDER — FUROSEMIDE 20 MG/1
20 TABLET ORAL DAILY
Qty: 30 TABLET | Refills: 0 | Status: SHIPPED | OUTPATIENT
Start: 2022-05-01 | End: 2022-05-25 | Stop reason: SDUPTHER

## 2022-05-01 RX ORDER — DIGOXIN 125 MCG
125 TABLET ORAL DAILY
Qty: 30 TABLET | Refills: 0 | Status: SHIPPED | OUTPATIENT
Start: 2022-05-02 | End: 2022-05-25 | Stop reason: SDUPTHER

## 2022-05-01 RX ORDER — POTASSIUM CHLORIDE 20 MEQ/1
20 TABLET, EXTENDED RELEASE ORAL 2 TIMES DAILY
Qty: 60 TABLET | Refills: 0 | Status: SHIPPED | OUTPATIENT
Start: 2022-05-01 | End: 2022-05-25 | Stop reason: SDUPTHER

## 2022-05-01 RX ORDER — METOPROLOL TARTRATE 100 MG/1
100 TABLET ORAL EVERY 12 HOURS SCHEDULED
Qty: 60 TABLET | Refills: 0 | Status: SHIPPED | OUTPATIENT
Start: 2022-05-01 | End: 2022-05-25 | Stop reason: SDUPTHER

## 2022-05-01 RX ORDER — ATORVASTATIN CALCIUM 40 MG/1
40 TABLET, FILM COATED ORAL
Qty: 30 TABLET | Refills: 0 | Status: SHIPPED | OUTPATIENT
Start: 2022-05-01 | End: 2022-05-25 | Stop reason: SDUPTHER

## 2022-05-01 RX ADMIN — METOPROLOL TARTRATE 100 MG: 100 TABLET, FILM COATED ORAL at 08:20

## 2022-05-01 RX ADMIN — RIVAROXABAN 20 MG: 20 TABLET, FILM COATED ORAL at 08:19

## 2022-05-01 RX ADMIN — PANTOPRAZOLE SODIUM 40 MG: 40 TABLET, DELAYED RELEASE ORAL at 05:21

## 2022-05-01 RX ADMIN — DIGOXIN 125 MCG: 125 TABLET ORAL at 08:19

## 2022-05-01 NOTE — PLAN OF CARE
Problem: PAIN - ADULT  Goal: Verbalizes/displays adequate comfort level or baseline comfort level  Description: Interventions:  - Encourage patient to monitor pain and request assistance  - Assess pain using appropriate pain scale  - Administer analgesics based on type and severity of pain and evaluate response  - Implement non-pharmacological measures as appropriate and evaluate response  - Consider cultural and social influences on pain and pain management  - Notify physician/advanced practitioner if interventions unsuccessful or patient reports new pain  Outcome: Progressing     Problem: INFECTION - ADULT  Goal: Absence or prevention of progression during hospitalization  Description: INTERVENTIONS:  - Assess and monitor for signs and symptoms of infection  - Monitor lab/diagnostic results  - Monitor all insertion sites, i e  indwelling lines, tubes, and drains  - Monitor endotracheal if appropriate and nasal secretions for changes in amount and color  - [place] appropriate cooling/warming therapies per order  - Administer medications as ordered  - Instruct and encourage patient and family to use good hand hygiene technique  - Identify and instruct in appropriate isolation precautions for identified infection/condition  Outcome: Progressing     Problem: SAFETY ADULT  Goal: Patient will remain free of falls  Description: INTERVENTIONS:  - Educate patient/family on patient safety including physical limitations  - Instruct patient to call for assistance with activity   - Consult OT/PT to assist with strengthening/mobility   - Keep Call bell within reach  - Keep bed low and locked with side rails adjusted as appropriate  - Keep care items and personal belongings within reach  - Initiate and maintain comfort rounds  - Make Fall Risk Sign visible to staff  Outcome: Progressing  Goal: Maintain or return to baseline ADL function  Description: INTERVENTIONS:  -  Assess patient's ability to carry out ADLs; assess patient's baseline for ADL function and identify physical deficits which impact ability to perform ADLs (bathing, care of mouth/teeth, toileting, grooming, dressing, etc )  - Assess/evaluate cause of self-care deficits   - Assess range of motion  - Assess patient's mobility; develop plan if impaired  - Assess patient's need for assistive devices and provide as appropriate  - Encourage maximum independence but intervene and supervise when necessary  - Involve family in performance of ADLs  - Assess for home care needs following discharge   - Consider OT consult to assist with ADL evaluation and planning for discharge  - Provide patient education as appropriate  Outcome: Progressing  Goal: Maintains/Returns to pre admission functional level  Description: INTERVENTIONS:  - Perform BMAT or MOVE assessment daily    - Set and communicate daily mobility goal to care team and patient/family/caregiver     - Collaborate with rehabilitation services on mobility goals if consulted  - Ambulate patient 3 times a day  - Out of bed to chair 3 times a day   - Out of bed for meals 3 times a day  - Out of bed for toileting  - Record patient progress and toleration of activity level   Outcome: Progressing     Problem: DISCHARGE PLANNING  Goal: Discharge to home or other facility with appropriate resources  Description: INTERVENTIONS:  - Identify barriers to discharge w/patient and caregiver  - Arrange for needed discharge resources and transportation as appropriate  - Identify discharge learning needs (meds, wound care, etc )  - Arrange for interpretive services to assist at discharge as needed  - Refer to Case Management Department for coordinating discharge planning if the patient needs post-hospital services based on physician/advanced practitioner order or complex needs related to functional status, cognitive ability, or social support system  Outcome: Progressing     Problem: Knowledge Deficit  Goal: Patient/family/caregiver demonstrates understanding of disease process, treatment plan, medications, and discharge instructions  Description: Complete learning assessment and assess knowledge base    Interventions:  - Provide teaching at level of understanding  - Provide teaching via preferred learning methods  Outcome: Progressing     Problem: CARDIOVASCULAR - ADULT  Goal: Maintains optimal cardiac output and hemodynamic stability  Description: INTERVENTIONS:  - Monitor I/O, vital signs and rhythm  - Monitor for S/S and trends of decreased cardiac output  - Administer and titrate ordered vasoactive medications to optimize hemodynamic stability  - Assess quality of pulses, skin color and temperature  - Assess for signs of decreased coronary artery perfusion  - Instruct patient to report change in severity of symptoms  Outcome: Progressing  Goal: Absence of cardiac dysrhythmias or at baseline rhythm  Description: INTERVENTIONS:  - Continuous cardiac monitoring, vital signs, obtain 12 lead EKG if ordered  - Administer antiarrhythmic and heart rate control medications as ordered  - Monitor electrolytes and administer replacement therapy as ordered  Outcome: Progressing     Problem: Potential for Falls  Goal: Patient will remain free of falls  Description: INTERVENTIONS:  - Educate patient/family on patient safety including physical limitations  - Instruct patient to call for assistance with activity   - Consult OT/PT to assist with strengthening/mobility   - Keep Call bell within reach  - Keep bed low and locked with side rails adjusted as appropriate  - Keep care items and personal belongings within reach  - Initiate and maintain comfort rounds  - Make Fall Risk Sign visible to staff  Outcome: Progressing

## 2022-05-01 NOTE — DISCHARGE SUMMARY
[facility]  Discharge- Zaid Gupta 1960, 62 y o  male MRN: [id number]  Unit/Bed#: E4 -01 Encounter: [id number]  Primary Care Provider: Manfred Camacho DO   Date and time admitted to hospital: 4/24/2022 11:42 AM    * Acute on chronic systolic CHF (congestive heart failure) ([facility] [address] )  Assessment & Plan  Wt Readings from Last 3 Encounters:   05/01/22 123 kg (271 lb 13 2 oz)   04/07/22 127 kg (281 lb)   04/07/22 127 kg (281 lb)   Presents with acute shortness of breath, 10 lb weight gain, leg swelling, abdominal distention  BNP elevated at 1900  Not maintained on diuretics outpatient prior to admission  Had nuclear stress test/echo 4/07/22 with EF 32%, stress test negative for ischemia  Follows with Dr Kelly Rangel outpatient  Recieved IV Lasix 40 mg b i d   Treated for afib with RVR  Patient attempted TI/cardioversion 4/26/22 however were unable to perform cardioversion due to small and mobile thrombus in the left atrial appendage   -TI also revealing ejection fraction 15%  Life Vest discussed but cardiology no longer recommending with poorly controlled heart rate and risk of inappropriate shock jw in the setting of his intracardiac thrombus    Anticoagulation: Eliquis 5 mg BID - not covered by insurance --> changed to xarelto  Home regimen metoprolol tartrate 25 BID --> titrated up for better rate control-  100 mg BID  Started on Losartan here- but now being held in favor of better heart rate control  S/p IV lasix here --> will discharge on lasix 20 mg daily + Kdur 20 meq daily    PAF (paroxysmal atrial fibrillation) ([facility])  Assessment & Plan  History of paroxysmal atrial fibrillation initially hospitalized in February for this   Home regimen Cardizem 60 mg b i d  and metoprolol tartrate 25 mg q 12   Not on anticoagulation outpatient   Elquis not covered by insurance so switched to xarelto 20 mg daily  Continue anticoagulation in the setting of left atrial appendage thrombus  Cardiology stopped cardizem  Continued on metoprolol but dose increased up to 100 mg BID for better rate control  Given digoxin and multiple boluses here  Now on digoxin 125 mcg daily    SRIKANTH (obstructive sleep apnea)  Assessment & Plan  Has been referred for outpatient sleep study     History of Eliud-en-Y gastric bypass  Assessment & Plan  History of obesity status post gastric    Chest pain-resolved as of 4/27/2022  Assessment & Plan  Admits to constant substernal chest pain described as a knot on admission   Since resolved     Consultations During Hospital Stay:  · Cardiology    Procedures Performed:   XR chest 2 views  Result Date: 4/24/2022  · Impression: There are findings consistent with mild CHF and pulmonary edema  Tiny effusions  The study was marked in [facility] for immediate notification  Workstation performed: [id number]     · 4/26/22:  Transesophageal echocardiogram    Left Ventricle: Left ventricular cavity size is mildly dilated  The left ventricular ejection fraction is 15%  Systolic function is severely reduced in the setting of tachycardia  There is severe global hypokinesis    Left Atrium: The atrium is mildly dilated  There is mild spontaneous echo contrast     Left Atrial Appendage: There is reduced function without spontaneous echocontrast  There is a small, immobile thrombus, measuring upto 0 6 cm in the appendage    Mitral Valve: There is mild regurgitation    The TI was aborted early due to low BP and patient coughing  Significant Findings / Test Results:   · Acute on chronic diastolic and systolic CHF  · Nonischemic cardiomyopathy likely tachycardia mediated  · EF of 15% on TI  · Atrial fibrillation with RVR  · Left atrial thrombus on TI 4/26/22  · Started on anticoagulation with Xarelto    Incidental Findings:   · None     Test Results Pending at Discharge (will require follow up):    · None     Outpatient follow-up Requested:  · PCP-1 week  · Electrophysiology 5/16/22  · SRIKANTH consultation 5/18/22  · Cardiology 6/69/18    Complications: Tachycardia, hypotension    Hospital Course:     Zaid Gupta is a 62 y o  male patient with a PMH of chronic systolic CHF, paroxysmal atrial fibrillation previously not anticoagulated given chads Vasc of 0, gastric bypass surgery, previous heavy alcohol use, recent COVID-19 infection February 2022, probable SRIKANTH  He originally presented to the hospital on 4/24/2022 due to chest pain and shortness of breath  He is found have a temp home we can along with leg swelling, abdominal distension, and elevated BNP of 1900  He was seen in consultation by Cardiology here and started on IV diuresis  His medications were adjusted given elevated heart rates  Was also determined that patient start Eliquis given chads Vasc score now being 1 prior to cardioversion  Cardiology elected to pursue TI cardioversion and patient underwent procedure on 4/26/22  Unfortunately cardioversion could not be performed given findings of a left atrial thrombus  Initially started Eliquis however this was not covered by insurance and he was transition to [address] [place] [address]  Patient continued to have difficulty with controlling heart rates during his hospital stay  LifeVest was initially recommended however decided against given elevated heart rates and possibility of administering a inappropriate shock especially in the setting of a left atrial thrombus  Additionally noted to have a new decreased EF of 15%  Patient developed some hypotension and losartan and Lasix had to be held  Patient required doses of digoxin and was given multiple boluses  The patient's metoprolol dosage was titrated upwards for better rate control of his heart  Cardiac medications upon discharge:  Cardizem-stopped  Metoprolol tartrate 25 BID --> increased to 100 mg b i d    Atorvastatin 40 mg daily - new  Digoxin 125 mcg daily - new  Xarelto 20 mg daily- new  Lasix 20 mg daily + 20 mEq K -new    On 5/1/22, the patient's heart rates noted to have improved and he was deemed stable for discharge by Cardiology- personally spoke with Dr Wisam Ruffin in regards to meds listed above  He will be discharged on the medications listed above and expected to follow-up with Dr Kenia Du of EP on 5/16/22  He additionally has cardiology appointment with Dr Maria A Trinh already scheduled on 5/25/22  He was asked to follow-up with his PCP in 1 week in the meantime  Counseled against use of alcohol  In conclusion, patient is stable for discharge at this time  Condition at Discharge: stable     Discharge Day Visit / Exam:     Subjective:  Seen and evaluated earlier during rounds  Patient reports he feels good today  Has ambulated unit multiple times and remains without short of breath  Denies any chest pain or reason heart sensation  Attempted to call wife to give update - unable to reach  Vitals: Blood Pressure: 138/70 (05/01/22 1055)  Pulse: 59 (05/01/22 1055)  Temperature: 97 7 °F (36 5 °C) (05/01/22 1055)  Temp Source: Temporal (05/01/22 1055)  Respirations: 18 (05/01/22 1055)  Height: 6' (182 9 cm) (04/26/22 1225)  Weight - Scale: 123 kg (271 lb 13 2 oz) (05/01/22 0553)  SpO2: 98 % (05/01/22 1055)     Exam:   Physical Exam  Vitals and nursing note reviewed  Constitutional:       General: He is not in acute distress  Appearance: Normal appearance  He is obese  He is not ill-appearing, toxic-appearing or diaphoretic  HENT:      Head: Normocephalic and atraumatic  Eyes:      General: No scleral icterus  Cardiovascular:      Rate and Rhythm: Normal rate  Rhythm irregularly irregular  Pulmonary:      Effort: Pulmonary effort is normal  No respiratory distress  Breath sounds: Normal breath sounds  No stridor  No wheezing or rhonchi  Abdominal:      General: Bowel sounds are normal  There is no distension  Palpations: Abdomen is soft  There is no mass  Tenderness:  There is no abdominal tenderness  Hernia: No hernia is present  Musculoskeletal:         General: No swelling  Cervical back: Neck supple  Skin:     General: Skin is warm and dry  Neurological:      Mental Status: He is alert and oriented to person, place, and time  Mental status is at baseline  Psychiatric:         Mood and Affect: Mood normal          Behavior: Behavior normal        Discharge instructions/Information to patient and family:   See after visit summary for information provided to patient and family  Provisions for Follow-Up Care:  See after visit summary for information related to follow-up care and any pertinent home health orders  Planned Readmission: no     Discharge Statement:  I spent 40 minutes discharging the patient  This time was spent on the day of discharge  I had direct contact with the patient on the day of discharge  Greater than 50% of the total time was spent examining patient, answering all patient questions, arranging and discussing plan of care with patient as well as directly providing post-discharge instructions  Additional time then spent on discharge activities  Discharge Medications:  See after visit summary for reconciled discharge medications provided to patient and family        ** Please Note: This note has been constructed using a voice recognition system **

## 2022-05-01 NOTE — ASSESSMENT & PLAN NOTE
Wt Readings from Last 3 Encounters:   05/01/22 123 kg (271 lb 13 2 oz)   04/07/22 127 kg (281 lb)   04/07/22 127 kg (281 lb)   Presents with acute shortness of breath, 10 lb weight gain, leg swelling, abdominal distention  BNP elevated at 1900  Not maintained on diuretics outpatient prior to admission  Had nuclear stress test/echo 4/07/22 with EF 32%, stress test negative for ischemia  Follows with Dr Artur Lindquist outpatient  Recieved IV Lasix 40 mg b i d   Treated for afib with RVR  Patient attempted TI/cardioversion 4/26/22 however were unable to perform cardioversion due to small and mobile thrombus in the left atrial appendage   -TI also revealing ejection fraction 15%  Life Vest discussed but cardiology no longer recommending with poorly controlled heart rate and risk of inappropriate shock jw in the setting of his intracardiac thrombus    Anticoagulation: Eliquis 5 mg BID - not covered by insurance --> changed to xarelto  Home regimen metoprolol tartrate 25 BID --> titrated up for better rate control-  100 mg BID  Started on Losartan here- but now being held in favor of better heart rate control  S/p IV lasix here --> will discharge on lasix 20 mg daily + Kdur 20 meq daily

## 2022-05-01 NOTE — ASSESSMENT & PLAN NOTE
History of paroxysmal atrial fibrillation initially hospitalized in February for this   Home regimen Cardizem 60 mg b i d  and metoprolol tartrate 25 mg q 12   Not on anticoagulation outpatient   Elquis not covered by insurance so switched to xarelto 20 mg daily  Continue anticoagulation in the setting of left atrial appendage thrombus  Cardiology stopped cardizem  Continued on metoprolol but dose increased up to 100 mg BID for better rate control  Given digoxin and multiple boluses here  Now on digoxin 125 mcg daily

## 2022-05-02 ENCOUNTER — TRANSITIONAL CARE MANAGEMENT (OUTPATIENT)
Dept: FAMILY MEDICINE CLINIC | Facility: CLINIC | Age: 62
End: 2022-05-02

## 2022-05-03 ENCOUNTER — PATIENT MESSAGE (OUTPATIENT)
Dept: FAMILY MEDICINE CLINIC | Facility: CLINIC | Age: 62
End: 2022-05-03

## 2022-05-03 ENCOUNTER — OFFICE VISIT (OUTPATIENT)
Dept: FAMILY MEDICINE CLINIC | Facility: CLINIC | Age: 62
End: 2022-05-03
Payer: COMMERCIAL

## 2022-05-03 VITALS
HEIGHT: 73 IN | WEIGHT: 277.4 LBS | OXYGEN SATURATION: 97 % | DIASTOLIC BLOOD PRESSURE: 70 MMHG | SYSTOLIC BLOOD PRESSURE: 96 MMHG | TEMPERATURE: 96.5 F | BODY MASS INDEX: 36.77 KG/M2 | HEART RATE: 65 BPM

## 2022-05-03 DIAGNOSIS — R93.89 ABNORMAL CT OF THE CHEST: ICD-10-CM

## 2022-05-03 DIAGNOSIS — I48.0 PAF (PAROXYSMAL ATRIAL FIBRILLATION) (HCC): ICD-10-CM

## 2022-05-03 DIAGNOSIS — R22.41 MASS OF RIGHT LOWER EXTREMITY: ICD-10-CM

## 2022-05-03 DIAGNOSIS — F43.9 STRESS: ICD-10-CM

## 2022-05-03 DIAGNOSIS — I50.23 ACUTE ON CHRONIC SYSTOLIC CHF (CONGESTIVE HEART FAILURE) (HCC): Primary | ICD-10-CM

## 2022-05-03 PROCEDURE — 1111F DSCHRG MED/CURRENT MED MERGE: CPT | Performed by: FAMILY MEDICINE

## 2022-05-03 PROCEDURE — 99496 TRANSJ CARE MGMT HIGH F2F 7D: CPT | Performed by: FAMILY MEDICINE

## 2022-05-03 NOTE — ASSESSMENT & PLAN NOTE
Wife expressed in email concerns of anxiety and stress; discussed with patient concerns of stress or anxiety given recent cardiac events or otherwise; he denies any significant stress or anxiety; offered counseling services and discussed medical treatment options should he feel sx are poorly controlled; declines at this time; f/u guidance given

## 2022-05-03 NOTE — ASSESSMENT & PLAN NOTE
Wt Readings from Last 3 Encounters:   05/03/22 126 kg (277 lb 6 4 oz)   05/01/22 123 kg (271 lb 13 2 oz)   04/07/22 127 kg (281 lb)     Appears euvolemic; c/w current tx; advised daily weights; f/u with cardiology; f/u guidance given should sx worsen; advised against any exertional activities until has f/u with cardiology given most recent EF

## 2022-05-03 NOTE — PATIENT INSTRUCTIONS
Call if weight increases more than 3-5 lbs in 1 day or if you develop any leg swelling  Follow up with cardiology  Please call with any concerns  Complete ultrasound and repeat CT scan

## 2022-05-03 NOTE — ASSESSMENT & PLAN NOTE
Pt had mediastinal adenopathy that was likely reactive; recommended repeat CT in 3-6 months; advised due soon

## 2022-05-03 NOTE — ASSESSMENT & PLAN NOTE
Rate controlled; c/w current tx; has f/u scheduled with EP and cardiology; ER guidance and f/u guidance given

## 2022-05-03 NOTE — PROGRESS NOTES
Assessment/Plan:     1  Acute on chronic systolic CHF (congestive heart failure) ([facility] [address] )  Assessment & Plan:  Wt Readings from Last 3 Encounters:   05/03/22 126 kg (277 lb 6 4 oz)   05/01/22 123 kg (271 lb 13 2 oz)   04/07/22 127 kg (281 lb)     Appears euvolemic; c/w current tx; advised daily weights; f/u with cardiology; f/u guidance given should sx worsen; advised against any exertional activities until has f/u with cardiology given most recent EF            2  Mass of right lower extremity  Assessment & Plan:  No known trauma to area; check u/s; f/u with results    Orders:  -     [address] [facility] [employer] [facility]; Future; Expected date: 05/03/2022    3  Abnormal CT of the chest  Assessment & Plan:  Pt had mediastinal adenopathy that was likely reactive; recommended repeat CT in 3-6 months; advised due soon        4  PAF (paroxysmal atrial fibrillation) ([facility])  Assessment & Plan:  Rate controlled; c/w current tx; has f/u scheduled with EP and cardiology; ER guidance and f/u guidance given      5  Stress  Assessment & Plan: Wife expressed in email concerns of anxiety and stress; discussed with patient concerns of stress or anxiety given recent cardiac events or otherwise; he denies any significant stress or anxiety; offered counseling services and discussed medical treatment options should he feel sx are poorly controlled; declines at this time; f/u guidance given          Subjective:      Patient ID: Adrianna James is a 62 y o  male  Patient presents for hospital follow up for acute CHF for admission 4/24-5/1/22  He has been feeling good since hospital discharge  Denies any palpitations  Feels heart is doing okay feels  Feels the best now than he had in the last month  Denies any lightheadedness  No SOB  Feels SOB is improved  Feels he is able to go up a flight of steps easier now  No significant leg swelling  Has follow up scheduled with Dr Janiya Keith and Dr Rishi Pascal  This morning weighed himself this am he was 269    No alcohol use since discharge  No coughing or fevers  Compliant with medication  Notes lump on his leg has been there for a few years  No known trauma to region  Patient denies any significant anxiety or concerns with mood  Hospital Course Summary:    "Carmen Gipson is a 62 y o  male patient with a PMH of chronic systolic CHF, paroxysmal atrial fibrillation previously not anticoagulated given chads Vasc of 0, gastric bypass surgery, previous heavy alcohol use, recent COVID-19 infection February 2022, probable SRIKANTH      He originally presented to the hospital on 4/24/2022 due to chest pain and shortness of breath  He is found have a temp home we can along with leg swelling, abdominal distension, and elevated BNP of 1900  He was seen in consultation by Cardiology here and started on IV diuresis  His medications were adjusted given elevated heart rates  Was also determined that patient start Eliquis given chads Vasc score now being 1 prior to cardioversion  Cardiology elected to pursue TI cardioversion and patient underwent procedure on 4/26/22  Unfortunately cardioversion could not be performed given findings of a left atrial thrombus  Initially started Eliquis however this was not covered by insurance and he was transition to [address] [place] [address]  Patient continued to have difficulty with controlling heart rates during his hospital stay  LifeVest was initially recommended however decided against given elevated heart rates and possibility of administering a inappropriate shock especially in the setting of a left atrial thrombus  Additionally noted to have a new decreased EF of 15%  Patient developed some hypotension and losartan and Lasix had to be held  Patient required doses of digoxin and was given multiple boluses    The patient's metoprolol dosage was titrated upwards for better rate control of his heart       Cardiac medications upon discharge:  Cardizem-stopped  Metoprolol tartrate 25 BID --> increased to 100 mg b i d  Atorvastatin 40 mg daily - new  Digoxin 125 mcg daily - new  Xarelto 20 mg daily- new  Lasix 20 mg daily + 20 mEq K -new     On 5/1/22, the patient's heart rates noted to have improved and he was deemed stable for discharge by Cardiology- personally spoke with Dr Sebastián Navarro in regards to meds listed above  He will be discharged on the medications listed above and expected to follow-up with Dr Renay Kay of EP on 5/16/22  He additionally has cardiology appointment with Dr Rachel Galeas already scheduled on 5/25/22  He was asked to follow-up with his PCP in 1 week in the meantime  Counseled against use of alcohol  In conclusion, patient is stable for discharge at this time "         The following portions of the patient's history were reviewed and updated as appropriate: allergies, current medications, past family history, past medical history, past social history, past surgical history, and problem list     Review of Systems   Constitutional: Negative for chills, fatigue and fever  Respiratory: Negative for shortness of breath  Cardiovascular: Negative for palpitations and leg swelling  Objective:      BP 96/70 (BP Location: Right arm, Patient Position: Sitting, Cuff Size: Large)   Pulse 65   Temp (!) 96 5 °F (35 8 °C)   Ht 6' 1" (1 854 m)   Wt 126 kg (277 lb 6 4 oz)   SpO2 97%   BMI 36 60 kg/m²          Physical Exam  Vitals reviewed  Constitutional:       General: He is not in acute distress  Appearance: Normal appearance  He is not ill-appearing, toxic-appearing or diaphoretic  HENT:      Head: Normocephalic and atraumatic  Eyes:      General: No scleral icterus  Right eye: No discharge  Left eye: No discharge  Conjunctiva/sclera: Conjunctivae normal    Cardiovascular:      Rate and Rhythm: Normal rate and regular rhythm  Pulses: Normal pulses  Heart sounds: Normal heart sounds  No murmur heard  No gallop      Pulmonary:      Effort: Pulmonary effort is normal  No respiratory distress  Breath sounds: Normal breath sounds  No stridor  No wheezing, rhonchi or rales  Musculoskeletal:      Right lower leg: No edema  Left lower leg: No edema  Neurological:      General: No focal deficit present  Mental Status: He is alert and oriented to person, place, and time  Psychiatric:         Mood and Affect: Mood normal          Behavior: Behavior normal          Thought Content:  Thought content normal          Judgment: Judgment normal

## 2022-05-13 NOTE — PROGRESS NOTES
Consultation - Electrophysiology-Cardiology (EP)   Pooja Lao 62 y o  male MRN: [id number]  Unit/Bed#:  Encounter: [id number]      1  Persistent atrial fibrillation (HCC)  POCT ECG   2  Atrial thrombus     3  Acute on chronic systolic CHF (congestive heart failure) ([address] )     4  SRIKANTH (obstructive sleep apnea)     5  Gastrointestinal hemorrhage with melena     6  Blood alcohol level of 240 mg/100 ml or more     7  History of Eliud-en-Y gastric bypass     8  Alcohol use     9  COVID-19     10  Obesity (BMI 30-39  9)           Consults  Physician Requesting Consult: Ramirez Mail, DO   Reason for Consult / Principal Problem: management of AF       Assessment/Plan     Persistent atrial fibrillation  Left atrial appendage thrombus, noted on April 22   Chads Vasc score 1   Acute on chronic combined systolic and diastolic heart failure, nonischemic CMP , LVEF 15-30%  Intermittent LBBB, likely rate related  Severe obesity, status post gastric bypass   Peptic ulcer disease   History of heavy alcohol use   Chronic kidney disease stage 3  Mixed hyperlipidemia          Persistent atrial fibrillation  Long-term anticoagulation  Left atrial thrombus    Recent worsening noted since April 2022    Risk factors   Age-61  Obesity-BMI 37  Obstructive sleep apnea -history of same  Thyroid disorder -TSH normal  Hypertension -104/80 on medication  Heart failure -LVEF 15-30%  Diabetes mellitus -hemoglobin A1c 5 7  Tobacco use-history of same  Alcohol use-heavy abuse till recently  Energy drink use-negative    Has left atrial appendage thrombus    Current therapy   Anticoagulation-chads Vasc score-has left atrial appendage thrombus-on Xarelto  Rate control-metoprolol, digoxin  Rhythm control-negative    My recommendation for this patient  Rate control and anticoagulation till clot resolves  Repeat TI in 2 months to confirm if clot has resolved  Once clot resolve-consider amiodarone loading and cardioversion to see if sinus rhythm can be maintained  Aggressive management of heart failure  Proceed with ablation relatively acutely considering low EF  However need to wait for every step until left atrial appendage clot is resolved          CHF, combined systolic and diastolic failure  EUYA-10 - 30%  NYHA class- 2-3  Symptoms-orthopnea and PND improved, has chronic leg swelling and exertional intolerance  Medications-metoprolol, furosemide  My recommendation for this patient-continue to optimize therapy        Obesity / overweight  BMI-37  History of gastric bypass  This increases the risk of-CAD, CVA, vascular disease, diabetes, kidney dysfunction, hypertension, hyperlipidemia  Diet is responsible for 80% of weight gain   Advice nutritional counseling and healthy diet  Also advised to increase activity  He is going to follow up with his primary care        Obstructive sleep apnea   Patient has history of snoring, morning fatigue and daytime sleepiness at least on some days  Examination is also suggestive  Recommended to follow up with primary care and Pulmonary Medicine  Needs aggressive management of same        Hypertension   Blood pressure-104/80  Medication-metoprolol, furosemide  My recommendation-getting hypotensive in the setting of heart failure        Mixed hyperlipidemia   Medication-atorvastatin  No myositis or myalgia   My recommendation-continue with same        Diabetes mellitus   Hemoglobin A1c -5 7  Medication -none  My recommendation-continue with weight loss diet      Tobacco abuse   Former smoker      Alcohol abuse   Heavy drinking till recently        Summary of my recommendation for the patient  - stop combination chlorpheniramine + phenylephrine as it is promoting tachycardia and PVCs  - absolute cessation of drinking  - at the current time continue with rate control and anticoagulation  - once left atrial clot resolves, patient can be started on antiarrhythmic for AFib control -amiodarone/dofetilide  - aggressive management of heart failure  - plan for comprehensive ablation-PVI and posterior wall once clinically stable  - if EF does not improve despite aggressive management of heart failure for 3 months then can consider device therapy            History of Present Illness   HPI: Ele Grant is a 62y o  year old male has been referred to me by Dr Babs Blackwell     The patient has significant medical illnesses which include  Persistent atrial fibrillation  Left atrial appendage thrombus, noted on April 22   Chads Vasc score 1   Acute on chronic combined systolic and diastolic heart failure, nonischemic CMP , LVEF 15-30%  Intermittent LBBB, likely rate related  Severe obesity, status post gastric bypass   Peptic ulcer disease   History of heavy alcohol use   Chronic kidney disease stage 3  Mixed hyperlipidemia    Patient admitted to the hospital in April 2022 with significant shortness of breath and weight gain  Echocardiogram revealed severely reduced heart function, RV systolic dysfunction  Stress test was negative for myocardial ischemia  Jesus confirmed severely reduced LV systolic function with thrombus about 0 6 cm noted in very dilated left atrial appendage  Patient is on metoprolol tartrate, digoxin for rate control    As far as cardiac symptoms are concerned  Angina -negative  Orthopnea -improved  Paroxysmal nocturnal dyspnea -improved  Leg swelling-chronic  Palpitations -improved  Presyncope-occasional  Syncope -negative  Orthostatic lightheadedness -occasional  Exertional intolerance-present    Snoring-present  morning fatigue-present  Daytime sleepiness-present    Social history  Tobacco use-former smoker  Alcohol use-history of abuse and was drinking heavily till recently  Energy drink use-negative  Recreational drug use-negative      Family history  Seizure disorder      Historical Information   Past Medical History:   Diagnosis Date    A-fib [facility])     Acute ulcer of stomach     GERD (gastroesophageal reflux disease)     Rib fractures      Past Surgical History:   Procedure Laterality Date    COLONOSCOPY      ESOPHAGOGASTRODUODENOSCOPY      GASTRIC BYPASS      MANDIBLE FRACTURE SURGERY       Social History     Substance and Sexual Activity   Alcohol Use Yes    Alcohol/week: 7 0 standard drinks    Types: 7 Cans of beer per week    Comment: Hasn't had any beer since 4/23/22     Social History     Substance and Sexual Activity   Drug Use No     Social History     Tobacco Use   Smoking Status Former Smoker    Types: Cigars   Smokeless Tobacco Former User    Types: Chew     Social History     Socioeconomic History    Marital status: /Civil Union     Spouse name: Not on file    Number of children: Not on file    Years of education: Not on file    Highest education level: Not on file   Occupational History    Not on file   Tobacco Use    Smoking status: Former Smoker     Types: Cigars    Smokeless tobacco: Former User     Types: Chew   Vaping Use    Vaping Use: Never used   Substance and Sexual Activity    Alcohol use: Yes     Alcohol/week: 7 0 standard drinks     Types: 7 Cans of beer per week     Comment: Hasn't had any beer since 4/23/22    Drug use: No    Sexual activity: Not on file   Other Topics Concern    Not on file   Social History Narrative    Always uses seat belt    Feels safe at home    No guns in the home     Social Determinants of Health     Financial Resource Strain: Not on file   Food Insecurity: No Food Insecurity    Worried About Running Out of Food in the Last Year: Never true    Noe of Food in the Last Year: Never true   Transportation Needs: No Transportation Needs    Lack of Transportation (Medical): No    Lack of Transportation (Non-Medical):  No   Physical Activity: Not on file   Stress: Not on file   Social Connections: Not on file   Intimate Partner Violence: Not on file   Housing Stability: Low Risk     Unable to Pay for Housing in the Last Year: No    Number of Places Lived in the Last Year: 1    Unstable Housing in the Last Year: No        Family History:  Family History   Problem Relation Age of Onset    Seizures Father          Meds/Allergies      No current facility-administered medications for this visit  (Not in a hospital admission)      No Known Allergies        Objective   Vitals:   Visit Vitals  /80   Pulse 90   Resp 16   Ht 6' 1" (1 854 m)   Wt 127 kg (280 lb 12 8 oz)   BMI 37 05 kg/m²   Smoking Status Former Smoker   BSA 2 48 m²      Vitals:    05/16/22 1317   Weight: 127 kg (280 lb 12 8 oz)   [unfilled]    Invasive Devices  Report    None                   ROS  Review of Systems   All other systems reviewed and are negative  As described in my history of present illness        PHYSICAL EXAM  Physical Exam  Constitutional:       Appearance: He is obese  He is ill-appearing  Comments: BMI 37   HENT:      Head: Normocephalic and atraumatic  Nose: Nose normal       Mouth/Throat:      Comments: Posterior pharynx is crowded  Eyes:      General: No scleral icterus  Extraocular Movements: Extraocular movements intact  Conjunctiva/sclera: Conjunctivae normal       Pupils: Pupils are equal, round, and reactive to light  Neck:      Comments: Large thick neck  Cardiovascular:      Rate and Rhythm: Tachycardia present  Rhythm irregular  Heart sounds: Normal heart sounds  No murmur heard  Pulmonary:      Effort: No respiratory distress  Breath sounds: Examination of the right-lower field reveals decreased breath sounds  Examination of the left-lower field reveals decreased breath sounds  Decreased breath sounds present  Abdominal:      Palpations: Abdomen is soft  Comments: Central obesity present   Musculoskeletal:         General: Swelling present  No deformity  Cervical back: No rigidity  Skin:     Coloration: Skin is not jaundiced  Findings: No bruising or lesion  Neurological:      Mental Status: He is alert   Mental status is at baseline  Psychiatric:         Mood and Affect: Mood normal          Behavior: Behavior normal          Thought Content: Thought content normal                LAB RESULTS:      CBC:  Results from Last 12 Months   Lab Units 04/29/22  0617 04/28/22  0601 04/27/22  0442 04/25/22  0514 04/24/22  1154 02/26/22  1807 02/24/22  1546 02/14/22  0517 02/13/22  1312   WBC Thousand/uL 7 01 7 00 6 94 6 78 8 88 8 13 7 54   < > 6 81   HEMOGLOBIN g/dL 16 8 16 3 15 5 14 8 15 1 15 1 15 1   < > 17 1*   HEMATOCRIT % 49 8* 48 3 46 9 44 3 44 5 44 6 42 4   < > 49 4*   MCV fL 94 97 96 96 98 97 92   < > 95   PLATELETS Thousands/uL 214 201 201 187 196 297 276   < > 273   MCH pg 31 6 32 6 31 6 31 9 33 2 32 9 32 6   < > 32 9   MCHC g/dL 33 7 33 7 33 0 33 4 33 9 33 9 35 6   < > 34 6   RDW % 11 9 12 1 12 4 12 5 12 4 12 0 11 9   < > 12 1   MPV fL 10 6 11 1 10 6 10 7 11 0 10 3 9 9   < > 10 3   NRBC AUTO /100 WBCs  --   --   --  0 0 0 0  --  0    < > = values in this interval not displayed  CMP:  Results from Last 12 Months   Lab Units 04/28/22  0601 04/27/22  0442 04/26/22  0448 04/25/22  0515 04/24/22  1154 02/14/22  0517 02/13/22  1312   POTASSIUM mmol/L 4 3 3 9 4 0 3 7 4 4   < > 4 2   CHLORIDE mmol/L 106 106 105 106 106   < > 105   CO2 mmol/L 23 25 25 26 24   < > 26   BUN mg/dL 18 20 21 20 21   < > 17   CREATININE mg/dL 1 06 1 04 1 10 1 04 1 38*   < > 1 08   CALCIUM mg/dL 8 4 8 1* 8 3 8 0* 7 8*   < > 8 6   AST U/L  --   --   --   --   --   --  28   ALT U/L  --   --   --   --   --   --  34   ALK PHOS U/L  --   --   --   --   --   --  75   EGFR ml/min/1 73sq m 75 77 72 77 55   < > 74    < > = values in this interval not displayed  Magnesium:   Results from Last 12 Months   Lab Units 04/28/22  0601   MAGNESIUM mg/dL 2 0       A1C:  Results from Last 12 Months   Lab Units 04/25/22  0514   HEMOGLOBIN A1C % 5 7*        TSH:  No results for input(s): TSH in the last 8784 hours      TSH 3rd Gen:  Results from Last 12 Months Lab Units 02/13/22  1312   TSH 3RD GENERATON uIU/mL 2 945        PT/INR:  Results from Last 12 Months   Lab Units 02/13/22  1312   PROTIME seconds 13 5   INR  1 05       Lipid Panel:  Results from Last 12 Months   Lab Units 04/25/22  0515   CHOLESTEROL mg/dL 144   TRIGLYCERIDES mg/dL 82   HDL mg/dL 50   NON-HDL-CHOL (CHOL-HDL) mg/dl 94            TI  Results for orders placed during the hospital encounter of 04/24/22    TI  Interpretation Summary    Left Ventricle: Left ventricular cavity size is mildly dilated  The left ventricular ejection fraction is 15%  Systolic function is severely reduced in the setting of tachycardia  There is severe global hypokinesis    Left Atrium: The atrium is mildly dilated  There is mild spontaneous echo contrast     Left Atrial Appendage: There is reduced function without spontaneous echocontrast  There is a small, immobile thrombus, measuring upto 0 6 cm in the appendage    Mitral Valve: There is mild regurgitation  The TI was aborted early due to low BP and patient coughing  Echocardiogram  Results for orders placed during the hospital encounter of 04/07/22    Echo complete w/ contrast if indicated    Interpretation Summary    Left Ventricle: Left ventricular cavity size is mildly dilated  Wall thickness is top-normal  The left ventricular ejection fraction is 30% by visual estimation  Systolic function is severely reduced  There is severe global hypokinesis  Probable diastolic dysfunction  Diastolic staging precluded by the presence of arrhythmia    Right Ventricle: Right ventricular cavity size is moderately dilated  Systolic function is mildly reduced    Left Atrium: The atrium is moderately dilated    Right Atrium: The atrium is moderately dilated    Aortic Valve: The valve appears sclerotic    Mitral Valve: There is mild annular calcification  There is mild regurgitation    Tricuspid Valve: There is mild regurgitation   Pulmonary artery systolic pressures are estimated at 35-40 mm Hg    There is no study for comparison  No results found for this or any previous visit  Stress Test:   Results for orders placed during the hospital encounter of 04/07/22    NM myocardial perfusion spect (rx stress and/or rest)    Interpretation Summary    Abnormal pharmacologic nuclear stress test   Dilated left ventricle with moderately reduced left ventricular systolic function  Ejection fraction 32%  Normal perfusion  These findings are suggestive of a nonischemic cardiomyopathy    Stress ECG: The stress ECG is negative for ischemia after pharmacologic stress    Perfusion Defect Conclusion: The rest end diastolic cavity size is enlarged  The stress end diastolic cavity size is dilated    Stress Function: Left ventricular function post-stress is abnormal  Global function is moderately reduced  Post-stress ejection fraction is 32 %  No results found for this or any previous visit  Cardiac catheterization :  No results found for this or any previous visit  HOLTER MONITOR: 24 HOUR/48 HOUR MONITORS  No results found for this or any previous visit  AMB extended holter monitor  No results found for this or any previous visit  DEVICE CHECK:       No results found for this or any previous visit          Code Status: [unfilled]  Advance Directive and Living Will:      Power of :    POLST:      Counseling / Coordination of Care  Very detailed discussion done with regards to management of AFib    Amanda Mandel MD

## 2022-05-16 ENCOUNTER — CONSULT (OUTPATIENT)
Dept: CARDIOLOGY CLINIC | Facility: CLINIC | Age: 62
End: 2022-05-16
Payer: COMMERCIAL

## 2022-05-16 VITALS
RESPIRATION RATE: 16 BRPM | DIASTOLIC BLOOD PRESSURE: 80 MMHG | BODY MASS INDEX: 37.22 KG/M2 | WEIGHT: 280.8 LBS | HEART RATE: 90 BPM | HEIGHT: 73 IN | SYSTOLIC BLOOD PRESSURE: 104 MMHG

## 2022-05-16 DIAGNOSIS — K92.1 GASTROINTESTINAL HEMORRHAGE WITH MELENA: ICD-10-CM

## 2022-05-16 DIAGNOSIS — G47.33 OSA (OBSTRUCTIVE SLEEP APNEA): ICD-10-CM

## 2022-05-16 DIAGNOSIS — I51.3 ATRIAL THROMBUS: ICD-10-CM

## 2022-05-16 DIAGNOSIS — U07.1 COVID-19: ICD-10-CM

## 2022-05-16 DIAGNOSIS — I48.19 PERSISTENT ATRIAL FIBRILLATION (HCC): Primary | ICD-10-CM

## 2022-05-16 DIAGNOSIS — Z72.89 ALCOHOL USE: ICD-10-CM

## 2022-05-16 DIAGNOSIS — Z98.84 HISTORY OF ROUX-EN-Y GASTRIC BYPASS: ICD-10-CM

## 2022-05-16 DIAGNOSIS — I48.0 PAF (PAROXYSMAL ATRIAL FIBRILLATION) (HCC): ICD-10-CM

## 2022-05-16 DIAGNOSIS — E66.9 OBESITY (BMI 30-39.9): ICD-10-CM

## 2022-05-16 DIAGNOSIS — J30.2 SEASONAL ALLERGIES: Primary | ICD-10-CM
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
## 2022-05-16 DIAGNOSIS — I50.23 ACUTE ON CHRONIC SYSTOLIC CHF (CONGESTIVE HEART FAILURE) (HCC): ICD-10-CM

## 2022-05-16 PROCEDURE — 93000 ELECTROCARDIOGRAM COMPLETE: CPT | Performed by: INTERNAL MEDICINE

## 2022-05-16 PROCEDURE — 99215 OFFICE O/P EST HI 40 MIN: CPT | Performed by: INTERNAL MEDICINE

## 2022-05-16 RX ORDER — FEXOFENADINE HCL 180 MG/1
180 TABLET ORAL DAILY
COMMUNITY
End: 2022-05-16 | Stop reason: SDUPTHER

## 2022-05-16 RX ORDER — FEXOFENADINE HCL 180 MG/1
180 TABLET ORAL DAILY
Qty: 30 TABLET | Refills: 3 | Status: SHIPPED | OUTPATIENT
Start: 2022-05-16

## 2022-05-16 NOTE — LETTER
May 17, 2022     Manfred Camacho DO  [address]  [address] [place] [address]    Patient: Zaid Gupta   YOB: 1960   Date of Visit: 5/16/2022       Dear Dr Cydney Dill:    Thank you for referring Toni Resendiz to me for evaluation  Below are my notes for this consultation  If you have questions, please do not hesitate to call me  I look forward to following your patient along with you  Sincerely,        Nico Conner MD        CC: Ankit Pérez MD  5/17/2022  6:52 AM  Sign when Signing Visit   Consultation - Electrophysiology-Cardiology (EP)   Bassam Lao 62 y o  male MRN: [id number]  Unit/Bed#:  Encounter: [id number]      1  Persistent atrial fibrillation (HCC)  POCT ECG   2  Atrial thrombus     3  Acute on chronic systolic CHF (congestive heart failure) ([facility] [address] )     4  SRIKANTH (obstructive sleep apnea)     5  Gastrointestinal hemorrhage with melena     6  Blood alcohol level of 240 mg/100 ml or more     7  History of Eliud-en-Y gastric bypass     8  Alcohol use     9  COVID-19     10  Obesity (BMI 30-39  9)           Consults  Physician Requesting Consult: Maggi Sánchez DO   Reason for Consult / Principal Problem: management of AF       Assessment/Plan     Persistent atrial fibrillation  Left atrial appendage thrombus, noted on April 22   Chads Vasc score 1   Acute on chronic combined systolic and diastolic heart failure, nonischemic CMP , LVEF 15-30%  Intermittent LBBB, likely rate related  Severe obesity, status post gastric bypass   Peptic ulcer disease   History of heavy alcohol use   Chronic kidney disease stage 3  Mixed hyperlipidemia          Persistent atrial fibrillation  Long-term anticoagulation  Left atrial thrombus    Recent worsening noted since April 2022    Risk factors   Age-61  Obesity-BMI 37  Obstructive sleep apnea -history of same  Thyroid disorder -TSH normal  Hypertension -104/80 on medication  Heart failure -LVEF 15-30%  Diabetes mellitus -hemoglobin A1c 5 7  Tobacco use-history of same  Alcohol use-heavy abuse till recently  Energy drink use-negative    Has left atrial appendage thrombus    Current therapy   Anticoagulation-chads Vasc score-has left atrial appendage thrombus-on Xarelto  Rate control-metoprolol, digoxin  Rhythm control-negative    My recommendation for this patient  Rate control and anticoagulation till clot resolves  Repeat TI in 2 months to confirm if clot has resolved  Once clot resolve-consider amiodarone loading and cardioversion to see if sinus rhythm can be maintained  Aggressive management of heart failure  Proceed with ablation relatively acutely considering low EF  However need to wait for every step until left atrial appendage clot is resolved          CHF, combined systolic and diastolic failure  WEJB-05 - 30%  NYHA class- 2-3  Symptoms-orthopnea and PND improved, has chronic leg swelling and exertional intolerance  Medications-metoprolol, furosemide  My recommendation for this patient-continue to optimize therapy        Obesity / overweight  BMI-37  History of gastric bypass  This increases the risk of-CAD, CVA, vascular disease, diabetes, kidney dysfunction, hypertension, hyperlipidemia  Diet is responsible for 80% of weight gain   Advice nutritional counseling and healthy diet  Also advised to increase activity  He is going to follow up with his primary care        Obstructive sleep apnea   Patient has history of snoring, morning fatigue and daytime sleepiness at least on some days  Examination is also suggestive  Recommended to follow up with primary care and Pulmonary Medicine  Needs aggressive management of same        Hypertension   Blood pressure-104/80  Medication-metoprolol, furosemide  My recommendation-getting hypotensive in the setting of heart failure        Mixed hyperlipidemia   Medication-atorvastatin  No myositis or myalgia   My recommendation-continue with same        Diabetes mellitus Hemoglobin A1c -5 7  Medication -none  My recommendation-continue with weight loss diet      Tobacco abuse   Former smoker      Alcohol abuse   Heavy drinking till recently        Summary of my recommendation for the patient  - stop combination chlorpheniramine + phenylephrine as it is promoting tachycardia and PVCs  - absolute cessation of drinking  - at the current time continue with rate control and anticoagulation  - once left atrial clot resolves, patient can be started on antiarrhythmic for AFib control -amiodarone/dofetilide  - aggressive management of heart failure  - plan for comprehensive ablation-PVI and posterior wall once clinically stable  - if EF does not improve despite aggressive management of heart failure for 3 months then can consider device therapy            History of Present Illness   HPI: Rosa Maria West is a 62y o  year old male has been referred to me by Dr Rachel Galeas     The patient has significant medical illnesses which include  Persistent atrial fibrillation  Left atrial appendage thrombus, noted on April 22   Chads Vasc score 1   Acute on chronic combined systolic and diastolic heart failure, nonischemic CMP , LVEF 15-30%  Intermittent LBBB, likely rate related  Severe obesity, status post gastric bypass   Peptic ulcer disease   History of heavy alcohol use   Chronic kidney disease stage 3  Mixed hyperlipidemia    Patient admitted to the hospital in April 2022 with significant shortness of breath and weight gain  Echocardiogram revealed severely reduced heart function, RV systolic dysfunction  Stress test was negative for myocardial ischemia  Jesus confirmed severely reduced LV systolic function with thrombus about 0 6 cm noted in very dilated left atrial appendage  Patient is on metoprolol tartrate, digoxin for rate control    As far as cardiac symptoms are concerned  Angina -negative  Orthopnea -improved  Paroxysmal nocturnal dyspnea -improved  Leg swelling-chronic  Palpitations -improved  Presyncope-occasional  Syncope -negative  Orthostatic lightheadedness -occasional  Exertional intolerance-present    Snoring-present  morning fatigue-present  Daytime sleepiness-present    Social history  Tobacco use-former smoker  Alcohol use-history of abuse and was drinking heavily till recently  Energy drink use-negative  Recreational drug use-negative      Family history  Seizure disorder      Historical Information   Past Medical History:   Diagnosis Date    A-fib ([address] )     Acute ulcer of stomach     GERD (gastroesophageal reflux disease)     Rib fractures      Past Surgical History:   Procedure Laterality Date    COLONOSCOPY      ESOPHAGOGASTRODUODENOSCOPY      GASTRIC BYPASS      MANDIBLE FRACTURE SURGERY       Social History     Substance and Sexual Activity   Alcohol Use Yes    Alcohol/week: 7 0 standard drinks    Types: 7 Cans of beer per week    Comment: Hasn't had any beer since 4/23/22     Social History     Substance and Sexual Activity   Drug Use No     Social History     Tobacco Use   Smoking Status Former Smoker    Types: Cigars   Smokeless Tobacco Former User    Types: Chew     Social History     Socioeconomic History    Marital status: /Civil Union     Spouse name: Not on file    Number of children: Not on file    Years of education: Not on file    Highest education level: Not on file   Occupational History    Not on file   Tobacco Use    Smoking status: Former Smoker     Types: Cigars    Smokeless tobacco: Former User     Types: Chew   Vaping Use    Vaping Use: Never used   Substance and Sexual Activity    Alcohol use:  Yes     Alcohol/week: 7 0 standard drinks     Types: 7 Cans of beer per week     Comment: Hasn't had any beer since 4/23/22    Drug use: No    Sexual activity: Not on file   Other Topics Concern    Not on file   Social History Narrative    Always uses seat belt    Feels safe at home    No guns in the home     Social Determinants of Health Financial Resource Strain: Not on file   Food Insecurity: No Food Insecurity    Worried About Running Out of Food in the Last Year: Never true    Noe of Food in the Last Year: Never true   Transportation Needs: No Transportation Needs    Lack of Transportation (Medical): No    Lack of Transportation (Non-Medical): No   Physical Activity: Not on file   Stress: Not on file   Social Connections: Not on file   Intimate Partner Violence: Not on file   Housing Stability: Low Risk     Unable to Pay for Housing in the Last Year: No    Number of Places Lived in the Last Year: 1    Unstable Housing in the Last Year: No        Family History:  Family History   Problem Relation Age of Onset    Seizures Father          Meds/Allergies      No current facility-administered medications for this visit  (Not in a hospital admission)      No Known Allergies        Objective   Vitals:   Visit Vitals  /80   Pulse 90   Resp 16   Ht 6' 1" (1 854 m)   Wt 127 kg (280 lb 12 8 oz)   BMI 37 05 kg/m²   Smoking Status Former Smoker   BSA 2 48 m²      Vitals:    05/16/22 1317   Weight: 127 kg (280 lb 12 8 oz)   [unfilled]    Invasive Devices  Report    None                   ROS  Review of Systems   All other systems reviewed and are negative  As described in my history of present illness        PHYSICAL EXAM  Physical Exam  Constitutional:       Appearance: He is obese  He is ill-appearing  Comments: BMI 37   HENT:      Head: Normocephalic and atraumatic  Nose: Nose normal       Mouth/Throat:      Comments: Posterior pharynx is crowded  Eyes:      General: No scleral icterus  Extraocular Movements: Extraocular movements intact  Conjunctiva/sclera: Conjunctivae normal       Pupils: Pupils are equal, round, and reactive to light  Neck:      Comments: Large thick neck  Cardiovascular:      Rate and Rhythm: Tachycardia present  Rhythm irregular  Heart sounds: Normal heart sounds   No murmur heard   Pulmonary:      Effort: No respiratory distress  Breath sounds: Examination of the right-lower field reveals decreased breath sounds  Examination of the left-lower field reveals decreased breath sounds  Decreased breath sounds present  Abdominal:      Palpations: Abdomen is soft  Comments: Central obesity present   Musculoskeletal:         General: Swelling present  No deformity  Cervical back: No rigidity  Skin:     Coloration: Skin is not jaundiced  Findings: No bruising or lesion  Neurological:      Mental Status: He is alert  Mental status is at baseline  Psychiatric:         Mood and Affect: Mood normal          Behavior: Behavior normal          Thought Content: Thought content normal                LAB RESULTS:      CBC:  Results from Last 12 Months   Lab Units 04/29/22  0617 04/28/22  0601 04/27/22  0442 04/25/22  0514 04/24/22  1154 02/26/22  1807 02/24/22  1546 02/14/22  0517 02/13/22  1312   WBC Thousand/uL 7 01 7 00 6 94 6 78 8 88 8 13 7 54   < > 6 81   HEMOGLOBIN g/dL 16 8 16 3 15 5 14 8 15 1 15 1 15 1   < > 17 1*   HEMATOCRIT % 49 8* 48 3 46 9 44 3 44 5 44 6 42 4   < > 49 4*   MCV fL 94 97 96 96 98 97 92   < > 95   PLATELETS Thousands/uL 214 201 201 187 196 297 276   < > 273   MCH pg 31 6 32 6 31 6 31 9 33 2 32 9 32 6   < > 32 9   MCHC g/dL 33 7 33 7 33 0 33 4 33 9 33 9 35 6   < > 34 6   RDW % 11 9 12 1 12 4 12 5 12 4 12 0 11 9   < > 12 1   MPV fL 10 6 11 1 10 6 10 7 11 0 10 3 9 9   < > 10 3   NRBC AUTO /100 WBCs  --   --   --  0 0 0 0  --  0    < > = values in this interval not displayed          CMP:  Results from Last 12 Months   Lab Units 04/28/22  0601 04/27/22  0442 04/26/22  0448 04/25/22  0515 04/24/22  1154 02/14/22  0517 02/13/22  1312   POTASSIUM mmol/L 4 3 3 9 4 0 3 7 4 4   < > 4 2   CHLORIDE mmol/L 106 106 105 106 106   < > 105   CO2 mmol/L 23 25 25 26 24   < > 26   BUN mg/dL 18 20 21 20 21   < > 17   CREATININE mg/dL 1 06 1 04 1 10 1 04 1 38*   < > 1 08 CALCIUM mg/dL 8 4 8 1* 8 3 8 0* 7 8*   < > 8 6   AST U/L  --   --   --   --   --   --  28   ALT U/L  --   --   --   --   --   --  34   ALK PHOS U/L  --   --   --   --   --   --  75   EGFR ml/min/1 73sq m 75 77 72 77 55   < > 74    < > = values in this interval not displayed  Magnesium:   Results from Last 12 Months   Lab Units 04/28/22  0601   MAGNESIUM mg/dL 2 0       A1C:  Results from Last 12 Months   Lab Units 04/25/22  0514   HEMOGLOBIN A1C % 5 7*        TSH:  No results for input(s): TSH in the last 8784 hours  TSH 3rd Gen:  Results from Last 12 Months   Lab Units 02/13/22  1312   TSH 3RD GENERATON uIU/mL 2 945        PT/INR:  Results from Last 12 Months   Lab Units 02/13/22  1312   PROTIME seconds 13 5   INR  1 05       Lipid Panel:  Results from Last 12 Months   Lab Units 04/25/22  0515   CHOLESTEROL mg/dL 144   TRIGLYCERIDES mg/dL 82   HDL mg/dL 50   NON-HDL-CHOL (CHOL-HDL) mg/dl 94            TI  Results for orders placed during the hospital encounter of 04/24/22    TI  Interpretation Summary    Left Ventricle: Left ventricular cavity size is mildly dilated  The left ventricular ejection fraction is 15%  Systolic function is severely reduced in the setting of tachycardia  There is severe global hypokinesis    Left Atrium: The atrium is mildly dilated  There is mild spontaneous echo contrast     Left Atrial Appendage: There is reduced function without spontaneous echocontrast  There is a small, immobile thrombus, measuring upto 0 6 cm in the appendage    Mitral Valve: There is mild regurgitation  The TI was aborted early due to low BP and patient coughing  Echocardiogram  Results for orders placed during the hospital encounter of 04/07/22    Echo complete w/ contrast if indicated    Interpretation Summary    Left Ventricle: Left ventricular cavity size is mildly dilated  Wall thickness is top-normal  The left ventricular ejection fraction is 30% by visual estimation  Systolic function is severely reduced  There is severe global hypokinesis  Probable diastolic dysfunction  Diastolic staging precluded by the presence of arrhythmia    Right Ventricle: Right ventricular cavity size is moderately dilated  Systolic function is mildly reduced    Left Atrium: The atrium is moderately dilated    Right Atrium: The atrium is moderately dilated    Aortic Valve: The valve appears sclerotic    Mitral Valve: There is mild annular calcification  There is mild regurgitation    Tricuspid Valve: There is mild regurgitation  Pulmonary artery systolic pressures are estimated at 35-40 mm Hg    There is no study for comparison  No results found for this or any previous visit  Stress Test:   Results for orders placed during the hospital encounter of 04/07/22    NM myocardial perfusion spect (rx stress and/or rest)    Interpretation Summary    Abnormal pharmacologic nuclear stress test   Dilated left ventricle with moderately reduced left ventricular systolic function  Ejection fraction 32%  Normal perfusion  These findings are suggestive of a nonischemic cardiomyopathy    Stress ECG: The stress ECG is negative for ischemia after pharmacologic stress    Perfusion Defect Conclusion: The rest end diastolic cavity size is enlarged  The stress end diastolic cavity size is dilated    Stress Function: Left ventricular function post-stress is abnormal  Global function is moderately reduced  Post-stress ejection fraction is 32 %  No results found for this or any previous visit  Cardiac catheterization :  No results found for this or any previous visit  HOLTER MONITOR: 24 HOUR/48 HOUR MONITORS  No results found for this or any previous visit  AMB extended holter monitor  No results found for this or any previous visit  DEVICE CHECK:       No results found for this or any previous visit          Code Status: [unfilled]  Advance Directive and Living Will:      Power of :    POLST:      Counseling / Coordination of Care  Very detailed discussion done with regards to management of AFib    Mikaela Howard MD

## 2022-05-17 PROBLEM — I51.3 ATRIAL THROMBUS: Status: ACTIVE | Noted: 2022-05-17

## 2022-05-17 PROBLEM — E66.9 OBESITY (BMI 30-39.9): Status: ACTIVE | Noted: 2022-05-17

## 2022-05-18 ENCOUNTER — OFFICE VISIT (OUTPATIENT)
Dept: SLEEP CENTER | Facility: CLINIC | Age: 62
End: 2022-05-18
Payer: COMMERCIAL

## 2022-05-18 VITALS
DIASTOLIC BLOOD PRESSURE: 64 MMHG | SYSTOLIC BLOOD PRESSURE: 88 MMHG | HEIGHT: 73 IN | WEIGHT: 280 LBS | HEART RATE: 80 BPM | BODY MASS INDEX: 37.11 KG/M2

## 2022-05-18 DIAGNOSIS — I48.0 PAF (PAROXYSMAL ATRIAL FIBRILLATION) (HCC): ICD-10-CM

## 2022-05-18 DIAGNOSIS — E66.9 OBESITY (BMI 30-39.9): ICD-10-CM

## 2022-05-18 DIAGNOSIS — G47.33 OBSTRUCTIVE SLEEP APNEA: Primary | ICD-10-CM

## 2022-05-18 DIAGNOSIS — I50.23 ACUTE ON CHRONIC SYSTOLIC CHF (CONGESTIVE HEART FAILURE) (HCC): ICD-10-CM

## 2022-05-18 PROCEDURE — 3008F BODY MASS INDEX DOCD: CPT | Performed by: INTERNAL MEDICINE

## 2022-05-18 PROCEDURE — 1111F DSCHRG MED/CURRENT MED MERGE: CPT | Performed by: NURSE PRACTITIONER

## 2022-05-18 PROCEDURE — 99204 OFFICE O/P NEW MOD 45 MIN: CPT | Performed by: NURSE PRACTITIONER

## 2022-05-18 NOTE — PROGRESS NOTES
Consultation - [address] [facility], 1960, MRN: [id number]    5/18/2022        Reason for Consult / Principal Problem: Moderate to Severe Obstructive Sleep Apnea       Thank you for the opportunity of participating in the evaluation and care of this patient in the Sleep Clinic at [facility]  Subjective:     HPI: Beny Mora is a 62y o  year old male  He presents for a consultation regarding obstructive sleep apnea  He was diagnosed with atrial fibrillation and CHF in February 2022  He reports that he was going to have cardioversion, but was found to have a blood clot in his heart  He is on xarelto  He completed a diagnostic sleep study in March, which demonstrated moderate to severe obstructive sleep apnea  AHI was 24 3, worsening to 27 1 in supine sleep and 43 6 in REM sleep  Oxygen eze was 80% with 15 minutes spent at levels less than 90%  Comorbid conditions:  CHF  Atrial fibrillation  Obesity    Review of Systems      Genitourinary none   Cardiology Frequent chest pain or angina, , palpitations/fluttering feeling in the chest and ankle/leg swelling   Gastrointestinal none   Neurology muscle weakness, numbness/tingling of an extremity, forgetfulness, difficulty with memory and balance problems   Constitutional none   Integumentary none   Psychiatry none   Musculoskeletal joint pain and leg cramps   Pulmonary shortness of breath with activity and wheezing   ENT none   Endocrine none   Hematological none     Sleep Study Results:  Results of the diagnostic sleep study, completed on 3/8/22, are as follows:  IMPRESSION:   1  Moderate to severe obstructive sleep apnea  2  Significant hypoxia  3  Moderate sleep fragmentation  4  Short sleep and REM latencies suggesting sleep deprivation     Based on the results of this study, a follow-up study to titrate positive airway pressure is recommended     Clinical correlation is advised      Employment:  He currently works full time as an , M-F between the hours of 7:00am-3:00pm    Sleep Schedule:       Bedtime:  Between 8:30-9:00pm on work days, 10:00pm on weekends      Latency:  Within minutes      Wakeup time:  4:30am on work days, wakes up around 5-6:00am on weekends    Awakenings:       Frequency:  One time per night      Causes: For urination      Duration:  Returns to sleep without difficulty    Daytime Sleepiness / Inappropriate Sleep:       Most severe:  After work       Naps :  He doesn't take naps, he rests after work without falling asleep      Inappropriate drowsiness / sleep:  He feels drowsy and dozes off when relaxing, but gets up and does things to avoid falling asleep    Snoring:  He sleeps alone, so is unsure if he snores    Apnea:  No prior witnessed apnea    Change in Weight:  He gained weight, however, it was found to be fluid overload  Weight has remained stable since discharge  Restless Leg Syndrome:  He has clinical symptoms consistent with this diagnosis when relaxing - they feel better when his legs are elevated    Other Complaints:  No reports of sleep walking, sleep talking, sleep paralysis or hallucinations surrounding sleep  He does not wake up with headaches  His dentist has reported bruxism  He has never used an oral appliance for bruxism  Social History:      Caffeine:  20 ounces of coffee daily       Tobacco:   reports that he has quit smoking  His smoking use included cigars  He has quit using smokeless tobacco   His smokeless tobacco use included chew  E-cig/Vaping:    E-Cigarette/Vaping    E-Cigarette Use Never User       E-Cigarette/Vaping Substances         Alcohol:   reports current alcohol use of about 7 0 standard drinks of alcohol per week  He was drinking 4-6 beers per day  Currently not drinking any alcohol      Drugs:   reports no history of drug use         The review of systems and following portions of the patient's history were reviewed and updated as appropriate: allergies, current medications, past family history, past medical history, past social history, past surgical history and problem list         Objective:       Vitals:    05/18/22 1407   BP: (!) 88/64   BP Location: Left arm   Patient Position: Sitting   Cuff Size: Large   Pulse: 80   Weight: 127 kg (280 lb)   Height: 6' 1 2" (1 859 m)     He has some lightheadedness with quick movements    Body mass index is 36 74 kg/m²  Neck Circumference: 16 5  Mcallen Sleepiness Scale:  Total score: 5      Current Outpatient Medications:     albuterol (PROVENTIL HFA,VENTOLIN HFA) 90 mcg/act inhaler, INHALE 2 PUFFS EVERY 6 HOURS AS NEEDED FOR WHEEZING, Disp: 18 g, Rfl: 0    atorvastatin (LIPITOR) 40 mg tablet, Take 1 tablet (40 mg total) by mouth daily with dinner, Disp: 30 tablet, Rfl: 0    digoxin (LANOXIN) 0 125 mg tablet, Take 1 tablet (125 mcg total) by mouth daily, Disp: 30 tablet, Rfl: 0    fexofenadine (ALLEGRA) 180 MG tablet, Take 1 tablet (180 mg total) by mouth in the morning , Disp: 30 tablet, Rfl: 3    furosemide (LASIX) 20 mg tablet, Take 1 tablet (20 mg total) by mouth daily, Disp: 30 tablet, Rfl: 0    metoprolol tartrate (LOPRESSOR) 100 mg tablet, Take 1 tablet (100 mg total) by mouth every 12 (twelve) hours, Disp: 60 tablet, Rfl: 0    Multiple Vitamins-Minerals (MULTIVITAMIN ADULT EXTRA C PO), Take 1 capsule by mouth, Disp: , Rfl:     pantoprazole (PROTONIX) 40 mg tablet, TAKE 1 TABLET EVERY 12 HOURS (Patient taking differently: every 12 (twelve) hours), Disp: 180 tablet, Rfl: 3    potassium chloride (K-DUR,KLOR-CON) 20 mEq tablet, Take 1 tablet (20 mEq total) by mouth 2 (two) times a day, Disp: 60 tablet, Rfl: 0    rivaroxaban (Xarelto) 20 mg tablet, Take 1 tablet (20 mg total) by mouth daily with breakfast, Disp: 30 tablet, Rfl: 0    Physical Exam  General Appearance:   Alert, cooperative, no distress, appears stated age, obese Head:   Normocephalic, without obvious abnormality, atraumatic     Eyes:   PERRL, conjunctiva/corneas clear          Nose:  Nares normal, septum midline, mucosa normal, no drainage or sinus tenderness           Throat:  Lips, teeth and gums normal; tongue normal in size and midline in position; mucosa moist with low-lying soft palatal tissue, uvula only partially visualized, tonsils normal, Mallampati class 4       Neck:  Supple, symmetrical, trachea midline, no adenopathy; no thyromegaly noted, no carotid bruit or JVD     Lungs:      Clear to auscultation bilaterally, respirations unlabored     Heart:   Regular rate and rhythm, S1 and S2 normal, no murmur, rub or gallop       Extremities:  Extremities normal, atraumatic, no cyanosis and trace edema in LE bilaterally       Skin:  Skin color, texture, turgor normal, no rashes or lesions       Neurologic:  No focal deficits noted  ASSESSMENT / PLAN     1  Obstructive sleep apnea  CPAP Study   2  Acute on chronic systolic CHF (congestive heart failure) ([facility])  CPAP Study   3  PAF (paroxysmal atrial fibrillation) ([facility])  CPAP Study   4  Obesity (BMI 30-39  9)  CPAP Study         Counseling / Coordination of Care  Total clinic time spent today 60 minutes  Greater than 50% of total time was spent with the patient and / or family counseling and / or coordination of care  A description of the counseling / coordination of care:     diagnostic results, instructions for management, risk factor reductions, prognosis, patient and family education, impressions, risks and benefits of treatment options and importance of compliance with treatment    I reviewed the recent test results with the patient  We talked about the abnormal findings, including those consistent with Obstructive Sleep Apnea  We then discussed how the these are categorized as mild, moderate or severe    We also covered the medical comorbidities associated with untreated Obstructive Sleep Apnea including, hypertension, cardiac arrythmia, myocardial infarction and stroke  I explained how the risk of these conditions increases with the severity of the test results  I also spoke in some detail about the most common treatment options including weight loss, nasal PAP, mandibular advancement devices and uvulopalatopharyngoplasty (UPPP)  I answered all questions posed to me and gave my opinion regarding the best options for treatment based on the patient and their level of disease  In this case he chose to begin treatment using PAP therapy  Due to the severity of the SRIKANTH, as well as CHF, a CPAP titration study is needed and has been ordered  He will then be scheduled for set up of equipment  The patient will return in 4 to 12 weeks for a review of compliance data collected after beginning treatment  We will discuss this data and talk about any problems that may prevent successful treatment of Obstructive Sleep Apnea  Changes will be made in treatment parameters or interface devices in order to improve his ability to continue using PAP to maintain upper airway patency during sleep  The following instructions have been given to the patient today:    Patient Instructions   1  Schedule CPAP titration study  2  Schedule appointment for set up of equipment  3  Schedule compliance follow up 31-91 days after set up    Nursing Support:  When: Monday through Friday 7A-5PM except holidays  Where: Our direct line is [phone]  If you are having a true emergency please call 911  In the event that the line is busy or it is after hours please leave a voice message and we will return your call  Please speak clearly, leaving your full name, birth date, best number to reach you and the reason for your call  Medication refills: We will need the name of the medication, the dosage, the ordering provider, whether you get a 30 or 90 day refill, and the pharmacy name and address    Medications will be ordered by the provider only  Nurses cannot call in prescriptions  Please allow 7 days for medication refills  Physician requested updates: If your provider requested that you call with an update after starting medication, please be ready to provide us the medication and dosage, what time you take your medication, the time you attempt to fall asleep, time you fall asleep, when you wake up, and what time you get out of bed  Sleep Study Results: We will contact you with sleep study results and/or next steps after the physician has reviewed your testing        Riley Reed, [address] [facility]

## 2022-05-18 NOTE — PATIENT INSTRUCTIONS
Schedule CPAP titration study  Schedule appointment for set up of equipment  Schedule compliance follow up 31-91 days after set up    Nursing Support:  When: Monday through Friday 7A-5PM except holidays  Where: Our direct line is [phone]  If you are having a true emergency please call 911  In the event that the line is busy or it is after hours please leave a voice message and we will return your call  Please speak clearly, leaving your full name, birth date, best number to reach you and the reason for your call  Medication refills: We will need the name of the medication, the dosage, the ordering provider, whether you get a 30 or 90 day refill, and the pharmacy name and address  Medications will be ordered by the provider only  Nurses cannot call in prescriptions  Please allow 7 days for medication refills  Physician requested updates: If your provider requested that you call with an update after starting medication, please be ready to provide us the medication and dosage, what time you take your medication, the time you attempt to fall asleep, time you fall asleep, when you wake up, and what time you get out of bed  Sleep Study Results: We will contact you with sleep study results and/or next steps after the physician has reviewed your testing

## 2022-05-19 ENCOUNTER — TELEPHONE (OUTPATIENT)
Dept: SLEEP CENTER | Facility: CLINIC | Age: 62
End: 2022-05-19

## 2022-05-19 NOTE — TELEPHONE ENCOUNTER
Auth was approved  Will call patient when we have an opening!      ----- Message from [address] sent at 5/19/2022 12:53 PM EDT -----  Jessica Hernandez was expedited on Availity today  I will keep you updated on approval   ----- Message -----  From: BINU Mercedes  Sent: 5/18/2022   3:15 PM EDT  To: Gianluca Langley, #    Patient needs CPAP titration study ASAP  Newly diagnosed with CHF, afib and cardiomyopathy  Diagnostic was done back in March  Consult today    ThanksAleksandra

## 2022-05-20 ENCOUNTER — HOSPITAL ENCOUNTER (OUTPATIENT)
Dept: SLEEP CENTER | Facility: CLINIC | Age: 62
Discharge: HOME | End: 2022-05-20
Payer: COMMERCIAL

## 2022-05-20 DIAGNOSIS — G47.33 OBSTRUCTIVE SLEEP APNEA: ICD-10-CM

## 2022-05-20 DIAGNOSIS — I48.0 PAF (PAROXYSMAL ATRIAL FIBRILLATION) (HCC): ICD-10-CM

## 2022-05-20 DIAGNOSIS — I50.23 ACUTE ON CHRONIC SYSTOLIC CHF (CONGESTIVE HEART FAILURE) (HCC): ICD-10-CM

## 2022-05-20 DIAGNOSIS — E66.9 OBESITY (BMI 30-39.9): ICD-10-CM

## 2022-05-20 PROCEDURE — 95811 POLYSOM 6/>YRS CPAP 4/> PARM: CPT

## 2022-05-20 PROCEDURE — 95811 POLYSOM 6/>YRS CPAP 4/> PARM: CPT | Performed by: INTERNAL MEDICINE

## 2022-05-21 DIAGNOSIS — G47.33 OBSTRUCTIVE SLEEP APNEA: Primary | ICD-10-CM

## 2022-05-21 NOTE — PROGRESS NOTES
Sleep Study Documentation    Pre-Sleep Study       Sleep testing procedure explained to patient:YES    Patient napped prior to study:NO    [address] [place] worker after 12PM   Caffeine use:NO    Alcohol:Dayshift workers after 5PM: Alcohol use:NO    Typical day for patient:YES       Study Documentation    Sleep Study Indications: Moderate to severe obstructive sleep apnea demonstrated on diagnostic study performed 3/7/2022 with AHI of 24 3  Sleep Study: Treatment   Optimal PAP pressure: 15cm   Leak:Small  Snore:Eliminated  REM Obtained:yes  Supplemental O2: no    Minimum SaO2 85%  Baseline SaO2 96%  PAP mask tried (list all) ResMed AirFit F20 (medium), [employer] Simplus (medium)   PAP mask choice (final) [employer] Simplus   PAP mask type:full face  PAP pressure at which snoring was eliminated 9cm  Minimum SaO2 at final PAP pressure 94%  Mode of Therapy:CPAP  ETCO2:No  CPAP changed to BiPAP:No        EKG abnormalities: yes:  Comments: A-fib and PVCs  EEG abnormalities: no    Sleep Study Recorded < 2 hours: N/A    Sleep Study Recorded > 2 hours but incomplete study: N/A    Sleep Study Recorded 6 hours but no sleep obtained: NO    Patient classification: employed       Post-Sleep Study    Medication used at bedtime or during sleep study:NO    Patient reports time it took to fall asleep:less than 20 minutes    Patient reports waking up during study:3 or more times  Patient reports returning to sleep without difficulty  Patient reports sleeping 6 to 8 hours without dreaming  Patient reports sleep during study:typical    Patient rated sleepiness: Somewhat sleepy or tired    PAP treatment:yes: Post PAP treatment patient reports feeling unsure if a change is noted and  would wear PAP mask at home

## 2022-05-24 ENCOUNTER — RA CDI HCC (OUTPATIENT)
Dept: OTHER | Facility: HOSPITAL | Age: 62
End: 2022-05-24

## 2022-05-24 NOTE — PROGRESS NOTES
Please review if the following dx  is applicable to the patient's condition and assess and document, if applicable in next visit on 06/06/2022    E66 01: Morbid (severe) obesity due to excess calories ([facility] [address] ) -     Per CMS/ICD 10 coding guidelines, to be used when BMI > 35 & <40 with one or more comorbidity (DM, HTN, or SRIKANTH)  [facility] [address]  coding opportunities          Chart Reviewed number of suggestions sent to Provider: 1     Patients Insurance        Commercial Insurance: [facility]

## 2022-05-25 ENCOUNTER — OFFICE VISIT (OUTPATIENT)
Dept: CARDIOLOGY CLINIC | Facility: CLINIC | Age: 62
End: 2022-05-25
Payer: COMMERCIAL

## 2022-05-25 VITALS
SYSTOLIC BLOOD PRESSURE: 115 MMHG | HEART RATE: 79 BPM | DIASTOLIC BLOOD PRESSURE: 68 MMHG | BODY MASS INDEX: 36.16 KG/M2 | WEIGHT: 275.6 LBS

## 2022-05-25 DIAGNOSIS — J30.2 SEASONAL ALLERGIES: ICD-10-CM

## 2022-05-25 DIAGNOSIS — I42.8 NONISCHEMIC CARDIOMYOPATHY (HCC): ICD-10-CM

## 2022-05-25 DIAGNOSIS — I51.3 ATRIAL THROMBUS: Primary | ICD-10-CM

## 2022-05-25 DIAGNOSIS — I48.91 ATRIAL FIBRILLATION (HCC): ICD-10-CM

## 2022-05-25 DIAGNOSIS — I48.0 PAF (PAROXYSMAL ATRIAL FIBRILLATION) (HCC): ICD-10-CM

## 2022-05-25 DIAGNOSIS — I50.9 CHF (CONGESTIVE HEART FAILURE) (HCC): ICD-10-CM

## 2022-05-25 DIAGNOSIS — I10 BENIGN ESSENTIAL HTN: ICD-10-CM

## 2022-05-25 DIAGNOSIS — E78.5 DYSLIPIDEMIA: ICD-10-CM

## 2022-05-25 DIAGNOSIS — R06.00 DYSPNEA ON EXERTION: ICD-10-CM

## 2022-05-25 PROCEDURE — 99215 OFFICE O/P EST HI 40 MIN: CPT | Performed by: INTERNAL MEDICINE

## 2022-05-25 PROCEDURE — 1111F DSCHRG MED/CURRENT MED MERGE: CPT | Performed by: INTERNAL MEDICINE

## 2022-05-25 RX ORDER — DIGOXIN 125 MCG
125 TABLET ORAL DAILY
Qty: 90 TABLET | Refills: 3 | Status: SHIPPED | OUTPATIENT
Start: 2022-05-25

## 2022-05-25 RX ORDER — ATORVASTATIN CALCIUM 40 MG/1
40 TABLET, FILM COATED ORAL
Qty: 90 TABLET | Refills: 3 | Status: SHIPPED | OUTPATIENT
Start: 2022-05-25

## 2022-05-25 RX ORDER — METOPROLOL TARTRATE 100 MG/1
100 TABLET ORAL EVERY 12 HOURS SCHEDULED
Qty: 60 TABLET | Refills: 0 | Status: SHIPPED | OUTPATIENT
Start: 2022-05-25 | End: 2022-06-01 | Stop reason: SDUPTHER

## 2022-05-25 RX ORDER — POTASSIUM CHLORIDE 20 MEQ/1
20 TABLET, EXTENDED RELEASE ORAL 2 TIMES DAILY
Qty: 180 TABLET | Refills: 3 | Status: SHIPPED | OUTPATIENT
Start: 2022-05-25 | End: 2022-07-22 | Stop reason: SDUPTHER

## 2022-05-25 RX ORDER — FUROSEMIDE 20 MG/1
20 TABLET ORAL DAILY
Qty: 90 TABLET | Refills: 3 | Status: SHIPPED | OUTPATIENT
Start: 2022-05-25 | End: 2022-07-22 | Stop reason: SDUPTHER

## 2022-05-25 NOTE — PROGRESS NOTES
Cardiology             Letty Vani Shilo  1960  [id number]              Assessment/Plan:    Paroxysmal atrial fibrillation, currently rate controlled  Left atrial appendage thrombus by TI 64/27/2476  Chronic systolic and diastolic CHF, compensated  Prior heavy alcohol use  COVID-19 infection 2/22  Prior bariatric surgery  Probable sleep apnea    Heart rate adequately controlled, continue metoprolol tartrate 100 mg b i d   Continue digoxin 0 125 mg daily  Will check digoxin level before next visit  Continue anticoagulation with Xarelto which she has been tolerating well  Appreciate electrophysiology evaluation  Will repeat TI after next visit in 6 weeks  If thrombus resolved, will cardiovert with plan on initiating amiodarone with eventual plans for ablation  Will touch base with Dr Isaac Jon after next visit to see if he would like to do all of this together at [facility]  Volume status seems compensated  Office weight 275 lb  Continue furosemide 20 mg daily  Will check CMP before next visit        Follow-up in 6 weeks after CMP, digoxin level        Interval History: This is a very pleasant 62-year-old male hospitalized 2/2022 shortly after he had a positive home COVID test and symptoms of vomiting, diarrhea, mild dyspnea, and upper respiratory symptoms  He came to the ED 2/13/2022, with exertional dyspnea  ECG demonstrated a new diagnosis of atrial fibrillation with RVR  CT chest revealed no evidence of pulmonary embolism, although bilateral multilobar infiltrates were present, suggestive of COVID-19 pneumonia  He converted to sinus rhythm with diltiazem IV  His chads Vasc score was 0, therefore he was maintained off anticoagulation  Beta-blockade was initiated        Subsequently, he was seen by our nurse practitioner on 02/24/2022, and rapid atrial fibrillation with symptoms of lightheadedness and exertional dyspnea    He was sent back to the ED where he was given IV diltiazem once again and converted back to sinus rhythm with symptomatic improvement  He was discharged without adjustment of his medications  He was seen by his PCP 02/25/2022 rate was noted to have recurrent atrial fibrillation with RVR at 125 beats per minute and minimal symptoms  Diltiazem 60 mg p o  b i d  was initiated  He went to the ER Saturday 02/26/2022 for atypical chest pain  He was ruled out for myocardial injury with negative high sensitivity troponins x2 and no ischemic ECG changes  He underwent a nuclear stress test 04/07/2022 which revealed no evidence of myocardial ischemia or prior infarction by SPECT imaging, with ejection fraction 32%  This was consistent with nonischemic cardiomyopathy  Rober presented back to the hospital 4/2022 with acute CHF and AFib with RVR  Robinson  revealed a severely reduced left ventricular systolic function with a small thrombus about 0 6 cm noted in a very dilated left atrial appendage  Cardioversion was not performed  He was discharged on a rate control strategy with anticoagulation  He was seen by electrophysiology (Dr Silvio Carranza) 05/16/2022 at which time rate control with anticoagulation was recommended with repeat TI in 2 months  He was recommended to start amiodarone after resolution of his atrial appendage thrombus with subsequent cardioversion to try maintaining sinus rhythm  Cessation of alcohol intake was strongly recommended  Is also recommended to plan for comprehensive ablation-PVI once clinically stable  He presents today for follow-up  He gets mild exertional dyspnea which slows him down as well as fatigue  He denies lower extremity edema in the beginning of the day which usually starts to progress towards the end of the day                        Vitals:  Vitals:    05/25/22 1548   BP: 115/68   BP Location: Left arm   Patient Position: Sitting   Cuff Size: Large   Pulse: 79   Weight: 125 kg (275 lb 9 6 oz)         Past Medical History:   Diagnosis Date    A-fib [facility])     Acute ulcer of stomach     GERD (gastroesophageal reflux disease)     Rib fractures      Social History     Socioeconomic History    Marital status: /Civil Union     Spouse name: Not on file    Number of children: Not on file    Years of education: Not on file    Highest education level: Not on file   Occupational History    Not on file   Tobacco Use    Smoking status: Former Smoker     Types: Cigars    Smokeless tobacco: Former User     Types: Chew   Vaping Use    Vaping Use: Never used   Substance and Sexual Activity    Alcohol use: Yes     Alcohol/week: 7 0 standard drinks     Types: 7 Cans of beer per week     Comment: Hasn't had any beer since 4/23/22    Drug use: No    Sexual activity: Not on file   Other Topics Concern    Not on file   Social History Narrative    Always uses seat belt    Feels safe at home    No guns in the home     Social Determinants of Health     Financial Resource Strain: Not on file   Food Insecurity: No Food Insecurity    Worried About Running Out of Food in the Last Year: Never true    Noe of Food in the Last Year: Never true   Transportation Needs: No Transportation Needs    Lack of Transportation (Medical): No    Lack of Transportation (Non-Medical):  No   Physical Activity: Not on file   Stress: Not on file   Social Connections: Not on file   Intimate Partner Violence: Not on file   Housing Stability: Low Risk     Unable to Pay for Housing in the Last Year: No    Number of Places Lived in the Last Year: 1    Unstable Housing in the Last Year: No      Family History   Problem Relation Age of Onset    Seizures Father      Past Surgical History:   Procedure Laterality Date    COLONOSCOPY      ESOPHAGOGASTRODUODENOSCOPY      GASTRIC BYPASS      MANDIBLE FRACTURE SURGERY         Current Outpatient Medications:     albuterol (PROVENTIL HFA,VENTOLIN HFA) 90 mcg/act inhaler, INHALE 2 PUFFS EVERY 6 HOURS AS NEEDED FOR WHEEZING, Disp: 18 g, Rfl: 0    atorvastatin (LIPITOR) 40 mg tablet, Take 1 tablet (40 mg total) by mouth daily with dinner, Disp: 90 tablet, Rfl: 3    digoxin (LANOXIN) 0 125 mg tablet, Take 1 tablet (125 mcg total) by mouth daily, Disp: 90 tablet, Rfl: 3    fexofenadine (ALLEGRA) 180 MG tablet, Take 1 tablet (180 mg total) by mouth in the morning , Disp: 30 tablet, Rfl: 3    furosemide (LASIX) 20 mg tablet, Take 1 tablet (20 mg total) by mouth daily, Disp: 90 tablet, Rfl: 3    metoprolol tartrate (LOPRESSOR) 100 mg tablet, Take 1 tablet (100 mg total) by mouth every 12 (twelve) hours, Disp: 60 tablet, Rfl: 0    Multiple Vitamins-Minerals (MULTIVITAMIN ADULT EXTRA C PO), Take 1 capsule by mouth, Disp: , Rfl:     pantoprazole (PROTONIX) 40 mg tablet, TAKE 1 TABLET EVERY 12 HOURS (Patient taking differently: every 12 (twelve) hours), Disp: 180 tablet, Rfl: 3    potassium chloride (K-DUR,KLOR-CON) 20 mEq tablet, Take 1 tablet (20 mEq total) by mouth 2 (two) times a day, Disp: 180 tablet, Rfl: 3    rivaroxaban (Xarelto) 20 mg tablet, Take 1 tablet (20 mg total) by mouth daily with breakfast, Disp: 90 tablet, Rfl: 3        Review of Systems:  Review of Systems   Constitutional: Positive for fatigue  Respiratory: Positive for shortness of breath  Cardiovascular: Negative  All other systems reviewed and are negative  Physical Exam:  Physical Exam  Constitutional:       General: He is not in acute distress  Appearance: He is well-developed  He is not diaphoretic  HENT:      Head: Normocephalic and atraumatic  Eyes:      General: No scleral icterus  Right eye: No discharge  Pupils: Pupils are equal, round, and reactive to light  Neck:      Thyroid: No thyromegaly  Cardiovascular:      Rate and Rhythm: Normal rate and regular rhythm  Heart sounds: Normal heart sounds  No murmur heard  No friction rub  No gallop     Pulmonary:      Effort: Pulmonary effort is normal       Breath sounds: Normal breath sounds  Abdominal:      General: There is no distension  Tenderness: There is no abdominal tenderness  There is no guarding or rebound  Musculoskeletal:         General: Normal range of motion  Cervical back: Normal range of motion and neck supple  Skin:     General: Skin is warm and dry  Coloration: Skin is not pale  Findings: No erythema or rash  Neurological:      Mental Status: He is alert and oriented to person, place, and time  Coordination: Coordination normal    Psychiatric:         Behavior: Behavior normal          Thought Content: Thought content normal          Judgment: Judgment normal          This note was completed in part utilizing [employer] Software  Grammatical errors, random word insertions, spelling mistakes, and incomplete sentences can be an occasional consequence of this system secondary to software limitations, ambient noise, and hardware issues  If you have any questions or concerns about the content, text, or information contained within the body of this dictation, please contact the provider for clarification

## 2022-05-31 ENCOUNTER — TELEPHONE (OUTPATIENT)
Dept: CARDIOLOGY CLINIC | Facility: CLINIC | Age: 62
End: 2022-05-31

## 2022-05-31 NOTE — TELEPHONE ENCOUNTER
Fax refill request from [facility] for   Metoprolol Tartrate 25mg 90 day supply  Diltiazem 60mg 90 day supply    Left message patient voice mail  Need to confirm with patient Metoprolol was changed to 100mg and diltiazem was stopped

## 2022-06-01 DIAGNOSIS — I50.9 CHF (CONGESTIVE HEART FAILURE) (HCC): ICD-10-CM

## 2022-06-01 RX ORDER — METOPROLOL TARTRATE 100 MG/1
100 TABLET ORAL EVERY 12 HOURS SCHEDULED
Qty: 180 TABLET | Refills: 3 | Status: SHIPPED | OUTPATIENT
Start: 2022-06-01

## 2022-06-01 NOTE — TELEPHONE ENCOUNTER
Patient returned phone call  He is not taking Metoprolol 25mg daily he was switched to 100mg bid  He is not taking diltiazem  Faxed paperwork back to [facility], both medications discontinued

## 2022-06-06 ENCOUNTER — TELEPHONE (OUTPATIENT)
Dept: SLEEP CENTER | Facility: CLINIC | Age: 62
End: 2022-06-06

## 2022-06-06 ENCOUNTER — OFFICE VISIT (OUTPATIENT)
Dept: FAMILY MEDICINE CLINIC | Facility: CLINIC | Age: 62
End: 2022-06-06
Payer: COMMERCIAL

## 2022-06-06 VITALS
TEMPERATURE: 98.1 F | SYSTOLIC BLOOD PRESSURE: 100 MMHG | WEIGHT: 277.6 LBS | OXYGEN SATURATION: 97 % | BODY MASS INDEX: 36.79 KG/M2 | HEART RATE: 55 BPM | HEIGHT: 73 IN | DIASTOLIC BLOOD PRESSURE: 70 MMHG

## 2022-06-06 DIAGNOSIS — I48.0 PAF (PAROXYSMAL ATRIAL FIBRILLATION) (HCC): ICD-10-CM

## 2022-06-06 DIAGNOSIS — I50.42 CHRONIC COMBINED SYSTOLIC AND DIASTOLIC HEART FAILURE (HCC): ICD-10-CM

## 2022-06-06 DIAGNOSIS — I51.3 THROMBUS OF LEFT ATRIAL APPENDAGE: Primary | ICD-10-CM

## 2022-06-06 PROCEDURE — 99214 OFFICE O/P EST MOD 30 MIN: CPT | Performed by: FAMILY MEDICINE

## 2022-06-06 PROCEDURE — 1036F TOBACCO NON-USER: CPT | Performed by: FAMILY MEDICINE

## 2022-06-06 PROCEDURE — 3008F BODY MASS INDEX DOCD: CPT | Performed by: FAMILY MEDICINE

## 2022-06-06 NOTE — TELEPHONE ENCOUNTER
Left message for patient to call office to review sleep study results  Study resulted and APAP ordered  Needs to schedule DME set up    Has compliance follow up 11/30/22 that needs to be rescheduled sooner

## 2022-06-06 NOTE — PROGRESS NOTES
Assessment/Plan:   1  Thrombus of left atrial appendage/Chronic combined systolic and diastolic heart failure (HCC)/PAF (paroxysmal atrial fibrillation) ([address] )    Reviewed patient's previous medical history  At this time, he was found to have a left atrial appendage thrombus on his last TI  He was placed on Xarelto which she has been taking regularly  It appears that his recommendations were made for patient to repeat a TI 2 months after this initial finding  He will be due at the end of this month  Will contact Cardiology to help him schedule this procedure  Continue at this time with his current treatment follow-up if any symptoms worsen  BMI Counseling: Body mass index is 36 62 kg/m²  The BMI is above normal  Nutrition recommendations include decreasing portion sizes, encouraging healthy choices of fruits and vegetables, decreasing fast food intake, consuming healthier snacks and limiting drinks that contain sugar  Exercise recommendations include moderate physical activity 150 minutes/week and exercising 3-5 times per week  No pharmacotherapy was ordered  Patient referred to PCP  Rationale for BMI follow-up plan is due to patient being overweight or obese  Diagnoses and all orders for this visit:    Thrombus of left atrial appendage    Chronic combined systolic and diastolic heart failure (HCC)    PAF (paroxysmal atrial fibrillation) (HCC)        Subjective:       Chief Complaint   Patient presents with    Follow-up     1 month follow up for CHF  Patient c/o fatigue      Patient ID: Jeffrey Jackson is a 62 y o  male  Presents today for a follow-up from his last visit 1 month ago  Since his last visit, he has been following  Up with Cardiology regularly  He states that he denies any recent palpitations or lightheadedness  He has been working regularly    He states that he in general has been noticing lower exercise potential   He notes that he gets short of breath if he is highly active  He has been taking all his medications regularly  He states that he has been placed on a  Novel anticoagulant for his left atrial appendage thrombosis he denies any other symptoms today  HPI    Review of Systems   Constitutional: Negative for activity change, chills, fatigue and fever  HENT: Negative for congestion, ear pain, sinus pressure and sore throat  Eyes: Negative for redness, itching and visual disturbance  Respiratory: Negative for cough and shortness of breath  Cardiovascular: Negative for chest pain and palpitations  Gastrointestinal: Negative for abdominal pain, diarrhea and nausea  Endocrine: Negative for cold intolerance and heat intolerance  Genitourinary: Negative for dysuria, flank pain and frequency  Musculoskeletal: Negative for arthralgias, back pain, gait problem and myalgias  Skin: Negative for color change  Allergic/Immunologic: Negative for environmental allergies  Neurological: Negative for dizziness, numbness and headaches  Psychiatric/Behavioral: Negative for behavioral problems and sleep disturbance  The following portions of the patient's history were reviewed and updated as appropriate : past family history, past medical history, past social history and past surgical history      Current Outpatient Medications:     atorvastatin (LIPITOR) 40 mg tablet, Take 1 tablet (40 mg total) by mouth daily with dinner, Disp: 90 tablet, Rfl: 3    digoxin (LANOXIN) 0 125 mg tablet, Take 1 tablet (125 mcg total) by mouth daily, Disp: 90 tablet, Rfl: 3    fexofenadine (ALLEGRA) 180 MG tablet, Take 1 tablet (180 mg total) by mouth in the morning , Disp: 30 tablet, Rfl: 3    furosemide (LASIX) 20 mg tablet, Take 1 tablet (20 mg total) by mouth daily, Disp: 90 tablet, Rfl: 3    metoprolol tartrate (LOPRESSOR) 100 mg tablet, Take 1 tablet (100 mg total) by mouth every 12 (twelve) hours, Disp: 180 tablet, Rfl: 3    Multiple Vitamins-Minerals (MULTIVITAMIN ADULT EXTRA C PO), Take 1 capsule by mouth, Disp: , Rfl:     pantoprazole (PROTONIX) 40 mg tablet, TAKE 1 TABLET EVERY 12 HOURS (Patient taking differently: every 12 (twelve) hours), Disp: 180 tablet, Rfl: 3    potassium chloride (K-DUR,KLOR-CON) 20 mEq tablet, Take 1 tablet (20 mEq total) by mouth 2 (two) times a day, Disp: 180 tablet, Rfl: 3    rivaroxaban (Xarelto) 20 mg tablet, Take 1 tablet (20 mg total) by mouth daily with breakfast, Disp: 90 tablet, Rfl: 3    albuterol (PROVENTIL HFA,VENTOLIN HFA) 90 mcg/act inhaler, INHALE 2 PUFFS EVERY 6 HOURS AS NEEDED FOR WHEEZING (Patient not taking: Reported on 6/6/2022), Disp: 18 g, Rfl: 0         Objective:         Vitals:    06/06/22 1459   BP: 100/70   BP Location: Left arm   Patient Position: Sitting   Cuff Size: Adult   Pulse: 55   Temp: 98 1 °F (36 7 °C)   SpO2: 97%   Weight: 126 kg (277 lb 9 6 oz)   Height: 6' 1" (1 854 m)     Physical Exam  Vitals reviewed  Constitutional:       Appearance: He is well-developed  HENT:      Head: Normocephalic and atraumatic  Nose: Nose normal       Mouth/Throat:      Pharynx: No oropharyngeal exudate  Eyes:      General: No scleral icterus  Right eye: No discharge  Left eye: No discharge  Pupils: Pupils are equal, round, and reactive to light  Neck:      Trachea: No tracheal deviation  Cardiovascular:      Rate and Rhythm: Normal rate and regular rhythm  Pulses:           Dorsalis pedis pulses are 2+ on the right side and 2+ on the left side  Posterior tibial pulses are 2+ on the right side and 2+ on the left side  Heart sounds: Normal heart sounds  No murmur heard  No friction rub  No gallop  Pulmonary:      Effort: Pulmonary effort is normal  No respiratory distress  Breath sounds: Normal breath sounds  No wheezing or rales  Abdominal:      General: Bowel sounds are normal  There is no distension  Palpations: Abdomen is soft  Tenderness:  There is no abdominal tenderness  There is no guarding or rebound  Musculoskeletal:         General: Normal range of motion  Cervical back: Normal range of motion and neck supple  Lymphadenopathy:      Head:      Right side of head: No submental or submandibular adenopathy  Left side of head: No submental or submandibular adenopathy  Cervical: No cervical adenopathy  Right cervical: No superficial, deep or posterior cervical adenopathy  Left cervical: No superficial, deep or posterior cervical adenopathy  Skin:     General: Skin is warm and dry  Findings: No erythema  Neurological:      Mental Status: He is alert and oriented to person, place, and time  Cranial Nerves: No cranial nerve deficit  Sensory: No sensory deficit  Psychiatric:         Mood and Affect: Mood is not anxious or depressed  Speech: Speech normal          Behavior: Behavior normal          Thought Content:  Thought content normal          Judgment: Judgment normal

## 2022-07-15 ENCOUNTER — APPOINTMENT (OUTPATIENT)
Dept: LAB | Facility: CLINIC | Age: 62
End: 2022-07-15
Payer: COMMERCIAL

## 2022-07-15 LAB
ALBUMIN SERPL BCP-MCNC: 3.4 G/DL (ref 3.5–5)
ALP SERPL-CCNC: 73 U/L (ref 46–116)
ALT SERPL W P-5'-P-CCNC: 75 U/L (ref 12–78)
ANION GAP SERPL CALCULATED.3IONS-SCNC: 5 MMOL/L (ref 4–13)
AST SERPL W P-5'-P-CCNC: 48 U/L (ref 5–45)
BILIRUB SERPL-MCNC: 0.97 MG/DL (ref 0.2–1)
BUN SERPL-MCNC: 21 MG/DL (ref 5–25)
CALCIUM ALBUM COR SERPL-MCNC: 9.2 MG/DL (ref 8.3–10.1)
CALCIUM SERPL-MCNC: 8.7 MG/DL (ref 8.3–10.1)
CHLORIDE SERPL-SCNC: 110 MMOL/L (ref 100–108)
CO2 SERPL-SCNC: 25 MMOL/L (ref 21–32)
CREAT SERPL-MCNC: 1.1 MG/DL (ref 0.6–1.3)
DIGOXIN SERPL-MCNC: 0.7 NG/ML (ref 0.8–2)
GFR SERPL CREATININE-BSD FRML MDRD: 72 ML/MIN/1.73SQ M
GLUCOSE P FAST SERPL-MCNC: 101 MG/DL (ref 65–99)
MAGNESIUM SERPL-MCNC: 2.3 MG/DL (ref 1.6–2.6)
POTASSIUM SERPL-SCNC: 4.3 MMOL/L (ref 3.5–5.3)
PROT SERPL-MCNC: 6.9 G/DL (ref 6.4–8.2)
SODIUM SERPL-SCNC: 140 MMOL/L (ref 136–145)

## 2022-07-15 PROCEDURE — 80162 ASSAY OF DIGOXIN TOTAL: CPT | Performed by: INTERNAL MEDICINE

## 2022-07-15 PROCEDURE — 83735 ASSAY OF MAGNESIUM: CPT | Performed by: INTERNAL MEDICINE

## 2022-07-15 PROCEDURE — 80053 COMPREHEN METABOLIC PANEL: CPT | Performed by: INTERNAL MEDICINE

## 2022-07-15 PROCEDURE — 36415 COLL VENOUS BLD VENIPUNCTURE: CPT | Performed by: INTERNAL MEDICINE

## 2022-07-22 ENCOUNTER — OFFICE VISIT (OUTPATIENT)
Dept: CARDIOLOGY CLINIC | Facility: CLINIC | Age: 62
End: 2022-07-22
Payer: COMMERCIAL

## 2022-07-22 VITALS
HEART RATE: 78 BPM | SYSTOLIC BLOOD PRESSURE: 104 MMHG | BODY MASS INDEX: 37.37 KG/M2 | OXYGEN SATURATION: 98 % | WEIGHT: 282 LBS | HEIGHT: 73 IN | DIASTOLIC BLOOD PRESSURE: 66 MMHG

## 2022-07-22 DIAGNOSIS — I10 BENIGN ESSENTIAL HTN: Primary | ICD-10-CM

## 2022-07-22 DIAGNOSIS — I48.0 PAF (PAROXYSMAL ATRIAL FIBRILLATION) (HCC): ICD-10-CM

## 2022-07-22 DIAGNOSIS — I50.9 CHF (CONGESTIVE HEART FAILURE) (HCC): ICD-10-CM

## 2022-07-22 PROCEDURE — 99214 OFFICE O/P EST MOD 30 MIN: CPT | Performed by: INTERNAL MEDICINE

## 2022-07-22 PROCEDURE — 93000 ELECTROCARDIOGRAM COMPLETE: CPT | Performed by: INTERNAL MEDICINE

## 2022-07-22 RX ORDER — POTASSIUM CHLORIDE 20 MEQ/1
TABLET, EXTENDED RELEASE ORAL
Qty: 180 TABLET | Refills: 3 | Status: SHIPPED | OUTPATIENT
Start: 2022-07-22 | End: 2022-07-26 | Stop reason: SDUPTHER

## 2022-07-22 RX ORDER — FUROSEMIDE 20 MG/1
40 TABLET ORAL DAILY
Qty: 180 TABLET | Refills: 3 | Status: ON HOLD | OUTPATIENT
Start: 2022-07-22 | End: 2022-08-12

## 2022-07-22 NOTE — PATIENT INSTRUCTIONS
INCREASE FUROSEMIDE TO 40 MG DAILY  INCREASE POTASSIUM TO 40 MEQ IN THE MORNING, 20 MEQ IN THE EVENING  BLOOD WORK IN 3 WEEKS  FOLLOW-UP IN 6 WEEKS  WILL CALL YOU AFTER SPEAKING WITH DR CULLEN ABOUT SCHEDULING CARDIOVERSION

## 2022-07-22 NOTE — PROGRESS NOTES
Cardiology             Misbah Ventura Shilo  1960  [id number]                Assessment/Plan:     Paroxysmal atrial fibrillation, currently rate controlled  Left atrial appendage thrombus by TI 75/96/0968  Chronic systolic and diastolic CHF, compensated  Prior heavy alcohol use  COVID-19 infection 2/22  Prior bariatric surgery  Probable sleep apnea       Heart rate controlled at 78 beats per minute on ECG  Continue metoprolol and digoxin  Prior level nontoxic  Continue anticoagulation with Xarelto  Patient needs repeat TI and cardioversion/ablation as planned by Dr Vijay Ayon  Will reach out to him to see if all this can be done at [place] at the same time  Will increase furosemide to 40 mg daily in light of some weight gain, now at 82 lb  Increase potassium to 40 mEq in a m /20 mg in p m  (from 20 mEq b i d )               Follow-up in 6 weeks           Interval History:      This is a very pleasant 81-year-old male hospitalized 2/2022 shortly after he had a positive home COVID test and symptoms of vomiting, diarrhea, mild dyspnea, and upper respiratory symptoms   He came to the ED 2/13/2022, with exertional dyspnea   ECG demonstrated a new diagnosis of atrial fibrillation with RVR   CT chest revealed no evidence of pulmonary embolism, although bilateral multilobar infiltrates were present, suggestive of COVID-19 pneumonia   He converted to sinus rhythm with diltiazem IV   His chads Vasc score was 0, therefore he was maintained off anticoagulation   Beta-blockade was initiated        Subsequently, he was seen by our nurse practitioner on 02/24/2022, and rapid atrial fibrillation with symptoms of lightheadedness and exertional dyspnea   He was sent back to the ED where he was given IV diltiazem once again and converted back to sinus rhythm with symptomatic improvement   He was discharged without adjustment of his medications   He was seen by his PCP 02/25/2022 rate was noted to have recurrent atrial fibrillation with RVR at 125 beats per minute and minimal symptoms   Diltiazem 60 mg p o  b i d  was initiated  Frantz Yousif went to the ER Saturday 02/26/2022 for atypical chest pain   He was ruled out for myocardial injury with negative high sensitivity troponins x2 and no ischemic ECG changes        He underwent a nuclear stress test 04/07/2022 which revealed no evidence of myocardial ischemia or prior infarction by SPECT imaging, with ejection fraction 32%  This was consistent with nonischemic cardiomyopathy      Beka presented back to the hospital 4/2022 with acute CHF and AFib with RVR  Linares Miguel revealed a severely reduced left ventricular systolic function with a small thrombus about 0 6 cm noted in a very dilated left atrial appendage  Cardioversion was not performed  He was discharged on a rate control strategy with anticoagulation      He was seen by electrophysiology (Dr Teresa Valdes) 05/16/2022 at which time rate control with anticoagulation was recommended with repeat TI in 2 months  He was recommended to start amiodarone after resolution of his atrial appendage thrombus with subsequent cardioversion to try maintaining sinus rhythm  Cessation of alcohol intake was strongly recommended  It was also recommended to plan for comprehensive ablation-PVI once clinically stable  He presents today for follow-up  He has complaints of exertional dyspnea which has been ongoing since his hospitalization  He also admits to chest discomfort that he has constantly, increasing with cough  His lower extremity edema is stable                    Vitals:  Vitals:    07/22/22 1451   BP: 104/66   BP Location: Left arm   Patient Position: Sitting   Cuff Size: Large   Pulse: 78   SpO2: 98%   Weight: 128 kg (282 lb)   Height: 6' 1" (1 854 m)         Past Medical History:   Diagnosis Date    [facility] )     Acute ulcer of stomach     GERD (gastroesophageal reflux disease)     Rib fractures      Social History     Socioeconomic History    Marital status: /Civil Union     Spouse name: Not on file    Number of children: Not on file    Years of education: Not on file    Highest education level: Not on file   Occupational History    Not on file   Tobacco Use    Smoking status: Former Smoker     Types: Cigars    Smokeless tobacco: Former User     Types: Chew   Vaping Use    Vaping Use: Never used   Substance and Sexual Activity    Alcohol use: Yes     Alcohol/week: 7 0 standard drinks     Types: 7 Cans of beer per week     Comment: Hasn't had any beer since 4/23/22    Drug use: No    Sexual activity: Not on file   Other Topics Concern    Not on file   Social History Narrative    Always uses seat belt    Feels safe at home    No guns in the home     Social Determinants of Health     Financial Resource Strain: Not on file   Food Insecurity: No Food Insecurity    Worried About Running Out of Food in the Last Year: Never true    Noe of Food in the Last Year: Never true   Transportation Needs: No Transportation Needs    Lack of Transportation (Medical): No    Lack of Transportation (Non-Medical):  No   Physical Activity: Not on file   Stress: Not on file   Social Connections: Not on file   Intimate Partner Violence: Not on file   Housing Stability: Low Risk     Unable to Pay for Housing in the Last Year: No    Number of Places Lived in the Last Year: 1    Unstable Housing in the Last Year: No      Family History   Problem Relation Age of Onset    Seizures Father      Past Surgical History:   Procedure Laterality Date    COLONOSCOPY      ESOPHAGOGASTRODUODENOSCOPY      GASTRIC BYPASS      MANDIBLE FRACTURE SURGERY         Current Outpatient Medications:     atorvastatin (LIPITOR) 40 mg tablet, Take 1 tablet (40 mg total) by mouth daily with dinner, Disp: 90 tablet, Rfl: 3    digoxin (LANOXIN) 0 125 mg tablet, Take 1 tablet (125 mcg total) by mouth daily, Disp: 90 tablet, Rfl: 3    fexofenadine (ALLEGRA) 180 MG tablet, Take 1 tablet (180 mg total) by mouth in the morning , Disp: 30 tablet, Rfl: 3    furosemide (LASIX) 20 mg tablet, Take 2 tablets (40 mg total) by mouth daily, Disp: 180 tablet, Rfl: 3    metoprolol tartrate (LOPRESSOR) 100 mg tablet, Take 1 tablet (100 mg total) by mouth every 12 (twelve) hours, Disp: 180 tablet, Rfl: 3    Multiple Vitamins-Minerals (MULTIVITAMIN ADULT EXTRA C PO), Take 1 capsule by mouth, Disp: , Rfl:     pantoprazole (PROTONIX) 40 mg tablet, TAKE 1 TABLET EVERY 12 HOURS (Patient taking differently: every 12 (twelve) hours), Disp: 180 tablet, Rfl: 3    potassium chloride (K-DUR,KLOR-CON) 20 mEq tablet, TAKE 2 TABLETS BY MOUTH IN AM, 1 TABLET IN PM, Disp: 180 tablet, Rfl: 3    rivaroxaban (Xarelto) 20 mg tablet, Take 1 tablet (20 mg total) by mouth daily with breakfast, Disp: 90 tablet, Rfl: 3    albuterol (PROVENTIL HFA,VENTOLIN HFA) 90 mcg/act inhaler, INHALE 2 PUFFS EVERY 6 HOURS AS NEEDED FOR WHEEZING (Patient not taking: No sig reported), Disp: 18 g, Rfl: 0        Review of Systems:  Review of Systems   Respiratory: Negative  Cardiovascular: Negative  All other systems reviewed and are negative  Physical Exam:  Physical Exam  Constitutional:       General: He is not in acute distress  Appearance: He is well-developed  He is not diaphoretic  HENT:      Head: Normocephalic and atraumatic  Eyes:      General: No scleral icterus  Right eye: No discharge  Pupils: Pupils are equal, round, and reactive to light  Neck:      Thyroid: No thyromegaly  Cardiovascular:      Rate and Rhythm: Normal rate and regular rhythm  Heart sounds: Normal heart sounds  No murmur heard  No friction rub  No gallop  Pulmonary:      Effort: Pulmonary effort is normal       Breath sounds: Normal breath sounds  Abdominal:      General: There is no distension  Tenderness: There is no abdominal tenderness  There is no guarding or rebound     Musculoskeletal: General: Normal range of motion  Cervical back: Normal range of motion and neck supple  Skin:     General: Skin is warm and dry  Coloration: Skin is not pale  Findings: No erythema or rash  Neurological:      Mental Status: He is alert and oriented to person, place, and time  Coordination: Coordination normal    Psychiatric:         Behavior: Behavior normal          Thought Content: Thought content normal          Judgment: Judgment normal          This note was completed in part utilizing M-Modal Fluency Direct Software  Grammatical errors, random word insertions, spelling mistakes, and incomplete sentences can be an occasional consequence of this system secondary to software limitations, ambient noise, and hardware issues  If you have any questions or concerns about the content, text, or information contained within the body of this dictation, please contact the provider for clarification

## 2022-07-25 DIAGNOSIS — I48.0 PAF (PAROXYSMAL ATRIAL FIBRILLATION) (HCC): Primary | ICD-10-CM

## 2022-07-26 ENCOUNTER — TELEPHONE (OUTPATIENT)
Dept: CARDIOLOGY CLINIC | Facility: CLINIC | Age: 62
End: 2022-07-26

## 2022-07-26 DIAGNOSIS — I50.9 CHF (CONGESTIVE HEART FAILURE) (HCC): ICD-10-CM

## 2022-07-26 DIAGNOSIS — I48.0 PAF (PAROXYSMAL ATRIAL FIBRILLATION) (HCC): Primary | ICD-10-CM

## 2022-07-26 RX ORDER — POTASSIUM CHLORIDE 20 MEQ/1
TABLET, EXTENDED RELEASE ORAL
Qty: 270 TABLET | Refills: 3 | Status: SHIPPED | OUTPATIENT
Start: 2022-07-26

## 2022-07-26 NOTE — TELEPHONE ENCOUNTER
Fax received from [address] [place], [place] [address]y [address] for script clarification  Rx originally ordered with 180 tablets which is not a full 90 day supply  They are requesting a dispense amount of 270 for a 90 day supply  Routing to pool to determine protocol

## 2022-07-26 NOTE — TELEPHONE ENCOUNTER
Order placed       Beryle Mares, MD Raymon Cunas; Candi Mendes MA; PHYSICIANS [facility]   Can we put in an order for TI for this patient   This is to follow-up with his left atrial appendage clot

## 2022-07-31 ENCOUNTER — ANESTHESIA EVENT (OUTPATIENT)
Dept: NON INVASIVE DIAGNOSTICS | Facility: HOSPITAL | Age: 62
End: 2022-07-31

## 2022-07-31 NOTE — ANESTHESIA PREPROCEDURE EVALUATION
Procedure:  TI    Relevant Problems   CARDIO   (+) Chronic congestive heart failure (HCC)   (+) PAF (paroxysmal atrial fibrillation) (HCC)      GI/HEPATIC   (+) Gastrointestinal hemorrhage with melena   (+) Peptic ulcer      HEMATOLOGY   (+) Acute blood loss anemia      PULMONARY   (+) Obstructive sleep apnea      Denies missing any doses of Xarelto since June    TTE 4/7/2022:    Left Ventricle: Left ventricular cavity size is mildly dilated  Wall thickness is top-normal  The left ventricular ejection fraction is 30% by visual estimation  Systolic function is severely reduced  There is severe global hypokinesis  Probable diastolic dysfunction  Diastolic staging precluded by the presence of arrhythmia    Right Ventricle: Right ventricular cavity size is moderately dilated  Systolic function is mildly reduced    Left Atrium: The atrium is moderately dilated    Right Atrium: The atrium is moderately dilated    Aortic Valve: The valve appears sclerotic    Mitral Valve: There is mild annular calcification  There is mild regurgitation    Tricuspid Valve: There is mild regurgitation  Pulmonary artery systolic pressures are estimated at 35-40 mm Hg  NM stress test 4/7/2022:    Abnormal pharmacologic nuclear stress test   Dilated left ventricle with moderately reduced left ventricular systolic function  Ejection fraction 32%  Normal perfusion  These findings are suggestive of a nonischemic cardiomyopathy    Stress ECG: The stress ECG is negative for ischemia after pharmacologic stress    Perfusion Defect Conclusion: The rest end diastolic cavity size is enlarged  The stress end diastolic cavity size is dilated    Stress Function: Left ventricular function post-stress is abnormal  Global function is moderately reduced  Post-stress ejection fraction is 32 %  Physical Exam    Airway    Mallampati score:  I  TM Distance: >3 FB  Neck ROM: full     Dental   Comment: Denies loose teeth, 4 missing teeth on top and bottom,     Cardiovascular  Rhythm: irregular, Rate: normal,     Pulmonary  Comment: Normal WOB, not tachypneic, speaking in full sentences, Pulmonary exam normal     Other Findings  2+ right brachial artery pulse, unable to palpate B/L radial pulses      Anesthesia Plan  ASA Score- 4     Anesthesia Type- IV sedation with anesthesia with ASA Monitors  Additional Monitors:   Airway Plan:           Plan Factors-Exercise tolerance (METS): <4 METS  Chart reviewed  Patient is not a current smoker  Induction- intravenous  Postoperative Plan-     Informed Consent- Anesthetic plan and risks discussed with patient  I personally reviewed this patient with the CRNA  Discussed and agreed on the Anesthesia Plan with the CRNA  Neal Kay MD, personally examined the patient, reviewed the patient history and laboratory data, and explained the risks/benefits of the anesthetic to the patient  The patient has signed the appropriate consents and is ready to proceed

## 2022-08-01 ENCOUNTER — ANESTHESIA (OUTPATIENT)
Dept: NON INVASIVE DIAGNOSTICS | Facility: HOSPITAL | Age: 62
End: 2022-08-01

## 2022-08-01 ENCOUNTER — HOSPITAL ENCOUNTER (OUTPATIENT)
Dept: NON INVASIVE DIAGNOSTICS | Facility: HOSPITAL | Age: 62
Discharge: HOME | End: 2022-08-01
Attending: INTERNAL MEDICINE
Payer: COMMERCIAL

## 2022-08-01 ENCOUNTER — HOSPITAL ENCOUNTER (OUTPATIENT)
Dept: NON INVASIVE DIAGNOSTICS | Facility: HOSPITAL | Age: 62
Discharge: HOME | End: 2022-08-01
Payer: COMMERCIAL

## 2022-08-01 VITALS
OXYGEN SATURATION: 97 % | BODY MASS INDEX: 37.37 KG/M2 | HEIGHT: 73 IN | HEART RATE: 52 BPM | RESPIRATION RATE: 16 BRPM | SYSTOLIC BLOOD PRESSURE: 94 MMHG | WEIGHT: 282 LBS | DIASTOLIC BLOOD PRESSURE: 56 MMHG

## 2022-08-01 DIAGNOSIS — I48.0 PAF (PAROXYSMAL ATRIAL FIBRILLATION) (HCC): ICD-10-CM

## 2022-08-01 DIAGNOSIS — I48.91 ATRIAL FIBRILLATION, UNSPECIFIED TYPE (HCC): ICD-10-CM

## 2022-08-01 PROBLEM — I50.9 CHRONIC CONGESTIVE HEART FAILURE (HCC): Chronic | Status: ACTIVE | Noted: 2022-08-01

## 2022-08-01 LAB
ATRIAL RATE: 71 BPM
P AXIS: 55 DEGREES
PR INTERVAL: 190 MS
QRS AXIS: 39 DEGREES
QRS AXIS: 49 DEGREES
QRSD INTERVAL: 86 MS
QRSD INTERVAL: 86 MS
QT INTERVAL: 386 MS
QT INTERVAL: 394 MS
QTC INTERVAL: 428 MS
QTC INTERVAL: 436 MS
SL CV LV EF: 25
T WAVE AXIS: 130 DEGREES
T WAVE AXIS: 174 DEGREES
VENTRICULAR RATE: 71 BPM
VENTRICULAR RATE: 77 BPM

## 2022-08-01 PROCEDURE — 92960 CARDIOVERSION ELECTRIC EXT: CPT | Performed by: INTERNAL MEDICINE

## 2022-08-01 PROCEDURE — 93312 ECHO TRANSESOPHAGEAL: CPT

## 2022-08-01 PROCEDURE — 93325 DOPPLER ECHO COLOR FLOW MAPG: CPT | Performed by: INTERNAL MEDICINE

## 2022-08-01 PROCEDURE — 92960 CARDIOVERSION ELECTRIC EXT: CPT

## 2022-08-01 PROCEDURE — 93010 ELECTROCARDIOGRAM REPORT: CPT | Performed by: INTERNAL MEDICINE

## 2022-08-01 PROCEDURE — 93312 ECHO TRANSESOPHAGEAL: CPT | Performed by: INTERNAL MEDICINE

## 2022-08-01 PROCEDURE — 93320 DOPPLER ECHO COMPLETE: CPT | Performed by: INTERNAL MEDICINE

## 2022-08-01 RX ORDER — GLYCOPYRROLATE 0.2 MG/ML
INJECTION INTRAMUSCULAR; INTRAVENOUS AS NEEDED
Status: DISCONTINUED | OUTPATIENT
Start: 2022-08-01 | End: 2022-08-01

## 2022-08-01 RX ORDER — AMIODARONE HYDROCHLORIDE 200 MG/1
400 TABLET ORAL 2 TIMES DAILY
Qty: 60 TABLET | Refills: 0 | Status: SHIPPED | OUTPATIENT
Start: 2022-08-01 | End: 2022-08-16

## 2022-08-01 RX ORDER — PROPOFOL 10 MG/ML
INJECTION, EMULSION INTRAVENOUS AS NEEDED
Status: DISCONTINUED | OUTPATIENT
Start: 2022-08-01 | End: 2022-08-01

## 2022-08-01 RX ORDER — SODIUM CHLORIDE 9 MG/ML
INJECTION, SOLUTION INTRAVENOUS CONTINUOUS PRN
Status: DISCONTINUED | OUTPATIENT
Start: 2022-08-01 | End: 2022-08-01

## 2022-08-01 RX ORDER — LIDOCAINE HYDROCHLORIDE 10 MG/ML
INJECTION, SOLUTION EPIDURAL; INFILTRATION; INTRACAUDAL; PERINEURAL AS NEEDED
Status: DISCONTINUED | OUTPATIENT
Start: 2022-08-01 | End: 2022-08-01

## 2022-08-01 RX ORDER — KETAMINE HCL IN NACL, ISO-OSM 100MG/10ML
SYRINGE (ML) INJECTION AS NEEDED
Status: DISCONTINUED | OUTPATIENT
Start: 2022-08-01 | End: 2022-08-01

## 2022-08-01 RX ORDER — PROPOFOL 10 MG/ML
INJECTION, EMULSION INTRAVENOUS CONTINUOUS PRN
Status: DISCONTINUED | OUTPATIENT
Start: 2022-08-01 | End: 2022-08-01

## 2022-08-01 RX ADMIN — PROPOFOL 30 MG: 10 INJECTION, EMULSION INTRAVENOUS at 12:07

## 2022-08-01 RX ADMIN — GLYCOPYRROLATE 0.2 MCG: 0.2 INJECTION, SOLUTION INTRAMUSCULAR; INTRAVENOUS at 12:02

## 2022-08-01 RX ADMIN — LIDOCAINE HYDROCHLORIDE 50 MG: 10 INJECTION, SOLUTION EPIDURAL; INFILTRATION; INTRACAUDAL; PERINEURAL at 12:04

## 2022-08-01 RX ADMIN — Medication 20 MG: at 12:04

## 2022-08-01 RX ADMIN — EPINEPHRINE 0.5 MCG/MIN: 1 INJECTION, SOLUTION, CONCENTRATE INTRAVENOUS at 11:59

## 2022-08-01 RX ADMIN — PROPOFOL 80 MCG/KG/MIN: 10 INJECTION, EMULSION INTRAVENOUS at 12:06

## 2022-08-01 RX ADMIN — SODIUM CHLORIDE: 9 INJECTION, SOLUTION INTRAVENOUS at 11:43

## 2022-08-01 RX ADMIN — PROPOFOL 80 MG: 10 INJECTION, EMULSION INTRAVENOUS at 12:04

## 2022-08-01 RX ADMIN — EPINEPHRINE 2 MCG/MIN: 1 INJECTION, SOLUTION, CONCENTRATE INTRAVENOUS at 12:04

## 2022-08-01 NOTE — DISCHARGE INSTRUCTIONS
Take 400 mg twice a day of amiodarone for 2 weeks then take 200 mg daily   -continue blood thinner and all your other medications for now

## 2022-08-02 ENCOUNTER — TELEPHONE (OUTPATIENT)
Dept: NON INVASIVE DIAGNOSTICS | Facility: HOSPITAL | Age: 62
End: 2022-08-02

## 2022-08-02 ENCOUNTER — TELEPHONE (OUTPATIENT)
Dept: CARDIOLOGY CLINIC | Facility: CLINIC | Age: 62
End: 2022-08-02

## 2022-08-02 DIAGNOSIS — I42.8 NICM (NONISCHEMIC CARDIOMYOPATHY) (HCC): Primary | ICD-10-CM

## 2022-08-02 NOTE — TELEPHONE ENCOUNTER
----- Message from Gricelda Nielsen Aline Anand sent at 7/28/2022  8:09 AM EDT -----  Thank you     ----- Message -----  From: Jeronimo Garcia MD  Sent: 7/27/2022   4:17 PM EDT  To: Shira Jeffery DO, Ana Diamond MA, #    Yes we should wait till TI is done and then schedule  the procedure  ----- Message -----  From: Ana Diamond MA  Sent: 7/27/2022   3:21 PM EDT  To: Jeronimo Garcia MD, Shira Jeffery DO, #    Good afternoon,  I see patient scheduled for his TI on 8/1/2, and the request mention if TI negative we will scheduled the ablation, I wonder if we need to wait until Monday to see the results, in regard ablation scheduling? Thank you     ----- Message -----  From: Shira Jeffery DO  Sent: 7/27/2022   3:02 PM EDT  To: Jeronimo Garcia MD, Ana Diamond MA, #    Thank you Sudip Maricela Harada, please let me know when you speak w/ the pt  Thanks    Shira Jeffery      ----- Message -----  From: Jeronimo Garcia MD  Sent: 7/23/2022   7:56 PM EDT  To: Shira Jeffery DO, Ana Diamond MA, #    Please look at the attached message from Dr Yang Pena  Can we set up for TI to rule out left atrial appendage clot  If it is negative then we can set up for ablation as soon as possible    ----- Message -----  From: Shira Jeffery DO  Sent: 7/22/2022   3:31 PM EDT  To: MD Mango Aguirre,     This mutual pt of ours has persistent AFib, rate control, had a KIAH thrombus 3 months ago  You had mentioned repeat TI/CV and ablation  The pt preferred if all this could be done together at [facility] and I wanted to reach out to you to see if this is possible  Please let me know and help me schedule accordingly  Thanks so much!     Imani

## 2022-08-02 NOTE — TELEPHONE ENCOUNTER
Good morning,  Patient have a TI/CV perform yesterday,  Can you please advise if patient will need to be scheduled for the cardiac ablation? Thank you

## 2022-08-08 ENCOUNTER — TELEPHONE (OUTPATIENT)
Dept: CARDIOLOGY CLINIC | Facility: CLINIC | Age: 62
End: 2022-08-08

## 2022-08-08 NOTE — TELEPHONE ENCOUNTER
Pt called had cardioversion and states since Saturday night to is back in atrial fib   states had coughing spell and  Is back in atrial fib  Please advise

## 2022-08-08 NOTE — TELEPHONE ENCOUNTER
Mango Wang,   Would love some guidance on this pt  Looks like back in Afib  Mely Butcher, Dr Jorge Ellison has been helping w/ his AFib management  Please let pt know that one of us will get back to him after we discuss further options  Thanks

## 2022-08-09 ENCOUNTER — TELEPHONE (OUTPATIENT)
Dept: CARDIOLOGY CLINIC | Facility: CLINIC | Age: 62
End: 2022-08-09

## 2022-08-09 NOTE — TELEPHONE ENCOUNTER
----- Message from Fara Hernandez DO sent at 8/9/2022  1:33 PM EDT -----  Clerical team, please schedule 2 week f/u with Dr Freddy Tillman  If he's not available, one of the EP AP's    Thanks    RP

## 2022-08-09 NOTE — TELEPHONE ENCOUNTER
Thanks Felipe! Leila Dominguez, please let him know to continue the amiodarone 400 mg po BID  If any moderate or severe symptoms, go to ER  If mild or asymptomatic, will plan on continuing amiodarone  Can you ask him to monitor his HR for us and let us know if HR is > 100 BPM consistently? If he can't check pulse, can try pulse oximeter, smart device (phone/watch), or get Joel Gonsales device and sync w/ smart phone    Thx    RP

## 2022-08-10 NOTE — TELEPHONE ENCOUNTER
Called patient lm continue amiodorone, monitor HR and to Er if moderate or severe symptoms as directed by Dr Daniel Story  Told to call if any questions

## 2022-08-12 ENCOUNTER — HOSPITAL ENCOUNTER (INPATIENT)
Facility: HOSPITAL | Age: 62
LOS: 4 days | Discharge: HOME | DRG: 308 | End: 2022-08-16
Attending: EMERGENCY MEDICINE | Admitting: INTERNAL MEDICINE
Payer: COMMERCIAL

## 2022-08-12 ENCOUNTER — APPOINTMENT (EMERGENCY)
Dept: RADIOLOGY | Facility: HOSPITAL | Age: 62
DRG: 308 | End: 2022-08-12
Payer: COMMERCIAL

## 2022-08-12 DIAGNOSIS — I48.0 PAF (PAROXYSMAL ATRIAL FIBRILLATION) (HCC): Primary | ICD-10-CM

## 2022-08-12 DIAGNOSIS — I50.9 CHF (CONGESTIVE HEART FAILURE) (HCC): Chronic | ICD-10-CM

## 2022-08-12 PROBLEM — I50.23 ACUTE ON CHRONIC SYSTOLIC CONGESTIVE HEART FAILURE (HCC): Status: ACTIVE | Noted: 2022-08-01

## 2022-08-12 LAB
2HR DELTA HS TROPONIN: -2 NG/L
4HR DELTA HS TROPONIN: -2 NG/L
ANION GAP SERPL CALCULATED.3IONS-SCNC: 11 MMOL/L (ref 4–13)
ATRIAL RATE: 234 BPM
ATRIAL RATE: 300 BPM
ATRIAL RATE: 78 BPM
BASOPHILS # BLD AUTO: 0.04 THOUSANDS/ΜL (ref 0–0.1)
BASOPHILS NFR BLD AUTO: 0 % (ref 0–1)
BUN SERPL-MCNC: 22 MG/DL (ref 5–25)
CALCIUM SERPL-MCNC: 8.7 MG/DL (ref 8.3–10.1)
CARDIAC TROPONIN I PNL SERPL HS: 5 NG/L
CARDIAC TROPONIN I PNL SERPL HS: 5 NG/L
CARDIAC TROPONIN I PNL SERPL HS: 7 NG/L
CHLORIDE SERPL-SCNC: 105 MMOL/L (ref 96–108)
CO2 SERPL-SCNC: 24 MMOL/L (ref 21–32)
CREAT SERPL-MCNC: 1.15 MG/DL (ref 0.6–1.3)
DIGOXIN SERPL-MCNC: 1.4 NG/ML (ref 0.8–2)
EOSINOPHIL # BLD AUTO: 0.4 THOUSAND/ΜL (ref 0–0.61)
EOSINOPHIL NFR BLD AUTO: 4 % (ref 0–6)
ERYTHROCYTE [DISTWIDTH] IN BLOOD BY AUTOMATED COUNT: 13.2 % (ref 11.6–15.1)
GFR SERPL CREATININE-BSD FRML MDRD: 68 ML/MIN/1.73SQ M
GLUCOSE SERPL-MCNC: 113 MG/DL (ref 65–140)
HCT VFR BLD AUTO: 46.7 % (ref 36.5–49.3)
HGB BLD-MCNC: 16 G/DL (ref 12–17)
IMM GRANULOCYTES # BLD AUTO: 0.04 THOUSAND/UL (ref 0–0.2)
IMM GRANULOCYTES NFR BLD AUTO: 0 % (ref 0–2)
LYMPHOCYTES # BLD AUTO: 1.82 THOUSANDS/ΜL (ref 0.6–4.47)
LYMPHOCYTES NFR BLD AUTO: 17 % (ref 14–44)
MCH RBC QN AUTO: 32.1 PG (ref 26.8–34.3)
MCHC RBC AUTO-ENTMCNC: 34.3 G/DL (ref 31.4–37.4)
MCV RBC AUTO: 94 FL (ref 82–98)
MONOCYTES # BLD AUTO: 1.01 THOUSAND/ΜL (ref 0.17–1.22)
MONOCYTES NFR BLD AUTO: 10 % (ref 4–12)
NEUTROPHILS # BLD AUTO: 7.32 THOUSANDS/ΜL (ref 1.85–7.62)
NEUTS SEG NFR BLD AUTO: 69 % (ref 43–75)
NRBC BLD AUTO-RTO: 0 /100 WBCS
P AXIS: 0 DEGREES
PLATELET # BLD AUTO: 222 THOUSANDS/UL (ref 149–390)
PMV BLD AUTO: 10.1 FL (ref 8.9–12.7)
POTASSIUM SERPL-SCNC: 4.4 MMOL/L (ref 3.5–5.3)
QRS AXIS: 106 DEGREES
QRS AXIS: 135 DEGREES
QRS AXIS: 138 DEGREES
QRSD INTERVAL: 148 MS
QRSD INTERVAL: 90 MS
QRSD INTERVAL: 92 MS
QT INTERVAL: 436 MS
QT INTERVAL: 452 MS
QT INTERVAL: 454 MS
QTC INTERVAL: 428 MS
QTC INTERVAL: 443 MS
QTC INTERVAL: 457 MS
RBC # BLD AUTO: 4.99 MILLION/UL (ref 3.88–5.62)
SODIUM SERPL-SCNC: 140 MMOL/L (ref 135–147)
T WAVE AXIS: -20 DEGREES
T WAVE AXIS: 159 DEGREES
T WAVE AXIS: 236 DEGREES
VENTRICULAR RATE: 58 BPM
VENTRICULAR RATE: 58 BPM
VENTRICULAR RATE: 61 BPM
WBC # BLD AUTO: 10.63 THOUSAND/UL (ref 4.31–10.16)

## 2022-08-12 PROCEDURE — 80048 BASIC METABOLIC PNL TOTAL CA: CPT

## 2022-08-12 PROCEDURE — 93010 ELECTROCARDIOGRAM REPORT: CPT

## 2022-08-12 PROCEDURE — 80162 ASSAY OF DIGOXIN TOTAL: CPT

## 2022-08-12 PROCEDURE — 92960 CARDIOVERSION ELECTRIC EXT: CPT

## 2022-08-12 PROCEDURE — 85025 COMPLETE CBC W/AUTO DIFF WBC: CPT

## 2022-08-12 PROCEDURE — 99285 EMERGENCY DEPT VISIT HI MDM: CPT | Performed by: EMERGENCY MEDICINE

## 2022-08-12 PROCEDURE — 99223 1ST HOSP IP/OBS HIGH 75: CPT | Performed by: INTERNAL MEDICINE

## 2022-08-12 PROCEDURE — 36415 COLL VENOUS BLD VENIPUNCTURE: CPT

## 2022-08-12 PROCEDURE — 96374 THER/PROPH/DIAG INJ IV PUSH: CPT

## 2022-08-12 PROCEDURE — 93005 ELECTROCARDIOGRAM TRACING: CPT

## 2022-08-12 PROCEDURE — 84484 ASSAY OF TROPONIN QUANT: CPT

## 2022-08-12 PROCEDURE — 71045 X-RAY EXAM CHEST 1 VIEW: CPT

## 2022-08-12 PROCEDURE — 99285 EMERGENCY DEPT VISIT HI MDM: CPT

## 2022-08-12 RX ORDER — SODIUM CHLORIDE 9 MG/ML
3 INJECTION INTRAVENOUS
Status: DISCONTINUED | OUTPATIENT
Start: 2022-08-12 | End: 2022-08-16 | Stop reason: HOSPADM

## 2022-08-12 RX ORDER — ALBUTEROL SULFATE 90 UG/1
2 AEROSOL, METERED RESPIRATORY (INHALATION) EVERY 6 HOURS PRN
Status: DISCONTINUED | OUTPATIENT
Start: 2022-08-12 | End: 2022-08-16 | Stop reason: HOSPADM

## 2022-08-12 RX ORDER — METOPROLOL TARTRATE 50 MG/1
50 TABLET, FILM COATED ORAL EVERY 12 HOURS SCHEDULED
Status: DISCONTINUED | OUTPATIENT
Start: 2022-08-13 | End: 2022-08-13

## 2022-08-12 RX ORDER — AMIODARONE HYDROCHLORIDE 200 MG/1
400 TABLET ORAL 2 TIMES DAILY
Status: DISCONTINUED | OUTPATIENT
Start: 2022-08-12 | End: 2022-08-12 | Stop reason: SDUPTHER

## 2022-08-12 RX ORDER — ATORVASTATIN CALCIUM 40 MG/1
40 TABLET, FILM COATED ORAL
Status: DISCONTINUED | OUTPATIENT
Start: 2022-08-12 | End: 2022-08-16 | Stop reason: HOSPADM

## 2022-08-12 RX ORDER — ETOMIDATE 2 MG/ML
11.9 INJECTION INTRAVENOUS ONCE
Status: DISCONTINUED | OUTPATIENT
Start: 2022-08-12 | End: 2022-08-16 | Stop reason: HOSPADM

## 2022-08-12 RX ORDER — POTASSIUM CHLORIDE 20 MEQ/1
20 TABLET, EXTENDED RELEASE ORAL DAILY
Status: DISCONTINUED | OUTPATIENT
Start: 2022-08-12 | End: 2022-08-16 | Stop reason: HOSPADM

## 2022-08-12 RX ORDER — PANTOPRAZOLE SODIUM 40 MG/1
40 TABLET, DELAYED RELEASE ORAL
Status: DISCONTINUED | OUTPATIENT
Start: 2022-08-12 | End: 2022-08-16 | Stop reason: HOSPADM

## 2022-08-12 RX ORDER — AMIODARONE HYDROCHLORIDE 200 MG/1
400 TABLET ORAL 2 TIMES DAILY WITH MEALS
Status: DISCONTINUED | OUTPATIENT
Start: 2022-08-12 | End: 2022-08-16 | Stop reason: HOSPADM

## 2022-08-12 RX ORDER — FUROSEMIDE 10 MG/ML
20 INJECTION INTRAMUSCULAR; INTRAVENOUS
Status: DISCONTINUED | OUTPATIENT
Start: 2022-08-13 | End: 2022-08-13

## 2022-08-12 RX ORDER — FENTANYL CITRATE 50 UG/ML
50 INJECTION, SOLUTION INTRAMUSCULAR; INTRAVENOUS ONCE
Status: COMPLETED | OUTPATIENT
Start: 2022-08-12 | End: 2022-08-12

## 2022-08-12 RX ADMIN — FENTANYL CITRATE 50 MCG: 50 INJECTION, SOLUTION INTRAMUSCULAR; INTRAVENOUS at 11:12

## 2022-08-12 RX ADMIN — ATORVASTATIN CALCIUM 40 MG: 40 TABLET, FILM COATED ORAL at 16:46

## 2022-08-12 RX ADMIN — PANTOPRAZOLE SODIUM 40 MG: 40 TABLET, DELAYED RELEASE ORAL at 16:46

## 2022-08-12 RX ADMIN — POTASSIUM CHLORIDE 20 MEQ: 1500 TABLET, EXTENDED RELEASE ORAL at 16:47

## 2022-08-12 RX ADMIN — AMIODARONE HYDROCHLORIDE 400 MG: 200 TABLET ORAL at 16:47

## 2022-08-12 NOTE — CONSULTS
Consult - Cardiology   Louis Lao 62 y o  male MRN: [id number]  Unit/Bed#: ED 15 Encounter: [id number]        Reason For Consult:  Atrial fibrillation                 Assessment:  HFrEF:  Currently seems compensated   Stress 4/7/2022 EF 32%   TI 4/24/2022 EF 15%   TI 8/1/2022 EF 25%  Paroxysmal atrial fibrillation:  Recurrent since approximately 8/6 - presently with controlled rate   Dx 2/2022:     Eliquis anticoagulation   Antiarrhythmic:  Amiodarone-initiated 8/1  Currently 400 mg b i d    AV blocking Rx:  Digoxin 0 125 mg daily, Lopressor 100 mg b i d  Hx Left atrial appendage thrombus    Seen on TI 4/26/2022   TI 8/1/22:  No thrombus seen    Other   Hx alcohol overuse  COVID-19 infection February 2022  Prior bariatric surgery  Probable SRIKANTH     Discussion / Plan:  # patient presented with chief complaint of orthopnea though without much objective evidence of volume overload and symptoms now resolved independent of any therapy    # history of recurrent paroxysmal atrial fibrillation which is presently ongoing with a controlled ventricular rate (60s)                It was previously planned that the patient would undergo repeat cardioversion after amiodarone loading ~~> he has now received approximately 8 g  Will confer with attending regarding doing so today versus scheduling this as an elective procedure   At this point amiodarone can probably be decreased to 200 mg daily ~~> will confer with attending regarding same   Similarly, with low normal heart rate and possible future cardioversion we may be able to reduce this patient's AV blocking medications    If the patient will not be cardioverted he can probably be discharged from the emergency department        History Of Present Illness:  Nena Zhou is a 59-year-old patient of [address] [place] [address] cardiologist Dr Shira Jeffery      This gentleman had come under our care in February of 2022 which time he was hospitalized with COVID pneumonia and new atrial fibrillation with rapid ventricular rate  He spontaneously converted to sinus rhythm  He was initiated on beta-blocker and with a chads Vasc score of 0 he was not anticoagulated  Later that month he was seen in the outpatient setting where he was again noted to be in atrial fibrillation with rapid ventricular rate with associated symptoms of lightheadedness and dyspnea  For this he was sent to the emergency department where he was given IV Cardizem which again provoked a return to sinus rhythm  He was discharged with no change to his medical regimen  2/25/2022 he was seen by his PCP where he was again noted to be in atrial fibrillation with accelerated ventricular rate  At this point he was initiated on diltiazem 60 mg b i d  Following day he went to the emergency department complaints of atypical chest discomfort  He was in a sinus rhythm, had negative serial high sensitivity troponin levels and nonischemic ECGs for which he was released to home  4/7/2022 - Nuclear stress test:  Normal perfusion with calculated EF 32%    4/24/2022:  Hospitalized with recurrent AF with RVR and decompensated CHF  TI 4/26-EF 15%, global hypokinesia, [facility] spontaneous echo contrast with small in mobile thrombus measuring up to 0 6 cm at the appendage, mild MR  Cardioversion not pursued  Anticoagulation begun  5/16/2022 EP eval -Dr Patel Plan:  Recommendation for rate control plus anticoagulation with repeat TI in 2 months  Recommended that after resolution of atrial appendage thrombus he be initiated on amiodarone with strong recommendation for alcohol cessation and eventual ablation  7/22/2022 O/p visit Dr Chelita Cortez: C/o effort dyspnea with stable lower extremity edema  Atypical chest discomfort worsened by cough  Weight 128 kg  Continued on metoprolol, digoxin, and Xarelto  Furosemide increased to 40 mg daily (potassium 40 a m /20 p m )  IT arranged    8/1/22 - ti/cardioversion:  EF 25%   Moderate spontaneous echo contrast in the [facility] but no thrombus  He was successfully cardioverted    On 8/6 the patient felt he thought to be recurrent atrial fibrillation noting some irregularity of his heart rate  This is been ongoing to the present time though he denies any feelings of tachycardia nor has he experienced any decrease in his effort tolerance  Yesterday after an unremarkable day the patient went to bed feeling comfortable  At around 1:00 a m  He woke with what he describes as some symptoms of orthopnea  He had had some increased work of breathing and with this what he described as some vague chest pressure  With sitting up be felt improved any did make a few times to lie back down and return to sleep though doing this caused him some increased dyspnea so he remained upright thereafter  At around 5:00 a m he took his usual medications then came to the ER at around 6:00 a m  To be evaluated  Since arrival the patient has been in atrial fibrillation with a controlled ventricular rate trending in the 60s  High sensitivity troponin levels are normal at 7 and 5  Digoxin level is 1 4 and chest x-ray is unremarkable  At the time my visit, and having received no additional Lasix, the patient is breathing comfortably and able to lie flat  Past Medical History:        Past Medical History:   Diagnosis Date    A-fib ([facility] [address] )     Acute ulcer of stomach     GERD (gastroesophageal reflux disease)     Rib fractures       Past Surgical History:   Procedure Laterality Date    COLONOSCOPY      ESOPHAGOGASTRODUODENOSCOPY      GASTRIC BYPASS      MANDIBLE FRACTURE SURGERY          Allergy:        No Known Allergies    Medications:       Prior to Admission medications    Medication Sig Start Date End Date Taking?  Authorizing Provider   albuterol (PROVENTIL HFA,VENTOLIN HFA) 90 mcg/act inhaler INHALE 2 PUFFS EVERY 6 HOURS AS NEEDED FOR WHEEZING  Patient not taking: No sig reported 3/1/22   Rigoberto Hernandez DO   amiodarone 200 mg tablet Take 2 tablets (400 mg total) by mouth 2 (two) times a day Take 400 mg BID for 2 weeks and then 200 mg daily 8/1/22   Kimber Mcclure MD   atorvastatin (LIPITOR) 40 mg tablet Take 1 tablet (40 mg total) by mouth daily with dinner 5/25/22   [facility], DO   digoxin (LANOXIN) 0 125 mg tablet Take 1 tablet (125 mcg total) by mouth daily 5/25/22   [facility], DO   fexofenadine (ALLEGRA) 180 MG tablet Take 1 tablet (180 mg total) by mouth in the morning  5/16/22   Adrian Gerber MD   furosemide (LASIX) 20 mg tablet Take 2 tablets (40 mg total) by mouth daily 7/22/22   [facility], DO   metoprolol tartrate (LOPRESSOR) 100 mg tablet Take 1 tablet (100 mg total) by mouth every 12 (twelve) hours 6/1/22   [facility], DO   Multiple Vitamins-Minerals (MULTIVITAMIN ADULT EXTRA C PO) Take 1 capsule by mouth    Historical Provider, MD   pantoprazole (PROTONIX) 40 mg tablet TAKE 1 TABLET EVERY 12 HOURS  Patient taking differently: every 12 (twelve) hours 11/19/21   Ihab Abdelaal, DO   potassium chloride (K-DUR,KLOR-CON) 20 mEq tablet TAKE 2 TABLETS BY MOUTH IN AM, 1 TABLET IN PM 7/26/22   [facility], DO   rivaroxaban (Xarelto) 20 mg tablet Take 1 tablet (20 mg total) by mouth daily with breakfast 5/25/22   [facility] Sujey Quintana, DO       Family History:     Family History   Problem Relation Age of Onset    Seizures Father         Social History:       Social History     Socioeconomic History    Marital status: /Civil Union     Spouse name: None    Number of children: None    Years of education: None    Highest education level: None   Occupational History    None   Tobacco Use    Smoking status: Former Smoker     Types: Cigars    Smokeless tobacco: Former User     Types: Chew   Vaping Use    Vaping Use: Never used   Substance and Sexual Activity    Alcohol use:  Yes     Alcohol/week: 7 0 standard drinks     Types: 7 Cans of beer per week     Comment: Hasn't had any beer since 4/23/22    Drug use: No    Sexual activity: None   Other Topics Concern    None   Social History Narrative    Always uses seat belt    Feels safe at home    No guns in the home     Social Determinants of Health     Financial Resource Strain: Not on file   Food Insecurity: No Food Insecurity    Worried About Running Out of Food in the Last Year: Never true    Noe of Food in the Last Year: Never true   Transportation Needs: No Transportation Needs    Lack of Transportation (Medical): No    Lack of Transportation (Non-Medical): No   Physical Activity: Not on file   Stress: Not on file   Social Connections: Not on file   Intimate Partner Violence: Not on file   Housing Stability: Low Risk     Unable to Pay for Housing in the Last Year: No    Number of Places Lived in the Last Year: 1    Unstable Housing in the Last Year: No       ROS:  Symptoms per HPI  Maintaining stable home weight around 283 lb  Mild persistent lower extremity edema  The remainder of the review of systems is negative    Exam:  General:  Alert, normally conversant, comfortable appearing  Head: Normocephalic, atraumatic  Eyes:  EOMI  Pupils - equal, round, reactive to accomodation  No icterus  Normal Conjunctiva  Oropharynx: Moist without lesion  Neck:  No gross bruit, JVD, thyromegaly, or lymphadenopathy  Heart:  Irregular with controlled rate  No rub nor pathologic murmur  Lungs:  Clear without rales/rhonchi/wheeze  Abdomen:  Soft and nontender with normal bowel sounds  No organomegaly or mass  Lower Limbs:  1+ pitting edema distal pretibial echo and malleolar area  Pulses[de-identified]  RLE - DP:  2+                 LLE - DP:  2+  Musculoskeletal: Independent movement of limbs observed, Formal ROM and strength eval not performed  Neurologic:    Oriented to: person, place, situation  Cranial Nerves: grossly intact - vision, smell, taste, and hearing were not tested       Motor function: grossly normal, symmetric   Sensation: Was not tested    Vitals:    08/12/22 4670 08/12/22 0651 08/12/22 0652 08/12/22 0841   BP: 113/64   99/61   BP Location:    Right arm   Pulse:  (!) 52  67   Resp: 20   18   Temp: 97 9 °F (36 6 °C)      TempSrc: Oral      SpO2: 93%   94%   Weight:   130 kg (286 lb 6 oz)            DATA:      -----------  Telemetry:   Atrial fibrillation with ventricular rate trend in the mid 60s       -----------------------------------------------------------------------------------------------------------------------------------------------  Weights: Wt Readings from Last 20 Encounters:   08/12/22 130 kg (286 lb 6 oz)   08/01/22 128 kg (282 lb)   07/22/22 128 kg (282 lb)   06/06/22 126 kg (277 lb 9 6 oz)   05/25/22 125 kg (275 lb 9 6 oz)   05/18/22 127 kg (280 lb)   05/16/22 127 kg (280 lb 12 8 oz)   05/03/22 126 kg (277 lb 6 4 oz)   05/01/22 123 kg (271 lb 13 2 oz)   04/07/22 127 kg (281 lb)   04/07/22 127 kg (281 lb)   02/28/22 127 kg (281 lb)   02/25/22 128 kg (283 lb 3 2 oz)   02/24/22 127 kg (280 lb)   02/13/22 125 kg (276 lb 0 3 oz)   12/19/21 113 kg (250 lb)   08/21/21 116 kg (255 lb)   05/11/21 121 kg (266 lb 9 6 oz)   11/23/20 121 kg (267 lb 3 2 oz)   02/29/20 117 kg (258 lb 9 6 oz)   , Body mass index is 37 78 kg/m²           Lab Studies:               Results from last 7 days   Lab Units 08/12/22  0708   WBC Thousand/uL 10 63*   HEMOGLOBIN g/dL 16 0   HEMATOCRIT % 46 7   PLATELETS Thousands/uL 222   ,   Results from last 7 days   Lab Units 08/12/22  0708   POTASSIUM mmol/L 4 4   CHLORIDE mmol/L 105   CO2 mmol/L 24   BUN mg/dL 22   CREATININE mg/dL 1 15   CALCIUM mg/dL 8 7

## 2022-08-12 NOTE — ED PROVIDER NOTES
History  Chief Complaint   Patient presents with    Chest Pain     CP, SOB and dizziness since last night, recent cardiac ablation      Patient is 73-year-old male presenting for chest pain, shortness of breath, dizziness since earlier this morning  Past medical history significant for CHF (EF 30%) and PAF  Patient was recently cardioverted on 08/01 for his AFib and was sent home on amiodarone 400 mg b i d  Patient states he woke up this morning and noticed he had substernal pressure-like pain in his chest, shortness of breath, lightheadedness/dizziness, palpitations, and his legs felt weak/pins and needles  Patient denies abdominal pain, nausea/vomiting/diarrhea, slurred speech, facial droop, fever/chills  Patient is alert well-appearing on initial presentation  EKG shows patient is currently in AFib  Rate 58  Patient states took all his home medications this morning in hopes that they would help  Prior to Admission Medications   Prescriptions Last Dose Informant Patient Reported? Taking? Multiple Vitamins-Minerals (MULTIVITAMIN ADULT EXTRA C PO)  Self Yes No   Sig: Take 1 capsule by mouth   albuterol (PROVENTIL HFA,VENTOLIN HFA) 90 mcg/act inhaler  Self No No   Sig: INHALE 2 PUFFS EVERY 6 HOURS AS NEEDED FOR WHEEZING   Patient not taking: No sig reported   amiodarone 200 mg tablet   No No   Sig: Take 2 tablets (400 mg total) by mouth 2 (two) times a day Take 400 mg BID for 2 weeks and then 200 mg daily   atorvastatin (LIPITOR) 40 mg tablet  Self No No   Sig: Take 1 tablet (40 mg total) by mouth daily with dinner   digoxin (LANOXIN) 0 125 mg tablet  Self No No   Sig: Take 1 tablet (125 mcg total) by mouth daily   fexofenadine (ALLEGRA) 180 MG tablet  Self No No   Sig: Take 1 tablet (180 mg total) by mouth in the morning     furosemide (LASIX) 20 mg tablet   No No   Sig: Take 2 tablets (40 mg total) by mouth daily   metoprolol tartrate (LOPRESSOR) 100 mg tablet  Self No No   Sig: Take 1 tablet (100 mg total) by mouth every 12 (twelve) hours   pantoprazole (PROTONIX) 40 mg tablet   No No   Sig: TAKE 1 TABLET EVERY 12 HOURS   Patient taking differently: every 12 (twelve) hours   potassium chloride (K-DUR,KLOR-CON) 20 mEq tablet   No No   Sig: TAKE 2 TABLETS BY MOUTH IN AM, 1 TABLET IN PM   rivaroxaban (Xarelto) 20 mg tablet  Self No No   Sig: Take 1 tablet (20 mg total) by mouth daily with breakfast      Facility-Administered Medications: None       Past Medical History:   Diagnosis Date    [facility]     Acute ulcer of stomach     GERD (gastroesophageal reflux disease)     Rib fractures        Past Surgical History:   Procedure Laterality Date    COLONOSCOPY      ESOPHAGOGASTRODUODENOSCOPY      GASTRIC BYPASS      MANDIBLE FRACTURE SURGERY         Family History   Problem Relation Age of Onset    Seizures Father      I have reviewed and agree with the history as documented  E-Cigarette/Vaping    E-Cigarette Use Never User      E-Cigarette/Vaping Substances     Social History     Tobacco Use    Smoking status: Former Smoker     Types: Cigars    Smokeless tobacco: Former User     Types: Chew   Vaping Use    Vaping Use: Never used   Substance Use Topics    Alcohol use: Yes     Alcohol/week: 7 0 standard drinks     Types: 7 Cans of beer per week     Comment: Hasn't had any beer since 4/23/22    Drug use: No        Review of Systems   Constitutional: Negative  HENT: Negative  Eyes: Negative  Respiratory: Positive for shortness of breath  Cardiovascular: Positive for chest pain and palpitations  Substernal pressure-like Chest pain   Gastrointestinal: Negative  Endocrine: Negative  Genitourinary: Negative  Musculoskeletal: Negative  Skin: Negative  Allergic/Immunologic: Negative  Neurological: Positive for dizziness and light-headedness  "Pins and needles" down his legs   Hematological: Negative  Psychiatric/Behavioral: Negative          Physical Exam  ED Triage Vitals   Temperature Pulse Respirations Blood Pressure SpO2   08/12/22 0649 08/12/22 0651 08/12/22 0649 08/12/22 0649 08/12/22 0649   97 9 °F (36 6 °C) (!) 52 20 113/64 93 %      Temp Source Heart Rate Source Patient Position - Orthostatic VS BP Location FiO2 (%)   08/12/22 0649 08/12/22 0841 08/12/22 0841 08/12/22 0841 --   Oral Monitor Lying Right arm       Pain Score       08/12/22 0841       3             Orthostatic Vital Signs  Vitals:    08/12/22 1133 08/12/22 1136 08/12/22 1139 08/12/22 1141   BP:    103/64   Pulse: 62 56 56 61   Patient Position - Orthostatic VS:    Lying       Physical Exam  Vitals and nursing note reviewed  Constitutional:       Appearance: He is well-developed  He is obese  HENT:      Head: Normocephalic and atraumatic  Eyes:      Extraocular Movements: Extraocular movements intact  Pupils: Pupils are equal, round, and reactive to light  Cardiovascular:      Rate and Rhythm: Bradycardia present  Rhythm irregular  Pulses:           Radial pulses are 2+ on the right side and 2+ on the left side  Heart sounds: Normal heart sounds  Comments: Pt currently in Afib (according to ECG)  Pulmonary:      Effort: Pulmonary effort is normal       Breath sounds: Normal breath sounds  Abdominal:      General: Bowel sounds are normal       Palpations: Abdomen is soft  Musculoskeletal:         General: Normal range of motion  Cervical back: Normal range of motion  Skin:     General: Skin is warm and dry  Capillary Refill: Capillary refill takes less than 2 seconds  Neurological:      General: No focal deficit present  Mental Status: He is alert and oriented to person, place, and time     Psychiatric:         Mood and Affect: Mood normal          Behavior: Behavior normal          ED Medications  Medications   sodium chloride (PF) 0 9 % injection 3 mL (has no administration in time range)   etomidate (AMIDATE) 2 mg/mL injection 11 9 mg (11 9 mg Intravenous Not Given 8/12/22 1116)   furosemide (LASIX) injection 20 mg (has no administration in time range)   metoprolol tartrate (LOPRESSOR) tablet 50 mg (has no administration in time range)   amiodarone tablet 400 mg (has no administration in time range)   fentanyl citrate (PF) 100 MCG/2ML 50 mcg (50 mcg Intravenous Given 8/12/22 1112)       Diagnostic Studies  Results Reviewed     Procedure Component Value Units Date/Time    HS Troponin I 4hr [[id number]]  (Normal) Collected: 08/12/22 1108    Lab Status: Final result Specimen: Blood from Arm, Left Updated: 08/12/22 1134     hs TnI 4hr 5 ng/L      Delta 4hr hsTnI -2 ng/L     HS Troponin I 2hr [[id number]]  (Normal) Collected: 08/12/22 0909    Lab Status: Final result Specimen: Blood from Arm, Left Updated: 08/12/22 0938     hs TnI 2hr 5 ng/L      Delta 2hr hsTnI -2 ng/L     Basic metabolic panel [[id number]] Collected: 08/12/22 0708    Lab Status: Final result Specimen: Blood from Arm, Left Updated: 08/12/22 0746     Sodium 140 mmol/L      Potassium 4 4 mmol/L      Chloride 105 mmol/L      CO2 24 mmol/L      ANION GAP 11 mmol/L      BUN 22 mg/dL      Creatinine 1 15 mg/dL      Glucose 113 mg/dL      Calcium 8 7 mg/dL      eGFR 68 ml/min/1 73sq m     Narrative:      [place] guidelines for Chronic Kidney Disease (CKD):     Stage 1 with normal or high GFR (GFR > 90 mL/min/1 73 square meters)    Stage 2 Mild CKD (GFR = 60-89 mL/min/1 73 square meters)    Stage 3A Moderate CKD (GFR = 45-59 mL/min/1 73 square meters)    Stage 3B Moderate CKD (GFR = 30-44 mL/min/1 73 square meters)    Stage 4 Severe CKD (GFR = 15-29 mL/min/1 73 square meters)    Stage 5 End Stage CKD (GFR <15 mL/min/1 73 square meters)  Note: GFR calculation is accurate only with a steady state creatinine    Digoxin level [[id number]]  (Normal) Collected: 08/12/22 0708    Lab Status: Final result Specimen: Blood from Arm, Left Updated: 08/12/22 0746     Digoxin Lvl 1 4 ng/mL     HS Troponin 0hr (reflex protocol) [[id number]]  (Normal) Collected: 08/12/22 0708    Lab Status: Final result Specimen: Blood from Arm, Left Updated: 08/12/22 0740     hs TnI 0hr 7 ng/L     CBC and differential [[id number]]  (Abnormal) Collected: 08/12/22 0708    Lab Status: Final result Specimen: Blood from Arm, Left Updated: 08/12/22 0713     WBC 10 63 Thousand/uL      RBC 4 99 Million/uL      Hemoglobin 16 0 g/dL      Hematocrit 46 7 %      MCV 94 fL      MCH 32 1 pg      MCHC 34 3 g/dL      RDW 13 2 %      MPV 10 1 fL      Platelets 908 Thousands/uL      nRBC 0 /100 WBCs      Neutrophils Relative 69 %      Immat GRANS % 0 %      Lymphocytes Relative 17 %      Monocytes Relative 10 %      Eosinophils Relative 4 %      Basophils Relative 0 %      Neutrophils Absolute 7 32 Thousands/µL      Immature Grans Absolute 0 04 Thousand/uL      Lymphocytes Absolute 1 82 Thousands/µL      Monocytes Absolute 1 01 Thousand/µL      Eosinophils Absolute 0 40 Thousand/µL      Basophils Absolute 0 04 Thousands/µL                  X-ray chest 1 view portable   Final Result by Megan Henriquez MD (08/12 1164)      No acute cardiopulmonary disease                    Workstation performed: [id number]               Procedures  ECG 12 Lead Documentation Only    Date/Time: 8/12/2022 7:41 AM  Performed by: Tanvi Rothman MD  Authorized by: Tanvi Rothman MD     Indications / Diagnosis:  Chest pain/ PAF  ECG reviewed by me, the ED Provider: yes    Patient location:  ED  Previous ECG:     Previous ECG:  Compared to current    Similarity:  Changes noted    Comparison to cardiac monitor: Yes    Interpretation:     Interpretation: abnormal    Rate:     ECG rate:  58    ECG rate assessment: bradycardic    Rhythm:     Rhythm: atrial fibrillation    Pre-Procedural Sedation  Performed by: Tanvi Rothman MD  Authorized by: Tanvi Rothman MD     Consent:     Consent obtained:  Verbal    Consent given by:  Patient    Risks discussed: Allergic reaction, prolonged hypoxia resulting in organ damage, dysrhythmia, vomiting, respiratory compromise necessitating ventilatory assistance and intubation, prolonged sedation necessitating reversal, inadequate sedation and nausea  Universal protocol:     Procedure explained and questions answered to patient or proxy's satisfaction: yes      Relevant documents present and verified: yes      Test results available and properly labeled: yes      Radiology Images displayed and confirmed  If images not available, report reviewed: yes      Required blood products, implants, devices, and special equipment available: yes      Site/side marked: yes      Immediately prior to procedure a time out was called: yes      Patient identity confirmation method:  Verbally with patient  Indications:     Sedation purpose:  Cardioversion    Procedure necessitating sedation performed by:  Physician performing sedation    Intended level of sedation:  Moderate (conscious sedation)  Pre-sedation assessment:     ASA classification: class 2 - patient with mild systemic disease      Neck mobility: normal      Mouth opening:  3 or more finger widths    Thyromental distance:  4 finger widths    Mallampati score:  I - soft palate, uvula, fauces, pillars visible    Pre-sedation assessments completed and reviewed: airway patency not reviewed, cardiovascular function not reviewed, hydration status not reviewed, mental status not reviewed, nausea/vomiting not reviewed, pain level not reviewed, respiratory function not reviewed and temperature not reviewed      History of difficult intubation: no      Pre-sedation assessment completed:  8/12/2022 11:34 AM          ED Course  ED Course as of 08/12/22 1255   Fri Aug 12, 2022   1138 Pt had signed consent, meds drawn up, and pads + leads placed  However machine was not able to detect adequate signal to synchronize for shock  Consequently elected not to go through with the cardioversion   Etomidate was not given, 50mg of fentanyl had already been given  Pt understands and agrees  Will reach out to cardiology   [phone] Pt discussed w/ cardiology who encountered the same problem  Plan to admit pt to [facility] per recs from cardiology                             SBIRT 20yo+    Flowsheet Row Most Recent Value   SBIRT (23 yo +)    In order to provide better care to our patients, we are screening all of our patients for alcohol and drug use  Would it be okay to ask you these screening questions? Unable to answer at this time Filed at: 08/12/2022 1034                MDM  Number of Diagnoses or Management Options  PAF (paroxysmal atrial fibrillation) [facility]): new and requires workup  Diagnosis management comments: Patient is 46-year-old male presenting for chest pain, shortness of breath, lightheadedness/dizziness  Ddx: PAF, CHF exacerbation, ACS  Pt has a known history of afib and recently was cardioverted on 8/1 and placed on 400mg amiodarone BID  Current ECG shows pt is in Afib  Will order cardiac workup - CBC, BMP, ECG, CXR, Trop, and Digoxin level  Consult placed to cardiology regarding appropriate next steps for this pt         Amount and/or Complexity of Data Reviewed  Clinical lab tests: ordered and reviewed  Tests in the radiology section of CPT®: ordered and reviewed  Discussion of test results with the performing providers: yes  Review and summarize past medical records: yes  Independent visualization of images, tracings, or specimens: yes    Risk of Complications, Morbidity, and/or Mortality  Presenting problems: low  Diagnostic procedures: low  Management options: low    Patient Progress  Patient progress: stable      Disposition  Final diagnoses:   PAF (paroxysmal atrial fibrillation) ([facility] [address] )     Time reflects when diagnosis was documented in both MDM as applicable and the Disposition within this note     Time User Action Codes Description Comment    8/12/2022  7:17 AM Nelli Owen Add [I48 0] PAF (paroxysmal atrial fibrillation) [facility])       ED Disposition     ED Disposition   Admit    Condition   Stable    Date/Time   Fri Aug 12, 2022 12:34 PM    Comment   Case was discussed with Dr Dilip Pérze and the patient's admission status was agreed to be Admission Status: inpatient status to the service of Dr Dilip Pérez   Follow-up Information    None         Patient's Medications   Discharge Prescriptions    No medications on file     No discharge procedures on file  PDMP Review     None           ED Provider  Attending physically available and evaluated Alexandra Ayers I managed the patient along with the ED Attending      Electronically Signed by         Darron Fields MD  08/12/22 3667

## 2022-08-12 NOTE — ASSESSMENT & PLAN NOTE
Wt Readings from Last 3 Encounters:   08/12/22 93 3 kg (205 lb 11 oz)   08/01/22 128 kg (282 lb)   07/22/22 128 kg (282 lb)   ·   · TTE 08/01/2022 revealed ejection fraction 25%  · Cardiology input appreciated  · Recommending furosemide 20 mg IV b i d   · I&Os, daily weight, fluid restriction

## 2022-08-12 NOTE — ASSESSMENT & PLAN NOTE
This is a 79-year-old male with history of atrial fibrillation on Xarelto presented with chest pain, dyspnea and dizziness that began this morning  Patient was recently cardioverted on 08/01/2022 for atrial fibrillation and sent home on amiodarone 400 mg b i d  Monia Polina     · Cardiology consultation appreciated  · Continue amiodarone 400 mg b i d   · Decrease metoprolol to 50 mg b i d   · Discontinue digoxin secondary to atrial fibrillation with slow ventricular rate  · Continue Xarelto for anticoagulation  · Plan for cardioversion on Monday once heart rate improves

## 2022-08-12 NOTE — ED ATTENDING ATTESTATION
8/12/2022  I, Tess Black MD, saw and evaluated the patient  I have discussed the patient with the resident/non-physician practitioner and agree with the resident's/non-physician practitioner's findings, Plan of Care, and MDM as documented in the resident's/non-physician practitioner's note, except where noted  All available labs and Radiology studies were reviewed  I was present for key portions of any procedure(s) performed by the resident/non-physician practitioner and I was immediately available to provide assistance  At this point I agree with the current assessment done in the Emergency Department  I have conducted an independent evaluation of this patient a history and physical is as follows:  63 yo M with PAF, CHF, c/o onset of chest discomfort, dizziness, dyspnea, described as substernal pressure, no fever, chills, N/V, diaphoresis  VS notable for bradycardia with afib, stable BP  Irregular rhythm, chest clear  Abd S/NT  Cardiac workup with serial troponin, digoxin level  He was just electrically cardioverted on 8/1/22, so we will d/w cardiology for the recurrent slow afib in the ED  He is on multiple rate control meds  Reviewed cardiology note  ED workup is reassuring  It was taken under advisement to attempt electrical cardioversion, for which I was agreeable  Patient was consented, and prepped  However, at bedside I was not comfortable with the feedback from the monitor showing an incorrect heart rate and inconsistent synchronization, so I opted not to proceed with ED cardioversion for safety since he is stable and this was not a required procedure but only for potential benefit  D/W Dr Aj Rodriguez who is going to consult at bedside  Dr Aj Rodriguez agreed to forgo cardioversion for now and recommended admitting him for diuresis, medication adjustment, with the goal to attempt cardioversion in a few days         ED Course         Critical Care Time  Procedures

## 2022-08-12 NOTE — H&P
[address] [facility]  H&P- Rhonda [facility] 1960, 62 y o  male MRN: [id number]  Unit/Bed#: E4 -01 Encounter: [id number]  Primary Care Provider: Oswaldo Mcclure DO   Date and time admitted to hospital: 8/12/2022  6:44 AM    * PAF (paroxysmal atrial fibrillation) [facility])  Assessment & Plan  This is a 28-year-old male with history of atrial fibrillation on Xarelto presented with chest pain, dyspnea and dizziness that began this morning  Patient was recently cardioverted on 08/01/2022 for atrial fibrillation and sent home on amiodarone 400 mg b i d  Milka Dewey · Cardiology consultation appreciated  · Continue amiodarone 400 mg b i d   · Decrease metoprolol to 50 mg b i d   · Discontinue digoxin secondary to atrial fibrillation with slow ventricular rate  · Continue Xarelto for anticoagulation  · Plan for cardioversion on Monday once heart rate improves    Acute on chronic systolic congestive heart failure (HCC)  Assessment & Plan  Wt Readings from Last 3 Encounters:   08/12/22 93 3 kg (205 lb 11 oz)   08/01/22 128 kg (282 lb)   07/22/22 128 kg (282 lb)   ·   · TTE 08/01/2022 revealed ejection fraction 25%  · Cardiology input appreciated  · Recommending furosemide 20 mg IV b i d   · I&Os, daily weight, fluid restriction    Atrial thrombus  Assessment & Plan  · History of left atrial appendage seen on TI on 04/26/2022,  · TI 8/1/22 no thrombus seen        VTE Prophylaxis: Rivaroxaban (Xarelto)  / sequential compression device   Code Status: Full  POLST: There is no POLST form on file for this patient (pre-hospital)    Anticipated Length of Stay:  Patient will be admitted on an Inpatient basis with an anticipated length of stay of  greater than 2 midnights  Justification for Hospital Stay: afib with rvr    Total Time for Visit, including Counseling / Coordination of Care: 45 minutes  Greater than 50% of this total time spent on direct patient counseling and coordination of care      Chief Complaint: Chest pain, dyspnea, palpitations    History of Present Illness:    Carolina Henson is a 62 y o  male who presents with chest pain, dizziness, dyspnea  Patient hasof atrial fibrillation on Xarelto presented with chest pain, dyspnea and dizziness that began this morning  Patient was recently cardioverted on 08/01/2022 for atrial fibrillation and sent home on amiodarone 400 mg b i d   Patient denies any fever, chills, cough, congestion  Denies any nausea, vomiting, diarrhea  Patient states symptoms were worse when lying supine also had some vague chest pressure  Review of Systems:    Review of Systems   Constitutional: Negative  HENT: Negative  Eyes: Negative  Respiratory: Positive for shortness of breath  Cardiovascular: Positive for chest pain and palpitations  Gastrointestinal: Negative  Endocrine: Negative  Genitourinary: Negative  Musculoskeletal: Negative  Skin: Negative  Allergic/Immunologic: Negative  Neurological: Negative  Hematological: Negative  Psychiatric/Behavioral: Negative  Past Medical and Surgical History:     Past Medical History:   Diagnosis Date    A-fib ([facility] [address] )     Acute ulcer of stomach     GERD (gastroesophageal reflux disease)     Rib fractures        Past Surgical History:   Procedure Laterality Date    COLONOSCOPY      ESOPHAGOGASTRODUODENOSCOPY      GASTRIC BYPASS      MANDIBLE FRACTURE SURGERY         Meds/Allergies:    Prior to Admission medications    Medication Sig Start Date End Date Taking?  Authorizing Provider   albuterol (PROVENTIL HFA,VENTOLIN HFA) 90 mcg/act inhaler INHALE 2 PUFFS EVERY 6 HOURS AS NEEDED FOR WHEEZING  Patient not taking: No sig reported 3/1/22   Rigoberto Hernandez DO   amiodarone 200 mg tablet Take 2 tablets (400 mg total) by mouth 2 (two) times a day Take 400 mg BID for 2 weeks and then 200 mg daily 8/1/22   Rosmery Burrows MD   atorvastatin (LIPITOR) 40 mg tablet Take 1 tablet (40 mg total) by mouth daily with dinner 5/25/22   Imani Myers,    digoxin (LANOXIN) 0 125 mg tablet Take 1 tablet (125 mcg total) by mouth daily 5/25/22   Imani Myers,    fexofenadine (ALLEGRA) 180 MG tablet Take 1 tablet (180 mg total) by mouth in the morning  5/16/22   Mirna Hernandez MD   metoprolol tartrate (LOPRESSOR) 100 mg tablet Take 1 tablet (100 mg total) by mouth every 12 (twelve) hours 6/1/22   Imani Myers DO   Multiple Vitamins-Minerals (MULTIVITAMIN ADULT EXTRA C PO) Take 1 capsule by mouth    Historical Provider, MD   pantoprazole (PROTONIX) 40 mg tablet TAKE 1 TABLET EVERY 12 HOURS  Patient taking differently: every 12 (twelve) hours 11/19/21   Ihab Abdelaal, DO   potassium chloride (K-DUR,KLOR-CON) 20 mEq tablet TAKE 2 TABLETS BY MOUTH IN AM, 1 TABLET IN PM 7/26/22   Imani Myers, DO   rivaroxaban (Xarelto) 20 mg tablet Take 1 tablet (20 mg total) by mouth daily with breakfast 5/25/22   Imani Myers, DO   furosemide (LASIX) 20 mg tablet Take 2 tablets (40 mg total) by mouth daily 7/22/22 8/12/22  Imani Myers DO     I have reviewed home medications with patient personally      Allergies: No Known Allergies    Social History:     Social History     Substance and Sexual Activity   Alcohol Use Yes    Alcohol/week: 7 0 standard drinks    Types: 7 Cans of beer per week    Comment: Hasn't had any beer since 4/23/22     Social History     Tobacco Use   Smoking Status Former Smoker    Types: Cigars   Smokeless Tobacco Former User    Types: Chew     Social History     Substance and Sexual Activity   Drug Use No       Family History:    Family History   Problem Relation Age of Onset    Seizures Father        Physical Exam:     Vitals:   Blood Pressure: 102/66 (08/12/22 1541)  Pulse: 56 (08/12/22 1541)  Temperature: (!) 97 4 °F (36 3 °C) (08/12/22 1541)  Temp Source: Temporal (08/12/22 1541)  Respirations: 18 (08/12/22 1541)  Height: 6' 1" (185 4 cm) (08/12/22 1319)  Weight - Scale: 93 3 kg (205 lb 11 oz) (08/12/22 1319)  SpO2: 94 % (08/12/22 1541)    Constitutional: Patient is oriented to person, place and time, no acute distress  HEENT:  Normocephalic, atraumatic  Cardiovascular: Normal S1S2, irregular, No murmurs/rubs/gallops appreciated  Pulmonary:  Bilateral air entry, No rhonchi/rales/wheezing appreciated  Abdominal: Soft, Bowel sounds present, Non-tender, Non-distended  Extremities:  No cyanosis, clubbing or edema  Neurological: Cranial nerves II-XII grossly intact, sensation intact, otherwise no focal neurological symptoms  Additional Data:     Lab Results: I have personally reviewed pertinent reports  Results from last 7 days   Lab Units 08/12/22  0708   WBC Thousand/uL 10 63*   HEMOGLOBIN g/dL 16 0   HEMATOCRIT % 46 7   PLATELETS Thousands/uL 222   NEUTROS PCT % 69   LYMPHS PCT % 17   MONOS PCT % 10   EOS PCT % 4     Results from last 7 days   Lab Units 08/12/22  0708   POTASSIUM mmol/L 4 4   CHLORIDE mmol/L 105   CO2 mmol/L 24   BUN mg/dL 22   CREATININE mg/dL 1 15   CALCIUM mg/dL 8 7           Imaging: I have personally reviewed pertinent reports  X-ray chest 1 view portable    Result Date: 8/12/2022  Narrative: CHEST INDICATION:   chest pain  A  Fib  COMPARISON:  Due to technical issues, a chest radiograph from 4/24/2022 and a chest CT from 2/14/2022 are not available  EXAM PERFORMED/VIEWS:  XR CHEST PORTABLE FINDINGS: Cardiomediastinal silhouette appears unremarkable  The lungs are clear  No pneumothorax or pleural effusion  Skeleton normal for age with healed left rib fractures  Impression: No acute cardiopulmonary disease  Workstation performed: [id number]     Cardioversion    Result Date: 8/1/2022  Narrative: PRE-PROCEDURE RHYTHM:  atrial fibrillation  POST-PROCEDURE RHYTHM:  normal sinus rhythm  :  Rayna Kay MD    ANESTHESIA:  Camron Ahmadi MD    COMPLICATIONS:  None  OPERATIVE TERM:  DC cardioversion   The nature of the procedure, risks and alternatives were discussed with the patient who gave informed consent  Patient was noted to be atrial fibrillation and on appropriate anticoagulation  A proper timeout was obtained  OPERATIVE TECHNIQUE:  The patient was sedated by the anesthesiology service  TI was performed to rule out intracardiac thrombus  DC cardioversion was performed with 250 J biphasically and synchronously  The patient converted to sinus rhythm after a single attempt  The patient was then monitored until fully alert and left the procedure area in stable condition  Impression:   Successful DC cardioversion  TI    Result Date: 8/1/2022  Narrative: Danny Arora  Left Ventricle: Left ventricular cavity size is dilated  Wall thickness is normal  The left ventricular ejection fraction is 25% by visual estimation  Systolic function is severely reduced  There is severe global hypokinesis    Atrial Septum: No patent foramen ovale confirmed at rest by color flow Doppler    Left Atrial Appendage: There is a chicken wing appearance  There is reduced function  There is no thrombus  There is moderate, continuous spontaneous echo contrast  No KIAH/LA thrombus  Allscripts / Epic Records Reviewed: Yes     ** Please Note: This note has been constructed using a voice recognition system   **

## 2022-08-13 LAB
ALBUMIN SERPL BCP-MCNC: 3.2 G/DL (ref 3.5–5)
ALP SERPL-CCNC: 68 U/L (ref 46–116)
ALT SERPL W P-5'-P-CCNC: 41 U/L (ref 12–78)
ANION GAP SERPL CALCULATED.3IONS-SCNC: 9 MMOL/L (ref 4–13)
AST SERPL W P-5'-P-CCNC: 26 U/L (ref 5–45)
ATRIAL RATE: 202 BPM
BASOPHILS # BLD AUTO: 0.03 THOUSANDS/ΜL (ref 0–0.1)
BASOPHILS NFR BLD AUTO: 0 % (ref 0–1)
BILIRUB SERPL-MCNC: 1.38 MG/DL (ref 0.2–1)
BUN SERPL-MCNC: 21 MG/DL (ref 5–25)
CALCIUM ALBUM COR SERPL-MCNC: 9 MG/DL (ref 8.3–10.1)
CALCIUM SERPL-MCNC: 8.4 MG/DL (ref 8.3–10.1)
CHLORIDE SERPL-SCNC: 104 MMOL/L (ref 96–108)
CO2 SERPL-SCNC: 26 MMOL/L (ref 21–32)
CREAT SERPL-MCNC: 1.1 MG/DL (ref 0.6–1.3)
EOSINOPHIL # BLD AUTO: 0.37 THOUSAND/ΜL (ref 0–0.61)
EOSINOPHIL NFR BLD AUTO: 5 % (ref 0–6)
ERYTHROCYTE [DISTWIDTH] IN BLOOD BY AUTOMATED COUNT: 13.2 % (ref 11.6–15.1)
GFR SERPL CREATININE-BSD FRML MDRD: 72 ML/MIN/1.73SQ M
GLUCOSE SERPL-MCNC: 100 MG/DL (ref 65–140)
HCT VFR BLD AUTO: 46.2 % (ref 36.5–49.3)
HGB BLD-MCNC: 15.6 G/DL (ref 12–17)
IMM GRANULOCYTES # BLD AUTO: 0.03 THOUSAND/UL (ref 0–0.2)
IMM GRANULOCYTES NFR BLD AUTO: 0 % (ref 0–2)
LYMPHOCYTES # BLD AUTO: 1.42 THOUSANDS/ΜL (ref 0.6–4.47)
LYMPHOCYTES NFR BLD AUTO: 19 % (ref 14–44)
MCH RBC QN AUTO: 31.8 PG (ref 26.8–34.3)
MCHC RBC AUTO-ENTMCNC: 33.8 G/DL (ref 31.4–37.4)
MCV RBC AUTO: 94 FL (ref 82–98)
MONOCYTES # BLD AUTO: 0.65 THOUSAND/ΜL (ref 0.17–1.22)
MONOCYTES NFR BLD AUTO: 9 % (ref 4–12)
NEUTROPHILS # BLD AUTO: 4.88 THOUSANDS/ΜL (ref 1.85–7.62)
NEUTS SEG NFR BLD AUTO: 67 % (ref 43–75)
NRBC BLD AUTO-RTO: 0 /100 WBCS
PLATELET # BLD AUTO: 195 THOUSANDS/UL (ref 149–390)
PMV BLD AUTO: 10.6 FL (ref 8.9–12.7)
POTASSIUM SERPL-SCNC: 3.8 MMOL/L (ref 3.5–5.3)
PROT SERPL-MCNC: 6.6 G/DL (ref 6.4–8.4)
QRS AXIS: -53 DEGREES
QRSD INTERVAL: 148 MS
QT INTERVAL: 434 MS
QTC INTERVAL: 491 MS
RBC # BLD AUTO: 4.9 MILLION/UL (ref 3.88–5.62)
SODIUM SERPL-SCNC: 139 MMOL/L (ref 135–147)
T WAVE AXIS: 82 DEGREES
VENTRICULAR RATE: 77 BPM
WBC # BLD AUTO: 7.38 THOUSAND/UL (ref 4.31–10.16)

## 2022-08-13 PROCEDURE — 99232 SBSQ HOSP IP/OBS MODERATE 35: CPT | Performed by: INTERNAL MEDICINE

## 2022-08-13 PROCEDURE — 80053 COMPREHEN METABOLIC PANEL: CPT | Performed by: INTERNAL MEDICINE

## 2022-08-13 PROCEDURE — 93010 ELECTROCARDIOGRAM REPORT: CPT | Performed by: INTERNAL MEDICINE

## 2022-08-13 PROCEDURE — 85025 COMPLETE CBC W/AUTO DIFF WBC: CPT | Performed by: INTERNAL MEDICINE

## 2022-08-13 PROCEDURE — 93005 ELECTROCARDIOGRAM TRACING: CPT

## 2022-08-13 RX ORDER — FUROSEMIDE 10 MG/ML
40 INJECTION INTRAMUSCULAR; INTRAVENOUS
Status: DISCONTINUED | OUTPATIENT
Start: 2022-08-13 | End: 2022-08-14

## 2022-08-13 RX ORDER — ZOLPIDEM TARTRATE 5 MG/1
2.5 TABLET ORAL
Status: DISCONTINUED | OUTPATIENT
Start: 2022-08-13 | End: 2022-08-16 | Stop reason: HOSPADM

## 2022-08-13 RX ADMIN — RIVAROXABAN 20 MG: 20 TABLET, FILM COATED ORAL at 08:22

## 2022-08-13 RX ADMIN — PANTOPRAZOLE SODIUM 40 MG: 40 TABLET, DELAYED RELEASE ORAL at 17:20

## 2022-08-13 RX ADMIN — POTASSIUM CHLORIDE 20 MEQ: 1500 TABLET, EXTENDED RELEASE ORAL at 08:22

## 2022-08-13 RX ADMIN — AMIODARONE HYDROCHLORIDE 400 MG: 200 TABLET ORAL at 17:20

## 2022-08-13 RX ADMIN — ZOLPIDEM TARTRATE 2.5 MG: 5 TABLET, COATED ORAL at 22:54

## 2022-08-13 RX ADMIN — ATORVASTATIN CALCIUM 40 MG: 40 TABLET, FILM COATED ORAL at 17:20

## 2022-08-13 RX ADMIN — FUROSEMIDE 40 MG: 10 INJECTION, SOLUTION INTRAVENOUS at 17:20

## 2022-08-13 RX ADMIN — AMIODARONE HYDROCHLORIDE 400 MG: 200 TABLET ORAL at 08:22

## 2022-08-13 RX ADMIN — PANTOPRAZOLE SODIUM 40 MG: 40 TABLET, DELAYED RELEASE ORAL at 05:11

## 2022-08-13 RX ADMIN — METOPROLOL TARTRATE 75 MG: 50 TABLET, FILM COATED ORAL at 20:43

## 2022-08-13 NOTE — PLAN OF CARE
Problem: Potential for Falls  Goal: Patient will remain free of falls  Description: INTERVENTIONS:  - Educate patient/family on patient safety including physical limitations  - Instruct patient to call for assistance with activity   - Consult OT/PT to assist with strengthening/mobility   - Keep Call bell within reach  - Keep bed low and locked with side rails adjusted as appropriate  - Keep care items and personal belongings within reach  - Initiate and maintain comfort rounds  - Make Fall Risk Sign visible to staff  - Offer Toileting every  Hours, in advance of need  - Initiate/Maintain alarm  - Obtain necessary fall risk management equipment:   - Apply yellow socks and bracelet for high fall risk patients  - Consider moving patient to room near nurses station  Outcome: Progressing     Problem: PAIN - ADULT  Goal: Verbalizes/displays adequate comfort level or baseline comfort level  Description: Interventions:  - Encourage patient to monitor pain and request assistance  - Assess pain using appropriate pain scale  - Administer analgesics based on type and severity of pain and evaluate response  - Implement non-pharmacological measures as appropriate and evaluate response  - Consider cultural and social influences on pain and pain management  - Notify physician/advanced practitioner if interventions unsuccessful or patient reports new pain  Outcome: Progressing     Problem: INFECTION - ADULT  Goal: Absence or prevention of progression during hospitalization  Description: INTERVENTIONS:  - Assess and monitor for signs and symptoms of infection  - Monitor lab/diagnostic results  - Monitor all insertion sites, i e  indwelling lines, tubes, and drains  - Monitor endotracheal if appropriate and nasal secretions for changes in amount and color  - Daisy appropriate cooling/warming therapies per order  - Administer medications as ordered  - Instruct and encourage patient and family to use good hand hygiene technique  - Identify and instruct in appropriate isolation precautions for identified infection/condition  Outcome: Progressing  Goal: Absence of fever/infection during neutropenic period  Description: INTERVENTIONS:  - Monitor WBC    Outcome: Progressing     Problem: SAFETY ADULT  Goal: Patient will remain free of falls  Description: INTERVENTIONS:  - Educate patient/family on patient safety including physical limitations  - Instruct patient to call for assistance with activity   - Consult OT/PT to assist with strengthening/mobility   - Keep Call bell within reach  - Keep bed low and locked with side rails adjusted as appropriate  - Keep care items and personal belongings within reach  - Initiate and maintain comfort rounds  - Make Fall Risk Sign visible to staff  - Offer Toileting every  Hours, in advance of need  - Initiate/Maintain alarm  - Obtain necessary fall risk management equipment:   - Apply yellow socks and bracelet for high fall risk patients  - Consider moving patient to room near nurses station  Outcome: Progressing  Goal: Maintain or return to baseline ADL function  Description: INTERVENTIONS:  -  Assess patient's ability to carry out ADLs; assess patient's baseline for ADL function and identify physical deficits which impact ability to perform ADLs (bathing, care of mouth/teeth, toileting, grooming, dressing, etc )  - Assess/evaluate cause of self-care deficits   - Assess range of motion  - Assess patient's mobility; develop plan if impaired  - Assess patient's need for assistive devices and provide as appropriate  - Encourage maximum independence but intervene and supervise when necessary  - Involve family in performance of ADLs  - Assess for home care needs following discharge   - Consider OT consult to assist with ADL evaluation and planning for discharge  - Provide patient education as appropriate  Outcome: Progressing  Goal: Maintains/Returns to pre admission functional level  Description: INTERVENTIONS:  - Perform BMAT or MOVE assessment daily    - Set and communicate daily mobility goal to care team and patient/family/caregiver  - Collaborate with rehabilitation services on mobility goals if consulted  - Perform Range of Motion  times a day  - Reposition patient every  hours  - Dangle patient  times a day  - Stand patient  times a day  - Ambulate patient  times a day  - Out of bed to chair  times a day   - Out of bed for meals times a day  - Out of bed for toileting  - Record patient progress and toleration of activity level   Outcome: Progressing     Problem: DISCHARGE PLANNING  Goal: Discharge to home or other facility with appropriate resources  Description: INTERVENTIONS:  - Identify barriers to discharge w/patient and caregiver  - Arrange for needed discharge resources and transportation as appropriate  - Identify discharge learning needs (meds, wound care, etc )  - Arrange for interpretive services to assist at discharge as needed  - Refer to Case Management Department for coordinating discharge planning if the patient needs post-hospital services based on physician/advanced practitioner order or complex needs related to functional status, cognitive ability, or social support system  Outcome: Progressing     Problem: Knowledge Deficit  Goal: Patient/family/caregiver demonstrates understanding of disease process, treatment plan, medications, and discharge instructions  Description: Complete learning assessment and assess knowledge base    Interventions:  - Provide teaching at level of understanding  - Provide teaching via preferred learning methods  Outcome: Progressing     Problem: CARDIOVASCULAR - ADULT  Goal: Maintains optimal cardiac output and hemodynamic stability  Description: INTERVENTIONS:  - Monitor I/O, vital signs and rhythm  - Monitor for S/S and trends of decreased cardiac output  - Administer and titrate ordered vasoactive medications to optimize hemodynamic stability  - Assess quality of pulses, skin color and temperature  - Assess for signs of decreased coronary artery perfusion  - Instruct patient to report change in severity of symptoms  Outcome: Progressing  Goal: Absence of cardiac dysrhythmias or at baseline rhythm  Description: INTERVENTIONS:  - Continuous cardiac monitoring, vital signs, obtain 12 lead EKG if ordered  - Administer antiarrhythmic and heart rate control medications as ordered  - Monitor electrolytes and administer replacement therapy as ordered  Outcome: Progressing     Problem: METABOLIC, FLUID AND ELECTROLYTES - ADULT  Goal: Electrolytes maintained within normal limits  Description: INTERVENTIONS:  - Monitor labs and assess patient for signs and symptoms of electrolyte imbalances  - Administer electrolyte replacement as ordered  - Monitor response to electrolyte replacements, including repeat lab results as appropriate  - Instruct patient on fluid and nutrition as appropriate  Outcome: Progressing  Goal: Fluid balance maintained  Description: INTERVENTIONS:  - Monitor labs   - Monitor I/O and WT  - Instruct patient on fluid and nutrition as appropriate  - Assess for signs & symptoms of volume excess or deficit  Outcome: Progressing  Goal: Glucose maintained within target range  Description: INTERVENTIONS:  - Monitor Blood Glucose as ordered  - Assess for signs and symptoms of hyperglycemia and hypoglycemia  - Administer ordered medications to maintain glucose within target range  - Assess nutritional intake and initiate nutrition service referral as needed  Outcome: Progressing

## 2022-08-13 NOTE — NURSING NOTE
Patient called RN with complaints of sudden diaphoresis and chest pain of 6/10  Vital signs stable  EKG obtained  Dr Nixon Encarnacion with [facility] and Dr Marrian Severe with cardiology aware  Patient now resting comfortably in bed, stating that symptoms have resolved and his chest pain is a 3/10, which is what patient states is his normal pain level  Will continue to monitor

## 2022-08-13 NOTE — PLAN OF CARE
Problem: Potential for Falls  Goal: Patient will remain free of falls  Description: INTERVENTIONS:  - Educate patient/family on patient safety including physical limitations  - Instruct patient to call for assistance with activity   - Consult OT/PT to assist with strengthening/mobility   - Keep Call bell within reach  - Keep bed low and locked with side rails adjusted as appropriate  - Keep care items and personal belongings within reach  - Initiate and maintain comfort rounds  - Make Fall Risk Sign visible to staff  - Offer Toileting every 2 Hours, in advance of need  - Initiate/Maintain bed alarm  - Obtain necessary fall risk management equipment: bed alarm   - Apply yellow socks and bracelet for high fall risk patients  - Consider moving patient to room near nurses station  Outcome: Progressing     Problem: PAIN - ADULT  Goal: Verbalizes/displays adequate comfort level or baseline comfort level  Description: Interventions:  - Encourage patient to monitor pain and request assistance  - Assess pain using appropriate pain scale  - Administer analgesics based on type and severity of pain and evaluate response  - Implement non-pharmacological measures as appropriate and evaluate response  - Consider cultural and social influences on pain and pain management  - Notify physician/advanced practitioner if interventions unsuccessful or patient reports new pain  Outcome: Progressing     Problem: INFECTION - ADULT  Goal: Absence or prevention of progression during hospitalization  Description: INTERVENTIONS:  - Assess and monitor for signs and symptoms of infection  - Monitor lab/diagnostic results  - Monitor all insertion sites, i e  indwelling lines, tubes, and drains  - Monitor endotracheal if appropriate and nasal secretions for changes in amount and color  - [place] appropriate cooling/warming therapies per order  - Administer medications as ordered  - Instruct and encourage patient and family to use good hand hygiene technique  - Identify and instruct in appropriate isolation precautions for identified infection/condition  Outcome: Progressing  Goal: Absence of fever/infection during neutropenic period  Description: INTERVENTIONS:  - Monitor WBC    Outcome: Progressing     Problem: SAFETY ADULT  Goal: Patient will remain free of falls  Description: INTERVENTIONS:  - Educate patient/family on patient safety including physical limitations  - Instruct patient to call for assistance with activity   - Consult OT/PT to assist with strengthening/mobility   - Keep Call bell within reach  - Keep bed low and locked with side rails adjusted as appropriate  - Keep care items and personal belongings within reach  - Initiate and maintain comfort rounds  - Make Fall Risk Sign visible to staff  - Offer Toileting every 2 Hours, in advance of need  - Initiate/Maintain bed alarm  - Obtain necessary fall risk management equipment: bed alarm   - Apply yellow socks and bracelet for high fall risk patients  - Consider moving patient to room near nurses station  Outcome: Progressing  Goal: Maintain or return to baseline ADL function  Description: INTERVENTIONS:  -  Assess patient's ability to carry out ADLs; assess patient's baseline for ADL function and identify physical deficits which impact ability to perform ADLs (bathing, care of mouth/teeth, toileting, grooming, dressing, etc )  - Assess/evaluate cause of self-care deficits   - Assess range of motion  - Assess patient's mobility; develop plan if impaired  - Assess patient's need for assistive devices and provide as appropriate  - Encourage maximum independence but intervene and supervise when necessary  - Involve family in performance of ADLs  - Assess for home care needs following discharge   - Consider OT consult to assist with ADL evaluation and planning for discharge  - Provide patient education as appropriate  Outcome: Progressing  Goal: Maintains/Returns to pre admission functional level  Description: INTERVENTIONS:  - Perform BMAT or MOVE assessment daily    - Set and communicate daily mobility goal to care team and patient/family/caregiver  - Collaborate with rehabilitation services on mobility goals if consulted  - Perform Range of Motion 3 times a day  - Reposition patient every 2 hours  - Dangle patient 3 times a day  - Stand patient 3 times a day  - Ambulate patient 3 times a day  - Out of bed to chair 3 times a day   - Out of bed for meals 3 times a day  - Out of bed for toileting  - Record patient progress and toleration of activity level   Outcome: Progressing     Problem: DISCHARGE PLANNING  Goal: Discharge to home or other facility with appropriate resources  Description: INTERVENTIONS:  - Identify barriers to discharge w/patient and caregiver  - Arrange for needed discharge resources and transportation as appropriate  - Identify discharge learning needs (meds, wound care, etc )  - Arrange for interpretive services to assist at discharge as needed  - Refer to Case Management Department for coordinating discharge planning if the patient needs post-hospital services based on physician/advanced practitioner order or complex needs related to functional status, cognitive ability, or social support system  Outcome: Progressing     Problem: Knowledge Deficit  Goal: Patient/family/caregiver demonstrates understanding of disease process, treatment plan, medications, and discharge instructions  Description: Complete learning assessment and assess knowledge base    Interventions:  - Provide teaching at level of understanding  - Provide teaching via preferred learning methods  Outcome: Progressing     Problem: CARDIOVASCULAR - ADULT  Goal: Maintains optimal cardiac output and hemodynamic stability  Description: INTERVENTIONS:  - Monitor I/O, vital signs and rhythm  - Monitor for S/S and trends of decreased cardiac output  - Administer and titrate ordered vasoactive medications to optimize hemodynamic stability  - Assess quality of pulses, skin color and temperature  - Assess for signs of decreased coronary artery perfusion  - Instruct patient to report change in severity of symptoms  Outcome: Progressing  Goal: Absence of cardiac dysrhythmias or at baseline rhythm  Description: INTERVENTIONS:  - Continuous cardiac monitoring, vital signs, obtain 12 lead EKG if ordered  - Administer antiarrhythmic and heart rate control medications as ordered  - Monitor electrolytes and administer replacement therapy as ordered  Outcome: Progressing     Problem: METABOLIC, FLUID AND ELECTROLYTES - ADULT  Goal: Electrolytes maintained within normal limits  Description: INTERVENTIONS:  - Monitor labs and assess patient for signs and symptoms of electrolyte imbalances  - Administer electrolyte replacement as ordered  - Monitor response to electrolyte replacements, including repeat lab results as appropriate  - Instruct patient on fluid and nutrition as appropriate  Outcome: Progressing  Goal: Fluid balance maintained  Description: INTERVENTIONS:  - Monitor labs   - Monitor I/O and WT  - Instruct patient on fluid and nutrition as appropriate  - Assess for signs & symptoms of volume excess or deficit  Outcome: Progressing  Goal: Glucose maintained within target range  Description: INTERVENTIONS:  - Monitor Blood Glucose as ordered  - Assess for signs and symptoms of hyperglycemia and hypoglycemia  - Administer ordered medications to maintain glucose within target range  - Assess nutritional intake and initiate nutrition service referral as needed  Outcome: Progressing

## 2022-08-13 NOTE — PROGRESS NOTES
Cardiology         Progress Note - Cardiology   Destiny Ano 62 y o  male MRN: [id number]  Unit/Bed#: E4 -01 Encounter: [id number]          Assessment/Recommendations/Discussion:     1  Orthopnea, suspect secondary to elevated intracardiac pressures and recurrent AF   2  AF, slow VR   3  KIAH thrombus, resolved on f/u TI 8/1/22       · Heart rate much improved after metoprolol lowered and digoxin discontinued  Continue metoprolol 50 mg b i d   If rate starts to increase, can increase back up to 100 mg b i d  Remain off digoxin for now  · Continue furosemide but will increase dose to 40 mg IV b i d  As he had recurrent orthopnea last night  Monitor urine output, renal function, electrolytes, clinical progress  · Plan for DC cardioversion Monday        Subjective:  Patient seen and examined, feels better today  Did complain orthopnea last night                  Physical Exam:  GEN:  NAD  HEENT:  MMM, NCAT, pink conjunctiva, EOMI, nonicteric sclera  CV:  NO JVD/HJR, irregularly irregular, NO M/R/G, +S1/S2, NO PARASTERNAL HEAVE/THRILL, NO LE EDEMA, NO HEPATIC SYSTOLIC PULSATION, WARM EXTREMITIES  RESP:  CTAB/L  ABD:  SOFT, NT, NO GROSS ORGANOMEGALY        Vitals:   /65 (BP Location: Left arm)   Pulse 59   Temp (!) 97 °F (36 1 °C) (Temporal)   Resp 18   Ht 6' 1" (1 854 m)   Wt 123 kg (271 lb 13 2 oz)   SpO2 98%   BMI 35 86 kg/m²   Vitals:    08/12/22 1730 08/13/22 0600   Weight: 125 kg (274 lb 9 3 oz) 123 kg (271 lb 13 2 oz)     No intake or output data in the 24 hours ending 08/13/22 1120    TELEMETRY:  Atrial fibrillation, normal rates  Lab Results:  Results from last 7 days   Lab Units 08/13/22  0455   WBC Thousand/uL 7 38   HEMOGLOBIN g/dL 15 6   HEMATOCRIT % 46 2   PLATELETS Thousands/uL 195     Results from last 7 days   Lab Units 08/13/22  0455   POTASSIUM mmol/L 3 8   CHLORIDE mmol/L 104   CO2 mmol/L 26   BUN mg/dL 21   CREATININE mg/dL 1 10   CALCIUM mg/dL 8 4   ALK PHOS U/L 68   ALT U/L 41   AST U/L 26     Results from last 7 days   Lab Units 08/13/22  0455   POTASSIUM mmol/L 3 8   CHLORIDE mmol/L 104   CO2 mmol/L 26   BUN mg/dL 21   CREATININE mg/dL 1 10   CALCIUM mg/dL 8 4           Medications:    Current Facility-Administered Medications:     albuterol (PROVENTIL HFA,VENTOLIN HFA) inhaler 2 puff, 2 puff, Inhalation, Q6H PRN, Davina Cuadra MD    amiodarone tablet 400 mg, 400 mg, Oral, BID With Meals, Imani Myers DO, 400 mg at 08/13/22 8890    atorvastatin (LIPITOR) tablet 40 mg, 40 mg, Oral, Daily With Dinner, Davina Cuadra MD, 40 mg at 08/12/22 1646    etomidate (AMIDATE) 2 mg/mL injection 11 9 mg, 11 9 mg, Intravenous, Once, Gilberto Cade MD    furosemide (LASIX) injection 20 mg, 20 mg, Intravenous, BID (diuretic), Imani Myers DO    metoprolol tartrate (LOPRESSOR) tablet 50 mg, 50 mg, Oral, Q12H [facility], Imani Myers,     pantoprazole (PROTONIX) EC tablet 40 mg, 40 mg, Oral, BID AC, Davina Cuadra MD, 40 mg at 08/13/22 0511    potassium chloride (K-DUR,KLOR-CON) CR tablet 20 mEq, 20 mEq, Oral, Daily, Davina Cuadra MD, 20 mEq at 08/13/22 4828    rivaroxaban (XARELTO) tablet 20 mg, 20 mg, Oral, Daily With Breakfast, Davina Cuadra MD, 20 mg at 08/13/22 0239    Insert peripheral IV, , , Once **AND** sodium chloride (PF) 0 9 % injection 3 mL, 3 mL, Intravenous, Q1H PRN, Gilberto Cade MD    This note was completed in part utilizing M-Modal Fluency Direct Software  Grammatical errors, random word insertions, spelling mistakes, and incomplete sentences may be an occasional consequence of this system secondary to software limitations, ambient noise, and hardware issues  If you have any questions or concerns about the content, text, or information contained within the body of this dictation, please contact the provider for clarification

## 2022-08-13 NOTE — QUICK NOTE
Pt called RN c/o diaphoresis  Pt seen/examined  He states he was watching television and sitting upright, and suddenly felt a cold sweat  He felt some blurry vision, numbness and tingling in both of his hands, feet, and a discomfort radiating from the back of his neck into his head  He had mild chest discomfort along with this  The symptoms lasted for several minutes  He called the nurse who per related ECG, revealing left bundle branch block, normal rates  Reviewed telemetry which does reveal intermittent tachycardia  It is possible his intermittent symptoms may be related to RVR  Will increase metoprolol to 75 mg p o  B i d  Overall heart rate trend appears to be normal with rates between 70-90 beats per minute  Patient chest pain-free at this time, stating that the numbness and tingling is both of his hands is slowly resolving

## 2022-08-13 NOTE — ASSESSMENT & PLAN NOTE
Wt Readings from Last 3 Encounters:   08/13/22 123 kg (271 lb 13 2 oz)   08/01/22 128 kg (282 lb)   07/22/22 128 kg (282 lb)     · TTE 08/01/2022 revealed ejection fraction 25%  · Cardiology input appreciated  · Continue with furosemide 20 mg IV b i d   · I&Os, daily weight, fluid restriction

## 2022-08-13 NOTE — UTILIZATION REVIEW
Initial Clinical Review    Admission: Date/Time/Statement:   Admission Orders (From admission, onward)     Ordered        08/12/22 1235  INPATIENT ADMISSION  Once                      Orders Placed This Encounter   Procedures    INPATIENT ADMISSION     Standing Status:   Standing     Number of Occurrences:   1     Order Specific Question:   Level of Care     Answer:   Med Surg [16]     Order Specific Question:   Estimated length of stay     Answer:   More than 2 Midnights     Order Specific Question:   Certification     Answer:   I certify that inpatient services are medically necessary for this patient for a duration of greater than two midnights  See H&P and MD Progress Notes for additional information about the patient's course of treatment  ED Arrival Information     Expected   -    Arrival   8/12/2022 06:41    Acuity   Emergent            Means of arrival   Walk-In    Escorted by   Spouse    Service   Hospitalist    Admission type   Emergency            Arrival complaint   Chest Pain, Afib           Chief Complaint   Patient presents with    Chest Pain     CP, SOB and dizziness since last night, recent cardiac ablation        Initial Presentation: 62 y o  male  Presents to ED  From home with chest pain, dizziness and dyspnea  Since the morning of admission  Recently  Cardioverted for a fib  On   8/1/22, discharged on amiodarone  Symptoms worse lying down  Additional PMH is  Atrial thrombus,  CHF   Admit  Ip with  PAF, Acute/chronic  CHF and plan is  Cardiology consult, fluid restrict,  IV  Lasix, monitor labs,  Daily weight and continue xarelto  Cardiology consult  ( 8/12)     Suspect orthopnea due to   Elevated intracardiac pressures and recurrent Afib  Plan cardioversion  Mon   8/15  Continue IV lasix, amiodarone,  Xarelto  Can d/c  Dig due to slow  VR  Date:     8/13      Day 2:   Plan cardioversion  Mon  8/15  Remains on  IV  lasix  BID    Continue fluid restrict, daily weight and I & O   No further chest pain  Continue current [facility]        ED Triage Vitals   Temperature Pulse Respirations Blood Pressure SpO2   08/12/22 0649 08/12/22 0651 08/12/22 0649 08/12/22 0649 08/12/22 0649   97 9 °F (36 6 °C) (!) 52 20 113/64 93 %      Temp Source Heart Rate Source Patient Position - Orthostatic VS BP Location FiO2 (%)   08/12/22 0649 08/12/22 0841 08/12/22 0841 08/12/22 0841 --   Oral Monitor Lying Right arm       Pain Score       08/12/22 0841       3          Wt Readings from Last 1 Encounters:   08/13/22 123 kg (271 lb 13 2 oz)     Additional Vital Signs:   /13/22 0638 97 °F (36 1 °C) Abnormal  59 18 108/65 -- 98 % None (Room air) Lying   08/13/22 0300 -- 58 18 118/78 -- -- -- Lying   08/12/22 2354 97 2 °F (36 2 °C) Abnormal  57 18 125/66 83 95 % None (Room air) Lying   08/12/22 1906 97 6 °F (36 4 °C) 57 18 112/63 74 94 % None (Room air) Sitting   08/12/22 1541 97 4 °F (36 3 °C) Abnormal  56 18 102/66 72 94 % -- Sitting   08/12/22 1319 97 1 °F (36 2 °C) Abnormal  59 16 122/77 86 96 % None (Room air) Lying   08/12/22 1300 -- 58 16 111/67 -- 97 % None (Room air) Lying   08/12/22 1141 -- 61 -- 103/64 -- 93 % None (Room air) Lying   08/12/22 1139 -- 56 12 -- -- 93 % -- --   08/12/22 1136 -- 56 20 -- -- 94 % -- --   08/12/22 1133 -- 62 16 -- -- 92 % -- --   08/12/22 1130 -- 58 19 -- -- 92 % -- --   08/12/22 1127 -- 58 17 -- -- 92 % -- --   08/12/22 1124 -- 58 12 -- -- 92 % -- --   08/12/22 1121 -- 58 11 Abnormal  -- -- 93 % -- --   08/12/22 1118 -- 62 19 -- -- 95 % -- --   08/12/22 1115 -- 58 12 -- -- 95 % -- --   08/12/22 11:13:31 -- -- -- -- -- -- None (Room air) --   08/12/22 11:13:05 -- -- -- 110/68 84 -- -- --   08/12/22 0841 -- 67 18 99/61 -- 94 % None (Room air) Lying   08/12/22 0651 -- 52 Abnormal  -- -- -- -- -- --   08/12/22 0649 97 9 °F (36 6 °C) -- 20 113/64 -- 93 % --        Pertinent Labs/Diagnostic Test Results:   EKG      Bradycardic      HR  58   Atrial fibrillation  X-ray chest 1 view portable   Final Result by Magi Stevenson MD (60/26 4577)      No acute cardiopulmonary disease                    Workstation performed: [id number]               Results from last 7 days   Lab Units 08/13/22  0455 08/12/22  0708   WBC Thousand/uL 7 38 10 63*   HEMOGLOBIN g/dL 15 6 16 0   HEMATOCRIT % 46 2 46 7   PLATELETS Thousands/uL 195 222   NEUTROS ABS Thousands/µL 4 88 7 32         Results from last 7 days   Lab Units 08/13/22  0455 08/12/22  0708   SODIUM mmol/L 139 140   POTASSIUM mmol/L 3 8 4 4   CHLORIDE mmol/L 104 105   CO2 mmol/L 26 24   ANION GAP mmol/L 9 11   BUN mg/dL 21 22   CREATININE mg/dL 1 10 1 15   EGFR ml/min/1 73sq m 72 68   CALCIUM mg/dL 8 4 8 7     Results from last 7 days   Lab Units 08/13/22  0455   AST U/L 26   ALT U/L 41   ALK PHOS U/L 68   TOTAL PROTEIN g/dL 6 6   ALBUMIN g/dL 3 2*   TOTAL BILIRUBIN mg/dL 1 38*         Results from last 7 days   Lab Units 08/13/22  0455 08/12/22  0708   GLUCOSE RANDOM mg/dL 100 113           Results from last 7 days   Lab Units 08/12/22  1108 08/12/22  0909 08/12/22  0708   HS TNI 0HR ng/L  --   --  7   HS TNI 2HR ng/L  --  5  --    HSTNI D2 ng/L  --  -2  --    HS TNI 4HR ng/L 5  --   --    HSTNI D4 ng/L -2  --   --                Results from last 7 days   Lab Units 08/12/22  0708   DIGOXIN LVL ng/mL 1 4             ED Treatment:   Medication Administration from 08/12/2022 0641 to 08/12/2022 1417       Date/Time Order Dose Route Action Comments     08/12/2022 1116 etomidate (AMIDATE) 2 mg/mL injection 11 9 mg 11 9 mg Intravenous Not Given      08/12/2022 1112 fentanyl citrate (PF) 100 MCG/2ML 50 mcg 50 mcg Intravenous Given           Present on Admission:   Atrial thrombus   Acute on chronic systolic congestive heart failure (HCC)   PAF [facility]paroxysmal atrial fibrillation) ([facility])      Admitting Diagnosis: PAF (paroxysmal atrial fibrillation) ([address] ) [I48 0]  Chest pain [R07 9]  Age/Sex: 62 y o  male  Admission Orders:  Scheduled Medications:  amiodarone, 400 mg, Oral, BID With Meals  atorvastatin, 40 mg, Oral, Daily With Dinner  etomidate, 11 9 mg, Intravenous, Once  furosemide, 20 mg, Intravenous, BID (diuretic)  metoprolol tartrate, 50 mg, Oral, Q12H [facility]  pantoprazole, 40 mg, Oral, BID AC  potassium chloride, 20 mEq, Oral, Daily  rivaroxaban, 20 mg, Oral, Daily With Breakfast      Continuous IV Infusions:     PRN Meds:  albuterol, 2 puff, Inhalation, Q6H PRN  sodium chloride (PF), 3 mL, Intravenous, Q1H PRN        IP CONSULT TO CARDIOLOGY    Network Utilization Review Department  ATTENTION: Please call with any questions or concerns to [phone] and carefully listen to the prompts so that you are directed to the right person  All voicemails are confidential   [place] Doing all requests for admission clinical reviews, approved or denied determinations and any other requests to dedicated fax number below belonging to the campus where the patient is receiving treatment   List of dedicated fax numbers for the Facilities:  [address] DENIALS (Administrative/Medical Necessity) [phone]   [address] (Maternity/NICU/Pediatrics) [phone]   [address]  [address] Medical [place] Marty [place] [address] [address] [place] [address] [place] [phone] [facility] [phone]

## 2022-08-13 NOTE — ASSESSMENT & PLAN NOTE
This is a 58-year-old male with history of atrial fibrillation on Xarelto presented with chest pain, dyspnea and dizziness that began this morning  Patient was recently cardioverted on 08/01/2022 for atrial fibrillation and sent home on amiodarone 400 mg b i d  Milka Dewey     · Cardiology consultation appreciated  · Continue amiodarone 400 mg b i d   · Decrease metoprolol to 50 mg b i d   · Discontinue digoxin secondary to atrial fibrillation with slow ventricular rate  · Continue Xarelto for anticoagulation  · Plan for cardioversion on Monday once heart rate improves

## 2022-08-13 NOTE — PROGRESS NOTES
[address] [facility]  Progress Note - Carolina Marceloi 1960, 62 y o  male MRN: [id number]  Unit/Bed#: E4 -01 Encounter: [id number]  Primary Care Provider: Jimi Ivey DO   Date and time admitted to hospital: 8/12/2022  6:44 AM    * PAF (paroxysmal atrial fibrillation) [facility])  Assessment & Plan  This is a 55-year-old male with history of atrial fibrillation on Xarelto presented with chest pain, dyspnea and dizziness that began this morning  Patient was recently cardioverted on 08/01/2022 for atrial fibrillation and sent home on amiodarone 400 mg b i d  [place] · Cardiology consultation appreciated  · Continue amiodarone 400 mg b i d   · Decrease metoprolol to 50 mg b i d   · Discontinue digoxin secondary to atrial fibrillation with slow ventricular rate  · Continue Xarelto for anticoagulation  · Plan for cardioversion on Monday once heart rate improves    Acute on chronic systolic congestive heart failure (HCC)  Assessment & Plan  Wt Readings from Last 3 Encounters:   08/13/22 123 kg (271 lb 13 2 oz)   08/01/22 128 kg (282 lb)   07/22/22 128 kg (282 lb)     · TTE 08/01/2022 revealed ejection fraction 25%  · Cardiology input appreciated  · Continue with furosemide 20 mg IV b i d   · I&Os, daily weight, fluid restriction    Atrial thrombus  Assessment & Plan  · History of left atrial appendage seen on TI on 04/26/2022,  · TI 8/1/22 no thrombus seen        VTE Pharmacologic Prophylaxis:   Pharmacologic:  Xarelto    Patient Centered Rounds: I have performed bedside rounds with nursing staff today  Education and Discussions with Family / Patient:  Patient    Time Spent for Care: 20 minutes  More than 50% of total time spent on counseling and coordination of care as described above      Current Length of Stay: 1 day(s)    Current Patient Status: Inpatient   Certification Statement: The patient will continue to require additional inpatient hospital stay due to AFib with RVR, plan for cardioversion    Discharge Plan / Estimated Discharge Date: TBD    Code Status: Level 1 - Full Code      Subjective:   Patient seen and examined at bedside, denies any chest pain, disease    Objective:     Vitals:   Temp (24hrs), Av 3 °F (36 3 °C), Min:97 °F (36 1 °C), Max:97 6 °F (36 4 °C)    Temp:  [97 °F (36 1 °C)-97 6 °F (36 4 °C)] 97 °F (36 1 °C)  HR:  [56-62] 59  Resp:  [11-20] 18  BP: (102-125)/(63-78) 108/65  SpO2:  [92 %-98 %] 98 %  Body mass index is 35 86 kg/m²  Input and Output Summary (last 24 hours):     No intake or output data in the 24 hours ending 22 1018    Physical Exam:    Constitutional: Patient is oriented to person, place and time, no acute distress  HEENT:  Normocephalic, atraumatic  Cardiovascular: Normal S1S2, irregular, No murmurs/rubs/gallops appreciated  Pulmonary:  Bilateral air entry, No rhonchi/rales/wheezing appreciated  Abdominal: Soft, Bowel sounds present, Non-tender, Non-distended  Extremities:  No cyanosis, clubbing or edema  Neurological: Cranial nerves II-XII grossly intact, sensation intact, otherwise no focal neurological symptoms  Skin:  Warm, dry    Additional Data:     Labs:    Results from last 7 days   Lab Units 22  0455   WBC Thousand/uL 7 38   HEMOGLOBIN g/dL 15 6   HEMATOCRIT % 46 2   PLATELETS Thousands/uL 195   NEUTROS PCT % 67   LYMPHS PCT % 19   MONOS PCT % 9   EOS PCT % 5     Results from last 7 days   Lab Units 22  0455   POTASSIUM mmol/L 3 8   CHLORIDE mmol/L 104   CO2 mmol/L 26   BUN mg/dL 21   CREATININE mg/dL 1 10   CALCIUM mg/dL 8 4   ALK PHOS U/L 68   ALT U/L 41   AST U/L 26            I Have Reviewed All Lab Data Listed Above      Invasive Devices  Report    Peripheral Intravenous Line  Duration           Peripheral IV 22 Left Antecubital 1 day                     Recent Cultures (last 7 days):           Last 24 Hours Medication List:   Current Facility-Administered Medications   Medication Dose Route Frequency Provider Last Rate    albuterol  2 puff Inhalation Q6H PRN Tanner Coley MD      amiodarone  400 mg Oral BID With Meals Rushanna Fermin, DO      atorvastatin  40 mg Oral Daily With Neto Manjarrez MD      etomidate  11 9 mg Intravenous Once Annie Jewell MD      furosemide  20 mg Intravenous BID (diuretic) Rujul Myers, DO      metoprolol tartrate  50 mg Oral Q12H [address] Rujul Myers, DO      pantoprazole  40 mg Oral BID AC Tanner Coley MD      potassium chloride  20 mEq Oral Daily Tanner Coley MD      rivaroxaban  20 mg Oral Daily With Breakfast Tanner Coley MD      sodium chloride (PF)  3 mL Intravenous Q1H PRN Annie Jewell MD          Today, Patient Was Seen By: Tanner Coley MD

## 2022-08-14 LAB
ATRIAL RATE: 75 BPM
QRS AXIS: -56 DEGREES
QRSD INTERVAL: 156 MS
QT INTERVAL: 472 MS
QTC INTERVAL: 516 MS
T WAVE AXIS: 41 DEGREES
VENTRICULAR RATE: 72 BPM

## 2022-08-14 PROCEDURE — 93010 ELECTROCARDIOGRAM REPORT: CPT | Performed by: INTERNAL MEDICINE

## 2022-08-14 PROCEDURE — 93005 ELECTROCARDIOGRAM TRACING: CPT

## 2022-08-14 PROCEDURE — 99232 SBSQ HOSP IP/OBS MODERATE 35: CPT | Performed by: INTERNAL MEDICINE

## 2022-08-14 RX ADMIN — ZOLPIDEM TARTRATE 2.5 MG: 5 TABLET, COATED ORAL at 21:01

## 2022-08-14 RX ADMIN — PANTOPRAZOLE SODIUM 40 MG: 40 TABLET, DELAYED RELEASE ORAL at 06:00

## 2022-08-14 RX ADMIN — AMIODARONE HYDROCHLORIDE 400 MG: 200 TABLET ORAL at 08:28

## 2022-08-14 RX ADMIN — PANTOPRAZOLE SODIUM 40 MG: 40 TABLET, DELAYED RELEASE ORAL at 17:03

## 2022-08-14 RX ADMIN — RIVAROXABAN 20 MG: 20 TABLET, FILM COATED ORAL at 08:28

## 2022-08-14 RX ADMIN — AMIODARONE HYDROCHLORIDE 400 MG: 200 TABLET ORAL at 17:03

## 2022-08-14 RX ADMIN — FUROSEMIDE 40 MG: 10 INJECTION, SOLUTION INTRAVENOUS at 08:28

## 2022-08-14 RX ADMIN — POTASSIUM CHLORIDE 20 MEQ: 1500 TABLET, EXTENDED RELEASE ORAL at 08:28

## 2022-08-14 RX ADMIN — METOPROLOL TARTRATE 75 MG: 50 TABLET, FILM COATED ORAL at 08:28

## 2022-08-14 RX ADMIN — ATORVASTATIN CALCIUM 40 MG: 40 TABLET, FILM COATED ORAL at 17:03

## 2022-08-14 NOTE — ASSESSMENT & PLAN NOTE
Wt Readings from Last 3 Encounters:   08/14/22 124 kg (272 lb 4 3 oz)   08/01/22 128 kg (282 lb)   07/22/22 128 kg (282 lb)     · TTE 08/01/2022 revealed ejection fraction 25%  · Cardiology input appreciated  · Patient now euvolemic  · Diuretics on hold for now  · I&Os, daily weight, fluid restriction

## 2022-08-14 NOTE — NURSING NOTE
Patient states he suddenly became diaphoretic and dizzy  No chest pain  No other symptoms  Vital signs stable  Telemetry unchanged  Dr Rivera Matson with [facility] and Dr Conor Harrell with Cardiology aware  EKG obtained at this time  Patient states symptoms resolved in less than 10 minutes  Will continue to monitor

## 2022-08-14 NOTE — PROGRESS NOTES
[address] [facility]  Progress Note - Nena Zhou 1960, 62 y o  male MRN: [id number]  Unit/Bed#: E4 -01 Encounter: [id number]  Primary Care Provider: Arslan Rivero DO   Date and time admitted to hospital: 8/12/2022  6:44 AM    * PAF (paroxysmal atrial fibrillation) [facility])  Assessment & Plan  This is a 59-year-old male with history of atrial fibrillation on Xarelto presented with chest pain, dyspnea and dizziness that began this morning  Patient was recently cardioverted on 08/01/2022 for atrial fibrillation and sent home on amiodarone 400 mg b i d  [place] · Cardiology consultation appreciated  · Continue amiodarone 400 mg b i d   · Decrease metoprolol to 50 mg b i d   · Discontinue digoxin secondary to atrial fibrillation with slow ventricular rate  · Continue Xarelto for anticoagulation  · Plan for TI and cardioversion on Monday     Acute on chronic systolic congestive heart failure (HCC)  Assessment & Plan  Wt Readings from Last 3 Encounters:   08/14/22 124 kg (272 lb 4 3 oz)   08/01/22 128 kg (282 lb)   07/22/22 128 kg (282 lb)     · TTE 08/01/2022 revealed ejection fraction 25%  · Cardiology input appreciated  · Patient now euvolemic  · Diuretics on hold for now  · I&Os, daily weight, fluid restriction    Atrial thrombus  Assessment & Plan  · History of left atrial appendage seen on TI on 04/26/2022,  · TI 8/1/22 no thrombus seen        VTE Pharmacologic Prophylaxis:   Pharmacologic: xarelto    Patient Centered Rounds: I have performed bedside rounds with nursing staff today  Education and Discussions with Family / Patient:  Patient    Time Spent for Care: 20 minutes  More than 50% of total time spent on counseling and coordination of care as described above      Current Length of Stay: 2 day(s)    Current Patient Status: Inpatient   Certification Statement: The patient will continue to require additional inpatient hospital stay due to Cardioversion    Discharge Plan / Estimated Discharge Date: TBD    Code Status: Level 1 - Full Code      Subjective:   Patient seen and examined at bedside, currently denies any chest pain, tolerating his diet, stated he slept okay    Objective:     Vitals:   Temp (24hrs), Av 6 °F (36 4 °C), Min:97 2 °F (36 2 °C), Max:97 8 °F (36 6 °C)    Temp:  [97 2 °F (36 2 °C)-97 8 °F (36 6 °C)] 97 2 °F (36 2 °C)  HR:  [43-67] 67  Resp:  [18] 18  BP: ()/(62-73) 107/68  SpO2:  [96 %-98 %] 98 %  Body mass index is 35 92 kg/m²  Input and Output Summary (last 24 hours):     No intake or output data in the 24 hours ending 22 1401    Physical Exam:    Constitutional: Patient is oriented to person, place and time, no acute distress  HEENT:  Normocephalic, atraumatic  Cardiovascular: Normal S1S2,  No murmurs/rubs/gallops appreciated  Pulmonary:  Bilateral air entry, No rhonchi/rales/wheezing appreciated  Abdominal: Soft, Bowel sounds present, Non-tender, Non-distended  Extremities:  No cyanosis, clubbing or edema  Neurological: Cranial nerves II-XII grossly intact, sensation intact, otherwise no focal neurological symptoms  Skin:  Warm, dry    Additional Data:     Labs:    Results from last 7 days   Lab Units 22  0455   WBC Thousand/uL 7 38   HEMOGLOBIN g/dL 15 6   HEMATOCRIT % 46 2   PLATELETS Thousands/uL 195   NEUTROS PCT % 67   LYMPHS PCT % 19   MONOS PCT % 9   EOS PCT % 5     Results from last 7 days   Lab Units 22  0455   POTASSIUM mmol/L 3 8   CHLORIDE mmol/L 104   CO2 mmol/L 26   BUN mg/dL 21   CREATININE mg/dL 1 10   CALCIUM mg/dL 8 4   ALK PHOS U/L 68   ALT U/L 41   AST U/L 26            I Have Reviewed All Lab Data Listed Above      Invasive Devices  Report    Peripheral Intravenous Line  Duration           Peripheral IV 22 Left Antecubital 2 days                     Recent Cultures (last 7 days):           Last 24 Hours Medication List:   Current Facility-Administered Medications   Medication Dose Route Frequency Provider Last Rate    albuterol  2 puff Inhalation Q6H PRN Courtney Carter MD      amiodarone  400 mg Oral BID With Meals Rujul Edison Leyva, DO      atorvastatin  40 mg Oral Daily With Hillary Parrish MD      etomidate  11 9 mg Intravenous Once Prabhu Sands MD      metoprolol tartrate  75 mg Oral Q12H [address] Rujul Myers, DO      pantoprazole  40 mg Oral BID AC Courtney Carter MD      potassium chloride  20 mEq Oral Daily Courtney Carter MD      rivaroxaban  20 mg Oral Daily With Breakfast Courtney Carter MD      sodium chloride (PF)  3 mL Intravenous Q1H PRN Prabhu Sands MD      zolpidem  2 5 mg Oral HS Tootie Gibbons PA-C          Today, Patient Was Seen By: Courtney Carter MD

## 2022-08-14 NOTE — ASSESSMENT & PLAN NOTE
This is a 61-year-old male with history of atrial fibrillation on Xarelto presented with chest pain, dyspnea and dizziness that began this morning  Patient was recently cardioverted on 08/01/2022 for atrial fibrillation and sent home on amiodarone 400 mg b i d  [place]     · Cardiology consultation appreciated  · Continue amiodarone 400 mg b i d   · Decrease metoprolol to 50 mg b i d   · Discontinue digoxin secondary to atrial fibrillation with slow ventricular rate  · Continue Xarelto for anticoagulation  · Plan for TI and cardioversion on Monday

## 2022-08-14 NOTE — PLAN OF CARE
Problem: Potential for Falls  Goal: Patient will remain free of falls  Description: INTERVENTIONS:  - Educate patient/family on patient safety including physical limitations  - Instruct patient to call for assistance with activity   - Consult OT/PT to assist with strengthening/mobility   - Keep Call bell within reach  - Keep bed low and locked with side rails adjusted as appropriate  - Keep care items and personal belongings within reach  - Initiate and maintain comfort rounds  - Make Fall Risk Sign visible to staff  - Offer Toileting every  Hours, in advance of need  - Initiate/Maintain alarm  - Obtain necessary fall risk management equipment:   - Apply yellow socks and bracelet for high fall risk patients  - Consider moving patient to room near nurses station  Outcome: Progressing     Problem: PAIN - ADULT  Goal: Verbalizes/displays adequate comfort level or baseline comfort level  Description: Interventions:  - Encourage patient to monitor pain and request assistance  - Assess pain using appropriate pain scale  - Administer analgesics based on type and severity of pain and evaluate response  - Implement non-pharmacological measures as appropriate and evaluate response  - Consider cultural and social influences on pain and pain management  - Notify physician/advanced practitioner if interventions unsuccessful or patient reports new pain  Outcome: Progressing     Problem: INFECTION - ADULT  Goal: Absence or prevention of progression during hospitalization  Description: INTERVENTIONS:  - Assess and monitor for signs and symptoms of infection  - Monitor lab/diagnostic results  - Monitor all insertion sites, i e  indwelling lines, tubes, and drains  - Monitor endotracheal if appropriate and nasal secretions for changes in amount and color  - [place] appropriate cooling/warming therapies per order  - Administer medications as ordered  - Instruct and encourage patient and family to use good hand hygiene technique  - Identify and instruct in appropriate isolation precautions for identified infection/condition  Outcome: Progressing  Goal: Absence of fever/infection during neutropenic period  Description: INTERVENTIONS:  - Monitor WBC    Outcome: Progressing     Problem: SAFETY ADULT  Goal: Patient will remain free of falls  Description: INTERVENTIONS:  - Educate patient/family on patient safety including physical limitations  - Instruct patient to call for assistance with activity   - Consult OT/PT to assist with strengthening/mobility   - Keep Call bell within reach  - Keep bed low and locked with side rails adjusted as appropriate  - Keep care items and personal belongings within reach  - Initiate and maintain comfort rounds  - Make Fall Risk Sign visible to staff  - Offer Toileting every  Hours, in advance of need  - Initiate/Maintain alarm  - Obtain necessary fall risk management equipment:   - Apply yellow socks and bracelet for high fall risk patients  - Consider moving patient to room near nurses station  Outcome: Progressing  Goal: Maintain or return to baseline ADL function  Description: INTERVENTIONS:  -  Assess patient's ability to carry out ADLs; assess patient's baseline for ADL function and identify physical deficits which impact ability to perform ADLs (bathing, care of mouth/teeth, toileting, grooming, dressing, etc )  - Assess/evaluate cause of self-care deficits   - Assess range of motion  - Assess patient's mobility; develop plan if impaired  - Assess patient's need for assistive devices and provide as appropriate  - Encourage maximum independence but intervene and supervise when necessary  - Involve family in performance of ADLs  - Assess for home care needs following discharge   - Consider OT consult to assist with ADL evaluation and planning for discharge  - Provide patient education as appropriate  Outcome: Progressing  Goal: Maintains/Returns to pre admission functional level  Description: INTERVENTIONS:  - Perform BMAT or MOVE assessment daily    - Set and communicate daily mobility goal to care team and patient/family/caregiver  - Collaborate with rehabilitation services on mobility goals if consulted  - Perform Range of Motion  times a day  - Reposition patient every  hours  - Dangle patient  times a day  - Stand patient  times a day  - Ambulate patient  times a day  - Out of bed to chair  times a day   - Out of bed for meals times a day  - Out of bed for toileting  - Record patient progress and toleration of activity level   Outcome: Progressing     Problem: DISCHARGE PLANNING  Goal: Discharge to home or other facility with appropriate resources  Description: INTERVENTIONS:  - Identify barriers to discharge w/patient and caregiver  - Arrange for needed discharge resources and transportation as appropriate  - Identify discharge learning needs (meds, wound care, etc )  - Arrange for interpretive services to assist at discharge as needed  - Refer to Case Management Department for coordinating discharge planning if the patient needs post-hospital services based on physician/advanced practitioner order or complex needs related to functional status, cognitive ability, or social support system  Outcome: Progressing     Problem: Knowledge Deficit  Goal: Patient/family/caregiver demonstrates understanding of disease process, treatment plan, medications, and discharge instructions  Description: Complete learning assessment and assess knowledge base    Interventions:  - Provide teaching at level of understanding  - Provide teaching via preferred learning methods  Outcome: Progressing     Problem: CARDIOVASCULAR - ADULT  Goal: Maintains optimal cardiac output and hemodynamic stability  Description: INTERVENTIONS:  - Monitor I/O, vital signs and rhythm  - Monitor for S/S and trends of decreased cardiac output  - Administer and titrate ordered vasoactive medications to optimize hemodynamic stability  - Assess quality of pulses, skin color and temperature  - Assess for signs of decreased coronary artery perfusion  - Instruct patient to report change in severity of symptoms  Outcome: Progressing  Goal: Absence of cardiac dysrhythmias or at baseline rhythm  Description: INTERVENTIONS:  - Continuous cardiac monitoring, vital signs, obtain 12 lead EKG if ordered  - Administer antiarrhythmic and heart rate control medications as ordered  - Monitor electrolytes and administer replacement therapy as ordered  Outcome: Progressing     Problem: METABOLIC, FLUID AND ELECTROLYTES - ADULT  Goal: Electrolytes maintained within normal limits  Description: INTERVENTIONS:  - Monitor labs and assess patient for signs and symptoms of electrolyte imbalances  - Administer electrolyte replacement as ordered  - Monitor response to electrolyte replacements, including repeat lab results as appropriate  - Instruct patient on fluid and nutrition as appropriate  Outcome: Progressing  Goal: Fluid balance maintained  Description: INTERVENTIONS:  - Monitor labs   - Monitor I/O and WT  - Instruct patient on fluid and nutrition as appropriate  - Assess for signs & symptoms of volume excess or deficit  Outcome: Progressing  Goal: Glucose maintained within target range  Description: INTERVENTIONS:  - Monitor Blood Glucose as ordered  - Assess for signs and symptoms of hyperglycemia and hypoglycemia  - Administer ordered medications to maintain glucose within target range  - Assess nutritional intake and initiate nutrition service referral as needed  Outcome: Progressing

## 2022-08-14 NOTE — PROGRESS NOTES
Cardiology         Progress Note - Cardiology   Alexandra Ayers 62 y o  male MRN: [id number]  Unit/Bed#: E4 -01 Encounter: [id number]          Assessment/Recommendations/Discussion:     1  Orthopnea, suspect secondary to elevated intracardiac pressures and recurrent AF   2  AF, slow VR   3  KIAH thrombus, resolved on f/u TI 8/1/22     · Will plan on TI/CV tomorrow  · Continue Xarelto, metoprolol, amiodarone  Tele ok  · Hold IV lasix for now  Seems euvolemic  BMP/Mg in am          Subjective: Pt seen/examined    Felt an episode of diaphoresis today, tele ok                Physical Exam:  GEN:  NAD  HEENT:  MMM, NCAT, pink conjunctiva, EOMI, nonicteric sclera  CV:  NO JVD/HJR, RR, NO M/R/G, +S1/S2, NO PARASTERNAL HEAVE/THRILL, NO LE EDEMA, NO HEPATIC SYSTOLIC PULSATION, WARM EXTREMITIES  RESP:  CTAB/L  ABD:  SOFT, NT, NO GROSS ORGANOMEGALY        Vitals:   /68 (BP Location: Right arm)   Pulse 67 Comment: pt  currently in A-fib  Temp (!) 97 2 °F (36 2 °C) (Temporal)   Resp 18   Ht 6' 1" (1 854 m)   Wt 124 kg (272 lb 4 3 oz)   SpO2 98%   BMI 35 92 kg/m²   Vitals:    08/13/22 0600 08/14/22 0600   Weight: 123 kg (271 lb 13 2 oz) 124 kg (272 lb 4 3 oz)     No intake or output data in the 24 hours ending 08/14/22 1333    TELEMETRY: AF  Lab Results:  Results from last 7 days   Lab Units 08/13/22  0455   WBC Thousand/uL 7 38   HEMOGLOBIN g/dL 15 6   HEMATOCRIT % 46 2   PLATELETS Thousands/uL 195     Results from last 7 days   Lab Units 08/13/22  0455   POTASSIUM mmol/L 3 8   CHLORIDE mmol/L 104   CO2 mmol/L 26   BUN mg/dL 21   CREATININE mg/dL 1 10   CALCIUM mg/dL 8 4   ALK PHOS U/L 68   ALT U/L 41   AST U/L 26     Results from last 7 days   Lab Units 08/13/22  0455   POTASSIUM mmol/L 3 8   CHLORIDE mmol/L 104   CO2 mmol/L 26   BUN mg/dL 21   CREATININE mg/dL 1 10   CALCIUM mg/dL 8 4           Medications:    Current Facility-Administered Medications:     albuterol (PROVENTIL HFA,VENTOLIN HFA) inhaler 2 puff, 2 puff, Inhalation, Q6H PRN, Marixa Martinez MD    amiodarone tablet 400 mg, 400 mg, Oral, BID With Meals, Rujul Myers, DO, 400 mg at 08/14/22 4720    atorvastatin (LIPITOR) tablet 40 mg, 40 mg, Oral, Daily With Lena Ellsworth MD, 40 mg at 08/13/22 1720    etomidate (AMIDATE) 2 mg/mL injection 11 9 mg, 11 9 mg, Intravenous, Once, Eric Amaya MD    furosemide (LASIX) injection 40 mg, 40 mg, Intravenous, BID (diuretic), Rujul Myers, DO, 40 mg at 08/14/22 5060    metoprolol tartrate (LOPRESSOR) tablet 75 mg, 75 mg, Oral, Q12H [address], Rujul Myers, DO, 75 mg at 08/14/22 0828    pantoprazole (PROTONIX) EC tablet 40 mg, 40 mg, Oral, BID AC, Marixa Martinez MD, 40 mg at 08/14/22 0600    potassium chloride (K-DUR,KLOR-CON) CR tablet 20 mEq, 20 mEq, Oral, Daily, Marixa Martinez MD, 20 mEq at 08/14/22 6186    rivaroxaban (XARELTO) tablet 20 mg, 20 mg, Oral, Daily With Breakfast, Marixa Martinez MD, 20 mg at 08/14/22 0828    Insert peripheral IV, , , Once **AND** sodium chloride (PF) 0 9 % injection 3 mL, 3 mL, Intravenous, Q1H PRN, Eric Amaya MD    zolpidem [facility]) tablet 2 5 mg, 2 5 mg, Oral, HS, Dolores Srinivasan PA-C, 2 5 mg at 08/13/22 6845    This note was completed in part utilizing M-Modal Fluency Direct Software  Grammatical errors, random word insertions, spelling mistakes, and incomplete sentences may be an occasional consequence of this system secondary to software limitations, ambient noise, and hardware issues  If you have any questions or concerns about the content, text, or information contained within the body of this dictation, please contact the provider for clarification

## 2022-08-15 ENCOUNTER — ANESTHESIA (INPATIENT)
Dept: NON INVASIVE DIAGNOSTICS | Facility: HOSPITAL | Age: 62
DRG: 308 | End: 2022-08-15
Payer: COMMERCIAL

## 2022-08-15 ENCOUNTER — APPOINTMENT (INPATIENT)
Dept: NON INVASIVE DIAGNOSTICS | Facility: HOSPITAL | Age: 62
DRG: 308 | End: 2022-08-15
Attending: INTERNAL MEDICINE
Payer: COMMERCIAL

## 2022-08-15 PROBLEM — I50.23 ACUTE ON CHRONIC SYSTOLIC CONGESTIVE HEART FAILURE (HCC): Status: RESOLVED | Noted: 2022-08-01 | Resolved: 2022-08-15

## 2022-08-15 LAB
ANION GAP SERPL CALCULATED.3IONS-SCNC: 9 MMOL/L (ref 4–13)
BASOPHILS # BLD AUTO: 0.05 THOUSANDS/ΜL (ref 0–0.1)
BASOPHILS NFR BLD AUTO: 1 % (ref 0–1)
BUN SERPL-MCNC: 15 MG/DL (ref 5–25)
CALCIUM SERPL-MCNC: 8.5 MG/DL (ref 8.3–10.1)
CHLORIDE SERPL-SCNC: 103 MMOL/L (ref 96–108)
CO2 SERPL-SCNC: 26 MMOL/L (ref 21–32)
CREAT SERPL-MCNC: 1.21 MG/DL (ref 0.6–1.3)
EOSINOPHIL # BLD AUTO: 0.24 THOUSAND/ΜL (ref 0–0.61)
EOSINOPHIL NFR BLD AUTO: 3 % (ref 0–6)
ERYTHROCYTE [DISTWIDTH] IN BLOOD BY AUTOMATED COUNT: 13.1 % (ref 11.6–15.1)
GFR SERPL CREATININE-BSD FRML MDRD: 64 ML/MIN/1.73SQ M
GLUCOSE SERPL-MCNC: 126 MG/DL (ref 65–140)
GLUCOSE SERPL-MCNC: 72 MG/DL (ref 65–140)
GLUCOSE SERPL-MCNC: 95 MG/DL (ref 65–140)
HCT VFR BLD AUTO: 46.4 % (ref 36.5–49.3)
HGB BLD-MCNC: 15.9 G/DL (ref 12–17)
IMM GRANULOCYTES # BLD AUTO: 0.03 THOUSAND/UL (ref 0–0.2)
IMM GRANULOCYTES NFR BLD AUTO: 0 % (ref 0–2)
LYMPHOCYTES # BLD AUTO: 1.59 THOUSANDS/ΜL (ref 0.6–4.47)
LYMPHOCYTES NFR BLD AUTO: 20 % (ref 14–44)
MAGNESIUM SERPL-MCNC: 2.2 MG/DL (ref 1.6–2.6)
MCH RBC QN AUTO: 32.1 PG (ref 26.8–34.3)
MCHC RBC AUTO-ENTMCNC: 34.3 G/DL (ref 31.4–37.4)
MCV RBC AUTO: 94 FL (ref 82–98)
MONOCYTES # BLD AUTO: 0.73 THOUSAND/ΜL (ref 0.17–1.22)
MONOCYTES NFR BLD AUTO: 9 % (ref 4–12)
NEUTROPHILS # BLD AUTO: 5.4 THOUSANDS/ΜL (ref 1.85–7.62)
NEUTS SEG NFR BLD AUTO: 67 % (ref 43–75)
NRBC BLD AUTO-RTO: 0 /100 WBCS
NT-PROBNP SERPL-MCNC: 927 PG/ML
PLATELET # BLD AUTO: 189 THOUSANDS/UL (ref 149–390)
PMV BLD AUTO: 10.8 FL (ref 8.9–12.7)
POTASSIUM SERPL-SCNC: 3.8 MMOL/L (ref 3.5–5.3)
RBC # BLD AUTO: 4.95 MILLION/UL (ref 3.88–5.62)
SL CV LV EF: 25
SODIUM SERPL-SCNC: 138 MMOL/L (ref 135–147)
WBC # BLD AUTO: 8.04 THOUSAND/UL (ref 4.31–10.16)

## 2022-08-15 PROCEDURE — 93005 ELECTROCARDIOGRAM TRACING: CPT

## 2022-08-15 PROCEDURE — 80048 BASIC METABOLIC PNL TOTAL CA: CPT | Performed by: INTERNAL MEDICINE

## 2022-08-15 PROCEDURE — 99232 SBSQ HOSP IP/OBS MODERATE 35: CPT

## 2022-08-15 PROCEDURE — 82948 REAGENT STRIP/BLOOD GLUCOSE: CPT

## 2022-08-15 PROCEDURE — 85025 COMPLETE CBC W/AUTO DIFF WBC: CPT | Performed by: INTERNAL MEDICINE

## 2022-08-15 PROCEDURE — 93312 ECHO TRANSESOPHAGEAL: CPT | Performed by: INTERNAL MEDICINE

## 2022-08-15 PROCEDURE — 83880 ASSAY OF NATRIURETIC PEPTIDE: CPT | Performed by: INTERNAL MEDICINE

## 2022-08-15 PROCEDURE — 83735 ASSAY OF MAGNESIUM: CPT | Performed by: INTERNAL MEDICINE

## 2022-08-15 PROCEDURE — 93312 ECHO TRANSESOPHAGEAL: CPT

## 2022-08-15 PROCEDURE — 92960 CARDIOVERSION ELECTRIC EXT: CPT | Performed by: INTERNAL MEDICINE

## 2022-08-15 PROCEDURE — 92960 CARDIOVERSION ELECTRIC EXT: CPT

## 2022-08-15 PROCEDURE — 93325 DOPPLER ECHO COLOR FLOW MAPG: CPT | Performed by: INTERNAL MEDICINE

## 2022-08-15 PROCEDURE — 93320 DOPPLER ECHO COMPLETE: CPT | Performed by: INTERNAL MEDICINE

## 2022-08-15 RX ORDER — PROPOFOL 10 MG/ML
INJECTION, EMULSION INTRAVENOUS AS NEEDED
Status: DISCONTINUED | OUTPATIENT
Start: 2022-08-15 | End: 2022-08-15

## 2022-08-15 RX ORDER — SODIUM CHLORIDE 9 MG/ML
INJECTION, SOLUTION INTRAVENOUS CONTINUOUS PRN
Status: DISCONTINUED | OUTPATIENT
Start: 2022-08-15 | End: 2022-08-15

## 2022-08-15 RX ADMIN — PROPOFOL 30 MG: 10 INJECTION, EMULSION INTRAVENOUS at 13:21

## 2022-08-15 RX ADMIN — PANTOPRAZOLE SODIUM 40 MG: 40 TABLET, DELAYED RELEASE ORAL at 16:45

## 2022-08-15 RX ADMIN — ZOLPIDEM TARTRATE 2.5 MG: 5 TABLET, COATED ORAL at 21:30

## 2022-08-15 RX ADMIN — METOPROLOL TARTRATE 75 MG: 50 TABLET, FILM COATED ORAL at 21:34

## 2022-08-15 RX ADMIN — AMIODARONE HYDROCHLORIDE 400 MG: 200 TABLET ORAL at 08:06

## 2022-08-15 RX ADMIN — PHENYLEPHRINE HYDROCHLORIDE 30 MCG/MIN: 10 INJECTION INTRAVENOUS at 13:18

## 2022-08-15 RX ADMIN — PROPOFOL 30 MG: 10 INJECTION, EMULSION INTRAVENOUS at 13:26

## 2022-08-15 RX ADMIN — RIVAROXABAN 20 MG: 20 TABLET, FILM COATED ORAL at 08:06

## 2022-08-15 RX ADMIN — PROPOFOL 50 MG: 10 INJECTION, EMULSION INTRAVENOUS at 13:19

## 2022-08-15 RX ADMIN — AMIODARONE HYDROCHLORIDE 400 MG: 200 TABLET ORAL at 16:44

## 2022-08-15 RX ADMIN — POTASSIUM CHLORIDE 20 MEQ: 1500 TABLET, EXTENDED RELEASE ORAL at 08:06

## 2022-08-15 RX ADMIN — PROPOFOL 30 MG: 10 INJECTION, EMULSION INTRAVENOUS at 13:23

## 2022-08-15 RX ADMIN — PROPOFOL 30 MG: 10 INJECTION, EMULSION INTRAVENOUS at 13:24

## 2022-08-15 RX ADMIN — ATORVASTATIN CALCIUM 40 MG: 40 TABLET, FILM COATED ORAL at 16:44

## 2022-08-15 RX ADMIN — PROPOFOL 30 MG: 10 INJECTION, EMULSION INTRAVENOUS at 13:20

## 2022-08-15 RX ADMIN — SODIUM CHLORIDE: 0.9 INJECTION, SOLUTION INTRAVENOUS at 13:01

## 2022-08-15 RX ADMIN — PANTOPRAZOLE SODIUM 40 MG: 40 TABLET, DELAYED RELEASE ORAL at 06:08

## 2022-08-15 NOTE — ASSESSMENT & PLAN NOTE
· History of left atrial appendage seen on TI on 04/26/2022,  · TI 8/1/22 no thrombus seen  · Repeat TI scheduled

## 2022-08-15 NOTE — ASSESSMENT & PLAN NOTE
This is a 70-year-old male with history of atrial fibrillation on Xarelto presented with chest pain, dyspnea and dizziness that began yesterday morning  Patient was recently cardioverted on 08/01/2022 for atrial fibrillation and sent home on amiodarone 400 mg b i d  [place]     · Cardiology following  · Continue amiodarone 400 mg b i d   · Continue metoprolol 75 mg b i d was increased from 50 mg b i d due to episode of dizziness, chest discomfort and numbness/ tingling in both hands  · Digoxin discontinued upon admission secondary to atrial fibrillation with slow ventricular rate  · Continue Xarelto for anticoagulation  · Plan for TI and cardioversion today

## 2022-08-15 NOTE — ANESTHESIA POSTPROCEDURE EVALUATION
Post-Op Assessment Note    CV Status:  Stable    Pain management: adequate     Mental Status:  Alert and awake   Hydration Status:  Euvolemic   PONV Controlled:  Controlled   Airway Patency:  Patent      Post Op Vitals Reviewed: Yes      Staff: Anesthesiologist         No complications documented      BP (!) 89/50 (08/15/22 1336)    Temp (!) 97 1 °F (36 2 °C) (08/15/22 1336)    Pulse 71 (08/15/22 1336)   Resp      SpO2 95 % (08/15/22 1342)

## 2022-08-15 NOTE — PROGRESS NOTES
[address] [place] [address]  Progress Note - Susan Romero 1960, 62 y o  male MRN: [id number]  Unit/Bed#: E4 -01 Encounter: [id number]  Primary Care Provider: Dolores Speaker, DO   Date and time admitted to hospital: 8/12/2022  6:44 AM    * PAF (paroxysmal atrial fibrillation) [facility])  Assessment & Plan  This is a 72-year-old male with history of atrial fibrillation on Xarelto presented with chest pain, dyspnea and dizziness that began yesterday morning  Patient was recently cardioverted on 08/01/2022 for atrial fibrillation and sent home on amiodarone 400 mg b i d  Isabel Jude · Cardiology following  · Continue amiodarone 400 mg b i d   · Continue metoprolol 75 mg b i d was increased from 50 mg b i d due to episode of dizziness, chest discomfort and numbness/ tingling in both hands  · Digoxin discontinued upon admission secondary to atrial fibrillation with slow ventricular rate  · Continue Xarelto for anticoagulation  · Plan for TI and cardioversion today     Acute on chronic systolic congestive heart failure (HCC)  Assessment & Plan  Wt Readings from Last 3 Encounters:   08/15/22 123 kg (270 lb 11 6 oz)   08/01/22 128 kg (282 lb)   07/22/22 128 kg (282 lb)     · TTE 08/01/2022 ejection fraction 25%  · Cardiology following  · Patient now euvolemic today  · Patient was on IV lasix 40 mg b i d discontinued 8/14 continue off diuretics   · I&Os, daily weight, fluid restriction    Atrial thrombus  Assessment & Plan  · History of left atrial appendage seen on TI on 04/26/2022,  · TI 8/1/22 no thrombus seen  · Repeat TI scheduled         VTE Pharmacologic Prophylaxis: VTE Score: 4 Moderate Risk (Score 3-4) - Pharmacological DVT Prophylaxis Ordered: rivaroxaban (Xarelto)  Patient Centered Rounds: I performed bedside rounds with nursing staff today  Discussions with Specialists or Other Care Team Provider:none    Education and Discussions with Family / Patient: Patient declined call to   Time Spent for Care: 30 minutes  More than 50% of total time spent on counseling and coordination of care as described above  Current Length of Stay: 3 day(s)  Current Patient Status: Inpatient   Certification Statement: The patient will continue to require additional inpatient hospital stay due to Atrial fibrilation requiring cardioversion  Discharge Plan: Anticipate discharge tomorrow to home  Code Status: Level 1 - Full Code    Subjective:   Patient was seen laying in bed this morning  Patient reports feeling well today  He denies any episodes of chest pain, dizziness, numbness/tingling over night  He denies any new complaints, sob and abdominal pain  He is aware of plan to get TI and cardioversion today  Objective:     Vitals:   Temp (24hrs), Av 3 °F (36 3 °C), Min:97 °F (36 1 °C), Max:97 8 °F (36 6 °C)    Temp:  [97 °F (36 1 °C)-97 8 °F (36 6 °C)] 97 °F (36 1 °C)  HR:  [64-88] 66  Resp:  [18] 18  BP: (102-109)/(59-79) 102/61  SpO2:  [96 %-98 %] 97 %  Body mass index is 35 72 kg/m²  Input and Output Summary (last 24 hours):   No intake or output data in the 24 hours ending 08/15/22 1048    Physical Exam:   Physical Exam  Constitutional:       General: He is not in acute distress  Appearance: Normal appearance  He is not diaphoretic  HENT:      Head: Normocephalic and atraumatic  Cardiovascular:      Rate and Rhythm: Normal rate  Rhythm irregular  Pulmonary:      Effort: Pulmonary effort is normal       Breath sounds: Normal breath sounds  No stridor  No wheezing, rhonchi or rales  Abdominal:      General: Abdomen is flat  Bowel sounds are normal       Palpations: Abdomen is soft  Tenderness: There is no abdominal tenderness  Musculoskeletal:      Right lower leg: No edema  Left lower leg: No edema  Skin:     General: Skin is warm and dry  Neurological:      General: No focal deficit present  Mental Status: He is alert  Mental status is at baseline  Psychiatric:         Mood and Affect: Mood normal          Behavior: Behavior normal        Additional Data:     Labs:  Results from last 7 days   Lab Units 08/15/22  0508   WBC Thousand/uL 8 04   HEMOGLOBIN g/dL 15 9   HEMATOCRIT % 46 4   PLATELETS Thousands/uL 189   NEUTROS PCT % 67   LYMPHS PCT % 20   MONOS PCT % 9   EOS PCT % 3     Results from last 7 days   Lab Units 08/15/22  0508 22  0455   SODIUM mmol/L 138 139   POTASSIUM mmol/L 3 8 3 8   CHLORIDE mmol/L 103 104   CO2 mmol/L 26 26   BUN mg/dL 15 21   CREATININE mg/dL 1 21 1 10   ANION GAP mmol/L 9 9   CALCIUM mg/dL 8 5 8 4   ALBUMIN g/dL  --  3 2*   TOTAL BILIRUBIN mg/dL  --  1 38*   ALK PHOS U/L  --  68   ALT U/L  --  41   AST U/L  --  26   GLUCOSE RANDOM mg/dL 95 100                       Lines/Drains:  Invasive Devices  Report    Peripheral Intravenous Line  Duration           Peripheral IV 22 Left Antecubital 3 days                  Telemetry:  Telemetry Orders (From admission, onward)             24 Hour Telemetry Monitoring  Continuous x 24 Hours (Telem)           References:    Telemetry Guidelines   Question:  Reason for 24 Hour Telemetry  Answer:  Patients waiting for PCI/EP Study/CABG                 Telemetry Reviewed: Atrial fibrillation    Indication for Continued Telemetry Use: Awaiting PCI/EP Study/CABG             Imaging: Reviewed radiology reports from this admission including: chest xray    Recent Cultures (last 7 days):         Last 24 Hours Medication List:   Current Facility-Administered Medications   Medication Dose Route Frequency Provider Last Rate    albuterol  2 puff Inhalation Q6H PRN Marixa Martinez MD      amiodarone  400 mg Oral BID With Meals Imani Myers, DO      atorvastatin  40 mg Oral Daily With Lena Ellsworth MD      etomidate  11 9 mg Intravenous Once Eric Amaya MD      metoprolol tartrate  75 mg Oral Q12H [address] Rujul Myers, DO      pantoprazole  40 mg Oral BID AC MD Whit Rondon potassium chloride  20 mEq Oral Daily Uma Hernandez MD      rivaroxaban  20 mg Oral Daily With Breakfast Uma Hernandez MD      sodium chloride (PF)  3 mL Intravenous Q1H PRN Justice Siddiqui MD      zolpidem  2 5 mg Oral HS Adrianna Smith PA-C          Today, Patient Was Seen By: Polina Posey PA-C    **Please Note: This note may have been constructed using a voice recognition system  **

## 2022-08-15 NOTE — ANESTHESIA PREPROCEDURE EVALUATION
Procedure:  TI    Relevant Problems   CARDIO   (+) Acute on chronic systolic congestive heart failure (HCC) (Resolved)   (+) CHF (congestive heart failure) (HCC)   (+) PAF (paroxysmal atrial fibrillation) (HCC)      GI/HEPATIC   (+) GERD (gastroesophageal reflux disease)   (+) Gastrointestinal hemorrhage with melena   (+) Peptic ulcer      HEMATOLOGY   (+) Acute blood loss anemia      PULMONARY   (+) Obstructive sleep apnea      Cardiovascular and Mediastinum   (+) Acute on chronic systolic CHF (congestive heart failure) (HCC)   (+) Atrial thrombus      Other   (+) Alcohol use   (+) History of Eliud-en-Y gastric bypass   (+) Obesity (BMI 30-39  9)        Physical Exam    Airway    Mallampati score: II  TM Distance: >3 FB  Neck ROM: full     Dental   No notable dental hx     Cardiovascular  Rhythm: irregular, Rate: normal,     Pulmonary  Pulmonary exam normal Breath sounds clear to auscultation,     Other Findings        Anesthesia Plan  ASA Score- 4     Anesthesia Type- general with ASA Monitors  Additional Monitors:   Airway Plan:     Comment: EF 25%, valves ok          Plan Factors-Exercise tolerance (METS): >4 METS  Chart reviewed  EKG reviewed  Imaging results reviewed  Existing labs reviewed  Patient summary reviewed  Patient is not a current smoker  Patient did not smoke on day of surgery  Obstructive sleep apnea risk education given perioperatively  Induction- intravenous  Postoperative Plan-     Informed Consent- Anesthetic plan and risks discussed with patient  I personally reviewed this patient with the CRNA  Discussed and agreed on the Anesthesia Plan with the CRNA  Marcy Cardoso

## 2022-08-15 NOTE — PLAN OF CARE
Problem: Potential for Falls  Goal: Patient will remain free of falls  Description: INTERVENTIONS:  - Educate patient/family on patient safety including physical limitations  - Instruct patient to call for assistance with activity   - Consult OT/PT to assist with strengthening/mobility   - Keep Call bell within reach  - Keep bed low and locked with side rails adjusted as appropriate  - Keep care items and personal belongings within reach  - Initiate and maintain comfort rounds  - Make Fall Risk Sign visible to staff  - Apply yellow socks and bracelet for high fall risk patients  - Consider moving patient to room near nurses station  Outcome: Progressing     Problem: PAIN - ADULT  Goal: Verbalizes/displays adequate comfort level or baseline comfort level  Description: Interventions:  - Encourage patient to monitor pain and request assistance  - Assess pain using appropriate pain scale  - Administer analgesics based on type and severity of pain and evaluate response  - Implement non-pharmacological measures as appropriate and evaluate response  - Consider cultural and social influences on pain and pain management  - Notify physician/advanced practitioner if interventions unsuccessful or patient reports new pain  Outcome: Progressing     Problem: INFECTION - ADULT  Goal: Absence or prevention of progression during hospitalization  Description: INTERVENTIONS:  - Assess and monitor for signs and symptoms of infection  - Monitor lab/diagnostic results  - Monitor all insertion sites, i e  indwelling lines, tubes, and drains  - Monitor endotracheal if appropriate and nasal secretions for changes in amount and color  - [place] appropriate cooling/warming therapies per order  - Administer medications as ordered  - Instruct and encourage patient and family to use good hand hygiene technique  - Identify and instruct in appropriate isolation precautions for identified infection/condition  Outcome: Progressing     Goal: Maintain or return to baseline ADL function  Description: INTERVENTIONS:  -  Assess patient's ability to carry out ADLs; assess patient's baseline for ADL function and identify physical deficits which impact ability to perform ADLs (bathing, care of mouth/teeth, toileting, grooming, dressing, etc )  - Assess/evaluate cause of self-care deficits   - Assess range of motion  - Assess patient's mobility; develop plan if impaired  - Assess patient's need for assistive devices and provide as appropriate  - Encourage maximum independence but intervene and supervise when necessary  - Involve family in performance of ADLs  - Assess for home care needs following discharge   - Consider OT consult to assist with ADL evaluation and planning for discharge  - Provide patient education as appropriate  Outcome: Progressing  Goal: Maintains/Returns to pre admission functional level  Description: INTERVENTIONS:  - Perform BMAT or MOVE assessment daily    - Set and communicate daily mobility goal to care team and patient/family/caregiver  - Collaborate with rehabilitation services on mobility goals if consulted  - Perform Range of Motion 3 times a day    - Ambulate patient 3 times a day  - Out of bed to chair for meals  - Out of bed for toileting  - Record patient progress and toleration of activity level   Outcome: Progressing     Problem: DISCHARGE PLANNING  Goal: Discharge to home or other facility with appropriate resources  Description: INTERVENTIONS:  - Identify barriers to discharge w/patient and caregiver  - Arrange for needed discharge resources and transportation as appropriate  - Identify discharge learning needs (meds, wound care, etc )  - Arrange for interpretive services to assist at discharge as needed  - Refer to Case Management Department for coordinating discharge planning if the patient needs post-hospital services based on physician/advanced practitioner order or complex needs related to functional status, cognitive ability, or social support system  Outcome: Progressing     Problem: Knowledge Deficit  Goal: Patient/family/caregiver demonstrates understanding of disease process, treatment plan, medications, and discharge instructions  Description: Complete learning assessment and assess knowledge base    Interventions:  - Provide teaching at level of understanding  - Provide teaching via preferred learning methods  Outcome: Progressing     Problem: CARDIOVASCULAR - ADULT  Goal: Maintains optimal cardiac output and hemodynamic stability  Description: INTERVENTIONS:  - Monitor I/O, vital signs and rhythm  - Monitor for S/S and trends of decreased cardiac output  - Administer and titrate ordered vasoactive medications to optimize hemodynamic stability  - Assess quality of pulses, skin color and temperature  - Assess for signs of decreased coronary artery perfusion  - Instruct patient to report change in severity of symptoms  Outcome: Progressing  Goal: Absence of cardiac dysrhythmias or at baseline rhythm  Description: INTERVENTIONS:  - Continuous cardiac monitoring, vital signs, obtain 12 lead EKG if ordered  - Administer antiarrhythmic and heart rate control medications as ordered  - Monitor electrolytes and administer replacement therapy as ordered  Outcome: Progressing     Problem: METABOLIC, FLUID AND ELECTROLYTES - ADULT  Goal: Electrolytes maintained within normal limits  Description: INTERVENTIONS:  - Monitor labs and assess patient for signs and symptoms of electrolyte imbalances  - Administer electrolyte replacement as ordered  - Monitor response to electrolyte replacements, including repeat lab results as appropriate  - Instruct patient on fluid and nutrition as appropriate  Outcome: Progressing  Goal: Fluid balance maintained  Description: INTERVENTIONS:  - Monitor labs   - Monitor I/O and WT  - Instruct patient on fluid and nutrition as appropriate  - Assess for signs & symptoms of volume excess or deficit  Outcome: Progressing

## 2022-08-15 NOTE — UTILIZATION REVIEW
Inpatient Admission Authorization Request   NOTIFICATION OF INPATIENT ADMISSION/INPATIENT AUTHORIZATION REQUEST   SERVICING FACILITY:   [address] [place] [address], [facility], [address]   Tax ID: [id number]  NPI: [id number]  Place of Service: Inpatient [facility]  [address]  Place of Service Code: 24     ATTENDING PROVIDER:  Attending Name and NPI#: Anastasiya Hawthorne [[id number]]  Address: [address] [place] [address], [facility], [facility]  Phone: [phone]     UTILIZATION REVIEW CONTACT:  Marcus Andrew, Utilization   Network Utilization Review Department  Phone: [phone]  Fax: [phone]  Email: [email or web address]     PHYSICIAN ADVISORY SERVICES:  FOR YMXA-RA-SCMV REVIEW - MEDICAL NECESSITY DENIAL  Phone: [phone]  Fax: [phone]  Email: [email or web address]     TYPE OF REQUEST:  Inpatient Status     ADMISSION INFORMATION:  ADMISSION DATE/TIME: 8/12/22 12:35 PM  PATIENT DIAGNOSIS CODE/DESCRIPTION:  PAF (paroxysmal atrial fibrillation) ([facility] [address] ) [I48 0]  Chest pain [R07 9]  DISCHARGE DATE/TIME: No discharge date for patient encounter  IMPORTANT INFORMATION:  Please contact Marcus Andrew directly with any questions or concerns regarding this request  Department voicemails are confidential     Send requests for admission clinical reviews, concurrent reviews, approvals, and administrative denials due to lack of clinical to fax [phone]

## 2022-08-15 NOTE — ASSESSMENT & PLAN NOTE
Wt Readings from Last 3 Encounters:   08/15/22 123 kg (270 lb 11 6 oz)   08/01/22 128 kg (282 lb)   07/22/22 128 kg (282 lb)     · TTE 08/01/2022 ejection fraction 25%  · Cardiology following  · Patient now euvolemic today  · Patient was on IV lasix 40 mg b i d discontinued 8/14 continue off diuretics   · I&Os, daily weight, fluid restriction

## 2022-08-16 ENCOUNTER — TRANSITIONAL CARE MANAGEMENT (OUTPATIENT)
Dept: FAMILY MEDICINE CLINIC | Facility: CLINIC | Age: 62
End: 2022-08-16

## 2022-08-16 VITALS
HEART RATE: 60 BPM | DIASTOLIC BLOOD PRESSURE: 72 MMHG | TEMPERATURE: 97.9 F | SYSTOLIC BLOOD PRESSURE: 127 MMHG | HEIGHT: 73 IN | OXYGEN SATURATION: 98 % | RESPIRATION RATE: 16 BRPM | BODY MASS INDEX: 35.78 KG/M2 | WEIGHT: 270 LBS

## 2022-08-16 LAB
ATRIAL RATE: 62 BPM
ATRIAL RATE: 66 BPM
P AXIS: 47 DEGREES
P AXIS: 88 DEGREES
PR INTERVAL: 208 MS
PR INTERVAL: 218 MS
QRS AXIS: -46 DEGREES
QRS AXIS: 252 DEGREES
QRSD INTERVAL: 140 MS
QRSD INTERVAL: 162 MS
QT INTERVAL: 460 MS
QT INTERVAL: 464 MS
QTC INTERVAL: 466 MS
QTC INTERVAL: 486 MS
T WAVE AXIS: 82 DEGREES
T WAVE AXIS: 85 DEGREES
VENTRICULAR RATE: 62 BPM
VENTRICULAR RATE: 66 BPM

## 2022-08-16 PROCEDURE — 99232 SBSQ HOSP IP/OBS MODERATE 35: CPT | Performed by: INTERNAL MEDICINE

## 2022-08-16 PROCEDURE — 93010 ELECTROCARDIOGRAM REPORT: CPT | Performed by: INTERNAL MEDICINE

## 2022-08-16 PROCEDURE — 99239 HOSP IP/OBS DSCHRG MGMT >30: CPT

## 2022-08-16 PROCEDURE — 93010 ELECTROCARDIOGRAM REPORT: CPT

## 2022-08-16 RX ORDER — AMIODARONE HYDROCHLORIDE 400 MG/1
200 TABLET ORAL DAILY
Qty: 15 TABLET | Refills: 0 | Status: SHIPPED | OUTPATIENT
Start: 2022-08-16 | End: 2022-09-22 | Stop reason: SDUPTHER

## 2022-08-16 RX ORDER — METOPROLOL TARTRATE 75 MG/1
75 TABLET, FILM COATED ORAL EVERY 12 HOURS SCHEDULED
Qty: 60 TABLET | Refills: 0 | Status: SHIPPED | OUTPATIENT
Start: 2022-08-16 | End: 2022-09-12 | Stop reason: SDUPTHER

## 2022-08-16 RX ORDER — FUROSEMIDE 20 MG/1
20 TABLET ORAL DAILY
Qty: 30 TABLET | Refills: 0 | Status: SHIPPED | OUTPATIENT
Start: 2022-08-16 | End: 2022-10-03

## 2022-08-16 RX ADMIN — RIVAROXABAN 20 MG: 20 TABLET, FILM COATED ORAL at 08:13

## 2022-08-16 RX ADMIN — PANTOPRAZOLE SODIUM 40 MG: 40 TABLET, DELAYED RELEASE ORAL at 06:01

## 2022-08-16 RX ADMIN — POTASSIUM CHLORIDE 20 MEQ: 1500 TABLET, EXTENDED RELEASE ORAL at 08:13

## 2022-08-16 NOTE — DISCHARGE SUMMARY
[address] [facility]  Discharge- [place] Uz[place] 1960, 62 y o  male MRN: [id number]  Unit/Bed#: E4 -01 Encounter: [id number]  Primary Care Provider: Mariano Gardiner DO   Date and time admitted to hospital: 8/12/2022  6:44 AM    * PAF (paroxysmal atrial fibrillation) [facility])  Assessment & Plan  This is a 26-year-old male with history of atrial fibrillation on Xarelto presented with chest pain, dyspnea and dizziness that began 8/12/22   Patient was recently cardioverted on 08/01/2022 for atrial fibrillation and sent home on amiodarone 400 mg b i d  Karrie Nissen · Cardiology following  · Continue amiodarone 200 mg daily, discontinued amiodarone 400 mg BID  · Continue metoprolol 75 mg b i d was increased from 50 mg b i d due to episode of dizziness, chest discomfort and numbness/ tingling in both hands  · Digoxin discontinued upon admission secondary to atrial fibrillation with slow ventricular rate, discontinue at discharge  · Continue Xarelto for anticoagulation  ·  Cardioversion yesterday, currently NS   · TI yesterday showed EJ 25%, severely reduced systolic function with severe global  Hypokineses   Mild MR     CHF (congestive heart failure) ([facility])  Assessment & Plan  Wt Readings from Last 3 Encounters:   08/16/22 122 kg (270 lb)   08/01/22 128 kg (282 lb)   07/22/22 128 kg (282 lb)     · Patient discontinued on PO lasix 20 mg daily   · Patient euvolemic today   · Monitor daily weights, fluid restriction   · EJ 25% on TI 8/16        Atrial thrombus  Assessment & Plan  · History of left atrial appendage seen on TI on 04/26/2022,  · TI 8/1/22 no thrombus seen  · TI done 8/16 showed no thrombus    Medical Problems             Resolved Problems  Date Reviewed: 8/16/2022          Resolved    Acute on chronic systolic congestive heart failure ([facility] [address] ) 8/15/2022     Resolved by  Ty Hu DO              Discharging Physician / Practitioner: Zoran Xie PA-C  PCP: Mariano Gardiner DO  Admission Date:   Admission Orders (From admission, onward)     Ordered        08/12/22 1310 Mihaela Lew  Once                      Discharge Date: 08/16/22    Consultations During Hospital Stay:  · Cardiology    Procedures Performed:   · Cardioversion, TI    Significant Findings / Test Results:   · none    Incidental Findings:   · none    Test Results Pending at Discharge (will require follow up):   · none     Outpatient Tests Requested:  · none    Complications:  none    Reason for Admission: Paroxysmal atrial fibrilation    Hospital Course:   Nena Zhou is a 62 y o  male patient who originally presented to the hospital on 8/12/2022 due to chest pain, dyspnea and dizziness that began 8/12/22 and was found to be in atrial fibrillation  Patient was recently cardioverted on 8/1/22 for atrial fibrillation and sent home on amiodarone 400 mg b i d  Patient wCardiology was consulted and TI and CV were performed on 8/15/22  TI revealed no acute thrombus and patient was successfully cardioverted to NSR  Patient discharged with follow up with cardiology outpatient  Please see above list of diagnoses and related plan for additional information  Condition at Discharge: good    Discharge Day Visit / Exam:   Subjective:  Patient was seen at bedside this morning  He was laying comfortably in bed and reports no new complaints  He denies any chest pain, SOB, abdominal pain, palpitations or dizziness  He reports feeling well today and is anxious to go home  Vitals: Blood Pressure: 100/58 (08/16/22 0737)  Pulse: (!) 50 (08/16/22 0737)  Temperature: 97 6 °F (36 4 °C) (08/16/22 0737)  Temp Source: Temporal (08/16/22 0737)  Respirations: 16 (08/16/22 0737)  Height: 6' 1" (185 4 cm) (08/15/22 1320)  Weight - Scale: 122 kg (270 lb) (08/16/22 0546)  SpO2: 95 % (08/16/22 0737)  Exam:   Physical Exam  Constitutional:       General: He is not in acute distress  Appearance: Normal appearance   He is not diaphoretic  HENT:      Head: Normocephalic and atraumatic  Eyes:      General: No scleral icterus  Cardiovascular:      Rate and Rhythm: Normal rate and regular rhythm  Pulmonary:      Effort: Pulmonary effort is normal       Breath sounds: Normal breath sounds  No wheezing or rhonchi  Abdominal:      General: Abdomen is flat  Bowel sounds are normal       Palpations: Abdomen is soft  Tenderness: There is no abdominal tenderness  Musculoskeletal:      Right lower leg: No edema  Left lower leg: No edema  Skin:     General: Skin is warm and dry  Neurological:      Mental Status: He is alert  Mental status is at baseline  Psychiatric:         Mood and Affect: Mood normal          Behavior: Behavior normal           Discussion with Family: Patient declined call to   Discharge instructions/Information to patient and family:   See after visit summary for information provided to patient and family  Provisions for Follow-Up Care:  See after visit summary for information related to follow-up care and any pertinent home health orders  Disposition:   Home    Planned Readmission: none     Discharge Statement:  I spent 60 minutes discharging the patient  This time was spent on the day of discharge  I had direct contact with the patient on the day of discharge  Greater than 50% of the total time was spent examining patient, answering all patient questions, arranging and discussing plan of care with patient as well as directly providing post-discharge instructions  Additional time then spent on discharge activities  Discharge Medications:  See after visit summary for reconciled discharge medications provided to patient and/or family        **Please Note: This note may have been constructed using a voice recognition system**

## 2022-08-16 NOTE — PLAN OF CARE
Problem: Potential for Falls  Goal: Patient will remain free of falls  Description: INTERVENTIONS:  - Educate patient/family on patient safety including physical limitations  - Instruct patient to call for assistance with activity   - Consult OT/PT to assist with strengthening/mobility   - Keep Call bell within reach  - Keep bed low and locked with side rails adjusted as appropriate  - Keep care items and personal belongings within reach  - Initiate and maintain comfort rounds  - Make Fall Risk Sign visible to staff  - Apply yellow socks and bracelet for high fall risk patients  - Consider moving patient to room near nurses station  Outcome: Progressing     Problem: PAIN - ADULT  Goal: Verbalizes/displays adequate comfort level or baseline comfort level  Description: Interventions:  - Encourage patient to monitor pain and request assistance  - Assess pain using appropriate pain scale  - Administer analgesics based on type and severity of pain and evaluate response  - Implement non-pharmacological measures as appropriate and evaluate response  - Consider cultural and social influences on pain and pain management  - Notify physician/advanced practitioner if interventions unsuccessful or patient reports new pain  Outcome: Progressing     Problem: INFECTION - ADULT  Goal: Absence or prevention of progression during hospitalization  Description: INTERVENTIONS:  - Assess and monitor for signs and symptoms of infection  - Monitor lab/diagnostic results  - Monitor all insertion sites, i e  indwelling lines, tubes, and drains  - Monitor endotracheal if appropriate and nasal secretions for changes in amount and color  - [place] appropriate cooling/warming therapies per order  - Administer medications as ordered  - Instruct and encourage patient and family to use good hand hygiene technique  - Identify and instruct in appropriate isolation precautions for identified infection/condition  Outcome: Progressing     Goal: Maintain or return to baseline ADL function  Description: INTERVENTIONS:  -  Assess patient's ability to carry out ADLs; assess patient's baseline for ADL function and identify physical deficits which impact ability to perform ADLs (bathing, care of mouth/teeth, toileting, grooming, dressing, etc )  - Assess/evaluate cause of self-care deficits   - Assess range of motion  - Assess patient's mobility; develop plan if impaired  - Assess patient's need for assistive devices and provide as appropriate  - Encourage maximum independence but intervene and supervise when necessary  - Involve family in performance of ADLs  - Assess for home care needs following discharge   - Consider OT consult to assist with ADL evaluation and planning for discharge  - Provide patient education as appropriate  Outcome: Progressing  Goal: Maintains/Returns to pre admission functional level  Description: INTERVENTIONS:  - Perform BMAT or MOVE assessment daily    - Set and communicate daily mobility goal to care team and patient/family/caregiver  - Collaborate with rehabilitation services on mobility goals if consulted  - Perform Range of Motion 3 times a day  - Encourage patient to reposition every 2 hours    - Dangle patient 3 times a day  - Stand patient 3 times a day  - Ambulate patient 3 times a day  - Out of bed to chair for meals  - Out of bed for toileting  - Record patient progress and toleration of activity level   Outcome: Progressing     Problem: DISCHARGE PLANNING  Goal: Discharge to home or other facility with appropriate resources  Description: INTERVENTIONS:  - Identify barriers to discharge w/patient and caregiver  - Arrange for needed discharge resources and transportation as appropriate  - Identify discharge learning needs (meds, wound care, etc )  - Arrange for interpretive services to assist at discharge as needed  - Refer to Case Management Department for coordinating discharge planning if the patient needs post-hospital services based on physician/advanced practitioner order or complex needs related to functional status, cognitive ability, or social support system  Outcome: Progressing     Problem: Knowledge Deficit  Goal: Patient/family/caregiver demonstrates understanding of disease process, treatment plan, medications, and discharge instructions  Description: Complete learning assessment and assess knowledge base    Interventions:  - Provide teaching at level of understanding  - Provide teaching via preferred learning methods  Outcome: Progressing     Problem: CARDIOVASCULAR - ADULT  Goal: Maintains optimal cardiac output and hemodynamic stability  Description: INTERVENTIONS:  - Monitor I/O, vital signs and rhythm  - Monitor for S/S and trends of decreased cardiac output  - Administer and titrate ordered vasoactive medications to optimize hemodynamic stability  - Assess quality of pulses, skin color and temperature  - Assess for signs of decreased coronary artery perfusion  - Instruct patient to report change in severity of symptoms  Outcome: Progressing  Goal: Absence of cardiac dysrhythmias or at baseline rhythm  Description: INTERVENTIONS:  - Continuous cardiac monitoring, vital signs, obtain 12 lead EKG if ordered  - Administer antiarrhythmic and heart rate control medications as ordered  - Monitor electrolytes and administer replacement therapy as ordered  Outcome: Progressing     Problem: METABOLIC, FLUID AND ELECTROLYTES - ADULT  Goal: Electrolytes maintained within normal limits  Description: INTERVENTIONS:  - Monitor labs and assess patient for signs and symptoms of electrolyte imbalances  - Administer electrolyte replacement as ordered  - Monitor response to electrolyte replacements, including repeat lab results as appropriate  - Instruct patient on fluid and nutrition as appropriate  Outcome: Progressing  Goal: Fluid balance maintained  Description: INTERVENTIONS:  - Monitor labs   - Monitor I/O and WT  - Instruct patient on fluid and nutrition as appropriate  - Assess for signs & symptoms of volume excess or deficit  Outcome: Progressing

## 2022-08-16 NOTE — ASSESSMENT & PLAN NOTE
This is a 23-year-old male with history of atrial fibrillation on Xarelto presented with chest pain, dyspnea and dizziness that began 8/12/22   Patient was recently cardioverted on 08/01/2022 for atrial fibrillation and sent home on amiodarone 400 mg b i d  Imer Baxter · Cardiology following  · Continue amiodarone 200 mg daily, discontinued amiodarone 400 mg BID  · Continue metoprolol 75 mg b i d was increased from 50 mg b i d due to episode of dizziness, chest discomfort and numbness/ tingling in both hands  · Digoxin discontinued upon admission secondary to atrial fibrillation with slow ventricular rate, discontinue at discharge  · Continue Xarelto for anticoagulation  ·  Cardioversion yesterday, currently NS   · TI yesterday showed EJ 25%, severely reduced systolic function with severe global  Hypokineses  Mild MR

## 2022-08-16 NOTE — NURSING NOTE
Shilo stated that he wasn't feel well  He was feeling shaky  He had a cardioconvertion done and is in NSR with L BBB  vitals were done  temp 97 8, Hr 90, RR 18 BP  125/70  Accucheck 72; gave pt some orange juice, EKG done(NSR with L BBB) and pt is feeling better,  will recheck blood sugar   Slim notified

## 2022-08-16 NOTE — ASSESSMENT & PLAN NOTE
· History of left atrial appendage seen on TI on 04/26/2022,  · IT 8/1/22 no thrombus seen  · TI done 8/16 showed no thrombus

## 2022-08-16 NOTE — ASSESSMENT & PLAN NOTE
Wt Readings from Last 3 Encounters:   08/16/22 122 kg (270 lb)   08/01/22 128 kg (282 lb)   07/22/22 128 kg (282 lb)     · Patient discontinued on PO lasix 20 mg daily   · Patient euvolemic today   · Monitor daily weights, fluid restriction   · EJ 25% on TI 8/16

## 2022-08-16 NOTE — PROGRESS NOTES
Cardiology         Progress Note - Cardiology   Rhonda Owens 62 y o  male MRN: [id number]  Unit/Bed#: E4 -01 Encounter: [id number]          Assessment/Recommendations/Discussion:     1  Orthopnea, suspect secondary to elevated intracardiac pressures and recurrent AF   2  AF, slow VR   3  KIAH thrombus, resolved on f/u TI 8/1/22     · Successful TI/CV yesterday  · Transition amiodarone to 200 mg po daily  12 gram load finished, will continue maintenance dosing  · Will reach out to Dr Montana Gee to schedule ablation  · Continue metoprolol 75 po BID, stay OFF digoxin  · DC on lasix 20 po daily          Subjective: Pt seen/examined    Feels well                Physical Exam:  GEN:  NAD  HEENT:  MMM, NCAT, pink conjunctiva, EOMI, nonicteric sclera  CV:  NO JVD/HJR, RR, NO M/R/G, +S1/S2, NO PARASTERNAL HEAVE/THRILL, NO LE EDEMA, NO HEPATIC SYSTOLIC PULSATION, WARM EXTREMITIES  RESP:  CTAB/L  ABD:  SOFT, NT, NO GROSS ORGANOMEGALY        Vitals:   /58 (BP Location: Right arm)   Pulse (!) 50   Temp 97 6 °F (36 4 °C) (Temporal)   Resp 16   Ht 6' 1" (1 854 m)   Wt 122 kg (270 lb)   SpO2 95%   BMI 35 62 kg/m²   Vitals:    08/15/22 1320 08/16/22 0546   Weight: 122 kg (270 lb) 122 kg (270 lb)       Intake/Output Summary (Last 24 hours) at 8/16/2022 1014  Last data filed at 8/15/2022 1601  Gross per 24 hour   Intake 780 ml   Output --   Net 780 ml       TELEMETRY: SR  Lab Results:  Results from last 7 days   Lab Units 08/15/22  0508   WBC Thousand/uL 8 04   HEMOGLOBIN g/dL 15 9   HEMATOCRIT % 46 4   PLATELETS Thousands/uL 189     Results from last 7 days   Lab Units 08/15/22  0508 08/13/22  0455   POTASSIUM mmol/L 3 8 3 8   CHLORIDE mmol/L 103 104   CO2 mmol/L 26 26   BUN mg/dL 15 21   CREATININE mg/dL 1 21 1 10   CALCIUM mg/dL 8 5 8 4   ALK PHOS U/L  --  68   ALT U/L  --  41   AST U/L  --  26     Results from last 7 days   Lab Units 08/15/22  0508   POTASSIUM mmol/L 3 8   CHLORIDE mmol/L 103   CO2 mmol/L 26 BUN mg/dL 15   CREATININE mg/dL 1 21   CALCIUM mg/dL 8 5           Medications:    Current Facility-Administered Medications:     albuterol (PROVENTIL HFA,VENTOLIN HFA) inhaler 2 puff, 2 puff, Inhalation, Q6H PRN, Teri Vance MD    amiodarone tablet 400 mg, 400 mg, Oral, BID With Meals, Isauro Zarate PA-C, 400 mg at 08/15/22 1644    atorvastatin (LIPITOR) tablet 40 mg, 40 mg, Oral, Daily With Dinner, Teri Vance MD, 40 mg at 08/15/22 1644    etomidate (AMIDATE) 2 mg/mL injection 11 9 mg, 11 9 mg, Intravenous, Once, Matthieu Childs MD    metoprolol tartrate (LOPRESSOR) tablet 75 mg, 75 mg, Oral, Q12H [address], Rujul Myers, DO, 75 mg at 08/15/22 2134    pantoprazole (PROTONIX) EC tablet 40 mg, 40 mg, Oral, BID AC, Teri Vance MD, 40 mg at 08/16/22 0601    potassium chloride (K-DUR,KLOR-CON) CR tablet 20 mEq, 20 mEq, Oral, Daily, Teri Vance MD, 20 mEq at 08/16/22 0813    rivaroxaban (XARELTO) tablet 20 mg, 20 mg, Oral, Daily With Breakfast, Teri Vance MD, 20 mg at 08/16/22 0813    Insert peripheral IV, , , Once **AND** sodium chloride (PF) 0 9 % injection 3 mL, 3 mL, Intravenous, Q1H PRN, Matthieu Childs MD    zolpidem [facility]) tablet 2 5 mg, 2 5 mg, Oral, HS, Julienne Louie PA-C, 2 5 mg at 08/15/22 2130    This note was completed in part utilizing M-Modal Fluency Direct Software  Grammatical errors, random word insertions, spelling mistakes, and incomplete sentences may be an occasional consequence of this system secondary to software limitations, ambient noise, and hardware issues  If you have any questions or concerns about the content, text, or information contained within the body of this dictation, please contact the provider for clarification

## 2022-08-17 NOTE — UTILIZATION REVIEW
Notification of Discharge   This is a Notification of Discharge from our facility [address]  Please be advised that this patient has been discharge from our facility  Below you will find the admission and discharge date and time including the patients disposition  UTILIZATION REVIEW CONTACT:  Vaishali López  Utilization   Network Utilization Review Department  Phone: [phone] x carefully listen to the prompts  All voicemails are confidential   Email: [email or web address]     PHYSICIAN ADVISORY SERVICES:  FOR EYDP-TU-POLG REVIEW - MEDICAL NECESSITY DENIAL  Phone: [phone]  Fax: [phone]  Email: [email or web address]     PRESENTATION DATE: 8/12/2022  6:44 AM    INPATIENT ADMISSION DATE: 8/12/22 12:35 PM   DISCHARGE DATE: 8/16/2022 11:45 AM  DISPOSITION: Home/Self Care Home/Self Care      IMPORTANT INFORMATION:  Send all requests for admission clinical reviews, approved or denied determinations and any other requests to dedicated fax number below belonging to the campus where the patient is receiving treatment   List of dedicated fax numbers:  [address] DENIALS (Administrative/Medical Necessity) [phone]   [address] (Maternity/NICU/Pediatrics) [phone]   Feliberto Boots [phone]   130 [facility] [phone]   [address] [place] [address] [phone]   2000 [facility] [address] [place] [address],[address] [phone]   [facility]  [phone]   [address] [facility], [address]  [address] [facility] [address] [facility] [phone]

## 2022-08-23 ENCOUNTER — HOSPITAL ENCOUNTER (OUTPATIENT)
Dept: CT IMAGING | Facility: HOSPITAL | Age: 62
Discharge: HOME | End: 2022-08-23
Payer: COMMERCIAL

## 2022-08-23 DIAGNOSIS — R91.1 NODULE OF LEFT LUNG: ICD-10-CM

## 2022-08-23 PROCEDURE — 71250 CT THORAX DX C-: CPT

## 2022-08-23 PROCEDURE — G1004 CDSM NDSC: HCPCS

## 2022-08-26 ENCOUNTER — OFFICE VISIT (OUTPATIENT)
Dept: CARDIOLOGY CLINIC | Facility: CLINIC | Age: 62
End: 2022-08-26
Payer: COMMERCIAL

## 2022-08-26 VITALS
SYSTOLIC BLOOD PRESSURE: 100 MMHG | WEIGHT: 281 LBS | HEART RATE: 87 BPM | HEIGHT: 73 IN | BODY MASS INDEX: 37.24 KG/M2 | DIASTOLIC BLOOD PRESSURE: 64 MMHG

## 2022-08-26 DIAGNOSIS — I48.0 PAF (PAROXYSMAL ATRIAL FIBRILLATION) (HCC): Primary | ICD-10-CM

## 2022-08-26 PROCEDURE — 93000 ELECTROCARDIOGRAM COMPLETE: CPT | Performed by: PHYSICIAN ASSISTANT

## 2022-08-26 PROCEDURE — 99214 OFFICE O/P EST MOD 30 MIN: CPT | Performed by: PHYSICIAN ASSISTANT

## 2022-08-27 NOTE — PROGRESS NOTES
Electrophysiology Office Note    Janelle Newell [facility]  1960  [id number]  HEART & VASCULAR [address] [facility] [address] [phone]        Assessment/Plan     Primary diagnosis:   1  Persistent atrial fibrillation, symptomatic    * patient presents to B EP office for routine follow up    * feels improved post cardioversions despite being back in afib however he still is having BURROWS and fatigue  * ECG today showing rate controlled afib    * see below for afib history    * has severely dilated LA and EF 25% on recent TI    * continues on amiodarone 200mg QDay    * Today we discussed non cardiac modifiable AF risk  factors to prevent further substrate formation    1  HTN - controlled     2  T2DM - does not have     3  SRIKANTH - was diagnosed with moderate to severe SRIKANTH in May 2022  Did have sleep study with CPAP and felt improved  Waiting to receive his CPAP machine  I sent message to sleep medicine to check status of this  Patient does want to wear CPAP, understands importance    4  Alcohol intake- is down to 1 drink a week  Congratulated him on his efforts     5  Obesity - weight is 281lbs BMI is 37 with history of gastric bypass  He knows his diet is bad  Feels bored/depressed about his medical situation and turns to food  Does want to increase activity but cannot due to symptoms     6  Tobacco use - does not use    * will discuss case with dr Leydi Ruiz before making next treatment steps  Given his LA size and risk factors not sure if he will maintain NSR post ablation, discussed this with patient today  Despite being 61 he may be better candidate for BiV ICD and AVJ ablation  * patients goal is to have the energy to keep up with his grandchildren      2   NICMP w/ LBBB    * EF as above + large LBBB    * on metoprolol tartrate due to issues with hypotension on multiple agents   * NICMP likely from afib and LBBB, if we can restore NSR and his EF does not improve he would be candidate for BiV ICD for primary prevention  We did discuss this today    Secondary diagnosis:   1  Morbid obesity s/p gastric bypass   2  covid 19 infection   3  Hx GIB after NSAID use - 2020  4  Hx prior alcohol abuse   5  LBBB  6  Moderate to severe SRIKANTH not on CPAP              Rhythm History:   Atrial fibrillation:   1  Diagnosed with a fib w/ RVR during active covid - 2/2022 - spontaneous conversion to NSR   2  In afib w/ RVR at office visit - symptomatic - 2/2022 - sent to ED w/ conversion to NSR after dilt   3  Back in rate controlled afib at office visit 2/2022  4  NM stress showing no ischemia but EF 32% - 4/2022   5  Rehospitalization for acute HFrEF + afib w/ RVR - 4/2022 - plan for TI/DCCV however with KIAH thrombus - rate controlled + xarelto started- referred to EP   6  Evaluated by Dr Patel Plan - amiodarone + TI  DCCV once KIAH thrombus resolves  7  S/p outpatient TI/DCCV + amiodarone initiated - successful DCCV - 8/2022  8  Recurrent hospitalization due to orthopnea - back in rate controlled afib - s/p TI/DCCV after amiodarone load - successful - 8/2022   9  Back in rate controlled afib at EP office visit - 8/2022    Atrial flutter:     SVT:     VT/VF/PVC:     Device history:   Pacemaker:    Defibrillator:    BIV PPM:    BIV ICD:    ILR:      Cardiac Testing:     ECHO: No results found for this or any previous visit  History of Present Illness     HPI/INTERVAL HISTORY: Héctor Gallegos is a 62 y o  male with history as above who presents to \[facility]\"" EP office today for routine follow up  Since last OV he has had two cardioversions which initially were successful but he returned to NSR  He continues to have symptoms of BURROWS and fatigue  Continues to work as an   Review of Systems  ROS as noted above, otherwise 12 point review of systems was performed and is negative         Historical Information   Social History     Socioeconomic History    Marital status: /Civil Union Spouse name: Not on file    Number of children: Not on file    Years of education: Not on file    Highest education level: Not on file   Occupational History    Not on file   Tobacco Use    Smoking status: Former Smoker     Types: Cigars    Smokeless tobacco: Former User     Types: Chew   Vaping Use    Vaping Use: Never used   Substance and Sexual Activity    Alcohol use: Yes     Alcohol/week: 7 0 standard drinks     Types: 7 Cans of beer per week     Comment: Hasn't had any beer since 4/23/22    Drug use: No    Sexual activity: Not on file   Other Topics Concern    Not on file   Social History Narrative    Always uses seat belt    Feels safe at home    No guns in the home     Social Determinants of Health     Financial Resource Strain: Not on file   Food Insecurity: No Food Insecurity    Worried About Running Out of Food in the Last Year: Never true    Noe of Food in the Last Year: Never true   Transportation Needs: No Transportation Needs    Lack of Transportation (Medical): No    Lack of Transportation (Non-Medical):  No   Physical Activity: Not on file   Stress: Not on file   Social Connections: Not on file   Intimate Partner Violence: Not on file   Housing Stability: Low Risk     Unable to Pay for Housing in the Last Year: No    Number of Places Lived in the Last Year: 1    Unstable Housing in the Last Year: No     Past Medical History:   Diagnosis Date    A-fib ([place] [address] )     Acute ulcer of stomach     CHF [facility]congestive heart failure) ([facility] )     GERD (gastroesophageal reflux disease)     Rib fractures      Past Surgical History:   Procedure Laterality Date    COLONOSCOPY      ESOPHAGOGASTRODUODENOSCOPY      GASTRIC BYPASS      MANDIBLE FRACTURE SURGERY       Social History     Substance and Sexual Activity   Alcohol Use Yes    Alcohol/week: 7 0 standard drinks    Types: 7 Cans of beer per week    Comment: Hasn't had any beer since 4/23/22     Social History     Substance and Sexual Activity   Drug Use No     Social History     Tobacco Use   Smoking Status Former Smoker    Types: Cigars   Smokeless Tobacco Former User    Types: Chew     Family History   Problem Relation Age of Onset    Seizures Father        Meds/Allergies       Current Outpatient Medications:     amiodarone (PACERONE) 400 MG tablet, Take 0 5 tablets (200 mg total) by mouth daily, Disp: 15 tablet, Rfl: 0    atorvastatin (LIPITOR) 40 mg tablet, Take 1 tablet (40 mg total) by mouth daily with dinner, Disp: 90 tablet, Rfl: 3    fexofenadine (ALLEGRA) 180 MG tablet, Take 1 tablet (180 mg total) by mouth in the morning , Disp: 30 tablet, Rfl: 3    furosemide (LASIX) 20 mg tablet, Take 1 tablet (20 mg total) by mouth daily, Disp: 30 tablet, Rfl: 0    metoprolol tartrate 75 MG TABS, Take 1 tablet (75 mg total) by mouth every 12 (twelve) hours, Disp: 60 tablet, Rfl: 0    Multiple Vitamins-Minerals (MULTIVITAMIN ADULT EXTRA C PO), Take 1 capsule by mouth, Disp: , Rfl:     pantoprazole (PROTONIX) 40 mg tablet, TAKE 1 TABLET EVERY 12 HOURS (Patient taking differently: every 12 (twelve) hours), Disp: 180 tablet, Rfl: 3    potassium chloride (K-DUR,KLOR-CON) 20 mEq tablet, TAKE 2 TABLETS BY MOUTH IN AM, 1 TABLET IN PM, Disp: 270 tablet, Rfl: 3    rivaroxaban (Xarelto) 20 mg tablet, Take 1 tablet (20 mg total) by mouth daily with breakfast, Disp: 90 tablet, Rfl: 3    albuterol (PROVENTIL HFA,VENTOLIN HFA) 90 mcg/act inhaler, INHALE 2 PUFFS EVERY 6 HOURS AS NEEDED FOR WHEEZING (Patient not taking: No sig reported), Disp: 18 g, Rfl: 0    No Known Allergies    Objective   Vitals: Blood pressure 100/64, pulse 87, height 6' 1" (1 854 m), weight 127 kg (281 lb)  Physical Exam  Constitutional:       Appearance: He is well-developed  HENT:      Head: Normocephalic and atraumatic  Eyes:      Pupils: Pupils are equal, round, and reactive to light  Cardiovascular:      Rate and Rhythm: Normal rate   Rhythm irregularly irregular  Pulmonary:      Effort: Pulmonary effort is normal       Breath sounds: Normal breath sounds  Abdominal:      General: Bowel sounds are normal       Palpations: Abdomen is soft  Musculoskeletal:         General: Normal range of motion  Cervical back: Normal range of motion and neck supple  Skin:     General: Skin is warm and dry  Neurological:      Mental Status: He is alert and oriented to person, place, and time             Labs:  Admission on 08/12/2022, Discharged on 08/16/2022   Component Date Value    WBC 08/12/2022 10 63 (A)    RBC 08/12/2022 4 99     Hemoglobin 08/12/2022 16 0     Hematocrit 08/12/2022 46 7     MCV 08/12/2022 94     MCH 08/12/2022 32 1     MCHC 08/12/2022 34 3     RDW 08/12/2022 13 2     MPV 08/12/2022 10 1     Platelets 27/01/5647 222     nRBC 08/12/2022 0     Neutrophils Relative 08/12/2022 69     Immat GRANS % 08/12/2022 0     Lymphocytes Relative 08/12/2022 17     Monocytes Relative 08/12/2022 10     Eosinophils Relative 08/12/2022 4     Basophils Relative 08/12/2022 0     Neutrophils Absolute 08/12/2022 7 32     Immature Grans Absolute 08/12/2022 0 04     Lymphocytes Absolute 08/12/2022 1 82     Monocytes Absolute 08/12/2022 1 01     Eosinophils Absolute 08/12/2022 0 40     Basophils Absolute 08/12/2022 0 04     Sodium 08/12/2022 140     Potassium 08/12/2022 4 4     Chloride 08/12/2022 105     CO2 08/12/2022 24     ANION GAP 08/12/2022 11     BUN 08/12/2022 22     Creatinine 08/12/2022 1 15     Glucose 08/12/2022 113     Calcium 08/12/2022 8 7     eGFR 08/12/2022 68     hs TnI 0hr 08/12/2022 7     Digoxin Lvl 08/12/2022 1 4     hs TnI 2hr 08/12/2022 5     Delta 2hr hsTnI 08/12/2022 -2     hs TnI 4hr 08/12/2022 5     Delta 4hr hsTnI 08/12/2022 -2     Ventricular Rate 08/12/2022 58     Atrial Rate 08/12/2022 234     QRSD Interval 08/12/2022 148     QT Interval 08/12/2022 436     QTC Interval 08/12/2022 428     QRS Axis 08/12/2022 138     T Wave Axis 08/12/2022 -20     Ventricular Rate 08/12/2022 58     Atrial Rate 08/12/2022 300     QRSD Interval 08/12/2022 92     QT Interval 08/12/2022 452     QTC Interval 08/12/2022 443     P Axis 08/12/2022 0     QRS Axis 08/12/2022 106     T Wave Axis 08/12/2022 159     Ventricular Rate 08/12/2022 61     Atrial Rate 08/12/2022 78     QRSD Interval 08/12/2022 90     QT Interval 08/12/2022 454     QTC Interval 08/12/2022 457     QRS Axis 08/12/2022 135     T Wave Axis 08/12/2022 236     Sodium 08/13/2022 139     Potassium 08/13/2022 3 8     Chloride 08/13/2022 104     CO2 08/13/2022 26     ANION GAP 08/13/2022 9     BUN 08/13/2022 21     Creatinine 08/13/2022 1 10     Glucose 08/13/2022 100     Calcium 08/13/2022 8 4     Corrected Calcium 08/13/2022 9 0     AST 08/13/2022 26     ALT 08/13/2022 41     Alkaline Phosphatase 08/13/2022 68     Total Protein 08/13/2022 6 6     Albumin 08/13/2022 3 2 (A)    Total Bilirubin 08/13/2022 1 38 (A)    eGFR 08/13/2022 72     WBC 08/13/2022 7 38     RBC 08/13/2022 4 90     Hemoglobin 08/13/2022 15 6     Hematocrit 08/13/2022 46 2     MCV 08/13/2022 94     MCH 08/13/2022 31 8     MCHC 08/13/2022 33 8     RDW 08/13/2022 13 2     MPV 08/13/2022 10 6     Platelets 58/21/2374 195     nRBC 08/13/2022 0     Neutrophils Relative 08/13/2022 67     Immat GRANS % 08/13/2022 0     Lymphocytes Relative 08/13/2022 19     Monocytes Relative 08/13/2022 9     Eosinophils Relative 08/13/2022 5     Basophils Relative 08/13/2022 0     Neutrophils Absolute 08/13/2022 4 88     Immature Grans Absolute 08/13/2022 0 03     Lymphocytes Absolute 08/13/2022 1 42     Monocytes Absolute 08/13/2022 0 65     Eosinophils Absolute 08/13/2022 0 37     Basophils Absolute 08/13/2022 0 03     Ventricular Rate 08/13/2022 77     Atrial Rate 08/13/2022 202     QRSD Interval 08/13/2022 148     QT Interval 08/13/2022 434     QTC Interval 08/13/2022 491     QRS Axis 08/13/2022 -48     T Wave Axis 08/13/2022 82     LV EF 08/15/2022 25     Ventricular Rate 08/14/2022 72     Atrial Rate 08/14/2022 75     QRSD Interval 08/14/2022 156     QT Interval 08/14/2022 472     QTC Interval 08/14/2022 516     QRS Axis 08/14/2022 -64     T Wave Axis 08/14/2022 41     WBC 08/15/2022 8 04     RBC 08/15/2022 4 95     Hemoglobin 08/15/2022 15 9     Hematocrit 08/15/2022 46 4     MCV 08/15/2022 94     MCH 08/15/2022 32 1     MCHC 08/15/2022 34 3     RDW 08/15/2022 13 1     MPV 08/15/2022 10 8     Platelets 22/46/6290 189     nRBC 08/15/2022 0     Neutrophils Relative 08/15/2022 67     Immat GRANS % 08/15/2022 0     Lymphocytes Relative 08/15/2022 20     Monocytes Relative 08/15/2022 9     Eosinophils Relative 08/15/2022 3     Basophils Relative 08/15/2022 1     Neutrophils Absolute 08/15/2022 5 40     Immature Grans Absolute 08/15/2022 0 03     Lymphocytes Absolute 08/15/2022 1 59     Monocytes Absolute 08/15/2022 0 73     Eosinophils Absolute 08/15/2022 0 24     Basophils Absolute 08/15/2022 0 05     Sodium 08/15/2022 138     Potassium 08/15/2022 3 8     Chloride 08/15/2022 103     CO2 08/15/2022 26     ANION GAP 08/15/2022 9     BUN 08/15/2022 15     Creatinine 08/15/2022 1 21     Glucose 08/15/2022 95     Calcium 08/15/2022 8 5     eGFR 08/15/2022 64     Magnesium 08/15/2022 2 2     NT-proBNP 08/15/2022 927 (A)    POC Glucose 08/15/2022 72     POC Glucose 08/15/2022 126     Ventricular Rate 08/15/2022 62     Atrial Rate 08/15/2022 62     MT Interval 08/15/2022 218     QRSD Interval 08/15/2022 140     QT Interval 08/15/2022 460     QTC Interval 08/15/2022 466     P Axis 08/15/2022 88     QRS Axis 08/15/2022 252     T Wave Axis 08/15/2022 85     Ventricular Rate 08/15/2022 66     Atrial Rate 08/15/2022 66     MT Interval 08/15/2022 208     QRSD Interval 08/15/2022 162     QT Interval 08/15/2022 464     QTC Interval 08/15/2022 486     P Axis 08/15/2022 47     QRS Axis 08/15/2022 -55     T Wave Jeremiah 08/15/2022 82    Hospital Outpatient Visit on 08/01/2022   Component Date Value    LV EF 08/01/2022 25     Ventricular Rate 08/01/2022 77     QRSD Interval 08/01/2022 86     QT Interval 08/01/2022 386     QTC Interval 08/01/2022 436     QRS Axis 08/01/2022 39     T Wave Axis 08/01/2022 174     Ventricular Rate 08/01/2022 71     Atrial Rate 08/01/2022 71     NM Interval 08/01/2022 190     QRSD Interval 08/01/2022 86     QT Interval 08/01/2022 394     QTC Interval 08/01/2022 428     P Axis 08/01/2022 55     QRS Axis 08/01/2022 49     T Wave Axis 08/01/2022 130        Imaging: I have personally reviewed pertinent reports

## 2022-08-29 ENCOUNTER — TELEPHONE (OUTPATIENT)
Dept: SLEEP CENTER | Facility: CLINIC | Age: 62
End: 2022-08-29

## 2022-08-29 ENCOUNTER — OFFICE VISIT (OUTPATIENT)
Dept: FAMILY MEDICINE CLINIC | Facility: CLINIC | Age: 62
End: 2022-08-29
Payer: COMMERCIAL

## 2022-08-29 VITALS
BODY MASS INDEX: 37.91 KG/M2 | TEMPERATURE: 97.5 F | HEIGHT: 73 IN | DIASTOLIC BLOOD PRESSURE: 70 MMHG | HEART RATE: 57 BPM | WEIGHT: 286 LBS | SYSTOLIC BLOOD PRESSURE: 112 MMHG | OXYGEN SATURATION: 94 % | RESPIRATION RATE: 19 BRPM

## 2022-08-29 DIAGNOSIS — G47.33 OBSTRUCTIVE SLEEP APNEA: ICD-10-CM

## 2022-08-29 DIAGNOSIS — I48.19 PERSISTENT ATRIAL FIBRILLATION (HCC): Primary | ICD-10-CM

## 2022-08-29 DIAGNOSIS — I50.42 CHRONIC COMBINED SYSTOLIC AND DIASTOLIC HEART FAILURE (HCC): ICD-10-CM

## 2022-08-29 PROCEDURE — 1111F DSCHRG MED/CURRENT MED MERGE: CPT | Performed by: FAMILY MEDICINE

## 2022-08-29 PROCEDURE — 99495 TRANSJ CARE MGMT MOD F2F 14D: CPT | Performed by: FAMILY MEDICINE

## 2022-08-29 NOTE — PROGRESS NOTES
Assessment/Plan:   1  Persistent atrial fibrillation (HCC)/Chronic combined systolic and diastolic heart failure ([facility] )  Patient with persistent AFib  He appears at this time clinically stable  He denies any chest pain palpitations or shortness of breath  He was advised that if any symptoms should worsen, he must go directly back to the ED  at this time, will continue with his current medications recommended by Cardiology  Will continue with his amiodarone, metoprolol as well as his Xarelto  2  Obstructive sleep apnea  Reviewed patient's in history with him  At this time, he was advised that sleep apnea on may likely be a large factor playing into his atrial fibrillation  After his visit, he was walked to Sleep Medicine to further discuss follow-up visit as well as need to optimize this treatment  There are no diagnoses linked to this encounter  Subjective:       Chief Complaint   Patient presents with    Transition of Care Management      Patient ID: Yenni Mcclure is a 62 y o  male presents today for hospital follow-up patient he was admitted on August 12th and subsequently discharged on August 16  He is admitted secondary to his paroxysmal AFib  He states that he was developing dizziness as well as chest discomfort  His symptoms appear persistent while he was inpatient  He did have medication adjustment by Cardiology and eventually a cardioversion  He states that he still has been having persistent problems with his atrial fibrillation  He denies any syncopal events or chest pain at this time  He has been taking his medications regularly      TCM Call     Date and time call was made  8/16/2022  Roberto Castro care reviewed  Records reviewed    Patient was hospitial[facility] at  [facility]    Date of Admission  08/12/22    Date of discharge  08/16/22    Diagnosis  PAF    Disposition  Home    Were the patients medications reviewed and updated  No    Current Symptoms  None      TCM Call     Post hospital issues  Reduced activity    Should patient be enrolled in anticoag monitoring? No    Scheduled for follow up? Yes    Patients specialists  Cardiologist    Other specialists names  Dr Lyudmila Price     Other specialists contcat #  [phone]    Referrals needed  None     Did you obtain your prescribed medications  Yes    Do you need help managing your prescriptions or medications  No    Is transportation to your appointment needed  No    Specify why  Viet Cain is not able to drive  I have advised the patient to call PCP with any new or worsening symptoms    Reymundo Rosales MA    Living Arrangements  Spouse or Significiant other    Are you recieving any outpatient services  No    Are you recieving home care services  No    Are you using any community resources  No    Current waiver services  No    Have you fallen in the last 12 months  No    Interperter language line needed  No    Comments  Pt is scheduled for a TCM with Dr Hernandez            Review of Systems   Constitutional: Negative for activity change, chills, fatigue and fever  HENT: Negative for congestion, ear pain, sinus pressure and sore throat  Eyes: Negative for redness, itching and visual disturbance  Respiratory: Negative for cough and shortness of breath  Cardiovascular: Negative for chest pain and palpitations  Gastrointestinal: Negative for abdominal pain, diarrhea and nausea  Endocrine: Negative for cold intolerance and heat intolerance  Genitourinary: Negative for dysuria, flank pain and frequency  Musculoskeletal: Negative for arthralgias, back pain, gait problem and myalgias  Skin: Negative for color change  Allergic/Immunologic: Negative for environmental allergies  Neurological: Negative for dizziness, numbness and headaches  Psychiatric/Behavioral: Negative for behavioral problems and sleep disturbance         The following portions of the patient's history were reviewed and updated as appropriate : past family history, past medical history, past social history and past surgical history  Current Outpatient Medications:     amiodarone (PACERONE) 400 MG tablet, Take 0 5 tablets (200 mg total) by mouth daily, Disp: 15 tablet, Rfl: 0    atorvastatin (LIPITOR) 40 mg tablet, Take 1 tablet (40 mg total) by mouth daily with dinner, Disp: 90 tablet, Rfl: 3    fexofenadine (ALLEGRA) 180 MG tablet, Take 1 tablet (180 mg total) by mouth in the morning , Disp: 30 tablet, Rfl: 3    furosemide (LASIX) 20 mg tablet, Take 1 tablet (20 mg total) by mouth daily, Disp: 30 tablet, Rfl: 0    metoprolol tartrate 75 MG TABS, Take 1 tablet (75 mg total) by mouth every 12 (twelve) hours, Disp: 60 tablet, Rfl: 0    Multiple Vitamins-Minerals (MULTIVITAMIN ADULT EXTRA C PO), Take 1 capsule by mouth, Disp: , Rfl:     pantoprazole (PROTONIX) 40 mg tablet, TAKE 1 TABLET EVERY 12 HOURS (Patient taking differently: every 12 (twelve) hours), Disp: 180 tablet, Rfl: 3    potassium chloride (K-DUR,KLOR-CON) 20 mEq tablet, TAKE 2 TABLETS BY MOUTH IN AM, 1 TABLET IN PM, Disp: 270 tablet, Rfl: 3    rivaroxaban (Xarelto) 20 mg tablet, Take 1 tablet (20 mg total) by mouth daily with breakfast, Disp: 90 tablet, Rfl: 3    albuterol (PROVENTIL HFA,VENTOLIN HFA) 90 mcg/act inhaler, INHALE 2 PUFFS EVERY 6 HOURS AS NEEDED FOR WHEEZING (Patient not taking: No sig reported), Disp: 18 g, Rfl: 0         Objective:         Vitals:    08/29/22 1438   BP: 112/70   Pulse: 57   Resp: 19   Temp: 97 5 °F (36 4 °C)   TempSrc: Tympanic   SpO2: 94%   Weight: 130 kg (286 lb)   Height: 6' 1" (1 854 m)     Physical Exam  Vitals reviewed  Constitutional:       Appearance: He is well-developed  HENT:      Head: Normocephalic and atraumatic  Nose: Nose normal       Mouth/Throat:      Pharynx: No oropharyngeal exudate  Eyes:      General: No scleral icterus  Right eye: No discharge  Left eye: No discharge  Pupils: Pupils are equal, round, and reactive to light  Neck:      Trachea: No tracheal deviation  Cardiovascular:      Rate and Rhythm: Normal rate and regular rhythm  Pulses:           Dorsalis pedis pulses are 2+ on the right side and 2+ on the left side  Posterior tibial pulses are 2+ on the right side and 2+ on the left side  Heart sounds: Normal heart sounds  No murmur heard  No friction rub  No gallop  Pulmonary:      Effort: Pulmonary effort is normal  No respiratory distress  Breath sounds: Normal breath sounds  No wheezing or rales  Abdominal:      General: Bowel sounds are normal  There is no distension  Palpations: Abdomen is soft  Tenderness: There is no abdominal tenderness  There is no guarding or rebound  Musculoskeletal:         General: Normal range of motion  Cervical back: Normal range of motion and neck supple  Lymphadenopathy:      Head:      Right side of head: No submental or submandibular adenopathy  Left side of head: No submental or submandibular adenopathy  Cervical: No cervical adenopathy  Right cervical: No superficial, deep or posterior cervical adenopathy  Left cervical: No superficial, deep or posterior cervical adenopathy  Skin:     General: Skin is warm and dry  Findings: No erythema  Neurological:      Mental Status: He is alert and oriented to person, place, and time  Cranial Nerves: No cranial nerve deficit  Sensory: No sensory deficit  Psychiatric:         Mood and Affect: Mood is not anxious or depressed  Speech: Speech normal          Behavior: Behavior normal          Thought Content:  Thought content normal          Judgment: Judgment normal

## 2022-08-29 NOTE — TELEPHONE ENCOUNTER
Recalled the patient left message on his cell and left message with family member at his home number  Patient's sleep study done in May shows moderate to severe SRIKANTH and APAP ordered  Patient needs to be scheduled for DME set up       Letter sent to the patient also

## 2022-08-29 NOTE — TELEPHONE ENCOUNTER
----- Message from Johan Fairchild Juancho  sent at 8/28/2022  8:13 PM EDT -----  Regarding: follow up/CPAP set up  [facility],  I saw him back in May for his consultation and we got him in for a CPAP titration study within 2 days  The PAP equipment was ordered as soon as the test was resulted  The notes indicate that the office left him a message to schedule the set up of the equipment but he didn't call back  I will cc our office nurses to ask them to reach out again to set up the appointment for set up of the PAP equipment  Thank you for letting me know,  Tehsa Powell     ----- Message -----  From: Momo De Leon PA-C  Sent: 8/26/2022   2:47 PM EDT  To: Yoel Florissant, CRNP    [place],     This is susan with electrophysiology  I am struggling to get this guys afib controlled  Can you update me on the process of him getting his CPAP? Thank you!      Viola Epperson

## 2022-08-30 ENCOUNTER — DOCUMENTATION (OUTPATIENT)
Dept: CARDIOLOGY CLINIC | Facility: CLINIC | Age: 62
End: 2022-08-30

## 2022-08-30 NOTE — PROGRESS NOTES
MD Johnathon Smith PA-C; Scott Waldo, DO  This patient is unable to maintain sinus rhythm   Haze Buerger has made some very pertinent observation     His EF remains low     I think the best approach for him   -aggressive management of heart failure, at least try to start an Ace inhibitor/ARB     -proceed with Bi V ICD as soon as possible after initiation of medical therapy / or documenting that side effects does not allow it to be initiated     - once Bi V paced for at least 3-4 months, hopefully aggressive management of heart failure will allow for better management of AFib     - at that time can consider for comprehensive ablation   He will need a PVI and posterior wall which is close to 4-6 hours of ablation   Lower risk if EF has improved over 40%

## 2022-09-02 ENCOUNTER — TELEPHONE (OUTPATIENT)
Dept: CARDIOLOGY CLINIC | Facility: CLINIC | Age: 62
End: 2022-09-02

## 2022-09-02 LAB

## 2022-09-09 LAB

## 2022-09-12 ENCOUNTER — TELEPHONE (OUTPATIENT)
Dept: SLEEP CENTER | Facility: CLINIC | Age: 62
End: 2022-09-12

## 2022-09-12 DIAGNOSIS — I48.0 PAF (PAROXYSMAL ATRIAL FIBRILLATION) (HCC): ICD-10-CM

## 2022-09-12 LAB

## 2022-09-12 RX ORDER — METOPROLOL TARTRATE 75 MG/1
75 TABLET, FILM COATED ORAL EVERY 12 HOURS SCHEDULED
Qty: 180 TABLET | Refills: 2 | Status: SHIPPED | OUTPATIENT
Start: 2022-09-12 | End: 2022-09-22 | Stop reason: SDUPTHER

## 2022-09-21 ENCOUNTER — TELEPHONE (OUTPATIENT)
Dept: SLEEP CENTER | Facility: CLINIC | Age: 62
End: 2022-09-21

## 2022-09-21 DIAGNOSIS — Z78.9 DIFFICULTY USING CONTINUOUS POSITIVE AIRWAY PRESSURE (CPAP) DEVICE: ICD-10-CM

## 2022-09-21 DIAGNOSIS — G47.33 OBSTRUCTIVE SLEEP APNEA: Primary | ICD-10-CM

## 2022-09-21 LAB
DME PARACHUTE DELIVERY DATE REQUESTED: NORMAL
DME PARACHUTE ITEM DESCRIPTION: NORMAL
DME PARACHUTE ORDER STATUS: NORMAL
DME PARACHUTE SUPPLIER NAME: NORMAL
DME PARACHUTE SUPPLIER PHONE: NORMAL

## 2022-09-21 NOTE — PROGRESS NOTES
Data from CPAP titration study reviewed  Will start with APAP 9-15cm  Patient will need to schedule an appointment to bring REMA equipment in for pressure change ASAP, due to compliance and severity of SRIKANTH and cardiac issues

## 2022-09-21 NOTE — TELEPHONE ENCOUNTER
BINU Eason at 9/21/2022 10:20 AM    Status: Cosign Needed Cosign Required: Yes      Data from CPAP titration study reviewed  Will start with APAP 9-15cm  Patient will need to schedule an appointment to bring REMA equipment in for pressure change ASAP, due to compliance and severity of SRIKANTH and cardiac issues          Called the patient left a message for him to call back about pressure change and schedule an appointment to have the pressure changed manually   Pressure change order sent to Silvano Becketttao

## 2022-09-22 ENCOUNTER — TELEPHONE (OUTPATIENT)
Dept: SLEEP CENTER | Facility: CLINIC | Age: 62
End: 2022-09-22

## 2022-09-22 DIAGNOSIS — I48.0 PAF (PAROXYSMAL ATRIAL FIBRILLATION) (HCC): ICD-10-CM

## 2022-09-22 NOTE — TELEPHONE ENCOUNTER
Requested medication(s) are due for refill today: Yes  Patient has already received a courtesy refill: No  Other reason request has been forwarded to provider: failed        Patient did not receive his #90 day supply from mail order

## 2022-09-23 RX ORDER — AMIODARONE HYDROCHLORIDE 400 MG/1
200 TABLET ORAL DAILY
Qty: 15 TABLET | Refills: 3 | Status: SHIPPED | OUTPATIENT
Start: 2022-09-23 | End: 2022-10-24 | Stop reason: SDUPTHER

## 2022-09-23 RX ORDER — METOPROLOL TARTRATE 75 MG/1
75 TABLET, FILM COATED ORAL EVERY 12 HOURS SCHEDULED
Qty: 60 TABLET | Refills: 3 | Status: SHIPPED | OUTPATIENT
Start: 2022-09-23 | End: 2022-10-03

## 2022-10-03 ENCOUNTER — OFFICE VISIT (OUTPATIENT)
Dept: CARDIOLOGY CLINIC | Facility: CLINIC | Age: 62
End: 2022-10-03
Payer: COMMERCIAL

## 2022-10-03 VITALS
HEART RATE: 50 BPM | HEIGHT: 73 IN | BODY MASS INDEX: 37.51 KG/M2 | DIASTOLIC BLOOD PRESSURE: 68 MMHG | WEIGHT: 283 LBS | SYSTOLIC BLOOD PRESSURE: 100 MMHG

## 2022-10-03 DIAGNOSIS — I42.8 NONISCHEMIC CARDIOMYOPATHY (HCC): ICD-10-CM

## 2022-10-03 DIAGNOSIS — I50.42 CHRONIC COMBINED SYSTOLIC AND DIASTOLIC HEART FAILURE (HCC): ICD-10-CM

## 2022-10-03 DIAGNOSIS — I48.11 LONGSTANDING PERSISTENT ATRIAL FIBRILLATION (HCC): ICD-10-CM

## 2022-10-03 DIAGNOSIS — I48.0 PAF (PAROXYSMAL ATRIAL FIBRILLATION) (HCC): Primary | ICD-10-CM

## 2022-10-03 DIAGNOSIS — R61 DIAPHORESIS: ICD-10-CM

## 2022-10-03 PROCEDURE — 99214 OFFICE O/P EST MOD 30 MIN: CPT | Performed by: INTERNAL MEDICINE

## 2022-10-03 PROCEDURE — 93000 ELECTROCARDIOGRAM COMPLETE: CPT | Performed by: INTERNAL MEDICINE

## 2022-10-03 RX ORDER — METOPROLOL SUCCINATE 50 MG/1
75 TABLET, EXTENDED RELEASE ORAL DAILY
Qty: 30 TABLET | Refills: 5 | Status: SHIPPED | OUTPATIENT
Start: 2022-10-03 | End: 2022-10-24 | Stop reason: SDUPTHER

## 2022-10-03 RX ORDER — LISINOPRIL 10 MG/1
10 TABLET ORAL DAILY
Qty: 90 TABLET | Refills: 3 | Status: SHIPPED | OUTPATIENT
Start: 2022-10-03

## 2022-10-03 NOTE — PROGRESS NOTES
[facility]      [facility]             Lisa Lao  1960  [id number]              Assessment/Plan:    Paroxysmal atrial fibrillation, on amiodarone suppression, sinus bradycardia on today's visit  Nonischemic cardiomyopathy, ejection fraction 88%  Chronic systolic and diastolic CHF  History of LA thrombus, resolved on follow-up TI 08/01/2022  Left bundle branch block  Sleep apnea  Obesity with history of gastric bypass surgery  History of GI bleed up to NSAID use in 2020      Fortunately, sinus rhythm on today's ECG, actually bradycardic at 50 beats per minute  Will decrease metoprolol from 75 mg b i d  to 75 mg once daily and changed to succinate  Will start lisinopril 10 mg daily for cardiomyopathy  His blood pressure is marginally low, therefore he will call me if he experiences any lightheadedness or dizziness  Will check BMP and NT proBNP in 2-3 weeks  Hopefully cutting back on metoprolol will also help with introducing ACE-inhibitor  If he is able to tolerate least 20 mg of lisinopril, may consider switching to Josph Charm in the future  However do not think he will be able to tolerate Entresto at this time with his marginally low blood pressures  Repeat echocardiogram has been scheduled for 11/02/2022  Will recheck to electrophysiology to see if Bi V ICD should be placed before then or if we should rather wait  Patient with episodes of diaphoresis that occurs somewhat randomly occasionally after drinking a heart beverage  Will check TSH and plasma metanephrines and cortisol level  In-hospital, episodes of diaphoresis did not correlate with slow or fast heart rate at the time of atrial fibrillation  Follow-up in 4-6 weeks        Interval History:      This is a very pleasant 52-year-old male hospitalized 2/2022 shortly after he had a positive home COVID test and symptoms of vomiting, diarrhea, mild dyspnea, and upper respiratory symptoms   He came to the ED 2/13/2022, with exertional dyspnea   ECG demonstrated a new diagnosis of atrial fibrillation with RVR   CT chest revealed no evidence of pulmonary embolism, although bilateral multilobar infiltrates were present, suggestive of COVID-19 pneumonia   He converted to sinus rhythm with diltiazem IV   His chads Vasc score was 0, therefore he was maintained off anticoagulation   Beta-blockade was initiated        Subsequently, he was seen by our nurse practitioner on 02/24/2022, and rapid atrial fibrillation with symptoms of lightheadedness and exertional dyspnea   He was sent back to the ED where he was given IV diltiazem once again and converted back to sinus rhythm with symptomatic improvement   He was discharged without adjustment of his medications  [facility] was seen by his PCP 02/25/2022 rate was noted to have recurrent atrial fibrillation with RVR at 125 beats per minute and minimal symptoms   Diltiazem 60 mg p o  b i d  was initiated  [facility] went to the ER Saturday 02/26/2022 for atypical chest pain   He was ruled out for myocardial injury with negative high sensitivity troponins x2 and no ischemic ECG changes        He underwent a nuclear stress test 04/07/2022 which revealed no evidence of myocardial ischemia or prior infarction by SPECT imaging, with ejection fraction 32%   This was consistent with nonischemic cardiomyopathy      Beka presented back to the hospital 4/2022 with acute CHF and AFib with RVR   TI revealed a severely reduced left ventricular systolic function with a small thrombus about 0 6 cm noted in a very dilated left atrial appendage   Cardioversion was not performed  [facility] was discharged on a rate control strategy with anticoagulation      He was seen by electrophysiology (Dr Martinez) 05/16/2022 at which time rate control with anticoagulation was recommended with repeat TI in 2 months   He was recommended to start amiodarone after resolution of his atrial appendage thrombus with subsequent cardioversion to try maintaining sinus rhythm  Cessation of alcohol intake was strongly recommended   It was also recommended to plan for comprehensive ablation-PVI once clinically stable  He underwent successful IT/cardioversion 08/01/2022  Ejection fraction is 25% with no further thrombus in left atrial appendage despite moderate continuous spontaneous echo contrast   He was initiated on amiodarone  He called with recurrent palpitations and was noted to be back in atrial fibrillation  Before repeat elective TI/cardioversion was scheduled, he was hospitalized 08/12/2022 with symptoms of orthopnea and atrial fibrillation with slow ventricular response  He was initiated on IV furosemide  Interestingly was noted to have episodes of diaphoresis which she called the nurse for  However on telemetry his rates were not slow or fast and high sensitivity troponins were negative  On 08/15/2022 he underwent successful TI/cardioversion  There was resolution of his left atrial appendage thrombus  He was continued on amiodarone at 200 mg daily  He was seen by electrophysiology 08/26/2022 and was unfortunately noted to be back in atrial fibrillation  His weight was 87 beats per minute  It was recommended to initiate and optimize heart failure medications including ACE-inhibitor/ARB  Bi V ICD was recommended after initiation of medical therapy with consideration of future comprehensive ablation  He presents today for follow-up  Continues to experience exertional dyspnea although will slow down when he gets a bit short of breath  He denies lower extremity edema, chest discomfort, lightheadedness, palpitations  He has cut down on drinking alcohol for now only drinks light beer will check twice on the weekend  He has not been able to tolerate CPAP therapy  Continues to have occasional episodes of diaphoresis while he is at home which occurs fairly randomly, occasionally after drinking a heart beverage                        Vitals:  Vitals: 10/03/22 1340   BP: 100/68   Pulse: (!) 50   Weight: 128 kg (283 lb)   Height: 6' 1" (1 854 m)         Past Medical History:   Diagnosis Date    A-fib ([facility] )     Acute ulcer of stomach     CHF (congestive heart failure) (HCC)     GERD (gastroesophageal reflux disease)     Rib fractures      Social History     Socioeconomic History    Marital status: /Civil Union     Spouse name: Not on file    Number of children: Not on file    Years of education: Not on file    Highest education level: Not on file   Occupational History    Not on file   Tobacco Use    Smoking status: Former Smoker     Types: Cigars    Smokeless tobacco: Former User     Types: Chew   Vaping Use    Vaping Use: Never used   Substance and Sexual Activity    Alcohol use: Yes     Alcohol/week: 7 0 standard drinks     Types: 7 Cans of beer per week     Comment: Hasn't had any beer since 4/23/22    Drug use: No    Sexual activity: Not on file   Other Topics Concern    Not on file   Social History Narrative    Always uses seat belt    Feels safe at home    No guns in the home     Social Determinants of Health     Financial Resource Strain: Not on file   Food Insecurity: No Food Insecurity    Worried About Running Out of Food in the Last Year: Never true    Noe of Food in the Last Year: Never true   Transportation Needs: No Transportation Needs    Lack of Transportation (Medical): No    Lack of Transportation (Non-Medical):  No   Physical Activity: Not on file   Stress: Not on file   Social Connections: Not on file   Intimate Partner Violence: Not on file   Housing Stability: Low Risk     Unable to Pay for Housing in the Last Year: No    Number of Places Lived in the Last Year: 1    Unstable Housing in the Last Year: No      Family History   Problem Relation Age of Onset    Seizures Father      Past Surgical History:   Procedure Laterality Date    COLONOSCOPY      ESOPHAGOGASTRODUODENOSCOPY      GASTRIC BYPASS      MANDIBLE FRACTURE SURGERY         Current Outpatient Medications:     amiodarone (PACERONE) 400 MG tablet, Take 0 5 tablets (200 mg total) by mouth daily, Disp: 15 tablet, Rfl: 3    atorvastatin (LIPITOR) 40 mg tablet, Take 1 tablet (40 mg total) by mouth daily with dinner, Disp: 90 tablet, Rfl: 3    fexofenadine (ALLEGRA) 180 MG tablet, Take 1 tablet (180 mg total) by mouth in the morning , Disp: 30 tablet, Rfl: 3    furosemide (LASIX) 20 mg tablet, Take 1 tablet (20 mg total) by mouth daily, Disp: 30 tablet, Rfl: 0    lisinopril (ZESTRIL) 10 mg tablet, Take 1 tablet (10 mg total) by mouth daily, Disp: 90 tablet, Rfl: 3    metoprolol succinate (TOPROL-XL) 50 mg 24 hr tablet, Take 1 5 tablets (75 mg total) by mouth daily, Disp: 30 tablet, Rfl: 5    pantoprazole (PROTONIX) 40 mg tablet, TAKE 1 TABLET EVERY 12 HOURS (Patient taking differently: every 12 (twelve) hours), Disp: 180 tablet, Rfl: 3    potassium chloride (K-DUR,KLOR-CON) 20 mEq tablet, TAKE 2 TABLETS BY MOUTH IN AM, 1 TABLET IN PM, Disp: 270 tablet, Rfl: 3    rivaroxaban (Xarelto) 20 mg tablet, Take 1 tablet (20 mg total) by mouth daily with breakfast, Disp: 90 tablet, Rfl: 3    albuterol (PROVENTIL HFA,VENTOLIN HFA) 90 mcg/act inhaler, INHALE 2 PUFFS EVERY 6 HOURS AS NEEDED FOR WHEEZING (Patient not taking: No sig reported), Disp: 18 g, Rfl: 0    Multiple Vitamins-Minerals (MULTIVITAMIN ADULT EXTRA C PO), Take 1 capsule by mouth (Patient not taking: Reported on 10/3/2022), Disp: , Rfl:         Review of Systems:  Review of Systems   Respiratory: Negative  Cardiovascular: Negative  All other systems reviewed and are negative  Physical Exam:  Physical Exam  Constitutional:       General: He is not in acute distress  Appearance: He is well-developed  He is not diaphoretic  HENT:      Head: Normocephalic and atraumatic  Eyes:      General: No scleral icterus  Right eye: No discharge        Pupils: Pupils are equal, round, and reactive to light  Neck:      Thyroid: No thyromegaly  Cardiovascular:      Rate and Rhythm: Normal rate and regular rhythm  Heart sounds: Normal heart sounds  No murmur heard  No friction rub  No gallop  Pulmonary:      Effort: Pulmonary effort is normal       Breath sounds: Normal breath sounds  Abdominal:      General: There is no distension  Tenderness: There is no abdominal tenderness  There is no guarding or rebound  Musculoskeletal:         General: Normal range of motion  Cervical back: Normal range of motion and neck supple  Skin:     General: Skin is warm and dry  Coloration: Skin is not pale  Findings: No erythema or rash  Neurological:      Mental Status: He is alert and oriented to person, place, and time  Coordination: Coordination normal    Psychiatric:         Behavior: Behavior normal          Thought Content: Thought content normal          Judgment: Judgment normal          This note was completed in part utilizing [employer] Direct Software  Grammatical errors, random word insertions, spelling mistakes, and incomplete sentences can be an occasional consequence of this system secondary to software limitations, ambient noise, and hardware issues  If you have any questions or concerns about the content, text, or information contained within the body of this dictation, please contact the provider for clarification

## 2022-10-12 ENCOUNTER — TELEPHONE (OUTPATIENT)
Dept: CARDIOLOGY CLINIC | Facility: CLINIC | Age: 62
End: 2022-10-12

## 2022-10-12 ENCOUNTER — PREP FOR PROCEDURE (OUTPATIENT)
Dept: CARDIOLOGY CLINIC | Facility: CLINIC | Age: 62
End: 2022-10-12

## 2022-10-12 DIAGNOSIS — I50.9 CONGESTIVE HEART FAILURE, UNSPECIFIED HF CHRONICITY, UNSPECIFIED HEART FAILURE TYPE (HCC): Primary | ICD-10-CM

## 2022-10-12 NOTE — TELEPHONE ENCOUNTER
Patient scheduled for BIV ICD  implant at [facility] on 11/11/22 with Dr Martinez  Patient aware of general instructions, labs test required    Lasix hold the Am of  Can we please check insurance for approval      Dr Dony Terrazas can you please advised Xarelto hold to apply prior of procedure

## 2022-10-12 NOTE — TELEPHONE ENCOUNTER
----- Message from Jaky Nicolas MD sent at 10/10/2022 12:47 PM EDT -----  Have the patient set up for Bi V ICD    ----- Message -----  From: Evy Wren DO  Sent: 10/3/2022   2:13 PM EDT  To: Jaky Nicolas MD, JENFIER Byrnes,     I saw Sher Carter today  He's agreeable for BiVICD  OK to get this scheduled? I started lisinopril today at 10 mg  His SBP is only 100 so I didn't think Entresto would be well tolerated  He's in SR fortunately, but I did lower the metoprolol from 75 BID to once daily as his HR was 50  I also wanted to give him more BP room for his ACE  If you think appropriate, please schedule BiVICD  Thanks!     Imani

## 2022-10-20 ENCOUNTER — APPOINTMENT (OUTPATIENT)
Dept: LAB | Facility: CLINIC | Age: 62
End: 2022-10-20
Payer: COMMERCIAL

## 2022-10-20 DIAGNOSIS — I48.0 PAF (PAROXYSMAL ATRIAL FIBRILLATION) (HCC): ICD-10-CM

## 2022-10-20 DIAGNOSIS — I42.8 NONISCHEMIC CARDIOMYOPATHY (HCC): ICD-10-CM

## 2022-10-20 DIAGNOSIS — R61 DIAPHORESIS: ICD-10-CM

## 2022-10-20 LAB
ANION GAP SERPL CALCULATED.3IONS-SCNC: 7 MMOL/L (ref 4–13)
BUN SERPL-MCNC: 19 MG/DL (ref 5–25)
CALCIUM SERPL-MCNC: 9 MG/DL (ref 8.3–10.1)
CHLORIDE SERPL-SCNC: 107 MMOL/L (ref 96–108)
CO2 SERPL-SCNC: 24 MMOL/L (ref 21–32)
CORTIS SERPL-MCNC: 14.8 UG/DL
CREAT SERPL-MCNC: 1.19 MG/DL (ref 0.6–1.3)
GFR SERPL CREATININE-BSD FRML MDRD: 65 ML/MIN/1.73SQ M
GLUCOSE P FAST SERPL-MCNC: 105 MG/DL (ref 65–99)
NT-PROBNP SERPL-MCNC: 766 PG/ML
POTASSIUM SERPL-SCNC: 4.2 MMOL/L (ref 3.5–5.3)
SODIUM SERPL-SCNC: 138 MMOL/L (ref 135–147)
T4 FREE SERPL-MCNC: 1.25 NG/DL (ref 0.76–1.46)
TSH SERPL DL<=0.05 MIU/L-ACNC: 5.76 UIU/ML (ref 0.45–4.5)

## 2022-10-20 PROCEDURE — 80048 BASIC METABOLIC PNL TOTAL CA: CPT | Performed by: INTERNAL MEDICINE

## 2022-10-20 PROCEDURE — 36415 COLL VENOUS BLD VENIPUNCTURE: CPT | Performed by: INTERNAL MEDICINE

## 2022-10-20 PROCEDURE — 83880 ASSAY OF NATRIURETIC PEPTIDE: CPT

## 2022-10-20 PROCEDURE — 84439 ASSAY OF FREE THYROXINE: CPT | Performed by: INTERNAL MEDICINE

## 2022-10-20 PROCEDURE — 84443 ASSAY THYROID STIM HORMONE: CPT | Performed by: INTERNAL MEDICINE

## 2022-10-20 PROCEDURE — 82533 TOTAL CORTISOL: CPT

## 2022-10-20 PROCEDURE — 83835 ASSAY OF METANEPHRINES: CPT | Performed by: INTERNAL MEDICINE

## 2022-10-24 DIAGNOSIS — I48.0 PAF (PAROXYSMAL ATRIAL FIBRILLATION) (HCC): ICD-10-CM

## 2022-10-24 DIAGNOSIS — I42.8 NONISCHEMIC CARDIOMYOPATHY (HCC): ICD-10-CM

## 2022-10-24 RX ORDER — METOPROLOL SUCCINATE 50 MG/1
75 TABLET, EXTENDED RELEASE ORAL DAILY
Qty: 135 TABLET | Refills: 2 | Status: SHIPPED | OUTPATIENT
Start: 2022-10-24

## 2022-10-24 RX ORDER — AMIODARONE HYDROCHLORIDE 400 MG/1
200 TABLET ORAL DAILY
Qty: 45 TABLET | Refills: 3 | Status: SHIPPED | OUTPATIENT
Start: 2022-10-24 | End: 2022-11-23

## 2022-10-25 LAB
METANEPH FREE SERPL-MCNC: 56.5 PG/ML (ref 0–88)
NORMETANEPHRINE SERPL-MCNC: 116.8 PG/ML (ref 0–244)

## 2022-11-02 ENCOUNTER — HOSPITAL ENCOUNTER (OUTPATIENT)
Dept: NON INVASIVE DIAGNOSTICS | Facility: HOSPITAL | Age: 62
Discharge: HOME | End: 2022-11-02
Attending: INTERNAL MEDICINE

## 2022-11-02 VITALS
HEART RATE: 50 BPM | WEIGHT: 283 LBS | DIASTOLIC BLOOD PRESSURE: 68 MMHG | BODY MASS INDEX: 37.51 KG/M2 | HEIGHT: 73 IN | SYSTOLIC BLOOD PRESSURE: 100 MMHG

## 2022-11-02 DIAGNOSIS — I42.8 NICM (NONISCHEMIC CARDIOMYOPATHY) (HCC): ICD-10-CM

## 2022-11-04 LAB
AORTIC ROOT: 3 CM
APICAL FOUR CHAMBER EJECTION FRACTION: 43 %
E WAVE DECELERATION TIME: 273 MS
FRACTIONAL SHORTENING: 36 (ref 28–44)
INTERVENTRICULAR SEPTUM IN DIASTOLE (PARASTERNAL SHORT AXIS VIEW): 1.3 CM
INTERVENTRICULAR SEPTUM: 1.3 CM (ref 0.6–1.1)
LAAS-AP2: 32.4 CM2
LAAS-AP4: 27.9 CM2
LEFT ATRIUM SIZE: 4.7 CM
LEFT INTERNAL DIMENSION IN SYSTOLE: 3.6 CM (ref 2.1–4)
LEFT VENTRICULAR INTERNAL DIMENSION IN DIASTOLE: 5.6 CM (ref 3.5–6)
LEFT VENTRICULAR POSTERIOR WALL IN END DIASTOLE: 1.2 CM
LEFT VENTRICULAR STROKE VOLUME: 99 ML
LVSV (TEICH): 99 ML
MV E'TISSUE VEL-SEP: 8 CM/S
MV PEAK A VEL: 0.44 M/S
MV PEAK E VEL: 74 CM/S
MV STENOSIS PRESSURE HALF TIME: 79 MS
MV VALVE AREA P 1/2 METHOD: 2.78
RIGHT ATRIUM AREA SYSTOLE A4C: 19.3 CM2
RIGHT VENTRICLE ID DIMENSION: 4.5 CM
SL CV LEFT ATRIUM LENGTH A2C: 6.4 CM
SL CV LV EF: 43
SL CV PED ECHO LEFT VENTRICLE DIASTOLIC VOLUME (MOD BIPLANE) 2D: 155 ML
SL CV PED ECHO LEFT VENTRICLE SYSTOLIC VOLUME (MOD BIPLANE) 2D: 56 ML

## 2022-11-07 ENCOUNTER — TELEPHONE (OUTPATIENT)
Dept: CARDIOLOGY CLINIC | Facility: CLINIC | Age: 62
End: 2022-11-07

## 2022-11-07 NOTE — TELEPHONE ENCOUNTER
----- Message from Tanya Jain MD sent at 11/6/2022  2:44 PM EST -----  This patient's EF has improved   He does not qualify for a Bi V ICD at this time   Cancel the procedure set up later this week   We will wait for another 1-2 months before proceeding with the AFib ablation  I expect his EF will continue to improve and it will be safer to ablate in 1-2 months        ----- Message -----  From: Candi Velasquez DO  Sent: 11/4/2022   3:37 PM EST  To: Tanya Jain MD, JENIFER Ji,   New echo on this pt with improved LVEF, now 43%, back in SR  Please re-evaluate need for BiV AICD  Thanks!     Imani

## 2022-11-07 NOTE — TELEPHONE ENCOUNTER
Called patient and LVM in regard procedure scheduled for 11/11/2 cancelled per Dr Gabrielle Starr, due that EF improved, patient is not a candidate for device at the moment

## 2022-11-10 ENCOUNTER — TELEPHONE (OUTPATIENT)
Dept: CARDIOLOGY CLINIC | Facility: CLINIC | Age: 62
End: 2022-11-10

## 2022-11-10 DIAGNOSIS — I42.8 NICM (NONISCHEMIC CARDIOMYOPATHY) (HCC): ICD-10-CM

## 2022-11-10 DIAGNOSIS — I48.0 PAF (PAROXYSMAL ATRIAL FIBRILLATION) (HCC): Primary | ICD-10-CM

## 2022-11-10 NOTE — TELEPHONE ENCOUNTER
----- Message from Gricelda Monique Aline AndradeBrighton sent at 11/10/2022  7:26 AM EST -----    ----- Message -----  From: Merline Hones, MD  Sent: 11/9/2022   9:41 PM EST  To: Ozzie Almanzar DO, John Whitney MA, #    Repeat a limited echo for EF in 2 months' time  ----- Message -----  From: Ozzie Almanzar DO  Sent: 11/9/2022   3:08 PM EST  To: Merline Hones, MD, John Whitney MA, #    Thanks Felipe!      ----- Message -----  From: Merline Hones, MD  Sent: 11/6/2022   2:46 PM EST  To: Ozzie Almanzar DO, John Whitney MA, #    This patient's EF has improved   He does not qualify for a Bi V ICD at this time   Cancel the procedure set up later this week   We will wait for another 1-2 months before proceeding with the AFib ablation  I expect his EF will continue to improve and it will be safer to ablate in 1-2 months        ----- Message -----  From: Ozzie Almanzar DO  Sent: 11/4/2022   3:37 PM EST  To: Merline Hones, MD, JENIFER Cid,   New echo on this pt with improved LVEF, now 43%, back in SR  Please re-evaluate need for BiV AICD  Thanks!     Imani

## 2022-11-16 DIAGNOSIS — I42.8 NONISCHEMIC CARDIOMYOPATHY (HCC): ICD-10-CM

## 2022-11-16 DIAGNOSIS — I48.0 PAF (PAROXYSMAL ATRIAL FIBRILLATION) (HCC): ICD-10-CM

## 2022-11-16 RX ORDER — AMIODARONE HYDROCHLORIDE 200 MG/1
200 TABLET ORAL DAILY
Qty: 90 TABLET | Refills: 0 | Status: SHIPPED | OUTPATIENT
Start: 2022-11-16

## 2022-11-16 RX ORDER — AMIODARONE HYDROCHLORIDE 200 MG/1
200 TABLET ORAL DAILY
Qty: 90 TABLET | Refills: 3 | Status: SHIPPED | OUTPATIENT
Start: 2022-11-16 | End: 2022-11-16 | Stop reason: SDUPTHER

## 2022-11-16 RX ORDER — METOPROLOL SUCCINATE 50 MG/1
75 TABLET, EXTENDED RELEASE ORAL DAILY
Qty: 135 TABLET | Refills: 3 | Status: SHIPPED | OUTPATIENT
Start: 2022-11-16 | End: 2022-11-16 | Stop reason: SDUPTHER

## 2022-11-16 RX ORDER — METOPROLOL SUCCINATE 50 MG/1
75 TABLET, EXTENDED RELEASE ORAL DAILY
Qty: 135 TABLET | Refills: 0 | Status: SHIPPED | OUTPATIENT
Start: 2022-11-16

## 2022-11-30 ENCOUNTER — OFFICE VISIT (OUTPATIENT)
Dept: SLEEP CENTER | Facility: CLINIC | Age: 62
End: 2022-11-30

## 2022-11-30 VITALS
BODY MASS INDEX: 38.43 KG/M2 | SYSTOLIC BLOOD PRESSURE: 130 MMHG | DIASTOLIC BLOOD PRESSURE: 82 MMHG | HEART RATE: 65 BPM | HEIGHT: 73 IN | WEIGHT: 290 LBS

## 2022-11-30 DIAGNOSIS — Z78.9 INTOLERANCE OF CONTINUOUS POSITIVE AIRWAY PRESSURE (CPAP) VENTILATION: ICD-10-CM

## 2022-11-30 DIAGNOSIS — G47.33 OBSTRUCTIVE SLEEP APNEA: Primary | ICD-10-CM

## 2022-11-30 DIAGNOSIS — I48.0 PAF (PAROXYSMAL ATRIAL FIBRILLATION) (HCC): ICD-10-CM

## 2022-11-30 DIAGNOSIS — I50.23 ACUTE ON CHRONIC SYSTOLIC CHF (CONGESTIVE HEART FAILURE) (HCC): ICD-10-CM

## 2022-11-30 DIAGNOSIS — F40.240 CLAUSTROPHOBIA: ICD-10-CM

## 2022-11-30 DIAGNOSIS — E66.9 OBESITY (BMI 30-39.9): ICD-10-CM

## 2022-11-30 NOTE — PROGRESS NOTES
Progress Note - [address] [address] Foster Lao 62 y o  male   :1960, MRN: [id number]  2022        Follow Up Evaluation / Problem: Moderate to Severe Obstructive Sleep Apnea  Intolerance of CPAP therapy      Thank you for the opportunity of participating in the evaluation and care of this patient in the Sleep Clinic at [facility]  HPI: Chelsi Bruner is a 62y o  year old male  The patient presents for follow up of moderate to severe obstructive sleep apnea with significant hypoxia  SRIKANTH was confirmed with a diagnostic sleep study in 2022  AHI was 24 3, worsening to 27 1 in supine sleep and 43 6 in REM sleep  Oxygen zee was 80% with 15 minutes spent at levels less than 90%  A CPAP titration study was completed in May 2022  He was successfully titrated to 15cm of water pressure with residual events at 1 7 per hour  He was set up with CPAP equipment in a pressure range of 15-20cm, as recommended  He presents to review compliance and effectiveness of treatment      Review of Systems      Genitourinary need to urinate more than twice a night   Cardiology Frequent chest pain or angina,  and palpitations/fluttering feeling in the chest   Gastrointestinal none   Neurology muscle weakness, numbness/tingling of an extremity, forgetfulness, poor concentration or confusion, , difficulty with memory and balance problems   Constitutional excessive sweating at night   Integumentary none   Psychiatry depression   Musculoskeletal joint pain, muscle aches, back pain and leg cramps   Pulmonary shortness of breath with activity, chest tightness, frequent cough and difficulty breathing when lying flat    ENT none   Endocrine excessive thirst   Hematological none       Current Outpatient Medications:   •  amiodarone 200 mg tablet, Take 1 tablet (200 mg total) by mouth daily, Disp: 90 tablet, Rfl: 0  •  atorvastatin (LIPITOR) 40 mg tablet, Take 1 tablet (40 mg total) by mouth daily with dinner, Disp: 90 tablet, Rfl: 3  •  fexofenadine (ALLEGRA) 180 MG tablet, Take 1 tablet (180 mg total) by mouth in the morning , Disp: 30 tablet, Rfl: 3  •  lisinopril (ZESTRIL) 10 mg tablet, Take 1 tablet (10 mg total) by mouth daily, Disp: 90 tablet, Rfl: 3  •  metoprolol succinate (TOPROL-XL) 50 mg 24 hr tablet, Take 1 5 tablets (75 mg total) by mouth daily, Disp: 135 tablet, Rfl: 0  •  pantoprazole (PROTONIX) 40 mg tablet, TAKE 1 TABLET EVERY 12 HOURS (Patient taking differently: every 12 (twelve) hours), Disp: 180 tablet, Rfl: 3  •  potassium chloride (K-DUR,KLOR-CON) 20 mEq tablet, TAKE 2 TABLETS BY MOUTH IN AM, 1 TABLET IN PM, Disp: 270 tablet, Rfl: 3  •  rivaroxaban (Xarelto) 20 mg tablet, Take 1 tablet (20 mg total) by mouth daily with breakfast, Disp: 90 tablet, Rfl: 3  •  albuterol (PROVENTIL HFA,VENTOLIN HFA) 90 mcg/act inhaler, INHALE 2 PUFFS EVERY 6 HOURS AS NEEDED FOR WHEEZING (Patient not taking: Reported on 6/6/2022), Disp: 18 g, Rfl: 0  •  furosemide (LASIX) 20 mg tablet, Take 1 tablet (20 mg total) by mouth daily, Disp: 30 tablet, Rfl: 0  •  Multiple Vitamins-Minerals (MULTIVITAMIN ADULT EXTRA C PO), Take 1 capsule by mouth (Patient not taking: Reported on 10/3/2022), Disp: , Rfl:     [place] Sleepiness Scale  Sitting and reading: Would never doze  Watching TV: Moderate chance of dozing  Sitting, inactive in a public place (e g  a theatre or a meeting):  Would never doze  As a passenger in a car for an hour without a break: Slight chance of dozing  Lying down to rest in the afternoon when circumstances permit: Slight chance of dozing  Sitting and talking to someone: Would never doze  Sitting quietly after a lunch without alcohol: Slight chance of dozing  In a car, while stopped for a few minutes in traffic: Would never doze  Total score: 5              Vitals:    11/30/22 1320   BP: 130/82   BP Location: Left arm   Patient Position: Sitting   Cuff Size: Large   Pulse: 65   Weight: 132 kg (290 lb)   Height: 6' 1" (1 854 m)       Body mass index is 38 26 kg/m²  EPWORTH SLEEPINESS SCORE  Total score: 5      Past History Since Last Sleep Center Visit:   He continues to follow with cardiology  A cardiac ICD implant was planned, however, it was cancelled due to improvement in EF  He has not been using the CPAP equipment  He reports that he tried, however, he woke up feeling like he couldn't breathe  He states that he can't have something on his face due to feeling a sense of panic  He suffers from claustrophobia related to being trapped in tight spaces in his line of work as an   He reports that his heart races and he can't tolerate it  He plans to return the equipment today  The review of systems and following portions of the patient's history were reviewed and updated as appropriate: allergies, current medications, past family history, past medical history, past social history, past surgical history, and problem list         OBJECTIVE  Equipment set up date:  9/12/22 - returned equipment 11/30/22  PAP Pressure: Nasal AutoPAP using a lower limit of 15 cm and an upper limit of 20 cm of water pressure  Type of mask used: full face  DME Provider: [facility]    Physical Exam:     General Appearance:   Alert, cooperative, no distress, appears stated age, obese     Head:   Normocephalic, without obvious abnormality, atraumatic     Eyes:   PERRL, conjunctiva/corneas clear          Nose:  Nares normal, septum midline, no drainage or sinus tenderness           Throat:  Lips and gums normal; tongue normal size and midline mucosa moist with low-lying soft palatal tissue, uvula thick and only partially visualized, tonsils normal, Mallampati class 4       Neck:  Supple, symmetrical, trachea midline, no adenopathy;  Thyroid: No enlargement, tenderness or nodules; no carotid bruit or JVD     Lungs:      Clear to auscultation bilaterally, respirations unlabored     Heart:   Regular rate and rhythm, S1 and S2 normal, no murmur, rub or gallop       Extremities:  Extremities normal, atraumatic, no cyanosis or edema       Skin:  Skin color, texture, turgor normal, no rashes or lesions       Neurologic:  No focal deficits noted       ASSESSMENT / PLAN    1  Obstructive sleep apnea  Ambulatory referral to Bariatric Surgery    CANCELED: Ambulatory referral to Bariatric Surgery      2  Intolerance of continuous positive airway pressure (CPAP) ventilation  Ambulatory referral to Bariatric Surgery      3  Obesity (BMI 30-39  9)  Ambulatory referral to Bariatric Surgery    CANCELED: Ambulatory referral to Bariatric Surgery      4  PAF (paroxysmal atrial fibrillation) ([facility] )  Ambulatory referral to Bariatric Surgery      5  Acute on chronic systolic CHF (congestive heart failure) ([facility] )  Ambulatory referral to Bariatric Surgery              Counseling / Coordination of Care  Total clinic time spent today 30 minutes  Greater than 50% of total time was spent with the patient and / or family counseling and / or coordination of care  A description of the counseling / coordination of care:     Impressions, Diagnostic results, Prognosis, Instructions for management, Risks and benefits of treatment, Patient and family education, Risk factor reductions and Importance of compliance with treatment    Results of the CPAP titration study were reviewed  Significant improvement in AHI was noted during the study  We discussed his attempt at use of CPAP equipment  He reports that he tried it and felt claustrophobic and unable to wear the mask on his face  He felt he could not tolerated the pressure  It is noted that he called on 9/21 regarding pressure intolerance, however, he did not return the call to bring the equipment in for adjustment  He declines adjustment of the settings today  He is not interested in trying desensitization    The complications associated with untreated sleep apnea, including significant cardiac complications were discussed  He returned the equipment against medical advice  He would like to consider treatment with [facility]  We discussed that he would need to lose 25 lbs to be able to qualify  He has a history of bariatric surgery at [facility]  He would like a referral to [id number] specialists for help with weight loss  Referral provided  In the interim, we discussed avoidance of sleeping in a supine position, which may provide some improvement in SRIKANTH  Positional devices were discussed to assist with this  He will schedule a follow up visit in 4-5 months  The following instructions have been given to the patient today:    Patient Instructions   1  Schedule evaluation with the bariatrics specialists and work on healthy lifestyle changes  2  Avoid sleeping on your back, which may improve your sleep apnea  There is a device on [employer] called [employer] to help with this  3  Schedule follow up in 4-5 months  Nursing Support:  When: Monday through Friday 7A-5PM except holidays  Where: Our direct line is [phone]  If you are having a true emergency please call 911  In the event that the line is busy or it is after hours please leave a voice message and we will return your call  Please speak clearly, leaving your full name, birth date, best number to reach you and the reason for your call  Medication refills: We will need the name of the medication, the dosage, the ordering provider, whether you get a 30 or 90 day refill, and the pharmacy name and address  Medications will be ordered by the provider only  Nurses cannot call in prescriptions  Please allow 7 days for medication refills  Physician requested updates:  If your provider requested that you call with an update after starting medication, please be ready to provide us the medication and dosage, what time you take your medication, the time you attempt to fall asleep, time you fall asleep, when you wake up, and what time you get out of bed  Sleep Study Results: We will contact you with sleep study results and/or next steps after the physician has reviewed your testing  [facility]      Portions of the record may have been created with voice recognition software  Occasional wrong word or "sound a like" substitutions may have occurred due to the inherent limitations of voice recognition software  Read the chart carefully and recognize, using context, where substitutions have occurred

## 2022-11-30 NOTE — Clinical Note
Patient unable to tolerate CPAP equipment  Would not allow for any adjustments to settings  Returned equipment AMA  He is interested in [place]  He does not currently meet criteria, due to elevated BMI  Needs to lose 25 lbs  Referral to bariatrics provided to discuss weight loss options (he has past history of bariatric surgery but is not interested in revision)  Advice regarding positional devices also provided

## 2022-11-30 NOTE — PATIENT INSTRUCTIONS
Schedule evaluation with the bariatrics specialists and work on healthy lifestyle changes  Avoid sleeping on your back, which may improve your sleep apnea  There is a device on [employer] called [employer] to help with this  Schedule follow up in 4-5 months  Nursing Support:  When: Monday through Friday 7A-5PM except holidays  Where: Our direct line is [phone]  If you are having a true emergency please call 911  In the event that the line is busy or it is after hours please leave a voice message and we will return your call  Please speak clearly, leaving your full name, birth date, best number to reach you and the reason for your call  Medication refills: We will need the name of the medication, the dosage, the ordering provider, whether you get a 30 or 90 day refill, and the pharmacy name and address  Medications will be ordered by the provider only  Nurses cannot call in prescriptions  Please allow 7 days for medication refills  Physician requested updates: If your provider requested that you call with an update after starting medication, please be ready to provide us the medication and dosage, what time you take your medication, the time you attempt to fall asleep, time you fall asleep, when you wake up, and what time you get out of bed  Sleep Study Results: We will contact you with sleep study results and/or next steps after the physician has reviewed your testing

## 2022-12-02 PROBLEM — F40.240 CLAUSTROPHOBIA: Status: ACTIVE | Noted: 2022-12-02

## 2022-12-12 ENCOUNTER — OFFICE VISIT (OUTPATIENT)
Dept: CARDIOLOGY CLINIC | Facility: CLINIC | Age: 62
End: 2022-12-12

## 2022-12-12 VITALS
SYSTOLIC BLOOD PRESSURE: 104 MMHG | HEART RATE: 46 BPM | BODY MASS INDEX: 38.62 KG/M2 | DIASTOLIC BLOOD PRESSURE: 64 MMHG | HEIGHT: 73 IN | WEIGHT: 291.4 LBS

## 2022-12-12 DIAGNOSIS — I48.0 PAF (PAROXYSMAL ATRIAL FIBRILLATION) (HCC): Primary | ICD-10-CM

## 2022-12-12 DIAGNOSIS — Z91.89 AT RISK FOR AMIODARONE TOXICITY WITH LONG TERM USE: ICD-10-CM

## 2022-12-12 DIAGNOSIS — Z79.899 AT RISK FOR AMIODARONE TOXICITY WITH LONG TERM USE: ICD-10-CM

## 2022-12-12 DIAGNOSIS — I42.8 NONISCHEMIC CARDIOMYOPATHY (HCC): ICD-10-CM

## 2022-12-12 RX ORDER — METOPROLOL SUCCINATE 25 MG/1
25 TABLET, EXTENDED RELEASE ORAL DAILY
Qty: 90 TABLET | Refills: 3 | Status: SHIPPED | OUTPATIENT
Start: 2022-12-12

## 2022-12-12 NOTE — PROGRESS NOTES
Cardiology             Ramaalina Lao  1960  [id number]                 Assessment/Plan:     Paroxysmal atrial fibrillation, on amiodarone suppression, sinus bradycardia on today's visit  Nonischemic cardiomyopathy, ejection fraction 79%  Chronic systolic and diastolic CHF  History of LA thrombus, resolved on follow-up TI 08/01/2022  Left bundle branch block  Sleep apnea  Obesity with history of gastric bypass surgery  History of GI bleed up to NSAID use in 2020        Patient remains in sinus bradycardia at 46 bpm   Will reduce metoprolol from 75 mg once daily to 25 mg once daily  Patient does admit to intermittent lightheadedness  Continue lisinopril for cardiomyopathy  Doubt blood pressure will be able to tolerate higher dose or Entresto  Maintaining sinus rhythm/sinus bradycardia  Continue amiodarone  We will check PFTs and diffusion capacity  We will reach out to PCP to help with elevated TSH level  May need levothyroxine supplement    Continue Xarelto  No need for AICD since left ankle ejection fraction improved  Recent cortisol and metanephrines unremarkable           Follow-up 6 months           Interval History:      This is a very pleasant 62-year-old male hospitalized 2/2022 shortly after he had a positive home COVID test and symptoms of vomiting, diarrhea, mild dyspnea, and upper respiratory symptoms   He came to the ED 2/13/2022, with exertional dyspnea   ECG demonstrated a new diagnosis of atrial fibrillation with RVR   CT chest revealed no evidence of pulmonary embolism, although bilateral multilobar infiltrates were present, suggestive of COVID-19 pneumonia   He converted to sinus rhythm with diltiazem IV   His chads Vasc score was 0, therefore he was maintained off anticoagulation   Beta-blockade was initiated        Subsequently, he was seen by our nurse practitioner on 02/24/2022, and rapid atrial fibrillation with symptoms of lightheadedness and exertional dyspnea   He was sent back to the ED where he was given IV diltiazem once again and converted back to sinus rhythm with symptomatic improvement   He was discharged without adjustment of his medications  [facility] was seen by his PCP 02/25/2022 rate was noted to have recurrent atrial fibrillation with RVR at 125 beats per minute and minimal symptoms   Diltiazem 60 mg p o  b i d  was initiated  [facility] went to the ER Saturday 02/26/2022 for atypical chest pain   He was ruled out for myocardial injury with negative high sensitivity troponins x2 and no ischemic ECG changes        He underwent a nuclear stress test 04/07/2022 which revealed no evidence of myocardial ischemia or prior infarction by SPECT imaging, with ejection fraction 32%   This was consistent with nonischemic cardiomyopathy      Beka presented back to the hospital 4/2022 with acute CHF and AFib with RVR   TI revealed a severely reduced left ventricular systolic function with a small thrombus about 0 6 cm noted in a very dilated left atrial appendage  Cardioversion was not performed  [facility] was discharged on a rate control strategy with anticoagulation      He was seen by electrophysiology (Dr Martinez) 05/16/2022 at which time rate control with anticoagulation was recommended with repeat TI in 2 months   He was recommended to start amiodarone after resolution of his atrial appendage thrombus with subsequent cardioversion to try maintaining sinus rhythm  Cessation of alcohol intake was strongly recommended   It was also recommended to plan for comprehensive ablation-PVI once clinically stable      He underwent successful TI/cardioversion 08/01/2022  Ejection fraction is 25% with no further thrombus in left atrial appendage despite moderate continuous spontaneous echo contrast   He was initiated on amiodarone      He called with recurrent palpitations and was noted to be back in atrial fibrillation    Before repeat elective TI/cardioversion was scheduled, he was hospitalized 08/12/2022 with symptoms of orthopnea and atrial fibrillation with slow ventricular response  He was initiated on IV furosemide  Interestingly was noted to have episodes of diaphoresis which she called the nurse for  However on telemetry his rates were not slow or fast and high sensitivity troponins were negative  On 08/15/2022 he underwent successful TI/cardioversion  There was resolution of his left atrial appendage thrombus  He was continued on amiodarone at 200 mg daily      He was seen by electrophysiology 08/26/2022 and was unfortunately noted to be back in atrial fibrillation  His weight was 87 beats per minute  It was recommended to initiate and optimize heart failure medications including ACE-inhibitor/ARB  Bi V ICD was recommended after initiation of medical therapy with consideration of future comprehensive ablation  Repeat echocardiogram 11/2/2022 demonstrate improvement left ventricular ejection fraction at 43%  At that time, ICD plantation was canceled      He presents today for follow-up with no complaints  He admits to occasional lightheadedness  He is chronic but stable exertional dyspnea  Vitals:  Vitals:    12/12/22 1440   BP: 104/64   Pulse: (!) 46   Weight: 132 kg (291 lb 6 4 oz)   Height: 6' 1" (1 854 m)         Past Medical History:   Diagnosis Date   • A-fib (HCC)    • Acute ulcer of stomach    • CHF (congestive heart failure) (HCC)    • GERD (gastroesophageal reflux disease)    • Rib fractures      Social History     Socioeconomic History   • Marital status: /Civil Union     Spouse name: Not on file   • Number of children: Not on file   • Years of education: Not on file   • Highest education level: Not on file   Occupational History   • Not on file   Tobacco Use   • Smoking status: Former     Types: Cigars   • Smokeless tobacco: Former     Types: Chew   Vaping Use   • Vaping Use: Never used   Substance and Sexual Activity   • Alcohol use:  Yes     Alcohol/week: 7 0 standard drinks     Types: 7 Cans of beer per week     Comment: Hasn't had any beer since 4/23/22   • Drug use: No   • Sexual activity: Not on file   Other Topics Concern   • Not on file   Social History Narrative    Always uses seat belt    Feels safe at home    No guns in the home     Social Determinants of Health     Financial Resource Strain: Not on file   Food Insecurity: No Food Insecurity   • Worried About Running Out of Food in the Last Year: Never true   • Ran Out of Food in the Last Year: Never true   Transportation Needs: No Transportation Needs   • Lack of Transportation (Medical): No   • Lack of Transportation (Non-Medical):  No   Physical Activity: Not on file   Stress: Not on file   Social Connections: Not on file   Intimate Partner Violence: Not on file   Housing Stability: Low Risk    • Unable to Pay for Housing in the Last Year: No   • Number of Places Lived in the Last Year: 1   • Unstable Housing in the Last Year: No      Family History   Problem Relation Age of Onset   • Seizures Father      Past Surgical History:   Procedure Laterality Date   • COLONOSCOPY     • ESOPHAGOGASTRODUODENOSCOPY     • GASTRIC BYPASS     • MANDIBLE FRACTURE SURGERY         Current Outpatient Medications:   •  amiodarone 200 mg tablet, Take 1 tablet (200 mg total) by mouth daily (Patient taking differently: Take 200 mg by mouth daily Patient did not bring med list), Disp: 90 tablet, Rfl: 0  •  atorvastatin (LIPITOR) 40 mg tablet, Take 1 tablet (40 mg total) by mouth daily with dinner, Disp: 90 tablet, Rfl: 3  •  fexofenadine (ALLEGRA) 180 MG tablet, Take 1 tablet (180 mg total) by mouth in the morning , Disp: 30 tablet, Rfl: 3  •  lisinopril (ZESTRIL) 10 mg tablet, Take 1 tablet (10 mg total) by mouth daily, Disp: 90 tablet, Rfl: 3  •  metoprolol succinate (TOPROL-XL) 25 mg 24 hr tablet, Take 1 tablet (25 mg total) by mouth daily, Disp: 90 tablet, Rfl: 3  •  Multiple Vitamins-Minerals (MULTIVITAMIN ADULT EXTRA C PO), Take 1 capsule by mouth, Disp: , Rfl:   •  pantoprazole (PROTONIX) 40 mg tablet, TAKE 1 TABLET EVERY 12 HOURS (Patient taking differently: every 12 (twelve) hours), Disp: 180 tablet, Rfl: 3  •  potassium chloride (K-DUR,KLOR-CON) 20 mEq tablet, TAKE 2 TABLETS BY MOUTH IN AM, 1 TABLET IN PM, Disp: 270 tablet, Rfl: 3  •  rivaroxaban (Xarelto) 20 mg tablet, Take 1 tablet (20 mg total) by mouth daily with breakfast, Disp: 90 tablet, Rfl: 3  •  albuterol (PROVENTIL HFA,VENTOLIN HFA) 90 mcg/act inhaler, INHALE 2 PUFFS EVERY 6 HOURS AS NEEDED FOR WHEEZING (Patient not taking: Reported on 6/6/2022), Disp: 18 g, Rfl: 0  •  furosemide (LASIX) 20 mg tablet, Take 1 tablet (20 mg total) by mouth daily, Disp: 30 tablet, Rfl: 0        Review of Systems:  Review of Systems   Respiratory: Negative  Cardiovascular: Negative  All other systems reviewed and are negative  Physical Exam:  Physical Exam  Constitutional:       General: He is not in acute distress  Appearance: He is well-developed  He is not diaphoretic  HENT:      Head: Normocephalic and atraumatic  Eyes:      General: No scleral icterus  Right eye: No discharge  Pupils: Pupils are equal, round, and reactive to light  Neck:      Thyroid: No thyromegaly  Cardiovascular:      Rate and Rhythm: Normal rate and regular rhythm  Heart sounds: Normal heart sounds  No murmur heard  No friction rub  No gallop  Pulmonary:      Effort: Pulmonary effort is normal       Breath sounds: Normal breath sounds  Abdominal:      General: There is no distension  Tenderness: There is no abdominal tenderness  There is no guarding or rebound  Musculoskeletal:         General: Normal range of motion  Cervical back: Normal range of motion and neck supple  Skin:     General: Skin is warm and dry  Coloration: Skin is not pale  Findings: No erythema or rash     Neurological:      Mental Status: He is alert and oriented to person, place, and time  Coordination: Coordination normal    Psychiatric:         Behavior: Behavior normal          Thought Content: Thought content normal          Judgment: Judgment normal          This note was completed in part utilizing M-Modal Fluency Direct Software  Grammatical errors, random word insertions, spelling mistakes, and incomplete sentences can be an occasional consequence of this system secondary to software limitations, ambient noise, and hardware issues  If you have any questions or concerns about the content, text, or information contained within the body of this dictation, please contact the provider for clarification

## 2022-12-14 ENCOUNTER — TELEPHONE (OUTPATIENT)
Dept: CARDIOLOGY CLINIC | Facility: CLINIC | Age: 62
End: 2022-12-14

## 2022-12-14 NOTE — TELEPHONE ENCOUNTER
Pt calling stating at [address] earlier this week metoprolol was decreased to 25 daily  He states this morining he felt terrible   his heart was jumping out of his chest difficult to breath  He took 50 mg of metoprolol and states his heart rate is much better   he did not know how fast his HR was or how much it is now    please advise

## 2022-12-14 NOTE — TELEPHONE ENCOUNTER
OK to increase to 75 mg daily again  Can you ask him to use a BP cuff or pulseoximeter to get us his HR now? If not, he can come in tomorrow for EKG  I fear he may have gone back into AFib    Thx    RP

## 2022-12-15 NOTE — TELEPHONE ENCOUNTER
Pt called left message okay to increase his metoprolol back to 75 mg as before  LM for him to call to report HR today and ask him to return call

## 2023-01-10 ENCOUNTER — HOSPITAL ENCOUNTER (OUTPATIENT)
Dept: NON INVASIVE DIAGNOSTICS | Facility: HOSPITAL | Age: 63
Discharge: HOME | End: 2023-01-10
Attending: INTERNAL MEDICINE

## 2023-01-10 VITALS
BODY MASS INDEX: 38.57 KG/M2 | HEART RATE: 54 BPM | WEIGHT: 291 LBS | HEIGHT: 73 IN | SYSTOLIC BLOOD PRESSURE: 104 MMHG | DIASTOLIC BLOOD PRESSURE: 55 MMHG

## 2023-01-10 DIAGNOSIS — I42.8 NICM (NONISCHEMIC CARDIOMYOPATHY) (HCC): ICD-10-CM

## 2023-01-10 DIAGNOSIS — I48.0 PAF (PAROXYSMAL ATRIAL FIBRILLATION) (HCC): ICD-10-CM

## 2023-01-10 LAB
AORTIC ROOT: 3.5 CM
AORTIC VALVE MEAN VELOCITY: 12.1 M/S
APICAL FOUR CHAMBER EJECTION FRACTION: 51 %
AV LVOT MEAN GRADIENT: 2 MMHG
AV LVOT PEAK GRADIENT: 4 MMHG
AV MEAN GRADIENT: 7 MMHG
AV PEAK GRADIENT: 11 MMHG
AV VELOCITY RATIO: 0.57
DOP CALC AO PEAK VEL: 1.69 M/S
DOP CALC AO VTI: 37.18 CM
DOP CALC LVOT PEAK VEL VTI: 22.19 CM
DOP CALC LVOT PEAK VEL: 0.97 M/S
E WAVE DECELERATION TIME: 236 MS
FRACTIONAL SHORTENING: 29 % (ref 28–44)
INTERVENTRICULAR SEPTUM IN DIASTOLE (PARASTERNAL SHORT AXIS VIEW): 1.3 CM
INTERVENTRICULAR SEPTUM: 1.3 CM (ref 0.6–1.1)
LAAS-AP2: 30.6 CM2
LAAS-AP4: 30.6 CM2
LEFT ATRIUM SIZE: 4.7 CM
LEFT INTERNAL DIMENSION IN SYSTOLE: 3.9 CM (ref 2.1–4)
LEFT VENTRICULAR INTERNAL DIMENSION IN DIASTOLE: 5.5 CM (ref 3.5–6)
LEFT VENTRICULAR POSTERIOR WALL IN END DIASTOLE: 1 CM
LEFT VENTRICULAR STROKE VOLUME: 81 ML
LVSV (TEICH): 81 ML
MV E'TISSUE VEL-LAT: 15 CM/S
MV E'TISSUE VEL-SEP: 9 CM/S
MV PEAK A VEL: 0.49 M/S
MV PEAK E VEL: 75 CM/S
RIGHT ATRIAL 2D VOLUME: 101 ML
RIGHT ATRIUM AREA SYSTOLE A4C: 28.5 CM2
RIGHT VENTRICLE ID DIMENSION: 5.1 CM
SL CV LEFT ATRIUM LENGTH A2C: 6.3 CM
SL CV LV EF: 45
SL CV PED ECHO LEFT VENTRICLE DIASTOLIC VOLUME (MOD BIPLANE) 2D: 148 ML
SL CV PED ECHO LEFT VENTRICLE SYSTOLIC VOLUME (MOD BIPLANE) 2D: 67 ML

## 2023-01-16 ENCOUNTER — TELEPHONE (OUTPATIENT)
Dept: CARDIOLOGY CLINIC | Facility: CLINIC | Age: 63
End: 2023-01-16

## 2023-01-20 NOTE — TELEPHONE ENCOUNTER
Left voicemail on machine informing patient to call and schedule an AFIB ABLATION procedure       ThanksMargo

## 2023-01-26 ENCOUNTER — TELEPHONE (OUTPATIENT)
Dept: BARIATRICS | Facility: CLINIC | Age: 63
End: 2023-01-26

## 2023-02-01 DIAGNOSIS — I48.0 PAF (PAROXYSMAL ATRIAL FIBRILLATION) (HCC): ICD-10-CM

## 2023-02-01 RX ORDER — AMIODARONE HYDROCHLORIDE 200 MG/1
TABLET ORAL
Qty: 90 TABLET | Refills: 3 | Status: SHIPPED | OUTPATIENT
Start: 2023-02-01

## 2023-02-02 DIAGNOSIS — I50.9 CHF (CONGESTIVE HEART FAILURE) (HCC): Chronic | ICD-10-CM

## 2023-02-02 RX ORDER — FUROSEMIDE 20 MG/1
20 TABLET ORAL DAILY
Qty: 90 TABLET | Refills: 3 | Status: SHIPPED | OUTPATIENT
Start: 2023-02-02 | End: 2023-03-04

## 2023-02-06 DIAGNOSIS — Z98.84 BARIATRIC SURGERY STATUS: Primary | ICD-10-CM

## 2023-02-14 ENCOUNTER — HOSPITAL ENCOUNTER (OUTPATIENT)
Dept: PULMONOLOGY | Facility: HOSPITAL | Age: 63
Discharge: HOME | End: 2023-02-14
Attending: INTERNAL MEDICINE

## 2023-02-14 DIAGNOSIS — Z91.89 AT RISK FOR AMIODARONE TOXICITY WITH LONG TERM USE: ICD-10-CM

## 2023-02-14 DIAGNOSIS — Z79.899 AT RISK FOR AMIODARONE TOXICITY WITH LONG TERM USE: ICD-10-CM

## 2023-02-20 ENCOUNTER — HOSPITAL ENCOUNTER (OUTPATIENT)
Dept: RADIOLOGY | Facility: HOSPITAL | Age: 63
Discharge: HOME | End: 2023-02-20
Attending: SURGERY

## 2023-02-20 DIAGNOSIS — Z98.84 BARIATRIC SURGERY STATUS: ICD-10-CM

## 2023-03-02 ENCOUNTER — TELEPHONE (OUTPATIENT)
Dept: CARDIOLOGY CLINIC | Facility: CLINIC | Age: 63
End: 2023-03-02

## 2023-03-02 NOTE — TELEPHONE ENCOUNTER
Received fax from [address]  Duplicate medications  Metoprolol Tartrate vs Metoprolol Succinate  Per Dr Maya Deluca, metoprolol was switched to succinate in October 2022  Faxed back to [facility] to fill metoprolol succinate 25mg one daily

## 2023-03-06 ENCOUNTER — RA CDI HCC (OUTPATIENT)
Dept: OTHER | Facility: HOSPITAL | Age: 63
End: 2023-03-06

## 2023-03-06 NOTE — PROGRESS NOTES
NOT on the BPA- please assess using MEAT for 2023 billing    [facility] [address]  coding opportunities          Chart Reviewed number of suggestions sent to Provider: 1     Patients Insurance        Commercial Insurance: [employer]

## 2023-03-13 ENCOUNTER — OFFICE VISIT (OUTPATIENT)
Dept: FAMILY MEDICINE CLINIC | Facility: CLINIC | Age: 63
End: 2023-03-13

## 2023-03-13 VITALS
BODY MASS INDEX: 39.03 KG/M2 | TEMPERATURE: 98.3 F | HEART RATE: 75 BPM | WEIGHT: 295.8 LBS | SYSTOLIC BLOOD PRESSURE: 100 MMHG | OXYGEN SATURATION: 97 % | DIASTOLIC BLOOD PRESSURE: 74 MMHG

## 2023-03-13 DIAGNOSIS — I48.19 PERSISTENT ATRIAL FIBRILLATION (HCC): Primary | ICD-10-CM

## 2023-03-13 DIAGNOSIS — K92.1 GASTROINTESTINAL HEMORRHAGE WITH MELENA: ICD-10-CM

## 2023-03-13 DIAGNOSIS — K27.9 PEPTIC ULCER: ICD-10-CM

## 2023-03-13 DIAGNOSIS — I50.42 CHRONIC COMBINED SYSTOLIC AND DIASTOLIC HEART FAILURE (HCC): ICD-10-CM

## 2023-03-13 DIAGNOSIS — G47.33 OBSTRUCTIVE SLEEP APNEA: ICD-10-CM

## 2023-03-13 DIAGNOSIS — R06.09 DOE (DYSPNEA ON EXERTION): ICD-10-CM

## 2023-03-13 RX ORDER — PANTOPRAZOLE SODIUM 40 MG/1
40 TABLET, DELAYED RELEASE ORAL EVERY 12 HOURS
Qty: 180 TABLET | Refills: 1 | Status: SHIPPED | OUTPATIENT
Start: 2023-03-13 | End: 2023-06-11

## 2023-03-13 NOTE — PROGRESS NOTES
Assessment/Plan:   1  BURROWS (dyspnea on exertion)  Reviewed patient's symptoms today  At this time, it is unclear as to exact cause of his dyspnea on exertion  Reviewed his previous testing in depth  At this time, it is unclear if his congestive heart failure may be a factor into this  He was advised to continue with his current treatment as well as regular follow-up with cardiology  2  Persistent atrial fibrillation (HCC)/Chronic combined systolic and diastolic heart failure [facility])  Patient states he has been having chronic intermittent fluttering in his chest   He has been taking his amiodarone regularly  Continues well with his current treatment metoprolol, lisinopril as well as his Lasix  He has been on treatment with Xarelto for his anticoag    3  Obstructive sleep apnea  Continue with nightly use of his CPAP  4  Gastrointestinal hemorrhage with melena  Stable  Continue with his current treatment of pantoprazole  Follow-up in 6 months  - pantoprazole (PROTONIX) 40 mg tablet; Take 1 tablet (40 mg total) by mouth every 12 (twelve) hours  Dispense: 180 tablet; Refill: 1             Diagnoses and all orders for this visit:    Persistent atrial fibrillation (HCC)    BURROWS (dyspnea on exertion)    Obstructive sleep apnea    Chronic combined systolic and diastolic heart failure (HCC)    Gastrointestinal hemorrhage with melena  -     pantoprazole (PROTONIX) 40 mg tablet; Take 1 tablet (40 mg total) by mouth every 12 (twelve) hours    Peptic ulcer  -     pantoprazole (PROTONIX) 40 mg tablet; Take 1 tablet (40 mg total) by mouth every 12 (twelve) hours          Subjective:       Chief Complaint   Patient presents with   • Follow-up     Chest hurts all the time, trouble breathing, SOB when going up and down steps, heart rate flutters      Patient ID: Demetri High is a 65 y o  male presents today for a follow-up on his condition    He has dyspnea on exertion, chest discomfort, persistent A-fib, chronic diastolic heart failure, GERD  He has been taking his medications regularly  He denies adverse reactions  He does follow with cardiology regularly  HPI    Review of Systems   Constitutional: Negative for activity change, chills, fatigue and fever  HENT: Negative for congestion, ear pain, sinus pressure and sore throat  Eyes: Negative for redness, itching and visual disturbance  Respiratory: Positive for shortness of breath  Negative for cough  BURROWS   Cardiovascular: Negative for chest pain and palpitations  Gastrointestinal: Negative for abdominal pain, diarrhea and nausea  Endocrine: Negative for cold intolerance and heat intolerance  Genitourinary: Negative for dysuria, flank pain and frequency  Musculoskeletal: Negative for arthralgias, back pain, gait problem and myalgias  Skin: Negative for color change  Allergic/Immunologic: Negative for environmental allergies  Neurological: Negative for dizziness, numbness and headaches  Psychiatric/Behavioral: Negative for behavioral problems and sleep disturbance  The following portions of the patient's history were reviewed and updated as appropriate : past family history, past medical history, past social history and past surgical history      Current Outpatient Medications:   •  amiodarone 200 mg tablet, TAKE 1 TABLET DAILY, Disp: 90 tablet, Rfl: 3  •  atorvastatin (LIPITOR) 40 mg tablet, Take 1 tablet (40 mg total) by mouth daily with dinner, Disp: 90 tablet, Rfl: 3  •  fexofenadine (ALLEGRA) 180 MG tablet, Take 1 tablet (180 mg total) by mouth in the morning , Disp: 30 tablet, Rfl: 3  •  furosemide (LASIX) 20 mg tablet, Take 1 tablet (20 mg total) by mouth daily, Disp: 90 tablet, Rfl: 3  •  lisinopril (ZESTRIL) 10 mg tablet, Take 1 tablet (10 mg total) by mouth daily, Disp: 90 tablet, Rfl: 3  •  metoprolol succinate (TOPROL-XL) 25 mg 24 hr tablet, Take 1 tablet (25 mg total) by mouth daily, Disp: 90 tablet, Rfl: 3  •  Multiple Vitamins-Minerals (MULTIVITAMIN ADULT EXTRA C PO), Take 1 capsule by mouth, Disp: , Rfl:   •  pantoprazole (PROTONIX) 40 mg tablet, Take 1 tablet (40 mg total) by mouth every 12 (twelve) hours, Disp: 180 tablet, Rfl: 1  •  potassium chloride (K-DUR,KLOR-CON) 20 mEq tablet, TAKE 2 TABLETS BY MOUTH IN AM, 1 TABLET IN PM, Disp: 270 tablet, Rfl: 3  •  rivaroxaban (Xarelto) 20 mg tablet, Take 1 tablet (20 mg total) by mouth daily with breakfast, Disp: 90 tablet, Rfl: 3         Objective:         Vitals:    03/13/23 1644   BP: 100/74   BP Location: Left arm   Patient Position: Sitting   Cuff Size: Large   Pulse: 75   Temp: 98 3 °F (36 8 °C)   SpO2: 97%   Weight: 134 kg (295 lb 12 8 oz)     Physical Exam  Vitals reviewed  Constitutional:       Appearance: He is well-developed  HENT:      Head: Normocephalic and atraumatic  Nose: Nose normal       Mouth/Throat:      Pharynx: No oropharyngeal exudate  Eyes:      General: No scleral icterus  Right eye: No discharge  Left eye: No discharge  Pupils: Pupils are equal, round, and reactive to light  Neck:      Trachea: No tracheal deviation  Cardiovascular:      Rate and Rhythm: Normal rate and regular rhythm  Pulses:           Dorsalis pedis pulses are 2+ on the right side and 2+ on the left side  Posterior tibial pulses are 2+ on the right side and 2+ on the left side  Heart sounds: Normal heart sounds  No murmur heard  No friction rub  No gallop  Pulmonary:      Effort: Pulmonary effort is normal  No respiratory distress  Breath sounds: Normal breath sounds  No wheezing or rales  Abdominal:      General: Bowel sounds are normal  There is no distension  Palpations: Abdomen is soft  Tenderness: There is no abdominal tenderness  There is no guarding or rebound  Musculoskeletal:         General: Normal range of motion  Cervical back: Normal range of motion and neck supple     Lymphadenopathy:      Head: Right side of head: No submental or submandibular adenopathy  Left side of head: No submental or submandibular adenopathy  Cervical: No cervical adenopathy  Right cervical: No superficial, deep or posterior cervical adenopathy  Left cervical: No superficial, deep or posterior cervical adenopathy  Skin:     General: Skin is warm and dry  Findings: No erythema  Neurological:      Mental Status: He is alert and oriented to person, place, and time  Cranial Nerves: No cranial nerve deficit  Sensory: No sensory deficit  Psychiatric:         Mood and Affect: Mood is not anxious or depressed  Speech: Speech normal          Behavior: Behavior normal          Thought Content:  Thought content normal          Judgment: Judgment normal

## 2023-03-16 ENCOUNTER — OFFICE VISIT (OUTPATIENT)
Dept: BARIATRICS | Facility: CLINIC | Age: 63
End: 2023-03-16

## 2023-03-16 VITALS
SYSTOLIC BLOOD PRESSURE: 100 MMHG | WEIGHT: 289.5 LBS | BODY MASS INDEX: 39.21 KG/M2 | HEIGHT: 72 IN | HEART RATE: 78 BPM | DIASTOLIC BLOOD PRESSURE: 60 MMHG | TEMPERATURE: 97.6 F

## 2023-03-16 DIAGNOSIS — E66.9 OBESITY, CLASS II, BMI 35-39.9: ICD-10-CM

## 2023-03-16 DIAGNOSIS — Z48.815 ENCOUNTER FOR SURGICAL AFTERCARE FOLLOWING SURGERY OF DIGESTIVE SYSTEM: Primary | ICD-10-CM

## 2023-03-16 DIAGNOSIS — R53.83 FATIGUE: ICD-10-CM

## 2023-03-16 DIAGNOSIS — R63.5 ABNORMAL WEIGHT GAIN: ICD-10-CM

## 2023-03-16 DIAGNOSIS — Z98.84 BARIATRIC SURGERY STATUS: ICD-10-CM

## 2023-03-16 DIAGNOSIS — K91.2 POSTSURGICAL MALABSORPTION: ICD-10-CM

## 2023-03-16 DIAGNOSIS — R00.2 PALPITATION: ICD-10-CM

## 2023-03-16 NOTE — PATIENT INSTRUCTIONS
Download myfitnesspal or bariatastic - start tracking your food and drink intake  Follow up with medical weight management  - Labs ordered - please make sure you are fasting prior to getting your labs  - please start on vitamin - bariatrics and calcium 500-600 mg twice a day  - Follow up in 1 year for annual visit

## 2023-03-16 NOTE — PROGRESS NOTES
Assessment/Plan:     Patient ID: Hemanth Hunt is a 65 y o  male  Bariatric Surgery Status/Abnormal weight gain/Fatigue/Palpitation    -s/p Eliud-En-Y Gastric Bypass with [facility] approximately 20 years ago with hx of stricture and bleeding ulcer (5 years ago) due to NSAIDs use  Presents to the office today for OD annual to establish care with also concerns of sleep apnea  Would like to lose weight to help decrease the need of CPAP use  He noticed weight gain occurring around the time of the pandemic  Eating out of boredom  Able to eat bigger portions but still feeling restricted, doesn't really follow 30/60 minute rule  Doesn't tend to eat his proteins first, doesn't exercise due to SOB with exertion and reduced endurance  Has associated fatigue, lack of energy, heart palpitations (but unsure if it's related to underlying A  FIB)  Initial - 385-395 lbs  Current - 289 5 lbs  Jules - 200 lbs      PLAN:     - Taking PPI daily for hx of bleeding ulcer with hospitalization - he states he tried to taper off however started having issues  Does not want to taper off at this time  - Advised to start food log and eat his proteins first as he may not be eating enough calories per day  - will order repeat another TSH due to abnormal levels in October  - [facility] to [facility]  UGI unremarkable  - Routine follow up in 1 YEAR for annual visit  - Continue with healthy lifestyle, adequate protein intake of 60 gm, fluid intake of at least 64 oz  - Continue with MVI daily  - Activity as tolerated  - Labs ordered and will adjust accordingly if any deficiency  - Follow up with RD and SW as needed       · Continued/Maintain healthy weight loss with good nutrition intakes  · Adequate hydration with at least 64oz  fluid intake  · Follow diet as discussed  · Follow vitamin and mineral recommendations as reviewed with you  · Exercise as tolerated      · Colonoscopy referral made: UTD - due in 2025    · Follow-up in 1 yeAr for annual visit  We kindly ask that your arrive 15 minutes before your scheduled appointment time with your provider to allow our staff to room you, get your vital signs and update your chart  · Get lab work done prior to annual visit  Please call the office if you need a script  It is recommended to check with your insurance BEFORE getting labs done to make sure they are covered by your policy  · Call our office if you have any problems with abdominal pain especially associated with fever, chills, nausea, vomiting or any other concerns  · All  Post-bariatric surgery patients should be aware that very small quantities of any alcohol can cause impairment and it is very possible not to feel the effect  The effect can be in the system for several hours  It is also a stomach irritant  · It is advised to AVOID alcohol, Nonsteroidal antiinflammatory drugs (NSAIDS) and nicotine of all forms   Any of these can cause stomach irritation/pain  · Discussed the effects of alcohol on a bariatric patient and the increased impairment risk  · Keep up the good work! Postsurgical Malabsorption   -At risk for malabsorption of vitamins/minerals secondary to malabsorption and restriction of intake from bariatric surgery  -NOT Currently taking adequate postop bariatric surgery vitamin supplementation  -Next set of bariatric labs ordered for approximately 2 weeks  -Patient received education about the importance of adhering to a lifelong supplementation regimen to avoid vitamin/mineral deficiencies      Diagnoses and all orders for this visit:    Encounter for surgical aftercare following surgery of digestive system  -     Zinc; Future  -     Vitamin D 25 hydroxy; Future  -     Vitamin B12; Future  -     Vitamin B1, whole blood; Future  -     PTH, intact; Future  -     Vitamin A; Future  -     CBC and Platelet; Future  -     Comprehensive metabolic panel; Future  -     Ferritin; Future  -     Folate;  Future  - Iron Saturation %; Future  -     TSH, 3rd generation with Free T4 reflex; Future    Bariatric surgery status  -     Zinc; Future  -     Vitamin D 25 hydroxy; Future  -     Vitamin B12; Future  -     Vitamin B1, whole blood; Future  -     PTH, intact; Future  -     Vitamin A; Future  -     CBC and Platelet; Future  -     Comprehensive metabolic panel; Future  -     Ferritin; Future  -     Folate; Future  -     Iron Saturation %; Future  -     TSH, 3rd generation with Free T4 reflex; Future    Postsurgical malabsorption  -     Zinc; Future  -     Vitamin D 25 hydroxy; Future  -     Vitamin B12; Future  -     Vitamin B1, whole blood; Future  -     PTH, intact; Future  -     Vitamin A; Future  -     CBC and Platelet; Future  -     Comprehensive metabolic panel; Future  -     Ferritin; Future  -     Folate; Future  -     Iron Saturation %; Future  -     TSH, 3rd generation with Free T4 reflex; Future    Obesity, Class II, BMI 35-39 9  -     Zinc; Future  -     Vitamin D 25 hydroxy; Future  -     Vitamin B12; Future  -     Vitamin B1, whole blood; Future  -     PTH, intact; Future  -     Vitamin A; Future  -     CBC and Platelet; Future  -     Comprehensive metabolic panel; Future  -     Ferritin; Future  -     Folate; Future  -     Iron Saturation %; Future  -     TSH, 3rd generation with Free T4 reflex; Future    Abnormal weight gain  -     Zinc; Future  -     Vitamin D 25 hydroxy; Future  -     Vitamin B12; Future  -     Vitamin B1, whole blood; Future  -     PTH, intact; Future  -     Vitamin A; Future  -     CBC and Platelet; Future  -     Comprehensive metabolic panel; Future  -     Ferritin; Future  -     Folate; Future  -     Iron Saturation %; Future  -     TSH, 3rd generation with Free T4 reflex; Future    Fatigue    Palpitation         Subjective:      Patient ID: Kendra Torres is a 65 y o  male        -s/p Eliud-En-Y Gastric Bypass with [facility] approximately 20 years ago with hx of stricture and bleeding ulcer (5 years ago) due to NSAIDs use  Presents to the office today for OD annual to establish care with also concerns of sleep apnea  Would like to lose weight to help decrease the need of CPAP use  He noticed weight gain occurring around the time of the pandemic  Eating out of boredom  Able to eat bigger portions but still feeling restricted, doesn't really follow 30/60 minute rule  Doesn't tend to eat his proteins first, doesn't exercise due to SOB with exertion and reduced endurance  Has associated fatigue, lack of energy, heart palpitations (but unsure if it's related to underlying A  FIB)  Initial: 395 lbs  Current: 289 5 lbs  EWL: (Weight loss is ahead of schedule at this post surgical period )  Jules: 200 lbs,  Current BMI is Body mass index is 39 59 kg/m²  · Tolerating a regular diet-yes  · Eating at least 60 grams of protein per day-yes  · Following 30/60 minute rule with liquids-no   · Drinking at least 64 ounces of fluid per day-yes  · Drinking carbonated beverages- yes - beer   · Sufficient exercise-no  · Using NSAIDs regularly-no  · Using nicotine-no  · Using alcohol-yes - approximately 3 beers   · Supplements: Multivitamins    · EWL is, which places the patient ahead of schedule for expected post surgical weight loss at this time  The following portions of the patient's history were reviewed and updated as appropriate: allergies, current medications, past family history, past medical history, past social history, past surgical history and problem list     Review of Systems   Constitutional: Positive for fatigue  Respiratory: Positive for shortness of breath  Cardiovascular: Positive for palpitations  Gastrointestinal: Positive for constipation  Reflux - takes pantoprazole     Neurological: Negative  Psychiatric/Behavioral: Negative            Objective:    /60   Pulse 78   Temp 97 6 °F (36 4 °C) (Tympanic)   Ht 5' 11 7" (1 821 m)   Wt 131 kg (289 lb 8 oz)   BMI 39 59 kg/m² Physical Exam  Vitals and nursing note reviewed  Constitutional:       Appearance: Normal appearance  He is obese  Cardiovascular:      Rate and Rhythm: Normal rate and regular rhythm  Heart sounds: Normal heart sounds  Pulmonary:      Effort: Pulmonary effort is normal       Breath sounds: Normal breath sounds  Abdominal:      General: Bowel sounds are normal       Palpations: Abdomen is soft  Tenderness: There is no abdominal tenderness  Musculoskeletal:         General: Normal range of motion  Skin:     General: Skin is warm and dry  Neurological:      General: No focal deficit present  Mental Status: He is alert and oriented to person, place, and time  Mental status is at baseline  Psychiatric:         Mood and Affect: Mood normal          Behavior: Behavior normal          Thought Content: Thought content normal          Judgment: Judgment normal          I have spent a total time of 40 minutes on 03/16/23 in caring for this patient including Diagnostic results, Risks and benefits of tx options, Instructions for management, Patient and family education, Importance of tx compliance, Risk factor reductions, Impressions, Counseling / Coordination of care, Documenting in the medical record, Reviewing / ordering tests, medicine, procedures   and Obtaining or reviewing history

## 2023-03-24 ENCOUNTER — APPOINTMENT (OUTPATIENT)
Dept: LAB | Facility: CLINIC | Age: 63
End: 2023-03-24

## 2023-03-24 DIAGNOSIS — E66.9 OBESITY, CLASS II, BMI 35-39.9: ICD-10-CM

## 2023-03-24 DIAGNOSIS — Z98.84 BARIATRIC SURGERY STATUS: ICD-10-CM

## 2023-03-24 DIAGNOSIS — K91.2 POSTSURGICAL MALABSORPTION: ICD-10-CM

## 2023-03-24 DIAGNOSIS — R63.5 ABNORMAL WEIGHT GAIN: ICD-10-CM

## 2023-03-24 DIAGNOSIS — Z48.815 ENCOUNTER FOR SURGICAL AFTERCARE FOLLOWING SURGERY OF DIGESTIVE SYSTEM: ICD-10-CM

## 2023-03-24 LAB
25(OH)D3 SERPL-MCNC: 17.6 NG/ML (ref 30–100)
ALBUMIN SERPL BCP-MCNC: 3.8 G/DL (ref 3.5–5)
ALP SERPL-CCNC: 65 U/L (ref 46–116)
ALT SERPL W P-5'-P-CCNC: 31 U/L (ref 12–78)
ANION GAP SERPL CALCULATED.3IONS-SCNC: 7 MMOL/L (ref 4–13)
AST SERPL W P-5'-P-CCNC: 28 U/L (ref 5–45)
BILIRUB SERPL-MCNC: 0.96 MG/DL (ref 0.2–1)
BUN SERPL-MCNC: 18 MG/DL (ref 5–25)
CALCIUM SERPL-MCNC: 9.3 MG/DL (ref 8.3–10.1)
CHLORIDE SERPL-SCNC: 107 MMOL/L (ref 96–108)
CO2 SERPL-SCNC: 22 MMOL/L (ref 21–32)
CREAT SERPL-MCNC: 1.11 MG/DL (ref 0.6–1.3)
ERYTHROCYTE [DISTWIDTH] IN BLOOD BY AUTOMATED COUNT: 12 % (ref 11.6–15.1)
FERRITIN SERPL-MCNC: 87 NG/ML (ref 8–388)
FOLATE SERPL-MCNC: >20 NG/ML (ref 3.1–17.5)
GFR SERPL CREATININE-BSD FRML MDRD: 71 ML/MIN/1.73SQ M
GLUCOSE P FAST SERPL-MCNC: 92 MG/DL (ref 65–99)
HCT VFR BLD AUTO: 46 % (ref 36.5–49.3)
HGB BLD-MCNC: 15.7 G/DL (ref 12–17)
IRON SATN MFR SERPL: 25 % (ref 20–50)
IRON SERPL-MCNC: 94 UG/DL (ref 65–175)
MCH RBC QN AUTO: 33.1 PG (ref 26.8–34.3)
MCHC RBC AUTO-ENTMCNC: 34.1 G/DL (ref 31.4–37.4)
MCV RBC AUTO: 97 FL (ref 82–98)
PLATELET # BLD AUTO: 236 THOUSANDS/UL (ref 149–390)
PMV BLD AUTO: 10.7 FL (ref 8.9–12.7)
POTASSIUM SERPL-SCNC: 4.5 MMOL/L (ref 3.5–5.3)
PROT SERPL-MCNC: 7.3 G/DL (ref 6.4–8.4)
PTH-INTACT SERPL-MCNC: 138.6 PG/ML (ref 18.4–80.1)
RBC # BLD AUTO: 4.74 MILLION/UL (ref 3.88–5.62)
SODIUM SERPL-SCNC: 136 MMOL/L (ref 135–147)
T4 FREE SERPL-MCNC: 1.23 NG/DL (ref 0.76–1.46)
TIBC SERPL-MCNC: 383 UG/DL (ref 250–450)
TSH SERPL DL<=0.05 MIU/L-ACNC: 5.55 UIU/ML (ref 0.45–4.5)
VIT B12 SERPL-MCNC: 213 PG/ML (ref 100–900)
WBC # BLD AUTO: 8.63 THOUSAND/UL (ref 4.31–10.16)

## 2023-03-27 LAB — VIT B1 BLD-SCNC: 154.7 NMOL/L (ref 66.5–200)

## 2023-03-28 LAB — ZINC SERPL-MCNC: 72 UG/DL (ref 44–115)

## 2023-03-30 LAB — VIT A SERPL-MCNC: 75.2 UG/DL (ref 22–69.5)

## 2023-03-31 ENCOUNTER — TELEPHONE (OUTPATIENT)
Dept: BARIATRICS | Facility: CLINIC | Age: 63
End: 2023-03-31

## 2023-03-31 DIAGNOSIS — Z98.84 BARIATRIC SURGERY STATUS: Primary | ICD-10-CM

## 2023-03-31 DIAGNOSIS — E67.0 HIGH VITAMIN A LEVEL: ICD-10-CM

## 2023-03-31 DIAGNOSIS — K91.2 POSTSURGICAL MALABSORPTION: ICD-10-CM

## 2023-03-31 DIAGNOSIS — R79.89 HIGH SERUM PARATHYROID HORMONE (PTH): ICD-10-CM

## 2023-03-31 DIAGNOSIS — E53.8 VITAMIN B12 DEFICIENCY: ICD-10-CM

## 2023-03-31 DIAGNOSIS — E55.9 VITAMIN D DEFICIENCY: ICD-10-CM

## 2023-03-31 RX ORDER — ERGOCALCIFEROL 1.25 MG/1
50000 CAPSULE ORAL 2 TIMES WEEKLY
Qty: 24 CAPSULE | Refills: 0 | Status: SHIPPED | OUTPATIENT
Start: 2023-04-03 | End: 2023-07-02

## 2023-03-31 NOTE — TELEPHONE ENCOUNTER
Called pt twice to review lab result  Left VM         ----- Message from Kurt Tinajero sent at 3/31/2023  8:06 AM EDT -----  Please call patient to let him know the following:     - We received his vitamin panel and it shows his vitamin B12 level is low for bariatric patients  Please start on vitamin B12 500 mcg once daily  This is over the counter     - vitamin D is low and parathyroid hormone is high which regulates bone health and calcium absorption  I will send a prescription dose of vitamin D2 to take - it is 50,000 IU twice a week for 12 weeks  After this please switch to an over the counter vitamin D3 2000 IU daily to help maintain your level  Also please make sure you are taking enough calcium; please take at least 1000 mg of calcium per day broken into 500 mg chewable tablets each twice per day  - Folate is elevated but this is not harmful  - Thyroid levels is altered  Please follow up with your PCP for this as this can contribute to your weight gain  - vitamin A level is elevated however I believe this is related to daily alcohol use  Please consider cutting down your alcohol use and I would like to get these levels repeated in 3 months

## 2023-04-20 NOTE — SOCIAL WORK
MSW Student met with patient and explained her role  Pt was alert and oriented and reported that his emergency contact is his wife [place] Livers [phone]  Pt reported that he lives with his wife in a 2 story home with 1 step to enter  Pt reported that there are living arrangements on the 1st floor  Pt denied any hx of D/A, MH stays, or inpatient PT/OT  Pt reported he had [address] 20 years ago after a shoulder injury  Pt was IPTA with ADLS  Pts PCP is Dr Vic Ibrahim through [address] [place] [address] and his pharmacy of choice is [facility] in [facility]  CM reviewed d/c planning process including the following: identifying help at home, patient preference for d/c planning needs, Discharge Lounge, [facility] Meds to Bed program, availability of treatment team to discuss questions or concerns patient and/or family may have regarding understanding medications and recognizing signs and symptoms once discharged  CM also encouraged patient to follow up with all recommended appointments after discharge   Patient advised of importance for patient and family to participate in managing patients medical well being     ~I have read and agree with the above documentation~    PEEWEE Schafer Aklief counseling:  Patient advised to apply a pea-sized amount only at bedtime and wait 30 minutes after washing their face before applying.  If too drying, patient may add a non-comedogenic moisturizer.  The most commonly reported side effects including irritation, redness, scaling, dryness, stinging, burning, itching, and increased risk of sunburn.  The patient verbalized understanding of the proper use and possible adverse effects of retinoids.  All of the patient's questions and concerns were addressed.

## 2023-05-08 DIAGNOSIS — K27.9 PEPTIC ULCER: ICD-10-CM

## 2023-05-08 DIAGNOSIS — K92.1 GASTROINTESTINAL HEMORRHAGE WITH MELENA: ICD-10-CM

## 2023-05-08 RX ORDER — PANTOPRAZOLE SODIUM 40 MG/1
TABLET, DELAYED RELEASE ORAL
Qty: 180 TABLET | Refills: 3 | Status: SHIPPED | OUTPATIENT
Start: 2023-05-08

## 2023-05-09 ENCOUNTER — APPOINTMENT (EMERGENCY)
Dept: NON INVASIVE DIAGNOSTICS | Facility: HOSPITAL | Age: 63
DRG: 287 | End: 2023-05-09
Payer: COMMERCIAL

## 2023-05-09 ENCOUNTER — APPOINTMENT (EMERGENCY)
Dept: RADIOLOGY | Facility: HOSPITAL | Age: 63
DRG: 287 | End: 2023-05-09
Payer: COMMERCIAL

## 2023-05-09 ENCOUNTER — HOSPITAL ENCOUNTER (INPATIENT)
Facility: HOSPITAL | Age: 63
LOS: 3 days | Discharge: HOME | DRG: 287 | End: 2023-05-12
Attending: EMERGENCY MEDICINE | Admitting: INTERNAL MEDICINE
Payer: COMMERCIAL

## 2023-05-09 ENCOUNTER — TELEPHONE (OUTPATIENT)
Dept: BARIATRICS | Facility: CLINIC | Age: 63
End: 2023-05-09

## 2023-05-09 DIAGNOSIS — R42 POSTURAL DIZZINESS: ICD-10-CM

## 2023-05-09 DIAGNOSIS — I48.0 PAF (PAROXYSMAL ATRIAL FIBRILLATION) (HCC): ICD-10-CM

## 2023-05-09 DIAGNOSIS — I44.7 LBBB (LEFT BUNDLE BRANCH BLOCK): ICD-10-CM

## 2023-05-09 DIAGNOSIS — Z86.79 HISTORY OF CHF (CONGESTIVE HEART FAILURE): ICD-10-CM

## 2023-05-09 DIAGNOSIS — I42.9 CARDIOMYOPATHY (HCC): ICD-10-CM

## 2023-05-09 DIAGNOSIS — I42.8 NONISCHEMIC CARDIOMYOPATHY (HCC): ICD-10-CM

## 2023-05-09 DIAGNOSIS — I50.42 CHRONIC COMBINED SYSTOLIC AND DIASTOLIC HEART FAILURE (HCC): ICD-10-CM

## 2023-05-09 DIAGNOSIS — R00.1 SINUS BRADYCARDIA: ICD-10-CM

## 2023-05-09 DIAGNOSIS — R06.09 DOE (DYSPNEA ON EXERTION): Primary | ICD-10-CM

## 2023-05-09 LAB
2HR DELTA HS TROPONIN: -1 NG/L
ANION GAP SERPL CALCULATED.3IONS-SCNC: 4 MMOL/L (ref 4–13)
AORTIC VALVE MEAN VELOCITY: 12 M/S
APICAL FOUR CHAMBER EJECTION FRACTION: 41 %
ATRIAL RATE: 47 BPM
ATRIAL RATE: 49 BPM
ATRIAL RATE: 53 BPM
AV AREA BY CONTINUOUS VTI: 2.1 CM2
AV AREA PEAK VELOCITY: 2 CM2
AV LVOT MEAN GRADIENT: 2 MMHG
AV LVOT PEAK GRADIENT: 4 MMHG
AV MEAN GRADIENT: 6 MMHG
AV PEAK GRADIENT: 10 MMHG
AV VALVE AREA: 2.12 CM2
AV VELOCITY RATIO: 0.63
BASOPHILS # BLD AUTO: 0.05 THOUSANDS/ÂΜL (ref 0–0.1)
BASOPHILS NFR BLD AUTO: 1 % (ref 0–1)
BNP SERPL-MCNC: 81 PG/ML (ref 0–100)
BUN SERPL-MCNC: 19 MG/DL (ref 5–25)
CALCIUM SERPL-MCNC: 8.7 MG/DL (ref 8.4–10.2)
CARDIAC TROPONIN I PNL SERPL HS: 6 NG/L
CARDIAC TROPONIN I PNL SERPL HS: 7 NG/L
CHLORIDE SERPL-SCNC: 107 MMOL/L (ref 96–108)
CO2 SERPL-SCNC: 27 MMOL/L (ref 21–32)
CREAT SERPL-MCNC: 1.16 MG/DL (ref 0.6–1.3)
DOP CALC AO PEAK VEL: 1.6 M/S
DOP CALC AO VTI: 35.3 CM
DOP CALC LVOT AREA: 3.14 CM2
DOP CALC LVOT DIAMETER: 2 CM
DOP CALC LVOT PEAK VEL VTI: 23.82 CM
DOP CALC LVOT PEAK VEL: 1.01 M/S
DOP CALC LVOT STROKE INDEX: 28.7 ML/M2
DOP CALC LVOT STROKE VOLUME: 74.79
EOSINOPHIL # BLD AUTO: 0.18 THOUSAND/ÂΜL (ref 0–0.61)
EOSINOPHIL NFR BLD AUTO: 2 % (ref 0–6)
ERYTHROCYTE [DISTWIDTH] IN BLOOD BY AUTOMATED COUNT: 12.8 % (ref 11.6–15.1)
FLUAV RNA RESP QL NAA+PROBE: NEGATIVE
FLUBV RNA RESP QL NAA+PROBE: NEGATIVE
FRACTIONAL SHORTENING: 23 (ref 28–44)
GFR SERPL CREATININE-BSD FRML MDRD: 67 ML/MIN/1.73SQ M
GLUCOSE SERPL-MCNC: 94 MG/DL (ref 65–140)
HCT VFR BLD AUTO: 44.4 % (ref 36.5–49.3)
HGB BLD-MCNC: 15.1 G/DL (ref 12–17)
IMM GRANULOCYTES # BLD AUTO: 0.04 THOUSAND/UL (ref 0–0.2)
IMM GRANULOCYTES NFR BLD AUTO: 1 % (ref 0–2)
INTERVENTRICULAR SEPTUM IN DIASTOLE (PARASTERNAL SHORT AXIS VIEW): 1.2 CM
INTERVENTRICULAR SEPTUM: 1.2 CM (ref 0.6–1.1)
LEFT INTERNAL DIMENSION IN SYSTOLE: 4.8 CM (ref 2.1–4)
LEFT VENTRICLE DIASTOLIC VOLUME (MOD BIPLANE): 235 ML
LEFT VENTRICLE SYSTOLIC VOLUME (MOD BIPLANE): 137 ML
LEFT VENTRICULAR INTERNAL DIMENSION IN DIASTOLE: 6.2 CM (ref 3.5–6)
LEFT VENTRICULAR POSTERIOR WALL IN END DIASTOLE: 1.1 CM
LEFT VENTRICULAR STROKE VOLUME: 90 ML
LV EF: 42 %
LVSV (TEICH): 90 ML
LYMPHOCYTES # BLD AUTO: 1.32 THOUSANDS/ÂΜL (ref 0.6–4.47)
LYMPHOCYTES NFR BLD AUTO: 15 % (ref 14–44)
MCH RBC QN AUTO: 33.5 PG (ref 26.8–34.3)
MCHC RBC AUTO-ENTMCNC: 34 G/DL (ref 31.4–37.4)
MCV RBC AUTO: 98 FL (ref 82–98)
MONOCYTES # BLD AUTO: 0.82 THOUSAND/ÂΜL (ref 0.17–1.22)
MONOCYTES NFR BLD AUTO: 10 % (ref 4–12)
NEUTROPHILS # BLD AUTO: 6.17 THOUSANDS/ÂΜL (ref 1.85–7.62)
NEUTS SEG NFR BLD AUTO: 71 % (ref 43–75)
NRBC BLD AUTO-RTO: 0 /100 WBCS
P AXIS: 33 DEGREES
P AXIS: 57 DEGREES
P AXIS: 86 DEGREES
PLATELET # BLD AUTO: 224 THOUSANDS/UL (ref 149–390)
PMV BLD AUTO: 10.2 FL (ref 8.9–12.7)
POTASSIUM SERPL-SCNC: 4.6 MMOL/L (ref 3.5–5.3)
PR INTERVAL: 200 MS
PR INTERVAL: 200 MS
PR INTERVAL: 202 MS
QRS AXIS: 142 DEGREES
QRS AXIS: 149 DEGREES
QRS AXIS: 157 DEGREES
QRSD INTERVAL: 148 MS
QRSD INTERVAL: 152 MS
QRSD INTERVAL: 156 MS
QT INTERVAL: 484 MS
QT INTERVAL: 512 MS
QT INTERVAL: 526 MS
QTC INTERVAL: 454 MS
QTC INTERVAL: 462 MS
QTC INTERVAL: 465 MS
RBC # BLD AUTO: 4.51 MILLION/UL (ref 3.88–5.62)
RSV RNA RESP QL NAA+PROBE: NEGATIVE
SARS-COV-2 RNA RESP QL NAA+PROBE: NEGATIVE
SL CV LV EF: 35
SL CV PED ECHO LEFT VENTRICLE DIASTOLIC VOLUME (MOD BIPLANE) 2D: 196 ML
SL CV PED ECHO LEFT VENTRICLE SYSTOLIC VOLUME (MOD BIPLANE) 2D: 106 ML
SODIUM SERPL-SCNC: 138 MMOL/L (ref 135–147)
T WAVE AXIS: 47 DEGREES
T WAVE AXIS: 58 DEGREES
T WAVE AXIS: 79 DEGREES
TR MAX PG: 25 MMHG
TR PEAK VELOCITY: 2.5 M/S
TRICUSPID VALVE PEAK REGURGITATION VELOCITY: 2.51 M/S
TSH SERPL DL<=0.05 MIU/L-ACNC: 5.27 UIU/ML (ref 0.45–4.5)
VENTRICULAR RATE: 47 BPM
VENTRICULAR RATE: 49 BPM
VENTRICULAR RATE: 53 BPM
WBC # BLD AUTO: 8.58 THOUSAND/UL (ref 4.31–10.16)

## 2023-05-09 PROCEDURE — 84484 ASSAY OF TROPONIN QUANT: CPT

## 2023-05-09 PROCEDURE — 93010 ELECTROCARDIOGRAM REPORT: CPT | Performed by: INTERNAL MEDICINE

## 2023-05-09 PROCEDURE — 36415 COLL VENOUS BLD VENIPUNCTURE: CPT

## 2023-05-09 PROCEDURE — 80048 BASIC METABOLIC PNL TOTAL CA: CPT

## 2023-05-09 PROCEDURE — 93321 DOPPLER ECHO F-UP/LMTD STD: CPT

## 2023-05-09 PROCEDURE — 84443 ASSAY THYROID STIM HORMONE: CPT | Performed by: NURSE PRACTITIONER

## 2023-05-09 PROCEDURE — 93005 ELECTROCARDIOGRAM TRACING: CPT

## 2023-05-09 PROCEDURE — 93356 MYOCRD STRAIN IMG SPCKL TRCK: CPT | Performed by: INTERNAL MEDICINE

## 2023-05-09 PROCEDURE — 93325 DOPPLER ECHO COLOR FLOW MAPG: CPT | Performed by: INTERNAL MEDICINE

## 2023-05-09 PROCEDURE — 84439 ASSAY OF FREE THYROXINE: CPT | Performed by: NURSE PRACTITIONER

## 2023-05-09 PROCEDURE — 93308 TTE F-UP OR LMTD: CPT

## 2023-05-09 PROCEDURE — 0241U HB NFCT DS VIR RESP RNA 4 TRGT: CPT

## 2023-05-09 PROCEDURE — 96374 THER/PROPH/DIAG INJ IV PUSH: CPT

## 2023-05-09 PROCEDURE — 93308 TTE F-UP OR LMTD: CPT | Performed by: INTERNAL MEDICINE

## 2023-05-09 PROCEDURE — 93325 DOPPLER ECHO COLOR FLOW MAPG: CPT

## 2023-05-09 PROCEDURE — 99223 1ST HOSP IP/OBS HIGH 75: CPT | Performed by: INTERNAL MEDICINE

## 2023-05-09 PROCEDURE — 71046 X-RAY EXAM CHEST 2 VIEWS: CPT

## 2023-05-09 PROCEDURE — 99285 EMERGENCY DEPT VISIT HI MDM: CPT | Performed by: EMERGENCY MEDICINE

## 2023-05-09 PROCEDURE — 83880 ASSAY OF NATRIURETIC PEPTIDE: CPT

## 2023-05-09 PROCEDURE — 99285 EMERGENCY DEPT VISIT HI MDM: CPT

## 2023-05-09 PROCEDURE — 93321 DOPPLER ECHO F-UP/LMTD STD: CPT | Performed by: INTERNAL MEDICINE

## 2023-05-09 PROCEDURE — 85025 COMPLETE CBC W/AUTO DIFF WBC: CPT

## 2023-05-09 RX ORDER — SODIUM CHLORIDE 9 MG/ML
75 INJECTION, SOLUTION INTRAVENOUS CONTINUOUS
Status: DISCONTINUED | OUTPATIENT
Start: 2023-05-10 | End: 2023-05-10

## 2023-05-09 RX ORDER — PANTOPRAZOLE SODIUM 40 MG/1
40 TABLET, DELAYED RELEASE ORAL 2 TIMES DAILY WITH MEALS
Status: DISCONTINUED | OUTPATIENT
Start: 2023-05-10 | End: 2023-05-12 | Stop reason: HOSPADM

## 2023-05-09 RX ORDER — ASPIRIN 81 MG/1
324 TABLET, CHEWABLE ORAL ONCE
Status: COMPLETED | OUTPATIENT
Start: 2023-05-10 | End: 2023-05-10

## 2023-05-09 RX ORDER — LORATADINE 10 MG/1
10 TABLET ORAL DAILY
Status: DISCONTINUED | OUTPATIENT
Start: 2023-05-10 | End: 2023-05-12 | Stop reason: HOSPADM

## 2023-05-09 RX ORDER — ATORVASTATIN CALCIUM 40 MG/1
40 TABLET, FILM COATED ORAL
Status: DISCONTINUED | OUTPATIENT
Start: 2023-05-10 | End: 2023-05-12 | Stop reason: HOSPADM

## 2023-05-09 RX ORDER — METOPROLOL SUCCINATE 25 MG/1
25 TABLET, EXTENDED RELEASE ORAL DAILY
Status: DISCONTINUED | OUTPATIENT
Start: 2023-05-10 | End: 2023-05-11

## 2023-05-09 RX ORDER — AMIODARONE HYDROCHLORIDE 200 MG/1
200 TABLET ORAL DAILY
Status: DISCONTINUED | OUTPATIENT
Start: 2023-05-10 | End: 2023-05-12 | Stop reason: HOSPADM

## 2023-05-09 RX ADMIN — PERFLUTREN 0.8 ML/MIN: 6.52 INJECTION, SUSPENSION INTRAVENOUS at 14:54

## 2023-05-09 NOTE — ED ATTENDING ATTESTATION
5/9/2023  ICrista MD, saw and evaluated the patient  I have discussed the patient with the resident/non-physician practitioner and agree with the resident's/non-physician practitioner's findings, Plan of Care, and MDM as documented in the resident's/non-physician practitioner's note, except where noted  All available labs and Radiology studies were reviewed  I was present for key portions of any procedure(s) performed by the resident/non-physician practitioner and I was immediately available to provide assistance  At this point I agree with the current assessment done in the Emergency Department  I have conducted an independent evaluation of this patient a history and physical is as follows:  Patient is a 64 yo male, hx of CHF, P-Afib on Xeralto, c/o cough, worsening dyspnea on exertion, dizziness, chronic midline chest pain, denies fever, headache, abdominal pain, vomiting, diarrhea, no recent falls  On exam, patient is conscious, alert, vital signs noted, blood pressure stable, bradycardic heart rate mostly in 40s to 50s, in between in 30s, EKG consistent with sinus bradycardia, no heart block noted, lungs CTA, CVS RRR, abdomen soft nontender, nonfocal neuro exam  Differential diagnosis: CHF exacerbation, ACS, viral illness, will check cardiac work-up and labs, EKG, chest x-ray, COVID/flu swab  ED Course     ER work-up results, labs, EKG, chest x-ray reviewed, no significant acute dramality noted  Cardiology consulted for symptomatic bradycardia  Dispo pending Cardiology consultation      Critical Care Time  Procedures

## 2023-05-09 NOTE — H&P
[facility]  H&P  Name: Katherine Marie 65 y o  male I MRN: [id number]  Unit/Bed#: ED-26 I Date of Admission: 5/9/2023   Date of Service: 5/9/2023 I Hospital Day: 0      Assessment/Plan   * Dyspnea on exertion  Assessment & Plan  Patient reports dyspnea on exertion  Echo performed found to have decline in EF  Plan for cardiac catheterization 5/10/2023  N p o  at midnight  Hold Xarelto    CHF (congestive heart failure) ([facility])  Assessment & Plan  Wt Readings from Last 3 Encounters:   05/09/23 134 kg (296 lb)   04/13/23 134 kg (296 lb)   03/16/23 131 kg (289 lb 8 oz)     Appears euvolemic  Hold a m  dose of Lasix with plan for catheterization        GERD (gastroesophageal reflux disease)  Assessment & Plan  Continue PPI    PAF (paroxysmal atrial fibrillation) ([facility])  Assessment & Plan  Continue metoprolol at reduced dose 25 mg daily plus amiodarone  Xarelto on hold tomorrow morning prior to cath    History of Eliud-en-Y gastric bypass  Assessment & Plan  Noted       VTE Prophylaxis: Xarelto on hold for procedure  Code Status: Level 1 full code    Anticipated Length of Stay:  Patient will be admitted on an Inpatient basis with an anticipated length of stay of greater than 2 midnights  Justification for Hospital Stay: Need for inpatient procedure and evaluation    Total Time for Visit, including Counseling / Coordination of Care: 60 minutes  Greater than 50% of this total time spent on direct patient counseling and coordination of care  Chief Complaint:   Chest discomfort    History of Present Illness:    Katherine Marie is a 65 y o  male With past medical history of paroxysmal atrial fibrillation on Xarelto and amiodarone, chronic combined systolic and diastolic CHF, hyperlipidemia, obstructive sleep apnea, obesity status post gastric bypass, daily alcohol use who presents to the hospital with complaints of dizziness, lightheadedness, shortness of breath and chest pressure with exertion    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  He reports significant fatigue and intermittent chest pressure with short ambulation or going up just a few stairs  Review of Systems:    Review of Systems   Constitutional: Positive for fatigue  Negative for chills and fever  HENT: Negative for sore throat and trouble swallowing  Eyes: Negative for photophobia and visual disturbance  Respiratory: Negative for shortness of breath and wheezing  Cardiovascular: Positive for chest pain  Negative for palpitations  Gastrointestinal: Negative for constipation, diarrhea, nausea and vomiting  Genitourinary: Negative for difficulty urinating and dysuria  Musculoskeletal: Negative for arthralgias and myalgias  Skin: Negative for rash and wound  Neurological: Negative for dizziness, light-headedness and headaches  Past Medical and Surgical History:     Past Medical History:   Diagnosis Date   • A-fib [facility])    • Acute ulcer of stomach    • CHF (congestive heart failure) ([facility] [address] )    • GERD (gastroesophageal reflux disease)    • Rib fractures        Past Surgical History:   Procedure Laterality Date   • COLONOSCOPY     • ESOPHAGOGASTRODUODENOSCOPY     • GASTRIC BYPASS     • MANDIBLE FRACTURE SURGERY         Meds/Allergies:    Prior to Admission medications    Medication Sig Start Date End Date Taking? Authorizing Provider   amiodarone 200 mg tablet TAKE 1 TABLET DAILY 2/1/23  Yes Nico FriendJENIFER   atorvastatin (LIPITOR) 40 mg tablet Take 1 tablet (40 mg total) by mouth daily with dinner 5/25/22  Yes Imani Myers,    ergocalciferol (VITAMIN D2) 50,000 units Take 1 capsule (50,000 Units total) by mouth 2 (two) times a week 4/3/23 7/2/23 Yes BINU Farrell   fexofenadine (ALLEGRA) 180 MG tablet Take 1 tablet (180 mg total) by mouth in the morning   5/16/22  Yes Laxmi Chen MD   furosemide (LASIX) 20 mg tablet Take 1 tablet (20 mg total) by mouth daily 2/2/23 5/9/23 Yes "Imani Myers, DO   lisinopril (ZESTRIL) 10 mg tablet Take 1 tablet (10 mg total) by mouth daily 10/3/22  Yes Imani Myers, DO   metoprolol succinate (TOPROL-XL) 25 mg 24 hr tablet Take 1 tablet (25 mg total) by mouth daily 22  Yes Imani Myers, DO   Multiple Vitamins-Minerals (MULTIVITAMIN ADULT EXTRA C PO) Take 1 capsule by mouth   Yes Historical Provider, MD   pantoprazole (PROTONIX) 40 mg tablet TAKE 1 TABLET EVERY 12 HOURS 23  Yes Rigoberto Hernandez DO   potassium chloride (K-DUR,KLOR-CON) 20 mEq tablet TAKE 2 TABLETS BY MOUTH IN AM, 1 TABLET IN PM 22  Yes Imani Myers DO   rivaroxaban (Xarelto) 20 mg tablet Take 1 tablet (20 mg total) by mouth daily with breakfast 22  Yes Imani Myers DO   Metoprolol Tartrate 75 MG TABS Take 1 tablet (75 mg total) by mouth every 12 (twelve) hours 9/23/22 10/3/22  Imani Myers DO       Allergies: No Known Allergies    Social History:     Marital Status: /Civil Union   Substance Use History:   Social History     Substance and Sexual Activity   Alcohol Use Yes   • Alcohol/week: 7 0 standard drinks   • Types: 7 Cans of beer per week    Comment: Hasn't had any beer since 23     Social History     Tobacco Use   Smoking Status Former   • Types: Cigars   • Start date:    • Quit date:    • Years since quittin 3   Smokeless Tobacco Former   • Types: Chew     Social History     Substance and Sexual Activity   Drug Use No       Family History:    Pertinent family history reviewed    Physical Exam:     Vitals:   Blood Pressure: 123/70 (23 1620)  Pulse: 59 (23 1620)  Temperature: 98 °F (36 7 °C) (23 0757)  Temp Source: Oral (23 075)  Respirations: 18 (23 162)  Height: 5' 11 7\" (182 1 cm) (23 1430)  Weight - Scale: 134 kg (296 lb) (23 1430)  SpO2: 99 % (23 1620)    Physical Exam  Vitals reviewed  Constitutional:       General: He is not in acute distress  Appearance: He is well-developed   He is not " ill-appearing, toxic-appearing or diaphoretic  HENT:      Head: Normocephalic and atraumatic  Mouth/Throat:      Mouth: Mucous membranes are moist    Eyes:      General: No scleral icterus  Extraocular Movements: Extraocular movements intact  Cardiovascular:      Rate and Rhythm: Normal rate and regular rhythm  Heart sounds: Normal heart sounds  Pulmonary:      Effort: Pulmonary effort is normal  No respiratory distress  Breath sounds: Normal breath sounds  No wheezing or rales  Abdominal:      General: There is no distension  Palpations: Abdomen is soft  Tenderness: There is no abdominal tenderness  There is no guarding or rebound  Musculoskeletal:         General: No swelling, tenderness or deformity  Skin:     General: Skin is warm and dry  Neurological:      General: No focal deficit present  Mental Status: He is alert  Mental status is at baseline  Psychiatric:         Mood and Affect: Mood normal          Behavior: Behavior normal          Thought Content: Thought content normal          Judgment: Judgment normal           Additional Data:     Lab Results: I have reviewed pertinent results     Results from last 7 days   Lab Units 05/09/23  0810   WBC Thousand/uL 8 58   HEMOGLOBIN g/dL 15 1   HEMATOCRIT % 44 4   PLATELETS Thousands/uL 224   NEUTROS PCT % 71   LYMPHS PCT % 15   MONOS PCT % 10   EOS PCT % 2     Results from last 7 days   Lab Units 05/09/23  0810   SODIUM mmol/L 138   POTASSIUM mmol/L 4 6   CHLORIDE mmol/L 107   CO2 mmol/L 27   BUN mg/dL 19   CREATININE mg/dL 1 16   ANION GAP mmol/L 4   CALCIUM mg/dL 8 7   GLUCOSE RANDOM mg/dL 94                       Imaging: I have reviewed pertinent imaging     XR chest 2 views   ED Interpretation by Hernan Huynh MD (05/09 0871)   The CXR was interpreted by me independently    On my read, it appears without acute abnormalities:  - The  cardiomediastinal  silhouette  is  unremarkable     - The  lungs  are  clear   - No  pleural  effusions   - No  pneumothorax  - The  pulmonary  vasculature  is  within  normal  limits     - The  trachea  is  midline     - Bony  thorax  is  unremarkable       - Similar to previous CXR       Final Result by Claudette Edge, MD (05/09 1453)      No acute cardiopulmonary disease  Findings are stable            Workstation performed: [id number]             EKG, Pathology, and Other Studies Reviewed on Admission:   · EKG: Reviewed     Allscripts / Epic Records Reviewed    ** Please Note: This note has been constructed using a voice recognition system   **

## 2023-05-09 NOTE — ED NOTES
Awaiting cardiology MARQUISE COOK saw prior        [address] [facility] RN  05/09/23 [id number]

## 2023-05-09 NOTE — ASSESSMENT & PLAN NOTE
Continue metoprolol at reduced dose 25 mg daily plus amiodarone  Xarelto on hold tomorrow morning prior to cath

## 2023-05-09 NOTE — ASSESSMENT & PLAN NOTE
Patient reports dyspnea on exertion  Echo performed found to have decline in EF  Plan for cardiac catheterization 5/10/2023  N p o  at midnight  Hold Xarelto

## 2023-05-09 NOTE — QUICK NOTE
Echo with decline in left ventricular systolic function compared to prior study 1/2023  Pt with known LBBB  Significant exertional dyspnea without evidence of decompensated CHF  Recommend:  · Angiography to rule out obstructive CAD as a cause of symptoms and decline in left ventricular ejection fraction  · If no evidence of obstructive CAD, consider electrophysiology evaluation for possible dyssynchrony induced cardiomyopathy in setting of left bundle branch block  · Would strongly advised alcohol cessation  Patient drinking 2-3 beers every night  · Continue cardiomyopathy regimen  · Orthostatics negative    OK to continue lower dose metoprolol at 25 mg daily in light of bradycardia

## 2023-05-09 NOTE — ED PROVIDER NOTES
History  Chief Complaint   Patient presents with   • Dizziness     Pt reports ongoing dizziness for weeks; usually subsides  Today dizziness has not subsided  Also reports SOB for one year since dx with afib  Taking medications as prescribed  61-year-old male with history of A-fib on metoprolol and xarelto and CHF with reduced ejection fraction on amiodarone presents with worsening dyspnea on exertion, cough, and positional dizziness for the past several months  Cough is productive and worse at night  Patient reports dyspnea while walking up stairs at work which is unusual for him  Patient is on diuretic therapy and reports drinking a gallon of water per day  Also reports self-limited episodes of room spinning sensation when going from leaning forward to standing upright  Symptoms are not present at rest and resolves spontaneously after a few seconds  Denies recent falls, weakness, numbness, or GI symptoms  Prior to Admission Medications   Prescriptions Last Dose Informant Patient Reported? Taking? Multiple Vitamins-Minerals (MULTIVITAMIN ADULT EXTRA C PO) 5/9/2023  Yes Yes   Sig: Take 1 capsule by mouth   amiodarone 200 mg tablet 5/9/2023  No Yes   Sig: TAKE 1 TABLET DAILY   atorvastatin (LIPITOR) 40 mg tablet 5/9/2023  No Yes   Sig: Take 1 tablet (40 mg total) by mouth daily with dinner   ergocalciferol (VITAMIN D2) 50,000 units Unknown  No No   Sig: Take 1 capsule (50,000 Units total) by mouth 2 (two) times a week   fexofenadine (ALLEGRA) 180 MG tablet Past Month  No Yes   Sig: Take 1 tablet (180 mg total) by mouth in the morning     furosemide (LASIX) 20 mg tablet 5/9/2023  No Yes   Sig: Take 1 tablet (20 mg total) by mouth daily   lisinopril (ZESTRIL) 10 mg tablet 5/9/2023  No Yes   Sig: Take 1 tablet (10 mg total) by mouth daily   metoprolol succinate (TOPROL-XL) 25 mg 24 hr tablet 5/9/2023  No Yes   Sig: Take 1 tablet (25 mg total) by mouth daily   pantoprazole (PROTONIX) 40 mg tablet No Yes   Sig: TAKE 1 TABLET EVERY 12 HOURS   potassium chloride (K-DUR,KLOR-CON) 20 mEq tablet 2023  No Yes   Sig: TAKE 2 TABLETS BY MOUTH IN AM, 1 TABLET IN PM   rivaroxaban (Xarelto) 20 mg tablet 2023  No Yes   Sig: Take 1 tablet (20 mg total) by mouth daily with breakfast      Facility-Administered Medications: None       Past Medical History:   Diagnosis Date   • A-fib ([facility] [address] )    • Acute ulcer of stomach    • CHF (congestive heart failure) ([facility])    • GERD (gastroesophageal reflux disease)    • Rib fractures        Past Surgical History:   Procedure Laterality Date   • COLONOSCOPY     • ESOPHAGOGASTRODUODENOSCOPY     • GASTRIC BYPASS     • MANDIBLE FRACTURE SURGERY         Family History   Problem Relation Age of Onset   • Seizures Father      I have reviewed and agree with the history as documented  E-Cigarette/Vaping   • E-Cigarette Use Never User      E-Cigarette/Vaping Substances     Social History     Tobacco Use   • Smoking status: Former     Types: Cigars     Start date: 0     Quit date:      Years since quittin 3   • Smokeless tobacco: Former     Types: Chew   Vaping Use   • Vaping Use: Never used   Substance Use Topics   • Alcohol use: Yes     Alcohol/week: 7 0 standard drinks     Types: 7 Cans of beer per week     Comment: Hasn't had any beer since 23   • Drug use: No        Review of Systems   Constitutional: Positive for chills  Negative for fever  HENT: Negative for ear pain and sore throat  Eyes: Negative for pain and visual disturbance  Respiratory: Positive for cough and shortness of breath  Cardiovascular: Positive for chest pain  Negative for palpitations and leg swelling  Gastrointestinal: Negative for abdominal pain and vomiting  Genitourinary: Negative for dysuria and hematuria  Musculoskeletal: Negative for arthralgias and back pain  Skin: Negative for color change and rash  Neurological: Positive for dizziness   Negative for seizures and syncope  All other systems reviewed and are negative  Physical Exam  ED Triage Vitals [05/09/23 0757]   Temperature Pulse Respirations Blood Pressure SpO2   98 °F (36 7 °C) (!) 52 20 137/84 100 %      Temp Source Heart Rate Source Patient Position - Orthostatic VS BP Location FiO2 (%)   Oral Monitor Sitting Right arm --      Pain Score       4             Orthostatic Vital Signs  Vitals:    05/09/23 2307 05/10/23 0321 05/10/23 0655 05/10/23 1105   BP: 120/74 107/76 121/79 122/77   Pulse: 74 (!) 46 (!) 49 (!) 51   Patient Position - Orthostatic VS: Lying Lying Lying Lying       Physical Exam  Vitals and nursing note reviewed  Constitutional:       General: He is not in acute distress  Appearance: Normal appearance  He is well-developed and normal weight  He is not ill-appearing, toxic-appearing or diaphoretic  HENT:      Head: Normocephalic and atraumatic  Right Ear: Tympanic membrane, ear canal and external ear normal       Left Ear: Tympanic membrane, ear canal and external ear normal       Nose: Nose normal       Mouth/Throat:      Mouth: Mucous membranes are moist    Eyes:      General:         Right eye: No discharge  Left eye: No discharge  Extraocular Movements: Extraocular movements intact  Conjunctiva/sclera: Conjunctivae normal    Cardiovascular:      Rate and Rhythm: Regular rhythm  Bradycardia present  Pulses: Normal pulses  Heart sounds: No murmur heard  Comments: Distant heart sounds  Pulmonary:      Effort: Pulmonary effort is normal  No respiratory distress  Breath sounds: Normal breath sounds  No stridor  No wheezing, rhonchi or rales  Chest:      Chest wall: No tenderness  Abdominal:      General: There is no distension  Palpations: Abdomen is soft  Tenderness: There is no abdominal tenderness  There is no guarding  Musculoskeletal:         General: No swelling or tenderness        Cervical back: Normal range of motion and neck supple  No rigidity or tenderness  Right lower leg: No edema  Left lower leg: No edema  Skin:     General: Skin is warm and dry  Capillary Refill: Capillary refill takes less than 2 seconds  Neurological:      Mental Status: He is alert and oriented to person, place, and time  Cranial Nerves: No cranial nerve deficit, dysarthria or facial asymmetry  Sensory: Sensation is intact  Motor: No tremor or pronator drift  Coordination: Romberg sign negative   Finger-Nose-Finger Test normal       Gait: Gait normal       Comments: 5/5 strength all muscle groups   Psychiatric:         Mood and Affect: Mood normal          Behavior: Behavior normal          ED Medications  Medications   sodium chloride 0 9 % infusion (75 mL/hr Intravenous New Bag 5/10/23 0506)   amiodarone tablet 200 mg (200 mg Oral Given 5/10/23 0745)   atorvastatin (LIPITOR) tablet 40 mg (has no administration in time range)   loratadine (CLARITIN) tablet 10 mg (10 mg Oral Given 5/10/23 0745)   metoprolol succinate (TOPROL-XL) 24 hr tablet 25 mg (25 mg Oral Not Given 5/10/23 0745)   pantoprazole (PROTONIX) EC tablet 40 mg (40 mg Oral Given 5/10/23 0745)   perflutren lipid microsphere (DEFINITY) injection (0 8 mL/min Intravenous Given 5/9/23 1454)   aspirin chewable tablet 324 mg (324 mg Oral Given 5/10/23 0500)       Diagnostic Studies  Results Reviewed     Procedure Component Value Units Date/Time    T4, free [[id number]]  (Normal) Collected: 05/09/23 0810    Lab Status: Final result Specimen: Blood from Arm, Left Updated: 05/10/23 0700     Free T4 1 27 ng/dL     Basic metabolic panel [[id number]] Collected: 05/10/23 0530    Lab Status: Final result Specimen: Blood from Arm, Right Updated: 05/10/23 0625     Sodium 138 mmol/L      Potassium 4 4 mmol/L      Chloride 106 mmol/L      CO2 28 mmol/L      ANION GAP 4 mmol/L      BUN 18 mg/dL      Creatinine 1 04 mg/dL      Glucose 94 mg/dL      Calcium 8 6 mg/dL      eGFR 77 ml/min/1 73sq m     Narrative:      National Kidney Disease Foundation guidelines for Chronic Kidney Disease (CKD):   •  Stage 1 with normal or high GFR (GFR > 90 mL/min/1 73 square meters)  •  Stage 2 Mild CKD (GFR = 60-89 mL/min/1 73 square meters)  •  Stage 3A Moderate CKD (GFR = 45-59 mL/min/1 73 square meters)  •  Stage 3B Moderate CKD (GFR = 30-44 mL/min/1 73 square meters)  •  Stage 4 Severe CKD (GFR = 15-29 mL/min/1 73 square meters)  •  Stage 5 End Stage CKD (GFR <15 mL/min/1 73 square meters)  Note: GFR calculation is accurate only with a steady state creatinine    CBC (With Platelets) [[id number]]  (Abnormal) Collected: 05/10/23 0530    Lab Status: Final result Specimen: Blood from Arm, Right Updated: 05/10/23 0558     WBC 6 21 Thousand/uL      RBC 4 44 Million/uL      Hemoglobin 14 6 g/dL      Hematocrit 44 2 %       fL      MCH 32 9 pg      MCHC 33 0 g/dL      RDW 12 6 %      Platelets 078 Thousands/uL      MPV 9 8 fL     TSH, 3rd generation with Free T4 reflex [[id number]]  (Abnormal) Collected: 05/09/23 0810    Lab Status: Final result Specimen: Blood from Arm, Left Updated: 05/09/23 2312     TSH 3RD GENERATON 5 266 uIU/mL     HS Troponin I 2hr [[id number]]  (Normal) Collected: 05/09/23 1044    Lab Status: Final result Specimen: Blood from Arm, Right Updated: 05/09/23 1119     hs TnI 2hr 6 ng/L      Delta 2hr hsTnI -1 ng/L     FLU/RSV/COVID - if FLU/RSV clinically relevant [[id number]]  (Normal) Collected: 05/09/23 0810    Lab Status: Final result Specimen: Nares from Nose Updated: 05/09/23 0918     SARS-CoV-2 Negative     INFLUENZA A PCR Negative     INFLUENZA B PCR Negative     RSV PCR Negative    Narrative:      FOR PEDIATRIC PATIENTS - copy/paste COVID Guidelines URL to browser: [email or web address]    SARS-CoV-2 assay is a Nucleic Acid Amplification assay intended for the  qualitative detection of nucleic acid from SARS-CoV-2 in nasopharyngeal  swabs  Results are for the presumptive identification of SARS-CoV-2 RNA  Positive results are indicative of infection with SARS-CoV-2, the virus  causing COVID-19, but do not rule out bacterial infection or co-infection  with other viruses  Laboratories within the [place] and its  territories are required to report all positive results to the appropriate  public health authorities  Negative results do not preclude SARS-CoV-2  infection and should not be used as the sole basis for treatment or other  patient management decisions  Negative results must be combined with  clinical observations, patient history, and epidemiological information  This test has not been FDA cleared or approved  This test has been authorized by FDA under an Emergency Use Authorization  (EUA)  This test is only authorized for the duration of time the  declaration that circumstances exist justifying the authorization of the  emergency use of an in vitro diagnostic tests for detection of SARS-CoV-2  virus and/or diagnosis of COVID-19 infection under section 564(b)(1) of  the Act, 21 U  S C  970EQG-3(W)(8), unless the authorization is terminated  or revoked sooner  The test has been validated but independent review by FDA  and CLIA is pending  Test performed using [employer]: This RT-PCR assay targets N2,  a region unique to SARS-CoV-2  A conserved region in the E-gene was chosen  for pan-Sarbecovirus detection which includes SARS-CoV-2  According to CMS-2020-01-R, this platform meets the definition of high-throughput technology      HS Troponin 0hr (reflex protocol) [[id number]]  (Normal) Collected: 05/09/23 0810    Lab Status: Final result Specimen: Blood from Arm, Left Updated: 05/09/23 0848     hs TnI 0hr 7 ng/L     B-Type Natriuretic Peptide(BNP) [[id number]]  (Normal) Collected: 05/09/23 0810    Lab Status: Final result Specimen: Blood from Arm, Left Updated: 05/09/23 0846     BNP 81 pg/mL     Basic metabolic panel [[id number]] Collected: 05/09/23 0810    Lab Status: Final result Specimen: Blood from Arm, Left Updated: 05/09/23 0837     Sodium 138 mmol/L      Potassium 4 6 mmol/L      Chloride 107 mmol/L      CO2 27 mmol/L      ANION GAP 4 mmol/L      BUN 19 mg/dL      Creatinine 1 16 mg/dL      Glucose 94 mg/dL      Calcium 8 7 mg/dL      eGFR 67 ml/min/1 73sq m     Narrative:      [place] guidelines for Chronic Kidney Disease (CKD):   •  Stage 1 with normal or high GFR (GFR > 90 mL/min/1 73 square meters)  •  Stage 2 Mild CKD (GFR = 60-89 mL/min/1 73 square meters)  •  Stage 3A Moderate CKD (GFR = 45-59 mL/min/1 73 square meters)  •  Stage 3B Moderate CKD (GFR = 30-44 mL/min/1 73 square meters)  •  Stage 4 Severe CKD (GFR = 15-29 mL/min/1 73 square meters)  •  Stage 5 End Stage CKD (GFR <15 mL/min/1 73 square meters)  Note: GFR calculation is accurate only with a steady state creatinine    CBC and differential [[id number]] Collected: 05/09/23 0810    Lab Status: Final result Specimen: Blood from Arm, Left Updated: 05/09/23 0822     WBC 8 58 Thousand/uL      RBC 4 51 Million/uL      Hemoglobin 15 1 g/dL      Hematocrit 44 4 %      MCV 98 fL      MCH 33 5 pg      MCHC 34 0 g/dL      RDW 12 8 %      MPV 10 2 fL      Platelets 601 Thousands/uL      nRBC 0 /100 WBCs      Neutrophils Relative 71 %      Immat GRANS % 1 %      Lymphocytes Relative 15 %      Monocytes Relative 10 %      Eosinophils Relative 2 %      Basophils Relative 1 %      Neutrophils Absolute 6 17 Thousands/µL      Immature Grans Absolute 0 04 Thousand/uL      Lymphocytes Absolute 1 32 Thousands/µL      Monocytes Absolute 0 82 Thousand/µL      Eosinophils Absolute 0 18 Thousand/µL      Basophils Absolute 0 05 Thousands/µL                  XR chest 2 views   ED Interpretation by Pita Banuelos MD (05/09 0989)   The CXR was interpreted by me independently    On my read, it appears without acute abnormalities:  - The  cardiomediastinal  silhouette  is  unremarkable     - The  lungs  are  clear  - No  pleural  effusions   - No  pneumothorax  - The  pulmonary  vasculature  is  within  normal  limits     - The  trachea  is  midline     - Bony  thorax  is  unremarkable       - Similar to previous CXR       Final Result by Selin Watkins MD (05/09 1453)      No acute cardiopulmonary disease  Findings are stable            Workstation performed: [id number]               Procedures  ECG 12 Lead Documentation Only    Date/Time: 5/9/2023 8:26 AM  Performed by: Leatha Harper MD  Authorized by: Leatha Harper MD     Indications / Diagnosis:  Dyspnea  ECG reviewed by me, the ED Provider: yes    Patient location:  ED  Previous ECG:     Previous ECG:  Compared to current    Similarity:  No change    Comparison to cardiac monitor: Yes    Interpretation:     Interpretation: abnormal    Rate:     ECG rate:  53    ECG rate assessment: bradycardic    Rhythm:     Rhythm: sinus bradycardia    Ectopy:     Ectopy: none    QRS:     QRS axis:  Normal    QRS intervals: Wide  Conduction:     Conduction: abnormal      Abnormal conduction: complete LBBB    ST segments:     ST segments:  Normal  T waves:     T waves: normal            ED Course                             SBIRT 22yo+    Flowsheet Row Most Recent Value   Initial Alcohol Screen: US AUDIT-C     1  How often do you have a drink containing alcohol? 6 Filed at: 05/09/2023 0802   2  How many drinks containing alcohol do you have on a typical day you are drinking? 1 Filed at: 05/09/2023 0802   3a  Male UNDER 65: How often do you have five or more drinks on one occasion? 0 Filed at: 05/09/2023 0802   3b  FEMALE Any Age, or MALE 65+: How often do you have 4 or more drinks on one occassion? 0 Filed at: 05/09/2023 0802   Audit-C Score 7 Filed at: 05/09/2023 0515   VITO: How many times in the past year have you        Used an illegal drug or used a prescription medication for non-medical reasons? Never Filed at: 05/09/2023 3645                Medical Decision Making  79-year-old male presents with history and physical exam suggestive of worsening heart failure  Differential diagnosis also includes pneumonia, viral bronchitis, symptomatic bradycardia, ACS  Episodic vertigo is positional, related head position, consistent with peripheral vertigo  No other findings on history of physical exam to suggest acute CVA  BURROWS (dyspnea on exertion): acute illness or injury  History of CHF (congestive heart failure): chronic illness or injury  LBBB (left bundle branch block): chronic illness or injury  Postural dizziness: acute illness or injury  Sinus bradycardia: chronic illness or injury  Amount and/or Complexity of Data Reviewed  Labs: ordered  Radiology: ordered and independent interpretation performed  Risk  Decision regarding hospitalization              Disposition  Final diagnoses:   History of CHF (congestive heart failure)   LBBB (left bundle branch block)   Sinus bradycardia   Postural dizziness   BURROWS (dyspnea on exertion)     Time reflects when diagnosis was documented in both MDM as applicable and the Disposition within this note     Time User Action Codes Description Comment    5/9/2023  8:21 AM Coral Flick Add [Z86 79] History of CHF (congestive heart failure)     5/9/2023  8:28 AM Coral Flick Add [I44 7] LBBB (left bundle branch block)     5/9/2023  8:29 AM Jackelyn Last [R00 1] Sinus bradycardia     5/9/2023  9:25 AM Coral Flick Add [R42] Postural dizziness     5/9/2023  9:25 AM Coarl Flick Add [R06 09] BURROWS (dyspnea on exertion)     5/9/2023  9:25 AM Coral Flick Modify [Z86 79] History of CHF (congestive heart failure)     5/9/2023  9:25 AM Coral Flick Modify [R06 09] BURROWS (dyspnea on exertion)     5/9/2023  4:41 PM Kelsey Navarro Add [I42 9] Cardiomyopathy ([address] )     5/9/2023  4:44 PM Alaina Frias Modify [I44 7] LBBB (left bundle branch block)     5/9/2023  4:44 PM Alaina Frias Modify [I42 9] Cardiomyopathy [facility])       ED Disposition     ED Disposition   Admit    Condition   Stable    Date/Time   Tue May 9, 2023  4:58 PM    Comment   Case was discussed with SAVANNAH and the patient's admission status was agreed to be Admission Status: inpatient status to the service of Dr Russell Carter  Follow-up Information    None         Current Discharge Medication List      CONTINUE these medications which have NOT CHANGED    Details   amiodarone 200 mg tablet TAKE 1 TABLET DAILY  Qty: 90 tablet, Refills: 3    Associated Diagnoses: PAF (paroxysmal atrial fibrillation) ([facility])      atorvastatin (LIPITOR) 40 mg tablet Take 1 tablet (40 mg total) by mouth daily with dinner  Qty: 90 tablet, Refills: 3    Associated Diagnoses: Dyspnea on exertion; CHF (congestive heart failure) ([facility])      fexofenadine (ALLEGRA) 180 MG tablet Take 1 tablet (180 mg total) by mouth in the morning  Qty: 30 tablet, Refills: 3    Associated Diagnoses: PAF (paroxysmal atrial fibrillation) ([facility] [address] ); Seasonal allergies      furosemide (LASIX) 20 mg tablet Take 1 tablet (20 mg total) by mouth daily  Qty: 90 tablet, Refills: 3    Associated Diagnoses: CHF (congestive heart failure) ([facility])      lisinopril (ZESTRIL) 10 mg tablet Take 1 tablet (10 mg total) by mouth daily  Qty: 90 tablet, Refills: 3    Associated Diagnoses: Chronic combined systolic and diastolic heart failure ([facility] [address] ); Nonischemic cardiomyopathy (HCC)      metoprolol succinate (TOPROL-XL) 25 mg 24 hr tablet Take 1 tablet (25 mg total) by mouth daily  Qty: 90 tablet, Refills: 3    Associated Diagnoses: PAF (paroxysmal atrial fibrillation) ([facility] [address] );  Nonischemic cardiomyopathy (HCC)      Multiple Vitamins-Minerals (MULTIVITAMIN ADULT EXTRA C PO) Take 1 capsule by mouth      pantoprazole (PROTONIX) 40 mg tablet TAKE 1 TABLET EVERY 12 HOURS  Qty: 180 tablet, Refills: 3    Associated Diagnoses: Gastrointestinal hemorrhage with melena; Peptic ulcer      potassium chloride (K-DUR,KLOR-CON) 20 mEq tablet TAKE 2 TABLETS BY MOUTH IN AM, 1 TABLET IN PM  Qty: 270 tablet, Refills: 3    Associated Diagnoses: CHF (congestive heart failure) (HCC)      rivaroxaban (Xarelto) 20 mg tablet Take 1 tablet (20 mg total) by mouth daily with breakfast  Qty: 90 tablet, Refills: 3    Associated Diagnoses: Atrial fibrillation (HCC)      ergocalciferol (VITAMIN D2) 50,000 units Take 1 capsule (50,000 Units total) by mouth 2 (two) times a week  Qty: 24 capsule, Refills: 0    Associated Diagnoses: Bariatric surgery status; Postsurgical malabsorption; Vitamin D deficiency; High serum parathyroid hormone (PTH)           No discharge procedures on file  PDMP Review     None           ED Provider  Attending physically available and evaluated [facility]  I managed the patient along with the ED Attending      Electronically Signed by         Whitman Bernheim, MD  05/10/23 9409

## 2023-05-09 NOTE — ED NOTES
Reached out to cardiology provider for update  Patient has no complaints at this time        Jacqueline Perez RN  05/09/23 0316

## 2023-05-09 NOTE — ED NOTES
Orthostatic BP done at this time, per pt feeling much better, still feels a little dizzy when sitting and standing         Silvio Cruz RN  05/09/23 9731

## 2023-05-09 NOTE — ASSESSMENT & PLAN NOTE
Wt Readings from Last 3 Encounters:   05/09/23 134 kg (296 lb)   04/13/23 134 kg (296 lb)   03/16/23 131 kg (289 lb 8 oz)     Appears euvolemic  Hold a m  dose of Lasix with plan for catheterization

## 2023-05-10 LAB
ANION GAP SERPL CALCULATED.3IONS-SCNC: 4 MMOL/L (ref 4–13)
BUN SERPL-MCNC: 18 MG/DL (ref 5–25)
CALCIUM SERPL-MCNC: 8.6 MG/DL (ref 8.4–10.2)
CHLORIDE SERPL-SCNC: 106 MMOL/L (ref 96–108)
CO2 SERPL-SCNC: 28 MMOL/L (ref 21–32)
CREAT SERPL-MCNC: 1.04 MG/DL (ref 0.6–1.3)
ERYTHROCYTE [DISTWIDTH] IN BLOOD BY AUTOMATED COUNT: 12.6 % (ref 11.6–15.1)
GFR SERPL CREATININE-BSD FRML MDRD: 77 ML/MIN/1.73SQ M
GLUCOSE SERPL-MCNC: 94 MG/DL (ref 65–140)
HCT VFR BLD AUTO: 44.2 % (ref 36.5–49.3)
HGB BLD-MCNC: 14.6 G/DL (ref 12–17)
MCH RBC QN AUTO: 32.9 PG (ref 26.8–34.3)
MCHC RBC AUTO-ENTMCNC: 33 G/DL (ref 31.4–37.4)
MCV RBC AUTO: 100 FL (ref 82–98)
PLATELET # BLD AUTO: 182 THOUSANDS/UL (ref 149–390)
PMV BLD AUTO: 9.8 FL (ref 8.9–12.7)
POTASSIUM SERPL-SCNC: 4.4 MMOL/L (ref 3.5–5.3)
RBC # BLD AUTO: 4.44 MILLION/UL (ref 3.88–5.62)
SODIUM SERPL-SCNC: 138 MMOL/L (ref 135–147)
T4 FREE SERPL-MCNC: 1.27 NG/DL (ref 0.76–1.46)
WBC # BLD AUTO: 6.21 THOUSAND/UL (ref 4.31–10.16)

## 2023-05-10 PROCEDURE — 99152 MOD SED SAME PHYS/QHP 5/>YRS: CPT | Performed by: INTERNAL MEDICINE

## 2023-05-10 PROCEDURE — 4A023N7 MEASUREMENT OF CARDIAC SAMPLING AND PRESSURE, LEFT HEART, PERCUTANEOUS APPROACH: ICD-10-PCS | Performed by: INTERNAL MEDICINE

## 2023-05-10 PROCEDURE — 99153 MOD SED SAME PHYS/QHP EA: CPT | Performed by: INTERNAL MEDICINE

## 2023-05-10 PROCEDURE — 85027 COMPLETE CBC AUTOMATED: CPT | Performed by: INTERNAL MEDICINE

## 2023-05-10 PROCEDURE — B2111ZZ FLUOROSCOPY OF MULTIPLE CORONARY ARTERIES USING LOW OSMOLAR CONTRAST: ICD-10-PCS | Performed by: INTERNAL MEDICINE

## 2023-05-10 PROCEDURE — C1894 INTRO/SHEATH, NON-LASER: HCPCS | Performed by: INTERNAL MEDICINE

## 2023-05-10 PROCEDURE — 93458 L HRT ARTERY/VENTRICLE ANGIO: CPT | Performed by: INTERNAL MEDICINE

## 2023-05-10 PROCEDURE — C1769 GUIDE WIRE: HCPCS | Performed by: INTERNAL MEDICINE

## 2023-05-10 PROCEDURE — 80048 BASIC METABOLIC PNL TOTAL CA: CPT | Performed by: INTERNAL MEDICINE

## 2023-05-10 PROCEDURE — 99232 SBSQ HOSP IP/OBS MODERATE 35: CPT | Performed by: INTERNAL MEDICINE

## 2023-05-10 RX ORDER — FUROSEMIDE 20 MG/1
20 TABLET ORAL DAILY
Status: DISCONTINUED | OUTPATIENT
Start: 2023-05-11 | End: 2023-05-12 | Stop reason: HOSPADM

## 2023-05-10 RX ORDER — FENTANYL CITRATE 50 UG/ML
INJECTION, SOLUTION INTRAMUSCULAR; INTRAVENOUS CODE/TRAUMA/SEDATION MEDICATION
Status: DISCONTINUED | OUTPATIENT
Start: 2023-05-10 | End: 2023-05-10 | Stop reason: HOSPADM

## 2023-05-10 RX ORDER — SODIUM CHLORIDE 9 MG/ML
50 INJECTION, SOLUTION INTRAVENOUS CONTINUOUS
Status: DISPENSED | OUTPATIENT
Start: 2023-05-10 | End: 2023-05-10

## 2023-05-10 RX ORDER — LISINOPRIL 5 MG/1
5 TABLET ORAL DAILY
Status: CANCELLED | OUTPATIENT
Start: 2023-05-11

## 2023-05-10 RX ORDER — LIDOCAINE HYDROCHLORIDE 10 MG/ML
INJECTION, SOLUTION EPIDURAL; INFILTRATION; INTRACAUDAL; PERINEURAL CODE/TRAUMA/SEDATION MEDICATION
Status: DISCONTINUED | OUTPATIENT
Start: 2023-05-10 | End: 2023-05-10 | Stop reason: HOSPADM

## 2023-05-10 RX ORDER — MIDAZOLAM HYDROCHLORIDE 2 MG/2ML
INJECTION, SOLUTION INTRAMUSCULAR; INTRAVENOUS CODE/TRAUMA/SEDATION MEDICATION
Status: DISCONTINUED | OUTPATIENT
Start: 2023-05-10 | End: 2023-05-10 | Stop reason: HOSPADM

## 2023-05-10 RX ADMIN — PANTOPRAZOLE SODIUM 40 MG: 40 TABLET, DELAYED RELEASE ORAL at 17:18

## 2023-05-10 RX ADMIN — ATORVASTATIN CALCIUM 40 MG: 40 TABLET, FILM COATED ORAL at 17:18

## 2023-05-10 RX ADMIN — SODIUM CHLORIDE 75 ML/HR: 0.9 INJECTION, SOLUTION INTRAVENOUS at 05:06

## 2023-05-10 RX ADMIN — PANTOPRAZOLE SODIUM 40 MG: 40 TABLET, DELAYED RELEASE ORAL at 07:45

## 2023-05-10 RX ADMIN — ASPIRIN 81 MG 324 MG: 81 TABLET ORAL at 05:00

## 2023-05-10 RX ADMIN — SODIUM CHLORIDE 50 ML/HR: 0.9 INJECTION, SOLUTION INTRAVENOUS at 14:14

## 2023-05-10 RX ADMIN — LORATADINE 10 MG: 10 TABLET ORAL at 07:45

## 2023-05-10 RX ADMIN — AMIODARONE HYDROCHLORIDE 200 MG: 200 TABLET ORAL at 07:45

## 2023-05-10 NOTE — PLAN OF CARE
Problem: Potential for Falls  Goal: Patient will remain free of falls  Description: INTERVENTIONS:  - Educate patient/family on patient safety including physical limitations  - Instruct patient to call for assistance with activity   - Consult OT/PT to assist with strengthening/mobility   - Keep Call bell within reach  - Keep bed low and locked with side rails adjusted as appropriate  - Keep care items and personal belongings within reach  - Initiate and maintain comfort rounds  - Make Fall Risk Sign visible to staff  - Offer Toileting every 2 Hours, in advance of need  - Initiate/Maintain bed alarm  - Obtain necessary fall risk management equipment: alarms  - Apply yellow socks and bracelet for high fall risk patients  - Consider moving patient to room near nurses station  Outcome: Progressing     Problem: PAIN - ADULT  Goal: Verbalizes/displays adequate comfort level or baseline comfort level  Description: Interventions:  - Encourage patient to monitor pain and request assistance  - Assess pain using appropriate pain scale  - Administer analgesics based on type and severity of pain and evaluate response  - Implement non-pharmacological measures as appropriate and evaluate response  - Consider cultural and social influences on pain and pain management  - Notify physician/advanced practitioner if interventions unsuccessful or patient reports new pain  Outcome: Progressing     Problem: SAFETY ADULT  Goal: Patient will remain free of falls  Description: INTERVENTIONS:  - Educate patient/family on patient safety including physical limitations  - Instruct patient to call for assistance with activity   - Consult OT/PT to assist with strengthening/mobility   - Keep Call bell within reach  - Keep bed low and locked with side rails adjusted as appropriate  - Keep care items and personal belongings within reach  - Initiate and maintain comfort rounds  - Make Fall Risk Sign visible to staff  - Offer Toileting every 2 Hours, in advance of need  - Initiate/Maintain bed alarm  - Obtain necessary fall risk management equipment: alarms  - Apply yellow socks and bracelet for high fall risk patients  - Consider moving patient to room near nurses station  Outcome: Progressing  Goal: Maintain or return to baseline ADL function  Description: INTERVENTIONS:  -  Assess patient's ability to carry out ADLs; assess patient's baseline for ADL function and identify physical deficits which impact ability to perform ADLs (bathing, care of mouth/teeth, toileting, grooming, dressing, etc )  - Assess/evaluate cause of self-care deficits   - Assess range of motion  - Assess patient's mobility; develop plan if impaired  - Assess patient's need for assistive devices and provide as appropriate  - Encourage maximum independence but intervene and supervise when necessary  - Involve family in performance of ADLs  - Assess for home care needs following discharge   - Consider OT consult to assist with ADL evaluation and planning for discharge  - Provide patient education as appropriate  Outcome: Progressing     Problem: DISCHARGE PLANNING  Goal: Discharge to home or other facility with appropriate resources  Description: INTERVENTIONS:  - Identify barriers to discharge w/patient and caregiver  - Arrange for needed discharge resources and transportation as appropriate  - Identify discharge learning needs (meds, wound care, etc )  - Arrange for interpretive services to assist at discharge as needed  - Refer to Case Management Department for coordinating discharge planning if the patient needs post-hospital services based on physician/advanced practitioner order or complex needs related to functional status, cognitive ability, or social support system  Outcome: Progressing     Problem: Knowledge Deficit  Goal: Patient/family/caregiver demonstrates understanding of disease process, treatment plan, medications, and discharge instructions  Description: Complete learning assessment and assess knowledge base    Interventions:  - Provide teaching at level of understanding  - Provide teaching via preferred learning methods  Outcome: Progressing     Problem: CARDIOVASCULAR - ADULT  Goal: Maintains optimal cardiac output and hemodynamic stability  Description: INTERVENTIONS:  - Monitor I/O, vital signs and rhythm  - Monitor for S/S and trends of decreased cardiac output  - Administer and titrate ordered vasoactive medications to optimize hemodynamic stability  - Assess quality of pulses, skin color and temperature  - Assess for signs of decreased coronary artery perfusion  - Instruct patient to report change in severity of symptoms  Outcome: Progressing  Goal: Absence of cardiac dysrhythmias or at baseline rhythm  Description: INTERVENTIONS:  - Continuous cardiac monitoring, vital signs, obtain 12 lead EKG if ordered  - Administer antiarrhythmic and heart rate control medications as ordered  - Monitor electrolytes and administer replacement therapy as ordered  Outcome: Progressing     Problem: RESPIRATORY - ADULT  Goal: Achieves optimal ventilation and oxygenation  Description: INTERVENTIONS:  - Assess for changes in respiratory status  - Assess for changes in mentation and behavior  - Position to facilitate oxygenation and minimize respiratory effort  - Oxygen administered by appropriate delivery if ordered  - Initiate smoking cessation education as indicated  - Encourage broncho-pulmonary hygiene including cough, deep breathe, Incentive Spirometry  - Assess the need for suctioning and aspirate as needed  - Assess and instruct to report SOB or any respiratory difficulty  - Respiratory Therapy support as indicated  Outcome: Progressing

## 2023-05-10 NOTE — ASSESSMENT & PLAN NOTE
· Patient presented with dyspnea on exertion and chest pressure  · Echo performed found to have decline in EF concern for worsening cardiomyopathy  · Cardiac catheterization 5/10/2023 pending final result to rule out CAD as cause of symptoms  · Cardiology consulted appreciate recommendations  ·  Xarelto on hold, restart tomorrow

## 2023-05-10 NOTE — UTILIZATION REVIEW
Initial Clinical Review    Admission: Date/Time/Statement:   Admission Orders (From admission, onward)     Ordered        05/09/23 1700  INPATIENT ADMISSION  Once                      Orders Placed This Encounter   Procedures   • INPATIENT ADMISSION     Standing Status:   Standing     Number of Occurrences:   1     Order Specific Question:   Level of Care     Answer:   Med Surg [16]     Order Specific Question:   Estimated length of stay     Answer:   More than 2 Midnights     Order Specific Question:   Certification     Answer:   I certify that inpatient services are medically necessary for this patient for a duration of greater than two midnights  See H&P and MD Progress Notes for additional information about the patient's course of treatment  ED Arrival Information     Expected   -    Arrival   5/9/2023 07:35    Acuity   Urgent            Means of arrival   Walk-In    Escorted by   Self    Service   Hospitalist    Admission type   Emergency            Arrival complaint   medical problem           Chief Complaint   Patient presents with   • Dizziness     Pt reports ongoing dizziness for weeks; usually subsides  Today dizziness has not subsided  Also reports SOB for one year since dx with afib  Taking medications as prescribed  Initial Presentation: 65 y o  male to ED from home w/ dizziness, lightheadedness, shortness of breath and chest pressure with exertion  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 [employer]  He reports significant fatigue and intermittent chest pressure with short ambulation or going up just a few stairs  PMHX afib  On xarelto and amiodarone , CHF , HLD , SRIKANTH , gastric bypass , daily alcohol use  Admitted IP status w/ BURROWS w/ plan for cardiac cath on 5/10, NPO after MN , hold xarelto   CHF hold am lasix   GERD cont PPI   afib cont lopressor , xarelto on hold prior to cath   5/9 Cardiology Consult   Lightheaded and dizziness w/ possible near syncope   Trop neg   SB possible tachy/jose miguel syndrome   Plan hold OP AV cassy blocker , toprol xl , cont amiodarone , repeat echo , tele , monitor volume, weights , I&O , renal function , lytes , keep K >4, mg >2 , check tsh , free T4       5/9 Cardiology Quick Note   Echo with decline in left ventricular systolic function   Angiography to rule out obstructive CAD as a cause of symptoms and decline in left ventricular ejection fraction  Orthostatics neg , cont lopressor   Date: 5/10   Day 2: cardiac acth today   Cont CP , cont lightheadedness and dizziness  Xarelto on hold restart tomorrow  Lasix held this am and restart in am   Cont PPI        ED Triage Vitals [05/09/23 0757]   Temperature Pulse Respirations Blood Pressure SpO2   98 °F (36 7 °C) (!) 52 20 137/84 100 %      Temp Source Heart Rate Source Patient Position - Orthostatic VS BP Location FiO2 (%)   Oral Monitor Sitting Right arm --      Pain Score       4          Wt Readings from Last 1 Encounters:   05/09/23 131 kg (289 lb 3 9 oz)     Additional Vital Signs:   05/10/23 0655 97 °F (36 1 °C) Abnormal  49 Abnormal  18 121/79 88 99 % None (Room air) Lying   05/10/23 0321 97 5 °F (36 4 °C) 46 Abnormal  47 Abnormal  107/76 89 96 % -- Lying   05/09/23 2307 97 2 °F (36 2 °C) Abnormal  74 18 120/74 84 95 % -- Lying   05/09/23 2005 97 5 °F (36 4 °C) 61 18 138/86 96 98 % None (Room air) Lying   05/09/23 1835 -- 60 -- 128/59 -- 99 % None (Room air) Lying   05/09/23 1620 -- 59 18 123/70 -- 99 % None (Room air) Standing - Orthostatic VS   05/09/23 1619 -- 49 Abnormal  20 117/61 -- 99 % None (Room air) Sitting - Orthostatic VS   05/09/23 1615 -- 48 Abnormal  19 123/57 82 98 % None (Room air) Lying - Orthostatic VS   05/09/23 1500 -- 50 Abnormal  24 Abnormal   102/80 -- -- -- Standing - Orthostatic VS   Resp: Short of breath at 05/09/23 1500   05/09/23 1459 -- 61 22 129/73 -- -- -- Sitting - Orthostatic VS   05/09/23 1458 -- 52 Abnormal  16 111/76 -- -- -- Lying - Orthostatic VS 05/09/23 1430 -- 53 Abnormal  -- 106/62 -- -- -- --   05/09/23 1304 -- 44 Abnormal  20 106/62 -- 99 % None (Room air) Sitting   05/09/23 1230 -- 52 Abnormal  20 -- -- 99 % -- --   05/09/23 1100 -- 50 Abnormal  18 103/59 75 98 % -- --   05/09/23 1016 -- 32 Abnormal  20 -- -- 100 % -- --   05/09/23 0915 -- 50 Abnormal  -- -- -- -- -- --   05/09/23 0843 -- 36 Abnormal  -- -- -- -- -- --   05/09/23 0800 -- -- -- -- -- -- None (Room           Pertinent Labs/Diagnostic Test Results:   5/10 Cardiac Cath   No angiographic evidence of obstructive CAD  •  LVEDP normal without gradient on LV-AO pullback    5/9 Echo •  Left Ventricle: Left ventricular cavity size is mildly dilated  Wall thickness is normal  The left ventricular ejection fraction is 35%  Systolic function is moderately reduced  Global longitudinal strain is reduced  There is moderate global hypokinesis  •  Mitral Valve: There is mild annular calcification  There is mild regurgitation  •  Pulmonic Valve: There is mild regurgitation  •  Compared to prior echocardiogram dated 1/10/2023, there is a noticeable reduction in left ventricular systolic function        5/9 EKG Marked sinus bradycardia  Left bundle branch block  Abnormal ECG  XR chest 2 views   ED Interpretation by Pita Banuelos MD (05/09 1224)   The CXR was interpreted by me independently  On my read, it appears without acute abnormalities:  - The  cardiomediastinal  silhouette  is  unremarkable     - The  lungs  are  clear  - No  pleural  effusions   - No  pneumothorax  - The  pulmonary  vasculature  is  within  normal  limits     - The  trachea  is  midline     - Bony  thorax  is  unremarkable       - Similar to previous CXR       Final Result by Henrik Del Toro MD (05/09 4331)      No acute cardiopulmonary disease        Findings are stable            Workstation performed: [id number]           Results from last 7 days   Lab Units 05/09/23  0810   SARS-COV-2  Negative     Results from last 7 days   Lab Units 05/10/23  0530 05/09/23  0810   WBC Thousand/uL 6 21 8 58   HEMOGLOBIN g/dL 14 6 15 1   HEMATOCRIT % 44 2 44 4   PLATELETS Thousands/uL 182 224   NEUTROS ABS Thousands/µL  --  6 17         Results from last 7 days   Lab Units 05/10/23  0530 05/09/23  0810   SODIUM mmol/L 138 138   POTASSIUM mmol/L 4 4 4 6   CHLORIDE mmol/L 106 107   CO2 mmol/L 28 27   ANION GAP mmol/L 4 4   BUN mg/dL 18 19   CREATININE mg/dL 1 04 1 16   EGFR ml/min/1 73sq m 77 67   CALCIUM mg/dL 8 6 8 7     Results from last 7 days   Lab Units 05/10/23  0530 05/09/23  0810   GLUCOSE RANDOM mg/dL 94 94       Results from last 7 days   Lab Units 05/09/23  1044 05/09/23  0810   HS TNI 0HR ng/L  --  7   HS TNI 2HR ng/L 6  --    HSTNI D2 ng/L -1  --      Results from last 7 days   Lab Units 05/09/23  0810   TSH 3RD GENERATON uIU/mL 5 266*       Results from last 7 days   Lab Units 05/09/23  0810   BNP pg/mL 81     Results from last 7 days   Lab Units 05/09/23  0810   INFLUENZA A PCR  Negative   INFLUENZA B PCR  Negative   RSV PCR  Negative       ED Treatment:   Medication Administration from 05/09/2023 0735 to 05/09/2023 2001       Date/Time Order Dose Route Action     05/09/2023 1454 EDT perflutren lipid microsphere (DEFINITY) injection 0 8 mL/min Intravenous Given        Past Medical History:   Diagnosis Date   • A-fib ([facility])    • Acute ulcer of stomach    • CHF (congestive heart failure) ([facility])    • GERD (gastroesophageal reflux disease)    • Rib fractures      Present on Admission:  • PAF (paroxysmal atrial fibrillation) ([facility])  • Atrial thrombus  • CHF (congestive heart failure) ([facility])  • GERD (gastroesophageal reflux disease)      Admitting Diagnosis: Dizziness [R42]  LBBB (left bundle branch block) [I44 7]  Cardiomyopathy ([address] ) [I42 9]  Sinus bradycardia [R00 1]  BURROWS (dyspnea on exertion) [R06 09]  Postural dizziness [R42]  History of CHF (congestive heart failure) [Z86 79]  Age/Sex: 65 y o  male  Admission Orders:  Scheduled Medications:  amiodarone, 200 mg, Oral, Daily  atorvastatin, 40 mg, Oral, Daily With Dinner  loratadine, 10 mg, Oral, Daily  metoprolol succinate, 25 mg, Oral, Daily  pantoprazole, 40 mg, Oral, BID With Meals      Continuous IV Infusions:  sodium chloride, 75 mL/hr, Intravenous, Continuous      PRN Meds:     NPO   Tele   Up and OOB   Orthostatics     IP CONSULT TO CARDIOLOGY    Network Utilization Review Department  ATTENTION: Please call with any questions or concerns to [phone] and carefully listen to the prompts so that you are directed to the right person  All voicemails are confidential   Shadi Crawley all requests for admission clinical reviews, approved or denied determinations and any other requests to dedicated fax number below belonging to the campus where the patient is receiving treatment   List of dedicated fax numbers for the Facilities:  [address] DENIALS (Administrative/Medical Necessity) [phone]   [address] (Maternity/NICU/Pediatrics) [phone]   911 Rachel Perez [phone]   [facility] [address] [phone]   [address] [place] [address] Tony Abdirizak [facility] [phone]   [address] Anand Garcia [place] [address] [facility] [phone]

## 2023-05-10 NOTE — PLAN OF CARE
Problem: Potential for Falls  Goal: Patient will remain free of falls  Description: INTERVENTIONS:  - Educate patient/family on patient safety including physical limitations  - Instruct patient to call for assistance with activity   - Consult OT/PT to assist with strengthening/mobility   - Keep Call bell within reach  - Keep bed low and locked with side rails adjusted as appropriate  - Keep care items and personal belongings within reach  - Initiate and maintain comfort rounds  - Make Fall Risk Sign visible to staff  - Offer Toileting every 3 Hours, in advance of need  - Initiate/Maintain bed alarm  - Obtain necessary fall risk management equipment: alarm   - Apply yellow socks and bracelet for high fall risk patients  - Consider moving patient to room near nurses station  Outcome: Progressing     Problem: PAIN - ADULT  Goal: Verbalizes/displays adequate comfort level or baseline comfort level  Description: Interventions:  - Encourage patient to monitor pain and request assistance  - Assess pain using appropriate pain scale  - Administer analgesics based on type and severity of pain and evaluate response  - Implement non-pharmacological measures as appropriate and evaluate response  - Consider cultural and social influences on pain and pain management  - Notify physician/advanced practitioner if interventions unsuccessful or patient reports new pain  Outcome: Progressing     Problem: SAFETY ADULT  Goal: Patient will remain free of falls  Description: INTERVENTIONS:  - Educate patient/family on patient safety including physical limitations  - Instruct patient to call for assistance with activity   - Consult OT/PT to assist with strengthening/mobility   - Keep Call bell within reach  - Keep bed low and locked with side rails adjusted as appropriate  - Keep care items and personal belongings within reach  - Initiate and maintain comfort rounds  - Make Fall Risk Sign visible to staff  - Offer Toileting every 3 Hours, in advance of need  - Initiate/Maintain bed alarm  - Obtain necessary fall risk management equipment: alarm   - Apply yellow socks and bracelet for high fall risk patients  - Consider moving patient to room near nurses station  Outcome: Progressing  Goal: Maintain or return to baseline ADL function  Description: INTERVENTIONS:  -  Assess patient's ability to carry out ADLs; assess patient's baseline for ADL function and identify physical deficits which impact ability to perform ADLs (bathing, care of mouth/teeth, toileting, grooming, dressing, etc )  - Assess/evaluate cause of self-care deficits   - Assess range of motion  - Assess patient's mobility; develop plan if impaired  - Assess patient's need for assistive devices and provide as appropriate  - Encourage maximum independence but intervene and supervise when necessary  - Involve family in performance of ADLs  - Assess for home care needs following discharge   - Consider OT consult to assist with ADL evaluation and planning for discharge  - Provide patient education as appropriate  Outcome: Progressing     Problem: DISCHARGE PLANNING  Goal: Discharge to home or other facility with appropriate resources  Description: INTERVENTIONS:  - Identify barriers to discharge w/patient and caregiver  - Arrange for needed discharge resources and transportation as appropriate  - Identify discharge learning needs (meds, wound care, etc )  - Arrange for interpretive services to assist at discharge as needed  - Refer to Case Management Department for coordinating discharge planning if the patient needs post-hospital services based on physician/advanced practitioner order or complex needs related to functional status, cognitive ability, or social support system  Outcome: Progressing     Problem: Knowledge Deficit  Goal: Patient/family/caregiver demonstrates understanding of disease process, treatment plan, medications, and discharge instructions  Description: Complete learning assessment and assess knowledge base    Interventions:  - Provide teaching at level of understanding  - Provide teaching via preferred learning methods  Outcome: Progressing     Problem: CARDIOVASCULAR - ADULT  Goal: Maintains optimal cardiac output and hemodynamic stability  Description: INTERVENTIONS:  - Monitor I/O, vital signs and rhythm  - Monitor for S/S and trends of decreased cardiac output  - Administer and titrate ordered vasoactive medications to optimize hemodynamic stability  - Assess quality of pulses, skin color and temperature  - Assess for signs of decreased coronary artery perfusion  - Instruct patient to report change in severity of symptoms  Outcome: Progressing  Goal: Absence of cardiac dysrhythmias or at baseline rhythm  Description: INTERVENTIONS:  - Continuous cardiac monitoring, vital signs, obtain 12 lead EKG if ordered  - Administer antiarrhythmic and heart rate control medications as ordered  - Monitor electrolytes and administer replacement therapy as ordered  Outcome: Progressing     Problem: RESPIRATORY - ADULT  Goal: Achieves optimal ventilation and oxygenation  Description: INTERVENTIONS:  - Assess for changes in respiratory status  - Assess for changes in mentation and behavior  - Position to facilitate oxygenation and minimize respiratory effort  - Oxygen administered by appropriate delivery if ordered  - Initiate smoking cessation education as indicated  - Encourage broncho-pulmonary hygiene including cough, deep breathe, Incentive Spirometry  - Assess the need for suctioning and aspirate as needed  - Assess and instruct to report SOB or any respiratory difficulty  - Respiratory Therapy support as indicated  Outcome: Progressing

## 2023-05-10 NOTE — ASSESSMENT & PLAN NOTE
Wt Readings from Last 3 Encounters:   05/09/23 131 kg (289 lb 3 9 oz)   04/13/23 134 kg (296 lb)   03/16/23 131 kg (289 lb 8 oz)   ·   Appears euvolemic  · Lasix held this AM prior catheterization, restart tomorrow

## 2023-05-10 NOTE — PROGRESS NOTES
[address] [place] [address]  Progress Note  Name: Lico Post  MRN: [id number]  Unit/Bed#: E4 -01 I Date of Admission: 5/9/2023   Date of Service: 5/10/2023 I Hospital Day: 1    Assessment/Plan   * Dyspnea on exertion  Assessment & Plan  · Patient presented with dyspnea on exertion and chest pressure  · Echo performed found to have decline in EF concern for worsening cardiomyopathy  · Cardiac catheterization 5/10/2023 pending final result to rule out CAD as cause of symptoms  · Cardiology consulted appreciate recommendations  ·  Xarelto on hold, restart tomorrow    CHF (congestive heart failure) ([facility] )  Assessment & Plan  Wt Readings from Last 3 Encounters:   05/09/23 131 kg (289 lb 3 9 oz)   04/13/23 134 kg (296 lb)   03/16/23 131 kg (289 lb 8 oz)   ·   Appears euvolemic  · Lasix held this AM prior catheterization, restart tomorrow        PAF (paroxysmal atrial fibrillation) ([facility])  Assessment & Plan  · Continue metoprolol at reduced dose 25 mg daily plus amiodarone  · Xarelto on hold, restart in AM    GERD (gastroesophageal reflux disease)  Assessment & Plan  · Continue PPI    History of Eliud-en-Y gastric bypass  Assessment & Plan  · Noted       VTE Pharmacologic Prophylaxis:   Moderate Risk (Score 3-4) - Pharmacological DVT Prophylaxis Ordered: rivaroxaban (Xarelto)  Patient Centered Rounds: I performed bedside rounds with nursing staff today  Discussions with Specialists or Other Care Team Provider: case management    Education and Discussions with Family / Patient: Patient declined call to        Total Time Spent on Date of Encounter in care of patient: 35 minutes This time was spent on one or more of the following: performing physical exam; counseling and coordination of care; obtaining or reviewing history; documenting in the medical record; reviewing/ordering tests, medications or procedures; communicating with other healthcare professionals and discussing with patient's family/caregivers  Current Length of Stay: 1 day(s)  Current Patient Status: Inpatient   Certification Statement: The patient will continue to require additional inpatient hospital stay due to pending final results of cardiac catheterization  Discharge Plan: Anticipate discharge in 24-48 hrs to home  Code Status: Prior    Subjective:   Patient was seen Rhonda Min in bed today prior to cardiac catheterization  He reports continued chest pain, he denies sob but states he has not exerted himself  He reports continued lightheadedness and dizziness  He denies any other acute complaints  Objective:     Vitals:   Temp (24hrs), Av 4 °F (36 3 °C), Min:97 °F (36 1 °C), Max:97 6 °F (36 4 °C)    Temp:  [97 °F (36 1 °C)-97 6 °F (36 4 °C)] 97 6 °F (36 4 °C)  HR:  [46-74] 53  Resp:  [18-47] 18  BP: (107-138)/(57-87) 133/87  SpO2:  [95 %-99 %] 96 %  Body mass index is 37 14 kg/m²  Input and Output Summary (last 24 hours):   No intake or output data in the 24 hours ending 05/10/23 1519    Physical Exam:   Physical Exam  Vitals and nursing note reviewed  Constitutional:       Appearance: Normal appearance  HENT:      Head: Normocephalic and atraumatic  Eyes:      General: No scleral icterus  Cardiovascular:      Rate and Rhythm: Normal rate and regular rhythm  Pulmonary:      Effort: Pulmonary effort is normal       Breath sounds: Normal breath sounds  Abdominal:      General: Abdomen is flat  Bowel sounds are normal       Palpations: Abdomen is soft  Musculoskeletal:      Right lower leg: No edema  Left lower leg: No edema  Skin:     General: Skin is warm and dry  Neurological:      Mental Status: He is alert  Mental status is at baseline     Psychiatric:         Mood and Affect: Mood normal          Behavior: Behavior normal          Additional Data:     Labs:  Results from last 7 days   Lab Units 05/10/23  0530 23  0810   WBC Thousand/uL 6 21 8 58   HEMOGLOBIN g/dL 14 6 15 1   HEMATOCRIT % 44 2 44 4   PLATELETS Thousands/uL 182 224   NEUTROS PCT %  --  71   LYMPHS PCT %  --  15   MONOS PCT %  --  10   EOS PCT %  --  2     Results from last 7 days   Lab Units 05/10/23  0530   SODIUM mmol/L 138   POTASSIUM mmol/L 4 4   CHLORIDE mmol/L 106   CO2 mmol/L 28   BUN mg/dL 18   CREATININE mg/dL 1 04   ANION GAP mmol/L 4   CALCIUM mg/dL 8 6   GLUCOSE RANDOM mg/dL 94                       Lines/Drains:  Invasive Devices     Peripheral Intravenous Line  Duration           Peripheral IV 05/09/23 Left Antecubital 1 day                  Telemetry:  Telemetry Orders (From admission, onward)             24 Hour Telemetry Monitoring  (ED Bridging Orders Panel)  Continuous x 24 Hours (Telem)        Question:  Reason for 24 Hour Telemetry  Answer:  Decompensated CHF- and any one of the following: continuous diuretic infusion or total diuretic dose >200 mg daily, associated electrolyte derangement (I e  K < 3 0), ionotropic drip (continuous infusion), hx of ventricular arrhythmia, or new EF < 35%                 Telemetry Reviewed: Sinus Bradycardia  Indication for Continued Telemetry Use: Acute CHF on >200 mg lasix/day or equivalent dose or with new reduced EF  Imaging: No pertinent imaging reviewed      Recent Cultures (last 7 days):         Last 24 Hours Medication List:   Current Facility-Administered Medications   Medication Dose Route Frequency Provider Last Rate   • amiodarone  200 mg Oral Daily Nevaeh Quarry, DO     • atorvastatin  40 mg Oral Daily With Lan Sow DO     • [START ON 5/11/2023] furosemide  20 mg Oral Daily Elijah Flores PA-C     • iohexol    Code/Trauma/Sedation Med Arlene Horvath DO     • loratadine  10 mg Oral Daily Nevaeh Quarry, DO     • metoprolol succinate  25 mg Oral Daily Nevaeh Quarry, DO     • pantoprazole  40 mg Oral BID With Meals Nevaeh Quarry, DO     • [START ON 5/11/2023] rivaroxaban  20 mg Oral Daily With Breakfast Lisset Carina Miner PA-C     • sodium chloride  50 mL/hr Intravenous Continuous Magalene Quiet, DO 50 mL/hr (05/10/23 7635)        Today, Patient Was Seen By: Sidney Rae PA-C    **Please Note: This note may have been constructed using a voice recognition system  **

## 2023-05-11 LAB
ANION GAP SERPL CALCULATED.3IONS-SCNC: 4 MMOL/L (ref 4–13)
BUN SERPL-MCNC: 15 MG/DL (ref 5–25)
CALCIUM SERPL-MCNC: 8 MG/DL (ref 8.4–10.2)
CHLORIDE SERPL-SCNC: 107 MMOL/L (ref 96–108)
CO2 SERPL-SCNC: 25 MMOL/L (ref 21–32)
CREAT SERPL-MCNC: 0.87 MG/DL (ref 0.6–1.3)
D DIMER PPP FEU-MCNC: <0.27 UG/ML FEU
ERYTHROCYTE [DISTWIDTH] IN BLOOD BY AUTOMATED COUNT: 12.3 % (ref 11.6–15.1)
GFR SERPL CREATININE-BSD FRML MDRD: 93 ML/MIN/1.73SQ M
GLUCOSE SERPL-MCNC: 98 MG/DL (ref 65–140)
HCT VFR BLD AUTO: 45.1 % (ref 36.5–49.3)
HGB BLD-MCNC: 15.1 G/DL (ref 12–17)
MCH RBC QN AUTO: 32.6 PG (ref 26.8–34.3)
MCHC RBC AUTO-ENTMCNC: 33.5 G/DL (ref 31.4–37.4)
MCV RBC AUTO: 97 FL (ref 82–98)
PLATELET # BLD AUTO: 176 THOUSANDS/UL (ref 149–390)
PMV BLD AUTO: 9.8 FL (ref 8.9–12.7)
POTASSIUM SERPL-SCNC: 4.2 MMOL/L (ref 3.5–5.3)
RBC # BLD AUTO: 4.63 MILLION/UL (ref 3.88–5.62)
SODIUM SERPL-SCNC: 136 MMOL/L (ref 135–147)
WBC # BLD AUTO: 6.36 THOUSAND/UL (ref 4.31–10.16)

## 2023-05-11 PROCEDURE — 99232 SBSQ HOSP IP/OBS MODERATE 35: CPT | Performed by: INTERNAL MEDICINE

## 2023-05-11 PROCEDURE — 80048 BASIC METABOLIC PNL TOTAL CA: CPT | Performed by: INTERNAL MEDICINE

## 2023-05-11 PROCEDURE — 85027 COMPLETE CBC AUTOMATED: CPT

## 2023-05-11 PROCEDURE — 99232 SBSQ HOSP IP/OBS MODERATE 35: CPT

## 2023-05-11 PROCEDURE — 85379 FIBRIN DEGRADATION QUANT: CPT | Performed by: INTERNAL MEDICINE

## 2023-05-11 RX ORDER — METOPROLOL SUCCINATE 25 MG/1
25 TABLET, EXTENDED RELEASE ORAL 2 TIMES DAILY
Status: DISCONTINUED | OUTPATIENT
Start: 2023-05-11 | End: 2023-05-12 | Stop reason: HOSPADM

## 2023-05-11 RX ADMIN — PANTOPRAZOLE SODIUM 40 MG: 40 TABLET, DELAYED RELEASE ORAL at 09:07

## 2023-05-11 RX ADMIN — METOPROLOL SUCCINATE 25 MG: 25 TABLET, EXTENDED RELEASE ORAL at 09:07

## 2023-05-11 RX ADMIN — AMIODARONE HYDROCHLORIDE 200 MG: 200 TABLET ORAL at 09:09

## 2023-05-11 RX ADMIN — FUROSEMIDE 20 MG: 20 TABLET ORAL at 09:09

## 2023-05-11 RX ADMIN — PANTOPRAZOLE SODIUM 40 MG: 40 TABLET, DELAYED RELEASE ORAL at 17:21

## 2023-05-11 RX ADMIN — ATORVASTATIN CALCIUM 40 MG: 40 TABLET, FILM COATED ORAL at 17:21

## 2023-05-11 RX ADMIN — METOPROLOL SUCCINATE 25 MG: 25 TABLET, EXTENDED RELEASE ORAL at 17:23

## 2023-05-11 RX ADMIN — LORATADINE 10 MG: 10 TABLET ORAL at 09:09

## 2023-05-11 RX ADMIN — RIVAROXABAN 20 MG: 20 TABLET, FILM COATED ORAL at 09:09

## 2023-05-11 NOTE — PROGRESS NOTES
[address] [place] [address]  Progress Note  Name: Samm Hunt  MRN: [id number]  Unit/Bed#: E4 -01 I Date of Admission: 5/9/2023   Date of Service: 5/11/2023 I Hospital Day: 2    Assessment/Plan   * Dyspnea on exertion  Assessment & Plan  · Patient presented with dyspnea on exertion and chest pressure  · Echo performed found to have decline in EF concern for worsening cardiomyopathy  · Cardiac catheterization 5/10/2023 without obstructive CAD  · Bradycardia on admission now with tachycardia- possibly tachycardia has lead to cardiomyopathy  · Increasing metoprolol 25 mg BID   · Cardiology consulted patient is okay for discharge in AM if symptoms undercontrol  · continue Xarelto     CHF (congestive heart failure) ([facility] [address] )  Assessment & Plan  Wt Readings from Last 3 Encounters:   05/09/23 131 kg (289 lb 3 9 oz)   04/13/23 134 kg (296 lb)   03/16/23 131 kg (289 lb 8 oz)   ·   Appears euvolemic  · Continue lasix         PAF (paroxysmal atrial fibrillation) ([facility])  Assessment & Plan  · Increase metoprolol to 25 mg BID plus amiodarone  · Continue Xarelto    GERD (gastroesophageal reflux disease)  Assessment & Plan  · Continue PPI    History of Eliud-en-Y gastric bypass  Assessment & Plan  · Noted       VTE Pharmacologic Prophylaxis:   High Risk (Score >/= 5) - Pharmacological DVT Prophylaxis Ordered: rivaroxaban (Xarelto)  Sequential Compression Devices Ordered  Patient Centered Rounds: I performed bedside rounds with nursing staff today  Discussions with Specialists or Other Care Team Provider: case management    Education and Discussions with Family / Patient: Patient declined call to        Total Time Spent on Date of Encounter in care of patient: 45 minutes This time was spent on one or more of the following: performing physical exam; counseling and coordination of care; obtaining or reviewing history; documenting in the medical record; reviewing/ordering tests, medications or procedures; communicating with other healthcare professionals and discussing with patient's family/caregivers  Current Length of Stay: 2 day(s)  Current Patient Status: Inpatient   Certification Statement: The patient will continue to require additional inpatient hospital stay due to titration of medications to control HR  Discharge Plan: Anticipate discharge tomorrow to home  Code Status: Prior    Subjective:   Patient was seen laying in bed today  He reports feeling well today  He states that he was able to ambulate around the halls he did feel lightheaded briefly  He denied any sob or chest pain  He denies any other acute complaints     Objective:     Vitals:   Temp (24hrs), Av 5 °F (36 4 °C), Min:96 8 °F (36 °C), Max:97 9 °F (36 6 °C)    Temp:  [96 8 °F (36 °C)-97 9 °F (36 6 °C)] 97 9 °F (36 6 °C)  HR:  [49-95] 69  Resp:  [18] 18  BP: ()/() 98/83  SpO2:  [94 %-99 %] 97 %  Body mass index is 37 14 kg/m²  Input and Output Summary (last 24 hours): Intake/Output Summary (Last 24 hours) at 2023 1330  Last data filed at 2023 9258  Gross per 24 hour   Intake 180 ml   Output --   Net 180 ml       Physical Exam:   Physical Exam  Vitals and nursing note reviewed  Constitutional:       Appearance: Normal appearance  HENT:      Head: Normocephalic and atraumatic  Eyes:      General: No scleral icterus  Cardiovascular:      Rate and Rhythm: Tachycardia present  Pulmonary:      Effort: Pulmonary effort is normal       Breath sounds: Normal breath sounds  Abdominal:      General: Abdomen is flat  Bowel sounds are normal       Palpations: Abdomen is soft  Tenderness: There is no abdominal tenderness  Musculoskeletal:      Right lower leg: No edema  Left lower leg: No edema  Skin:     General: Skin is warm and dry  Neurological:      Mental Status: He is alert  Mental status is at baseline     Psychiatric:         Mood and Affect: Mood normal          Behavior: Behavior normal         Additional Data:     Labs:  Results from last 7 days   Lab Units 05/11/23  0502 05/10/23  0530 05/09/23  0810   WBC Thousand/uL 6 36   < > 8 58   HEMOGLOBIN g/dL 15 1   < > 15 1   HEMATOCRIT % 45 1   < > 44 4   PLATELETS Thousands/uL 176   < > 224   NEUTROS PCT %  --   --  71   LYMPHS PCT %  --   --  15   MONOS PCT %  --   --  10   EOS PCT %  --   --  2    < > = values in this interval not displayed  Results from last 7 days   Lab Units 05/11/23  0502   SODIUM mmol/L 136   POTASSIUM mmol/L 4 2   CHLORIDE mmol/L 107   CO2 mmol/L 25   BUN mg/dL 15   CREATININE mg/dL 0 87   ANION GAP mmol/L 4   CALCIUM mg/dL 8 0*   GLUCOSE RANDOM mg/dL 98                       Lines/Drains:  Invasive Devices     Peripheral Intravenous Line  Duration           Peripheral IV 05/09/23 Left Antecubital 2 days                  Telemetry:  Telemetry Orders (From admission, onward)             24 Hour Telemetry Monitoring  (ED Bridging Orders Panel)  Continuous x 24 Hours (Telem)        Question:  Reason for 24 Hour Telemetry  Answer:  Decompensated CHF- and any one of the following: continuous diuretic infusion or total diuretic dose >200 mg daily, associated electrolyte derangement (I e  K < 3 0), ionotropic drip (continuous infusion), hx of ventricular arrhythmia, or new EF < 35%                 Telemetry Reviewed: Sinus Tachycardia  Indication for Continued Telemetry Use: Arrthymias requiring medical therapy             Imaging: No pertinent imaging reviewed      Recent Cultures (last 7 days):         Last 24 Hours Medication List:   Current Facility-Administered Medications   Medication Dose Route Frequency Provider Last Rate   • amiodarone  200 mg Oral Daily Eleazar Marie DO     • atorvastatin  40 mg Oral Daily With Nakia Olvera DO     • furosemide  20 mg Oral Daily Lisset Germain PA-C     • loratadine  10 mg Oral Daily Eleazar Marie DO     • metoprolol succinate  25 mg Oral BID Stasia Eisenmenger, MD     • pantoprazole  40 mg Oral BID With Meals Eleazar Marie DO     • rivaroxaban  20 mg Oral Daily With Breakfast Brenna David PA-C          Today, Patient Was Seen By: Brenna David PA-C    **Please Note: This note may have been constructed using a voice recognition system  **

## 2023-05-11 NOTE — ASSESSMENT & PLAN NOTE
Wt Readings from Last 3 Encounters:   05/09/23 131 kg (289 lb 3 9 oz)   04/13/23 134 kg (296 lb)   03/16/23 131 kg (289 lb 8 oz)   ·   Appears euvolemic  · Continue lasix

## 2023-05-11 NOTE — PROGRESS NOTES
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, nonischemic  • Occasional mild lightheadedness  • Exertional dyspnea  • History of left atrial appendage thrombus resolved on TI 8/1/2022  • Left bundle branch block  • Sleep apnea  • Obesity with history of gastric bypass surgery  • History of GI bleed secondary to NSAID use in 2020    Plan:    • Increase metoprolol succinate to 25 mg twice daily  • Allow patient to ambulate in fallon  • If patient does well today, discharge in a m  • Follow-up with Dr Mary Higgins      Interval history:  Heart rate still appears to be faster and suboptimally controlled in atrial fibrillation  Especially with possible tachycardia induced cardiomyopathy better control would be efficacious      PROBLEM LIST:    Patient Active Problem List    Diagnosis Date Noted   • Dyspnea on exertion 05/09/2023   • Claustrophobia 12/02/2022   • GERD (gastroesophageal reflux disease)    • CHF (congestive heart failure) ([facility]    • Atrial thrombus 05/17/2022   • Obesity (BMI 30-39 9) 05/17/2022   • Mass of right lower extremity 05/03/2022   • Stress 05/03/2022   • Acute on chronic systolic CHF (congestive heart failure) ([facility] [address] ) 04/24/2022   • Obstructive sleep apnea    • Abnormal CT of the chest 02/14/2022   • PAF (paroxysmal [facility] fibrillation) ([facility] ) 02/13/2022   • COVID-19 02/13/2022   • Acute blood loss anemia 02/12/2020   • History of Eliud-en-Y gastric bypass 02/12/2020   • Intolerance of continuous positive airway pressure (CPAP) ventilation 02/12/2020   • Gastrointestinal hemorrhage with melena 02/12/2020   • Abnormal CT of the abdomen 02/12/2020   • Closed fracture of multiple ribs of left side 03/05/2019   • Visual changes 08/31/2018   • Blood alcohol level of 240 mg/100 ml or more "08/28/2018   • Orbit fracture, right, sequela 08/26/2018   • Raised intraocular pressure of right eye 08/26/2018   • Peptic ulcer 01/02/2017       Vitals: /93 (BP Location: Right arm)   Pulse 95   Temp 97 7 °F (36 5 °C) (Temporal)   Resp 18   Ht 6' 2\" (1 88 m)   Wt 131 kg (289 lb 3 9 oz)   SpO2 98%   BMI 37 14 kg/m²   PREVIOUS WEIGHTS:   Weight (last 2 days)     Date/Time Weight    05/09/23 2005 131 (289 24)    05/09/23 1430 134 (296)          Wt Readings from Last 2 Encounters:   05/09/23 131 kg (289 lb 3 9 oz)   04/13/23 134 kg (296 lb)       Labs/Data:        Results from last 7 days   Lab Units 05/11/23  0502 05/10/23  0530 05/09/23  0810   WBC Thousand/uL 6 36 6 21 8 58   HEMOGLOBIN g/dL 15 1 14 6 15 1   HEMATOCRIT % 45 1 44 2 44 4   MCV fL 97 100* 98   MCH pg 32 6 32 9 33 5   MCHC g/dL 33 5 33 0 34 0   PLATELETS Thousands/uL 176 182 224     Results from last 7 days   Lab Units 05/11/23  0502 05/10/23  0530 05/09/23  0810   EGFR ml/min/1 73sq m 93 77 67   SODIUM mmol/L 136 138 138   POTASSIUM mmol/L 4 2 4 4 4 6   CHLORIDE mmol/L 107 106 107   CO2 mmol/L 25 28 27   BUN mg/dL 15 18 19   CREATININE mg/dL 0 87 1 04 1 16     Results from last 7 days   Lab Units 05/11/23  0502 05/10/23  0530 05/09/23  0810   CALCIUM mg/dL 8 0* 8 6 8 7     Lab Results   Component Value Date    NTBNP 766 (H) 10/20/2022    NTBNP 927 (H) 08/15/2022    NTBNP 1,992 (H) 04/24/2022     Lab Results   Component Value Date    CHOLESTEROL 144 04/25/2022    TRIG 82 04/25/2022    HDL 50 04/25/2022    LDLCALC 78 04/25/2022    HGBA1C 5 7 (H) 04/25/2022     Lab Results   Component Value Date    FREET4 1 27 05/09/2023    FREET4 1 23 03/24/2023     Lab Results   Component Value Date    DIGOXIN 1 4 08/12/2022    DIGOXIN 0 7 (L) 07/15/2022    DIGOXIN 0 5 (L) 04/29/2022              Current Facility-Administered Medications:   •  amiodarone tablet 200 mg, 200 mg, Oral, Daily, Melissa Stafford DO, 200 mg at 05/11/23 1270  •  atorvastatin " (LIPITOR) tablet 40 mg, 40 mg, Oral, Daily With Harlene Seats, DO, 40 mg at 05/10/23 1718  •  furosemide (LASIX) tablet 20 mg, 20 mg, Oral, Daily, Heather Ludie Klinefelter, PA-C, 20 mg at 05/11/23 1829  •  loratadine (CLARITIN) tablet 10 mg, 10 mg, Oral, Daily, Cecily Snider DO, 10 mg at 05/11/23 0834  •  metoprolol succinate (TOPROL-XL) 24 hr tablet 25 mg, 25 mg, Oral, Daily, Cecily Snider DO, 25 mg at 05/11/23 0907  •  pantoprazole (PROTONIX) EC tablet 40 mg, 40 mg, Oral, BID With Meals, Cecily Snider DO, 40 mg at 05/11/23 0907  •  rivaroxaban (XARELTO) tablet 20 mg, 20 mg, Oral, Daily With Breakfast, Oliver Burton PA-C, 20 mg at 05/11/23 3471  No Known Allergies      Intake/Output Summary (Last 24 hours) at 5/11/2023 1027  Last data filed at 5/11/2023 1283  Gross per 24 hour   Intake 180 ml   Output --   Net 180 ml       Invasive Devices     Peripheral Intravenous Line  Duration           Peripheral IV 05/09/23 Left Antecubital 2 days                Review of Systems   Constitutional: Negative for activity change  Respiratory: Negative for cough, chest tightness, shortness of breath and wheezing  Cardiovascular: Negative for chest pain, palpitations and leg swelling  Musculoskeletal: Negative for gait problem  Skin: Negative for color change  Neurological: Negative for dizziness, tremors, syncope, weakness, light-headedness and headaches  Psychiatric/Behavioral: Negative for agitation and confusion  Physical Exam  Vitals reviewed  Constitutional:       General: He is not in acute distress  Appearance: He is well-developed  HENT:      Head: Normocephalic and atraumatic  Neck:      Thyroid: No thyromegaly  Vascular: No carotid bruit or JVD  Trachea: No tracheal deviation  Cardiovascular:      Rate and Rhythm: Normal rate  Rhythm irregularly irregular  Pulses: Normal pulses  Heart sounds: Normal heart sounds  No murmur heard      No "friction rub  No gallop  Pulmonary:      Effort: Pulmonary effort is normal  No respiratory distress  Breath sounds: Normal breath sounds  No wheezing, rhonchi or rales  Chest:      Chest wall: No tenderness  Musculoskeletal:      Cervical back: Normal range of motion and neck supple  Right lower leg: No edema  Left lower leg: No edema  Skin:     General: Skin is warm and dry  Neurological:      General: No focal deficit present  Mental Status: He is alert and oriented to person, place, and time  Psychiatric:         Mood and Affect: Mood normal          Behavior: Behavior normal          Thought Content: Thought content normal          Judgment: Judgment normal          ======================================================     Imaging:   I have personally reviewed pertinent reports  EKG: Atrial fibrillation with heart rate in the 80s and 90s  Left bundle branch block pattern  Portions of the record may have been created with voice recognition software  Occasional wrong word or \"sound a like\" substitutions may have occurred due to the inherent limitations of voice recognition software  Read the chart carefully and recognize, using context, where substitutions have occurred      "

## 2023-05-11 NOTE — PLAN OF CARE
Problem: Potential for Falls  Goal: Patient will remain free of falls  Description: INTERVENTIONS:  - Educate patient/family on patient safety including physical limitations  - Instruct patient to call for assistance with activity   - Consult OT/PT to assist with strengthening/mobility   - Keep Call bell within reach  - Keep bed low and locked with side rails adjusted as appropriate  - Keep care items and personal belongings within reach  - Initiate and maintain comfort rounds  - Make Fall Risk Sign visible to staff  - Offer Toileting every 3 Hours, in advance of need  - Initiate/Maintain bed alarm  - Obtain necessary fall risk management equipment:   - Apply yellow socks and bracelet for high fall risk patients  - Consider moving patient to room near nurses station  Outcome: Progressing     Problem: PAIN - ADULT  Goal: Verbalizes/displays adequate comfort level or baseline comfort level  Description: Interventions:  - Encourage patient to monitor pain and request assistance  - Assess pain using appropriate pain scale  - Administer analgesics based on type and severity of pain and evaluate response  - Implement non-pharmacological measures as appropriate and evaluate response  - Consider cultural and social influences on pain and pain management  - Notify physician/advanced practitioner if interventions unsuccessful or patient reports new pain  Outcome: Progressing     Problem: SAFETY ADULT  Goal: Patient will remain free of falls  Description: INTERVENTIONS:  - Educate patient/family on patient safety including physical limitations  - Instruct patient to call for assistance with activity   - Consult OT/PT to assist with strengthening/mobility   - Keep Call bell within reach  - Keep bed low and locked with side rails adjusted as appropriate  - Keep care items and personal belongings within reach  - Initiate and maintain comfort rounds  - Make Fall Risk Sign visible to staff  - Offer Toileting every 3 Hours, in advance of need  - Initiate/Maintain bedalarm  - Obtain necessary fall risk management equipment:   - Apply yellow socks and bracelet for high fall risk patients  - Consider moving patient to room near nurses station  Outcome: Progressing  Goal: Maintain or return to baseline ADL function  Description: INTERVENTIONS:  -  Assess patient's ability to carry out ADLs; assess patient's baseline for ADL function and identify physical deficits which impact ability to perform ADLs (bathing, care of mouth/teeth, toileting, grooming, dressing, etc )  - Assess/evaluate cause of self-care deficits   - Assess range of motion  - Assess patient's mobility; develop plan if impaired  - Assess patient's need for assistive devices and provide as appropriate  - Encourage maximum independence but intervene and supervise when necessary  - Involve family in performance of ADLs  - Assess for home care needs following discharge   - Consider OT consult to assist with ADL evaluation and planning for discharge  - Provide patient education as appropriate  Outcome: Progressing     Problem: CARDIOVASCULAR - ADULT  Goal: Maintains optimal cardiac output and hemodynamic stability  Description: INTERVENTIONS:  - Monitor I/O, vital signs and rhythm  - Monitor for S/S and trends of decreased cardiac output  - Administer and titrate ordered vasoactive medications to optimize hemodynamic stability  - Assess quality of pulses, skin color and temperature  - Assess for signs of decreased coronary artery perfusion  - Instruct patient to report change in severity of symptoms  Outcome: Progressing  Goal: Absence of cardiac dysrhythmias or at baseline rhythm  Description: INTERVENTIONS:  - Continuous cardiac monitoring, vital signs, obtain 12 lead EKG if ordered  - Administer antiarrhythmic and heart rate control medications as ordered  - Monitor electrolytes and administer replacement therapy as ordered  Outcome: Progressing     Problem: Knowledge Deficit  Goal: Patient/family/caregiver demonstrates understanding of disease process, treatment plan, medications, and discharge instructions  Description: Complete learning assessment and assess knowledge base    Interventions:  - Provide teaching at level of understanding  - Provide teaching via preferred learning methods  Outcome: Progressing     Problem: RESPIRATORY - ADULT  Goal: Achieves optimal ventilation and oxygenation  Description: INTERVENTIONS:  - Assess for changes in respiratory status  - Assess for changes in mentation and behavior  - Position to facilitate oxygenation and minimize respiratory effort  - Oxygen administered by appropriate delivery if ordered  - Initiate smoking cessation education as indicated  - Encourage broncho-pulmonary hygiene including cough, deep breathe, Incentive Spirometry  - Assess the need for suctioning and aspirate as needed  - Assess and instruct to report SOB or any respiratory difficulty  - Respiratory Therapy support as indicated  Outcome: Progressing

## 2023-05-11 NOTE — ASSESSMENT & PLAN NOTE
· Patient presented with dyspnea on exertion and chest pressure  · Echo performed found to have decline in EF concern for worsening cardiomyopathy  · Cardiac catheterization 5/10/2023 without obstructive CAD  · Bradycardia on admission now with tachycardia- possibly tachycardia has lead to cardiomyopathy  · Increasing metoprolol 25 mg BID   · Cardiology consulted patient is okay for discharge in AM if symptoms undercontrol  · continue Xarelto

## 2023-05-11 NOTE — PLAN OF CARE
Problem: Potential for Falls  Goal: Patient will remain free of falls  Description: INTERVENTIONS:  - Educate patient/family on patient safety including physical limitations  - Instruct patient to call for assistance with activity   - Consult OT/PT to assist with strengthening/mobility   - Keep Call bell within reach  - Keep bed low and locked with side rails adjusted as appropriate  - Keep care items and personal belongings within reach  - Initiate and maintain comfort rounds  - Make Fall Risk Sign visible to staff  - Offer Toileting every 2 Hours, in advance of need  - Initiate/Maintain alarm  - Obtain necessary fall risk management equipment:   - Apply yellow socks and bracelet for high fall risk patients  - Consider moving patient to room near nurses station  Outcome: Progressing     Problem: PAIN - ADULT  Goal: Verbalizes/displays adequate comfort level or baseline comfort level  Description: Interventions:  - Encourage patient to monitor pain and request assistance  - Assess pain using appropriate pain scale  - Administer analgesics based on type and severity of pain and evaluate response  - Implement non-pharmacological measures as appropriate and evaluate response  - Consider cultural and social influences on pain and pain management  - Notify physician/advanced practitioner if interventions unsuccessful or patient reports new pain  Outcome: Progressing     Problem: SAFETY ADULT  Goal: Patient will remain free of falls  Description: INTERVENTIONS:  - Educate patient/family on patient safety including physical limitations  - Instruct patient to call for assistance with activity   - Consult OT/PT to assist with strengthening/mobility   - Keep Call bell within reach  - Keep bed low and locked with side rails adjusted as appropriate  - Keep care items and personal belongings within reach  - Initiate and maintain comfort rounds  - Make Fall Risk Sign visible to staff  - Offer Toileting every 2 Hours, in advance of need  - Initiate/Maintain alarm  - Obtain necessary fall risk management equipment:   - Apply yellow socks and bracelet for high fall risk patients  - Consider moving patient to room near nurses station  Outcome: Progressing  Goal: Maintain or return to baseline ADL function  Description: INTERVENTIONS:  -  Assess patient's ability to carry out ADLs; assess patient's baseline for ADL function and identify physical deficits which impact ability to perform ADLs (bathing, care of mouth/teeth, toileting, grooming, dressing, etc )  - Assess/evaluate cause of self-care deficits   - Assess range of motion  - Assess patient's mobility; develop plan if impaired  - Assess patient's need for assistive devices and provide as appropriate  - Encourage maximum independence but intervene and supervise when necessary  - Involve family in performance of ADLs  - Assess for home care needs following discharge   - Consider OT consult to assist with ADL evaluation and planning for discharge  - Provide patient education as appropriate  Outcome: Progressing

## 2023-05-12 VITALS
OXYGEN SATURATION: 99 % | RESPIRATION RATE: 20 BRPM | BODY MASS INDEX: 37.12 KG/M2 | WEIGHT: 289.24 LBS | HEIGHT: 74 IN | SYSTOLIC BLOOD PRESSURE: 116 MMHG | TEMPERATURE: 97.5 F | HEART RATE: 59 BPM | DIASTOLIC BLOOD PRESSURE: 82 MMHG

## 2023-05-12 LAB
ANION GAP SERPL CALCULATED.3IONS-SCNC: 6 MMOL/L (ref 4–13)
BUN SERPL-MCNC: 18 MG/DL (ref 5–25)
CALCIUM SERPL-MCNC: 8.5 MG/DL (ref 8.4–10.2)
CHLORIDE SERPL-SCNC: 105 MMOL/L (ref 96–108)
CO2 SERPL-SCNC: 24 MMOL/L (ref 21–32)
CREAT SERPL-MCNC: 1.03 MG/DL (ref 0.6–1.3)
ERYTHROCYTE [DISTWIDTH] IN BLOOD BY AUTOMATED COUNT: 12.4 % (ref 11.6–15.1)
GFR SERPL CREATININE-BSD FRML MDRD: 78 ML/MIN/1.73SQ M
GLUCOSE SERPL-MCNC: 93 MG/DL (ref 65–140)
HCT VFR BLD AUTO: 43.2 % (ref 36.5–49.3)
HGB BLD-MCNC: 14.8 G/DL (ref 12–17)
MCH RBC QN AUTO: 33.5 PG (ref 26.8–34.3)
MCHC RBC AUTO-ENTMCNC: 34.3 G/DL (ref 31.4–37.4)
MCV RBC AUTO: 98 FL (ref 82–98)
PLATELET # BLD AUTO: 188 THOUSANDS/UL (ref 149–390)
PMV BLD AUTO: 10.5 FL (ref 8.9–12.7)
POTASSIUM SERPL-SCNC: 4 MMOL/L (ref 3.5–5.3)
RBC # BLD AUTO: 4.42 MILLION/UL (ref 3.88–5.62)
SODIUM SERPL-SCNC: 135 MMOL/L (ref 135–147)
WBC # BLD AUTO: 7.43 THOUSAND/UL (ref 4.31–10.16)

## 2023-05-12 PROCEDURE — 85027 COMPLETE CBC AUTOMATED: CPT

## 2023-05-12 PROCEDURE — 99239 HOSP IP/OBS DSCHRG MGMT >30: CPT

## 2023-05-12 PROCEDURE — 99232 SBSQ HOSP IP/OBS MODERATE 35: CPT | Performed by: INTERNAL MEDICINE

## 2023-05-12 PROCEDURE — 80048 BASIC METABOLIC PNL TOTAL CA: CPT

## 2023-05-12 RX ORDER — LISINOPRIL 10 MG/1
10 TABLET ORAL
Qty: 90 TABLET | Refills: 0 | Status: SHIPPED | OUTPATIENT
Start: 2023-05-12 | End: 2023-08-03 | Stop reason: SDUPTHER

## 2023-05-12 RX ORDER — METOPROLOL SUCCINATE 25 MG/1
25 TABLET, EXTENDED RELEASE ORAL 2 TIMES DAILY
Qty: 60 TABLET | Refills: 0 | Status: SHIPPED | OUTPATIENT
Start: 2023-05-12 | End: 2023-08-25 | Stop reason: SDUPTHER

## 2023-05-12 RX ADMIN — METOPROLOL SUCCINATE 25 MG: 25 TABLET, EXTENDED RELEASE ORAL at 08:00

## 2023-05-12 RX ADMIN — LORATADINE 10 MG: 10 TABLET ORAL at 08:00

## 2023-05-12 RX ADMIN — PANTOPRAZOLE SODIUM 40 MG: 40 TABLET, DELAYED RELEASE ORAL at 08:00

## 2023-05-12 RX ADMIN — FUROSEMIDE 20 MG: 20 TABLET ORAL at 08:00

## 2023-05-12 RX ADMIN — RIVAROXABAN 20 MG: 20 TABLET, FILM COATED ORAL at 08:00

## 2023-05-12 RX ADMIN — AMIODARONE HYDROCHLORIDE 200 MG: 200 TABLET ORAL at 08:00

## 2023-05-12 NOTE — DISCHARGE SUMMARY
[facility] 1960[facility] 65 [facility]  [facility] MRN: [id number]  Unit/Bed#: E4 -01 Encounter: [id number]  Primary Care Provider: Huan Lewis DO   Date and time admitted to hospital: 5/9/2023  7:37 AM    * Dyspnea on exertion  Assessment & Plan  · Patient presented with dyspnea on exertion and chest pressure  · Echo performed found to have decline in EF concern for worsening cardiomyopathy  · Cardiac catheterization 5/10/2023 without obstructive CAD  · Bradycardia on admission now with tachycardia- possibly tachycardia has lead to cardiomyopathy  · Continue metoprolol 25 mg BID   · Restart lisinopril 10 mg daily at bedtime   · Patient cleared for discharge today by cardiology   · Follow up outpatient with cardiologist  · continue Xarelto     [facility]  Assessment & Plan  Wt Readings from Last 3 Encounters:   05/09/23 131 kg (289 lb 3 9 oz)   04/13/23 134 kg (296 lb)   03/16/23 131 kg (289 lb 8 oz)   ·   Appears euvolemic  · Continue lasix   · Restart lisinopril at discharge        PAF (paroxysmal atrial fibrillation) ([facility])  Assessment & Plan  · Continue metoprolol to 25 mg BID plus amiodarone  · Continue Xarelto    GERD (gastroesophageal reflux disease)  Assessment & Plan  · Continue PPI    History of Eliud-en-Y gastric bypass  Assessment & Plan  · Noted    Medical Problems     Resolved Problems  Date Reviewed: 5/12/2023   None       Discharging Physician / Practitioner: Purvi Slater PA-C  PCP: Huan Lewis DO  Admission Date:   Admission Orders (From admission, onward)     Ordered        05/09/23 1700  INPATIENT ADMISSION  Once                      Discharge Date: 05/12/23    Consultations During Hospital Stay:  · Cardiology    Procedures Performed:   · Cardiac catheterization impression no angiographic evidence of obstructive CAD, LVEDP normal without gradient on LV-AO pullback    Significant Findings / Test Results:   · XR chest impression no acute cardiopulmonary disease  Findings are stable  · ECHO summary left Ventricle: Left ventricular cavity size is mildly dilated  Wall thickness is normal  The left ventricular ejection fraction is 35%  Systolic function is moderately reduced  Global longitudinal strain is reduced  There is moderate global hypokinesis  Mitral Valve: There is mild annular calcification  There is mild regurgitation  Pulmonic Valve: There is mild regurgitation  Compared to prior echocardiogram dated 1/10/2023, there is a noticeable reduction in left ventricular systolic function  Incidental Findings:   · none    Test Results Pending at Discharge (will require follow up):   · none     Outpatient Tests Requested:  · none    Complications:  none    Reason for Admission: dyspnea on exertion    Hospital Course:   Alesha Flores is a 65 y o  male patient who originally presented to the hospital on 5/9/2023 due to dizziness, lightheadedness, SOB, and chest pressure with exertion  Echocardiogram was performed in the ED which showed a decline in patient's ejection fraction  Cardiology was consulted  Cardiac catheterization was recommended to rule out obstructive CAD as a cause of patient's symptoms  Cardiac catheterization was unremarkable for obstructive coronary artery disease  On telemetry patient was found to have tachycardia  Cardiology recommended increasing patient's metoprolol succinate to 25 mg twice a day  Patient converted back to sinus rhythm and symptoms resolved  He will continue on amiodarone 200 mg daily, atorvastatin 40 mg daily, torsemide 20 mg daily, metoprolol succinate 25 mg twice a day, lisinopril 10 mg at bedtime and Xarelto 20 mg daily with breakfast he will follow-up with his outpatient cardiologist Dr Rosalia Khan  Patient was stable for discharge today and was cleared by cardiology for outpatient follow-up  Please see above list of diagnoses and related plan for additional information  "    Condition at Discharge: stable    Discharge Day Visit / Exam:   Subjective: Patient was seen lying in bed today  He denies any chest pain or pressure today  He was able to walk around his room without any symptoms  He reports some mild lightheadedness and dizziness when ambulating however said that this quickly resolved  He denies any other acute complaints at this time  Vitals: Blood Pressure: 116/82 (05/12/23 1124)  Pulse: 59 (05/12/23 1124)  Temperature: 97 5 °F (36 4 °C) (05/12/23 1124)  Temp Source: Temporal (05/12/23 1124)  Respirations: 20 (05/12/23 1124)  Height: 6' 2\" (188 cm) (05/09/23 2005)  Weight - Scale: 131 kg (289 lb 3 9 oz) (05/09/23 2005)  SpO2: 99 % (05/12/23 1124)  Exam:   Physical Exam  Vitals and nursing note reviewed  Constitutional:       Appearance: Normal appearance  HENT:      Head: Normocephalic and atraumatic  Eyes:      General: No scleral icterus  Cardiovascular:      Rate and Rhythm: Normal rate and regular rhythm  Pulmonary:      Effort: Pulmonary effort is normal       Breath sounds: Normal breath sounds  Abdominal:      General: Abdomen is flat  Bowel sounds are normal       Palpations: Abdomen is soft  Tenderness: There is no abdominal tenderness  Musculoskeletal:      Right lower leg: No edema  Left lower leg: No edema  Skin:     General: Skin is warm and dry  Neurological:      Mental Status: He is alert  Mental status is at baseline  Psychiatric:         Mood and Affect: Mood normal          Behavior: Behavior normal           Discussion with Family: Patient declined call to   Discharge instructions/Information to patient and family:   See after visit summary for information provided to patient and family  Provisions for Follow-Up Care:  See after visit summary for information related to follow-up care and any pertinent home health orders         Disposition:   Home    Planned Readmission: none     Discharge " Statement:  I spent 60 minutes discharging the patient  This time was spent on the day of discharge  I had direct contact with the patient on the day of discharge  Greater than 50% of the total time was spent examining patient, answering all patient questions, arranging and discussing plan of care with patient as well as directly providing post-discharge instructions  Additional time then spent on discharge activities  Discharge Medications:  See after visit summary for reconciled discharge medications provided to patient and/or family        **Please Note: This note may have been constructed using a voice recognition system**

## 2023-05-12 NOTE — NURSING NOTE
"Patient reported having a \"hot flash\" with night sweats, tingling in his hands and feet \"That feel likes pins and needles\"  He also reported feeling lightheaded and dizzy when this happened as well    "

## 2023-05-12 NOTE — ASSESSMENT & PLAN NOTE
Wt Readings from Last 3 Encounters:   05/09/23 131 kg (289 lb 3 9 oz)   04/13/23 134 kg (296 lb)   03/16/23 131 kg (289 lb 8 oz)   ·   Appears euvolemic  · Continue lasix   · Restart lisinopril at discharge

## 2023-05-12 NOTE — UTILIZATION REVIEW
NOTIFICATION OF ADMISSION DISCHARGE   This is a Notification of Discharge from [address]  Please be advised that this patient has been discharge from our facility  Below you will find the admission and discharge date and time including the patient’s disposition  UTILIZATION REVIEW CONTACT:  Deb Luque  Utilization   Network Utilization Review Department  Phone: [phone] x carefully listen to the prompts  All voicemails are confidential   Email: [email or web address]     ADMISSION INFORMATION  PRESENTATION DATE: 5/9/2023  7:37 AM  OBERVATION ADMISSION DATE:   INPATIENT ADMISSION DATE: 5/9/23  5:00 PM   DISCHARGE DATE: 5/12/2023 12:37 PM   DISPOSITION:Home/Self Care    IMPORTANT INFORMATION:  Send all requests for admission clinical reviews, approved or denied determinations and any other requests to dedicated fax number below belonging to the campus where the patient is receiving treatment   List of dedicated fax numbers:  [address] DENIALS (Administrative/Medical Necessity) [phone]   [address] (Maternity/NICU/Pediatrics) [phone]   [facility] [phone]   [place] [address] [phone]   [address] [phone]   220 [facility] [phone]   90 [facility] [phone]   [address] [place], [place] [address] [phone]   [facility]  [phone]   [address] [facility] [phone]   412 [facility] [address] [facility] [phone]

## 2023-05-12 NOTE — DISCHARGE INSTR - AVS FIRST PAGE
Internal Medicine Discharge Instructions  -increase Toprol to 25 mg twice a day  -continue lisinopril 10 mg daily, start taking at bedtime   -follow up with outpatient cardiologist

## 2023-05-12 NOTE — ASSESSMENT & PLAN NOTE
· Patient presented with dyspnea on exertion and chest pressure  · Echo performed found to have decline in EF concern for worsening cardiomyopathy  · Cardiac catheterization 5/10/2023 without obstructive CAD  · Bradycardia on admission now with tachycardia- possibly tachycardia has lead to cardiomyopathy  · Continue metoprolol 25 mg BID   · Restart lisinopril 10 mg daily at bedtime   · Patient cleared for discharge today by cardiology   · Follow up outpatient with cardiologist  · continue Xarelto

## 2023-05-12 NOTE — PLAN OF CARE
Problem: Potential for Falls  Goal: Patient will remain free of falls  Description: INTERVENTIONS:  - Educate patient/family on patient safety including physical limitations  - Instruct patient to call for assistance with activity   - Consult OT/PT to assist with strengthening/mobility   - Keep Call bell within reach  - Keep bed low and locked with side rails adjusted as appropriate  - Keep care items and personal belongings within reach  - Initiate and maintain comfort rounds  - Make Fall Risk Sign visible to staff  Problem: PAIN - ADULT  Goal: Verbalizes/displays adequate comfort level or baseline comfort level  Description: Interventions:  - Encourage patient to monitor pain and request assistance  - Assess pain using appropriate pain scale  - Administer analgesics based on type and severity of pain and evaluate response  - Implement non-pharmacological measures as appropriate and evaluate response  - Consider cultural and social influences on pain and pain management  - Notify physician/advanced practitioner if interventions unsuccessful or patient reports new pain  Outcome: Progressing     Problem: DISCHARGE PLANNING  Goal: Discharge to home or other facility with appropriate resources  Description: INTERVENTIONS:  - Identify barriers to discharge w/patient and caregiver  - Arrange for needed discharge resources and transportation as appropriate  - Identify discharge learning needs (meds, wound care, etc )  - Arrange for interpretive services to assist at discharge as needed  - Refer to Case Management Department for coordinating discharge planning if the patient needs post-hospital services based on physician/advanced practitioner order or complex needs related to functional status, cognitive ability, or social support system  Outcome: Progressing     Problem: Knowledge Deficit  Goal: Patient/family/caregiver demonstrates understanding of disease process, treatment plan, medications, and discharge instructions  Description: Complete learning assessment and assess knowledge base    Interventions:  - Provide teaching at level of understanding  - Provide teaching via preferred learning methods  Outcome: Progressing     Problem: CARDIOVASCULAR - ADULT  Goal: Maintains optimal cardiac output and hemodynamic stability  Description: INTERVENTIONS:  - Monitor I/O, vital signs and rhythm  - Monitor for S/S and trends of decreased cardiac output  - Administer and titrate ordered vasoactive medications to optimize hemodynamic stability  - Assess quality of pulses, skin color and temperature  - Assess for signs of decreased coronary artery perfusion  - Instruct patient to report change in severity of symptoms  Outcome: Progressing  Goal: Absence of cardiac dysrhythmias or at baseline rhythm  Description: INTERVENTIONS:  - Continuous cardiac monitoring, vital signs, obtain 12 lead EKG if ordered  - Administer antiarrhythmic and heart rate control medications as ordered  - Monitor electrolytes and administer replacement therapy as ordered  Outcome: Progressing     Problem: RESPIRATORY - ADULT  Goal: Achieves optimal ventilation and oxygenation  Description: INTERVENTIONS:  - Assess for changes in respiratory status  - Assess for changes in mentation and behavior  - Position to facilitate oxygenation and minimize respiratory effort  - Oxygen administered by appropriate delivery if ordered  - Initiate smoking cessation education as indicated  - Encourage broncho-pulmonary hygiene including cough, deep breathe, Incentive Spirometry  - Assess the need for suctioning and aspirate as needed  - Assess and instruct to report SOB or any respiratory difficulty  - Respiratory Therapy support as indicated  Outcome: Progressing     - Apply yellow socks and bracelet for high fall risk patients  - Consider moving patient to room near nurses station  Outcome: Progressing Normal rate, regular rhythm.  Heart sounds S1, S2.  No murmurs, rubs or gallops.

## 2023-05-12 NOTE — PROGRESS NOTES
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, nonischemic   • occasional mild lightheadedness  • Exertional dyspnea  • History of left atrial thrombus resolved on TI 8/1/2022  • Left bundle branch block pattern  • Sleep apnea  • Obesity with history of gastric bypass surgery  • History of GI bleed secondary to NSAID use in 2020    Plan:    • Recommend abstinence from alcohol  • Patient may be discharged today  • Discharge on amiodarone 200 mg daily, atorvastatin 40 mg daily, furosemide 20 mg daily, metoprolol succinate 25 mg twice daily, Xarelto 20 mg daily with breakfast  • Follow-up with Dr Gretchen Vega      Interval history:  Patient states that around 7 PM last night his feet became numb  He then became sweaty and short of breath  He called the nurses who looked at the monitor and said that the monitor did not show anything  The symptoms subsequently passed and have not reoccurred  However, at 8 PM last night he converted to normal sinus rhythm  There was no conversion pause when he converted      PROBLEM LIST:    Patient Active Problem List    Diagnosis Date Noted   • Dyspnea on exertion 05/09/2023   • Claustrophobia 12/02/2022   • GERD (gastroesophageal reflux disease)    • CHF (congestive heart failure) ([facility])    • Atrial thrombus 05/17/2022   • Obesity (BMI 30-39 9) 05/17/2022   • Mass of right lower extremity 05/03/2022   • Stress 05/03/2022   • Acute on chronic systolic CH[facility]congestive [facility] failure) [facility] ) 04/24/2022   • Obstructive sleep apnea    • Abnormal CT of the chest 02/14/2022   • PAF (paroxysmal atrial fibrillation) ([facility] ) 02/13/2022   • COVID-19 02/13/2022   • Acute blood loss anemia 02/12/2020   • History of Eliud-en-Y "gastric bypass 02/12/2020   • Intolerance of continuous positive airway pressure (CPAP) ventilation 02/12/2020   • Gastrointestinal hemorrhage with melena 02/12/2020   • Abnormal CT of the abdomen 02/12/2020   • Closed fracture of multiple ribs of left side 03/05/2019   • Visual changes 08/31/2018   • Blood alcohol level of 240 mg/100 ml or more 08/28/2018   • Orbit fracture, right, sequela 08/26/2018   • Raised intraocular pressure of right eye 08/26/2018   • Peptic ulcer 01/02/2017       Vitals: /69 (BP Location: Right arm)   Pulse (!) 53   Temp (!) 96 9 °F (36 1 °C) (Temporal)   Resp 20   Ht 6' 2\" (1 88 m)   Wt 131 kg (289 lb 3 9 oz)   SpO2 99%   BMI 37 14 kg/m²   PREVIOUS WEIGHTS:   Weight (last 2 days)     None          Wt Readings from Last 2 Encounters:   05/09/23 131 kg (289 lb 3 9 oz)   04/13/23 134 kg (296 lb)       Labs/Data:        Results from last 7 days   Lab Units 05/12/23 0441 05/11/23  0502 05/10/23  0530   WBC Thousand/uL 7 43 6 36 6 21   HEMOGLOBIN g/dL 14 8 15 1 14 6   HEMATOCRIT % 43 2 45 1 44 2   MCV fL 98 97 100*   MCH pg 33 5 32 6 32 9   MCHC g/dL 34 3 33 5 33 0   PLATELETS Thousands/uL 188 176 182     Results from last 7 days   Lab Units 05/12/23 0441 05/11/23  0502 05/10/23  0530   EGFR ml/min/1 73sq m 78 93 77   SODIUM mmol/L 135 136 138   POTASSIUM mmol/L 4 0 4 2 4 4   CHLORIDE mmol/L 105 107 106   CO2 mmol/L 24 25 28   BUN mg/dL 18 15 18   CREATININE mg/dL 1 03 0 87 1 04     Results from last 7 days   Lab Units 05/12/23 0441 05/11/23  0502 05/10/23  0530   CALCIUM mg/dL 8 5 8 0* 8 6     Lab Results   Component Value Date    NTBNP 766 (H) 10/20/2022    NTBNP 927 (H) 08/15/2022    NTBNP 1,992 (H) 04/24/2022     Lab Results   Component Value Date    CHOLESTEROL 144 04/25/2022    TRIG 82 04/25/2022    HDL 50 04/25/2022    LDLCALC 78 04/25/2022    HGBA1C 5 7 (H) 04/25/2022     Lab Results   Component Value Date    FREET4 1 27 05/09/2023    FREET4 1 23 03/24/2023     Lab " Results   Component Value Date    DIGOXIN 1 4 08/12/2022    DIGOXIN 0 7 (L) 07/15/2022    DIGOXIN 0 5 (L) 04/29/2022              Current Facility-Administered Medications:   •  amiodarone tablet 200 mg, 200 mg, Oral, Daily, Jenni Moya DO, 200 mg at 05/12/23 0800  •  atorvastatin (LIPITOR) tablet 40 mg, 40 mg, Oral, Daily With Gendawood Dallas DO, 40 mg at 05/11/23 1721  •  furosemide (LASIX) tablet 20 mg, 20 mg, Oral, Daily, Lisset Howell PA-C, 20 mg at 05/12/23 0800  •  loratadine (CLARITIN) tablet 10 mg, 10 mg, Oral, Daily, Jenni Moya DO, 10 mg at 05/12/23 0800  •  metoprolol succinate (TOPROL-XL) 24 hr tablet 25 mg, 25 mg, Oral, BID, Elizabeth Sylvester MD, 25 mg at 05/12/23 0800  •  pantoprazole (PROTONIX) EC tablet 40 mg, 40 mg, Oral, BID With Meals, Jenni Moya DO, 40 mg at 05/12/23 0800  •  rivaroxaban (XARELTO) tablet 20 mg, 20 mg, Oral, Daily With Breakfast, Charlie Sanchez PA-C, 20 mg at 05/12/23 0800  No Known Allergies    No intake or output data in the 24 hours ending 05/12/23 0944    Invasive Devices     Peripheral Intravenous Line  Duration           Peripheral IV 05/09/23 Left Antecubital 3 days                Review of Systems   Constitutional: Negative for activity change  Respiratory: Negative for cough, chest tightness, shortness of breath and wheezing  Cardiovascular: Negative for chest pain, palpitations and leg swelling  Musculoskeletal: Negative for gait problem  Skin: Negative for color change  Neurological: Negative for dizziness, tremors, syncope, weakness, light-headedness and headaches  Psychiatric/Behavioral: Negative for agitation and confusion  Physical Exam  Vitals reviewed  Constitutional:       General: He is not in acute distress  Appearance: He is well-developed  HENT:      Head: Normocephalic and atraumatic  Neck:      Thyroid: No thyromegaly  Vascular: No carotid bruit or JVD        Trachea: No "tracheal deviation  Cardiovascular:      Rate and Rhythm: Normal rate and regular rhythm  Pulses: Normal pulses  Heart sounds: Normal heart sounds  No murmur heard  No friction rub  No gallop  Pulmonary:      Effort: Pulmonary effort is normal  No respiratory distress  Breath sounds: Normal breath sounds  No wheezing, rhonchi or rales  Chest:      Chest wall: No tenderness  Musculoskeletal:      Cervical back: Normal range of motion and neck supple  Right lower leg: No edema  Left lower leg: No edema  Skin:     General: Skin is warm and dry  Neurological:      General: No focal deficit present  Mental Status: He is alert and oriented to person, place, and time  Psychiatric:         Mood and Affect: Mood normal          Behavior: Behavior normal          Thought Content: Thought content normal          Judgment: Judgment normal          ======================================================     Imaging:   I have personally reviewed pertinent reports  EKG: Patient converted to normal sinus rhythm      Portions of the record may have been created with voice recognition software  Occasional wrong word or \"sound a like\" substitutions may have occurred due to the inherent limitations of voice recognition software  Read the chart carefully and recognize, using context, where substitutions have occurred      "

## 2023-05-12 NOTE — NURSING NOTE
Patient was discharged home with wife  AVS and education was given, IV and telemetry removed  No further questions at this time

## 2023-05-15 ENCOUNTER — TRANSITIONAL CARE MANAGEMENT (OUTPATIENT)
Dept: FAMILY MEDICINE CLINIC | Facility: CLINIC | Age: 63
End: 2023-05-15

## 2023-06-05 DIAGNOSIS — I48.91 ATRIAL FIBRILLATION (HCC): ICD-10-CM

## 2023-06-05 RX ORDER — RIVAROXABAN 20 MG/1
TABLET, FILM COATED ORAL
Qty: 90 TABLET | Refills: 3 | Status: SHIPPED | OUTPATIENT
Start: 2023-06-05

## 2023-06-07 ENCOUNTER — TELEPHONE (OUTPATIENT)
Dept: CARDIOLOGY CLINIC | Facility: CLINIC | Age: 63
End: 2023-06-07

## 2023-06-07 NOTE — TELEPHONE ENCOUNTER
Fax received from [employer] with prior auth form for Xarelto 20mg Tablets  Form completed and faxed back

## 2023-06-16 ENCOUNTER — OFFICE VISIT (OUTPATIENT)
Dept: CARDIOLOGY CLINIC | Facility: CLINIC | Age: 63
End: 2023-06-16
Payer: COMMERCIAL

## 2023-06-16 VITALS
HEART RATE: 123 BPM | DIASTOLIC BLOOD PRESSURE: 78 MMHG | BODY MASS INDEX: 36.9 KG/M2 | SYSTOLIC BLOOD PRESSURE: 115 MMHG | WEIGHT: 287.4 LBS

## 2023-06-16 DIAGNOSIS — I48.91 ATRIAL FIBRILLATION WITH RAPID VENTRICULAR RESPONSE (HCC): ICD-10-CM

## 2023-06-16 DIAGNOSIS — I50.23 ACUTE ON CHRONIC SYSTOLIC CHF (CONGESTIVE HEART FAILURE) (HCC): ICD-10-CM

## 2023-06-16 DIAGNOSIS — I48.0 PAROXYSMAL ATRIAL FIBRILLATION (HCC): Primary | ICD-10-CM

## 2023-06-16 PROCEDURE — 93000 ELECTROCARDIOGRAM COMPLETE: CPT | Performed by: INTERNAL MEDICINE

## 2023-06-16 PROCEDURE — 99215 OFFICE O/P EST HI 40 MIN: CPT | Performed by: INTERNAL MEDICINE

## 2023-06-16 NOTE — PATIENT INSTRUCTIONS
Go to hospital  Tonight, take 2 furosemide (water pill) and 2 metoprolol (to slow your heart)  [facility] any [facility] tonight [facility]

## 2023-06-17 NOTE — PROGRESS NOTES
Cardiology             Michael Biju Lao  1960  [id number]                 Assessment/Plan:     Paroxysmal atrial fibrillation, on amiodarone suppression, RVR on today's visit  Nonischemic cardiomyopathy, ejection fraction 81%  Chronic systolic and diastolic CHF  History of LA thrombus, resolved on follow-up TI 08/01/2022  Left bundle branch block  Sleep apnea  Obesity with history of gastric bypass surgery  History of GI bleed up to NSAID use in 2020        RVR on today's visit with symptoms of BURROWS, palpitations, chest discomfort and intermittent diaphoresis  Strongly recommended hospitalization  He tells me he will likely go tomorrow morning, and wants to speak with his family members that are out of town at this time  [facility] will go [facility] ER where he can get an EP evaluation as well  He will check his HR before going tomorrow and if < 100 he will follow up with EP in the office  I suspect he will need ablation as he has had multiple breakthrough runs of AFib on amiodarone, along with a tachy-induced cardiomyopathy  He has had sinus bradycardia when he's not in AF so uptitrating his AV node blockers has been challenging  Continue Xarelto  He will take an additional metoprolol and furosemide when he gets home  I've strongly encouraged him to call 911 if his symptoms become any worse    EF 35% on recent hospitalization 5/2023 with no obstructive CAD           Follow-up post hospital           Interval History:      This is a very pleasant 70-year-old male hospitalized 2/2022 shortly after he had a positive home COVID test and symptoms of vomiting, diarrhea, mild dyspnea, and upper respiratory symptoms   He came to the ED 2/13/2022, with exertional dyspnea   ECG demonstrated a new diagnosis of atrial fibrillation with RVR   CT chest revealed no evidence of pulmonary embolism, although bilateral multilobar infiltrates were present, suggestive of COVID-19 pneumonia   He converted to sinus rhythm with diltiazem IV   His chads Vasc score was 0, therefore he was maintained off anticoagulation   Beta-blockade was initiated        Subsequently, he was seen by our nurse practitioner on 02/24/2022, and rapid atrial fibrillation with symptoms of lightheadedness and exertional dyspnea   He was sent back to the ED where he was given IV diltiazem once again and converted back to sinus rhythm with symptomatic improvement   He was discharged without adjustment of his medications  Darryl Cuba was seen by his PCP 02/25/2022 rate was noted to have recurrent atrial fibrillation with RVR at 125 beats per minute and minimal symptoms   Diltiazem 60 mg p o  b i d  was initiated  Darryl Cuba went to the ER Saturday 02/26/2022 for atypical chest pain   He was ruled out for myocardial injury with negative high sensitivity troponins x2 and no ischemic ECG changes        He underwent a nuclear stress test 04/07/2022 which revealed no evidence of myocardial ischemia or prior infarction by SPECT imaging, with ejection fraction 32%   This was consistent with nonischemic cardiomyopathy      Beka presented back to the hospital 4/2022 with acute CHF and AFib with RVR   TI revealed a severely reduced left ventricular systolic function with a small thrombus about 0 6 cm noted in a very dilated left atrial appendage  Cardioversion was not performed  Darryl Cuba was discharged on a rate control strategy with anticoagulation      He was seen by electrophysiology (Dr Martinez) 05/16/2022 at which time rate control with anticoagulation was recommended with repeat TI in 2 months   He was recommended to start amiodarone after resolution of his atrial appendage thrombus with subsequent cardioversion to try maintaining sinus rhythm  Cessation of alcohol intake was strongly recommended   It was also recommended to plan for comprehensive ablation-PVI once clinically stable      He underwent successful TI/cardioversion 08/01/2022    Ejection fraction is 25% with no further thrombus in left atrial appendage despite moderate continuous spontaneous echo contrast   He was initiated on amiodarone      He called with recurrent palpitations and was noted to be back in atrial fibrillation  Before repeat elective TI/cardioversion was scheduled, he was hospitalized 08/12/2022 with symptoms of orthopnea and atrial fibrillation with slow ventricular response  He was initiated on IV furosemide  Interestingly was noted to have episodes of diaphoresis which she called the nurse for  However on telemetry his rates were not slow or fast and high sensitivity troponins were negative  On 08/15/2022 he underwent successful TI/cardioversion  There was resolution of his left atrial appendage thrombus  He was continued on amiodarone at 200 mg daily      He was seen by electrophysiology 08/26/2022 and was unfortunately noted to be back in atrial fibrillation  His weight was 87 beats per minute  It was recommended to initiate and optimize heart failure medications including ACE-inhibitor/ARB  Bi V ICD was recommended after initiation of medical therapy with consideration of future comprehensive ablation  Repeat echocardiogram 11/2/2022 demonstrate improvement left ventricular ejection fraction at 43%  At that time, ICD plantation was canceled      He was rehospitalized 5/2023 for CHF rapid AF  EF was low again at 35% and repeat cath 5/10/23 showed no CAD  He presents today for follow-up with complaints of exertional dyspnea, palpitations and intermittent diaphoresis and chest discomfort over the past 5 days  Prior to that he was feeling well  He has had times during the past week when he has felt better than other times          Vitals:  Vitals:    06/16/23 1554   BP: 115/78   BP Location: Right arm   Patient Position: Sitting   Cuff Size: Large   Pulse: (!) 123   Weight: 130 kg (287 lb 6 4 oz)         Past Medical History:   Diagnosis Date   • A-fib [facility])    • Acute ulcer of stomach    • CHF (congestive heart failure) (HCC)    • GERD (gastroesophageal reflux disease)    • Rib fractures      Social History     Socioeconomic History   • Marital status: /Civil Union     Spouse name: Not on file   • Number of children: Not on file   • Years of education: Not on file   • Highest education level: Not on file   Occupational History   • Not on file   Tobacco Use   • Smoking status: Former     Types: Cigars     Start date: 0     Quit date:      Years since quittin 4   • Smokeless tobacco: Former     Types: Chew   Vaping Use   • Vaping Use: Never used   Substance and Sexual Activity   • Alcohol use: Yes     Alcohol/week: 7 0 standard drinks of alcohol     Types: 7 Cans of beer per week     Comment: Hasn't had any beer since 23   • Drug use: No   • Sexual activity: Yes     Partners: Female   Other Topics Concern   • Not on file   Social History Narrative    Always uses seat belt    Feels safe at home    No guns in the home     Social Determinants of Health     Financial Resource Strain: Not on file   Food Insecurity: No Food Insecurity (2022)    Hunger Vital Sign    • Worried About Running Out of Food in the Last Year: Never true    • Ran Out of Food in the Last Year: Never true   Transportation Needs: No Transportation Needs (2022)    PRAPARE - Transportation    • Lack of Transportation (Medical): No    • Lack of Transportation (Non-Medical):  No   Physical Activity: Not on file   Stress: Not on file   Social Connections: Not on file   Intimate Partner Violence: Not on file   Housing Stability: Low Risk  (2022)    Housing Stability Vital Sign    • Unable to Pay for Housing in the Last Year: No    • Number of Places Lived in the Last Year: 1    • Unstable Housing in the Last Year: No      Family History   Problem Relation Age of Onset   • Seizures Father      Past Surgical History:   Procedure Laterality Date   • CARDIAC CATHETERIZATION N/A 5/10/2023    Procedure: Cardiac catheterization;  Surgeon: Radha Carrillo DO;  Location: [facility] CARDIAC [facility]; Service: Cardiology   • CARDIAC CATHETERIZATION N/A 5/10/2023    Procedure: Cardiac Coronary Angiogram;  Surgeon: Radha Carrillo DO;  Location: [facility]; Service: Cardiology   • COLONOSCOPY     • ESOPHAGOGASTRODUODENOSCOPY     • GASTRIC BYPASS     • MANDIBLE FRACTURE SURGERY         Current Outpatient Medications:   •  amiodarone 200 mg tablet, TAKE 1 TABLET DAILY, Disp: 90 tablet, Rfl: 3  •  atorvastatin (LIPITOR) 40 mg tablet, Take 1 tablet (40 mg total) by mouth daily with dinner, Disp: 90 tablet, Rfl: 3  •  ergocalciferol (VITAMIN D2) 50,000 units, Take 1 capsule (50,000 Units total) by mouth 2 (two) times a week, Disp: 24 capsule, Rfl: 0  •  fexofenadine (ALLEGRA) 180 MG tablet, Take 1 tablet (180 mg total) by mouth in the morning , Disp: 30 tablet, Rfl: 3  •  furosemide (LASIX) 20 mg tablet, Take 1 tablet (20 mg total) by mouth daily, Disp: 90 tablet, Rfl: 3  •  lisinopril (ZESTRIL) 10 mg tablet, Take 1 tablet (10 mg total) by mouth daily at bedtime, Disp: 90 tablet, Rfl: 0  •  metoprolol succinate (TOPROL-XL) 25 mg 24 hr tablet, Take 1 tablet (25 mg total) by mouth 2 (two) times a day, Disp: 60 tablet, Rfl: 0  •  Multiple Vitamins-Minerals (MULTIVITAMIN ADULT EXTRA C PO), Take 1 capsule by mouth, Disp: , Rfl:   •  pantoprazole (PROTONIX) 40 mg tablet, TAKE 1 TABLET EVERY 12 HOURS, Disp: 180 tablet, Rfl: 3  •  potassium chloride (K-DUR,KLOR-CON) 20 mEq tablet, TAKE 2 TABLETS BY MOUTH IN AM, 1 TABLET IN PM, Disp: 270 tablet, Rfl: 3  •  Xarelto 20 MG tablet, TAKE 1 TABLET DAILY WITH BREAKFAST, Disp: 90 tablet, Rfl: 3        Review of Systems:  Review of Systems   Constitutional: Positive for diaphoresis and fatigue  Respiratory: Positive for chest tightness and shortness of breath  Cardiovascular: Positive for palpitations  All other systems reviewed and are negative          Physical Exam:  Physical Exam  Constitutional:       General: He is not in acute distress  Appearance: He is well-developed  He is not diaphoretic  HENT:      Head: Normocephalic and atraumatic  Eyes:      General: No scleral icterus  Right eye: No discharge  Pupils: Pupils are equal, round, and reactive to light  Neck:      Thyroid: No thyromegaly  Cardiovascular:      Rate and Rhythm: Tachycardia present  Rhythm irregular  Heart sounds: Normal heart sounds  No murmur heard  No friction rub  No gallop  Pulmonary:      Effort: Pulmonary effort is normal       Breath sounds: Normal breath sounds  Abdominal:      General: There is no distension  Tenderness: There is no abdominal tenderness  There is no guarding or rebound  Musculoskeletal:         General: Normal range of motion  Cervical back: Normal range of motion and neck supple  Right lower leg: Edema present  Left lower leg: Edema present  Skin:     General: Skin is warm and dry  Coloration: Skin is not pale  Findings: No erythema or rash  Neurological:      Mental Status: He is alert and oriented to person, place, and time  Coordination: Coordination normal    Psychiatric:         Behavior: Behavior normal          Thought Content: Thought content normal          Judgment: Judgment normal          This note was completed in part utilizing M-Modal Fluency Direct Software  Grammatical errors, random word insertions, spelling mistakes, and incomplete sentences can be an occasional consequence of this system secondary to software limitations, ambient noise, and hardware issues  If you have any questions or concerns about the content, text, or information contained within the body of this dictation, please contact the provider for clarification

## 2023-06-19 ENCOUNTER — OFFICE VISIT (OUTPATIENT)
Dept: CARDIOLOGY CLINIC | Facility: CLINIC | Age: 63
End: 2023-06-19
Payer: COMMERCIAL

## 2023-06-19 ENCOUNTER — PREP FOR PROCEDURE (OUTPATIENT)
Dept: CARDIOLOGY CLINIC | Facility: CLINIC | Age: 63
End: 2023-06-19

## 2023-06-19 ENCOUNTER — TELEPHONE (OUTPATIENT)
Dept: CARDIOLOGY CLINIC | Facility: CLINIC | Age: 63
End: 2023-06-19

## 2023-06-19 VITALS
HEIGHT: 74 IN | SYSTOLIC BLOOD PRESSURE: 116 MMHG | DIASTOLIC BLOOD PRESSURE: 84 MMHG | WEIGHT: 287.4 LBS | HEART RATE: 62 BPM | BODY MASS INDEX: 36.88 KG/M2

## 2023-06-19 DIAGNOSIS — E66.9 OBESITY (BMI 30-39.9): ICD-10-CM

## 2023-06-19 DIAGNOSIS — Z79.899 LONG TERM CURRENT USE OF AMIODARONE: ICD-10-CM

## 2023-06-19 DIAGNOSIS — E78.2 MIXED HYPERLIPIDEMIA: ICD-10-CM

## 2023-06-19 DIAGNOSIS — I48.0 PAROXYSMAL ATRIAL FIBRILLATION (HCC): ICD-10-CM

## 2023-06-19 DIAGNOSIS — Z78.9 INTOLERANCE OF CONTINUOUS POSITIVE AIRWAY PRESSURE (CPAP) VENTILATION: ICD-10-CM

## 2023-06-19 DIAGNOSIS — I48.0 PAF (PAROXYSMAL ATRIAL FIBRILLATION) (HCC): Primary | ICD-10-CM

## 2023-06-19 DIAGNOSIS — G47.33 OBSTRUCTIVE SLEEP APNEA: ICD-10-CM

## 2023-06-19 DIAGNOSIS — I48.0 PAROXYSMAL ATRIAL FIBRILLATION (HCC): Primary | ICD-10-CM

## 2023-06-19 DIAGNOSIS — Z98.84 HISTORY OF ROUX-EN-Y GASTRIC BYPASS: ICD-10-CM

## 2023-06-19 DIAGNOSIS — Z79.01 CURRENT USE OF LONG TERM ANTICOAGULATION: ICD-10-CM

## 2023-06-19 DIAGNOSIS — I50.42 CHRONIC COMBINED SYSTOLIC AND DIASTOLIC CONGESTIVE HEART FAILURE (HCC): Chronic | ICD-10-CM

## 2023-06-19 DIAGNOSIS — I48.0 PAF (PAROXYSMAL ATRIAL FIBRILLATION) (HCC): ICD-10-CM

## 2023-06-19 PROBLEM — R06.09 DYSPNEA ON EXERTION: Status: RESOLVED | Noted: 2023-05-09 | Resolved: 2023-06-19

## 2023-06-19 PROBLEM — F40.240 CLAUSTROPHOBIA: Status: RESOLVED | Noted: 2022-12-02 | Resolved: 2023-06-19

## 2023-06-19 PROCEDURE — 99215 OFFICE O/P EST HI 40 MIN: CPT | Performed by: INTERNAL MEDICINE

## 2023-06-19 PROCEDURE — 93000 ELECTROCARDIOGRAM COMPLETE: CPT | Performed by: INTERNAL MEDICINE

## 2023-06-19 NOTE — LETTER
2023     DEVICE PROCEDURE INSTRUCTIONS     Letha Lao                         : 1960                    MRN: [id number]  [address] [place] [id number]     Procedure: BIV  ICD      Procedure Date: 23     Location: [facility]  Address: [address] [facility], [address] [place] [address]                                 Labs to be done on 23: CMP /  CBC (FASTING 8 HOURS)     BLOOD THINNER INSTRUCTIONS (Coumadin / Warfarin / Pradaxa / Eliquis / Xarelto):      Huan Heaps - Hold day before procedure and morning of procedure  Medication Hold:      Furosemide - Hold morning of procedure  Arrival Time: The Hospital will contact you the day prior to your procedure, usually between 4PM - 6PM to instruct you on the time and place to report  If you do not hear from a [facility]  by 6PM the evening prior to your procedure, please contact the campus you are scheduled at  DO NOT drink or eat anything after midnight the night prior to your procedure  You may have a minimal amount of water with your AM medications  If your procedure is scheduled after 12:00 noon, you may have clear liquids until 8:00AM the morning of your procedure  (Clear liquids: 7UP, ginger ale, jello, or broth )     Arrange for a responsible adult to drive you to and from the hospital      You should continue to take your morning medications with a sip of water UNLESS ADVISED OTHERWISE         DO NOT stop taking Plavix or Aspirin unless advised otherwise  If you are currently taking Fish Oil, Krill oil and/or Vitamin E please DO NOT TAKE FOR A WEEK PRIOR TO PROCEDURE  If you are diabetic, DO NOT take any of your diabetic pills the morning of your procedure  Oral diabetic medications may include Glucophage, Prandin, Glyburide, Micronase, Avandia, Glucovance, Precose, Glynase, and Starlix        Bring a list of daily medications, vitamins, minerals, herbals and nutritional supplements you take  Please include dosages and the times you take them each day  If you are packing an overnight bag, pack minimal clothing, you will be given hospital sleepwear  DO NOT bring money, valuables or jewelry  The wedding band is ok  Bring your Photo ID and Insurance cards with you  Please notify us if you have been admitted to the hospital within 30 days  Pre-Operative Showering Instructions  ONLY FOR DEVICE PROCEDURE  The two nights prior to your procedure, take a shower each night using the special antiseptic body scrub (Chlorhexidine Scrub Brush) you received from the office or hospital  This scrub will replace your soap at home, the sponge is pre-filled with soap  The scrub brushes are single use only and should be discarded after use  Shampoo your hair with regular shampoo and rinse completely before using the antiseptic scrub brush  Using the sponge side of the scrub brush to wash your entire body from your neck down, with special attention to your armpits, chest and groin area  DO NOT USE the scrub on your face and avoid any open wounds  Do not use any other soap or wash your skin  DO NOT USE any lotion, powder, deodorant or perfume of any kind on your skin after you shower  AFTER THE FIRST NIGHT of using the scrub, change your bed linen sheets to a fresh clean set and clean pajamas for bedtime  AFTER THE SECOND NIGHT of using the scrub, use clean pajamas for bedtime  DO NOT SHOWER THE MORNING OF SURGERY, you will be prepped and cleansed at the hospital prior to your procedure  PLEASE  THE SPONGES AT YOUR MOST CONVENIENT [facility] CARDIOLOGY OFFICE                              FAILURE TO FOLLOW ANY OF THESE INSTRUCTIONS COULD RESULT IN THE CANCELLATION OF YOUR PROCEDURE     Reporting to: [facility]     Address: [address] [facility]             Please call [phone] if you do not hear from the  by 6:00PM the night before your procedure  All patients enter through [facility],[address] is available free of charge or park on [address] [facility]: Proceed through the Lobby past the [employer]  Take the [facility] Elevators to the SECOND floor, turn left and follow the signs to [facility]  ACS: Proceed through the Lobby past the [employer]  Take the [facility] Elevators to the FIRST floor, follow the ORANGE markings to the surgical waiting room and check in at the reception desk               Thank you,   Isabela Zeng  Surgery Coordinator  [address] Cardiology   [address] [facility], [address]  Ph: [phone]

## 2023-06-19 NOTE — PROGRESS NOTES
Consultation - Electrophysiology-Cardiology (EP)   Bebo Astrid Shilo 65 y o  male MRN: [id number]  Unit/Bed#:  Encounter: [id number]      3  Chronic combined systolic and diastolic congestive heart failure (HCC)        2  Paroxysmal atrial fibrillation (HCC)  POCT ECG      3  Obstructive sleep apnea        4  PAF (paroxysmal atrial fibrillation) ([facility] [address] )        5  History of Eliud-en-Y gastric bypass        6  Obesity (BMI 30-39 9)        7  Current use of long term anticoagulation        8  Long term current use of amiodarone        9  Mixed hyperlipidemia        10  Intolerance of continuous positive airway pressure (CPAP) ventilation              Consults  Physician Requesting Consult: No ref   provider found   Reason for Consult / Principal Problem: management of AF, heart failure          My recommendation for the patient  Proceed with BiV ICD now -Medtronic, left side, use penicillin, use contrast  LVEF back to 35%, IVCD with  ms, symptomatic, optimal medical therapy for more than 3 months    Evaluate and treat for sleep apnea    Nutrition consult for weight loss    About 6 to 12 weeks after BiV ICD, proceed with comprehensive ablation of A-fib-PVI and posterior wall            Assessment/Plan   Acute on chronic combined systolic and diastolic heart failure, nonischemic CMP , LVEF 35%  Intermittent LBBB, likely rate related, IVCD,  ms  Optimal medical therapy greater than 3 months  Shared decision making done with patient and primary cardiologist    Persistent atrial fibrillation  Left atrial appendage thrombus, noted on April 22   Chads Vasc score 1   Severe obesity, status post gastric bypass   Peptic ulcer disease   History of heavy alcohol use   Chronic kidney disease stage 3  Mixed hyperlipidemia          Acute on chronic combined systolic and diastolic heart failure, nonischemic CMP , LVEF 35%  Intermittent LBBB, likely rate related, IVCD,  ms  NYHA class II-III  Optimal medical therapy greater than 3 months  Shared decision making done with patient and primary cardiologist  Primary prevention device    Patient initially had improvement in heart function, but subsequently down to 35% again  Although has episodes of RVR, currently in sinus rhythm  Not abusing alcohol at the current time  Has some symptoms of orthopnea, PND and leg swelling    Currently on metoprolol, lisinopril, furosemide  Follow-up with heart failure and to consider using SGLT2 inhibitors    Proceed with BiV ICD now -Medtronic, left side, use penicillin, use contrast  LVEF back to 35%, IVCD with  ms, symptomatic, optimal medical therapy for more than 3 months            Persistent atrial fibrillation  Long-term anticoagulation  Left atrial thrombus  Currently in sinus rhythm, does have episodes of RVR from time to time    Recent worsening noted since April 2022    Risk factors   Age-62  Obesity-BMI 37  Obstructive sleep apnea -history of same  Thyroid disorder -TSH normal  Hypertension -116/84 on medication  Heart failure -LVEF 35%  Diabetes mellitus -hemoglobin A1c 5 7  Tobacco use-history of same  Alcohol use-heavy abuse till recently  Energy drink use-negative    Has left atrial appendage thrombus    Current therapy   Anticoagulation-chads Vasc score-has left atrial appendage thrombus-on Xarelto  Rate control-metoprolol, digoxin  Rhythm control-amiodarone    My recommendation for this patient  Will first proceed with BiV ICD  This will allow us to monitor A-fib load  Will consider comprehensive ablation about 6 to 12 weeks after ICD placement  Depending upon A-fib load, risk factors, will decide on rhythm control ablation (PVI and posterior wall isolation)  versus rate control ablation (AV node ablation)            Obesity / overweight  BMI-37  History of gastric bypass  This increases the risk of-CAD, CVA, vascular disease, diabetes, kidney dysfunction, hypertension, hyperlipidemia  Diet is responsible for 80% of weight gain   Advice nutritional counseling and healthy diet  Also advised to increase activity  He is going to follow up with his primary care        Obstructive sleep apnea   Patient has history of snoring, morning fatigue and daytime sleepiness at least on some days  Examination is also suggestive  Recommended to follow up with primary care and Pulmonary Medicine  Needs aggressive management of same        Hypertension   Blood pressure-116/84  Medication-metoprolol, furosemide, lisinopril        Mixed hyperlipidemia   Medication-atorvastatin  No myositis or myalgia   My recommendation-continue with same        Diabetes mellitus   Hemoglobin A1c -5 7  Medication -none  My recommendation-continue with weight loss diet      Tobacco abuse   Former smoker      Alcohol abuse   Heavy drinking till recently          History of Present Illness   HPI: Chari Lassiter is a 65y o  year old male has been referred to me by Dr Dante Small     Patient initially had improvement of EF with control of A-fib, went up to 43%  But subsequently it is again down to 35%  Patient is once again complaining of intermittent orthopnea and PND along with leg swelling  There are intermittent episodes of A-fib with RVR    Prior history    The patient has significant medical illnesses which include  Persistent atrial fibrillation  Left atrial appendage thrombus, noted on April 22   Chads Vasc score 1   Acute on chronic combined systolic and diastolic heart failure, nonischemic CMP , LVEF 15-30%  Intermittent LBBB, likely rate related  Severe obesity, status post gastric bypass   Peptic ulcer disease   History of heavy alcohol use   Chronic kidney disease stage 3  Mixed hyperlipidemia    Patient admitted to the hospital in April 2022 with significant shortness of breath and weight gain  Echocardiogram revealed severely reduced heart function, RV systolic dysfunction  Stress test was negative for myocardial ischemia  Jesus confirmed severely reduced LV systolic function with thrombus about 0 6 cm noted in very dilated left atrial appendage  Patient is on metoprolol tartrate, digoxin for rate control    As far as cardiac symptoms are concerned  Angina -negative  Orthopnea -improved  Paroxysmal nocturnal dyspnea -improved  Leg swelling-chronic  Palpitations -improved  Presyncope-occasional  Syncope -negative  Orthostatic lightheadedness -occasional  Exertional intolerance-present    Snoring-present  morning fatigue-present  Daytime sleepiness-present    Social history  Tobacco use-former smoker  Alcohol use-history of abuse and was drinking heavily till recently  Energy drink use-negative  Recreational drug use-negative      Family history  Seizure disorder      Historical Information   Past Medical History:   Diagnosis Date   • A-fib [facility])    • Acute ulcer of stomach    • CHF (congestive heart failure) (HCC)    • GERD (gastroesophageal reflux disease)    • Rib fractures      Past Surgical History:   Procedure Laterality Date   • CARDIAC CATHETERIZATION N/A 5/10/2023    Procedure: Cardiac catheterization;  Surgeon: Abe Munzo DO;  Location: [facility] CARDIAC [facility]; Service: Cardiology   • CARDIAC CATHETERIZATION N/A 5/10/2023    Procedure: Cardiac Coronary Angiogram;  Surgeon: Abe Munoz DO;  Location: [facility];   Service: Cardiology   • COLONOSCOPY     • ESOPHAGOGASTRODUODENOSCOPY     • GASTRIC BYPASS     • MANDIBLE FRACTURE SURGERY       Social History     Substance and Sexual Activity   Alcohol Use Yes   • Alcohol/week: 7 0 standard drinks of alcohol   • Types: 7 Cans of beer per week    Comment: Hasn't had any beer since 23     Social History     Substance and Sexual Activity   Drug Use No     Social History     Tobacco Use   Smoking Status Former   • Types: Cigars   • Start date:    • Quit date:    • Years since quittin 4   Smokeless Tobacco Former   • Types: Chew     Social History     Socioeconomic History   • Marital status: /Civil [employer]     Spouse name: Not on file   • Number of children: Not on file   • Years of education: Not on file   • Highest education level: Not on file   Occupational History   • Not on file   Tobacco Use   • Smoking status: Former     Types: Cigars     Start date: 0     Quit date:      Years since quittin 4   • Smokeless tobacco: Former     Types: Chew   Vaping Use   • Vaping Use: Never used   Substance and Sexual Activity   • Alcohol use: Yes     Alcohol/week: 7 0 standard drinks of alcohol     Types: 7 Cans of beer per week     Comment: Hasn't had any beer since 23   • Drug use: No   • Sexual activity: Yes     Partners: Female   Other Topics Concern   • Not on file   Social History Narrative    Always uses seat belt    Feels safe at home    No guns in the home     Social Determinants of Health     Financial Resource Strain: Not on file   Food Insecurity: No Food Insecurity (2022)    Hunger Vital Sign    • Worried About Running Out of Food in the Last Year: Never true    • Ran Out of Food in the Last Year: Never true   Transportation Needs: No Transportation Needs (2022)    PRAPARE - Transportation    • Lack of Transportation (Medical): No    • Lack of Transportation (Non-Medical): No   Physical Activity: Not on file   Stress: Not on file   Social Connections: Not on file   Intimate Partner Violence: Not on file   Housing Stability: Low Risk  (2022)    Housing Stability Vital Sign    • Unable to Pay for Housing in the Last Year: No    • Number of Places Lived in the Last Year: 1    • Unstable Housing in the Last Year: No        Family History:  Family History   Problem Relation Age of Onset   • Seizures Father          Meds/Allergies      No current facility-administered medications for this visit          (Not in a hospital admission)      No Known Allergies        Objective   Vitals:   Visit Vitals  /84 (BP Location: Left arm, Patient Position: Sitting, Cuff Size: "Large)   Pulse 62   Ht 6' 2\" (1 88 m)   Wt 130 kg (287 lb 6 4 oz)   BMI 36 90 kg/m²   Smoking Status Former   BSA 2 53 m²      Vitals:    06/19/23 0907   Weight: 130 kg (287 lb 6 4 oz)   [unfilled]    Invasive Devices     None                   ROS  Review of Systems   All other systems reviewed and are negative  As described in my history of present illness        PHYSICAL EXAM  Physical Exam  Constitutional:       Appearance: He is obese  He is ill-appearing  Comments: BMI 37   HENT:      Head: Normocephalic and atraumatic  Nose: Nose normal       Mouth/Throat:      Comments: Posterior pharynx is crowded  Eyes:      General: No scleral icterus  Extraocular Movements: Extraocular movements intact  Conjunctiva/sclera: Conjunctivae normal       Pupils: Pupils are equal, round, and reactive to light  Neck:      Comments: Large thick neck  Cardiovascular:      Rate and Rhythm: Normal rate and regular rhythm  Heart sounds: Normal heart sounds  No murmur heard  Pulmonary:      Effort: No respiratory distress  Breath sounds: Examination of the right-lower field reveals decreased breath sounds  Examination of the left-lower field reveals decreased breath sounds  Decreased breath sounds present  Abdominal:      Palpations: Abdomen is soft  Comments: Central obesity present   Musculoskeletal:         General: Swelling present  No deformity  Cervical back: No rigidity  Skin:     Coloration: Skin is not jaundiced  Findings: No bruising or lesion  Neurological:      Mental Status: He is alert  Mental status is at baseline  Psychiatric:         Mood and Affect: Mood normal          Behavior: Behavior normal          Thought Content:  Thought content normal                LAB RESULTS:      CBC:  Results from Last 12 Months   Lab Units 05/12/23  0441 05/11/23  0502 05/10/23  0530 05/09/23  0810 03/24/23  0819 08/15/22  0508 08/13/22  0455 08/12/22  0708   WBC Thousand/uL 7 43 " "6 36 6 21 8 58 8 63 8 04 7 38 10 63*   HEMOGLOBIN g/dL 14 8 15 1 14 6 15 1 15 7 15 9 15 6 16 0   HEMATOCRIT % 43 2 45 1 44 2 44 4 46 0 46 4 46 2 46 7   MCV fL 98 97 100* 98 97 94 94 94   PLATELETS Thousands/uL 188 176 182 224 236 189 195 222   RBC Million/uL 4 42 4 63 4 44 4 51 4 74 4 95 4 90 4 99   MCH pg 33 5 32 6 32 9 33 5 33 1 32 1 31 8 32 1   MCHC g/dL 34 3 33 5 33 0 34 0 34 1 34 3 33 8 34 3   RDW % 12 4 12 3 12 6 12 8 12 0 13 1 13 2 13 2   MPV fL 10 5 9 8 9 8 10 2 10 7 10 8 10 6 10 1   NRBC AUTO /100 WBCs  --   --   --  0  --  0 0 0        CMP:  Results from Last 12 Months   Lab Units 05/12/23  0441 05/11/23  0502 05/10/23  0530 05/09/23  0810 03/24/23  0819 08/15/22  0508 08/13/22  0455 08/12/22  0708 07/15/22  0713   POTASSIUM mmol/L 4 0 4 2 4 4 4 6 4 5   < > 3 8   < > 4 3   CHLORIDE mmol/L 105 107 106 107 107   < > 104   < > 110*   CO2 mmol/L 24 25 28 27 22   < > 26   < > 25   BUN mg/dL 18 15 18 19 18   < > 21   < > 21   CREATININE mg/dL 1 03 0 87 1 04 1 16 1 11   < > 1 10   < > 1 10   CALCIUM mg/dL 8 5 8 0* 8 6 8 7 9 3   < > 8 4   < > 8 7   AST U/L  --   --   --   --  28  --  26  --  48*   ALT U/L  --   --   --   --  31  --  41  --  75   ALK PHOS U/L  --   --   --   --  65  --  68  --  73   EGFR ml/min/1 73sq m 78 93 77 67 71   < > 72   < > 72    < > = values in this interval not displayed  Magnesium:   Results from Last 12 Months   Lab Units 08/15/22  0508 07/15/22  0713   MAGNESIUM mg/dL 2 2 2 3       A1C:  No results for input(s): \"HGBA1C\" in the last 8784 hours  TSH:  No results for input(s): \"TSH\" in the last 8784 hours  TSH 3rd Gen:  Results from Last 12 Months   Lab Units 05/09/23  0810 03/24/23  0819 10/20/22  0725   TSH 3RD GENERATON uIU/mL 5 266* 5 550* 5 760*        PT/INR:  No results for input(s): \"PROTIME\", \"INR\" in the last 8784 hours  Lipid Panel:  No results for input(s): \"CHOLESTEROL\", \"TRIG\", \"HDL\", \"NONHDLC\" in the last 8784 hours      Invalid input(s): \"LDLCAL\" " TI  Results for orders placed during the hospital encounter of 04/24/22    TI  Interpretation Summary  •  Left Ventricle: Left ventricular cavity size is mildly dilated  The left ventricular ejection fraction is 15%  Systolic function is severely reduced in the setting of tachycardia  There is severe global hypokinesis  •  Left Atrium: The atrium is mildly dilated  There is mild spontaneous echo contrast   •  Left Atrial Appendage: There is reduced function without spontaneous echocontrast  There is a small, immobile thrombus, measuring upto 0 6 cm in the appendage  •  Mitral Valve: There is mild regurgitation  The TI was aborted early due to low BP and patient coughing  Echocardiogram  Results for orders placed during the hospital encounter of 04/07/22    Echo complete w/ contrast if indicated    Interpretation Summary  •  Left Ventricle: Left ventricular cavity size is mildly dilated  Wall thickness is top-normal  The left ventricular ejection fraction is 30% by visual estimation  Systolic function is severely reduced  There is severe global hypokinesis  Probable diastolic dysfunction  Diastolic staging precluded by the presence of arrhythmia  •  Right Ventricle: Right ventricular cavity size is moderately dilated  Systolic function is mildly reduced  •  Left Atrium: The atrium is moderately dilated  •  Right Atrium: The atrium is moderately dilated  •  Aortic Valve: The valve appears sclerotic  •  Mitral Valve: There is mild annular calcification  There is mild regurgitation  •  Tricuspid Valve: There is mild regurgitation  Pulmonary artery systolic pressures are estimated at 35-40 mm Hg  •  There is no study for comparison  No results found for this or any previous visit            Stress Test:   Results for orders placed during the hospital encounter of 04/07/22    NM myocardial perfusion spect (rx stress and/or rest)    Interpretation Summary  •  Abnormal pharmacologic nuclear stress test   Dilated left ventricle with moderately reduced left ventricular systolic function  Ejection fraction 32%  Normal perfusion  These findings are suggestive of a nonischemic cardiomyopathy  •  Stress ECG: The stress ECG is negative for ischemia after pharmacologic stress  •  Perfusion Defect Conclusion: The rest end diastolic cavity size is enlarged  The stress end diastolic cavity size is dilated  •  Stress Function: Left ventricular function post-stress is abnormal  Global function is moderately reduced  Post-stress ejection fraction is 32 %  No results found for this or any previous visit  Cardiac catheterization :  No results found for this or any previous visit  HOLTER MONITOR: 24 HOUR/48 HOUR MONITORS  No results found for this or any previous visit  AMB extended holter monitor  No results found for this or any previous visit  DEVICE CHECK:       No results found for this or any previous visit          Code Status: [unfilled]  Advance Directive and Living Will:      Power of :    POLST:      Counseling / Coordination of Care  Very detailed discussion done with regards to management of AFib  Initially we will proceed with ICD for heart failure    Felipe Meyers

## 2023-06-19 NOTE — TELEPHONE ENCOUNTER
Cesia Harvey,    Patient is on Xarelto, do you wish for him to just hold morning of procedure?      Thanks,  Madhu and Bety

## 2023-06-19 NOTE — TELEPHONE ENCOUNTER
Patient scheduled for BIV ICD device on 7/7/23 at [facility] with Dr Sasha Harmon  Instructions sent to patient through [address]  Also printed in person in office  Patient aware of all general instructions  Medication holds:   Xarelto - Per Dr Martinez to hold day before and morning of procedure  Furosemide - Hold morning of procedure  Labs to be done on 6/23/23:  CMP / CBC (FASTING 8 HOURS)    Insurance: [employer]     Please obtain auth       Thank you,  Macie Tovar

## 2023-06-19 NOTE — LETTER
DEVICE PROCEDURE INSTRUCTIONS    Cathie Arcos   : 1960  MRN: [id number]  [address] [facility] [address]  [address] [facility] [id number]    Procedure: BIV  ICD     Procedure Date: 23    Location: [facility]  Address: Roseanna Farmer, [address] [place] [address]        Labs to be done on 23: CMP /  CBC (FASTING)    BLOOD THINNER INSTRUCTIONS (Coumadin / Warfarin / Pradaxa / Eliquis / Xarelto):     Kathy Weber - Hold morning of procedure  Medication Hold:     Furosemide - Hold morning of procedure  Arrival Time: The Hospital will contact you the day prior to your procedure, usually between 4PM - 6PM to instruct you on the time and place to report  If you do not hear from a [facility]  by 6PM the evening prior to your procedure, please contact the campus you are scheduled at  DO NOT drink or eat anything after midnight the night prior to your procedure  You may have a minimal amount of water with your AM medications  If your procedure is scheduled after 12:00 noon, you may have clear liquids until 8:00AM the morning of your procedure  (Clear liquids: 7UP, ginger ale, jello, or broth )    Arrange for a responsible adult to drive you to and from the hospital     You should continue to take your morning medications with a sip of water UNLESS ADVISED OTHERWISE        DO NOT stop taking Plavix or Aspirin unless advised otherwise  If you are currently taking Fish Oil, Krill oil and/or Vitamin E please DO NOT TAKE FOR A WEEK PRIOR TO PROCEDURE  If you are diabetic, DO NOT take any of your diabetic pills the morning of your procedure  Oral diabetic medications may include Glucophage, Prandin, Glyburide, Micronase, Avandia, Glucovance, Precose, Glynase, and Starlix  Bring a list of daily medications, vitamins, minerals, herbals and nutritional supplements you take  Please include dosages and the times you take them each day       If you are packing an overnight bag, pack minimal clothing, you will be given hospital sleepwear  DO NOT bring money, valuables or jewelry  The wedding band is ok  Bring your Photo ID and Insurance cards with you  Please notify us if you have been admitted to the hospital within 30 days  Pre-Operative Showering Instructions  ONLY FOR DEVICE PROCEDURE  The two nights prior to your procedure, take a shower each night using the special antiseptic body scrub (Chlorhexidine Scrub Brush) you received from the office or hospital  This scrub will replace your soap at home, the sponge is pre-filled with soap  The scrub brushes are single use only and should be discarded after use  Shampoo your hair with regular shampoo and rinse completely before using the antiseptic scrub brush  Using the sponge side of the scrub brush to wash your entire body from your neck down, with special attention to your armpits, chest and groin area  DO NOT USE the scrub on your face and avoid any open wounds  Do not use any other soap or wash your skin  DO NOT USE any lotion, powder, deodorant or perfume of any kind on your skin after you shower  AFTER THE FIRST NIGHT of using the scrub, change your bed linen sheets to a fresh clean set and clean pajamas for bedtime  AFTER THE SECOND NIGHT of using the scrub, use clean pajamas for bedtime  DO NOT SHOWER THE MORNING OF SURGERY, you will be prepped and cleansed at the hospital prior to your procedure  PLEASE  THE SPONGES AT YOUR MOST CONVENIENT [facility] CARDIOLOGY OFFICE  FAILURE TO FOLLOW ANY OF THESE INSTRUCTIONS COULD RESULT IN THE CANCELLATION OF YOUR PROCEDURE    Reporting to: [facility]     Address: [facility] [address] Marleny, [address] [place] [address]           Please call [phone] if you do not hear from the  by 6:00PM the night before your procedure      All patients enter through [address] [place] [address],[address] is available free of charge or park on [address]: Proceed through the Lobby past the [employer]  Take the [address] Elevators to the SECOND floor, turn left and follow the signs to [facility]  ACS: Proceed through the Lobby past the [email or web address]  Take the [address] Elevators to the FIRST floor, follow the ORANGE markings to the surgical waiting room and check in at the reception desk            Thank you,   Sherie Alberto  Surgery Coordinator  [address] [facility]   [address] [place] [address], [address]  Ph: [phone]

## 2023-06-19 NOTE — H&P (VIEW-ONLY)
Consultation - Electrophysiology-Cardiology (EP)   Medina Lao 65 y.o. male MRN: [id number]  Unit/Bed#:  Encounter: [id number]      5. Chronic combined systolic and diastolic congestive heart failure (HCC)        2. Paroxysmal atrial fibrillation (HCC)  POCT ECG      3. Obstructive sleep apnea        4. PAF (paroxysmal atrial fibrillation) ([address])        5. History of Eliud-en-Y gastric bypass        6. Obesity (BMI 30-39.9)        7. Current use of long term anticoagulation        8. Long term current use of amiodarone        9. Mixed hyperlipidemia        10. Intolerance of continuous positive airway pressure (CPAP) ventilation              Consults  Physician Requesting Consult: No ref.  provider found   Reason for Consult / Principal Problem: management of AF, heart failure          My recommendation for the patient  Proceed with BiV ICD now -[employer]tr[employer], left side, use penicillin, use contrast  LVEF back to 35%, IVCD with  ms, symptomatic, optimal medical therapy for more than 3 months    Evaluate and treat for sleep apnea    Nutrition consult for weight loss    About 6 to 12 weeks after BiV ICD, proceed with comprehensive ablation of A-fib-PVI and posterior wall            Assessment/Plan   Acute on chronic combined systolic and diastolic heart failure, nonischemic CMP , LVEF 35%  Intermittent LBBB, likely rate related, IVCD,  ms  Optimal medical therapy greater than 3 months  Shared decision making done with patient and primary cardiologist    Persistent atrial fibrillation  Left atrial appendage thrombus, noted on April 22   Chads Vasc score 1   Severe obesity, status post gastric bypass   Peptic ulcer disease   History of heavy alcohol use   Chronic kidney disease stage 3  Mixed hyperlipidemia          Acute on chronic combined systolic and diastolic heart failure, nonischemic CMP , LVEF 35%  Intermittent LBBB, likely rate related, IVCD,  ms  NYHA class II-III  Optimal medical therapy greater than 3 months  Shared decision making done with patient and primary cardiologist  Primary prevention device    Patient initially had improvement in heart function, but subsequently down to 35% again  Although has episodes of RVR, currently in sinus rhythm  Not abusing alcohol at the current time  Has some symptoms of orthopnea, PND and leg swelling    Currently on metoprolol, lisinopril, furosemide  Follow-up with heart failure and to consider using SGLT2 inhibitors    Proceed with BiV ICD now -Medtronic, left side, use penicillin, use contrast  LVEF back to 35%, IVCD with  ms, symptomatic, optimal medical therapy for more than 3 months            Persistent atrial fibrillation  Long-term anticoagulation  Left atrial thrombus  Currently in sinus rhythm, does have episodes of RVR from time to time    Recent worsening noted since April 2022    Risk factors   Age-62  Obesity-BMI 37  Obstructive sleep apnea -history of same  Thyroid disorder -TSH normal  Hypertension -116/84 on medication  Heart failure -LVEF 35%  Diabetes mellitus -hemoglobin A1c 5.7  Tobacco use-history of same  Alcohol use-heavy abuse till recently  Energy drink use-negative    Has left atrial appendage thrombus    Current therapy   Anticoagulation-chads Vasc score-has left atrial appendage thrombus-on Xarelto  Rate control-metoprolol, digoxin  Rhythm control-amiodarone    My recommendation for this patient  Will first proceed with BiV ICD  This will allow us to monitor A-fib load  Will consider comprehensive ablation about 6 to 12 weeks after ICD placement  Depending upon A-fib load, risk factors, will decide on rhythm control ablation (PVI and posterior wall isolation)  versus rate control ablation (AV node ablation)            Obesity / overweight  BMI-37  History of gastric bypass  This increases the risk of-CAD, CVA, vascular disease, diabetes, kidney dysfunction, hypertension, hyperlipidemia  Diet is responsible for 80% of weight gain   Advice nutritional counseling and healthy diet  Also advised to increase activity  He is going to follow up with his primary care        Obstructive sleep apnea   Patient has history of snoring, morning fatigue and daytime sleepiness at least on some days  Examination is also suggestive  Recommended to follow up with primary care and Pulmonary Medicine  Needs aggressive management of same        Hypertension   Blood pressure-116/84  Medication-metoprolol, furosemide, lisinopril        Mixed hyperlipidemia   Medication-atorvastatin  No myositis or myalgia   My recommendation-continue with same        Diabetes mellitus   Hemoglobin A1c -5.7  Medication -none  My recommendation-continue with weight loss diet      Tobacco abuse   Former smoker      Alcohol abuse   Heavy drinking till recently          History of Present Illness   HPI: Veena Jarvis is a 65y.o. year old male has been referred to me by Dr Sary Lemus     Patient initially had improvement of EF with control of A-fib, went up to 43%  But subsequently it is again down to 35%  Patient is once again complaining of intermittent orthopnea and PND along with leg swelling  There are intermittent episodes of A-fib with RVR    Prior history    The patient has significant medical illnesses which include  Persistent atrial fibrillation  Left atrial appendage thrombus, noted on April 22   Chads Vasc score 1   Acute on chronic combined systolic and diastolic heart failure, nonischemic CMP , LVEF 15-30%  Intermittent LBBB, likely rate related  Severe obesity, status post gastric bypass   Peptic ulcer disease   History of heavy alcohol use   Chronic kidney disease stage 3  Mixed hyperlipidemia    Patient admitted to the hospital in April 2022 with significant shortness of breath and weight gain  Echocardiogram revealed severely reduced heart function, RV systolic dysfunction  Stress test was negative for myocardial ischemia  Jesus confirmed severely reduced LV systolic function with thrombus about 0.6 cm noted in very dilated left atrial appendage  Patient is on metoprolol tartrate, digoxin for rate control    As far as cardiac symptoms are concerned  Angina -negative  Orthopnea -improved  Paroxysmal nocturnal dyspnea -improved  Leg swelling-chronic  Palpitations -improved  Presyncope-occasional  Syncope -negative  Orthostatic lightheadedness -occasional  Exertional intolerance-present    Snoring-present  morning fatigue-present  Daytime sleepiness-present    Social history  Tobacco use-former smoker  Alcohol use-history of abuse and was drinking heavily till recently  Energy drink use-negative  Recreational drug use-negative      Family history  Seizure disorder      Historical Information   Past Medical History:   Diagnosis Date   • A-fib [facility])    • Acute ulcer of stomach    • CHF (congestive heart failure) ([facility])    • GERD (gastroesophageal reflux disease)    • Rib fractures      Past Surgical History:   Procedure Laterality Date   • CARDIAC CATHETERIZATION N/A 5/10/2023    Procedure: Cardiac catheterization;  Surgeon: Refugio Pabon DO;  Location: [facility] CARDIAC [facility]; Service: Cardiology   • CARDIAC CATHETERIZATION N/A 5/10/2023    Procedure: Cardiac Coronary Angiogram;  Surgeon: Refugio Pabon DO;  Location: [facility];   Service: Cardiology   • COLONOSCOPY     • ESOPHAGOGASTRODUODENOSCOPY     • GASTRIC BYPASS     • MANDIBLE FRACTURE SURGERY       Social History     Substance and Sexual Activity   Alcohol Use Yes   • Alcohol/week: 7.0 standard drinks of alcohol   • Types: 7 Cans of beer per week    Comment: Hasn't had any beer since 23     Social History     Substance and Sexual Activity   Drug Use No     Social History     Tobacco Use   Smoking Status Former   • Types: Cigars   • Start date:    • Quit date:    • Years since quittin.4   Smokeless Tobacco Former   • Types: Chew     Social History     Socioeconomic History   • Marital status: /Civil [employer]     Spouse name: Not on file   • Number of children: Not on file   • Years of education: Not on file   • Highest education level: Not on file   Occupational History   • Not on file   Tobacco Use   • Smoking status: Former     Types: Cigars     Start date: 0     Quit date:      Years since quittin.4   • Smokeless tobacco: Former     Types: Chew   Vaping Use   • Vaping Use: Never used   Substance and Sexual Activity   • Alcohol use: Yes     Alcohol/week: 7.0 standard drinks of alcohol     Types: 7 Cans of beer per week     Comment: Hasn't had any beer since 23   • Drug use: No   • Sexual activity: Yes     Partners: Female   Other Topics Concern   • Not on file   Social History Narrative    Always uses seat belt    Feels safe at home    No guns in the home     Social Determinants of Health     Financial Resource Strain: Not on file   Food Insecurity: No Food Insecurity (2022)    Hunger Vital Sign    • Worried About Running Out of Food in the Last Year: Never true    • Ran Out of Food in the Last Year: Never true   Transportation Needs: No Transportation Needs (2022)    PRAPARE - Transportation    • Lack of Transportation (Medical): No    • Lack of Transportation (Non-Medical): No   Physical Activity: Not on file   Stress: Not on file   Social Connections: Not on file   Intimate Partner Violence: Not on file   Housing Stability: Low Risk  (2022)    Housing Stability Vital Sign    • Unable to Pay for Housing in the Last Year: No    • Number of Places Lived in the Last Year: 1    • Unstable Housing in the Last Year: No     .  Family History:  Family History   Problem Relation Age of Onset   • Seizures Father          Meds/Allergies      No current facility-administered medications for this visit.         (Not in a hospital admission)      No Known Allergies        Objective   Vitals:   Visit Vitals  /84 (BP Location: Left arm, Patient Position: Sitting, Cuff Size: Large)   Pulse 62   Ht 6' 2" (1.88 m)   Wt 130 kg (287 lb 6.4 oz)   BMI 36.90 kg/m²   Smoking Status Former   BSA 2.53 m²      Vitals:    06/19/23 0907   Weight: 130 kg (287 lb 6.4 oz)   [unfilled]    Invasive Devices     None                   ROS  Review of Systems   All other systems reviewed and are negative. As described in my history of present illness        PHYSICAL EXAM  Physical Exam  Constitutional:       Appearance: He is obese. He is ill-appearing. Comments: BMI 37   HENT:      Head: Normocephalic and atraumatic. Nose: Nose normal.      Mouth/Throat:      Comments: Posterior pharynx is crowded  Eyes:      General: No scleral icterus. Extraocular Movements: Extraocular movements intact. Conjunctiva/sclera: Conjunctivae normal.      Pupils: Pupils are equal, round, and reactive to light. Neck:      Comments: Large thick neck  Cardiovascular:      Rate and Rhythm: Normal rate and regular rhythm. Heart sounds: Normal heart sounds. No murmur heard. Pulmonary:      Effort: No respiratory distress. Breath sounds: Examination of the right-lower field reveals decreased breath sounds. Examination of the left-lower field reveals decreased breath sounds. Decreased breath sounds present. Abdominal:      Palpations: Abdomen is soft. Comments: Central obesity present   Musculoskeletal:         General: Swelling present. No deformity. Cervical back: No rigidity. Skin:     Coloration: Skin is not jaundiced. Findings: No bruising or lesion. Neurological:      Mental Status: He is alert. Mental status is at baseline. Psychiatric:         Mood and Affect: Mood normal.         Behavior: Behavior normal.         Thought Content:  Thought content normal.               LAB RESULTS:      CBC:  Results from Last 12 Months   Lab Units 05/12/23  0441 05/11/23  0502 05/10/23  0530 05/09/23  0810 03/24/23  0819 08/15/22  0508 08/13/22  0455 08/12/22  0708   WBC Thousand/uL 7.43 6. 36 6.21 8.58 8.63 8.04 7.38 10.63*   HEMOGLOBIN g/dL 14.8 15.1 14.6 15.1 15.7 15.9 15.6 16.0   HEMATOCRIT % 43.2 45.1 44.2 44.4 46.0 46.4 46.2 46.7   MCV fL 98 97 100* 98 97 94 94 94   PLATELETS Thousands/uL 188 176 182 224 236 189 195 222   RBC Million/uL 4.42 4.63 4.44 4.51 4.74 4.95 4.90 4.99   MCH pg 33.5 32.6 32.9 33.5 33.1 32.1 31.8 32.1   MCHC g/dL 34.3 33.5 33.0 34.0 34.1 34.3 33.8 34.3   RDW % 12.4 12.3 12.6 12.8 12.0 13.1 13.2 13.2   MPV fL 10.5 9.8 9.8 10.2 10.7 10.8 10.6 10.1   NRBC AUTO /100 WBCs  --   --   --  0  --  0 0 0        CMP:  Results from Last 12 Months   Lab Units 05/12/23  0441 05/11/23  0502 05/10/23  0530 05/09/23  0810 03/24/23  0819 08/15/22  0508 08/13/22  0455 08/12/22  0708 07/15/22  0713   POTASSIUM mmol/L 4.0 4.2 4.4 4.6 4.5   < > 3.8   < > 4.3   CHLORIDE mmol/L 105 107 106 107 107   < > 104   < > 110*   CO2 mmol/L 24 25 28 27 22   < > 26   < > 25   BUN mg/dL 18 15 18 19 18   < > 21   < > 21   CREATININE mg/dL 1.03 0.87 1.04 1.16 1.11   < > 1.10   < > 1.10   CALCIUM mg/dL 8.5 8.0* 8.6 8.7 9.3   < > 8.4   < > 8.7   AST U/L  --   --   --   --  28  --  26  --  48*   ALT U/L  --   --   --   --  31  --  41  --  75   ALK PHOS U/L  --   --   --   --  65  --  68  --  73   EGFR ml/min/1.73sq m 78 93 77 67 71   < > 72   < > 72    < > = values in this interval not displayed. Magnesium:   Results from Last 12 Months   Lab Units 08/15/22  0508 07/15/22  0713   MAGNESIUM mg/dL 2.2 2.3       A1C:  No results for input(s): "HGBA1C" in the last 8784 hours. TSH:  No results for input(s): "TSH" in the last 8784 hours. TSH 3rd Gen:  Results from Last 12 Months   Lab Units 05/09/23  0810 03/24/23  0819 10/20/22  0725   TSH 3RD GENERATON uIU/mL 5.266* 5.550* 5.760*        PT/INR:  No results for input(s): "PROTIME", "INR" in the last 8784 hours. Lipid Panel:  No results for input(s): "CHOLESTEROL", "TRIG", "HDL", "[address]" in the last 8784 hours.     Invalid input(s): "LDLCAL" TI  Results for orders placed during the hospital encounter of 04/24/22    TI  Interpretation Summary  •  Left Ventricle: Left ventricular cavity size is mildly dilated. The left ventricular ejection fraction is 15%. Systolic function is severely reduced in the setting of tachycardia. There is severe global hypokinesis. •  Left Atrium: The atrium is mildly dilated. There is mild spontaneous echo contrast.  •  Left Atrial Appendage: There is reduced function without spontaneous echocontrast. There is a small, immobile thrombus, measuring upto 0.6 cm in the appendage. •  Mitral Valve: There is mild regurgitation. The TI was aborted early due to low BP and patient coughing. Echocardiogram  Results for orders placed during the hospital encounter of 04/07/22    Echo complete w/ contrast if indicated    Interpretation Summary  •  Left Ventricle: Left ventricular cavity size is mildly dilated. Wall thickness is top-normal. The left ventricular ejection fraction is 30% by visual estimation. Systolic function is severely reduced. There is severe global hypokinesis. Probable diastolic dysfunction. Diastolic staging precluded by the presence of arrhythmia. •  Right Ventricle: Right ventricular cavity size is moderately dilated. Systolic function is mildly reduced. •  Left Atrium: The atrium is moderately dilated. •  Right Atrium: The atrium is moderately dilated. •  Aortic Valve: The valve appears sclerotic. •  Mitral Valve: There is mild annular calcification. There is mild regurgitation. •  Tricuspid Valve: There is mild regurgitation. Pulmonary artery systolic pressures are estimated at 35-40 mm Hg. •  There is no study for comparison. No results found for this or any previous visit.           Stress Test:   Results for orders placed during the hospital encounter of 04/07/22    NM myocardial perfusion spect (rx stress and/or rest)    Interpretation Summary  •  Abnormal pharmacologic nuclear stress test.  Dilated left ventricle with moderately reduced left ventricular systolic function. Ejection fraction 32%. Normal perfusion. These findings are suggestive of a nonischemic cardiomyopathy. •  Stress ECG: The stress ECG is negative for ischemia after pharmacologic stress. •  Perfusion Defect Conclusion: The rest end diastolic cavity size is enlarged. The stress end diastolic cavity size is dilated. •  Stress Function: Left ventricular function post-stress is abnormal. Global function is moderately reduced. Post-stress ejection fraction is 32 %. No results found for this or any previous visit. Cardiac catheterization :  No results found for this or any previous visit. HOLTER MONITOR: 24 HOUR/48 HOUR MONITORS  No results found for this or any previous visit. AMB extended holter monitor  No results found for this or any previous visit. DEVICE CHECK:       No results found for this or any previous visit.         Code Status: [unfilled]  Advance Directive and Living Will:      Power of :    POLST:      Counseling / Coordination of Care  Very detailed discussion done with regards to management of AFib  Initially we will proceed with ICD for heart failure    Felipe Bravo

## 2023-06-19 NOTE — LETTER
June 19, 2023     Adry Gagnon DO  [address]  [address]    Patient: Jarrod Griffiths   YOB: 1960   Date of Visit: 6/19/2023       Dear Dr Mia Romero:    Thank you for referring Verenice Polo to me for evaluation  Below are my notes for this consultation  If you have questions, please do not hesitate to call me  I look forward to following your patient along with you  Sincerely,        Kathleen Strickland MD        CC: Noah Hanks DO  CARDIOLOGY SURGERY COORDINATOR    Kathleen Strickland MD  6/19/2023 10:54 PM  Sign when Signing Visit   Consultation - Electrophysiology-Cardiology (EP)   Minor Sotero Lao 65 y o  male MRN: [id number]  Unit/Bed#:  Encounter: [id number]      7  Chronic combined systolic and diastolic congestive heart failure (HCC)        2  Paroxysmal atrial fibrillation (HCC)  POCT ECG      3  Obstructive sleep apnea        4  PAF (paroxysmal atrial fibrillation) ([address] )        5  History of Eliud-en-Y gastric bypass        6  Obesity (BMI 30-39 9)        7  Current use of long term anticoagulation        8  Long term current use of amiodarone        9  Mixed hyperlipidemia        10  Intolerance of continuous positive airway pressure (CPAP) ventilation              Consults  Physician Requesting Consult: No ref   provider found   Reason for Consult / Principal Problem: management of AF, heart failure          My recommendation for the patient  Proceed with BiV ICD now -[employer]tr[employer], left side, use penicillin, use contrast  LVEF back to 35%, IVCD with  ms, symptomatic, optimal medical therapy for more than 3 months    Evaluate and treat for sleep apnea    Nutrition consult for weight loss    About 6 to 12 weeks after BiV ICD, proceed with comprehensive ablation of A-fib-PVI and posterior wall            Assessment/Plan   Acute on chronic combined systolic and diastolic heart failure, nonischemic CMP , LVEF 35%  Intermittent LBBB, likely rate related, IVCD,  ms  Optimal medical therapy greater than 3 months  Shared decision making done with patient and primary cardiologist    Persistent atrial fibrillation  Left atrial appendage thrombus, noted on April 22   Chads Vasc score 1   Severe obesity, status post gastric bypass   Peptic ulcer disease   History of heavy alcohol use   Chronic kidney disease stage 3  Mixed hyperlipidemia          Acute on chronic combined systolic and diastolic heart failure, nonischemic CMP , LVEF 35%  Intermittent LBBB, likely rate related, IVCD,  ms  NYHA class II-III  Optimal medical therapy greater than 3 months  Shared decision making done with patient and primary cardiologist  Primary prevention device    Patient initially had improvement in heart function, but subsequently down to 35% again  Although has episodes of RVR, currently in sinus rhythm  Not abusing alcohol at the current time  Has some symptoms of orthopnea, PND and leg swelling    Currently on metoprolol, lisinopril, furosemide  Follow-up with heart failure and to consider using SGLT2 inhibitors    Proceed with BiV ICD now -Medtronic, left side, use penicillin, use contrast  LVEF back to 35%, IVCD with  ms, symptomatic, optimal medical therapy for more than 3 months            Persistent atrial fibrillation  Long-term anticoagulation  Left atrial thrombus  Currently in sinus rhythm, does have episodes of RVR from time to time    Recent worsening noted since April 2022    Risk factors   Age-62  Obesity-BMI 37  Obstructive sleep apnea -history of same  Thyroid disorder -TSH normal  Hypertension -116/84 on medication  Heart failure -LVEF 35%  Diabetes mellitus -hemoglobin A1c 5 7  Tobacco use-history of same  Alcohol use-heavy abuse till recently  Energy drink use-negative    Has left atrial appendage thrombus    Current therapy   Anticoagulation-chads Vasc score-has left atrial appendage thrombus-on Xarelto  Rate control-metoprolol, digoxin  Rhythm control-amiodarone    My recommendation for this patient  Will first proceed with BiV ICD  This will allow us to monitor A-fib load  Will consider comprehensive ablation about 6 to 12 weeks after ICD placement  Depending upon A-fib load, risk factors, will decide on rhythm control ablation (PVI and posterior wall isolation)  versus rate control ablation (AV node ablation)            Obesity / overweight  BMI-37  History of gastric bypass  This increases the risk of-CAD, CVA, vascular disease, diabetes, kidney dysfunction, hypertension, hyperlipidemia  Diet is responsible for 80% of weight gain   Advice nutritional counseling and healthy diet  Also advised to increase activity  He is going to follow up with his primary care        Obstructive sleep apnea   Patient has history of snoring, morning fatigue and daytime sleepiness at least on some days  Examination is also suggestive  Recommended to follow up with primary care and Pulmonary Medicine  Needs aggressive management of same        Hypertension   Blood pressure-116/84  Medication-metoprolol, furosemide, lisinopril        Mixed hyperlipidemia   Medication-atorvastatin  No myositis or myalgia   My recommendation-continue with same        Diabetes mellitus   Hemoglobin A1c -5 7  Medication -none  My recommendation-continue with weight loss diet      Tobacco abuse   Former smoker      Alcohol abuse   Heavy drinking till recently          History of Present Illness   HPI: Artur Garnica is a 65y o  year old male has been referred to me by Dr Emerita Romo     Patient initially had improvement of EF with control of A-fib, went up to 43%  But subsequently it is again down to 35%  Patient is once again complaining of intermittent orthopnea and PND along with leg swelling  There are intermittent episodes of A-fib with RVR    Prior history    The patient has significant medical illnesses which include  Persistent atrial fibrillation  Left atrial appendage thrombus, noted on April 22   Chads Vasc score 1   Acute on chronic combined systolic and diastolic heart failure, nonischemic CMP , LVEF 15-30%  Intermittent LBBB, likely rate related  Severe obesity, status post gastric bypass   Peptic ulcer disease   History of heavy alcohol use   Chronic kidney disease stage 3  Mixed hyperlipidemia    Patient admitted to the hospital in April 2022 with significant shortness of breath and weight gain  Echocardiogram revealed severely reduced heart function, RV systolic dysfunction  Stress test was negative for myocardial ischemia  Jesus confirmed severely reduced LV systolic function with thrombus about 0 6 cm noted in very dilated left atrial appendage  Patient is on metoprolol tartrate, digoxin for rate control    As far as cardiac symptoms are concerned  Angina -negative  Orthopnea -improved  Paroxysmal nocturnal dyspnea -improved  Leg swelling-chronic  Palpitations -improved  Presyncope-occasional  Syncope -negative  Orthostatic lightheadedness -occasional  Exertional intolerance-present    Snoring-present  morning fatigue-present  Daytime sleepiness-present    Social history  Tobacco use-former smoker  Alcohol use-history of abuse and was drinking heavily till recently  Energy drink use-negative  Recreational drug use-negative      Family history  Seizure disorder      Historical Information   Past Medical History:   Diagnosis Date   • A-fib [facility])    • Acute ulcer of stomach    • CHF (congestive heart failure) ([facility] [address] )    • GERD (gastroesophageal reflux disease)    • Rib fractures      Past Surgical History:   Procedure Laterality Date   • CARDIAC CATHETERIZATION N/A 5/10/2023    Procedure: Cardiac catheterization;  Surgeon: Jake Gonzalez DO;  Location: [facility]ARDI[facility]; Service: Cardiology   • CARDIAC CATHETERIZATION N/A 5/10/2023    Procedure: Cardiac Coronary Angiogram;  Surgeon: Jake Gonzalez DO;  Location: [facility];   Service: Cardiology   • COLONOSCOPY     • ESOPHAGOGASTRODUODENOSCOPY     • GASTRIC BYPASS     • MANDIBLE FRACTURE SURGERY       Social History     Substance and Sexual Activity   Alcohol Use Yes   • Alcohol/week: 7 0 standard drinks of alcohol   • Types: 7 Cans of beer per week    Comment: Hasn't had any beer since 22     Social History     Substance and Sexual Activity   Drug Use No     Social History     Tobacco Use   Smoking Status Former   • Types: Cigars   • Start date:    • Quit date:    • Years since quittin 4   Smokeless Tobacco Former   • Types: Chew     Social History     Socioeconomic History   • Marital status: /Civil Union     Spouse name: Not on file   • Number of children: Not on file   • Years of education: Not on file   • Highest education level: Not on file   Occupational History   • Not on file   Tobacco Use   • Smoking status: Former     Types: Cigars     Start date: 0     Quit date:      Years since quittin 4   • Smokeless tobacco: Former     Types: Chew   Vaping Use   • Vaping Use: Never used   Substance and Sexual Activity   • Alcohol use: Yes     Alcohol/week: 7 0 standard drinks of alcohol     Types: 7 Cans of beer per week     Comment: Hasn't had any beer since 22   • Drug use: No   • Sexual activity: Yes     Partners: Female   Other Topics Concern   • Not on file   Social History Narrative    Always uses seat belt    Feels safe at home    No guns in the home     Social Determinants of Health     Financial Resource Strain: Not on file   Food Insecurity: No Food Insecurity (2022)    Hunger Vital Sign    • Worried About Running Out of Food in the Last Year: Never true    • Ran Out of Food in the Last Year: Never true   Transportation Needs: No Transportation Needs (2022)    PRAPARE - Transportation    • Lack of Transportation (Medical): No    • Lack of Transportation (Non-Medical):  No   Physical Activity: Not on file   Stress: Not on file   Social Connections: Not on file   Intimate "Partner Violence: Not on file   Housing Stability: Low Risk  (4/26/2022)    Housing Stability Vital Sign    • Unable to Pay for Housing in the Last Year: No    • Number of Places Lived in the Last Year: 1    • Unstable Housing in the Last Year: No        Family History:  Family History   Problem Relation Age of Onset   • Seizures Father          Meds/Allergies      No current facility-administered medications for this visit  (Not in a hospital admission)      No Known Allergies        Objective   Vitals:   Visit Vitals  /84 (BP Location: Left arm, Patient Position: Sitting, Cuff Size: Large)   Pulse 62   Ht 6' 2\" (1 88 m)   Wt 130 kg (287 lb 6 4 oz)   BMI 36 90 kg/m²   Smoking Status Former   BSA 2 53 m²      Vitals:    06/19/23 0907   Weight: 130 kg (287 lb 6 4 oz)   [unfilled]    Invasive Devices       None                     ROS  Review of Systems   All other systems reviewed and are negative  As described in my history of present illness        PHYSICAL EXAM  Physical Exam  Constitutional:       Appearance: He is obese  He is ill-appearing  Comments: BMI 37   HENT:      Head: Normocephalic and atraumatic  Nose: Nose normal       Mouth/Throat:      Comments: Posterior pharynx is crowded  Eyes:      General: No scleral icterus  Extraocular Movements: Extraocular movements intact  Conjunctiva/sclera: Conjunctivae normal       Pupils: Pupils are equal, round, and reactive to light  Neck:      Comments: Large thick neck  Cardiovascular:      Rate and Rhythm: Normal rate and regular rhythm  Heart sounds: Normal heart sounds  No murmur heard  Pulmonary:      Effort: No respiratory distress  Breath sounds: Examination of the right-lower field reveals decreased breath sounds  Examination of the left-lower field reveals decreased breath sounds  Decreased breath sounds present  Abdominal:      Palpations: Abdomen is soft        Comments: Central obesity present " Musculoskeletal:         General: Swelling present  No deformity  Cervical back: No rigidity  Skin:     Coloration: Skin is not jaundiced  Findings: No bruising or lesion  Neurological:      Mental Status: He is alert  Mental status is at baseline  Psychiatric:         Mood and Affect: Mood normal          Behavior: Behavior normal          Thought Content: Thought content normal                LAB RESULTS:      CBC:  Results from Last 12 Months   Lab Units 05/12/23  0441 05/11/23  0502 05/10/23  0530 05/09/23  0810 03/24/23  0819 08/15/22  0508 08/13/22  0455 08/12/22  0708   WBC Thousand/uL 7 43 6 36 6 21 8 58 8 63 8 04 7 38 10 63*   HEMOGLOBIN g/dL 14 8 15 1 14 6 15 1 15 7 15 9 15 6 16 0   HEMATOCRIT % 43 2 45 1 44 2 44 4 46 0 46 4 46 2 46 7   MCV fL 98 97 100* 98 97 94 94 94   PLATELETS Thousands/uL 188 176 182 224 236 189 195 222   RBC Million/uL 4 42 4 63 4 44 4 51 4 74 4 95 4 90 4 99   MCH pg 33 5 32 6 32 9 33 5 33 1 32 1 31 8 32 1   MCHC g/dL 34 3 33 5 33 0 34 0 34 1 34 3 33 8 34 3   RDW % 12 4 12 3 12 6 12 8 12 0 13 1 13 2 13 2   MPV fL 10 5 9 8 9 8 10 2 10 7 10 8 10 6 10 1   NRBC AUTO /100 WBCs  --   --   --  0  --  0 0 0        CMP:  Results from Last 12 Months   Lab Units 05/12/23  0441 05/11/23  0502 05/10/23  0530 05/09/23  0810 03/24/23  0819 08/15/22  0508 08/13/22  0455 08/12/22  0708 07/15/22  0713   POTASSIUM mmol/L 4 0 4 2 4 4 4 6 4 5   < > 3 8   < > 4 3   CHLORIDE mmol/L 105 107 106 107 107   < > 104   < > 110*   CO2 mmol/L 24 25 28 27 22   < > 26   < > 25   BUN mg/dL 18 15 18 19 18   < > 21   < > 21   CREATININE mg/dL 1 03 0 87 1 04 1 16 1 11   < > 1 10   < > 1 10   CALCIUM mg/dL 8 5 8 0* 8 6 8 7 9 3   < > 8 4   < > 8 7   AST U/L  --   --   --   --  28  --  26  --  48*   ALT U/L  --   --   --   --  31  --  41  --  75   ALK PHOS U/L  --   --   --   --  65  --  68  --  73   EGFR ml/min/1 73sq m 78 93 77 67 71   < > 72   < > 72    < > = values in this interval not displayed  "    Magnesium:   Results from Last 12 Months   Lab Units 08/15/22  0508 07/15/22  0713   MAGNESIUM mg/dL 2 2 2 3       A1C:  No results for input(s): \"HGBA1C\" in the last 8784 hours  TSH:  No results for input(s): \"TSH\" in the last 8784 hours  TSH 3rd Gen:  Results from Last 12 Months   Lab Units 05/09/23  0810 03/24/23  0819 10/20/22  0725   TSH 3RD GENERATON uIU/mL 5 266* 5 550* 5 760*        PT/INR:  No results for input(s): \"PROTIME\", \"INR\" in the last 8784 hours  Lipid Panel:  No results for input(s): \"CHOLESTEROL\", \"TRIG\", \"HDL\", \"NONHDLC\" in the last 8784 hours  Invalid input(s): \"LDLCAL\"         TI  Results for orders placed during the hospital encounter of 04/24/22    TI  Interpretation Summary  •  Left Ventricle: Left ventricular cavity size is mildly dilated  The left ventricular ejection fraction is 15%  Systolic function is severely reduced in the setting of tachycardia  There is severe global hypokinesis  •  Left Atrium: The atrium is mildly dilated  There is mild spontaneous echo contrast   •  Left Atrial Appendage: There is reduced function without spontaneous echocontrast  There is a small, immobile thrombus, measuring upto 0 6 cm in the appendage  •  Mitral Valve: There is mild regurgitation  The TI was aborted early due to low BP and patient coughing  Echocardiogram  Results for orders placed during the hospital encounter of 04/07/22    Echo complete w/ contrast if indicated    Interpretation Summary  •  Left Ventricle: Left ventricular cavity size is mildly dilated  Wall thickness is top-normal  The left ventricular ejection fraction is 30% by visual estimation  Systolic function is severely reduced  There is severe global hypokinesis  Probable diastolic dysfunction  Diastolic staging precluded by the presence of arrhythmia  •  Right Ventricle: Right ventricular cavity size is moderately dilated  Systolic function is mildly reduced    •  Left Atrium: The atrium " is moderately dilated  •  Right Atrium: The atrium is moderately dilated  •  Aortic Valve: The valve appears sclerotic  •  Mitral Valve: There is mild annular calcification  There is mild regurgitation  •  Tricuspid Valve: There is mild regurgitation  Pulmonary artery systolic pressures are estimated at 35-40 mm Hg  •  There is no study for comparison  No results found for this or any previous visit  Stress Test:   Results for orders placed during the hospital encounter of 04/07/22    NM myocardial perfusion spect (rx stress and/or rest)    Interpretation Summary  •  Abnormal pharmacologic nuclear stress test   Dilated left ventricle with moderately reduced left ventricular systolic function  Ejection fraction 32%  Normal perfusion  These findings are suggestive of a nonischemic cardiomyopathy  •  Stress ECG: The stress ECG is negative for ischemia after pharmacologic stress  •  Perfusion Defect Conclusion: The rest end diastolic cavity size is enlarged  The stress end diastolic cavity size is dilated  •  Stress Function: Left ventricular function post-stress is abnormal  Global function is moderately reduced  Post-stress ejection fraction is 32 %  No results found for this or any previous visit  Cardiac catheterization :  No results found for this or any previous visit  HOLTER MONITOR: 24 HOUR/48 HOUR MONITORS  No results found for this or any previous visit  AMB extended holter monitor  No results found for this or any previous visit  DEVICE CHECK:       No results found for this or any previous visit          Code Status: [unfilled]  Advance Directive and Living Will:      Power of :    POLST:      Counseling / Coordination of Care  Very detailed discussion done with regards to management of AFib  Initially we will proceed with ICD for heart failure    Felipe Jeter

## 2023-06-20 NOTE — TELEPHONE ENCOUNTER
Per [facility] online precert resource list and Availity, BIV [id number] & [id number], do not require authorization

## 2023-06-20 NOTE — TELEPHONE ENCOUNTER
Left voicemail informing patient per Dr Martinez to hold Xarelto day before procedure and morning of procedure and to call me back to let me know he received this message  Also sent MyChart message and resent letter to patient w/correct info on Xarelto       Thanks,  Rohm and Ablert

## 2023-06-20 NOTE — TELEPHONE ENCOUNTER
Per tiger text below from Raymundo Keenan and Sukhwinder Favorite can schedule patient for tomorrow Wed 6/21/23 or Thurs 6/22/23

## 2023-06-20 NOTE — TELEPHONE ENCOUNTER
Returned call to patient wife Meghan Franco and informed her that I'm trying to see if we can obtain auth ASAP to see if can add patient to this week either tomorrow 6/21/23 or Thurs 6/22/23 and to have patient go do his labs (marked STAT) and to let me know if patient took his Xarelto already today  She will call me back to let me know on Xarelto  If took today then will schedule case for 6/22/23 to have patient hold Xarelto day before and morning of procedure       Margo Dunbar

## 2023-06-20 NOTE — TELEPHONE ENCOUNTER
FELICIANO Martinez & Jared Jewell     Patient's wife Glenroy Tracy called back and said they will keep procedure on 7/7/23 as originally planned due to they have a settlement case w/their  on 6/30/23 that they been waiting for 6 months and can't cancel this appt  She apologized for the hassle of trying to move case up to sooner date  Glenroy Tracy also acknowledged that patient will hold his Xarelto day before procedure and morning of procedure       ThanksMargo

## 2023-06-20 NOTE — TELEPHONE ENCOUNTER
Chari,    Please try to obtain auth ASAP as I'm trying to move case up to 6/21/23 or 6/22/23 w/Dr Martinez @ [facility] due to patient's wife states their insurance will end on 6/30/23 and they have met their deductible already       Thanks,  aMdhu and Bety

## 2023-06-23 ENCOUNTER — APPOINTMENT (OUTPATIENT)
Dept: LAB | Facility: CLINIC | Age: 63
End: 2023-06-23
Payer: COMMERCIAL

## 2023-06-23 LAB
ALBUMIN SERPL BCP-MCNC: 3.6 G/DL (ref 3.5–5)
ALP SERPL-CCNC: 65 U/L (ref 46–116)
ALT SERPL W P-5'-P-CCNC: 37 U/L (ref 12–78)
ANION GAP SERPL CALCULATED.3IONS-SCNC: 3 MMOL/L
AST SERPL W P-5'-P-CCNC: 28 U/L (ref 5–45)
BASOPHILS # BLD AUTO: 0.04 THOUSANDS/ÂΜL (ref 0–0.1)
BASOPHILS NFR BLD AUTO: 1 % (ref 0–1)
BILIRUB SERPL-MCNC: 0.97 MG/DL (ref 0.2–1)
BUN SERPL-MCNC: 24 MG/DL (ref 5–25)
CALCIUM SERPL-MCNC: 8.6 MG/DL (ref 8.3–10.1)
CHLORIDE SERPL-SCNC: 111 MMOL/L (ref 96–108)
CO2 SERPL-SCNC: 24 MMOL/L (ref 21–32)
CREAT SERPL-MCNC: 1.12 MG/DL (ref 0.6–1.3)
EOSINOPHIL # BLD AUTO: 0.16 THOUSAND/ÂΜL (ref 0–0.61)
EOSINOPHIL NFR BLD AUTO: 2 % (ref 0–6)
ERYTHROCYTE [DISTWIDTH] IN BLOOD BY AUTOMATED COUNT: 12.8 % (ref 11.6–15.1)
GFR SERPL CREATININE-BSD FRML MDRD: 70 ML/MIN/1.73SQ M
GLUCOSE P FAST SERPL-MCNC: 93 MG/DL (ref 65–99)
HCT VFR BLD AUTO: 44.5 % (ref 36.5–49.3)
HGB BLD-MCNC: 15.4 G/DL (ref 12–17)
IMM GRANULOCYTES # BLD AUTO: 0.03 THOUSAND/UL (ref 0–0.2)
IMM GRANULOCYTES NFR BLD AUTO: 0 % (ref 0–2)
LYMPHOCYTES # BLD AUTO: 1.19 THOUSANDS/ÂΜL (ref 0.6–4.47)
LYMPHOCYTES NFR BLD AUTO: 17 % (ref 14–44)
MCH RBC QN AUTO: 34.5 PG (ref 26.8–34.3)
MCHC RBC AUTO-ENTMCNC: 34.6 G/DL (ref 31.4–37.4)
MCV RBC AUTO: 100 FL (ref 82–98)
MONOCYTES # BLD AUTO: 0.77 THOUSAND/ÂΜL (ref 0.17–1.22)
MONOCYTES NFR BLD AUTO: 11 % (ref 4–12)
NEUTROPHILS # BLD AUTO: 4.98 THOUSANDS/ÂΜL (ref 1.85–7.62)
NEUTS SEG NFR BLD AUTO: 69 % (ref 43–75)
NRBC BLD AUTO-RTO: 0 /100 WBCS
PLATELET # BLD AUTO: 254 THOUSANDS/UL (ref 149–390)
PMV BLD AUTO: 11.2 FL (ref 8.9–12.7)
POTASSIUM SERPL-SCNC: 4.4 MMOL/L (ref 3.5–5.3)
PROT SERPL-MCNC: 7 G/DL (ref 6.4–8.4)
RBC # BLD AUTO: 4.46 MILLION/UL (ref 3.88–5.62)
SODIUM SERPL-SCNC: 138 MMOL/L (ref 135–147)
WBC # BLD AUTO: 7.17 THOUSAND/UL (ref 4.31–10.16)

## 2023-06-23 PROCEDURE — 36415 COLL VENOUS BLD VENIPUNCTURE: CPT

## 2023-06-23 PROCEDURE — 85025 COMPLETE CBC W/AUTO DIFF WBC: CPT

## 2023-06-23 PROCEDURE — 80053 COMPREHEN METABOLIC PANEL: CPT

## 2023-07-06 ENCOUNTER — ANESTHESIA EVENT (OUTPATIENT)
Dept: NON INVASIVE DIAGNOSTICS | Facility: HOSPITAL | Age: 63
End: 2023-07-06
Payer: COMMERCIAL

## 2023-07-06 NOTE — PROGRESS NOTES
VOICEMAIL LEFT WITH ARRIVAL TIME OF 0255, need for transportation, directions to report to cath lab, npo status, and call back number

## 2023-07-06 NOTE — ANESTHESIA PREPROCEDURE EVALUATION
Procedure:  Cardiac biv icd implant (Chest)    Relevant Problems   ANESTHESIA (within normal limits)      CARDIO   (+) CHF (congestive heart failure) (HCC)   (+) Mixed hyperlipidemia   (+) PAF (paroxysmal atrial fibrillation) (HCC)      ENDO (within normal limits)      GI/HEPATIC   (+) GERD (gastroesophageal reflux disease)   (+) Gastrointestinal hemorrhage with melena   (+) Peptic ulcer      /RENAL (within normal limits)      HEMATOLOGY   (+) Acute blood loss anemia      NEURO/PSYCH (within normal limits)      PULMONARY   (+) Obstructive sleep apnea      Cardiovascular and Mediastinum   (+) Acute on chronic systolic CHF (congestive heart failure) (HCC)   (+) Atrial thrombus      Other   (+) Current use of long term anticoagulation   (+) History of Eliud-en-Y gastric bypass   (+) Intolerance of continuous positive airway pressure (CPAP) ventilation   (+) Long term current use of amiodarone   (+) Obesity (BMI 30-39. 9)      Cardiac Cath 5/10/2023:  •  No angiographic evidence of obstructive CAD  •  LVEDP normal without gradient on LV-AO pullback    TTE 5/9/2023:  •  Left Ventricle: Left ventricular cavity size is mildly dilated. Wall thickness is normal. The left ventricular ejection fraction is 35%. Systolic function is moderately reduced. Global longitudinal strain is reduced. There is moderate global hypokinesis. •  Mitral Valve: There is mild annular calcification. There is mild regurgitation. •  Pulmonic Valve: There is mild regurgitation. •  Compared to prior echocardiogram dated 1/10/2023, there is a noticeable reduction in left ventricular systolic function.     Lab Results   Component Value Date    WBC 7.17 06/23/2023    HGB 15.4 06/23/2023    HCT 44.5 06/23/2023     (H) 06/23/2023     06/23/2023     Lab Results   Component Value Date    SODIUM 138 06/23/2023    K 4.4 06/23/2023     (H) 06/23/2023    CO2 24 06/23/2023    BUN 24 06/23/2023    CREATININE 1.12 06/23/2023    GLUC 93 05/12/2023    CALCIUM 8.6 06/23/2023     Lab Results   Component Value Date    INR 1.05 02/13/2022    PROTIME 13.5 02/13/2022     Lab Results   Component Value Date    HGBA1C 5.7 (H) 04/25/2022          Physical Exam    Airway    Mallampati score: II    Neck ROM: full     Dental   No notable dental hx     Cardiovascular  Cardiovascular exam normal    Pulmonary  Pulmonary exam normal     Other Findings        Anesthesia Plan  ASA Score- 4     Anesthesia Type- IV sedation with anesthesia with ASA Monitors. Additional Monitors:   Airway Plan:           Plan Factors-Exercise tolerance (METS): >4 METS. Chart reviewed. EKG reviewed. Existing labs reviewed. Patient summary reviewed. Induction- intravenous. Postoperative Plan-     Informed Consent- Anesthetic plan and risks discussed with patient. I personally reviewed this patient with the CRNA. Discussed and agreed on the Anesthesia Plan with the CRNA. Mickie Cook

## 2023-07-07 ENCOUNTER — HOSPITAL ENCOUNTER (OUTPATIENT)
Facility: HOSPITAL | Age: 63
Setting detail: OUTPATIENT SURGERY
Discharge: HOME | End: 2023-07-08
Attending: INTERNAL MEDICINE | Admitting: INTERNAL MEDICINE
Payer: COMMERCIAL

## 2023-07-07 ENCOUNTER — ANESTHESIA (OUTPATIENT)
Dept: NON INVASIVE DIAGNOSTICS | Facility: HOSPITAL | Age: 63
End: 2023-07-07
Payer: COMMERCIAL

## 2023-07-07 ENCOUNTER — APPOINTMENT (OUTPATIENT)
Dept: RADIOLOGY | Facility: HOSPITAL | Age: 63
End: 2023-07-07
Payer: COMMERCIAL

## 2023-07-07 DIAGNOSIS — I48.0 PAF (PAROXYSMAL ATRIAL FIBRILLATION) (HCC): ICD-10-CM

## 2023-07-07 PROBLEM — I42.8 NONISCHEMIC CARDIOMYOPATHY (HCC): Status: ACTIVE | Noted: 2023-07-07

## 2023-07-07 LAB
ANION GAP SERPL CALCULATED.3IONS-SCNC: 5 MMOL/L
BUN SERPL-MCNC: 21 MG/DL (ref 5–25)
CALCIUM SERPL-MCNC: 8.6 MG/DL (ref 8.3–10.1)
CHLORIDE SERPL-SCNC: 111 MMOL/L (ref 96–108)
CO2 SERPL-SCNC: 25 MMOL/L (ref 21–32)
CREAT SERPL-MCNC: 1.04 MG/DL (ref 0.6–1.3)
ERYTHROCYTE [DISTWIDTH] IN BLOOD BY AUTOMATED COUNT: 12.5 % (ref 11.6–15.1)
GFR SERPL CREATININE-BSD FRML MDRD: 77 ML/MIN/1.73SQ M
GLUCOSE P FAST SERPL-MCNC: 100 MG/DL (ref 65–99)
GLUCOSE SERPL-MCNC: 100 MG/DL (ref 65–140)
HCT VFR BLD AUTO: 43 % (ref 36.5–49.3)
HGB BLD-MCNC: 14.2 G/DL (ref 12–17)
INR PPP: 1.08 (ref 0.84–1.19)
MCH RBC QN AUTO: 33 PG (ref 26.8–34.3)
MCHC RBC AUTO-ENTMCNC: 33 G/DL (ref 31.4–37.4)
MCV RBC AUTO: 100 FL (ref 82–98)
PLATELET # BLD AUTO: 222 THOUSANDS/UL (ref 149–390)
PMV BLD AUTO: 10.8 FL (ref 8.9–12.7)
POTASSIUM SERPL-SCNC: 4.4 MMOL/L (ref 3.5–5.3)
PROTHROMBIN TIME: 14.2 SECONDS (ref 11.6–14.5)
RBC # BLD AUTO: 4.3 MILLION/UL (ref 3.88–5.62)
SODIUM SERPL-SCNC: 141 MMOL/L (ref 135–147)
WBC # BLD AUTO: 7.44 THOUSAND/UL (ref 4.31–10.16)

## 2023-07-07 PROCEDURE — C1730 CATH, EP, 19 OR FEW ELECT: HCPCS | Performed by: INTERNAL MEDICINE

## 2023-07-07 PROCEDURE — C1898 LEAD, PMKR, OTHER THAN TRANS: HCPCS | Performed by: INTERNAL MEDICINE

## 2023-07-07 PROCEDURE — 33225 L VENTRIC PACING LEAD ADD-ON: CPT | Performed by: INTERNAL MEDICINE

## 2023-07-07 PROCEDURE — C1887 CATHETER, GUIDING: HCPCS | Performed by: INTERNAL MEDICINE

## 2023-07-07 PROCEDURE — 85027 COMPLETE CBC AUTOMATED: CPT | Performed by: PHYSICIAN ASSISTANT

## 2023-07-07 PROCEDURE — C1900 LEAD, CORONARY VENOUS: HCPCS | Performed by: INTERNAL MEDICINE

## 2023-07-07 PROCEDURE — 80048 BASIC METABOLIC PNL TOTAL CA: CPT | Performed by: PHYSICIAN ASSISTANT

## 2023-07-07 PROCEDURE — C1892 INTRO/SHEATH,FIXED,PEEL-AWAY: HCPCS | Performed by: INTERNAL MEDICINE

## 2023-07-07 PROCEDURE — 93005 ELECTROCARDIOGRAM TRACING: CPT

## 2023-07-07 PROCEDURE — C1769 GUIDE WIRE: HCPCS | Performed by: INTERNAL MEDICINE

## 2023-07-07 PROCEDURE — 85610 PROTHROMBIN TIME: CPT | Performed by: PHYSICIAN ASSISTANT

## 2023-07-07 PROCEDURE — 33217 INSERT 2 ELECTRODE PM-DEFIB: CPT | Performed by: INTERNAL MEDICINE

## 2023-07-07 PROCEDURE — C1882 AICD, OTHER THAN SING/DUAL: HCPCS | Performed by: INTERNAL MEDICINE

## 2023-07-07 PROCEDURE — 33249 INSJ/RPLCMT DEFIB W/LEAD(S): CPT | Performed by: INTERNAL MEDICINE

## 2023-07-07 PROCEDURE — C1777 LEAD, AICD, ENDO SINGLE COIL: HCPCS | Performed by: INTERNAL MEDICINE

## 2023-07-07 PROCEDURE — 71045 X-RAY EXAM CHEST 1 VIEW: CPT

## 2023-07-07 DEVICE — LEAD 459888 MRI S-TIP US
Type: IMPLANTABLE DEVICE | Site: HEART | Status: FUNCTIONAL
Brand: ATTAIN PERFORMA™ S MRI SURESCAN™

## 2023-07-07 DEVICE — LEAD 5076-52 MRI US RCMCRD
Type: IMPLANTABLE DEVICE | Site: HEART | Status: FUNCTIONAL
Brand: CAPSUREFIX NOVUS MRI™ SURESCAN®

## 2023-07-07 DEVICE — ENVELOPE CMRM6133 ABSORB LRG MR
Type: IMPLANTABLE DEVICE | Site: CHEST  WALL | Status: FUNCTIONAL
Brand: TYRX™

## 2023-07-07 DEVICE — LEAD 6935M62 QUATTRO SECURE S MRI US
Type: IMPLANTABLE DEVICE | Site: HEART | Status: FUNCTIONAL
Brand: SPRINT QUATTRO SECURE S MRI™ SURESCAN™

## 2023-07-07 DEVICE — CRTD DTPA2QQ COBALT XT HF QUAD MRI DF4
Type: IMPLANTABLE DEVICE | Site: CHEST  WALL | Status: FUNCTIONAL
Brand: COBALT™ XT HF QUAD CRT-D MRI SURESCAN™

## 2023-07-07 RX ORDER — PROPOFOL 10 MG/ML
INJECTION, EMULSION INTRAVENOUS AS NEEDED
Status: DISCONTINUED | OUTPATIENT
Start: 2023-07-07 | End: 2023-07-07

## 2023-07-07 RX ORDER — ATORVASTATIN CALCIUM 40 MG/1
40 TABLET, FILM COATED ORAL
Status: DISCONTINUED | OUTPATIENT
Start: 2023-07-07 | End: 2023-07-08 | Stop reason: HOSPADM

## 2023-07-07 RX ORDER — LIDOCAINE HYDROCHLORIDE 10 MG/ML
INJECTION, SOLUTION EPIDURAL; INFILTRATION; INTRACAUDAL; PERINEURAL AS NEEDED
Status: DISCONTINUED | OUTPATIENT
Start: 2023-07-07 | End: 2023-07-07

## 2023-07-07 RX ORDER — PANTOPRAZOLE SODIUM 40 MG/1
40 TABLET, DELAYED RELEASE ORAL EVERY 12 HOURS
Status: DISCONTINUED | OUTPATIENT
Start: 2023-07-07 | End: 2023-07-08 | Stop reason: HOSPADM

## 2023-07-07 RX ORDER — SODIUM CHLORIDE 9 MG/ML
INJECTION, SOLUTION INTRAVENOUS CONTINUOUS PRN
Status: DISCONTINUED | OUTPATIENT
Start: 2023-07-07 | End: 2023-07-07

## 2023-07-07 RX ORDER — FUROSEMIDE 20 MG/1
20 TABLET ORAL DAILY
Status: DISCONTINUED | OUTPATIENT
Start: 2023-07-07 | End: 2023-07-08 | Stop reason: HOSPADM

## 2023-07-07 RX ORDER — ACETAMINOPHEN 325 MG/1
650 TABLET ORAL EVERY 4 HOURS PRN
Status: DISCONTINUED | OUTPATIENT
Start: 2023-07-07 | End: 2023-07-08 | Stop reason: HOSPADM

## 2023-07-07 RX ORDER — METOPROLOL SUCCINATE 25 MG/1
25 TABLET, EXTENDED RELEASE ORAL 2 TIMES DAILY
Status: DISCONTINUED | OUTPATIENT
Start: 2023-07-07 | End: 2023-07-08 | Stop reason: HOSPADM

## 2023-07-07 RX ORDER — LORATADINE 10 MG/1
10 TABLET ORAL DAILY
Status: DISCONTINUED | OUTPATIENT
Start: 2023-07-07 | End: 2023-07-08 | Stop reason: HOSPADM

## 2023-07-07 RX ORDER — CEFAZOLIN SODIUM 2 G/50ML
2000 SOLUTION INTRAVENOUS ONCE
Status: COMPLETED | OUTPATIENT
Start: 2023-07-07 | End: 2023-07-07

## 2023-07-07 RX ORDER — MIDAZOLAM HYDROCHLORIDE 2 MG/2ML
INJECTION, SOLUTION INTRAMUSCULAR; INTRAVENOUS AS NEEDED
Status: DISCONTINUED | OUTPATIENT
Start: 2023-07-07 | End: 2023-07-07

## 2023-07-07 RX ORDER — GLYCOPYRROLATE 0.2 MG/ML
INJECTION INTRAMUSCULAR; INTRAVENOUS AS NEEDED
Status: DISCONTINUED | OUTPATIENT
Start: 2023-07-07 | End: 2023-07-07

## 2023-07-07 RX ORDER — POTASSIUM CHLORIDE 20 MEQ/1
40 TABLET, EXTENDED RELEASE ORAL DAILY
Status: DISCONTINUED | OUTPATIENT
Start: 2023-07-07 | End: 2023-07-08 | Stop reason: HOSPADM

## 2023-07-07 RX ORDER — LIDOCAINE HYDROCHLORIDE 10 MG/ML
INJECTION, SOLUTION EPIDURAL; INFILTRATION; INTRACAUDAL; PERINEURAL CODE/TRAUMA/SEDATION MEDICATION
Status: DISCONTINUED | OUTPATIENT
Start: 2023-07-07 | End: 2023-07-07 | Stop reason: HOSPADM

## 2023-07-07 RX ORDER — FENTANYL CITRATE 50 UG/ML
INJECTION, SOLUTION INTRAMUSCULAR; INTRAVENOUS AS NEEDED
Status: DISCONTINUED | OUTPATIENT
Start: 2023-07-07 | End: 2023-07-07

## 2023-07-07 RX ORDER — LISINOPRIL 10 MG/1
10 TABLET ORAL
Status: DISCONTINUED | OUTPATIENT
Start: 2023-07-07 | End: 2023-07-08 | Stop reason: HOSPADM

## 2023-07-07 RX ORDER — ONDANSETRON 2 MG/ML
INJECTION INTRAMUSCULAR; INTRAVENOUS AS NEEDED
Status: DISCONTINUED | OUTPATIENT
Start: 2023-07-07 | End: 2023-07-07

## 2023-07-07 RX ORDER — GENTAMICIN SULFATE 40 MG/ML
INJECTION, SOLUTION INTRAMUSCULAR; INTRAVENOUS CODE/TRAUMA/SEDATION MEDICATION
Status: DISCONTINUED | OUTPATIENT
Start: 2023-07-07 | End: 2023-07-07 | Stop reason: HOSPADM

## 2023-07-07 RX ORDER — PROPOFOL 10 MG/ML
INJECTION, EMULSION INTRAVENOUS CONTINUOUS PRN
Status: DISCONTINUED | OUTPATIENT
Start: 2023-07-07 | End: 2023-07-07

## 2023-07-07 RX ORDER — AMIODARONE HYDROCHLORIDE 200 MG/1
200 TABLET ORAL DAILY
Status: DISCONTINUED | OUTPATIENT
Start: 2023-07-07 | End: 2023-07-08 | Stop reason: HOSPADM

## 2023-07-07 RX ADMIN — MIDAZOLAM 2 MG: 1 INJECTION INTRAMUSCULAR; INTRAVENOUS at 08:16

## 2023-07-07 RX ADMIN — METOPROLOL SUCCINATE 25 MG: 25 TABLET, EXTENDED RELEASE ORAL at 17:09

## 2023-07-07 RX ADMIN — PROPOFOL 50 MCG/KG/MIN: 10 INJECTION, EMULSION INTRAVENOUS at 08:18

## 2023-07-07 RX ADMIN — Medication 5 MG: at 08:40

## 2023-07-07 RX ADMIN — PROPOFOL 20 MG: 10 INJECTION, EMULSION INTRAVENOUS at 09:38

## 2023-07-07 RX ADMIN — PROPOFOL 20 MG: 10 INJECTION, EMULSION INTRAVENOUS at 09:32

## 2023-07-07 RX ADMIN — Medication 5 MG: at 08:46

## 2023-07-07 RX ADMIN — PROPOFOL 20 MG: 10 INJECTION, EMULSION INTRAVENOUS at 09:42

## 2023-07-07 RX ADMIN — Medication 5 MG: at 09:17

## 2023-07-07 RX ADMIN — PROPOFOL 20 MG: 10 INJECTION, EMULSION INTRAVENOUS at 08:50

## 2023-07-07 RX ADMIN — Medication 5 MG: at 08:21

## 2023-07-07 RX ADMIN — FENTANYL CITRATE 25 MCG: 50 INJECTION INTRAMUSCULAR; INTRAVENOUS at 08:32

## 2023-07-07 RX ADMIN — FENTANYL CITRATE 25 MCG: 50 INJECTION INTRAMUSCULAR; INTRAVENOUS at 09:44

## 2023-07-07 RX ADMIN — ACETAMINOPHEN 650 MG: 325 TABLET, FILM COATED ORAL at 11:18

## 2023-07-07 RX ADMIN — FENTANYL CITRATE 25 MCG: 50 INJECTION INTRAMUSCULAR; INTRAVENOUS at 09:54

## 2023-07-07 RX ADMIN — Medication 5 MG: at 08:32

## 2023-07-07 RX ADMIN — ACETAMINOPHEN 650 MG: 325 TABLET, FILM COATED ORAL at 20:50

## 2023-07-07 RX ADMIN — GLYCOPYRROLATE 0.2 MG: 0.2 INJECTION, SOLUTION INTRAMUSCULAR; INTRAVENOUS at 08:16

## 2023-07-07 RX ADMIN — ATORVASTATIN CALCIUM 40 MG: 40 TABLET, FILM COATED ORAL at 17:09

## 2023-07-07 RX ADMIN — SODIUM CHLORIDE: 0.9 INJECTION, SOLUTION INTRAVENOUS at 08:06

## 2023-07-07 RX ADMIN — PROPOFOL 10 MG: 10 INJECTION, EMULSION INTRAVENOUS at 09:30

## 2023-07-07 RX ADMIN — PROPOFOL 10 MG: 10 INJECTION, EMULSION INTRAVENOUS at 08:36

## 2023-07-07 RX ADMIN — FENTANYL CITRATE 25 MCG: 50 INJECTION INTRAMUSCULAR; INTRAVENOUS at 09:32

## 2023-07-07 RX ADMIN — CEFAZOLIN SODIUM 3000 MG: 2 SOLUTION INTRAVENOUS at 08:17

## 2023-07-07 RX ADMIN — Medication 5 MG: at 08:59

## 2023-07-07 RX ADMIN — FENTANYL CITRATE 25 MCG: 50 INJECTION INTRAMUSCULAR; INTRAVENOUS at 08:46

## 2023-07-07 RX ADMIN — LIDOCAINE HYDROCHLORIDE 30 MG: 10 INJECTION, SOLUTION EPIDURAL; INFILTRATION; INTRACAUDAL; PERINEURAL at 08:18

## 2023-07-07 RX ADMIN — LISINOPRIL 10 MG: 10 TABLET ORAL at 20:50

## 2023-07-07 RX ADMIN — RIVAROXABAN 20 MG: 20 TABLET, FILM COATED ORAL at 17:09

## 2023-07-07 RX ADMIN — Medication 5 MG: at 09:26

## 2023-07-07 RX ADMIN — Medication 5 MG: at 09:48

## 2023-07-07 RX ADMIN — PROPOFOL 10 MG: 10 INJECTION, EMULSION INTRAVENOUS at 09:17

## 2023-07-07 RX ADMIN — ONDANSETRON 4 MG: 2 INJECTION INTRAMUSCULAR; INTRAVENOUS at 08:16

## 2023-07-07 RX ADMIN — PANTOPRAZOLE SODIUM 40 MG: 40 TABLET, DELAYED RELEASE ORAL at 20:50

## 2023-07-07 RX ADMIN — FENTANYL CITRATE 25 MCG: 50 INJECTION INTRAMUSCULAR; INTRAVENOUS at 08:37

## 2023-07-07 RX ADMIN — PROPOFOL 10 MG: 10 INJECTION, EMULSION INTRAVENOUS at 09:07

## 2023-07-07 NOTE — Clinical Note
Runthrough wire inserted Previous Accession (Optional): [id number] Previous Accession (Optional): [id number]

## 2023-07-07 NOTE — Clinical Note
Defib pad site: anterior/posterior. Defib pad site assessment: skin integrity intact. Telephone call received from Agata/Case management /[place] [employer]. She did indicate that spoke to member services and it is showing still that he does not have an active plan with Medicare as of 10/31/2022 and he elected [facility]. .  She directed patient to member services. Mirta Martin did place another call to patient but he did not answer and left a message. Mirta Mario has gone ahead and closed his case as he is ineligible and his plan has been terminated. Yung Gutierrez

## 2023-07-07 NOTE — DISCHARGE SUMMARY
Discharge Summary - My Calvert 65 y.o. male MRN: [id number]    Unit/Bed#: [facility] 213-08 Encounter: [id number]      Admission Date: 7/7/2023   Discharge Date: 7/8/2023***    Discharge Diagnosis:   1. Nonischemic cardiomyopathy with LVEF 35% despite after medical therapy, status post Medtronic BiV ICD implantation 7/7/2023   A.)  LVEF 30% per echo 4/2022, briefly improved however now recurred despite medical therapy   B.)  No obstructive CAD per cardiac catheterization 5/2023   C.)  Maintained on lisinopril and Toprol-XL  2. Chronic HFrEF  3.  Persistent atrial fibrillation with periods of rapid ventricular response, currently maintained on amiodarone and Xarelto  4. Sinus bradycardia/tachybradycardia syndrome, now status post Medtronic BiV ICD implantation  5. Hypertension  6. Hyperlipidemia  7. Intermittent left bundle branch block with wide QRS  8. Obesity with BMI 36 and prior gastric bypass  9. History of alcohol use  9. Prior GI bleed while taking NSAIDs, no recent issues on anticoagulation      Procedures Performed: Medtronic BiV ICD implantation  Orders Placed This Encounter   Procedures   • Cardiac ep lab eps/ablations       Consultants: None    HPI: Please refer to the initial history and physical as well as procedure notes for full details. Briefly, My Calvert is a 65y.o. year old male withnonischemic cardiomyopathy with LVEF 35% despite optimal medical therapy with Toprol-XL and lisinopril, chronic HFrEF, persistent atrial fibrillation with periods of rapid ventricular response currently maintained on amiodarone and Xarelto, sinus bradycardia/tachybradycardia syndrome, hypertension, hyperlipidemia, intermittent left bundle branch block, obesity with BMI 36 and prior gastric bypass, history of alcohol use, and prior GI bleed while taking NSAIDs. He typically follows with Dr. Maurizio Carl as an outpatient.   He has a longstanding history of persistent atrial fibrillation with periods of rapid ventricular response. TI 4/2022 revealed left atrial appendage thrombus, thus he was rate controlled until he could undergo repeat TI followed by successful cardioversion 8/2022 (first cardioversion earlier in the month successful - started on amiodarone at that time but with subsequent reversion, second cardioversion later that same month once loaded on amiodarone antiarrhythmic therapy was successful). He continues to have breakthrough A-fib on amiodarone, and despite optimal medical therapy his LVEF is still decreased at 35% per echo 5/2023. He also has periods of sinus bradycardia with rates in the 40s, as well as previously documented atrial fibrillation with slow ventricular response. These have limited up titration of his rate controlling medications. Thus, BiV ICD implantation (given reduced LVEF, left bundle branch block with wide QRS and need for pacing) was recommended and he presented this hospital admission to undergo this procedure. Hospital Course: Tevin Sainz presented at his baseline state of health. After the procedure was explained in detail and consent was obtained, he underwent the above procedures without complications. Please see operative notes by Dr. Rogelio Richardson for full details. He tolerated the procedure well. CXR immediately following the procedure showed appropriate lead placement without pneumothorax. He was then monitored overnight for further observation. There were no acute issues or events overnight. The following morning he denied all cardiac complaints, including chest pain/pressure, dyspnea, palpitations, dizziness, lightheadedness, or syncope. His vital signs were reviewed and labs are stable. Telemetry showed ***. Chest xray this morning again showed appropriate device placement without pneumothorax. Device interrogation showed appropriate device function, including lead sensing, threshold, and impedance.  His incision was clean, dry, and intact without swelling, hematoma, redness, bleeding, drainage, or signs of infection. ROS      Physical exam:  ***  GEN: NAD, alert and oriented x 3, well appearing  SKIN: dry without significant lesions or rashes  HEENT: NCAT, PERRL, EOMs intact  NECK: No JVD appreciated  CARDIOVASCULAR: RRR, normal S1, S2 without murmurs, rubs, or gallops appreciated  LUNGS: Clear to auscultation bilaterally without wheezes, rhonchi, or rales  ABDOMEN: Soft, nontender, nondistended  EXTREMITIES/VASCULAR: perfused without clubbing, cyanosis, or LE edema b/l  PSYCH: Normal mood and affect  NEURO: CN ll-Xll grossly intact    He was given routine post implantation discharge instructions and restrictions, including wound care, and these were explained in detail. He was instructed to keep the incision dry for one week. He was given a two week follow up appointment with our device clinic for device interrogation and site check, and he was instructed to follow up with his primary cardiologist as previously instructed. Per prior office visit with Dr. Sandra Lopes, this patient should undergo a comprehensive atrial fibrillation ablation in several months. I will reach out to our schedulers to arrange this. In terms of his medications, ***no changes will be made. He will continue Toprol-XL and lisinopril as previously instructed, likely unable to increase beta-blockers given baseline hypotension. Continue amiodarone for now, will wean following ablation. He is stable for discharge at this time with all questions answered. He was discussed in detail with Dr. Sandra Lopes who is in agreement with this discharge summary. Discharge Medications:  See after visit summary for reconciled discharge medications provided to patient and family.     Medications Prior to Admission   Medication   • amiodarone 200 mg tablet   • atorvastatin (LIPITOR) 40 mg tablet   • fexofenadine (ALLEGRA) 180 MG tablet   • furosemide (LASIX) 20 mg tablet   • lisinopril (ZESTRIL) 10 mg tablet   • metoprolol succinate (TOPROL-XL) 25 mg 24 hr tablet   • Multiple Vitamins-Minerals (MULTIVITAMIN ADULT EXTRA C PO)   • pantoprazole (PROTONIX) 40 mg tablet   • potassium chloride (K-DUR,KLOR-CON) 20 mEq tablet   • Xarelto 20 MG tablet   • ergocalciferol (VITAMIN D2) 50,000 units         Current Facility-Administered Medications   Medication Dose Route Frequency   • acetaminophen (TYLENOL) tablet 650 mg  650 mg Oral Q4H PRN   • amiodarone tablet 200 mg  200 mg Oral Daily   • atorvastatin (LIPITOR) tablet 40 mg  40 mg Oral Daily With Dinner   • furosemide (LASIX) tablet 20 mg  20 mg Oral Daily   • lisinopril (ZESTRIL) tablet 10 mg  10 mg Oral HS   • loratadine (CLARITIN) tablet 10 mg  10 mg Oral Daily   • metoprolol succinate (TOPROL-XL) 24 hr tablet 25 mg  25 mg Oral BID   • pantoprazole (PROTONIX) EC tablet 40 mg  40 mg Oral Q12H   • potassium chloride (K-DUR,KLOR-CON) CR tablet 40 mEq  40 mEq Oral Daily   • rivaroxaban (XARELTO) tablet 20 mg  20 mg Oral Daily With Dinner       P[facility]inet Labs/diagnostics:  CBC with diff:   Results from last 7 days   Lab Units 07/07/23  0642   WBC Thousand/uL 7.44   HEMOGLOBIN g/dL 14.2   HEMATOCRIT % 43.0   MCV fL 100*   PLATELETS Thousands/uL 222   RBC Million/uL 4.30   MCH pg 33.0   MCHC g/dL 33.0   RDW % 12.5   MPV fL 10.8       BMP:  Results from last 7 days   Lab Units 07/07/23  0642   POTASSIUM mmol/L 4.4   CHLORIDE mmol/L 111*   CO2 mmol/L 25   BUN mg/dL 21   CREATININE mg/dL 1.04   CALCIUM mg/dL 8.6       Magnesium:       Coags:   Results from last 7 days   Lab Units 07/07/23  0642   INR  3.06       Complications: none    Condition at Discharge: good     Discharge instructions/Information to patient and family:   See after visit summary for information provided to patient and family. Provisions for Follow-Up Care:  See after visit summary for information related to follow-up care and any pertinent home health orders.       Disposition: Home    Planned Readmission: No    Discharge Statement   I spent 45 minutes minutes discharging the patient. This time was spent on the day of discharge. I had direct contact with the patient on the day of discharge. Additional documentation is required if more than 30 minutes were spent on discharge. Evaluating the incision, discussing discharge instructions and restrictions, arranging follow up appointments, discussing medications    Discharge Medications:  See after visit summary for reconciled discharge medications provided to patient and family.

## 2023-07-07 NOTE — ANESTHESIA POSTPROCEDURE EVALUATION
Post-Op Assessment Note    CV Status:  Stable  Pain Score: 0    Pain management: adequate     Mental Status:  Alert and awake   Hydration Status:  Euvolemic   PONV Controlled:  Controlled   Airway Patency:  Patent      Post Op Vitals Reviewed: Yes      Comments: aox3, HOB elevated, nonobtructing airway, VSS        There were no known notable events for this encounter.     BP   99/51   Temp      Pulse   65   Resp   14   SpO2   96%

## 2023-07-07 NOTE — Clinical Note
The leads were placed into the connector and visually verified to be in correct position. Injury current obtained.

## 2023-07-07 NOTE — DISCHARGE INSTR - AVS FIRST PAGE
Please refer to post device implantation discharge instructions and restrictions below and your device booklet/temporary card. Keep incision dry for one week. Do not use lotions/powders/creams on incision. Leave outer bandage in place for 1 week - it is water proof, and as long as it is fully adhered to your skin you may shower with it. If it appears as though the bandage is coming off and/or there is any communication to the area of device incision, please then keep the whole area dry for the remaining week. After 1 week, please remove by pulling all edges away from the center of the bandage. No overhead reaching/pushing/pulling/lifting greater than 5-10lbs with left arm for six weeks. Please call the office if you notice redness, swelling, bleeding, or drainage from incision or if you develop fevers    Cardiac Resynchronization Therapy (CRT)    If you have any questions, please call [phone] to speak with a nurse (8:30am-4pm, or [phone] after hours). For appointments, please call [phone]. WHAT YOU SHOULD KNOW:    Your device is a biventricular ICD, which delivers resynchronization therapy and is also a defibrillator (see below). Cardiac resynchronization therapy (CRT) is a procedure used to treat problems with how your heart contracts. CRT is also called biventricular pacing. Your heart has 2 upper chambers, called atria, and 2 lower chambers, called ventricles. Your heartbeat is synchronized when all areas of your heart contract together properly. When the areas of your heart do not contract as they should, your heart cannot pump enough blood and oxygen to your body. You may have trouble breathing, tire easily, and have swelling in your legs and feet. With a biventricular device, there is one wire in the right atria (in most cases), one wire in the right ventricle, and a third wire that goes through a vein and wraps around to your left ventricle.  The two ventricular wires work together to help your heart contract more synchronously and efficiently. AFTER YOU LEAVE:      Medicines:   Heart medicine may be given to help your heart beat strongly and regularly. Ask your healthcare provider for more information about heart medicines. Take your medicine as directed. Contact your healthcare provider if you think your medicine is not helping or if you have side effects. Tell him if you are allergic to any medicine. Keep a list of the medicines, vitamins, and herbs you take. Include the amounts, and when and why you take them. Bring the list or the pill bottles to follow-up visits. Carry your medicine list with you in case of an emergency. Driving: you are ok to drive 48 hours after device is implanted     Follow up with your healthcare provider as directed: You will need to return to have your pacemaker checked. You may also need an EKG, echocardiogram, or chest x-ray to check the leads in your heart, and your doctor will order these tests if necessary. Write down your questions so you remember to ask them during your visits. Device safety: Some electrical devices may interfere with how your pacemaker works. Some examples are cell phones, security systems, power cables, and electric monitors. Ask your healthcare provider how long you can be near these devices, and which devices to avoid. Tell caregivers you have a pacemaker before you have a procedure or surgery. Wound care: Care for your wound as directed. Keep incision dry for one week. Do not use lotions/powders/creams on incision. Leave outer bandage in place for 1 week - it is water proof, and as long as it is fully adhered to your skin you may shower with it. If it appears as though the bandage is coming off and/or there is any communication to the area of device incision, please then keep the whole area dry for the remaining week.   After 1 week, please remove by pulling all edges away from the center of the bandage. No overhead reaching/pushing/pulling/lifting greater than 5-10lbs with left arm for one month. Please call the office if you notice redness, swelling, bleeding, or drainage from incision or if you develop fevers      Contact your healthcare provider if:   You have new or increased swelling in your legs or feet. You feel more tired than usual.   You have trouble breathing during activity. Your wound is red, warm, swollen, or draining pus. You have a fever. You have questions or concerns about your condition or care. Seek care immediately or call 911 if: You feel like your heart is fluttering or jumping. You have any of the following signs of a heart attack:    Squeezing, pressure, fullness, or pain in your chest that lasts longer than a few minutes or returns   Discomfort or pain in your back, neck, jaw, stomach, or arm   Shortness of breath or breathing problems   A sudden cold sweat, lightheadedness, dizziness, or nausea, especially with chest pain or trouble breathing      Implantable Cardioverter Defibrillator (ICD)    If you have any questions, please call [phone] to speak with a nurse (8:30am-4pm, or [phone] after hours). For appointments, please call [phone]. WHAT YOU SHOULD KNOW:    Your device is a biventricular ICD, which delivers resynchronization therapy (see above) and is also a defibrillator. An implantable cardioverter defibrillator (ICD) is a small device that monitors your heart rate and rhythm. It is commonly placed inside your upper chest region. It may be used if you have a ventricular arrhythmia, which is an irregular, dangerous rhythm from the bottom chamber of your heart. Some arrhythmias may cause your heart to suddenly stop beating. An ICD can give a shock to your heart to make it start beating again, or it can give pacing therapy (also known as pain-free therapy) to return your heart to normal rhythm.  It is also a pacemaker, so it will pace your heart if needed to prevent it from beating too slowly. AFTER YOU LEAVE:      Follow up with your primary healthcare provider or cardiologist as directed: You will need to follow-up to have your ICD checked and make sure you are not having problems. Write down your questions so you remember to ask them during your visits. Self-care:   Ask about activity: Ask if you need to avoid moving your shoulder or arm, and for how long. Ask if you should avoid lifting heavy objects. Do not play any contact sports, such as football or wrestling, until your primary healthcare provider [facility] or cardiologist tells you it is okay. You may only be able to drive for a certain amount of time per day, or during certain hours. Ask when you can return to work. Care for your skin over the ICD: see answer in instructions above     When you get a shock from your ICD: A shock may feel like someone has hit you, or you may feel a thump in the chest. If someone is touching you when you get a shock, they may feel a tingling feeling. The first time you feel a shock, it may scare you. Sit or lie down and stay calm. Ask someone to stay with you if possible. Please either call your cardiologist or report to an emergency room. Safety instructions when you have an ICD:   Carry an ID card for the ICD: This card has important information about your ICD. Stay away from magnets or machines with electric fields: This includes MRI machines. Avoid leaning into a car engine or doing welding. These things can interfere with how your ICD works. Tell airport security you have an ICD: You may need to be searched by hand when you go through a security gate. The security gate or handheld wand could harm your ICD. Keep an ICD diary: Record when you get a shock and what you were doing before you got the shock. Keep track of how you felt before and after the shock, as well as how many shocks you received.  Write down the day and time of each shock. Bring the diary with you when you see your PHP or cardiologist.   Melba Agustin alert identification: Wear medical alert jewelry or carry a card that says you have an ICD. Ask your PHP or cardiologist where to get these items. Contact your primary healthcare provider or cardiologist if:   You have a fever. You feel 1 or more shocks from your ICD and feel fine afterwards. Your feet or ankles swell. The area around your ICD is painful or tender after surgery. The skin around your stitches or staples is red, swollen, or draining pus or fluid. You have chills, a cough, and feel weak or achy. You are sad or anxious and find it hard to do your usual activities. You have questions or concerns about your condition or care. Seek care immediately or call 911 if:   Your stitches or staples come apart. Blood soaks through your bandage. You feel more than 3 shocks in a row from your ICD. You become weak, dizzy, or faint. You feel your heart skip beats or beat very fast or slow, but you do not feel a shock from your ICD. You have chest pain that does not go away with rest or medicine. © 2014 [facility] is for End User's use only and may not be sold, redistributed or otherwise used for commercial purposes. All illustrations and images included in CareNotes® are the copyrighted property of [employer] Inc[employer] or [employer]. The above information is an  only. It is not intended as medical advice for individual conditions or treatments. Talk to your doctor, nurse or pharmacist before following any medical regimen to see if it is safe and effective for you.

## 2023-07-07 NOTE — Clinical Note
Site (pad location): anterior thigh. Laterality: right. Grounding pad site assessment: skin integrity intact.

## 2023-07-07 NOTE — Clinical Note
The site was marked. Prepped: left chest. Prepped with: ChloraPrep. The site was clipped. The patient was draped.

## 2023-07-07 NOTE — INTERVAL H&P NOTE
Please see recent consultation with Dr. Monica Garcia for full details. Brief this patient is a pleasant 71-year-old male with nonischemic cardiomyopathy with LVEF 35% despite optimal medical therapy with Toprol-XL and lisinopril, chronic HFrEF, persistent atrial fibrillation with periods of rapid ventricular response currently maintained on amiodarone and Xarelto, sinus bradycardia/tachybradycardia syndrome, hypertension, hyperlipidemia, intermittent left bundle branch block, obesity with BMI 36 and prior gastric bypass, history of alcohol use, and prior GI bleed while taking NSAIDs. He typically follows with Dr. Mukund Bray as an outpatient. He has a longstanding history of persistent atrial fibrillation with periods of rapid ventricular response. TI 4/2022 revealed left atrial appendage thrombus, thus he was rate controlled until he could undergo repeat TI followed by successful cardioversion 8/2022 (first cardioversion earlier in the month successful - started on amiodarone at that time but with subsequent reversion, second cardioversion later that same month once loaded on amiodarone antiarrhythmic therapy was successful). He continues to have breakthrough A-fib on amiodarone, and despite optimal medical therapy his LVEF is still decreased at 35% per echo 5/2023. He also has periods of sinus bradycardia with rates in the 40s, as well as previously documented atrial fibrillation with slow ventricular response. These have limited up titration of his rate controlling medications. Thus, BiV ICD implantation (given reduced LVEF, left bundle branch block with wide QRS and need for pacing) was recommended and he presents today to undergo that procedure. No significant changes over the recent past, physical exam unchanged. Eventual plan is to undergo comprehensive atrial fibrillation ablation by Dr. Monica Garcia, likely in several months as an outpatient.     Vitals:    07/07/23 0654   BP: 104/62   Pulse: 58   Resp: 20   Temp: 97.6 °F (36.4 °C)   SpO2: 97%

## 2023-07-08 ENCOUNTER — APPOINTMENT (OUTPATIENT)
Dept: RADIOLOGY | Facility: HOSPITAL | Age: 63
End: 2023-07-08
Payer: COMMERCIAL

## 2023-07-08 VITALS
BODY MASS INDEX: 36.78 KG/M2 | HEIGHT: 74 IN | HEART RATE: 59 BPM | SYSTOLIC BLOOD PRESSURE: 114 MMHG | DIASTOLIC BLOOD PRESSURE: 71 MMHG | OXYGEN SATURATION: 96 % | TEMPERATURE: 97.7 F | WEIGHT: 286.6 LBS | RESPIRATION RATE: 18 BRPM

## 2023-07-08 LAB
ANION GAP SERPL CALCULATED.3IONS-SCNC: 3 MMOL/L
ATRIAL RATE: 58 BPM
ATRIAL RATE: 60 BPM
ATRIAL RATE: 61 BPM
BUN SERPL-MCNC: 11 MG/DL (ref 5–25)
CALCIUM SERPL-MCNC: 8.3 MG/DL (ref 8.3–10.1)
CHLORIDE SERPL-SCNC: 110 MMOL/L (ref 96–108)
CO2 SERPL-SCNC: 26 MMOL/L (ref 21–32)
CREAT SERPL-MCNC: 0.91 MG/DL (ref 0.6–1.3)
ERYTHROCYTE [DISTWIDTH] IN BLOOD BY AUTOMATED COUNT: 12.3 % (ref 11.6–15.1)
GFR SERPL CREATININE-BSD FRML MDRD: 90 ML/MIN/1.73SQ M
GLUCOSE P FAST SERPL-MCNC: 94 MG/DL (ref 65–99)
GLUCOSE SERPL-MCNC: 94 MG/DL (ref 65–140)
HCT VFR BLD AUTO: 39.6 % (ref 36.5–49.3)
HGB BLD-MCNC: 13.5 G/DL (ref 12–17)
MAGNESIUM SERPL-MCNC: 2.3 MG/DL (ref 1.6–2.6)
MCH RBC QN AUTO: 33.5 PG (ref 26.8–34.3)
MCHC RBC AUTO-ENTMCNC: 34.1 G/DL (ref 31.4–37.4)
MCV RBC AUTO: 98 FL (ref 82–98)
P AXIS: 100 DEGREES
P AXIS: 100 DEGREES
P AXIS: 103 DEGREES
P AXIS: 105 DEGREES
P AXIS: 13 DEGREES
P AXIS: 57 DEGREES
P AXIS: 63 DEGREES
P AXIS: 65 DEGREES
P AXIS: 67 DEGREES
P AXIS: 95 DEGREES
PLATELET # BLD AUTO: 171 THOUSANDS/UL (ref 149–390)
PMV BLD AUTO: 10.6 FL (ref 8.9–12.7)
POTASSIUM SERPL-SCNC: 4 MMOL/L (ref 3.5–5.3)
PR INTERVAL: 126 MS
PR INTERVAL: 128 MS
PR INTERVAL: 130 MS
PR INTERVAL: 132 MS
PR INTERVAL: 138 MS
PR INTERVAL: 156 MS
PR INTERVAL: 196 MS
QRS AXIS: -25 DEGREES
QRS AXIS: -31 DEGREES
QRS AXIS: -46 DEGREES
QRS AXIS: 107 DEGREES
QRS AXIS: 117 DEGREES
QRS AXIS: 137 DEGREES
QRS AXIS: 137 DEGREES
QRS AXIS: 147 DEGREES
QRS AXIS: 94 DEGREES
QRS AXIS: 97 DEGREES
QRSD INTERVAL: 128 MS
QRSD INTERVAL: 128 MS
QRSD INTERVAL: 136 MS
QRSD INTERVAL: 140 MS
QRSD INTERVAL: 142 MS
QRSD INTERVAL: 148 MS
QRSD INTERVAL: 152 MS
QRSD INTERVAL: 154 MS
QRSD INTERVAL: 156 MS
QRSD INTERVAL: 156 MS
QT INTERVAL: 480 MS
QT INTERVAL: 480 MS
QT INTERVAL: 486 MS
QT INTERVAL: 486 MS
QT INTERVAL: 496 MS
QT INTERVAL: 496 MS
QT INTERVAL: 500 MS
QT INTERVAL: 508 MS
QT INTERVAL: 516 MS
QT INTERVAL: 530 MS
QTC INTERVAL: 480 MS
QTC INTERVAL: 483 MS
QTC INTERVAL: 486 MS
QTC INTERVAL: 496 MS
QTC INTERVAL: 500 MS
QTC INTERVAL: 508 MS
QTC INTERVAL: 516 MS
QTC INTERVAL: 530 MS
RBC # BLD AUTO: 4.03 MILLION/UL (ref 3.88–5.62)
SODIUM SERPL-SCNC: 139 MMOL/L (ref 135–147)
T WAVE AXIS: -10 DEGREES
T WAVE AXIS: -17 DEGREES
T WAVE AXIS: -19 DEGREES
T WAVE AXIS: -19 DEGREES
T WAVE AXIS: -26 DEGREES
T WAVE AXIS: -44 DEGREES
T WAVE AXIS: -49 DEGREES
T WAVE AXIS: -51 DEGREES
T WAVE AXIS: -68 DEGREES
T WAVE AXIS: 83 DEGREES
VENTRICULAR RATE: 58 BPM
VENTRICULAR RATE: 60 BPM
VENTRICULAR RATE: 61 BPM
WBC # BLD AUTO: 6.46 THOUSAND/UL (ref 4.31–10.16)

## 2023-07-08 PROCEDURE — 85027 COMPLETE CBC AUTOMATED: CPT | Performed by: PHYSICIAN ASSISTANT

## 2023-07-08 PROCEDURE — 93010 ELECTROCARDIOGRAM REPORT: CPT | Performed by: INTERNAL MEDICINE

## 2023-07-08 PROCEDURE — NC001 PR NO CHARGE: Performed by: PHYSICIAN ASSISTANT

## 2023-07-08 PROCEDURE — 71046 X-RAY EXAM CHEST 2 VIEWS: CPT

## 2023-07-08 PROCEDURE — 83735 ASSAY OF MAGNESIUM: CPT | Performed by: PHYSICIAN ASSISTANT

## 2023-07-08 PROCEDURE — 80048 BASIC METABOLIC PNL TOTAL CA: CPT | Performed by: PHYSICIAN ASSISTANT

## 2023-07-08 PROCEDURE — 93005 ELECTROCARDIOGRAM TRACING: CPT

## 2023-07-08 RX ADMIN — FUROSEMIDE 20 MG: 20 TABLET ORAL at 08:43

## 2023-07-08 RX ADMIN — PANTOPRAZOLE SODIUM 40 MG: 40 TABLET, DELAYED RELEASE ORAL at 08:43

## 2023-07-08 RX ADMIN — LORATADINE 10 MG: 10 TABLET ORAL at 08:43

## 2023-07-08 RX ADMIN — POTASSIUM CHLORIDE 40 MEQ: 1500 TABLET, EXTENDED RELEASE ORAL at 08:43

## 2023-07-08 RX ADMIN — ACETAMINOPHEN 650 MG: 325 TABLET, FILM COATED ORAL at 08:45

## 2023-07-08 RX ADMIN — METOPROLOL SUCCINATE 25 MG: 25 TABLET, EXTENDED RELEASE ORAL at 08:43

## 2023-07-08 RX ADMIN — AMIODARONE HYDROCHLORIDE 200 MG: 200 TABLET ORAL at 08:43

## 2023-07-08 NOTE — PLAN OF CARE
Problem: PAIN - ADULT  Goal: Verbalizes/displays adequate comfort level or baseline comfort level  Description: Interventions:  - Encourage patient to monitor pain and request assistance  - Assess pain using appropriate pain scale  - Administer analgesics based on type and severity of pain and evaluate response  - Implement non-pharmacological measures as appropriate and evaluate response  - Consider cultural and social influences on pain and pain management  - Notify physician/advanced practitioner if interventions unsuccessful or patient reports new pain  Outcome: Progressing     Problem: Knowledge Deficit  Goal: Patient/family/caregiver demonstrates understanding of disease process, treatment plan, medications, and discharge instructions  Description: Complete learning assessment and assess knowledge base.   Interventions:  - Provide teaching at level of understanding  - Provide teaching via preferred learning methods  Outcome: Progressing

## 2023-07-08 NOTE — DISCHARGE SUMMARY
Arturo Loza is a 77 year old male presenting for   Chief Complaint   Patient presents with   • Medicare Wellness Visit     Denies Latex allergy or sensitivity.    Medication verified and med list updated  Refills needed today: No    Health Maintenance Due   Topic Date Due   • Influenza Vaccine (1) 09/01/2021   • Medicare Advantage- Medicare Wellness Visit  01/01/2022   • Depression Screening  03/23/2022       Patient is due for topics as listed above but is not proceeding with Immunization(s) Influenza at this time.       Unaddressed Risk Adjusted HCC Categories and Diagnoses  HCC 23 - Other Significant Endocrine and Metabolic Disorders  - Unaddressed Dx:Pituitary Mass (Cms/Hcc)      Last lab results:   No results found for: HGBA1C  CHOLESTEROL (mg/dL)   Date Value   02/25/2019 173     HDL (mg/dL)   Date Value   02/25/2019 58     TRIGLYCERIDE (mg/dL)   Date Value   02/25/2019 99     CALCULATED LDL (mg/dL)   Date Value   02/25/2019 95     No results found for: URMIC, UCR, MALBCR  No results found for: IFOB              Depression Screening:  Recent Review Flowsheet Data     Date 3/23/2021    Adult PHQ 2 Score 0    Adult PHQ 2 Interpretation No further screening needed    Little interest or pleasure in activity? Not at all    Feeling down, depressed or hopeless? Not at all           Advance Directives:  not discussed     Discharge Summary - Karina Green 65 y.o. male MRN: [id number]    Unit/Bed#: CW2 214-02 Encounter: [id number]      Admission Date: 7/7/2023   Discharge Date: 7/8/23    Discharge Diagnosis:   Nonischemic cardiomyopathy with LVEF 35%    Procedures Performed:   BiV ICD implantation  Orders Placed This Encounter   Procedures   • Cardiac ep lab eps/ablations       Consultants: None    HPI: Please refer to the initial history and physical as well as procedure notes for full details. Briefly, Karina Green is a 65y.o. year old male with nonischemic cardiomyopathy with LVEF 35% despite optimal medical therapy with Toprol-XL and lisinopril. He was seen by Dr. Deirdre Ricci as an outpatient, and BiV ICD was recommended. He presented this hospital admission to undergo this procedure. Hospital Course: Karina Green presented at his baseline state of health. After the procedure was explained in detail and consent was obtained, he underwent BiV ICD without complications. He tolerated the procedure well. Chest x-ray immediately following the procedure showed appropriate lead placement without pneumothorax. He was then monitored overnight for further observation. There were no acute issues or events overnight. The following morning he denied all cardiac complaints, including chest pain/pressure, dyspnea, palpitations, dizziness, lightheadedness, or syncope. His vital signs were reviewed and labs are stable. Telemetry showed sinus rhythm with paced complexes. Chest x-ray this morning again showed appropriate device placement without pneumothorax. Device interrogation showed appropriate device function, including lead sensing, threshold, and impedance. His incision was clean, dry, and intact without swelling, hematoma, redness, bleeding, drainage, or signs of infection.  Physical exam on the day of discharge was as follows:    GEN: NAD, alert and oriented, well appearing  SKIN: dry without significant lesions or marine  HEENT: NCAT, PERRL, EOMs intact  NECK: No JVD or carotid bruits appreciated  CARDIOVASCULAR: RRR, normal S1, S2 without murmurs, rubs, or gallops appreciated  LUNGS: Clear to auscultation bilaterally without wheezes, rhonchi, or rales  ABDOMEN: Soft, nontender, nondistended  EXTREMITIES/VASCULAR: perfused without clubbing, cyanosis, or edema b/l  PSYCH: Normal mood and affect  NEURO: CN ll-Xll grossly intact    He was given routine post implantation discharge instructions and restrictions, including wound care, and these were explained in detail. He was instructed to leave the Aquacell bandage over top of the incision for the full week - keeping the incision dry during that time. Lelia Rivera He was given a two week follow up appointment with our device clinic for device interrogation and site check, and he was instructed to follow up with his primary cardiologist as previously instructed. In terms of his medications, there will be no changes. He will continue Toprol-XL, amiodarone, and Xarelto. He is on goal directed medical therapy of beta-blocker, ACE inhibitor. He is stable for discharge at this time with all questions answered. He was discussed in detail with Dr. Ottoniel East who is in agreement with this discharge summary. Discharge Medications:  See after visit summary for reconciled discharge medications provided to patient and family.     Medications Prior to Admission   Medication   • amiodarone 200 mg tablet   • atorvastatin (LIPITOR) 40 mg tablet   • fexofenadine (ALLEGRA) 180 MG tablet   • furosemide (LASIX) 20 mg tablet   • lisinopril (ZESTRIL) 10 mg tablet   • metoprolol succinate (TOPROL-XL) 25 mg 24 hr tablet   • Multiple Vitamins-Minerals (MULTIVITAMIN ADULT EXTRA C PO)   • pantoprazole (PROTONIX) 40 mg tablet   • potassium chloride (K-DUR,KLOR-CON) 20 mEq tablet   • Xarelto 20 MG tablet   • ergocalciferol (VITAMIN D2) 50,000 units         Current Facility-Administered Medications   Medication Dose Route Frequency   • acetaminophen (TYLENOL) tablet 650 mg  650 mg Oral Q4H PRN   • amiodarone tablet 200 mg  200 mg Oral Daily   • atorvastatin (LIPITOR) tablet 40 mg  40 mg Oral Daily With Dinner   • furosemide (LASIX) tablet 20 mg  20 mg Oral Daily   • lisinopril (ZESTRIL) tablet 10 mg  10 mg Oral HS   • loratadine (CLARITIN) tablet 10 mg  10 mg Oral Daily   • metoprolol succinate (TOPROL-XL) 24 hr tablet 25 mg  25 mg Oral BID   • pantoprazole (PROTONIX) EC tablet 40 mg  40 mg Oral Q12H   • potassium chloride (K-DUR,KLOR-CON) CR tablet 40 mEq  40 mEq Oral Daily   • rivaroxaban (XARELTO) tablet 20 mg  20 mg Oral Daily With Dinner       Pertininet Labs/diagnostics:  CBC with diff:   Results from last 7 days   Lab Units 07/08/23  0530 07/07/23  0642   WBC Thousand/uL 6.46 7.44   HEMOGLOBIN g/dL 13.5 14.2   HEMATOCRIT % 39.6 43.0   MCV fL 98 100*   PLATELETS Thousands/uL 171 222   RBC Million/uL 4.03 4.30   MCH pg 33.5 33.0   MCHC g/dL 34.1 33.0   RDW % 12.3 12.5   MPV fL 10.6 10.8       BMP:  Results from last 7 days   Lab Units 07/08/23  0530 07/07/23  0642   POTASSIUM mmol/L 4.0 4.4   CHLORIDE mmol/L 110* 111*   CO2 mmol/L 26 25   BUN mg/dL 11 21   CREATININE mg/dL 0.91 1.04   CALCIUM mg/dL 8.3 8.6       Magnesium:   Results from last 7 days   Lab Units 07/08/23  0530   MAGNESIUM mg/dL 2.3       Coags:   Results from last 7 days   Lab Units 07/07/23  0642   INR  1.29         Complications: none    Condition at Discharge: good     Discharge instructions/Information to patient and family:   See after visit summary for information provided to patient and family. Provisions for Follow-Up Care:  See after visit summary for information related to follow-up care and any pertinent home health orders. Disposition: Home    Planned Readmission: No    Discharge Statement   I spent 45 minutes minutes discharging the patient. This time was spent on the day of discharge.  I had direct contact with the patient on the day of discharge. Additional documentation is required if more than 30 minutes were spent on discharge. Evaluating the incision, discussing discharge instructions and restrictions, arranging follow up appointments, discussing medications    Discharge Medications:  See after visit summary for reconciled discharge medications provided to patient and family.

## 2023-07-17 ENCOUNTER — TELEPHONE (OUTPATIENT)
Dept: CARDIOLOGY CLINIC | Facility: CLINIC | Age: 63
End: 2023-07-17

## 2023-07-17 DIAGNOSIS — Z45.02 ICD (IMPLANTABLE CARDIOVERTER-DEFIBRILLATOR) DISCHARGE: Primary | ICD-10-CM

## 2023-07-17 RX ORDER — DIGOXIN 125 MCG
250 TABLET ORAL DAILY
Qty: 30 TABLET | Refills: 2 | Status: SHIPPED | OUTPATIENT
Start: 2023-07-17

## 2023-07-17 NOTE — TELEPHONE ENCOUNTER
Jomar Lennon, this is Mary Grace Wood [id number], my birth date. Somebody called me earlier and said I got a message about my new meds and stuff and because I had that pacemaker, I got shocked on Sunday. Yes, yesterday And I got no message. So I'm just wondering if somebody could call me back and let me know the information and stuff. I looked on my house phone and my cell phone. I got no message. The house number is [phone].  OK, denisha. Denisha.

## 2023-07-17 NOTE — PROGRESS NOTES
Called by device that patient with ICD discharge for RVR. Currently on amiodarone 200 mg daily as well as metoprolol 25 mg daily. Attempted to call patient to see how he is doing but unable to reach him. Called wife as well and left message on her phone that we will rx digoxin 250 mcg daily to better control rates. Digoxin is favored as bp are borderline (last recorded 107/62) and may not allow BB up titration. rx for digoxin 250 mcg sent to [facility] in [place] and dig level ordered for 1 week. Will let office know to try and get in touch with the patient later today to confirm pharmacy and med adjustment.

## 2023-07-21 ENCOUNTER — IN-CLINIC DEVICE VISIT (OUTPATIENT)
Dept: CARDIOLOGY CLINIC | Facility: CLINIC | Age: 63
End: 2023-07-21

## 2023-07-21 DIAGNOSIS — Z95.810 PRESENCE OF AUTOMATIC CARDIOVERTER/DEFIBRILLATOR (AICD): Primary | ICD-10-CM

## 2023-07-21 PROCEDURE — 99024 POSTOP FOLLOW-UP VISIT: CPT | Performed by: INTERNAL MEDICINE

## 2023-07-21 NOTE — PROGRESS NOTES
Results for orders placed or performed in visit on 07/21/23   Cardiac EP device report    Narrative    DEVICE INTERROGATED IN THE [place] OFFICE. WOUND CHECK: INCISION CLEAN AND DRY WITH EDGES APPROXIMATED; WOUND CARE AND RESTRICTIONS REVIEWED WITH PATIENT. BATTERY VOLTAGE ADEQUATE (8.3 YRS). AP: 64%. : 97.5% + VSRP: 1.1%. ALL LEAD PARAMETERS WITHIN NORMAL LIMITS. AT/AF EPSIODES DETECTED W/ AF IN PROGRESS (HX OF SAME). AF BURDEN: 6.6%. VF AND SVT EPISODES PREVIOUSLY ADDRESSED. PT TAKES DIGOXIN, XARELTO, METOPROLOL SUCC, AMIODARONE. EF: 35% (ECHO 5/9/23). NO PROGRAMMING CHANGES MADE TO DEVICE PARAMETERS. NORMAL DEVICE FUNCTION.   [id number] [address]

## 2023-07-25 ENCOUNTER — RA CDI HCC (OUTPATIENT)
Dept: OTHER | Facility: HOSPITAL | Age: 63
End: 2023-07-25

## 2023-07-25 NOTE — PROGRESS NOTES
[facility] coding opportunities       Chart reviewed, no opportunity found: CHART REVIEWED, NO OPPORTUNITY FOUND        Patients Insurance        Commercial Insurance: [employer]

## 2023-07-27 ENCOUNTER — REMOTE DEVICE CLINIC VISIT (OUTPATIENT)
Dept: CARDIOLOGY CLINIC | Facility: CLINIC | Age: 63
End: 2023-07-27

## 2023-07-27 DIAGNOSIS — Z95.810 AICD (AUTOMATIC CARDIOVERTER/DEFIBRILLATOR) PRESENT: Primary | ICD-10-CM

## 2023-07-27 PROCEDURE — RECHECK: Performed by: INTERNAL MEDICINE

## 2023-07-27 NOTE — PROGRESS NOTES
Results for orders placed or performed in visit on 07/27/23   Cardiac EP device report    Narrative    MDT BI-V ICD/ ACTIVE SYSTEM IS MRI CONDITIONAL  1 WEEK CARELINK TRANSMISSION PER DR. POPE TO CHECK HR- NB: BATTERY VOLTAGE ADEQUATE (8.3 YRS). AP-48%, BVP-98.5% (TOTAL -95.9%+VSR PACE-2.6%). ALL AVAILABLE LEAD PARAMETERS WITHIN NORMAL LIMITS. 5 AF EPISODES @ AVG HR- BPM MAX DURATION 2.1 HRS. AF BURDEN-16.7%. HX: PAF & ON XARELOT, DIG, AMIO & METOPROLOL. [facility]. OPTI-VOL WITHIN NORMAL LIMITS. NORMAL DEVICE FUNCTION.  GV

## 2023-08-02 ENCOUNTER — REMOTE DEVICE CLINIC VISIT (OUTPATIENT)
Dept: CARDIOLOGY CLINIC | Facility: CLINIC | Age: 63
End: 2023-08-02

## 2023-08-02 DIAGNOSIS — I50.9 CHF (CONGESTIVE HEART FAILURE) (HCC): ICD-10-CM

## 2023-08-02 DIAGNOSIS — Z95.810 PRESENCE OF IMPLANTABLE CARDIOVERTER-DEFIBRILLATOR (ICD): Primary | ICD-10-CM

## 2023-08-02 PROCEDURE — RECHECK: Performed by: INTERNAL MEDICINE

## 2023-08-02 RX ORDER — POTASSIUM CHLORIDE 20 MEQ/1
TABLET, EXTENDED RELEASE ORAL
Qty: 270 TABLET | Refills: 3 | Status: SHIPPED | OUTPATIENT
Start: 2023-08-02

## 2023-08-02 NOTE — PROGRESS NOTES
MDT BI-V ICD/ ACTIVE SYSTEM IS MRI CONDITIONAL   NB CARELINK TRANSMISSION:  BATTERY VOLTAGE ADEQUATE (8.4 YR.).  AP 30.9% BP 95.7% VSRP 3.4%.   ALL AVAILABLE LEAD PARAMETERS WITHIN NORMAL LIMITS.  2 AT/AF EPISODES WITH LONGEST EPISODE IN PROGRESS SINCE 8/1/23 @ 17:34.  PATIENT TAKES XARELTO, DIGOXIN, AMIODARONE, AND METOPROLOL.  NORMAL DEVICE FUNCTION.  RG

## 2023-08-03 DIAGNOSIS — I50.42 CHRONIC COMBINED SYSTOLIC AND DIASTOLIC HEART FAILURE (HCC): ICD-10-CM

## 2023-08-03 DIAGNOSIS — I42.8 NONISCHEMIC CARDIOMYOPATHY (HCC): ICD-10-CM

## 2023-08-03 RX ORDER — LISINOPRIL 10 MG/1
10 TABLET ORAL
Qty: 90 TABLET | Refills: 1 | Status: SHIPPED | OUTPATIENT
Start: 2023-08-03 | End: 2023-08-08 | Stop reason: SINTOL

## 2023-08-03 NOTE — TELEPHONE ENCOUNTER
Mr. Abilio Zhang sent fax requesting patient's Lisinopril to be refilled. Patient is a patient of Dr. Alejandro Kapoor and Kalani Blake 06/16/2023.

## 2023-08-07 ENCOUNTER — TELEPHONE (OUTPATIENT)
Dept: CARDIOLOGY CLINIC | Facility: CLINIC | Age: 63
End: 2023-08-07

## 2023-08-07 NOTE — PROGRESS NOTES
Electrophysiology Office Note    Anastasiya Taylor [facility]  1960  [id number]  [facility]  [address] [facility] [address]    Assessment/Plan     Primary diagnosis:   1. Persistent atrial fibrillation   -on amiodarone and metoprolol for rhythm/rate control   -recently started on digoxin after inappropriate shock for RVR   -never got dig level preformed - counseled on importance of checking this   -last device check shows a fib burden 24.7% with high rates    -AC with xarelto 20 mg   -he is still having symptoms of his fib with palpitations.   -discussed ablation of his atrial fibrillation as we are running out of room to up titrate his medicines. -he was agreeable to ablation as soon as possible. Will discuss with attending   -he did not get a digoxin level checked since starting the med. I stressed the importance of getting this lab drawn. He will go after appointment today.   -risk of procedure explained to patient in detail including pericardial effusion, pneumothorax and bleeding. 2. Non-ischemic cardiomyopathy LVEF 35% s/p MDT BiV ICD   - persistently reduced LVEF despite GDMT now s/p BiV ICD 7/8  - cardiac cath 5/10/23 without CAD   - last echo LVEF 35%   - EKG here V paced rates 85   3. Tachy jose miguel syndrome   4. HTN   -bp here 98/62  -has been having dizziness at home as well especially with standing.   -decreased lisinopril to 5 mg daily from 10 to give bp room. -patient will cut his existing pills in half at home. 5. HLD   6. History of etoh abuse   7. Intermittent LBBB   8. Obesity prior GIB 2/2 NSAID use   9. SRIKANTH       Rhythm History:   Atrial fibrillation:   1. Diagnosed with a fib w/ RVR during active covid - 2/2022 - spontaneous conversion to NSR   2. In afib w/ RVR at office visit - symptomatic - 2/2022 - sent to ED w/ conversion to NSR after dilt   3. Back in rate controlled afib at office visit 2/2022  4.  NM stress showing no ischemia but EF 32% - 4/2022   5. Rehospitalization for acute HFrEF + afib w/ RVR - 4/2022 - plan for TI/DCCV however with KIAH thrombus - rate controlled + xarelto started- referred to EP   6. Evaluated by Dr. Nilay Ponce - amiodarone + TI. DCCV once KIAH thrombus resolves  7. S/p outpatient TI/DCCV + amiodarone initiated - successful DCCV - 8/2022  8. Recurrent hospitalization due to orthopnea - back in rate controlled afib - s/p TI/DCCV after amiodarone load - successful - 8/2022   9. Back in rate controlled afib at EP office visit - 8/2022  10. S/p MDT BiV ICD - 7//2023  11. Inappropriate ICD shock for a fib rvr started on digoxin 9 days after implant       Device history:     BIV ICD:  S/p MDT BiV ICD 7/7/23    Cardiac Testing:     ECHO: No results found for this or any previous visit. History of Present Illness     HPI/INTERVAL HISTORY:  Since undergoing BiV ICD implantation the patient did receive one inappropriate ICD shock on 7/18 for atrial fibrillation with RVR. He was subsequently started on digoxin after this to help suppress rapid ventricular rhythms. He has not gotten digoxin level checked and stated that he forgot to get the lab drawn. Since ICD shock, patient has been doing well. He does present today for follow up since having the biv ICD placed. He has been dizzy more so recently and his dizziness is related more when he stands up or changes position. His device check done on the 2nd did show some a fib with a burden of about 24% which he has been feeling symptoms from include dizziness and fluttering in the chest. We discussed ablation in detail today during his office visit as well as the risks and the patient was agreeable to procedure. Review of Systems   Constitutional: Negative for fatigue and fever. Respiratory: Negative for shortness of breath. Cardiovascular: Positive for palpitations. Negative for chest pain. Neurological: Positive for dizziness and light-headedness.    All other systems reviewed and are negative. ROS as noted above, otherwise 12 point review of systems was performed and is negative. Historical Information   Social History     Socioeconomic History   • Marital status: /Civil Union     Spouse name: Not on file   • Number of children: Not on file   • Years of education: Not on file   • Highest education level: Not on file   Occupational History   • Not on file   Tobacco Use   • Smoking status: Former     Types: Cigars     Start date: 0     Quit date:      Years since quittin.5   • Smokeless tobacco: Former     Types: Chew   Vaping Use   • Vaping Use: Never used   Substance and Sexual Activity   • Alcohol use: Yes     Alcohol/week: 7.0 standard drinks of alcohol     Types: 7 Cans of beer per week     Comment: Hasn't had any beer since 23   • Drug use: No   • Sexual activity: Yes     Partners: Female   Other Topics Concern   • Not on file   Social History Narrative    Always uses seat belt    Feels safe at home    No guns in the home     Social Determinants of Health     Financial Resource Strain: Not on file   Food Insecurity: No Food Insecurity (2022)    Hunger Vital Sign    • Worried About Running Out of Food in the Last Year: Never true    • Ran Out of Food in the Last Year: Never true   Transportation Needs: No Transportation Needs (2022)    PRAPARE - Transportation    • Lack of Transportation (Medical): No    • Lack of Transportation (Non-Medical):  No   Physical Activity: Not on file   Stress: Not on file   Social Connections: Not on file   Intimate Partner Violence: Not on file   Housing Stability: Low Risk  (2022)    Housing Stability Vital Sign    • Unable to Pay for Housing in the Last Year: No    • Number of Places Lived in the Last Year: 1    • Unstable Housing in the Last Year: No     Past Medical History:   Diagnosis Date   • A-fib [facility])    • Acute ulcer of stomach    • CHF (congestive heart failure) ([address] [facility])    • GERD (gastroesophageal reflux disease)    • Rib fractures      Past Surgical History:   Procedure Laterality Date   • CARDIAC CATHETERIZATION N/A 5/10/2023    Procedure: Cardiac catheterization;  Surgeon: Pablo Vogel DO;  Location: [facility] CARDIAC [facility]; Service: Cardiology   • CARDIAC CATHETERIZATION N/A 5/10/2023    Procedure: Cardiac Coronary Angiogram;  Surgeon: Pablo Vogel DO;  Location: [facility]ARDIAC [facility]; Service: Cardiology   • CARDIAC ELECTROPHYSIOLOGY PROCEDURE N/A 2023    Procedure: Cardiac biv icd implant;  Surgeon: Ozzy Horan MD;  Location: [facility] CARDIAC [facility];   Service: Cardiology   • COLONOSCOPY     • ESOPHAGOGASTRODUODENOSCOPY     • GASTRIC BYPASS     • MANDIBLE FRACTURE SURGERY       Social History     Substance and Sexual Activity   Alcohol Use Yes   • Alcohol/week: 7.0 standard drinks of alcohol   • Types: 7 Cans of beer per week    Comment: Hasn't had any beer since 23     Social History     Substance and Sexual Activity   Drug Use No     Social History     Tobacco Use   Smoking Status Former   • Types: Cigars   • Start date:    • Quit date:    • Years since quittin.5   Smokeless Tobacco Former   • Types: Chew     Family History   Problem Relation Age of Onset   • Seizures Father        Meds/Allergies       Current Outpatient Medications:   •  amiodarone 200 mg tablet, TAKE 1 TABLET DAILY, Disp: 90 tablet, Rfl: 3  •  atorvastatin (LIPITOR) 40 mg tablet, Take 1 tablet (40 mg total) by mouth daily with dinner, Disp: 90 tablet, Rfl: 3  •  digoxin (LANOXIN) 0.125 mg tablet, Take 2 tablets (250 mcg total) by mouth daily, Disp: 30 tablet, Rfl: 2  •  ergocalciferol (VITAMIN D2) 50,000 units, Take 1 capsule (50,000 Units total) by mouth 2 (two) times a week, Disp: 24 capsule, Rfl: 0  •  fexofenadine (ALLEGRA) 180 MG tablet, Take 1 tablet (180 mg total) by mouth in the morning., Disp: 30 tablet, Rfl: 3  •  furosemide (LASIX) 20 mg tablet, Take 1 tablet (20 mg total) by mouth daily, Disp: 90 tablet, Rfl: 3  •  lisinopril (ZESTRIL) 10 mg tablet, Take 1 tablet (10 mg total) by mouth daily at bedtime, Disp: 90 tablet, Rfl: 1  •  metoprolol succinate (TOPROL-XL) 25 mg 24 hr tablet, Take 1 tablet (25 mg total) by mouth 2 (two) times a day, Disp: 60 tablet, Rfl: 0  •  Multiple Vitamins-Minerals (MULTIVITAMIN ADULT EXTRA C PO), Take 1 capsule by mouth, Disp: , Rfl:   •  pantoprazole (PROTONIX) 40 mg tablet, TAKE 1 TABLET EVERY 12 HOURS, Disp: 180 tablet, Rfl: 3  •  potassium chloride (Klor-Con M20) 20 mEq tablet, TAKE 2 TABLETS IN THE MORNING AND 1 TABLET IN THE EVENING, Disp: 270 tablet, Rfl: 3  •  Xarelto 20 MG tablet, TAKE 1 TABLET DAILY WITH BREAKFAST, Disp: 90 tablet, Rfl: 3    No Known Allergies    Objective   Vitals: There were no vitals taken for this visit. Physical Exam  Vitals and nursing note reviewed. Constitutional:       General: He is not in acute distress. Appearance: He is obese. Cardiovascular:      Rate and Rhythm: Normal rate and regular rhythm. Pulmonary:      Effort: Pulmonary effort is normal.      Breath sounds: Normal breath sounds. Abdominal:      General: Abdomen is flat. Palpations: Abdomen is soft. Musculoskeletal:      Right lower leg: No edema. Left lower leg: No edema. Skin:     General: Skin is warm and dry. Neurological:      General: No focal deficit present. Mental Status: He is alert and oriented to person, place, and time.    Psychiatric:         Mood and Affect: Mood normal.           Labs:  Admission on 07/07/2023, Discharged on 07/08/2023   Component Date Value   • Sodium 07/07/2023 141    • Potassium 07/07/2023 4.4    • Chloride 07/07/2023 111 (H)    • CO2 07/07/2023 25    • ANION GAP 07/07/2023 5    • BUN 07/07/2023 21    • Creatinine 07/07/2023 1.04    • Glucose 07/07/2023 100    • Glucose, Fasting 07/07/2023 100 (H)    • Calcium 07/07/2023 8.6    • eGFR 07/07/2023 77    • WBC 07/07/2023 7.44    • RBC 07/07/2023 4.30    • Hemoglobin 07/07/2023 14.2    • Hematocrit 07/07/2023 43.0    • MCV 07/07/2023 100 (H)    • MCH 07/07/2023 33.0    • MCHC 07/07/2023 33.0    • RDW 07/07/2023 12.5    • Platelets 76/20/5593 222    • MPV 07/07/2023 10.8    • Protime 07/07/2023 14.2    • INR 07/07/2023 1.08    • Ventricular Rate 07/07/2023 58    • Atrial Rate 07/07/2023 58    • MN Interval 07/07/2023 196    • QRSD Interval 07/07/2023 156    • QT Interval 07/07/2023 496    • QTC Interval 07/07/2023 486    • P Axis 07/07/2023 13    • QRS Axis 07/07/2023 -46    • T Wave Axis 07/07/2023 83    • Ventricular Rate 07/07/2023 61    • Atrial Rate 07/07/2023 61    • MN Interval 07/07/2023 138    • QRSD Interval 07/07/2023 128    • QT Interval 07/07/2023 480    • QTC Interval 07/07/2023 483    • P Axis 07/07/2023 100    • QRS Axis 07/07/2023 137    • T Wave Axis 07/07/2023 -17    • WBC 07/08/2023 6.46    • RBC 07/08/2023 4.03    • Hemoglobin 07/08/2023 13.5    • Hematocrit 07/08/2023 39.6    • MCV 07/08/2023 98    • MCH 07/08/2023 33.5    • MCHC 07/08/2023 34.1    • RDW 07/08/2023 12.3    • Platelets 61/33/8329 171    • MPV 07/08/2023 10.6    • Sodium 07/08/2023 139    • Potassium 07/08/2023 4.0    • Chloride 07/08/2023 110 (H)    • CO2 07/08/2023 26    • ANION GAP 07/08/2023 3    • BUN 07/08/2023 11    • Creatinine 07/08/2023 0.91    • Glucose 07/08/2023 94    • Glucose, Fasting 07/08/2023 94    • Calcium 07/08/2023 8.3    • eGFR 07/08/2023 90    • Magnesium 07/08/2023 2.3    • Ventricular Rate 07/08/2023 60    • Atrial Rate 07/08/2023 60    • MN Interval 07/08/2023 128    • QRSD Interval 07/08/2023 140    • QT Interval 07/08/2023 486    • QTC Interval 07/08/2023 486    • P Axis 07/08/2023 103    • QRS Axis 07/08/2023 137    • T Wave Axis 07/08/2023 -26    • Ventricular Rate 07/08/2023 60    • Atrial Rate 07/08/2023 60    • MN Interval 07/08/2023 126    • QRSD Interval 07/08/2023 152    • QT Interval 07/08/2023 500    • QTC Interval 07/08/2023 500    • P Axis 07/08/2023 100    • QRS Axis 07/08/2023 117    • T Wave Axis 07/08/2023 -51    • Ventricular Rate 07/08/2023 60    • Atrial Rate 07/08/2023 60    • [place] Interval 07/08/2023 156    • QRSD Interval 07/08/2023 128    • QT Interval 07/08/2023 480    • QTC Interval 07/08/2023 480    • P Axis 07/08/2023 65    • QRS Axis 07/08/2023 94    • T Wave Axis 07/08/2023 -44    • Ventricular Rate 07/08/2023 60    • Atrial Rate 07/08/2023 60    • [place] Interval 07/08/2023 132    • QRSD Interval 07/08/2023 148    • QT Interval 07/08/2023 530    • QTC Interval 07/08/2023 530    • P Axis 07/08/2023 67    • QRS Axis 07/08/2023 107    • T Wave Axis 07/08/2023 -49    • Ventricular Rate 07/08/2023 60    • Atrial Rate 07/08/2023 60    • [place] Interval 07/08/2023 128    • QRSD Interval 07/08/2023 156    • QT Interval 07/08/2023 486    • QTC Interval 07/08/2023 486    • P Axis 07/08/2023 63    • QRS Axis 07/08/2023 97    • T Wave Axis 07/08/2023 -68    • Ventricular Rate 07/08/2023 60    • Atrial Rate 07/08/2023 60    • [place] Interval 07/08/2023 130    • QRSD Interval 07/08/2023 136    • QT Interval 07/08/2023 516    • QTC Interval 07/08/2023 516    • P Axis 07/08/2023 95    • QRS Axis 07/08/2023 147    • T Wave Axis 07/08/2023 -10    • Ventricular Rate 07/08/2023 60    • Atrial Rate 07/08/2023 60    • [place] Interval 07/08/2023 128    • QRSD Interval 07/08/2023 154    • QT Interval 07/08/2023 508    • QTC Interval 07/08/2023 508    • P Axis 07/08/2023 57    • QRS [place] 07/08/2023 -25    • T Wave Axis 07/08/2023 -19    • Ventricular Rate 07/08/2023 60    • Atrial Rate 07/08/2023 60    • [place] Interval 07/08/2023 128    • QRSD Interval 07/08/2023 142    • QT Interval 07/08/2023 496    • QTC Interval 07/08/2023 496    • P Axis 07/08/2023 105    • QRS Axis 07/08/2023 -31    • T Wave Axis 07/08/2023 -19    Telephone on 06/19/2023   Component Date Value   • Sodium 06/23/2023 138    • Potassium 06/23/2023 4.4    • Chloride 06/23/2023 111 (H)    • CO2 06/23/2023 24    • ANION GAP 06/23/2023 3    • BUN 06/23/2023 24    • Creatinine 06/23/2023 1.12    • Glucose, Fasting 06/23/2023 93    • Calcium 06/23/2023 8.6    • AST 06/23/2023 28    • ALT 06/23/2023 37    • Alkaline Phosphatase 06/23/2023 65    • Total Protein 06/23/2023 7.0    • Albumin 06/23/2023 3.6    • Total Bilirubin 06/23/2023 0.97    • eGFR 06/23/2023 70    • WBC 06/23/2023 7.17    • RBC 06/23/2023 4.46    • Hemoglobin 06/23/2023 15.4    • Hematocrit 06/23/2023 44.5    • MCV 06/23/2023 100 (H)    • MCH 06/23/2023 34.5 (H)    • MCHC 06/23/2023 34.6    • RDW 06/23/2023 12.8    • MPV 06/23/2023 11.2    • Platelets 43/89/2849 254    • nRBC 06/23/2023 0    • Neutrophils Relative 06/23/2023 69    • Immat GRANS % 06/23/2023 0    • Lymphocytes Relative 06/23/2023 17    • Monocytes Relative 06/23/2023 11    • Eosinophils Relative 06/23/2023 2    • Basophils Relative 06/23/2023 1    • Neutrophils Absolute 06/23/2023 4.98    • Immature Grans Absolute 06/23/2023 0.03    • Lymphocytes Absolute 06/23/2023 1.19    • Monocytes Absolute 06/23/2023 0.77    • Eosinophils Absolute 06/23/2023 0.16    • Basophils Absolute 06/23/2023 0.04        Imaging: I have personally reviewed pertinent reports.

## 2023-08-07 NOTE — TELEPHONE ENCOUNTER
Yes, this is Kael Thomas. [birth year] is my birth date. I just wanted to talk to somebody because I had a pacemaker to put in and I don't have any appointment to see the doctor. I don't know when I can go back to work. I don't know what I can do. I didn't get any information like that, so I'd like to talk to somebody about that. That's all.  OK. Bye.

## 2023-08-08 ENCOUNTER — APPOINTMENT (OUTPATIENT)
Dept: LAB | Facility: CLINIC | Age: 63
End: 2023-08-08
Payer: COMMERCIAL

## 2023-08-08 ENCOUNTER — PREP FOR PROCEDURE (OUTPATIENT)
Dept: CARDIOLOGY CLINIC | Facility: CLINIC | Age: 63
End: 2023-08-08

## 2023-08-08 ENCOUNTER — OFFICE VISIT (OUTPATIENT)
Dept: CARDIOLOGY CLINIC | Facility: CLINIC | Age: 63
End: 2023-08-08
Payer: COMMERCIAL

## 2023-08-08 ENCOUNTER — TELEPHONE (OUTPATIENT)
Dept: CARDIOLOGY CLINIC | Facility: CLINIC | Age: 63
End: 2023-08-08

## 2023-08-08 VITALS
DIASTOLIC BLOOD PRESSURE: 62 MMHG | BODY MASS INDEX: 36.82 KG/M2 | HEIGHT: 74 IN | SYSTOLIC BLOOD PRESSURE: 98 MMHG | HEART RATE: 85 BPM | WEIGHT: 286.9 LBS

## 2023-08-08 DIAGNOSIS — Z45.02 ICD (IMPLANTABLE CARDIOVERTER-DEFIBRILLATOR) DISCHARGE: ICD-10-CM

## 2023-08-08 DIAGNOSIS — Z95.810 AICD (AUTOMATIC CARDIOVERTER/DEFIBRILLATOR) PRESENT: Primary | ICD-10-CM

## 2023-08-08 DIAGNOSIS — I48.0 PAF (PAROXYSMAL ATRIAL FIBRILLATION) (HCC): Primary | ICD-10-CM

## 2023-08-08 PROCEDURE — 80162 ASSAY OF DIGOXIN TOTAL: CPT

## 2023-08-08 PROCEDURE — 36415 COLL VENOUS BLD VENIPUNCTURE: CPT

## 2023-08-08 PROCEDURE — 93000 ELECTROCARDIOGRAM COMPLETE: CPT | Performed by: PHYSICIAN ASSISTANT

## 2023-08-08 PROCEDURE — 99215 OFFICE O/P EST HI 40 MIN: CPT | Performed by: PHYSICIAN ASSISTANT

## 2023-08-08 RX ORDER — LISINOPRIL 5 MG/1
5 TABLET ORAL DAILY
Qty: 30 TABLET | Refills: 2 | Status: SHIPPED | OUTPATIENT
Start: 2023-08-08 | End: 2023-11-06

## 2023-08-08 NOTE — TELEPHONE ENCOUNTER
JENIFER De Luna Cardiology Surgery Coordinator  Hello!  This patient had a BiV ICD implanted this admission.  Per Dr. Monroy Massed prior consultation, he would like him to be set up for a comprehensive atrial fibrillation/flutter ablation in 3 months (he wants to give the device time to heal).  I just wanted to put this on your radar so he did not fall through the cracks.  He said comprehensive A-fib ablation with posterior wall isolation, cryo/NavX, CT of pulmonary veins if he has not had one. Thank you! [address] patient and LVM to call us back in regard ablation to be schedule.

## 2023-08-08 NOTE — TELEPHONE ENCOUNTER
Patient scheduled for TI/A fib/flutter at [facility] on   9/14/2023 with Dr Jared Bean. Patient also scheduled for CT PV at [facility] on 08/15/2023. Patient aware of general instructions, labs test required. Meds holds: Lasix to hold the morning of the procedure. Lisinopril to hold 24hrs prior. Can we please check insurance for approval.     Dr Jared Bean patient is taking Xarelto, can you please advise holds. Thank you.

## 2023-08-08 NOTE — TELEPHONE ENCOUNTER
[employer] approved auth # [id number] for CT [id number]/[id number] @ [employer]  Valid 8/8/23-2/4/24 Patient's CT's you had asked for from [facility] have been uploaded into the pacs system

## 2023-08-08 NOTE — TELEPHONE ENCOUNTER
----- Message from Anushka Vela PA-C sent at 8/8/2023 12:59 PM EDT -----  Hi,     This is a Dr. Serena Barry patient has a fib with recent BiV ICD for tachy myopathy. Having 24% a fib on device check on amio metoprolol and digoxin. Still with symptoms. Can we schedule him for an a fib ablation as soon as possible? Thank you.      Wilson Churchill PA-C

## 2023-08-09 ENCOUNTER — REMOTE DEVICE CLINIC VISIT (OUTPATIENT)
Dept: CARDIOLOGY CLINIC | Facility: CLINIC | Age: 63
End: 2023-08-09

## 2023-08-09 DIAGNOSIS — Z95.810 AICD (AUTOMATIC CARDIOVERTER/DEFIBRILLATOR) PRESENT: Primary | ICD-10-CM

## 2023-08-09 LAB — DIGOXIN SERPL-MCNC: 1.4 NG/ML (ref 0.8–2)

## 2023-08-09 PROCEDURE — RECHECK: Performed by: INTERNAL MEDICINE

## 2023-08-09 NOTE — PROGRESS NOTES
Results for orders placed or performed in visit on 08/09/23   Cardiac EP device report    Narrative    MDT BI-V ICD/ ACTIVE SYSTEM IS MRI CONDITIONAL  1 WEEK CARELINK TRANSMISSION PER DR. POPE TO CHECK HR- NB: BATTERY VOLTAGE ADEQUATE (8.5 YRS). AP-4%, BVP-96% (TOTAL -79.3%+VSR PACE-16.3%). ALL AVAILABLE LEAD PARAMETERS WITHIN NORMAL LIMITS. NO NEW SIGNIFICANT HIGH RATE EPISODES. PT IN % OF TIME & ON XARELTO, DIG, AMIO & METOPROLOL. [address] [facility] OPTI-VOL WITHIN NORMAL LIMITS. NORMAL DEVICE FUNCTION.  GV

## 2023-08-15 ENCOUNTER — HOSPITAL ENCOUNTER (OUTPATIENT)
Dept: RADIOLOGY | Facility: HOSPITAL | Age: 63
Discharge: HOME | End: 2023-08-15
Attending: INTERNAL MEDICINE
Payer: COMMERCIAL

## 2023-08-15 DIAGNOSIS — I48.0 PAF (PAROXYSMAL ATRIAL FIBRILLATION) (HCC): ICD-10-CM

## 2023-08-15 PROCEDURE — G1004 CDSM NDSC: HCPCS

## 2023-08-15 PROCEDURE — 75572 CT HRT W/3D IMAGE: CPT

## 2023-08-15 RX ORDER — IODIXANOL 320 MG/ML
100 INJECTION, SOLUTION INTRAVASCULAR
Status: COMPLETED | OUTPATIENT
Start: 2023-08-15 | End: 2023-08-15

## 2023-08-15 RX ADMIN — IODIXANOL 100 ML: 320 INJECTION, SOLUTION INTRAVASCULAR at 07:48

## 2023-08-16 ENCOUNTER — REMOTE DEVICE CLINIC VISIT (OUTPATIENT)
Dept: CARDIOLOGY CLINIC | Facility: CLINIC | Age: 63
End: 2023-08-16

## 2023-08-16 DIAGNOSIS — Z95.810 AICD (AUTOMATIC CARDIOVERTER/DEFIBRILLATOR) PRESENT: Primary | ICD-10-CM

## 2023-08-16 PROCEDURE — RECHECK

## 2023-08-16 NOTE — PROGRESS NOTES
MDT BI-V ICD/ ACTIVE SYSTEM IS MRI CONDITIONAL   NB; CARELINK TRANSMISSION - 1 WK HEART RATE CHECK PER DR. POPE: BATTERY VOLTAGE ADEQUATE (8.5 YRS). AP 58.2% BVP 99.6 (VSR PACE <0.1%). ALL AVAILABLE LEAD PARAMETERS WITHIN NORMAL LIMITS. NO NEW SIGNIFICANT HIGH RATE OR AT/AF EPISODES. PATIENT TAKING [place] Signs, AMIODORONE,  METOPROLOL SUCC. NO V-SENSE EPISODES. OPTI-VOL WITHIN NORMAL LIMITS.  NORMAL DEVICE FUNCTION.  ES

## 2023-08-23 ENCOUNTER — REMOTE DEVICE CLINIC VISIT (OUTPATIENT)
Dept: CARDIOLOGY CLINIC | Facility: CLINIC | Age: 63
End: 2023-08-23

## 2023-08-23 DIAGNOSIS — Z95.810 PRESENCE OF AUTOMATIC CARDIOVERTER/DEFIBRILLATOR (AICD): Primary | ICD-10-CM

## 2023-08-23 PROCEDURE — RECHECK: Performed by: INTERNAL MEDICINE

## 2023-08-23 NOTE — PROGRESS NOTES
Results for orders placed or performed in visit on 08/23/23   Cardiac EP device report    Narrative    MDT BI-V ICD/ ACTIVE SYSTEM IS MRI CONDITIONAL  CARELINK TRANSMISSION: N/B WEEKLY CHECK PER DR CULLEN- HR CHECKBATTERY VOLTAGE ADEQUATE. (8.4 YRS). AP 37%  94%. ALL AVAILABLE LEAD PARAMETERS WITHIN NORMAL LIMITS. 1 NSVT EPISODE DETECTED, RVR NOTED  BPM. 3 AT/AF EPISODES DETECTED LONGEST < 21 HOURS. PATIENT IS ON DIG, XARELTO AND METOPROLOL. OPTI-VOL WITHIN NORMAL LIMITS. NORMAL DEVICE FUNCTION. ---CALLEJAS

## 2023-08-25 DIAGNOSIS — I50.42 CHRONIC COMBINED SYSTOLIC AND DIASTOLIC HEART FAILURE (HCC): ICD-10-CM

## 2023-08-25 DIAGNOSIS — I48.0 PAF (PAROXYSMAL ATRIAL FIBRILLATION) (HCC): ICD-10-CM

## 2023-08-25 DIAGNOSIS — Z45.02 ICD (IMPLANTABLE CARDIOVERTER-DEFIBRILLATOR) DISCHARGE: ICD-10-CM

## 2023-08-25 RX ORDER — METOPROLOL SUCCINATE 25 MG/1
25 TABLET, EXTENDED RELEASE ORAL 2 TIMES DAILY
Qty: 180 TABLET | Refills: 3 | Status: SHIPPED | OUTPATIENT
Start: 2023-08-25

## 2023-08-28 RX ORDER — DIGOXIN 125 MCG
125 TABLET ORAL DAILY
Qty: 90 TABLET | Refills: 2 | Status: SHIPPED | OUTPATIENT
Start: 2023-08-28

## 2023-08-30 DIAGNOSIS — R06.09 DYSPNEA ON EXERTION: ICD-10-CM

## 2023-08-30 DIAGNOSIS — I50.9 CHF (CONGESTIVE HEART FAILURE) (HCC): ICD-10-CM

## 2023-09-01 RX ORDER — ATORVASTATIN CALCIUM 40 MG/1
40 TABLET, FILM COATED ORAL
Qty: 90 TABLET | Refills: 3 | Status: SHIPPED | OUTPATIENT
Start: 2023-09-01

## 2023-09-07 DIAGNOSIS — I50.42 CHRONIC COMBINED SYSTOLIC AND DIASTOLIC HEART FAILURE (HCC): ICD-10-CM

## 2023-09-07 DIAGNOSIS — I48.0 PAF (PAROXYSMAL ATRIAL FIBRILLATION) (HCC): ICD-10-CM

## 2023-09-07 RX ORDER — METOPROLOL SUCCINATE 25 MG/1
25 TABLET, EXTENDED RELEASE ORAL 2 TIMES DAILY
Qty: 180 TABLET | Refills: 3 | Status: SHIPPED | OUTPATIENT
Start: 2023-09-07

## 2023-09-11 ENCOUNTER — APPOINTMENT (OUTPATIENT)
Dept: LAB | Facility: CLINIC | Age: 63
End: 2023-09-11
Payer: COMMERCIAL

## 2023-09-11 LAB
ALBUMIN SERPL BCP-MCNC: 4 G/DL (ref 3.5–5)
ALP SERPL-CCNC: 60 U/L (ref 34–104)
ALT SERPL W P-5'-P-CCNC: 27 U/L (ref 7–52)
ANION GAP SERPL CALCULATED.3IONS-SCNC: 5 MMOL/L
AST SERPL W P-5'-P-CCNC: 21 U/L (ref 13–39)
BASOPHILS # BLD AUTO: 0.05 THOUSANDS/ÂΜL (ref 0–0.1)
BASOPHILS NFR BLD AUTO: 1 % (ref 0–1)
BILIRUB SERPL-MCNC: 0.8 MG/DL (ref 0.2–1)
BUN SERPL-MCNC: 19 MG/DL (ref 5–25)
CALCIUM SERPL-MCNC: 8.7 MG/DL (ref 8.4–10.2)
CHLORIDE SERPL-SCNC: 106 MMOL/L (ref 96–108)
CO2 SERPL-SCNC: 27 MMOL/L (ref 21–32)
CREAT SERPL-MCNC: 0.94 MG/DL (ref 0.6–1.3)
EOSINOPHIL # BLD AUTO: 0.27 THOUSAND/ÂΜL (ref 0–0.61)
EOSINOPHIL NFR BLD AUTO: 4 % (ref 0–6)
ERYTHROCYTE [DISTWIDTH] IN BLOOD BY AUTOMATED COUNT: 12.6 % (ref 11.6–15.1)
GFR SERPL CREATININE-BSD FRML MDRD: 87 ML/MIN/1.73SQ M
GLUCOSE P FAST SERPL-MCNC: 93 MG/DL (ref 65–99)
HCT VFR BLD AUTO: 44.2 % (ref 36.5–49.3)
HGB BLD-MCNC: 15 G/DL (ref 12–17)
IMM GRANULOCYTES # BLD AUTO: 0.04 THOUSAND/UL (ref 0–0.2)
IMM GRANULOCYTES NFR BLD AUTO: 1 % (ref 0–2)
LYMPHOCYTES # BLD AUTO: 1.66 THOUSANDS/ÂΜL (ref 0.6–4.47)
LYMPHOCYTES NFR BLD AUTO: 21 % (ref 14–44)
MCH RBC QN AUTO: 32.6 PG (ref 26.8–34.3)
MCHC RBC AUTO-ENTMCNC: 33.9 G/DL (ref 31.4–37.4)
MCV RBC AUTO: 96 FL (ref 82–98)
MONOCYTES # BLD AUTO: 0.75 THOUSAND/ÂΜL (ref 0.17–1.22)
MONOCYTES NFR BLD AUTO: 10 % (ref 4–12)
NEUTROPHILS # BLD AUTO: 5.02 THOUSANDS/ÂΜL (ref 1.85–7.62)
NEUTS SEG NFR BLD AUTO: 63 % (ref 43–75)
NRBC BLD AUTO-RTO: 0 /100 WBCS
PLATELET # BLD AUTO: 185 THOUSANDS/UL (ref 149–390)
PMV BLD AUTO: 10.6 FL (ref 8.9–12.7)
POTASSIUM SERPL-SCNC: 4 MMOL/L (ref 3.5–5.3)
PROT SERPL-MCNC: 6.6 G/DL (ref 6.4–8.4)
RBC # BLD AUTO: 4.6 MILLION/UL (ref 3.88–5.62)
SODIUM SERPL-SCNC: 138 MMOL/L (ref 135–147)
WBC # BLD AUTO: 7.79 THOUSAND/UL (ref 4.31–10.16)

## 2023-09-14 ENCOUNTER — APPOINTMENT (OUTPATIENT)
Dept: NON INVASIVE DIAGNOSTICS | Facility: HOSPITAL | Age: 63
End: 2023-09-14
Attending: INTERNAL MEDICINE
Payer: COMMERCIAL

## 2023-09-14 ENCOUNTER — ANESTHESIA EVENT (OUTPATIENT)
Dept: NON INVASIVE DIAGNOSTICS | Facility: HOSPITAL | Age: 63
End: 2023-09-14
Payer: COMMERCIAL

## 2023-09-14 ENCOUNTER — HOSPITAL ENCOUNTER (OUTPATIENT)
Facility: HOSPITAL | Age: 63
Setting detail: OUTPATIENT SURGERY
Discharge: HOME | End: 2023-09-15
Attending: INTERNAL MEDICINE | Admitting: INTERNAL MEDICINE
Payer: COMMERCIAL

## 2023-09-14 ENCOUNTER — ANESTHESIA (OUTPATIENT)
Dept: NON INVASIVE DIAGNOSTICS | Facility: HOSPITAL | Age: 63
End: 2023-09-14
Payer: COMMERCIAL

## 2023-09-14 DIAGNOSIS — I42.8 NONISCHEMIC CARDIOMYOPATHY (HCC): Primary | ICD-10-CM

## 2023-09-14 DIAGNOSIS — I48.0 PAF (PAROXYSMAL ATRIAL FIBRILLATION) (HCC): ICD-10-CM

## 2023-09-14 DIAGNOSIS — I31.9 PERICARDITIS: ICD-10-CM

## 2023-09-14 LAB
ANION GAP SERPL CALCULATED.3IONS-SCNC: 6 MMOL/L
ATRIAL RATE: 60 BPM
BASOPHILS # BLD AUTO: 0.03 THOUSANDS/ÂΜL (ref 0–0.1)
BASOPHILS NFR BLD AUTO: 0 % (ref 0–1)
BUN SERPL-MCNC: 17 MG/DL (ref 5–25)
CALCIUM SERPL-MCNC: 8.6 MG/DL (ref 8.4–10.2)
CHLORIDE SERPL-SCNC: 107 MMOL/L (ref 96–108)
CO2 SERPL-SCNC: 26 MMOL/L (ref 21–32)
CREAT SERPL-MCNC: 1 MG/DL (ref 0.6–1.3)
EOSINOPHIL # BLD AUTO: 0.27 THOUSAND/ÂΜL (ref 0–0.61)
EOSINOPHIL NFR BLD AUTO: 3 % (ref 0–6)
ERYTHROCYTE [DISTWIDTH] IN BLOOD BY AUTOMATED COUNT: 12.4 % (ref 11.6–15.1)
GFR SERPL CREATININE-BSD FRML MDRD: 80 ML/MIN/1.73SQ M
GLUCOSE P FAST SERPL-MCNC: 95 MG/DL (ref 65–99)
GLUCOSE SERPL-MCNC: 95 MG/DL (ref 65–140)
HCT VFR BLD AUTO: 45.9 % (ref 36.5–49.3)
HGB BLD-MCNC: 15.6 G/DL (ref 12–17)
IMM GRANULOCYTES # BLD AUTO: 0.03 THOUSAND/UL (ref 0–0.2)
IMM GRANULOCYTES NFR BLD AUTO: 0 % (ref 0–2)
INR PPP: 1.05 (ref 0.84–1.19)
KCT BLD-ACNC: 355 SEC (ref 89–137)
KCT BLD-ACNC: 369 SEC (ref 89–137)
KCT BLD-ACNC: 377 SEC (ref 89–137)
KCT BLD-ACNC: 377 SEC (ref 89–137)
KCT BLD-ACNC: 405 SEC (ref 89–137)
KCT BLD-ACNC: 427 SEC (ref 89–137)
LYMPHOCYTES # BLD AUTO: 1.73 THOUSANDS/ÂΜL (ref 0.6–4.47)
LYMPHOCYTES NFR BLD AUTO: 19 % (ref 14–44)
MAGNESIUM SERPL-MCNC: 2 MG/DL (ref 1.9–2.7)
MCH RBC QN AUTO: 32.5 PG (ref 26.8–34.3)
MCHC RBC AUTO-ENTMCNC: 34 G/DL (ref 31.4–37.4)
MCV RBC AUTO: 96 FL (ref 82–98)
MONOCYTES # BLD AUTO: 0.75 THOUSAND/ÂΜL (ref 0.17–1.22)
MONOCYTES NFR BLD AUTO: 8 % (ref 4–12)
NEUTROPHILS # BLD AUTO: 6.13 THOUSANDS/ÂΜL (ref 1.85–7.62)
NEUTS SEG NFR BLD AUTO: 70 % (ref 43–75)
NRBC BLD AUTO-RTO: 0 /100 WBCS
P AXIS: 87 DEGREES
PLATELET # BLD AUTO: 179 THOUSANDS/UL (ref 149–390)
PMV BLD AUTO: 10.2 FL (ref 8.9–12.7)
POTASSIUM SERPL-SCNC: 4.1 MMOL/L (ref 3.5–5.3)
PR INTERVAL: 138 MS
PROTHROMBIN TIME: 13.9 SECONDS (ref 11.6–14.5)
QRS AXIS: 136 DEGREES
QRSD INTERVAL: 124 MS
QT INTERVAL: 456 MS
QTC INTERVAL: 456 MS
RBC # BLD AUTO: 4.8 MILLION/UL (ref 3.88–5.62)
SL CV LV EF: 35
SODIUM SERPL-SCNC: 139 MMOL/L (ref 135–147)
SPECIMEN SOURCE: ABNORMAL
T WAVE AXIS: 49 DEGREES
VENTRICULAR RATE: 60 BPM
WBC # BLD AUTO: 8.94 THOUSAND/UL (ref 4.31–10.16)

## 2023-09-14 PROCEDURE — 83735 ASSAY OF MAGNESIUM: CPT | Performed by: PHYSICIAN ASSISTANT

## 2023-09-14 PROCEDURE — C1733 CATH, EP, OTHR THAN COOL-TIP: HCPCS | Performed by: INTERNAL MEDICINE

## 2023-09-14 PROCEDURE — 93321 DOPPLER ECHO F-UP/LMTD STD: CPT | Performed by: INTERNAL MEDICINE

## 2023-09-14 PROCEDURE — 76937 US GUIDE VASCULAR ACCESS: CPT | Performed by: INTERNAL MEDICINE

## 2023-09-14 PROCEDURE — C1730 CATH, EP, 19 OR FEW ELECT: HCPCS | Performed by: INTERNAL MEDICINE

## 2023-09-14 PROCEDURE — C1894 INTRO/SHEATH, NON-LASER: HCPCS | Performed by: INTERNAL MEDICINE

## 2023-09-14 PROCEDURE — 93005 ELECTROCARDIOGRAM TRACING: CPT

## 2023-09-14 PROCEDURE — C9113 INJ PANTOPRAZOLE SODIUM, VIA: HCPCS | Performed by: PHYSICIAN ASSISTANT

## 2023-09-14 PROCEDURE — 93010 ELECTROCARDIOGRAM REPORT: CPT | Performed by: INTERNAL MEDICINE

## 2023-09-14 PROCEDURE — C1769 GUIDE WIRE: HCPCS | Performed by: INTERNAL MEDICINE

## 2023-09-14 PROCEDURE — 85025 COMPLETE CBC W/AUTO DIFF WBC: CPT | Performed by: PHYSICIAN ASSISTANT

## 2023-09-14 PROCEDURE — 85347 COAGULATION TIME ACTIVATED: CPT

## 2023-09-14 PROCEDURE — C1893 INTRO/SHEATH, FIXED,NON-PEEL: HCPCS | Performed by: INTERNAL MEDICINE

## 2023-09-14 PROCEDURE — C1759 CATH, INTRA ECHOCARDIOGRAPHY: HCPCS | Performed by: INTERNAL MEDICINE

## 2023-09-14 PROCEDURE — 80048 BASIC METABOLIC PNL TOTAL CA: CPT | Performed by: PHYSICIAN ASSISTANT

## 2023-09-14 PROCEDURE — 93656 COMPRE EP EVAL ABLTJ ATR FIB: CPT | Performed by: INTERNAL MEDICINE

## 2023-09-14 PROCEDURE — 93312 ECHO TRANSESOPHAGEAL: CPT | Performed by: INTERNAL MEDICINE

## 2023-09-14 PROCEDURE — 93325 DOPPLER ECHO COLOR FLOW MAPG: CPT | Performed by: INTERNAL MEDICINE

## 2023-09-14 PROCEDURE — NC001 PR NO CHARGE: Performed by: PHYSICIAN ASSISTANT

## 2023-09-14 PROCEDURE — 93657 TX L/R ATRIAL FIB ADDL: CPT | Performed by: INTERNAL MEDICINE

## 2023-09-14 PROCEDURE — 93312 ECHO TRANSESOPHAGEAL: CPT

## 2023-09-14 PROCEDURE — 85610 PROTHROMBIN TIME: CPT | Performed by: PHYSICIAN ASSISTANT

## 2023-09-14 RX ORDER — LIDOCAINE HYDROCHLORIDE 10 MG/ML
INJECTION, SOLUTION EPIDURAL; INFILTRATION; INTRACAUDAL; PERINEURAL CODE/TRAUMA/SEDATION MEDICATION
Status: DISCONTINUED | OUTPATIENT
Start: 2023-09-14 | End: 2023-09-14 | Stop reason: HOSPADM

## 2023-09-14 RX ORDER — LORATADINE 10 MG/1
10 TABLET ORAL DAILY
Status: DISCONTINUED | OUTPATIENT
Start: 2023-09-14 | End: 2023-09-15 | Stop reason: HOSPADM

## 2023-09-14 RX ORDER — ATORVASTATIN CALCIUM 40 MG/1
40 TABLET, FILM COATED ORAL
Status: DISCONTINUED | OUTPATIENT
Start: 2023-09-14 | End: 2023-09-15 | Stop reason: HOSPADM

## 2023-09-14 RX ORDER — ONDANSETRON 2 MG/ML
4 INJECTION INTRAMUSCULAR; INTRAVENOUS ONCE AS NEEDED
Status: DISCONTINUED | OUTPATIENT
Start: 2023-09-14 | End: 2023-09-14 | Stop reason: HOSPADM

## 2023-09-14 RX ORDER — PANTOPRAZOLE SODIUM 40 MG/1
40 TABLET, DELAYED RELEASE ORAL EVERY 12 HOURS
Status: DISCONTINUED | OUTPATIENT
Start: 2023-09-14 | End: 2023-09-15 | Stop reason: HOSPADM

## 2023-09-14 RX ORDER — PANTOPRAZOLE SODIUM 40 MG/10ML
40 INJECTION, POWDER, LYOPHILIZED, FOR SOLUTION INTRAVENOUS ONCE
Status: COMPLETED | OUTPATIENT
Start: 2023-09-14 | End: 2023-09-14

## 2023-09-14 RX ORDER — LISINOPRIL 5 MG/1
5 TABLET ORAL DAILY
Status: DISCONTINUED | OUTPATIENT
Start: 2023-09-14 | End: 2023-09-15 | Stop reason: HOSPADM

## 2023-09-14 RX ORDER — DEXAMETHASONE SODIUM PHOSPHATE 10 MG/ML
INJECTION, SOLUTION INTRAMUSCULAR; INTRAVENOUS AS NEEDED
Status: DISCONTINUED | OUTPATIENT
Start: 2023-09-14 | End: 2023-09-14

## 2023-09-14 RX ORDER — CEFAZOLIN SODIUM 2 G/50ML
SOLUTION INTRAVENOUS AS NEEDED
Status: DISCONTINUED | OUTPATIENT
Start: 2023-09-14 | End: 2023-09-14

## 2023-09-14 RX ORDER — MIDAZOLAM HYDROCHLORIDE 2 MG/2ML
INJECTION, SOLUTION INTRAMUSCULAR; INTRAVENOUS AS NEEDED
Status: DISCONTINUED | OUTPATIENT
Start: 2023-09-14 | End: 2023-09-14

## 2023-09-14 RX ORDER — ACETAMINOPHEN 325 MG/1
650 TABLET ORAL EVERY 4 HOURS PRN
Status: DISCONTINUED | OUTPATIENT
Start: 2023-09-14 | End: 2023-09-15 | Stop reason: HOSPADM

## 2023-09-14 RX ORDER — PROTAMINE SULFATE 10 MG/ML
INJECTION, SOLUTION INTRAVENOUS AS NEEDED
Status: DISCONTINUED | OUTPATIENT
Start: 2023-09-14 | End: 2023-09-14

## 2023-09-14 RX ORDER — LIDOCAINE HYDROCHLORIDE 10 MG/ML
INJECTION, SOLUTION EPIDURAL; INFILTRATION; INTRACAUDAL; PERINEURAL AS NEEDED
Status: DISCONTINUED | OUTPATIENT
Start: 2023-09-14 | End: 2023-09-14

## 2023-09-14 RX ORDER — AMIODARONE HYDROCHLORIDE 200 MG/1
200 TABLET ORAL DAILY
Status: DISCONTINUED | OUTPATIENT
Start: 2023-09-14 | End: 2023-09-15 | Stop reason: HOSPADM

## 2023-09-14 RX ORDER — HEPARIN SODIUM 1000 [USP'U]/ML
INJECTION, SOLUTION INTRAVENOUS; SUBCUTANEOUS CODE/TRAUMA/SEDATION MEDICATION
Status: DISCONTINUED | OUTPATIENT
Start: 2023-09-14 | End: 2023-09-14 | Stop reason: HOSPADM

## 2023-09-14 RX ORDER — FENTANYL CITRATE 50 UG/ML
INJECTION, SOLUTION INTRAMUSCULAR; INTRAVENOUS AS NEEDED
Status: DISCONTINUED | OUTPATIENT
Start: 2023-09-14 | End: 2023-09-14

## 2023-09-14 RX ORDER — METOPROLOL SUCCINATE 25 MG/1
25 TABLET, EXTENDED RELEASE ORAL 2 TIMES DAILY
Status: DISCONTINUED | OUTPATIENT
Start: 2023-09-14 | End: 2023-09-15 | Stop reason: HOSPADM

## 2023-09-14 RX ORDER — FUROSEMIDE 20 MG/1
20 TABLET ORAL DAILY
Status: DISCONTINUED | OUTPATIENT
Start: 2023-09-14 | End: 2023-09-15 | Stop reason: HOSPADM

## 2023-09-14 RX ORDER — SODIUM CHLORIDE 9 MG/ML
20 INJECTION, SOLUTION INTRAVENOUS CONTINUOUS
Status: DISCONTINUED | OUTPATIENT
Start: 2023-09-14 | End: 2023-09-14

## 2023-09-14 RX ORDER — ONDANSETRON 2 MG/ML
INJECTION INTRAMUSCULAR; INTRAVENOUS AS NEEDED
Status: DISCONTINUED | OUTPATIENT
Start: 2023-09-14 | End: 2023-09-14

## 2023-09-14 RX ORDER — SUCCINYLCHOLINE/SOD CL,ISO/PF 100 MG/5ML
SYRINGE (ML) INTRAVENOUS AS NEEDED
Status: DISCONTINUED | OUTPATIENT
Start: 2023-09-14 | End: 2023-09-14

## 2023-09-14 RX ORDER — HEPARIN SODIUM 10000 [USP'U]/100ML
INJECTION, SOLUTION INTRAVENOUS
Status: DISCONTINUED | OUTPATIENT
Start: 2023-09-14 | End: 2023-09-14 | Stop reason: HOSPADM

## 2023-09-14 RX ORDER — POTASSIUM CHLORIDE 20 MEQ/1
40 TABLET, EXTENDED RELEASE ORAL DAILY
Status: DISCONTINUED | OUTPATIENT
Start: 2023-09-15 | End: 2023-09-15 | Stop reason: HOSPADM

## 2023-09-14 RX ORDER — DIGOXIN 125 MCG
125 TABLET ORAL DAILY
Status: CANCELLED | OUTPATIENT
Start: 2023-09-14

## 2023-09-14 RX ORDER — FENTANYL CITRATE/PF 50 MCG/ML
25 SYRINGE (ML) INJECTION
Status: DISCONTINUED | OUTPATIENT
Start: 2023-09-14 | End: 2023-09-14 | Stop reason: HOSPADM

## 2023-09-14 RX ORDER — PROPOFOL 10 MG/ML
INJECTION, EMULSION INTRAVENOUS AS NEEDED
Status: DISCONTINUED | OUTPATIENT
Start: 2023-09-14 | End: 2023-09-14

## 2023-09-14 RX ADMIN — NOREPINEPHRINE BITARTRATE 8 MCG: 1 SOLUTION INTRAVENOUS at 08:38

## 2023-09-14 RX ADMIN — METOPROLOL SUCCINATE 25 MG: 25 TABLET, FILM COATED, EXTENDED RELEASE ORAL at 17:06

## 2023-09-14 RX ADMIN — ATORVASTATIN CALCIUM 40 MG: 40 TABLET, FILM COATED ORAL at 17:06

## 2023-09-14 RX ADMIN — ONDANSETRON 4 MG: 2 INJECTION INTRAMUSCULAR; INTRAVENOUS at 08:40

## 2023-09-14 RX ADMIN — NOREPINEPHRINE BITARTRATE 8 MCG: 1 SOLUTION INTRAVENOUS at 09:31

## 2023-09-14 RX ADMIN — PANTOPRAZOLE SODIUM 40 MG: 40 TABLET, DELAYED RELEASE ORAL at 17:06

## 2023-09-14 RX ADMIN — FENTANYL CITRATE 25 MCG: 50 INJECTION INTRAMUSCULAR; INTRAVENOUS at 13:48

## 2023-09-14 RX ADMIN — MIDAZOLAM 2 MG: 1 INJECTION INTRAMUSCULAR; INTRAVENOUS at 08:24

## 2023-09-14 RX ADMIN — FENTANYL CITRATE 50 MCG: 50 INJECTION INTRAMUSCULAR; INTRAVENOUS at 12:47

## 2023-09-14 RX ADMIN — FENTANYL CITRATE 100 MCG: 50 INJECTION INTRAMUSCULAR; INTRAVENOUS at 08:25

## 2023-09-14 RX ADMIN — CEFAZOLIN SODIUM 2000 MG: 2 SOLUTION INTRAVENOUS at 08:40

## 2023-09-14 RX ADMIN — Medication 160 MG: at 08:25

## 2023-09-14 RX ADMIN — NOREPINEPHRINE BITARTRATE 8 MCG: 1 SOLUTION INTRAVENOUS at 08:33

## 2023-09-14 RX ADMIN — PROPOFOL 150 MG: 10 INJECTION, EMULSION INTRAVENOUS at 08:25

## 2023-09-14 RX ADMIN — SODIUM CHLORIDE: 0.9 INJECTION, SOLUTION INTRAVENOUS at 08:19

## 2023-09-14 RX ADMIN — FENTANYL CITRATE 25 MCG: 50 INJECTION INTRAMUSCULAR; INTRAVENOUS at 13:43

## 2023-09-14 RX ADMIN — SODIUM CHLORIDE: 0.9 INJECTION, SOLUTION INTRAVENOUS at 13:00

## 2023-09-14 RX ADMIN — FENTANYL CITRATE 50 MCG: 50 INJECTION INTRAMUSCULAR; INTRAVENOUS at 10:45

## 2023-09-14 RX ADMIN — PANTOPRAZOLE SODIUM 40 MG: 40 INJECTION, POWDER, FOR SOLUTION INTRAVENOUS at 09:02

## 2023-09-14 RX ADMIN — PROTAMINE SULFATE 50 MG: 10 INJECTION, SOLUTION INTRAVENOUS at 12:47

## 2023-09-14 RX ADMIN — RIVAROXABAN 20 MG: 20 TABLET, FILM COATED ORAL at 17:06

## 2023-09-14 RX ADMIN — PROTAMINE SULFATE 30 MG: 10 INJECTION, SOLUTION INTRAVENOUS at 12:49

## 2023-09-14 RX ADMIN — LIDOCAINE HYDROCHLORIDE 50 MG: 10 INJECTION, SOLUTION EPIDURAL; INFILTRATION; INTRACAUDAL at 08:25

## 2023-09-14 RX ADMIN — ACETAMINOPHEN 650 MG: 325 TABLET, FILM COATED ORAL at 19:27

## 2023-09-14 RX ADMIN — DEXAMETHASONE SODIUM PHOSPHATE 10 MG: 10 INJECTION, SOLUTION INTRAMUSCULAR; INTRAVENOUS at 08:25

## 2023-09-14 RX ADMIN — NOREPINEPHRINE BITARTRATE 16 MCG: 1 SOLUTION INTRAVENOUS at 08:25

## 2023-09-14 RX ADMIN — PROPOFOL 50 MG: 10 INJECTION, EMULSION INTRAVENOUS at 08:53

## 2023-09-14 RX ADMIN — PROPOFOL 40 MG: 10 INJECTION, EMULSION INTRAVENOUS at 10:40

## 2023-09-14 NOTE — ANESTHESIA POSTPROCEDURE EVALUATION
Post-Op Assessment Note    CV Status:  Stable  Pain Score: 0    Pain management: adequate     Mental Status:  Alert and awake   Hydration Status:  Euvolemic   PONV Controlled:  Controlled   Airway Patency:  Patent      Post Op Vitals Reviewed: Yes      Staff: CRNA         There were no known notable events for this encounter.     BP   114/73   Temp   97.9   Pulse  70   Resp   16   SpO2   95%

## 2023-09-14 NOTE — H&P
H&P Exam - Cardiology   Zahira Lao 65 y.o. male MRN: [id number]  Unit/Bed#: [facility] CATH [facility] RO[facility] Encounter: [id number]    Assessment/Plan     Assessment:  1. Persistent atrial fibrillation with periods of rapid ventricular response, with breakthrough despite amiodarone antiarrhythmic therapy   A.)  Initially noted 2/2022, converted on IV Cardizem on several occasions   B.)  More persistent A-fib noted 4/2022, TI at that time showed left atrial appendage thrombus and thus cardioversion was aborted in favor of ongoing rate control   C.)  Successful cardioversion 8/2022, however with subsequent reversion back to atrial fibrillation - started on amiodarone antiarrhythmic therapy at that time   D.)  Repeat successful cardioversion 8/2022 once loaded on amiodarone   E.)  Breakthrough episodes despite amiodarone, 7% burden per prior device interrogation   F.)  Recent inappropriate ICD shock for rapid atrial fibrillation, digoxin added to rate control along with amiodarone and Toprol-XL   G.)  Maintained on Xarelto anticoagulation  2. Nonischemic cardiomyopathy with LVEF 35% per echo 5/2023   A.)  No obstructive CAD per cardiac catheterization 5/2023   B.)  Maintained on lisinopril and Toprol-XL  3. Chronic HFrEF  4. Sinus bradycardia/tachybrady syndrome with Medtronic BiV ICD in situ (implanted 7/2023)  5. Hypertension  6. Hyperlipidemia  7.   Obesity with BMI 36 with prior gastric bypass surgery      Plan:  TI/atrial fibrillation ablation      History of Present Illness   HPI:  Perfecto Fillers is a 65y.o. year old male with nonischemic cardiomyopathy with LVEF 35% despite optimal medical therapy with Toprol-XL and lisinopril, chronic HFrEF, persistent atrial fibrillation with periods of rapid ventricular response currently maintained on amiodarone and Xarelto, sinus bradycardia/tachybradycardia syndrome, hypertension, hyperlipidemia, intermittent left bundle branch block, obesity with BMI 36 and prior gastric bypass, history of alcohol use, and prior GI bleed while taking NSAIDs. He typically follows with Dr. Murali Dumas as an outpatient. He was initially found to have atrial fibrillation during a hospitalization 2/2022, with periods of rapid ventricular response. He converted on IV Cardizem, and was discharged on metoprolol. He was not placed on anticoagulation given his low [id number] score. Unfortunately, several weeks later he had recurrent rapid atrial fibrillation and was readmitted back to the hospital.  He again converted on IV Cardizem, and was discharged on both oral diltiazem along with metoprolol. Echocardiograms were not performed during those admissions. He was admitted again to the hospital 4/2022 with acute CHF, and echocardiogram showed LVEF 30%. He remained in atrial fibrillation throughout his stay, thus TI cardioversion was pursued. TI 4/2022 revealed left atrial appendage thrombus, thus he was rate controlled until he could undergo repeat TI followed by successful cardioversion 8/2022 (first cardioversion earlier in the month was successful - started on amiodarone at that time but with subsequent reversion, second cardioversion later that same month once loaded on amiodarone antiarrhythmic therapy was successful). He continued to have breakthrough A-fib on amiodarone, and despite optimal medical therapy his LVEF is still decreased at 35% per echo 5/2023. He also has periods of sinus bradycardia with rates in the 40s, as well as previously documented atrial fibrillation with slow ventricular response. These have limited up titration of his rate controlling medications. Thus, BiV ICD implantation (given reduced LVEF, left bundle branch block with wide QRS and need for pacing) was recommended and underwent that procedure on 7/7/2023. Unfortunately, he received an inappropriate ICD shock shortly thereafter for rapid atrial fibrillation.   As he was already on amiodarone and Toprol-XL, digoxin was added to his regimen. Given his ongoing breakthrough A-fib despite amiodarone, atrial fibrillation ablation was recommended and he presents today to undergo that procedure. No significant changes over the recent past.      Review of Systems  ROS as noted above, otherwise 12 point review of systems was performed and is negative. Historical Information   Past Medical History:   Diagnosis Date   • A-fib [facility])    • Acute ulcer of stomach    • CHF (congestive heart failure) ([address])    • GERD (gastroesophageal reflux disease)    • Rib fractures      Past Surgical History:   Procedure Laterality Date   • CARDIAC CATHETERIZATION N/A 5/10/2023    Procedure: Cardiac catheterization;  Surgeon: Jenelle Gudino DO;  Location: [facility] CARDIAC CATH LAB; Service: Cardiology   • CARDIAC CATHETERIZATION N/A 5/10/2023    Procedure: Cardiac Coronary Angiogram;  Surgeon: Jenelle Gudino DO;  Location: [facility] CARDIAC C[facility] LAB; Service: Cardiology   • CARDIAC ELECTROPHYSIOLOGY PROCEDURE N/A 2023    Procedure: Cardiac biv icd implant;  Surgeon: Humera Clifton MD;  Location: [facility]ARD[facility] LAB;   Service: Cardiology   • COLONOSCOPY     • ESOPHAGOGASTRODUODENOSCOPY     • GASTRIC BYPASS     • MANDIBLE FRACTURE SURGERY       Family History:   Family History   Problem Relation Age of Onset   • Seizures Father        Social History   Social History     Substance and Sexual Activity   Alcohol Use Yes   • Alcohol/week: 7.0 standard drinks of alcohol   • Types: 7 Cans of beer per week    Comment: Hasn't had any beer since 23     Social History     Substance and Sexual Activity   Drug Use No     Social History     Tobacco Use   Smoking Status Former   • Types: Cigars   • Start date:    • Quit date:    • Years since quittin.7   Smokeless Tobacco Former   • Types: Chew         Meds/Allergies   all medications and allergies reviewed  Home Medications:   Medications Prior to Admission   Medication   • amiodarone 200 mg tablet   • atorvastatin (LIPITOR) 40 mg tablet   • digoxin (LANOXIN) 0.125 mg tablet   • ergocalciferol (VITAMIN D2) 50,000 units   • fexofenadine (ALLEGRA) 180 MG tablet   • furosemide (LASIX) 20 mg tablet   • lisinopril (ZESTRIL) 5 mg tablet   • metoprolol succinate (TOPROL-XL) 25 mg 24 hr tablet   • Multiple Vitamins-Minerals (MULTIVITAMIN ADULT EXTRA C PO)   • pantoprazole (PROTONIX) 40 mg tablet   • potassium chloride (Klor-Con M20) 20 mEq tablet   • Xarelto 20 MG tablet       No Known Allergies    Objective   Vitals: Blood pressure 119/75, pulse 60, temperature 98.1 °F (36.7 °C), temperature source Temporal, resp. rate 16, height 6' 2" (1.88 m), weight 130 kg (286 lb 9.6 oz), SpO2 97 %. Orthostatic Blood Pressures    Flowsheet Row Most Recent Value   Blood Pressure 119/75 filed at 09/14/2023 0709          No intake or output data in the 24 hours ending 09/14/23 0748    Invasive Devices     Peripheral Intravenous Line  Duration           Peripheral IV 09/14/23 Left;Ventral (anterior) Wrist <1 day    Peripheral IV 09/14/23 Right Antecubital <1 day                Physical Exam  GEN: NAD, alert and oriented x 3, well appearing  SKIN: dry without significant lesions or rashes  HEENT: NCAT, PERRL, EOMs intact  NECK: No JVD appreciated  CARDIOVASCULAR: RRR, normal S1, S2 without murmurs, rubs, or gallops appreciated  LUNGS: Clear to auscultation bilaterally without wheezes, rhonchi, or rales  ABDOMEN: Soft, nontender, nondistended  EXTREMITIES/VASCULAR: perfused without clubbing, cyanosis, or LE edema b/l  PSYCH: Normal mood and affect  NEURO: CN ll-Xll grossly intact      Lab Results: I have personally reviewed pertinent lab results.     Results from last 7 days   Lab Units 09/14/23  0655 09/11/23  0756   WBC Thousand/uL 8.94 7.79   HEMOGLOBIN g/dL 15.6 15.0   HEMATOCRIT % 45.9 44.2   PLATELETS Thousands/uL 179 185     Results from last 7 days   Lab Units 09/11/23  0756   POTASSIUM mmol/L 4.0   CHLORIDE mmol/L 106   CO2 mmol/L 27   BUN mg/dL 19   CREATININE mg/dL 0.94   CALCIUM mg/dL 8.7     Results from last 7 days   Lab Units 09/14/23  0655   INR  1.05             Imaging: I have personally reviewed pertinent reports. ECHO: No results found for this or any previous visit. Results for orders placed during the hospital encounter of 11/02/22    Echo complete w/ contrast if indicated    Interpretation Summary  •  Left Ventricle: Left ventricular cavity size is normal. Wall thickness is normal. The left ventricular ejection fraction is 43%. Systolic function is mildly reduced. Wall motion is normal. Diastolic function is normal.  •  Pulmonic Valve: There is mild regurgitation. Compared to prior echocardiographic study dated 04/07/2022, there appears to be an improvement in left ventricular systolic function, now with sinus rhythm replacing atrial fibrillation.         EKG:         Code Status: Level 1 - Full Code

## 2023-09-14 NOTE — ANESTHESIA PROCEDURE NOTES
Arterial Line Insertion    Performed by: Ralf Gibbs CRNA  Authorized by: Venus Meneses MD  Consent: Written consent obtained. Risks and benefits: risks, benefits and alternatives were discussed  Consent given by: patient  Patient identity confirmed: verbally with patient and arm band  Time out: Immediately prior to procedure a "time out" was called to verify the correct patient, procedure, equipment, support staff and site/side marked as required. Preparation: Patient was prepped and draped in the usual sterile fashion.   Indications: hemodynamic monitoring  Orientation:  Right  Location: radial artery  Procedure Details:  Needle gauge: 20  Number of attempts: 1    Post-procedure:  Post-procedure: dressing applied  Waveform: good waveform  Post-procedure CNS: normal  Patient tolerance: Patient tolerated the procedure well with no immediate complications

## 2023-09-14 NOTE — ANESTHESIA PREPROCEDURE EVALUATION
Procedure:  Cardiac eps/afib ablation (Chest)  Cardiac eps/aflutter ablation (Chest)    Relevant Problems   CARDIO   (+) CHF (congestive heart failure) (HCC)   (+) Mixed hyperlipidemia   (+) PAF (paroxysmal atrial fibrillation) (HCC)      GI/HEPATIC   (+) GERD (gastroesophageal reflux disease)   (+) Gastrointestinal hemorrhage with melena   (+) Peptic ulcer      HEMATOLOGY   (+) Acute blood loss anemia      PULMONARY   (+) Obstructive sleep apnea      Ep report on 9/7/23: placed on 7/7/23  MDT BIV ICD, AP 41% CRT pacing (BIV) 100% LV. Normal device function       Echo 5/9/23:   •  Left Ventricle: Left ventricular cavity size is mildly dilated. Wall thickness is normal. The left ventricular ejection fraction is 35%. Systolic function is moderately reduced. Global longitudinal strain is reduced. There is moderate global hypokinesis. •  Mitral Valve: There is mild annular calcification. There is mild regurgitation. •  Pulmonic Valve: There is mild regurgitation. •  Compared to prior echocardiogram dated 1/10/2023, there is a noticeable reduction in left ventricular systolic function.       Physical Exam    Airway    Mallampati score: II    Neck ROM: full     Dental   Comment: Several missing teeth, none loose per patient. Several chipped throughout. ,     Cardiovascular      Pulmonary      Other Findings        Anesthesia Plan  ASA Score- 3     Anesthesia Type- general with ASA Monitors. Additional Monitors: arterial line. Airway Plan: ETT. Plan Factors-Exercise tolerance (METS): <4 METS. Chart reviewed. EKG reviewed. Existing labs reviewed. Patient summary reviewed. Patient is not a current smoker. Obstructive sleep apnea risk education given perioperatively. Induction- intravenous. Postoperative Plan- . Planned trial extubation    Informed Consent- Anesthetic plan and risks discussed with patient. I personally reviewed this patient with the CRNA.  Discussed and agreed on the Anesthesia Plan with the CRNA. Christie Ahmadi

## 2023-09-15 ENCOUNTER — APPOINTMENT (OUTPATIENT)
Dept: RADIOLOGY | Facility: HOSPITAL | Age: 63
End: 2023-09-15
Payer: COMMERCIAL

## 2023-09-15 ENCOUNTER — APPOINTMENT (OUTPATIENT)
Dept: NON INVASIVE DIAGNOSTICS | Facility: HOSPITAL | Age: 63
End: 2023-09-15
Payer: COMMERCIAL

## 2023-09-15 VITALS
TEMPERATURE: 97.6 F | HEART RATE: 67 BPM | HEIGHT: 74 IN | RESPIRATION RATE: 19 BRPM | DIASTOLIC BLOOD PRESSURE: 72 MMHG | SYSTOLIC BLOOD PRESSURE: 121 MMHG | BODY MASS INDEX: 36.7 KG/M2 | WEIGHT: 286 LBS | OXYGEN SATURATION: 95 %

## 2023-09-15 PROBLEM — I31.9 PERICARDITIS: Status: ACTIVE | Noted: 2023-09-15

## 2023-09-15 LAB
ANION GAP SERPL CALCULATED.3IONS-SCNC: 7 MMOL/L
APICAL FOUR CHAMBER EJECTION FRACTION: 47 %
ATRIAL RATE: 69 BPM
BUN SERPL-MCNC: 15 MG/DL (ref 5–25)
CALCIUM SERPL-MCNC: 8.3 MG/DL (ref 8.4–10.2)
CHLORIDE SERPL-SCNC: 106 MMOL/L (ref 96–108)
CO2 SERPL-SCNC: 22 MMOL/L (ref 21–32)
CREAT SERPL-MCNC: 0.81 MG/DL (ref 0.6–1.3)
ERYTHROCYTE [DISTWIDTH] IN BLOOD BY AUTOMATED COUNT: 12.3 % (ref 11.6–15.1)
GFR SERPL CREATININE-BSD FRML MDRD: 95 ML/MIN/1.73SQ M
GLUCOSE P FAST SERPL-MCNC: 120 MG/DL (ref 65–99)
GLUCOSE SERPL-MCNC: 120 MG/DL (ref 65–140)
HCT VFR BLD AUTO: 43.6 % (ref 36.5–49.3)
HGB BLD-MCNC: 14.9 G/DL (ref 12–17)
MAGNESIUM SERPL-MCNC: 2.2 MG/DL (ref 1.9–2.7)
MCH RBC QN AUTO: 33.6 PG (ref 26.8–34.3)
MCHC RBC AUTO-ENTMCNC: 34.2 G/DL (ref 31.4–37.4)
MCV RBC AUTO: 98 FL (ref 82–98)
P AXIS: 82 DEGREES
PLATELET # BLD AUTO: 174 THOUSANDS/UL (ref 149–390)
PMV BLD AUTO: 11 FL (ref 8.9–12.7)
POTASSIUM SERPL-SCNC: 4.1 MMOL/L (ref 3.5–5.3)
PR INTERVAL: 0 MS
QRS AXIS: 146 DEGREES
QRSD INTERVAL: 125 MS
QT INTERVAL: 450 MS
QTC INTERVAL: 483 MS
RBC # BLD AUTO: 4.44 MILLION/UL (ref 3.88–5.62)
SL CV LV EF: 45
SODIUM SERPL-SCNC: 135 MMOL/L (ref 135–147)
T WAVE AXIS: 81 DEGREES
TR MAX PG: 38 MMHG
TR PEAK VELOCITY: 3.1 M/S
TRICUSPID VALVE PEAK REGURGITATION VELOCITY: 3.09 M/S
VENTRICULAR RATE: 69 BPM
WBC # BLD AUTO: 16.2 THOUSAND/UL (ref 4.31–10.16)

## 2023-09-15 PROCEDURE — 80048 BASIC METABOLIC PNL TOTAL CA: CPT | Performed by: PHYSICIAN ASSISTANT

## 2023-09-15 PROCEDURE — 85027 COMPLETE CBC AUTOMATED: CPT | Performed by: PHYSICIAN ASSISTANT

## 2023-09-15 PROCEDURE — NC001 PR NO CHARGE: Performed by: PHYSICIAN ASSISTANT

## 2023-09-15 PROCEDURE — 93010 ELECTROCARDIOGRAM REPORT: CPT | Performed by: INTERNAL MEDICINE

## 2023-09-15 PROCEDURE — 93321 DOPPLER ECHO F-UP/LMTD STD: CPT | Performed by: INTERNAL MEDICINE

## 2023-09-15 PROCEDURE — 71045 X-RAY EXAM CHEST 1 VIEW: CPT

## 2023-09-15 PROCEDURE — 93325 DOPPLER ECHO COLOR FLOW MAPG: CPT | Performed by: INTERNAL MEDICINE

## 2023-09-15 PROCEDURE — 93308 TTE F-UP OR LMTD: CPT | Performed by: INTERNAL MEDICINE

## 2023-09-15 PROCEDURE — 83735 ASSAY OF MAGNESIUM: CPT | Performed by: PHYSICIAN ASSISTANT

## 2023-09-15 PROCEDURE — 93308 TTE F-UP OR LMTD: CPT

## 2023-09-15 RX ORDER — COLCHICINE 0.6 MG/1
0.6 TABLET ORAL 2 TIMES DAILY PRN
Qty: 14 TABLET | Refills: 0 | Status: SHIPPED | OUTPATIENT
Start: 2023-09-15

## 2023-09-15 RX ORDER — KETOROLAC TROMETHAMINE 30 MG/ML
15 INJECTION, SOLUTION INTRAMUSCULAR; INTRAVENOUS ONCE
Status: COMPLETED | OUTPATIENT
Start: 2023-09-15 | End: 2023-09-15

## 2023-09-15 RX ADMIN — PANTOPRAZOLE SODIUM 40 MG: 40 TABLET, DELAYED RELEASE ORAL at 05:35

## 2023-09-15 RX ADMIN — ACETAMINOPHEN 650 MG: 325 TABLET, FILM COATED ORAL at 05:38

## 2023-09-15 RX ADMIN — METOPROLOL SUCCINATE 25 MG: 25 TABLET, FILM COATED, EXTENDED RELEASE ORAL at 08:27

## 2023-09-15 RX ADMIN — KETOROLAC TROMETHAMINE 15 MG: 30 INJECTION, SOLUTION INTRAMUSCULAR; INTRAVENOUS at 14:04

## 2023-09-15 RX ADMIN — FUROSEMIDE 20 MG: 20 TABLET ORAL at 08:27

## 2023-09-15 RX ADMIN — POTASSIUM CHLORIDE 40 MEQ: 1500 TABLET, EXTENDED RELEASE ORAL at 08:27

## 2023-09-15 RX ADMIN — ACETAMINOPHEN 650 MG: 325 TABLET, FILM COATED ORAL at 11:39

## 2023-09-15 RX ADMIN — LISINOPRIL 5 MG: 5 TABLET ORAL at 08:27

## 2023-09-15 RX ADMIN — AMIODARONE HYDROCHLORIDE 200 MG: 200 TABLET ORAL at 08:27

## 2023-09-15 RX ADMIN — LORATADINE 10 MG: 10 TABLET ORAL at 08:27

## 2023-09-15 NOTE — DISCHARGE SUMMARY
Discharge Summary - Powell Gottron 65 y.o. male MRN: [id number]    Unit/Bed#: CW2 218-01 Encounter: [id number]      Admission Date: 9/14/2023   Discharge Date: 9/15/2023    Discharge Diagnosis:   1. Persistent atrial fibrillation with periods of rapid ventricular response, with breakthrough despite amiodarone antiarrhythmic therapy              A.)  Initially noted 2/2022, converted on IV Cardizem on several occasions              B.)  More persistent A-fib noted 4/2022, TI at that time showed left atrial appendage thrombus and thus cardioversion was aborted in favor of ongoing rate control              C.)  Successful cardioversion 8/2022, however with subsequent reversion back to atrial fibrillation - started on amiodarone antiarrhythmic therapy at that time              D.)  Repeat successful cardioversion 8/2022 once loaded on amiodarone              E.)  Breakthrough episodes despite amiodarone, 7% burden per prior device interrogation              F.)  Recent inappropriate ICD shock for rapid atrial fibrillation, digoxin added to rate control along with amiodarone and Toprol-XL              G.)  Maintained on Xarelto anticoagulation  2. Nonischemic cardiomyopathy with LVEF 35% per echo 5/2023              A.)  No obstructive CAD per cardiac catheterization 5/2023              B.)  Maintained on lisinopril and Toprol-XL  3. Chronic HFrEF  4. Sinus bradycardia/tachybrady syndrome with Medtronic BiV ICD in situ (implanted 7/2023)  5. Hypertension  6. Hyperlipidemia  7. Obesity with BMI 36 with prior gastric bypass surgery      Procedures Performed:   Orders Placed This Encounter   Procedures   • Cardiac ep lab eps/ablations   1. Cryoablation of atrial fibrillation     Ablation 1 - pulmonary vein isolation (PVI)     Ablation 2 - left atrial posterior wall isolation ( PW)      2 Limited electrophysiologic measurement      3. 3-D electro-anatomic mapping using NavX system  4.  Intracardiac echocardiography  5. Transeptal  6. Access obtained under ultrasound guidance using Seldinger technique  7. Device programmed before and after study        Consultants: none      HPI: Please refer to the initial history and physical as well as procedure notes for full details. Briefly, Pia Bernheim is a 65y.o. year old male with nonischemic cardiomyopathy with LVEF 35% despite optimal medical therapy with Toprol-XL and lisinopril, chronic HFrEF, persistent atrial fibrillation with periods of rapid ventricular response currently maintained on amiodarone and Xarelto, sinus bradycardia/tachybradycardia syndrome, hypertension, hyperlipidemia, intermittent left bundle branch block, obesity with BMI 36 and prior gastric bypass, history of alcohol use, and prior GI bleed while taking NSAIDs.  He typically follows with Dr. Crista Gongora as an outpatient. Modesto Thompson was initially found to have atrial fibrillation during a hospitalization 2/2022, with periods of rapid ventricular response.  He converted on IV Cardizem, and was discharged on metoprolol. He was not placed on anticoagulation given his low [id number] score. Unfortunately, several weeks later he had recurrent rapid atrial fibrillation and was readmitted back to the hospital.  He again converted on IV Cardizem, and was discharged on both oral diltiazem along with metoprolol. Echocardiograms were not performed during those admissions.     He was admitted again to the hospital 4/2022 with acute CHF, and echocardiogram showed LVEF 30%. He remained in atrial fibrillation throughout his stay, thus TI cardioversion was pursued.   TI 4/2022 revealed left atrial appendage thrombus, thus he was rate controlled until he could undergo repeat TI followed by successful cardioversion 8/2022 (first cardioversion earlier in the month was successful - started on amiodarone at that time but with subsequent reversion, second cardioversion later that same month once loaded on amiodarone antiarrhythmic therapy was successful).  He continued to have breakthrough A-fib on amiodarone, and despite optimal medical therapy his LVEF is still decreased at 35% per echo 5/2023. Jeffry Medina also has periods of sinus bradycardia with rates in the 40s, as well as previously documented atrial fibrillation with slow ventricular response.  These have limited up titration of his rate controlling medications.  Thus, BiV ICD implantation (given reduced LVEF, left bundle branch block with wide QRS and need for pacing) was recommended and underwent that procedure on 7/7/2023. Unfortunately, he received an inappropriate ICD shock shortly thereafter for rapid atrial fibrillation. As he was already on amiodarone and Toprol-XL, digoxin was added to his regimen. Given his ongoing breakthrough A-fib despite amiodarone, atrial fibrillation ablation was recommended and he presented this hospital admission to undergo this procedure. Hospital Course: Delfino Denson presented at his baseline state of health. After the procedure was explained in detail and consent was obtained, he underwent the above procedures without complications. Please see operative notes by Dr. Jaret Lucas for full details. He tolerated the procedure well. He was then monitored overnight for further observation. There were no acute issues or events overnight. The following morning he reported mild chest pain with deep inspiration consistent with pericarditis. Per Dr. Jaret Lucas, he underwent a chest x-ray and limited echo. His chest x-ray showed no evidence of volume overload, and echocardiogram showed no evidence of effusion with slightly improved LVEF 45%. For his presumed pericarditis, he was given a one-time dose of IV Toradol, as well as an outpatient short-term prescription for colchicine. He otherwise reported occasional shortness of breath, unchanged from baseline.   He had mild dizziness/lightheadedness when initially getting out of bed, however this has improved. He denies palpitations or edema. His vital signs were reviewed and labs are stable. Telemetry showed normal sinus rhythm with biventricular pacing. His groins were soft without significant hematoma or recurrent bleeding, and groin sutures were removed without incident. Review of Systems   Constitutional: Negative for fever and malaise/fatigue. Cardiovascular: Positive for chest pain (Worse with deep inspiration and when laying flat) and dyspnea on exertion. Negative for irregular heartbeat, leg swelling, near-syncope, orthopnea, palpitations, paroxysmal nocturnal dyspnea and syncope. All other systems reviewed and are negative. Physical exam:  GEN: NAD, alert and oriented x 3, well appearing  SKIN: dry without significant lesions or rashes  HEENT: NCAT, PERRL, EOMs intact  NECK: No JVD appreciated  CARDIOVASCULAR: RRR, normal S1, S2 without murmurs, rubs, or gallops appreciated  LUNGS: Clear to auscultation bilaterally without wheezes, rhonchi, or rales  ABDOMEN: Soft, nontender, nondistended  EXTREMITIES/VASCULAR: perfused without clubbing, cyanosis, or LE edema b/l  PSYCH: Normal mood and affect  NEURO: CN ll-Xll grossly intact    He was given routine post ablation discharge instructions and restrictions, and these were explained in detail. He was given a follow up appointment with Ofelia Garrido PA-C, and he was instructed to follow up with his primary cardiologist as previously instructed. In terms of his medications, his digoxin has been discontinued. He will continue amiodarone, Toprol-XL, and Xarelto as previously instructed. He is also maintained on lisinopril for his nonischemic cardiomyopathy. He is already on pantoprazole 40 mg twice daily, which he will continue. He was given a small supply of colchicine to take as needed for postop pericarditis. He is stable for discharge at this time with all questions answered.  He was discussed in detail with Dr. Kian Greco who is in agreement with this discharge summary. Discharge Medications:  See after visit summary for reconciled discharge medications provided to patient and family. Medications Prior to Admission   Medication   • amiodarone 200 mg tablet   • atorvastatin (LIPITOR) 40 mg tablet   • digoxin (LANOXIN) 0.125 mg tablet   • ergocalciferol (VITAMIN D2) 50,000 units   • fexofenadine (ALLEGRA) 180 MG tablet   • furosemide (LASIX) 20 mg tablet   • lisinopril (ZESTRIL) 5 mg tablet   • metoprolol succinate (TOPROL-XL) 25 mg 24 hr tablet   • Multiple Vitamins-Minerals (MULTIVITAMIN ADULT EXTRA C PO)   • pantoprazole (PROTONIX) 40 mg tablet   • potassium chloride (Klor-Con M20) 20 mEq tablet   • Xarelto 20 MG tablet         [facility] Labs/diagnostics:  CBC with diff:   Results from last 7 days   Lab Units 09/15/23  0425 09/14/23  0655 09/11/23  0756   WBC Thousand/uL 16.20* 8.94 7.79   HEMOGLOBIN g/dL 14.9 15.6 15.0   HEMATOCRIT % 43.6 45.9 44.2   MCV fL 98 96 96   PLATELETS Thousands/uL 174 179 185   RBC Million/uL 4.44 4.80 4.60   MCH pg 33.6 32.5 32.6   MCHC g/dL 34.2 34.0 33.9   RDW % 12.3 12.4 12.6   MPV fL 11.0 10.2 10.6   NRBC AUTO /100 WBCs  --  0 0       BMP:  Results from last 7 days   Lab Units 09/15/23  0425 09/14/23  0655 09/11/23  0756   POTASSIUM mmol/L 4.1 4.1 4.0   CHLORIDE mmol/L 106 107 106   CO2 mmol/L 22 26 27   BUN mg/dL 15 17 19   CREATININE mg/dL 0.81 1.00 0.94   CALCIUM mg/dL 8.3* 8.6 8.7       Magnesium:   Results from last 7 days   Lab Units 09/15/23  0425 09/14/23  0655   MAGNESIUM mg/dL 2.2 2.0       Coags:   Results from last 7 days   Lab Units 09/14/23  0655   INR  8.06         Complications: none    Condition at Discharge: good     Discharge instructions/Information to patient and family:   See after visit summary for information provided to patient and family.       Provisions for Follow-Up Care:  See after visit summary for information related to follow-up care and any pertinent home health orders. Disposition: Home    Planned Readmission: No    Discharge Statement   I spent 45 minutes minutes discharging the patient. This time was spent on the day of discharge. I had direct contact with the patient on the day of discharge. Additional documentation is required if more than 30 minutes were spent on discharge.  Evaluating the incision, discussing discharge instructions and restrictions, arranging follow up appointments, discussing medications

## 2023-09-15 NOTE — NURSING NOTE
Patient discharged home in stable condition, discharged instruction reviewed with patients and all questions answered. Left and right arm IV access discontinue without any complication. Patient walked to lobby with wife, wife provided transportation home.

## 2023-09-15 NOTE — DISCHARGE INSTR - AVS FIRST PAGE
PLEASE NOTE THE FOLLOWING MEDICATION RECOMMENDATIONS:  - Discontinue digoxin  - Continue Toprol-XL and Xarelto as previously instructed  - Continue taking pantoprazole at least once a day for the next 30 days, at which time he may resume taking it as previously instructed  - take colchicine 0.6 mg twice daily for one week as needed for chest pain        RESTRICTIONS:  No heavy lifting or strenuous activity for one week. No soaking in a bath tub/hot tub/swimming pool for one week or until groin heals. You may shower - please let soap and water run over the groins, no scrubbing, and pat the area dry. Please place band-aid on groins daily for up to five days, but you may remove sooner if no issues are noted. If you notice ongoing bleeding, swelling, or firm lumps in groin near ablation incision, please contact Dr. Jackson Domingo office - [phone].

## 2023-09-15 NOTE — PLAN OF CARE
Problem: PAIN - ADULT  Goal: Verbalizes/displays adequate comfort level or baseline comfort level  Description: Interventions:  - Encourage patient to monitor pain and request assistance  - Assess pain using appropriate pain scale  - Administer analgesics based on type and severity of pain and evaluate response  - Implement non-pharmacological measures as appropriate and evaluate response  - Consider cultural and social influences on pain and pain management  - Notify physician/advanced practitioner if interventions unsuccessful or patient reports new pain  Outcome: Progressing     Problem: INFECTION - ADULT  Goal: Absence or prevention of progression during hospitalization  Description: INTERVENTIONS:  - Assess and monitor for signs and symptoms of infection  - Monitor lab/diagnostic results  - Monitor all insertion sites, i.e. indwelling lines, tubes, and drains  - Monitor endotracheal if appropriate and nasal secretions for changes in amount and color  - [place] appropriate cooling/warming therapies per order  - Administer medications as ordered  - Instruct and encourage patient and family to use good hand hygiene technique  - Identify and instruct in appropriate isolation precautions for identified infection/condition  Outcome: Progressing  Goal: Absence of fever/infection during neutropenic period  Description: INTERVENTIONS:  - Monitor WBC    Outcome: Progressing     Problem: SAFETY ADULT  Goal: Patient will remain free of falls  Description: INTERVENTIONS:  - Educate patient/family on patient safety including physical limitations  - Instruct patient to call for assistance with activity   - Consult OT/PT to assist with strengthening/mobility   - Keep Call bell within reach  - Keep bed low and locked with side rails adjusted as appropriate  - Keep care items and personal belongings within reach  - Initiate and maintain comfort rounds  - Make Fall Risk Sign visible to staff  - Offer Toileting every   Hours, in advance of need  - Initiate/Maintain  alarm  - Obtain necessary fall risk management equipment:    - Apply yellow socks and bracelet for high fall risk patients  - Consider moving patient to room near nurses station  Outcome: Progressing  Goal: Maintain or return to baseline ADL function  Description: INTERVENTIONS:  -  Assess patient's ability to carry out ADLs; assess patient's baseline for ADL function and identify physical deficits which impact ability to perform ADLs (bathing, care of mouth/teeth, toileting, grooming, dressing, etc.)  - Assess/evaluate cause of self-care deficits   - Assess range of motion  - Assess patient's mobility; develop plan if impaired  - Assess patient's need for assistive devices and provide as appropriate  - Encourage maximum independence but intervene and supervise when necessary  - Involve family in performance of ADLs  - Assess for home care needs following discharge   - Consider OT consult to assist with ADL evaluation and planning for discharge  - Provide patient education as appropriate  Outcome: Progressing  Goal: Maintains/Returns to pre admission functional level  Description: INTERVENTIONS:  - Perform BMAT or MOVE assessment daily.   - Set and communicate daily mobility goal to care team and patient/family/caregiver. - Collaborate with rehabilitation services on mobility goals if consulted  - Perform Range of Motion   times a day. - Reposition patient every   hours.   - Dangle patient   times a day  - Stand patient   times a day  - Ambulate patient   times a day  - Out of bed to chair   times a day   - Out of bed for meals     times a day  - Out of bed for toileting  - Record patient progress and toleration of activity level   Outcome: Progressing     Problem: DISCHARGE PLANNING  Goal: Discharge to home or other facility with appropriate resources  Description: INTERVENTIONS:  - Identify barriers to discharge w/patient and caregiver  - Arrange for needed discharge resources and transportation as appropriate  - Identify discharge learning needs (meds, wound care, etc.)  - Arrange for interpretive services to assist at discharge as needed  - Refer to Case Management Department for coordinating discharge planning if the patient needs post-hospital services based on physician/advanced practitioner order or complex needs related to functional status, cognitive ability, or social support system  Outcome: Progressing     Problem: Knowledge Deficit  Goal: Patient/family/caregiver demonstrates understanding of disease process, treatment plan, medications, and discharge instructions  Description: Complete learning assessment and assess knowledge base.   Interventions:  - Provide teaching at level of understanding  - Provide teaching via preferred learning methods  Outcome: Progressing

## 2023-09-19 NOTE — PROGRESS NOTES
Advanced Heart Failure/Pulmonary Hypertension Outpatient Note - Ramandeep Devine 65 y.o. male MRN: [id number]    @ Encounter: [id number]    Assessment:  65 y.o. male Hx per chart p/w HF fu.    Primary cardiologist is Dr. Mary Carmen Guy, general cardiology  Follows EP team  AF, 9/2023 had comprehensive ablation-PVI and posterior wall  On amio  2022 KIAH thrombus>resolved subsequently  NICM, HFrEF, EF 35%>45% 9/2023 (50-55% my review)  No obstructive CAD per cardiac catheterization 5/2023  Intermittent LBBB  S/p CRT-D  Obese, s/p gastric bypass  HLD  Chronic positional dizziness  Chronic cough x years  He is active, an   SRIKANTH. 2022 PSG:  IMPRESSION:   1. Moderate to severe obstructive sleep apnea  2. Significant hypoxia  3. Moderate sleep fragmentation  4.  Short sleep and REM latencies suggesting sleep deprivation      I have reviewed all pertinent patient data including but not limited to:        Lab Units 09/15/23  0425 09/14/23  0655 09/11/23  0756   CREATININE mg/dL 0.81 1.00 0.94         Lab Results   Component Value Date    K 4.1 09/15/2023     Lab Results   Component Value Date    HGBA1C 5.7 (H) 04/25/2022     Lab Results   Component Value Date    WGK8FNBQFQDQ 5.266 (H) 05/09/2023     Lab Results   Component Value Date    LDLCALC 78 04/25/2022     Lab Results   Component Value Date    BNP 81 05/09/2023      Lab Results   Component Value Date    NTBNP 766 (H) 10/20/2022          TODAY'S PLAN:     09/26/23  I am meeting patient for the first time today  Warm, euvolemic, obese  No new cardiac complaints, feels generally well  Chronic positional dizziness x years, only occurs with rapid position change or moving head rapidly up and down; he relates some of this to an arrow injury to his head as a child  He is feeling better since recent ablation  RRR on exam today    Recent echo with improved LVEF 50-55% my review  Max TRV overmeasured on study, may be 2.9 m/s - borderline PH which would most likely be groups II and III in him  Note RV dilated, mild dysfxn    Has significant SRIKANTH  Ensure optimal CPAP use    gdmt below  Attempt jardiance 10 qd today; discussed risks/benefits/red flags/when to call us; no overt contraindications  Could consider ACEi to entresto switch in future    No other med changes today    Fu Dr. Jamie Crowell and EP team - already scheduled    Advised go to his PCP for his long standing chronic cough    Studies:  I have reviewed all pertinent patient data/labs/imaging where available, including but not limited to the below studies. Selected results may be displayed here but comprehensive listing is omitted for note clarity and can be found in the epic chart. ECG. Echo. Stress. Cath. HPI:   65 y.o. male Hx per chart p/w HF fu.  No new CP/SOB/dizziness/palpitations/syncope. No new fatigue. No new unintentional weight changes. No new leg swelling, PND, pillow orthopnea. No new fevers, chills, cough, nausea, vomiting, diarrhea, dysuria. Interval History:   As noted in 'plan' section above and prior epic chart notes.     Past Medical History:   Diagnosis Date   • A-fib [facility])    • Acute ulcer of stomach    • CHF (congestive heart failure) ([facility])    • GERD (gastroesophageal reflux disease)    • Rib fractures      Patient Active Problem List    Diagnosis Date Noted   • Pericarditis 09/15/2023   • Nonischemic cardiomyopathy ([address]) 07/07/2023   • Current use of long term anticoagulation 06/19/2023   • Mixed hyperlipidemia 06/19/2023   • Long term current use of amiodarone 06/19/2023   • GERD (gastroesophageal reflux disease)    • CHF (congestive heart failure) ([address])    • Atrial thrombus 05/17/2022   • Obesity (BMI 30-39.9) 05/17/2022   • Mass of right lower extremity 05/03/2022   • Stress 05/03/2022   • Acute on chronic systolic CHF (congestive heart failure) ([address]) 04/24/2022   • Obstructive sleep apnea    • Abnormal CT of the chest 02/14/2022   • PAF (paroxysmal atrial fibrillation) ([address]) 02/13/2022   • COVID-19 2022   • Acute blood loss anemia 2020   • History of Eliud-en-Y gastric bypass 2020   • Intolerance of continuous positive airway pressure (CPAP) ventilation 2020   • Gastrointestinal hemorrhage with melena 2020   • Abnormal CT of the abdomen 2020   • Closed fracture of multiple ribs of left side 2019   • Visual changes 2018   • Blood alcohol level of 240 mg/100 ml or more 2018   • Orbit fracture, right, sequela 2018   • Raised intraocular pressure of right eye 2018   • Peptic ulcer 2017       ROS:  10 point ROS negative except as specified in HPI/interval history    No Known Allergies    Current Outpatient Medications   Medication Instructions   • amiodarone 200 mg tablet TAKE 1 TABLET DAILY   • atorvastatin (LIPITOR) 40 mg, Oral, Daily with dinner   • colchicine (COLCRYS) 0.6 mg, Oral, 2 times daily PRN   • Empagliflozin (JARDIANCE) 10 mg, Oral, Every morning   • ergocalciferol (VITAMIN D2) 50,000 Units, Oral, 2 times weekly   • fexofenadine (ALLEGRA) 180 mg, Oral, Daily   • furosemide (LASIX) 20 mg, Oral, Daily   • lisinopril (ZESTRIL) 5 mg, Oral, Daily   • metoprolol succinate (TOPROL-XL) 25 mg, Oral, 2 times daily   • Multiple Vitamins-Minerals (MULTIVITAMIN ADULT EXTRA C PO) 1 capsule, Oral   • pantoprazole (PROTONIX) 40 mg tablet TAKE 1 TABLET EVERY 12 HOURS   • potassium chloride (Klor-Con M20) 20 mEq tablet TAKE 2 TABLETS IN THE MORNING AND 1 TABLET IN THE EVENING   • Xarelto 20 MG tablet TAKE 1 TABLET DAILY WITH BREAKFAST        Social History     Socioeconomic History   • Marital status: /Civil Union     Spouse name: Not on file   • Number of children: Not on file   • Years of education: Not on file   • Highest education level: Not on file   Occupational History   • Not on file   Tobacco Use   • Smoking status: Former     Types: Cigars     Start date: 0     Quit date:      Years since quittin.7   • Smokeless tobacco: Former     Types: Chew   Vaping Use   • Vaping Use: Never used   Substance and Sexual Activity   • Alcohol use: Yes     Alcohol/week: 7.0 standard drinks of alcohol     Types: 7 Cans of beer per week     Comment: Hasn't had any beer since 4/23/22   • Drug use: No   • Sexual activity: Yes     Partners: Female   Other Topics Concern   • Not on file   Social History Narrative    Always uses seat belt    Feels safe at home    No guns in the home     Social Determinants of Health     Financial Resource Strain: Not on file   Food Insecurity: No Food Insecurity (4/26/2022)    Hunger Vital Sign    • Worried About Running Out of Food in the Last Year: Never true    • Ran Out of Food in the Last Year: Never true   Transportation Needs: No Transportation Needs (4/26/2022)    PRAPARE - Transportation    • Lack of Transportation (Medical): No    • Lack of Transportation (Non-Medical):  No   Physical Activity: Not on file   Stress: Not on file   Social Connections: Not on file   Intimate Partner Violence: Not on file   Housing Stability: Low Risk  (4/26/2022)    Housing Stability Vital Sign    • Unable to Pay for Housing in the Last Year: No    • Number of Places Lived in the Last Year: 1    • Unstable Housing in the Last Year: No       Family History   Problem Relation Age of Onset   • Seizures Father        Physical Exam:  Vitals:    09/26/23 1347   BP: 110/76   BP Location: Right arm   Patient Position: Sitting   Cuff Size: Standard   Pulse: 79   SpO2: 98%   Weight: 133 kg (294 lb)   Height: 6' 2" (1.88 m)     Constitutional: NAD, non toxic  Ears/nose/mouth/throat: atraumatic  CV: RRR, nl S1S2, no murmurs/rubs/gallups, no JVD, no HJR  Resp: CTABL  GI: Soft, NTND  MSK: no swollen joints in exposed areas  Extr: +1 LE edema, warm LE  Pysche: Normal affect  Neuro: appropriate in conversation  Skin: dry and intact in exposed areas    Labs & Results:    Lab Results   Component Value Date    SODIUM 135 09/15/2023    K 4.1 09/15/2023     09/15/2023    CO2 22 09/15/2023    BUN 15 09/15/2023    CREATININE 0.81 09/15/2023    GLUC 120 09/15/2023    CALCIUM 8.3 (L) 09/15/2023     Lab Results   Component Value Date    WBC 16.20 (H) 09/15/2023    HGB 14.9 09/15/2023    HCT 43.6 09/15/2023    MCV 98 09/15/2023     09/15/2023     Lab Results   Component Value Date    BNP 81 05/09/2023      Lab Results   Component Value Date    CHOLESTEROL 144 04/25/2022    CHOLESTEROL 185 05/13/2021     Lab Results   Component Value Date    HDL 50 04/25/2022    HDL 74 05/13/2021     Lab Results   Component Value Date    TRIG 82 04/25/2022    TRIG 55 05/13/2021     Lab Results   Component Value Date    [address] 04/25/2022       Counseling / Coordination of Care  Time spent today 27 minutes. Greater than 50% of total time was spent with the patient and / or family counseling and / or coordination of care. We discussed diagnoses, most recent studies, tests and any changes in treatment plan. Thank you for the opportunity to participate in the care of this patient.     Doristine Litten, MD  Attending Physician  Advanced Heart Failure and Transplant Cardiology  [address] [facility]

## 2023-09-23 NOTE — SOCIAL WORK
CM met with pt at bedside and reviewed observation notice  Pt verbalizes understanding and provided signature    CM provided copy to pt       ~I have read and agree with the above documentation~    PEEWEE Carcamo altered mental status

## 2023-09-26 ENCOUNTER — OFFICE VISIT (OUTPATIENT)
Dept: CARDIOLOGY CLINIC | Facility: CLINIC | Age: 63
End: 2023-09-26
Payer: COMMERCIAL

## 2023-09-26 VITALS
HEIGHT: 74 IN | WEIGHT: 294 LBS | DIASTOLIC BLOOD PRESSURE: 76 MMHG | BODY MASS INDEX: 37.73 KG/M2 | SYSTOLIC BLOOD PRESSURE: 110 MMHG | OXYGEN SATURATION: 98 % | HEART RATE: 79 BPM

## 2023-09-26 DIAGNOSIS — I48.0 PAF (PAROXYSMAL ATRIAL FIBRILLATION) (HCC): ICD-10-CM

## 2023-09-26 DIAGNOSIS — I50.42 CHRONIC COMBINED SYSTOLIC AND DIASTOLIC CONGESTIVE HEART FAILURE (HCC): ICD-10-CM

## 2023-09-26 DIAGNOSIS — R06.09 DYSPNEA ON EXERTION: ICD-10-CM

## 2023-09-26 DIAGNOSIS — I50.42 CHRONIC COMBINED SYSTOLIC AND DIASTOLIC HEART FAILURE (HCC): Primary | ICD-10-CM

## 2023-09-26 DIAGNOSIS — Z45.02 ICD (IMPLANTABLE CARDIOVERTER-DEFIBRILLATOR) DISCHARGE: ICD-10-CM

## 2023-09-26 DIAGNOSIS — I42.8 NONISCHEMIC CARDIOMYOPATHY (HCC): ICD-10-CM

## 2023-09-26 PROCEDURE — 99214 OFFICE O/P EST MOD 30 MIN: CPT | Performed by: STUDENT IN AN ORGANIZED HEALTH CARE EDUCATION/TRAINING PROGRAM

## 2023-09-29 ENCOUNTER — TELEPHONE (OUTPATIENT)
Dept: CARDIOLOGY CLINIC | Facility: CLINIC | Age: 63
End: 2023-09-29

## 2023-09-29 NOTE — TELEPHONE ENCOUNTER
Pt's wife states Fannie Raines had an ablation done on 9/14/23 with Dr. Jacky Harley. She states he is currently in distress, can hardly breathe, gained a lot of weight within the 2 weeks, feels dizzy and very weak. Please Advise. Thank You!

## 2023-09-29 NOTE — PROGRESS NOTES
Follow-up message from telephone encounter earlier today. Device interrogation showed that patient is going in and out of atrial flutter with frequent PACs. He is a couple weeks out from ablation. It appears that his atrial arrhythmia burden increased this week. This is likely the culprit for his weight gain. Did call the patient back and discussed how symptomatic he is. If he lays low at home, he is okay. Therefore, we have advised him to increase his metoprolol succinate to 50 mg twice daily and he is already aware of increasing his water pill over the weekend as well. On Monday, he will send another transmission and call the office to tell us how he is doing. He also was advised that if his symptoms get worse over the weekend, he will come back to [facility] ER for further EP evaluation. He is currently on amiodarone 20 mg daily.

## 2023-10-23 ENCOUNTER — IN-CLINIC DEVICE VISIT (OUTPATIENT)
Dept: CARDIOLOGY CLINIC | Facility: CLINIC | Age: 63
End: 2023-10-23
Payer: COMMERCIAL

## 2023-10-23 ENCOUNTER — OFFICE VISIT (OUTPATIENT)
Dept: CARDIOLOGY CLINIC | Facility: CLINIC | Age: 63
End: 2023-10-23
Payer: COMMERCIAL

## 2023-10-23 VITALS
DIASTOLIC BLOOD PRESSURE: 70 MMHG | WEIGHT: 290.8 LBS | HEIGHT: 74 IN | BODY MASS INDEX: 37.32 KG/M2 | SYSTOLIC BLOOD PRESSURE: 116 MMHG | HEART RATE: 72 BPM

## 2023-10-23 DIAGNOSIS — I48.0 PAF (PAROXYSMAL ATRIAL FIBRILLATION) (HCC): Primary | ICD-10-CM

## 2023-10-23 DIAGNOSIS — I48.0 PAROXYSMAL ATRIAL FIBRILLATION (HCC): ICD-10-CM

## 2023-10-23 DIAGNOSIS — Z95.810 PRESENCE OF AUTOMATIC CARDIOVERTER/DEFIBRILLATOR (AICD): Primary | ICD-10-CM

## 2023-10-23 DIAGNOSIS — I42.8 NONISCHEMIC CARDIOMYOPATHY (HCC): ICD-10-CM

## 2023-10-23 DIAGNOSIS — Z79.899 ON AMIODARONE THERAPY: ICD-10-CM

## 2023-10-23 DIAGNOSIS — Z79.899 LONG TERM CURRENT USE OF AMIODARONE: ICD-10-CM

## 2023-10-23 DIAGNOSIS — I50.42 CHRONIC COMBINED SYSTOLIC AND DIASTOLIC HEART FAILURE (HCC): ICD-10-CM

## 2023-10-23 PROCEDURE — 93000 ELECTROCARDIOGRAM COMPLETE: CPT | Performed by: PHYSICIAN ASSISTANT

## 2023-10-23 PROCEDURE — 99214 OFFICE O/P EST MOD 30 MIN: CPT | Performed by: PHYSICIAN ASSISTANT

## 2023-10-23 PROCEDURE — 93284 PRGRMG EVAL IMPLANTABLE DFB: CPT | Performed by: INTERNAL MEDICINE

## 2023-10-23 RX ORDER — METOPROLOL SUCCINATE 25 MG/1
25 TABLET, EXTENDED RELEASE ORAL DAILY
Qty: 180 TABLET | Refills: 3
Start: 2023-10-23

## 2023-10-23 RX ORDER — DIGOXIN 125 MCG
125 TABLET ORAL DAILY
Qty: 90 TABLET | Refills: 2 | Status: SHIPPED | OUTPATIENT
Start: 2023-10-23

## 2023-10-23 RX ORDER — LOSARTAN POTASSIUM 25 MG/1
12.5 TABLET ORAL DAILY
Qty: 45 TABLET | Refills: 2 | Status: SHIPPED | OUTPATIENT
Start: 2023-10-23

## 2023-10-23 NOTE — PROGRESS NOTES
Results for orders placed or performed in visit on 10/23/23   Cardiac EP device report    Narrative    MDT BI-V ICD/ [place] INTERROGATED IN THE [facility] OFFICE. BATTERY VOLTAGE ADEQUATE. (9.2 YRS) AP 43%  92%. ALL LEAD PARAMETERS WITHIN NORMAL LIMITS. MULTIPLE AT/AF EPISODES DETECTED (10% OF TIME, S/P AF ABL) LONGEST 45 MIN. PATIENT IS ON XARELTO, DIG AND METOPROLOL. OPTI-VOL WITHIN NORMAL LIMITS. NO PROGRAMMING CHANGES MADE TO DEVICE PARAMETERS. NORMAL DEVICE FUNCTION. ---CALLEJAS

## 2023-10-23 NOTE — PROGRESS NOTES
Electrophysiology Follow Up    [address] [place]  [address] [facility] [address] Nell Chavez  : 1960  MRN: [id number]    Date of Visit: 10/23/2023       Assessment/Plan     1. PAF (paroxysmal atrial fibrillation) (HCC)  metoprolol succinate (TOPROL-XL) 25 mg 24 hr tablet    digoxin (LANOXIN) 0.125 mg tablet    TSH, 3rd generation with Free T4 reflex      2. Paroxysmal atrial fibrillation (HCC)  POCT ECG      3. Chronic combined systolic and diastolic heart failure (HCC)  metoprolol succinate (TOPROL-XL) 25 mg 24 hr tablet      4. Nonischemic cardiomyopathy (HCC)  losartan (COZAAR) 25 mg tablet      5. On amiodarone therapy  TSH, 3rd generation with Free T4 reflex      6. Long term current use of amiodarone            ASSESSMENT:  1. Persistent atrial fibrillation with periods of rapid ventricular response, with breakthrough despite amiodarone antiarrhythmic therapy --- status post cryoablation with pulmonary vein isolation and posterior wall isolation 2023              A.)  Initially noted 2022, converted on IV Cardizem on several occasions              B.)  More persistent A-fib noted 2022, TI at that time showed left atrial appendage thrombus and thus cardioversion was aborted in favor of ongoing rate control              C.)  Successful cardioversion 2022, however with subsequent reversion back to atrial fibrillation - started on amiodarone antiarrhythmic therapy at that time              D.)  Repeat successful cardioversion 2022 once loaded on amiodarone              E.)  Breakthrough episodes despite amiodarone, 7% burden per prior device interrogation              F.)  Prior inappropriate ICD shock for rapid atrial fibrillation 2023, digoxin added to rate control along with amiodarone and Toprol-XL              G.)  Maintained on Xarelto anticoagulation   H.)  Digoxin discontinued in the post ablation setting  2.   Nonischemic cardiomyopathy with LVEF 35% per echo 5/2023              A.)  No obstructive CAD per cardiac catheterization 5/2023              B.)  Maintained on lisinopril and Toprol-XL  3. Chronic HFrEF  4. Sinus bradycardia/tachybrady syndrome with [employer] BiV ICD in situ (implanted 7/2023)  5. Hypertension  6. Hyperlipidemia  7. Obesity with BMI 36 with prior gastric bypass surgery      DISCUSSION/SUMMARY:  He reports frequent dizziness and lightheadedness, consistent with orthostatic hypotension. This is a longstanding issue, however recently he reports an episode of syncope while working in his garden and trying to get up quickly. This was after working for several hours and feeling periodic dizziness throughout this time. Device interrogation today confirmed that he had no arrhythmias at the time of the symptoms. To hopefully allow for more blood pressure room, I have asked him to decrease his Toprol-XL to 25 mg once daily. He is still having paroxysmal atrial fibrillation, thus I have asked him to restart digoxin 125 mcg daily as he was previously taking. He will need to continue amiodarone antiarrhythmic therapy at this time given his ongoing episodes. Hopefully this can be discontinued or weaned moving forward given his young age, however at this time he will need to continue it given his ongoing episodes. He also reports an ongoing cough. While there may be other causes of this, he has been maintained on lisinopril for his cardiomyopathy. Initially he was placed on losartan, however this was discontinued when he had recurrent atrial fibrillation and needed more rate controlling medications. It is unclear why lisinopril was restarted rather than losartan, as he states he tolerated it well. He will thus discontinue lisinopril, and restart his prior dose of losartan 12.5 mg daily. He was educated again on long-term use of amiodarone, and the ongoing testing necessary.   He sees an eye doctor regularly, and has informed them that he is on amiodarone. He had pulmonary function test performed about 1 year ago, and as his shortness of breath is unchanged with no new complaints I will not repeat these at this time. Recent LFTs were within normal limits, however he has not had a repeat TSH since 5/2023. I will thus order a TSH with reflex T4 to have done sometime in the future. We will continue to monitor his device through routine device interrogations. I will review the above plan with Dr. Yolanda Barboza, and I informed the patient that I would contact him with any further recommendations or changes. He otherwise knows to contact us with any questions, concerns, or changes in symptoms, and he will return to the office in 4 months. History of Present Illness     CHIEF COMPLAINT: Post ablation follow-up    HPI/INTERVAL HISTORY: John Plaza is a 65 y.o. male with nonischemic cardiomyopathy with LVEF 35% despite optimal medical therapy with Toprol-XL and lisinopril, chronic HFrEF, persistent atrial fibrillation with periods of rapid ventricular response currently maintained on amiodarone and Xarelto, sinus bradycardia/tachybradycardia syndrome, hypertension, hyperlipidemia, intermittent left bundle branch block, obesity with BMI 36 and prior gastric bypass, history of alcohol use, and prior GI bleed while taking NSAIDs. He typically follows with Dr. Jaleesa Marcus as an outpatient. He was initially found to have atrial fibrillation during a hospitalization 2/2022, with periods of rapid ventricular response. He converted on IV Cardizem, and was discharged on metoprolol. He was not placed on anticoagulation given his low [id number] score. Unfortunately, several weeks later he had recurrent rapid atrial fibrillation and was readmitted back to the hospital.  He again converted on IV Cardizem, and was discharged on both oral diltiazem along with metoprolol.   Echocardiograms were not performed during those admissions. He was admitted again to the hospital 4/2022 with acute CHF, and echocardiogram showed LVEF 30%. He remained in atrial fibrillation throughout his stay, thus TI cardioversion was pursued. TI 4/2022 revealed left atrial appendage thrombus, thus he was rate controlled until he could undergo repeat IT followed by successful cardioversion 8/2022 (first cardioversion earlier in the month was successful - started on amiodarone at that time but with subsequent reversion, second cardioversion later that same month once loaded on amiodarone antiarrhythmic therapy was successful). He continued to have breakthrough A-fib on amiodarone, and despite optimal medical therapy his LVEF was still decreased at 35% per echo 5/2023. He also has periods of sinus bradycardia with rates in the 40s, as well as previously documented atrial fibrillation with slow ventricular response. These have limited up titration of his rate controlling medications. Thus, BiV ICD implantation (given reduced LVEF, left bundle branch block with wide QRS and need for pacing) was recommended and underwent that procedure on 7/7/2023. Unfortunately, he received an inappropriate ICD shock shortly thereafter for rapid atrial fibrillation. As he was already on amiodarone and Toprol-XL, digoxin was added to his regimen. Given his ongoing breakthrough A-fib despite amiodarone, atrial fibrillation ablation was recommended and on 9/14/2023 he underwent cryoablation with pulmonary vein isolation and posterior wall isolation by Dr. Pola Hinds. The next day he was noted to have mild pericarditis successfully treated with IV Toradol. Limited echocardiogram at that time showed no evidence of effusion with LVEF around 45%. He was discharged on a short course of colchicine, and his digoxin was discontinued. He presents today for post ablation follow-up.     Several weeks after the ablation device interrogation showed paroxysmal atrial flutter and frequent PACs, with which he was symptomatic with mild weight gain. His Toprol-XL dose was increased to 50 mg twice daily along with increased diuretics. He was instructed to this for only several days, after which he decreased back to 25 mg twice daily along with his typical dose of diuretics. He denies significant issues with these medication changes. Since then, he reports an episode of syncope. He remains very active, and was outside pulling weeds and working in his garden for over 2 hours. He was periodically dizzy throughout this time, particularly with bending and standing up too quickly. He would always take a break and drink water, with improvement in his symptoms. Eventually, he reports being bent over and standing up when he syncopized and woke up in his garden. He woke up shortly thereafter, felt better and was able to continue his activity. This dizziness with positional changes is unchanged since his ablation, and he reports has been ongoing for over a year. Despite this, he remains active and does not frequently limit himself. He reports that his shortness of breath is at baseline, he has occasional palpitations which have improved since his ablation. He has a persistent cough which has been present for a while, and while he denies exertional chest pain he admits to "twinges" around his device. Review of Systems  ROS as noted above, otherwise 12 point review of systems was performed and is negative. Historical Information  - I have personally reviewed and updated this information, including social history, medical history, and family history.   Social History     Socioeconomic History    Marital status: /Civil Union     Spouse name: Not on file    Number of children: Not on file    Years of education: Not on file    Highest education level: Not on file   Occupational History    Not on file   Tobacco Use    Smoking status: Former     Types: Cigars     Start date: 1977 Quit date:      Years since quittin.8    Smokeless tobacco: Former     Types: Chew   Vaping Use    Vaping Use: Never used   Substance and Sexual Activity    Alcohol use: Not Currently     Alcohol/week: 7.0 standard drinks of alcohol     Types: 7 Cans of beer per week     Comment: Hasn't had any beer since 23    Drug use: No    Sexual activity: Yes     Partners: Female   Other Topics Concern    Not on file   Social History Narrative    Always uses seat belt    Feels safe at home    No guns in the home     Social Determinants of Health     Financial Resource Strain: Not on file   Food Insecurity: No Food Insecurity (2022)    Hunger Vital Sign     Worried About Running Out of Food in the Last Year: Never true     Ran Out of Food in the Last Year: Never true   Transportation Needs: No Transportation Needs (2022)    PRAPARE - Transportation     Lack of Transportation (Medical): No     Lack of Transportation (Non-Medical): No   Physical Activity: Not on file   Stress: Not on file   Social Connections: Not on file   Intimate Partner Violence: Not on file   Housing Stability: Low Risk  (2022)    Housing Stability Vital Sign     Unable to Pay for Housing in the Last Year: No     Number of Places Lived in the Last Year: 1     Unstable Housing in the Last Year: No     Past Medical History:   Diagnosis Date    A-fib ([address])     Acute ulcer of stomach     CHF (congestive heart failure) ([address])     GERD (gastroesophageal reflux disease)     Rib fractures      Past Surgical History:   Procedure Laterality Date    CARDIAC CATHETERIZATION N/A 5/10/2023    Procedure: Cardiac catheterization;  Surgeon: Kian Best DO;  Location: [facility]; Service: Cardiology    CARDIAC CATHETERIZATION N/A 5/10/2023    Procedure: Cardiac Coronary Angiogram;  Surgeon: Kian Best DO;  Location: [facility];   Service: Cardiology    CARDIAC ELECTROPHYSIOLOGY PROCEDURE N/A 2023    Procedure: Cardiac biv icd implant;  Surgeon: Duane Bernhardt, MD;  Location: [facility]; Service: Cardiology    CARDIAC ELECTROPHYSIOLOGY PROCEDURE N/A 2023    Procedure: Cardiac eps/afib ablation;  Surgeon: Duane Bernhardt, MD;  Location: [facility];   Service: Cardiology    COLONOSCOPY      ESOPHAGOGASTRODUODENOSCOPY      GASTRIC BYPASS      MANDIBLE FRACTURE SURGERY       Social History     Substance and Sexual Activity   Alcohol Use Not Currently    Alcohol/week: 7.0 standard drinks of alcohol    Types: 7 Cans of beer per week    Comment: Hasn't had any beer since 23     Social History     Substance and Sexual Activity   Drug Use No     Social History     Tobacco Use   Smoking Status Former    Types: Cigars    Start date:     Quit date:     Years since quittin.8   Smokeless Tobacco Former    Types: Chew     Family History   Problem Relation Age of Onset    Seizures Father        Meds/Allergies       Current Outpatient Medications:     amiodarone 200 mg tablet, TAKE 1 TABLET DAILY, Disp: 90 tablet, Rfl: 3    atorvastatin (LIPITOR) 40 mg tablet, TAKE 1 TABLET DAILY WITH DINNER, Disp: 90 tablet, Rfl: 3    colchicine (COLCRYS) 0.6 mg tablet, Take 1 tablet (0.6 mg total) by mouth 2 (two) times a day as needed (chest pain/pericarditis), Disp: 14 tablet, Rfl: 0    digoxin (LANOXIN) 0.125 mg tablet, Take 1 tablet (125 mcg total) by mouth daily, Disp: 90 tablet, Rfl: 2    furosemide (LASIX) 20 mg tablet, Take 1 tablet (20 mg total) by mouth daily, Disp: 90 tablet, Rfl: 3    losartan (COZAAR) 25 mg tablet, Take 0.5 tablets (12.5 mg total) by mouth daily, Disp: 45 tablet, Rfl: 2    metoprolol succinate (TOPROL-XL) 25 mg 24 hr tablet, Take 1 tablet (25 mg total) by mouth daily, Disp: 180 tablet, Rfl: 3    Multiple Vitamins-Minerals (MULTIVITAMIN ADULT EXTRA C PO), Take 1 capsule by mouth, Disp: , Rfl:     pantoprazole (PROTONIX) 40 mg tablet, TAKE 1 TABLET EVERY 12 HOURS, Disp: 180 tablet, Rfl: 3 potassium chloride (Klor-Con M20) 20 mEq tablet, TAKE 2 TABLETS IN THE MORNING AND 1 TABLET IN THE EVENING, Disp: 270 tablet, Rfl: 3    Xarelto 20 MG tablet, TAKE 1 TABLET DAILY WITH BREAKFAST, Disp: 90 tablet, Rfl: 3    Empagliflozin (Jardiance) 10 MG TABS tablet, Take 1 tablet (10 mg total) by mouth every morning (Patient not taking: Reported on 10/23/2023), Disp: 90 tablet, Rfl: 5    ergocalciferol (VITAMIN D2) 50,000 units, Take 1 capsule (50,000 Units total) by mouth 2 (two) times a week (Patient not taking: Reported on 9/26/2023), Disp: 24 capsule, Rfl: 0    fexofenadine (ALLEGRA) 180 MG tablet, Take 1 tablet (180 mg total) by mouth in the morning., Disp: 30 tablet, Rfl: 3    No Known Allergies    Objective   Vitals: Blood pressure 116/70, pulse 72, height 6' 2" (1.88 m), weight 132 kg (290 lb 12.8 oz).         Physical Exam  GEN: NAD, alert and oriented x 3, well appearing  SKIN: dry without significant lesions or rashes  HEENT: NCAT, PERRL, EOMs intact  NECK: No JVD appreciated  CARDIOVASCULAR: RRR, normal S1, S2 without murmurs, rubs, or gallops appreciated  LUNGS: Clear to auscultation bilaterally without wheezes, rhonchi, or rales  ABDOMEN: Soft, nontender, nondistended  EXTREMITIES/VASCULAR: perfused without clubbing, cyanosis, or LE edema b/l  PSYCH: Normal mood and affect  NEURO: CN ll-Xll grossly intact      Labs:  Admission on 09/14/2023, Discharged on 09/15/2023   Component Date Value    Sodium 09/14/2023 139     Potassium 09/14/2023 4.1     Chloride 09/14/2023 107     CO2 09/14/2023 26     ANION GAP 09/14/2023 6     BUN 09/14/2023 17     Creatinine 09/14/2023 1.00     Glucose 09/14/2023 95     Glucose, Fasting 09/14/2023 95     Calcium 09/14/2023 8.6     eGFR 09/14/2023 80     Magnesium 09/14/2023 2.0     WBC 09/14/2023 8.94     RBC 09/14/2023 4.80     Hemoglobin 09/14/2023 15.6     Hematocrit 09/14/2023 45.9     MCV 09/14/2023 96     MCH 09/14/2023 32.5     MCHC 09/14/2023 34.0     RDW 09/14/2023 12.4     MPV 09/14/2023 10.2     Platelets 87/11/0424 179     nRBC 09/14/2023 0     Neutrophils Relative 09/14/2023 70     Immat GRANS % 09/14/2023 0     Lymphocytes Relative 09/14/2023 19     Monocytes Relative 09/14/2023 8     Eosinophils Relative 09/14/2023 3     Basophils Relative 09/14/2023 0     Neutrophils Absolute 09/14/2023 6.13     Immature Grans Absolute 09/14/2023 0.03     Lymphocytes Absolute 09/14/2023 1.73     Monocytes Absolute 09/14/2023 0.75     Eosinophils Absolute 09/14/2023 0.27     Basophils Absolute 09/14/2023 0.03     Protime 09/14/2023 13.9     INR 09/14/2023 1.05     LV EF 09/14/2023 35     Ventricular Rate 09/14/2023 60     Atrial Rate 09/14/2023 60     MA Interval 09/14/2023 138     QRSD Interval 09/14/2023 124     QT Interval 09/14/2023 456     QTC Interval 09/14/2023 456     P Axis 09/14/2023 87     QRS Axis 09/14/2023 136     T Wave Axis 09/14/2023 49     Activated Clotting Time,* 09/14/2023 369 (H)     Specimen Type 09/14/2023 ARTERIAL     Activated Clotting Time,* 09/14/2023 355 (H)     Specimen Type 09/14/2023 ARTERIAL     Activated Clotting Time,* 09/14/2023 377 (H)     Specimen Type 09/14/2023 ARTERIAL     Activated Clotting Time,* 09/14/2023 355 (H)     Specimen Type 09/14/2023 ARTERIAL     Activated Clotting Time,* 09/14/2023 377 (H)     Specimen Type 09/14/2023 VENOUS     Activated Clotting Time,* 09/14/2023 355 (H)     Specimen Type 09/14/2023 VENOUS     Activated Clotting Time,* 09/14/2023 427 (H)     Specimen Type 09/14/2023 ARTERIAL     Activated Clotting Time,* 09/14/2023 405 (H)     Specimen Type 09/14/2023 VENOUS     Sodium 09/15/2023 135     Potassium 09/15/2023 4.1     Chloride 09/15/2023 106     CO2 09/15/2023 22     ANION GAP 09/15/2023 7     BUN 09/15/2023 15     Creatinine 09/15/2023 0.81     Glucose 09/15/2023 120     Glucose, Fasting 09/15/2023 120 (H)     Calcium 09/15/2023 8.3 (L)     eGFR 09/15/2023 95     WBC 09/15/2023 16.20 (H)     RBC 09/15/2023 4.44 Hemoglobin 09/15/2023 14.9     Hematocrit 09/15/2023 43.6     MCV 09/15/2023 98     MCH 09/15/2023 33.6     MCHC 09/15/2023 34.2     RDW 09/15/2023 12.3     Platelets 10/32/2688 174     MPV 09/15/2023 11.0     Magnesium 09/15/2023 2.2     Ventricular Rate 09/14/2023 69     Atrial Rate 09/14/2023 69     SC Interval 09/14/2023 0     QRSD Interval 09/14/2023 125     QT Interval 09/14/2023 450     QTC Interval 09/14/2023 483     P Axis 09/14/2023 82     QRS Axis 09/14/2023 146     T Wave Axis 09/14/2023 81     Triscuspid Valve Regurgi* 09/15/2023 38.0     Tricuspid valve peak reg* 09/15/2023 3.09     TR Peak Giorgio 09/15/2023 3.1     A4C EF 09/15/2023 47     LV EF 09/15/2023 45          Imaging: I have personally reviewed pertinent reports. ECHO: No results found for this or any previous visit. Results for orders placed during the hospital encounter of 11/02/22    Echo complete w/ contrast if indicated    Interpretation Summary    Left Ventricle: Left ventricular cavity size is normal. Wall thickness is normal. The left ventricular ejection fraction is 43%. Systolic function is mildly reduced. Wall motion is normal. Diastolic function is normal.    Pulmonic Valve: There is mild regurgitation. Compared to prior echocardiographic study dated 04/07/2022, there appears to be an improvement in left ventricular systolic function, now with sinus rhythm replacing atrial fibrillation.         CARDIAC CATH 5/2023:         EKG: Normal sinus rhythm with biventricular pacing at 73 bpm

## 2023-10-23 NOTE — Clinical Note
Please review this patient - since discharge he had an episode of syncope which was likely due to orthostatic hypotension. Device check showed no arrhythmias at that time, but he still has ongoing PAF. I made the following changes: - decreased Toprol XL to 25 mg daily - restarted dig 125 mcg (for ongoing afib) - changed lisinopril to losartan 12.5 daily (he reported an ongoing cough and wanted to make sure it wasn't a side effect) - told him to continue amiodarone for now  Do you agree, or is there anything else you would recommend? I asked him to return in 3-4 months for ongoing follow up.

## 2023-10-23 NOTE — PATIENT INSTRUCTIONS
- decrease Toprol XL to 25 mg ONCE daily   - restart digoxin 125 mcg daily   - discontinue lisinopril for possible side effects (cough)  - restart losartan 12.5 mg daily   - check bloodwork (thyroid studies)

## 2023-11-27 ENCOUNTER — OFFICE VISIT (OUTPATIENT)
Dept: CARDIOLOGY CLINIC | Facility: CLINIC | Age: 63
End: 2023-11-27
Payer: COMMERCIAL

## 2023-11-27 ENCOUNTER — REMOTE DEVICE CLINIC VISIT (OUTPATIENT)
Dept: CARDIOLOGY CLINIC | Facility: CLINIC | Age: 63
End: 2023-11-27

## 2023-11-27 VITALS
WEIGHT: 291 LBS | BODY MASS INDEX: 37.35 KG/M2 | HEART RATE: 76 BPM | DIASTOLIC BLOOD PRESSURE: 70 MMHG | SYSTOLIC BLOOD PRESSURE: 116 MMHG | HEIGHT: 74 IN

## 2023-11-27 DIAGNOSIS — I48.0 PAF (PAROXYSMAL ATRIAL FIBRILLATION) (HCC): Primary | ICD-10-CM

## 2023-11-27 DIAGNOSIS — Z95.810 AICD (AUTOMATIC CARDIOVERTER/DEFIBRILLATOR) PRESENT: Primary | ICD-10-CM

## 2023-11-27 DIAGNOSIS — I50.22 CHRONIC SYSTOLIC (CONGESTIVE) HEART FAILURE (HCC): ICD-10-CM

## 2023-11-27 PROCEDURE — RECHECK: Performed by: INTERNAL MEDICINE

## 2023-11-27 PROCEDURE — 93000 ELECTROCARDIOGRAM COMPLETE: CPT | Performed by: INTERNAL MEDICINE

## 2023-11-27 PROCEDURE — 99214 OFFICE O/P EST MOD 30 MIN: CPT | Performed by: INTERNAL MEDICINE

## 2023-11-27 NOTE — PROGRESS NOTES
Cardiology             Barrie Lao  1960  [id number]                Assessment/Plan:     Paroxysmal atrial fibrillation, on amiodarone suppression, status post ablation 9/2023  Nonischemic cardiomyopathy, ejection fraction 25%, subsequently improving to 45%, Medtronic biventricular AICD placed 7/2023  Ventricular tachycardia status post successful ATP x 1 10/2023  Orthostatic hypotension  Chronic systolic and diastolic CHF  History of LA thrombus, resolved on follow-up TI 08/01/2022  Left bundle branch block  Sleep apnea  Obesity with history of gastric bypass surgery  History of GI bleed up to NSAID use in 2020        Doing well from cardiac standpoint after successful A-fib ablation. He still has some burden of atrial fibrillation and therefore will remain on amiodarone. Continue device interrogations as scheduled. 1 episode of VT noted 10/2023 with successful ATP. Continue amiodarone, metoprolol  Continue losartan. Left ejection fraction improved to 45% 9/2023. He states he has not received Jardiance from [employer] and is not sure if it is too expensive. I encouraged him to call his mail order pharmacy to get more information. If he starts Jardiance, I have recommended that he discontinue his furosemide to avoid worsening orthostatic hypotension. Patient counseled on importance of slow positional changes in light of his orthostatic hypotension to avoid future syncopal episodes. Follow-up in 4 months after CMP, TSH, BNP      Interval History: This is a very pleasant 67-year-old male hospitalized 2/2022 shortly after he had a positive home COVID test and symptoms of vomiting, diarrhea, mild dyspnea, and upper respiratory symptoms. He came to the ED 2/13/2022, with exertional dyspnea. ECG demonstrated a new diagnosis of atrial fibrillation with RVR.   CT chest revealed no evidence of pulmonary embolism, although bilateral multilobar infiltrates were present, suggestive of COVID-19 pneumonia. He converted to sinus rhythm with diltiazem IV. His chads Vasc score was 0, therefore he was maintained off anticoagulation. Beta-blockade was initiated. Subsequently, he was seen by our nurse practitioner on 02/24/2022, and rapid atrial fibrillation with symptoms of lightheadedness and exertional dyspnea. He was sent back to the ED where he was given IV diltiazem once again and converted back to sinus rhythm with symptomatic improvement. He was discharged without adjustment of his medications. He was seen by his PCP 02/25/2022 rate was noted to have recurrent atrial fibrillation with RVR at 125 beats per minute and minimal symptoms. Diltiazem 60 mg p.o. b.i.d. was initiated. He went to the ER Saturday 02/26/2022 for atypical chest pain. He was ruled out for myocardial injury with negative high sensitivity troponins x2 and no ischemic ECG changes. He underwent a nuclear stress test 04/07/2022 which revealed no evidence of myocardial ischemia or prior infarction by SPECT imaging, with ejection fraction 32%. This was consistent with nonischemic cardiomyopathy. Abdulaziz Soliman presented back to the hospital 4/2022 with acute CHF and AFib with RVR. TI revealed a severely reduced left ventricular systolic function with a small thrombus about 0.6 cm noted in a very dilated left atrial appendage. Cardioversion was not performed. He was discharged on a rate control strategy with anticoagulation. He was seen by electrophysiology (Dr. Na Rodriguez) 05/16/2022 at which time rate control with anticoagulation was recommended with repeat TI in 2 months. He was recommended to start amiodarone after resolution of his atrial appendage thrombus with subsequent cardioversion to try maintaining sinus rhythm. Cessation of alcohol intake was strongly recommended. It was also recommended to plan for comprehensive ablation-PVI once clinically stable.      He underwent successful TI/cardioversion 08/01/2022. Ejection fraction is 25% with no further thrombus in left atrial appendage despite moderate continuous spontaneous echo contrast.  He was initiated on amiodarone. He called with recurrent palpitations and was noted to be back in atrial fibrillation. Before repeat elective TI/cardioversion was scheduled, he was hospitalized 08/12/2022 with symptoms of orthopnea and atrial fibrillation with slow ventricular response. He was initiated on IV furosemide. Interestingly was noted to have episodes of diaphoresis which she called the nurse for. However on telemetry his rates were not slow or fast and high sensitivity troponins were negative. On 08/15/2022 he underwent successful TI/cardioversion. There was resolution of his left atrial appendage thrombus. He was continued on amiodarone at 200 mg daily. He was seen by electrophysiology 08/26/2022 and was unfortunately noted to be back in atrial fibrillation. His weight was 87 beats per minute. It was recommended to initiate and optimize heart failure medications including ACE-inhibitor/ARB. Bi V ICD was recommended after initiation of medical therapy with consideration of future comprehensive ablation. Repeat echocardiogram 11/2/2022 demonstrate improvement left ventricular ejection fraction at 43%. At that time, ICD plantation was canceled. He was rehospitalized 5/2023 for CHF rapid AF. EF was low again at 35% and repeat cath 5/10/23 showed no CAD. Subsequently, on 7/7/2023 he underwent placement of a Medtronic biventricular AICD. On 9/14/2023 underwent ablation of his atrial fibrillation. Device interrogation 10/26/2023 demonstrated evidence of ventricular tachycardia at 8:16 AM that was treated successfully with ATP x 1. He had 96% biventricular pacing with an atrial fibrillation episode lasting 20 minutes, and OptiVol within normal limits.     He presents today for follow-up with complaints of lightheadedness with positional changes. He had 1 syncopal episode from the same about 2 months ago with no recurrence. Pain, shortness of breath, lower extremity edema. Vitals:  Vitals:    23 0838   BP: 116/70   Pulse: 76   Weight: 132 kg (291 lb)   Height: 6' 2" (1.88 m)         Past Medical History:   Diagnosis Date   • A-fib ([facility])    • Acute ulcer of stomach    • CHF (congestive heart failure) ([facility])    • GERD (gastroesophageal reflux disease)    • Rib fractures      Social History     Socioeconomic History   • Marital status: /Civil Union     Spouse name: Not on file   • Number of children: Not on file   • Years of education: Not on file   • Highest education level: Not on file   Occupational History   • Not on file   Tobacco Use   • Smoking status: Former     Types: Cigars     Start date:      Quit date:      Years since quittin.9   • Smokeless tobacco: Former     Types: Chew   Vaping Use   • Vaping Use: Never used   Substance and Sexual Activity   • Alcohol use: Not Currently     Alcohol/week: 7.0 standard drinks of alcohol     Types: 7 Cans of beer per week     Comment: Hasn't had any beer since 23   • Drug use: No   • Sexual activity: Yes     Partners: Female   Other Topics Concern   • Not on file   Social History Narrative    Always uses seat belt    Feels safe at home    No guns in the home     Social Determinants of Health     Financial Resource Strain: Not on file   Food Insecurity: No Food Insecurity (2022)    Hunger Vital Sign    • Worried About Running Out of Food in the Last Year: Never true    • Ran Out of Food in the Last Year: Never true   Transportation Needs: No Transportation Needs (2022)    PRAPARE - Transportation    • Lack of Transportation (Medical): No    • Lack of Transportation (Non-Medical):  No   Physical Activity: Not on file   Stress: Not on file   Social Connections: Not on file   Intimate Partner Violence: Not on file   Housing Stability: Low Risk  (4/26/2022)    Housing Stability Vital Sign    • Unable to Pay for Housing in the Last Year: No    • Number of Places Lived in the Last Year: 1    • Unstable Housing in the Last Year: No      Family History   Problem Relation Age of Onset   • Seizures Father      Past Surgical History:   Procedure Laterality Date   • CARDIAC CATHETERIZATION N/A 5/10/2023    Procedure: Cardiac catheterization;  Surgeon: Ulices Steward DO;  Location: [facility]; Service: Cardiology   • CARDIAC CATHETERIZATION N/A 5/10/2023    Procedure: Cardiac Coronary Angiogram;  Surgeon: Ulices Steward DO;  Location: [facility]; Service: Cardiology   • CARDIAC ELECTROPHYSIOLOGY PROCEDURE N/A 7/7/2023    Procedure: Cardiac biv icd implant;  Surgeon: Stefan Kasper MD;  Location: [facility]; Service: Cardiology   • CARDIAC ELECTROPHYSIOLOGY PROCEDURE N/A 9/14/2023    Procedure: Cardiac eps/afib ablation;  Surgeon: Stefan Kasper MD;  Location: [facility];   Service: Cardiology   • COLONOSCOPY     • ESOPHAGOGASTRODUODENOSCOPY     • GASTRIC BYPASS     • MANDIBLE FRACTURE SURGERY         Current Outpatient Medications:   •  amiodarone 200 mg tablet, TAKE 1 TABLET DAILY, Disp: 90 tablet, Rfl: 3  •  atorvastatin (LIPITOR) 40 mg tablet, TAKE 1 TABLET DAILY WITH DINNER, Disp: 90 tablet, Rfl: 3  •  colchicine (COLCRYS) 0.6 mg tablet, Take 1 tablet (0.6 mg total) by mouth 2 (two) times a day as needed (chest pain/pericarditis), Disp: 14 tablet, Rfl: 0  •  digoxin (LANOXIN) 0.125 mg tablet, Take 1 tablet (125 mcg total) by mouth daily, Disp: 90 tablet, Rfl: 2  •  ergocalciferol (VITAMIN D2) 50,000 units, Take 1 capsule (50,000 Units total) by mouth 2 (two) times a week, Disp: 24 capsule, Rfl: 0  •  fexofenadine (ALLEGRA) 180 MG tablet, Take 1 tablet (180 mg total) by mouth in the morning., Disp: 30 tablet, Rfl: 3  •  furosemide (LASIX) 20 mg tablet, Take 1 tablet (20 mg total) by mouth daily, Disp: 90 tablet, Rfl: 3  •  losartan (COZAAR) 25 mg tablet, Take 0.5 tablets (12.5 mg total) by mouth daily, Disp: 45 tablet, Rfl: 2  •  metoprolol succinate (TOPROL-XL) 25 mg 24 hr tablet, Take 1 tablet (25 mg total) by mouth daily, Disp: 180 tablet, Rfl: 3  •  Multiple Vitamins-Minerals (MULTIVITAMIN ADULT EXTRA C PO), Take 1 capsule by mouth, Disp: , Rfl:   •  pantoprazole (PROTONIX) 40 mg tablet, TAKE 1 TABLET EVERY 12 HOURS, Disp: 180 tablet, Rfl: 3  •  potassium chloride (Klor-Con M20) 20 mEq tablet, TAKE 2 TABLETS IN THE MORNING AND 1 TABLET IN THE EVENING, Disp: 270 tablet, Rfl: 3  •  Xarelto 20 MG tablet, TAKE 1 TABLET DAILY WITH BREAKFAST, Disp: 90 tablet, Rfl: 3  •  Empagliflozin (Jardiance) 10 MG TABS tablet, Take 1 tablet (10 mg total) by mouth every morning (Patient not taking: Reported on 11/27/2023), Disp: 90 tablet, Rfl: 5        Review of Systems:  Review of Systems   Respiratory: Negative. Cardiovascular: Negative. Neurological:  Positive for light-headedness. All other systems reviewed and are negative. Physical Exam:  Physical Exam  Constitutional:       General: He is not in acute distress. Appearance: He is well-developed. He is not diaphoretic. HENT:      Head: Normocephalic and atraumatic. Eyes:      General: No scleral icterus. Right eye: No discharge. Pupils: Pupils are equal, round, and reactive to light. Neck:      Thyroid: No thyromegaly. Cardiovascular:      Rate and Rhythm: Normal rate and regular rhythm. Heart sounds: Normal heart sounds. No murmur heard. No friction rub. No gallop. Pulmonary:      Effort: Pulmonary effort is normal.      Breath sounds: Normal breath sounds. Abdominal:      General: There is no distension. Tenderness: There is no abdominal tenderness. There is no guarding or rebound. Musculoskeletal:         General: Normal range of motion. Cervical back: Normal range of motion and neck supple.    Skin: General: Skin is warm and dry. Coloration: Skin is not pale. Findings: No erythema or rash. Neurological:      Mental Status: He is alert and oriented to person, place, and time. Coordination: Coordination normal.   Psychiatric:         Behavior: Behavior normal.         Thought Content: Thought content normal.         Judgment: Judgment normal.         This note was completed in part utilizing M-Modal Fluency Direct Software. Grammatical errors, random word insertions, spelling mistakes, and incomplete sentences can be an occasional consequence of this system secondary to software limitations, ambient noise, and hardware issues. If you have any questions or concerns about the content, text, or information contained within the body of this dictation, please contact the provider for clarification.

## 2023-11-27 NOTE — PROGRESS NOTES
Results for orders placed or performed in visit on 11/27/23   Cardiac EP device report    Narrative    MDT BI-V ICD/ ACTIVE SYSTEM IS MRI CONDITIONAL  NB/ARRHYTHMIA CK-CARELINK TRANSMISSION: 30 AT/AF NOTED; 1% BURDEN; LONGEST >1 HR. PT ON Arlyne Lowing. AVAIL EGRAMS SHOWING AF. BATTERY STATUS "9 YRS." AP 43% CRT PACING (BIV) 100% (LV) 0%. ALL AVAILABLE LEAD PARAMETERS WITHIN NORMAL LIMITS. OPTI-VOL WITHIN NORMAL LIMITS. NORMAL DEVICE FUNCTION.  NC

## 2023-11-27 NOTE — PATIENT INSTRUCTIONS
If Jardiance covered, stop furosemide (water pill) because Jardiance acts as a mild potent water pill.   Follow-up in 4 months after repeat blood work  Call me if any changes

## 2023-12-05 ENCOUNTER — TELEPHONE (OUTPATIENT)
Dept: NEUROLOGY | Facility: CLINIC | Age: 63
End: 2023-12-05

## 2023-12-05 NOTE — TELEPHONE ENCOUNTER
Patient called to schedule new patient appointment for dizziness. No testing done. Triage intake sent.

## 2023-12-22 ENCOUNTER — TELEPHONE (OUTPATIENT)
Dept: NEUROLOGY | Facility: CLINIC | Age: 63
End: 2023-12-22

## 2024-01-12 DIAGNOSIS — I50.9 CHF (CONGESTIVE HEART FAILURE) (HCC): Chronic | ICD-10-CM

## 2024-01-12 RX ORDER — FUROSEMIDE 20 MG/1
20 TABLET ORAL DAILY
Qty: 90 TABLET | Refills: 3 | Status: SHIPPED | OUTPATIENT
Start: 2024-01-12

## 2024-01-24 ENCOUNTER — REMOTE DEVICE CLINIC VISIT (OUTPATIENT)
Dept: CARDIOLOGY CLINIC | Facility: CLINIC | Age: 64
End: 2024-01-24
Payer: COMMERCIAL

## 2024-01-24 DIAGNOSIS — Z95.810 PRESENCE OF IMPLANTABLE CARDIOVERTER-DEFIBRILLATOR (ICD): Primary | ICD-10-CM

## 2024-01-24 PROCEDURE — 93296 REM INTERROG EVL PM/IDS: CPT | Performed by: STUDENT IN AN ORGANIZED HEALTH CARE EDUCATION/TRAINING PROGRAM

## 2024-01-24 PROCEDURE — 93295 DEV INTERROG REMOTE 1/2/MLT: CPT | Performed by: STUDENT IN AN ORGANIZED HEALTH CARE EDUCATION/TRAINING PROGRAM

## 2024-01-24 NOTE — PROGRESS NOTES
MDT BI-V ICD/ ACTIVE SYSTEM IS MRI CONDITIONAL   CARELINK TRANSMISSION:  BATTERY VOLTAGE ADEQUATE (9.1 YR.).  AP 28.2% BP 96.5% VSRP 0.3%.  ALL LEAD PARAMETERS WITHIN NORMAL LIMITS.  1 VT-NS EPISODE WITH PAT ON EGM (7 @ 154 BPM).  27 AT/AF EPISODES WITH LONGEST 1 H 4 M, AND AT/AF BURDEN OF 1% SINCE 12/26/2023.  PATIENT TAKES XARELTO, DIGOXIN, METOPROLOL, AND AMIODARONE.  OPTI-VOL WITHIN NORMAL LIMITS.  NORMAL DEVICE FUNCTION.  RG

## 2024-01-27 DIAGNOSIS — I48.0 PAF (PAROXYSMAL ATRIAL FIBRILLATION) (HCC): ICD-10-CM

## 2024-01-29 ENCOUNTER — TELEPHONE (OUTPATIENT)
Dept: CARDIOLOGY CLINIC | Facility: CLINIC | Age: 64
End: 2024-01-29

## 2024-01-29 RX ORDER — AMIODARONE HYDROCHLORIDE 200 MG/1
TABLET ORAL
Qty: 90 TABLET | Refills: 3 | Status: SHIPPED | OUTPATIENT
Start: 2024-01-29

## 2024-01-29 NOTE — TELEPHONE ENCOUNTER
----- Message from Joaquina Quintanilla PA-C sent at 1/29/2024 10:52 AM EST -----  No rush, but I am doing an amiodarone refill on this patient and it looks like he did not get the blood work done that I requested when I saw him in the office 9/2023 (I asked him to check TSH but I see Dr. Myers ordered other labs as well). Can someone please call him and kindly remind him to have his labs done?    Thank you!   Heparin drip

## 2024-01-29 NOTE — TELEPHONE ENCOUNTER
Please call patient to arrange an office visit with Dr. Martinez re: atrial fibrillation and ongoing amiodarone use. Thank you!

## 2024-01-29 NOTE — TELEPHONE ENCOUNTER
Spoke to pt. Notified of your message to get labs drawn. Pt stated he forget, but will get them drawn.

## 2024-02-28 ENCOUNTER — APPOINTMENT (OUTPATIENT)
Dept: LAB | Facility: CLINIC | Age: 64
End: 2024-02-28
Payer: COMMERCIAL

## 2024-02-28 DIAGNOSIS — I50.22 CHRONIC SYSTOLIC (CONGESTIVE) HEART FAILURE (HCC): ICD-10-CM

## 2024-02-28 DIAGNOSIS — I48.0 PAF (PAROXYSMAL ATRIAL FIBRILLATION) (HCC): ICD-10-CM

## 2024-02-28 LAB
ALBUMIN SERPL BCP-MCNC: 4 G/DL (ref 3.5–5)
ALP SERPL-CCNC: 70 U/L (ref 34–104)
ALT SERPL W P-5'-P-CCNC: 22 U/L (ref 7–52)
ANION GAP SERPL CALCULATED.3IONS-SCNC: 4 MMOL/L
AST SERPL W P-5'-P-CCNC: 20 U/L (ref 13–39)
BILIRUB SERPL-MCNC: 0.66 MG/DL (ref 0.2–1)
BNP SERPL-MCNC: 57 PG/ML (ref 0–100)
BUN SERPL-MCNC: 20 MG/DL (ref 5–25)
CALCIUM SERPL-MCNC: 8.6 MG/DL (ref 8.4–10.2)
CHLORIDE SERPL-SCNC: 106 MMOL/L (ref 96–108)
CO2 SERPL-SCNC: 28 MMOL/L (ref 21–32)
CREAT SERPL-MCNC: 1.06 MG/DL (ref 0.6–1.3)
GFR SERPL CREATININE-BSD FRML MDRD: 74 ML/MIN/1.73SQ M
GLUCOSE P FAST SERPL-MCNC: 96 MG/DL (ref 65–99)
POTASSIUM SERPL-SCNC: 4.4 MMOL/L (ref 3.5–5.3)
PROT SERPL-MCNC: 6.6 G/DL (ref 6.4–8.4)
SODIUM SERPL-SCNC: 138 MMOL/L (ref 135–147)
T4 FREE SERPL-MCNC: 1.01 NG/DL (ref 0.61–1.12)
TSH SERPL DL<=0.05 MIU/L-ACNC: 5.6 UIU/ML (ref 0.45–4.5)

## 2024-02-28 PROCEDURE — 36415 COLL VENOUS BLD VENIPUNCTURE: CPT

## 2024-02-28 PROCEDURE — 80053 COMPREHEN METABOLIC PANEL: CPT

## 2024-02-28 PROCEDURE — 83880 ASSAY OF NATRIURETIC PEPTIDE: CPT

## 2024-03-01 NOTE — PROGRESS NOTES
Consultation - Electrophysiology-Cardiology (EP)   Kiko Lao 63 y.o. male MRN: [id number]  Unit/Bed#:  Encounter: [id number]      1. PAF (paroxysmal atrial fibrillation) (HCC)  POCT ECG    Echo follow up/limited w/ contrast if indicated      2. Congestive heart failure, unspecified HF chronicity, unspecified heart failure type (HCC)  Echo follow up/limited w/ contrast if indicated      3. Obstructive sleep apnea        4. Acute on chronic systolic CHF (congestive heart failure) (HCC)        5. Nonischemic cardiomyopathy (HCC)        6. History of Eliud-en-Y gastric bypass        7. Intolerance of continuous positive airway pressure (CPAP) ventilation        8. Current use of long term anticoagulation        9. Mixed hyperlipidemia        10. Long term current use of amiodarone              Principal Problem: management of AF, heart failure          My recommendation for the patient    Patient does have symptomatic improvement but still complains of occasional fatigue    Device check, appropriate BiV pacing,  ms / no AF  Echo sept 2023- EF 45%    The patient weighs 136 kg with a BMI of 39  Is not using BiPAP, give it back after using it 1 day    Patient is advised to have a nutrition consult so that he can lose weight  At some point will follow-up with primary and endocrine with regards to SGLT2 or GLP-1 antagonist          Assessment/Plan   Acute on chronic combined systolic and diastolic heart failure, nonischemic CMP , LVEF 35%  Intermittent LBBB, likely rate related, IVCD,  ms  Optimal medical therapy greater than 3 months  Shared decision making done with patient and primary cardiologist    Persistent atrial fibrillation  Left atrial appendage thrombus, noted on April 22   Chads Vasc score 1   Severe obesity, status post gastric bypass   Peptic ulcer disease   History of heavy alcohol use   Chronic kidney disease stage 3  Mixed hyperlipidemia            Fatigue  Patient does have symptomatic  improvement but still complains of occasional fatigue    Device check, appropriate BiV pacing,  ms / no AF  Echo sept 2023- EF 45%    The patient weighs 136 kg with a BMI of 39  Is not using BiPAP, give it back after using it 1 day    Patient is advised to have a nutrition consult so that he can lose weight  At some point will follow-up with primary and endocrine with regards to SGLT2 or GLP-1 antagonist          Acute on chronic combined systolic and diastolic heart failure, nonischemic CMP , LVEF 35%  Intermittent LBBB, likely rate related, IVCD,  ms  NYHA class II-III  Optimal medical therapy greater than 3 months  Shared decision making done with patient and primary cardiologist  Primary prevention device  Post BiV ICD  Improvement of both QRS width as well as EF  Follow-up with heart failure service          Persistent atrial fibrillation  Long-term anticoagulation  Left atrial thrombus  Currently in sinus rhythm, does have episodes of RVR from time to time    Recent worsening noted since April 2022    Risk factors   Age-62  Obesity-BMI 39  Obstructive sleep apnea -history of same  Thyroid disorder -TSH normal  Hypertension -116/84 on medication  Heart failure -LVEF 35%  Diabetes mellitus -hemoglobin A1c 5.7  Tobacco use-history of same  Alcohol use-heavy abuse till recently  Energy drink use-negative    Has left atrial appendage thrombus    Current therapy   Anticoagulation-chads Vasc score-has left atrial appendage thrombus-on Xarelto  Rate control-metoprolol, digoxin  Rhythm control-post ablation, currently on amiodarone 200 mg a day, reduce it to 100 mg a day    My recommendation for this patient  Check for amiodarone toxicity        Obesity / overweight  BMI-39  History of gastric bypass  This increases the risk of-CAD, CVA, vascular disease, diabetes, kidney dysfunction, hypertension, hyperlipidemia  Diet is responsible for 80% of weight gain   Advice nutritional counseling and healthy  diet  Also advised to increase activity  He is going to follow up with his primary care        Obstructive sleep apnea   Patient has history of snoring, morning fatigue and daytime sleepiness at least on some days  Examination is also suggestive  Recommended to follow up with primary care and Pulmonary Medicine  Needs aggressive management of same  Requested to follow-up with pulmonary medicine as he may need a different device for management of his sleep apnea        Hypertension   Blood pressure-98/72  Medication-metoprolol, furosemide, lisinopril        Mixed hyperlipidemia   Medication-atorvastatin  No myositis or myalgia   My recommendation-continue with same        Diabetes mellitus   Hemoglobin A1c -5.7  Medication -none  My recommendation-continue with weight loss diet      Tobacco abuse   Former smoker      Alcohol abuse   Heavy drinking till recently          History of Present Illness   HPI: Kiko Lao is a 63 y.o. year old male has been referred to me by Dr Myers     Patient complains of some fatigue  He is no longer in A-fib  His BiV paced and QRS is 124 ms  He has however gained weight and currently BMI is 39  He has sleep apnea and is not using his CPAP      Prior history    The patient has significant medical illnesses which include  Persistent atrial fibrillation  Left atrial appendage thrombus, noted on April 22   Chads Vasc score 1   Acute on chronic combined systolic and diastolic heart failure, nonischemic CMP , LVEF 15-30%  Intermittent LBBB, likely rate related  Severe obesity, status post gastric bypass   Peptic ulcer disease   History of heavy alcohol use   Chronic kidney disease stage 3  Mixed hyperlipidemia    Patient admitted to the hospital in April 2022 with significant shortness of breath and weight gain  Echocardiogram revealed severely reduced heart function, RV systolic dysfunction  Stress test was negative for myocardial ischemia  Jesus confirmed severely reduced LV systolic  function with thrombus about 0.6 cm noted in very dilated left atrial appendage  Patient is on metoprolol tartrate, digoxin for rate control    As far as cardiac symptoms are concerned  Angina -negative  Orthopnea -improved  Paroxysmal nocturnal dyspnea -improved  Leg swelling-chronic  Palpitations -improved  Presyncope-occasional  Syncope -negative  Orthostatic lightheadedness -occasional  Exertional intolerance-present    Snoring-present  morning fatigue-present  Daytime sleepiness-present    Social history  Tobacco use-former smoker  Alcohol use-history of abuse and was drinking heavily till recently  Energy drink use-negative  Recreational drug use-negative      Family history  Seizure disorder      Historical Information   Past Medical History:   Diagnosis Date    A-fib (HCC)     Acute ulcer of stomach     CHF (congestive heart failure) (HCC)     GERD (gastroesophageal reflux disease)     Rib fractures      Past Surgical History:   Procedure Laterality Date    CARDIAC CATHETERIZATION N/A 5/10/2023    Procedure: Cardiac catheterization;  Surgeon: Claritza Resendiz DO;  Location: [facility];  Service: Cardiology    CARDIAC CATHETERIZATION N/A 5/10/2023    Procedure: Cardiac Coronary Angiogram;  Surgeon: Claritza Resendiz DO;  Location: [facility];  Service: Cardiology    CARDIAC ELECTROPHYSIOLOGY PROCEDURE N/A 7/7/2023    Procedure: Cardiac biv icd implant;  Surgeon: Felipe Martinez MD;  Location: [facility];  Service: Cardiology    CARDIAC ELECTROPHYSIOLOGY PROCEDURE N/A 9/14/2023    Procedure: Cardiac eps/afib ablation;  Surgeon: Feilpe Martinez MD;  Location: [facility];  Service: Cardiology    COLONOSCOPY      ESOPHAGOGASTRODUODENOSCOPY      GASTRIC BYPASS      MANDIBLE FRACTURE SURGERY       Social History     Substance and Sexual Activity   Alcohol Use Yes    Comment: 1 beer every 2-3 weeks     Social History     Substance and Sexual Activity   Drug Use No     Social History      Tobacco Use   Smoking Status Former    Types: Cigars    Start date:     Quit date:     Years since quittin.1   Smokeless Tobacco Former    Types: Chew     Social History     Socioeconomic History    Marital status: /Civil Union     Spouse name: Not on file    Number of children: Not on file    Years of education: Not on file    Highest education level: Not on file   Occupational History    Not on file   Tobacco Use    Smoking status: Former     Types: Cigars     Start date:      Quit date:      Years since quittin.1    Smokeless tobacco: Former     Types: Chew   Vaping Use    Vaping status: Never Used   Substance and Sexual Activity    Alcohol use: Yes     Comment: 1 beer every 2-3 weeks    Drug use: No    Sexual activity: Yes     Partners: Female   Other Topics Concern    Not on file   Social History Narrative    Always uses seat belt    Feels safe at home    No guns in the home     Social Determinants of Health     Financial Resource Strain: Not on file   Food Insecurity: No Food Insecurity (2022)    Hunger Vital Sign     Worried About Running Out of Food in the Last Year: Never true     Ran Out of Food in the Last Year: Never true   Transportation Needs: No Transportation Needs (2022)    PRAPARE - Transportation     Lack of Transportation (Medical): No     Lack of Transportation (Non-Medical): No   Physical Activity: Not on file   Stress: Not on file   Social Connections: Not on file   Intimate Partner Violence: Not on file   Housing Stability: Low Risk  (2022)    Housing Stability Vital Sign     Unable to Pay for Housing in the Last Year: No     Number of Places Lived in the Last Year: 1     Unstable Housing in the Last Year: No     .  Family History:  Family History   Problem Relation Age of Onset    Seizures Father          Meds/Allergies      No current facility-administered medications for this visit.        (Not in a hospital admission)      No Known  "Allergies        Objective   Vitals:   Visit Vitals  BP 98/72 (BP Location: Right arm, Patient Position: Sitting, Cuff Size: Large)   Pulse 73   Ht 6' 2\" (1.88 m)   Wt (!) 136 kg (300 lb 3.2 oz)   BMI 38.54 kg/m²   Smoking Status Former   BSA 2.58 m²      Vitals:    03/05/24 0935   Weight: (!) 136 kg (300 lb 3.2 oz)     [unfilled]    Invasive Devices       Peripheral Intravenous Line  Duration             Peripheral IV 09/14/23 Left;Ventral (anterior) Wrist 173 days    Peripheral IV 09/14/23 Right Antecubital 173 days                      ROS  Review of Systems   All other systems reviewed and are negative.  As described in my history of present illness        PHYSICAL EXAM  Physical Exam  Constitutional:       Appearance: He is obese. He is ill-appearing.      Comments: BMI 37   HENT:      Head: Normocephalic and atraumatic.      Nose: Nose normal.      Mouth/Throat:      Comments: Posterior pharynx is crowded  Eyes:      General: No scleral icterus.     Extraocular Movements: Extraocular movements intact.      Conjunctiva/sclera: Conjunctivae normal.      Pupils: Pupils are equal, round, and reactive to light.   Neck:      Comments: Large thick neck  Cardiovascular:      Rate and Rhythm: Normal rate and regular rhythm.      Heart sounds: Normal heart sounds. No murmur heard.  Pulmonary:      Effort: No respiratory distress.      Breath sounds: Examination of the right-lower field reveals decreased breath sounds. Examination of the left-lower field reveals decreased breath sounds. Decreased breath sounds present.   Abdominal:      Palpations: Abdomen is soft.      Comments: Central obesity present   Musculoskeletal:         General: Swelling present. No deformity.      Cervical back: No rigidity.   Skin:     Coloration: Skin is not jaundiced.      Findings: No bruising or lesion.   Neurological:      Mental Status: He is alert. Mental status is at baseline.   Psychiatric:         Mood and Affect: Mood normal.        "  Behavior: Behavior normal.         Thought Content: Thought content normal.         LAB RESULTS:    CBC:  WBC   Date Value Ref Range Status   09/15/2023 16.20 (H) 4.31 - 10.16 Thousand/uL Final     Hemoglobin   Date Value Ref Range Status   09/15/2023 14.9 12.0 - 17.0 g/dL Final     Hematocrit   Date Value Ref Range Status   09/15/2023 43.6 36.5 - 49.3 % Final     MCV   Date Value Ref Range Status   09/15/2023 98 82 - 98 fL Final     Platelets   Date Value Ref Range Status   09/15/2023 174 149 - 390 Thousands/uL Final     RBC   Date Value Ref Range Status   09/15/2023 4.44 3.88 - 5.62 Million/uL Final     MCH   Date Value Ref Range Status   09/15/2023 33.6 26.8 - 34.3 pg Final     MCHC   Date Value Ref Range Status   09/15/2023 34.2 31.4 - 37.4 g/dL Final     RDW   Date Value Ref Range Status   09/15/2023 12.3 11.6 - 15.1 % Final     MPV   Date Value Ref Range Status   09/15/2023 11.0 8.9 - 12.7 fL Final     nRBC   Date Value Ref Range Status   09/14/2023 0 /100 WBCs Final       CMP:  Potassium   Date Value Ref Range Status   02/28/2024 4.4 3.5 - 5.3 mmol/L Final   08/25/2018 3.0 (L) 3.5 - 5.2 mmol/L Final     Chloride   Date Value Ref Range Status   02/28/2024 106 96 - 108 mmol/L Final   08/25/2018 101 100 - 109 mmol/L Final     Carbon Dioxide   Date Value Ref Range Status   08/25/2018 21 (L) 23 - 31 mmol/L Final     CO2   Date Value Ref Range Status   02/28/2024 28 21 - 32 mmol/L Final     CO2, i-STAT   Date Value Ref Range Status   03/04/2019 26 21 - 32 mmol/L Final     BUN   Date Value Ref Range Status   02/28/2024 20 5 - 25 mg/dL Final   08/25/2018 9 7 - 28 mg/dL Final     Creatinine   Date Value Ref Range Status   02/28/2024 1.06 0.60 - 1.30 mg/dL Final     Comment:     Standardized to IDMS reference method   08/25/2018 0.83 0.53 - 1.30 mg/dL Final     Glucose, i-STAT   Date Value Ref Range Status   03/04/2019 105 65 - 140 mg/dl Final     Calcium   Date Value Ref Range Status   02/28/2024 8.6 8.4 - 10.2 mg/dL  Final   08/25/2018 8.1 (L) 8.5 - 10.1 mg/dL Final     AST   Date Value Ref Range Status   02/28/2024 20 13 - 39 U/L Final   08/25/2018 25 <41 U/L Final     ALT   Date Value Ref Range Status   02/28/2024 22 7 - 52 U/L Final     Comment:     Specimen collection should occur prior to Sulfasalazine administration due to the potential for falsely depressed results.    08/25/2018 25 <56 U/L Final     Alkaline Phosphatase   Date Value Ref Range Status   02/28/2024 70 34 - 104 U/L Final   08/25/2018 62 35 - 120 U/L Final     GFR, Calculated   Date Value Ref Range Status   08/25/2018 98 >60 mL/min/1.73m2 Final     Comment:     mL/min per 1.73 square meters                                            Normal Function or Mild Renal    Disease (if clinically at risk):  >or=60  Moderately Decreased:                30-59  Severely Decreased:                  15-29  Renal Failure:                         <15                                            -American GFR: multiply reported GFR by 1.16    Please note that the eGFR is based on the CKD-EPI calculation, and is not intended to be used for drug dosing.     eGFR   Date Value Ref Range Status   02/28/2024 74 ml/min/1.73sq m Final   03/04/2019 94 ml/min/1.73sq m Final        Magnesium:   Magnesium   Date Value Ref Range Status   09/15/2023 2.2 1.9 - 2.7 mg/dL Final        A1C:  Hemoglobin A1C   Date Value Ref Range Status   04/25/2022 5.7 (H) Normal 3.8-5.6%; PreDiabetic 5.7-6.4%; Diabetic >=6.5%; Glycemic control for adults with diabetes <7.0% % Final        TSH:  TSH 3RD GENERATON   Date Value Ref Range Status   02/28/2024 5.601 (H) 0.450 - 4.500 uIU/mL Final     Comment:     Adult TSH (3rd generation) reference range follows the recommended guidelines of the American Thyroid Association, January, 2020.        PT/INR:  Protime   Date Value Ref Range Status   09/14/2023 13.9 11.6 - 14.5 seconds Final     INR   Date Value Ref Range Status   09/14/2023 1.05 0.84 - 1.19 Final  "  2018 1.0  Final       Lipid Panel:  Cholesterol   Date Value Ref Range Status   2022 144 See Comment mg/dL Final     Comment:     Cholesterol:         Pediatric <18 Years        Desirable          <170 mg/dL      Borderline High    170-199 mg/dL      High               >=200 mg/dL        Adult >=18 Years            Desirable        <200 mg/dL      Borderline High  200-239 mg/dL      High             >239 mg/dL       Triglycerides   Date Value Ref Range Status   2022 82 See Comment mg/dL Final     Comment:     Triglyceride:     0-9Y            <75mg/dL     10Y-17Y         <90 mg/dL       >=18Y     Normal          <150 mg/dL     Borderline High 150-199 mg/dL     High            200-499 mg/dL        Very High       >499 mg/dL    Specimen collection should occur prior to N-Acetylcysteine or Metamizole administration due to the potential for falsely depressed results.     HDL, Direct   Date Value Ref Range Status   2022 50 >=40 mg/dL Final     Comment:     Specimen collection should occur prior to Metamizole administration due to the potential for falsley depressed results.     Non-HDL-Chol (CHOL-HDL)   Date Value Ref Range Status   2022 94 mg/dl Final       Troponin:  No results found for: \"TROPONINI\"      Imaging:    [id number]        TI:  Results for orders placed during the hospital encounter of 22    TI    Interpretation Summary    Left Ventricle: Left ventricular cavity size is dilated. Wall thickness is normal. The left ventricular ejection fraction is 25%. Systolic function is severely reduced. There is severe global hypokinesis.    Left Atrium: The atrium is severely dilated.    Right Atrium: The atrium is moderately dilated.    Left Atrial Appendage: Left atrial appendage is dilated. There is no thrombus. There is no spontaneous echo contrast.    Mitral Valve: There is mild regurgitation.      Echocardiogram:  Results for orders placed during the hospital " encounter of 11/02/22    Echo complete w/ contrast if indicated    Interpretation Summary    Left Ventricle: Left ventricular cavity size is normal. Wall thickness is normal. The left ventricular ejection fraction is 43%. Systolic function is mildly reduced. Wall motion is normal. Diastolic function is normal.    Pulmonic Valve: There is mild regurgitation.    Compared to prior echocardiographic study dated 04/07/2022, there appears to be an improvement in left ventricular systolic function, now with sinus rhythm replacing atrial fibrillation.    Results for orders placed during the hospital encounter of 09/14/23    Echo follow up/limited w/ contrast if indicated    Interpretation Summary    Left Ventricle: Left ventricular cavity size is normal. The left ventricular ejection fraction is 45%. Systolic function is mildly reduced. There is mild global hypokinesis.    Right Ventricle: Systolic function is normal.    Pericardium: There is no pericardial effusion.    Compared to TI dated 9/14/23, no significant changes other than improvement in LV systolic function.      Stress Test:   Results for orders placed during the hospital encounter of 04/07/22    NM myocardial perfusion spect (rx stress and/or rest)    Interpretation Summary    Abnormal pharmacologic nuclear stress test.  Dilated left ventricle with moderately reduced left ventricular systolic function.  Ejection fraction 32%.  Normal perfusion.  These findings are suggestive of a nonischemic cardiomyopathy.    Stress ECG: The stress ECG is negative for ischemia after pharmacologic stress.    Perfusion Defect Conclusion: The rest end diastolic cavity size is enlarged. The stress end diastolic cavity size is dilated.    Stress Function: Left ventricular function post-stress is abnormal. Global function is moderately reduced. Post-stress ejection fraction is 32 %.      Cardiac Catheterization:    Cardiac Coronary Angiogram  05/10/2023    Diagnostic  Dominance:  Right    No diagnostic findings have been documented.      HOLTER MONITOR: 24 HOUR/48 HOUR MONITORS  No results found for this or any previous visit.      AMB Extended Holter Monitor: Zio XT/AT or BioTel  No results found for this or any previous visit.      Cardiac CT:    CT Cardiac w Pulmonary Vein Mapping  08/15/2023    FINDINGS:     PULMONARY VEIN ANATOMY: Typical with two right and two left pulmonary veins.     APPROXIMATE MEASUREMENTS OF PULMONARY VEIN OSTIA:     Right superior: 18 mm.  Right inferior: 14 mm.     Left superior: 14 mm.  Left inferior: 10 mm.     CARDIAC STRUCTURES: Left chest pacer/ICD with intact leads. Otherwise unremarkable. No pericardial effusion.     GREAT VESSELS: Visualized thoracic aorta and central pulmonary arterial tree are within normal limits for the patient's age.     EXTRACARDIAC FINDINGS:  No significant extracardiac findings identified on limited imaging of the chest.     IMPRESSION:     Pulmonary venous anatomy as described above.       Cardioversion  08/15/2022    Result Text     Kiko Lao is a 61 y.o. y.o. male who follows with me in the office. He is a 62 yo male with persistent AF     Informed consent was obtained after indications, risks, and benefits were thoroughly reviewed.     The patient was identified in the OR. A time out procedure was performed.  Cardioversion leads were placed in an all-apical fashion.  Continuous pulse oximetry and ECG were performed with blood pressure measurements at regular intervals.     The patient was sedated by the anesthesia service.     After adequate sedation, a TI was performed. Please see separate report for full details.  Briefly, the LV function was severely reduced, and there was no left atrial or left atrial appendage thrombus identified.     After the TI, adequate sedation was once again confirmed. The patient was cardioverted to sinus rhythm with a 200J biphasic shock.       There were no immediate complications.      Pre Cardioversion EKG  08/15/2022        Post Cardioversion EKG  08/15/2022          DEVICE CHECK:     Results for orders placed or performed in visit on 01/24/24   Cardiac EP device report    Narrative    MDT BI-V ICD/ ACTIVE SYSTEM IS MRI CONDITIONAL  CARELINK TRANSMISSION:  BATTERY VOLTAGE ADEQUATE (9.1 YR.).  AP 28.2% BP 96.5% VSRP 0.3%.  ALL LEAD PARAMETERS WITHIN NORMAL LIMITS.  1 VT-NS EPISODE WITH PAT ON EGM (7 @ 154 BPM).  27 AT/AF EPISODES WITH LONGEST 1 H 4 M, AND AT/AF BURDEN OF 1% SINCE 12/26/2023.  PATIENT TAKES XARELTO, DIGOXIN, METOPROLOL, AND AMIODARONE.  OPTI-VOL WITHIN NORMAL LIMITS.  NORMAL DEVICE FUNCTION.  RG            Code Status: [unfilled]  Advance Directive and Living Will:      Power of :    POLST:      Counseling / Coordination of Care  Very detailed discussion done with regards to management of AFib and heart failure      Felipe Martinez

## 2024-03-05 ENCOUNTER — TELEPHONE (OUTPATIENT)
Dept: CARDIOLOGY CLINIC | Facility: CLINIC | Age: 64
End: 2024-03-05

## 2024-03-05 ENCOUNTER — OFFICE VISIT (OUTPATIENT)
Dept: CARDIOLOGY CLINIC | Facility: CLINIC | Age: 64
End: 2024-03-05
Payer: COMMERCIAL

## 2024-03-05 VITALS
DIASTOLIC BLOOD PRESSURE: 72 MMHG | HEART RATE: 73 BPM | BODY MASS INDEX: 38.53 KG/M2 | HEIGHT: 74 IN | SYSTOLIC BLOOD PRESSURE: 98 MMHG | WEIGHT: 300.2 LBS

## 2024-03-05 DIAGNOSIS — Z78.9 INTOLERANCE OF CONTINUOUS POSITIVE AIRWAY PRESSURE (CPAP) VENTILATION: ICD-10-CM

## 2024-03-05 DIAGNOSIS — I48.0 PAF (PAROXYSMAL ATRIAL FIBRILLATION) (HCC): Primary | ICD-10-CM

## 2024-03-05 DIAGNOSIS — Z79.899 ON AMIODARONE THERAPY: ICD-10-CM

## 2024-03-05 DIAGNOSIS — Z79.01 CURRENT USE OF LONG TERM ANTICOAGULATION: ICD-10-CM

## 2024-03-05 DIAGNOSIS — I42.8 NONISCHEMIC CARDIOMYOPATHY (HCC): ICD-10-CM

## 2024-03-05 DIAGNOSIS — E78.2 MIXED HYPERLIPIDEMIA: ICD-10-CM

## 2024-03-05 DIAGNOSIS — I50.9 CONGESTIVE HEART FAILURE, UNSPECIFIED HF CHRONICITY, UNSPECIFIED HEART FAILURE TYPE (HCC): ICD-10-CM

## 2024-03-05 DIAGNOSIS — I50.23 ACUTE ON CHRONIC SYSTOLIC CHF (CONGESTIVE HEART FAILURE) (HCC): ICD-10-CM

## 2024-03-05 DIAGNOSIS — Z79.899 LONG TERM CURRENT USE OF AMIODARONE: ICD-10-CM

## 2024-03-05 DIAGNOSIS — G47.33 OBSTRUCTIVE SLEEP APNEA: ICD-10-CM

## 2024-03-05 DIAGNOSIS — Z98.84 HISTORY OF ROUX-EN-Y GASTRIC BYPASS: ICD-10-CM

## 2024-03-05 DIAGNOSIS — E66.9 OBESITY (BMI 30-39.9): ICD-10-CM

## 2024-03-05 PROCEDURE — 93000 ELECTROCARDIOGRAM COMPLETE: CPT | Performed by: INTERNAL MEDICINE

## 2024-03-05 PROCEDURE — 99214 OFFICE O/P EST MOD 30 MIN: CPT | Performed by: INTERNAL MEDICINE

## 2024-03-05 RX ORDER — AMIODARONE HYDROCHLORIDE 100 MG/1
100 TABLET ORAL DAILY
Qty: 90 TABLET | Refills: 3 | Status: SHIPPED | OUTPATIENT
Start: 2024-03-05

## 2024-03-05 NOTE — PATIENT INSTRUCTIONS
- Reduce Amiodarone to 100 mg daily; prescription sent    - Complete Amiodarone toxicity screening. This includes:   - Schedule pulmonary function test; test ordered    - Call [phone] to schedule   - Schedule ophthalmology appointment for Amiodarone toxicity screening; referral placed    - Schedule echocardiogram to be completed in 6 months; test ordered   - Call [phone] to schedule    - Schedule with [facility] Endocrinology for weight management   - Call [phone] to schedule    - Schedule with [facility] Nutrition for weight management   - Call [phone] to schedule  
Yes

## 2024-03-05 NOTE — TELEPHONE ENCOUNTER
L/m for pt notifying him that Dr. Martinez wants to reduce his Amiodarone from 200 mg to 100 mg daily and a new Rx was sent to his mail order pharmacy. Also mentioned that he is to completed PFT and schedule an ophthalmology appointment for Amiodarone toxicity screening. Asked to call back if any questions and mentioned that info along w/ updated AVS was mailed to him.

## 2024-03-05 NOTE — LETTER
March 5, 2024     Rigoberto Hernandez DO  [address]  [address]  [facility] [address]    Patient: Kiko Lao   YOB: 1960   Date of Visit: 3/5/2024       Dear Dr. Hernandez:    Thank you for referring Kiko Lao to me for evaluation. Below are my notes for this consultation.    If you have questions, please do not hesitate to call me. I look forward to following your patient along with you.         Sincerely,        Felipe Martinez MD        CC: DO Felipe Aguiar MD  3/5/2024 10:14 AM  Sign when Signing Visit   Consultation - Electrophysiology-Cardiology (EP)   Kiko Lao 63 y.o. male MRN: [id number]  Unit/Bed#:  Encounter: [id number]      1. PAF (paroxysmal atrial fibrillation) (HCC)  POCT ECG    Echo follow up/limited w/ contrast if indicated      2. Congestive heart failure, unspecified HF chronicity, unspecified heart failure type (HCC)  Echo follow up/limited w/ contrast if indicated      3. Obstructive sleep apnea        4. Acute on chronic systolic CHF (congestive heart failure) (HCC)        5. Nonischemic cardiomyopathy (HCC)        6. History of Eliud-en-Y gastric bypass        7. Intolerance of continuous positive airway pressure (CPAP) ventilation        8. Current use of long term anticoagulation        9. Mixed hyperlipidemia        10. Long term current use of amiodarone              Principal Problem: management of AF, heart failure          My recommendation for the patient    Patient does have symptomatic improvement but still complains of occasional fatigue    Device check, appropriate BiV pacing,  ms / no AF  Echo sept 2023- EF 45%    The patient weighs 136 kg with a BMI of 39  Is not using BiPAP, give it back after using it 1 day    Patient is advised to have a nutrition consult so that he can lose weight  At some point will follow-up with primary and endocrine with regards to SGLT2 or GLP-1 antagonist          Assessment/Plan  Acute on chronic  combined systolic and diastolic heart failure, nonischemic CMP , LVEF 35%  Intermittent LBBB, likely rate related, IVCD,  ms  Optimal medical therapy greater than 3 months  Shared decision making done with patient and primary cardiologist    Persistent atrial fibrillation  Left atrial appendage thrombus, noted on April 22   Chads Vasc score 1   Severe obesity, status post gastric bypass   Peptic ulcer disease   History of heavy alcohol use   Chronic kidney disease stage 3  Mixed hyperlipidemia            Fatigue  Patient does have symptomatic improvement but still complains of occasional fatigue    Device check, appropriate BiV pacing,  ms / no AF  Echo sept 2023- EF 45%    The patient weighs 136 kg with a BMI of 39  Is not using BiPAP, give it back after using it 1 day    Patient is advised to have a nutrition consult so that he can lose weight  At some point will follow-up with primary and endocrine with regards to SGLT2 or GLP-1 antagonist          Acute on chronic combined systolic and diastolic heart failure, nonischemic CMP , LVEF 35%  Intermittent LBBB, likely rate related, IVCD,  ms  NYHA class II-III  Optimal medical therapy greater than 3 months  Shared decision making done with patient and primary cardiologist  Primary prevention device  Post BiV ICD  Improvement of both QRS width as well as EF  Follow-up with heart failure service          Persistent atrial fibrillation  Long-term anticoagulation  Left atrial thrombus  Currently in sinus rhythm, does have episodes of RVR from time to time    Recent worsening noted since April 2022    Risk factors   Age-62  Obesity-BMI 39  Obstructive sleep apnea -history of same  Thyroid disorder -TSH normal  Hypertension -116/84 on medication  Heart failure -LVEF 35%  Diabetes mellitus -hemoglobin A1c 5.7  Tobacco use-history of same  Alcohol use-heavy abuse till recently  Energy drink use-negative    Has left atrial appendage thrombus    Current  therapy   Anticoagulation-chads Vasc score-has left atrial appendage thrombus-on Xarelto  Rate control-metoprolol, digoxin  Rhythm control-post ablation, currently on amiodarone 200 mg a day, reduce it to 100 mg a day    My recommendation for this patient  Check for amiodarone toxicity        Obesity / overweight  BMI-39  History of gastric bypass  This increases the risk of-CAD, CVA, vascular disease, diabetes, kidney dysfunction, hypertension, hyperlipidemia  Diet is responsible for 80% of weight gain   Advice nutritional counseling and healthy diet  Also advised to increase activity  He is going to follow up with his primary care        Obstructive sleep apnea   Patient has history of snoring, morning fatigue and daytime sleepiness at least on some days  Examination is also suggestive  Recommended to follow up with primary care and Pulmonary Medicine  Needs aggressive management of same  Requested to follow-up with pulmonary medicine as he may need a different device for management of his sleep apnea        Hypertension   Blood pressure-98/72  Medication-metoprolol, furosemide, lisinopril        Mixed hyperlipidemia   Medication-atorvastatin  No myositis or myalgia   My recommendation-continue with same        Diabetes mellitus   Hemoglobin A1c -5.7  Medication -none  My recommendation-continue with weight loss diet      Tobacco abuse   Former smoker      Alcohol abuse   Heavy drinking till recently          History of Present Illness  HPI: Kiko Lao is a 63 y.o. year old male has been referred to me by Dr Myers     Patient complains of some fatigue  He is no longer in A-fib  His BiV paced and QRS is 124 ms  He has however gained weight and currently BMI is 39  He has sleep apnea and is not using his CPAP      Prior history    The patient has significant medical illnesses which include  Persistent atrial fibrillation  Left atrial appendage thrombus, noted on April 22   Chads Vasc score 1   Acute on chronic  combined systolic and diastolic heart failure, nonischemic CMP , LVEF 15-30%  Intermittent LBBB, likely rate related  Severe obesity, status post gastric bypass   Peptic ulcer disease   History of heavy alcohol use   Chronic kidney disease stage 3  Mixed hyperlipidemia    Patient admitted to the hospital in April 2022 with significant shortness of breath and weight gain  Echocardiogram revealed severely reduced heart function, RV systolic dysfunction  Stress test was negative for myocardial ischemia  Jesus confirmed severely reduced LV systolic function with thrombus about 0.6 cm noted in very dilated left atrial appendage  Patient is on metoprolol tartrate, digoxin for rate control    As far as cardiac symptoms are concerned  Angina -negative  Orthopnea -improved  Paroxysmal nocturnal dyspnea -improved  Leg swelling-chronic  Palpitations -improved  Presyncope-occasional  Syncope -negative  Orthostatic lightheadedness -occasional  Exertional intolerance-present    Snoring-present  morning fatigue-present  Daytime sleepiness-present    Social history  Tobacco use-former smoker  Alcohol use-history of abuse and was drinking heavily till recently  Energy drink use-negative  Recreational drug use-negative      Family history  Seizure disorder      Historical Information  Past Medical History:   Diagnosis Date   • A-fib ([facility])    • Acute ulcer of stomach    • CHF (congestive heart failure) ([facility])    • GERD (gastroesophageal reflux disease)    • Rib fractures      Past Surgical History:   Procedure Laterality Date   • CARDIAC CATHETERIZATION N/A 5/10/2023    Procedure: Cardiac catheterization;  Surgeon: Claritza Resendiz DO;  Location: [facility];  Service: Cardiology   • CARDIAC CATHETERIZATION N/A 5/10/2023    Procedure: Cardiac Coronary Angiogram;  Surgeon: Claritza Resendiz DO;  Location: [facility];  Service: Cardiology   • CARDIAC ELECTROPHYSIOLOGY PROCEDURE N/A 7/7/2023    Procedure: Cardiac biv icd  implant;  Surgeon: Felipe Martinez MD;  Location: [facility];  Service: Cardiology   • CARDIAC ELECTROPHYSIOLOGY PROCEDURE N/A 2023    Procedure: Cardiac eps/afib ablation;  Surgeon: Felipe Martinez MD;  Location: [facility];  Service: Cardiology   • COLONOSCOPY     • ESOPHAGOGASTRODUODENOSCOPY     • GASTRIC BYPASS     • MANDIBLE FRACTURE SURGERY       Social History     Substance and Sexual Activity   Alcohol Use Yes    Comment: 1 beer every 2-3 weeks     Social History     Substance and Sexual Activity   Drug Use No     Social History     Tobacco Use   Smoking Status Former   • Types: Cigars   • Start date:    • Quit date:    • Years since quittin.1   Smokeless Tobacco Former   • Types: Chew     Social History     Socioeconomic History   • Marital status: /Civil Union     Spouse name: Not on file   • Number of children: Not on file   • Years of education: Not on file   • Highest education level: Not on file   Occupational History   • Not on file   Tobacco Use   • Smoking status: Former     Types: Cigars     Start date:      Quit date:      Years since quittin.1   • Smokeless tobacco: Former     Types: Chew   Vaping Use   • Vaping status: Never Used   Substance and Sexual Activity   • Alcohol use: Yes     Comment: 1 beer every 2-3 weeks   • Drug use: No   • Sexual activity: Yes     Partners: Female   Other Topics Concern   • Not on file   Social History Narrative    Always uses seat belt    Feels safe at home    No guns in the home     Social Determinants of Health     Financial Resource Strain: Not on file   Food Insecurity: No Food Insecurity (2022)    Hunger Vital Sign    • Worried About Running Out of Food in the Last Year: Never true    • Ran Out of Food in the Last Year: Never true   Transportation Needs: No Transportation Needs (2022)    PRAPARE - Transportation    • Lack of Transportation (Medical): No    • Lack of Transportation (Non-Medical): No  "  Physical Activity: Not on file   Stress: Not on file   Social Connections: Not on file   Intimate Partner Violence: Not on file   Housing Stability: Low Risk  (4/26/2022)    Housing Stability Vital Sign    • Unable to Pay for Housing in the Last Year: No    • Number of Places Lived in the Last Year: 1    • Unstable Housing in the Last Year: No     .  Family History:  Family History   Problem Relation Age of Onset   • Seizures Father          Meds/Allergies     No current facility-administered medications for this visit.        (Not in a hospital admission)      No Known Allergies        Objective  Vitals:   Visit Vitals  BP 98/72 (BP Location: Right arm, Patient Position: Sitting, Cuff Size: Large)   Pulse 73   Ht 6' 2\" (1.88 m)   Wt (!) 136 kg (300 lb 3.2 oz)   BMI 38.54 kg/m²   Smoking Status Former   BSA 2.58 m²      Vitals:    03/05/24 0935   Weight: (!) 136 kg (300 lb 3.2 oz)     [unfilled]    Invasive Devices       Peripheral Intravenous Line  Duration             Peripheral IV 09/14/23 Left;Ventral (anterior) Wrist 173 days    Peripheral IV 09/14/23 Right Antecubital 173 days                      ROS  Review of Systems   All other systems reviewed and are negative.  As described in my history of present illness        PHYSICAL EXAM  Physical Exam  Constitutional:       Appearance: He is obese. He is ill-appearing.      Comments: BMI 37   HENT:      Head: Normocephalic and atraumatic.      Nose: Nose normal.      Mouth/Throat:      Comments: Posterior pharynx is crowded  Eyes:      General: No scleral icterus.     Extraocular Movements: Extraocular movements intact.      Conjunctiva/sclera: Conjunctivae normal.      Pupils: Pupils are equal, round, and reactive to light.   Neck:      Comments: Large thick neck  Cardiovascular:      Rate and Rhythm: Normal rate and regular rhythm.      Heart sounds: Normal heart sounds. No murmur heard.  Pulmonary:      Effort: No respiratory distress.      Breath sounds: " Examination of the right-lower field reveals decreased breath sounds. Examination of the left-lower field reveals decreased breath sounds. Decreased breath sounds present.   Abdominal:      Palpations: Abdomen is soft.      Comments: Central obesity present   Musculoskeletal:         General: Swelling present. No deformity.      Cervical back: No rigidity.   Skin:     Coloration: Skin is not jaundiced.      Findings: No bruising or lesion.   Neurological:      Mental Status: He is alert. Mental status is at baseline.   Psychiatric:         Mood and Affect: Mood normal.         Behavior: Behavior normal.         Thought Content: Thought content normal.         LAB RESULTS:    CBC:  WBC   Date Value Ref Range Status   09/15/2023 16.20 (H) 4.31 - 10.16 Thousand/uL Final     Hemoglobin   Date Value Ref Range Status   09/15/2023 14.9 12.0 - 17.0 g/dL Final     Hematocrit   Date Value Ref Range Status   09/15/2023 43.6 36.5 - 49.3 % Final     MCV   Date Value Ref Range Status   09/15/2023 98 82 - 98 fL Final     Platelets   Date Value Ref Range Status   09/15/2023 174 149 - 390 Thousands/uL Final     RBC   Date Value Ref Range Status   09/15/2023 4.44 3.88 - 5.62 Million/uL Final     MCH   Date Value Ref Range Status   09/15/2023 33.6 26.8 - 34.3 pg Final     MCHC   Date Value Ref Range Status   09/15/2023 34.2 31.4 - 37.4 g/dL Final     RDW   Date Value Ref Range Status   09/15/2023 12.3 11.6 - 15.1 % Final     MPV   Date Value Ref Range Status   09/15/2023 11.0 8.9 - 12.7 fL Final     nRBC   Date Value Ref Range Status   09/14/2023 0 /100 WBCs Final       CMP:  Potassium   Date Value Ref Range Status   02/28/2024 4.4 3.5 - 5.3 mmol/L Final   08/25/2018 3.0 (L) 3.5 - 5.2 mmol/L Final     Chloride   Date Value Ref Range Status   02/28/2024 106 96 - 108 mmol/L Final   08/25/2018 101 100 - 109 mmol/L Final     Carbon Dioxide   Date Value Ref Range Status   08/25/2018 21 (L) 23 - 31 mmol/L Final     CO2   Date Value Ref  Range Status   02/28/2024 28 21 - 32 mmol/L Final     CO2, i-STAT   Date Value Ref Range Status   03/04/2019 26 21 - 32 mmol/L Final     BUN   Date Value Ref Range Status   02/28/2024 20 5 - 25 mg/dL Final   08/25/2018 9 7 - 28 mg/dL Final     Creatinine   Date Value Ref Range Status   02/28/2024 1.06 0.60 - 1.30 mg/dL Final     Comment:     Standardized to IDMS reference method   08/25/2018 0.83 0.53 - 1.30 mg/dL Final     Glucose, i-STAT   Date Value Ref Range Status   03/04/2019 105 65 - 140 mg/dl Final     Calcium   Date Value Ref Range Status   02/28/2024 8.6 8.4 - 10.2 mg/dL Final   08/25/2018 8.1 (L) 8.5 - 10.1 mg/dL Final     AST   Date Value Ref Range Status   02/28/2024 20 13 - 39 U/L Final   08/25/2018 25 <41 U/L Final     ALT   Date Value Ref Range Status   02/28/2024 22 7 - 52 U/L Final     Comment:     Specimen collection should occur prior to Sulfasalazine administration due to the potential for falsely depressed results.    08/25/2018 25 <56 U/L Final     Alkaline Phosphatase   Date Value Ref Range Status   02/28/2024 70 34 - 104 U/L Final   08/25/2018 62 35 - 120 U/L Final     GFR, Calculated   Date Value Ref Range Status   08/25/2018 98 >60 mL/min/1.73m2 Final     Comment:     mL/min per 1.73 square meters                                            Normal Function or Mild Renal    Disease (if clinically at risk):  >or=60  Moderately Decreased:                30-59  Severely Decreased:                  15-29  Renal Failure:                         <15                                            -American GFR: multiply reported GFR by 1.16    Please note that the eGFR is based on the CKD-EPI calculation, and is not intended to be used for drug dosing.     eGFR   Date Value Ref Range Status   02/28/2024 74 ml/min/1.73sq m Final   03/04/2019 94 ml/min/1.73sq m Final        Magnesium:   Magnesium   Date Value Ref Range Status   09/15/2023 2.2 1.9 - 2.7 mg/dL Final        A1C:  Hemoglobin A1C  "  Date Value Ref Range Status   2022 5.7 (H) Normal 3.8-5.6%; PreDiabetic 5.7-6.4%; Diabetic >=6.5%; Glycemic control for adults with diabetes <7.0% % Final        TSH:  TSH 3RD GENERATON   Date Value Ref Range Status   2024 5.601 (H) 0.450 - 4.500 uIU/mL Final     Comment:     Adult TSH (3rd generation) reference range follows the recommended guidelines of the American Thyroid Association, .        PT/INR:  Protime   Date Value Ref Range Status   2023 13.9 11.6 - 14.5 seconds Final     INR   Date Value Ref Range Status   2023 1.05 0.84 - 1.19 Final   2018 1.0  Final       Lipid Panel:  Cholesterol   Date Value Ref Range Status   2022 144 See Comment mg/dL Final     Comment:     Cholesterol:         Pediatric <18 Years        Desirable          <170 mg/dL      Borderline High    170-199 mg/dL      High               >=200 mg/dL        Adult >=18 Years            Desirable        <200 mg/dL      Borderline High  200-239 mg/dL      High             >239 mg/dL       Triglycerides   Date Value Ref Range Status   2022 82 See Comment mg/dL Final     Comment:     Triglyceride:     0-9Y            <75mg/dL     10Y-17Y         <90 mg/dL       >=18Y     Normal          <150 mg/dL     Borderline High 150-199 mg/dL     High            200-499 mg/dL        Very High       >499 mg/dL    Specimen collection should occur prior to N-Acetylcysteine or Metamizole administration due to the potential for falsely depressed results.     HDL, Direct   Date Value Ref Range Status   2022 50 >=40 mg/dL Final     Comment:     Specimen collection should occur prior to Metamizole administration due to the potential for falsley depressed results.     Non-HDL-Chol (CHOL-HDL)   Date Value Ref Range Status   2022 94 mg/dl Final       Troponin:  No results found for: \"TROPONINI\"      Imaging:    [id number]        TI:  Results for orders placed during the hospital encounter of " 08/12/22    TI    Interpretation Summary  •  Left Ventricle: Left ventricular cavity size is dilated. Wall thickness is normal. The left ventricular ejection fraction is 25%. Systolic function is severely reduced. There is severe global hypokinesis.  •  Left Atrium: The atrium is severely dilated.  •  Right Atrium: The atrium is moderately dilated.  •  Left Atrial Appendage: Left atrial appendage is dilated. There is no thrombus. There is no spontaneous echo contrast.  •  Mitral Valve: There is mild regurgitation.      Echocardiogram:  Results for orders placed during the hospital encounter of 11/02/22    Echo complete w/ contrast if indicated    Interpretation Summary  •  Left Ventricle: Left ventricular cavity size is normal. Wall thickness is normal. The left ventricular ejection fraction is 43%. Systolic function is mildly reduced. Wall motion is normal. Diastolic function is normal.  •  Pulmonic Valve: There is mild regurgitation.    Compared to prior echocardiographic study dated 04/07/2022, there appears to be an improvement in left ventricular systolic function, now with sinus rhythm replacing atrial fibrillation.    Results for orders placed during the hospital encounter of 09/14/23    Echo follow up/limited w/ contrast if indicated    Interpretation Summary  •  Left Ventricle: Left ventricular cavity size is normal. The left ventricular ejection fraction is 45%. Systolic function is mildly reduced. There is mild global hypokinesis.  •  Right Ventricle: Systolic function is normal.  •  Pericardium: There is no pericardial effusion.  •  Compared to TI dated 9/14/23, no significant changes other than improvement in LV systolic function.      Stress Test:   Results for orders placed during the hospital encounter of 04/07/22    NM myocardial perfusion spect (rx stress and/or rest)    Interpretation Summary  •  Abnormal pharmacologic nuclear stress test.  Dilated left ventricle with moderately reduced left  ventricular systolic function.  Ejection fraction 32%.  Normal perfusion.  These findings are suggestive of a nonischemic cardiomyopathy.  •  Stress ECG: The stress ECG is negative for ischemia after pharmacologic stress.  •  Perfusion Defect Conclusion: The rest end diastolic cavity size is enlarged. The stress end diastolic cavity size is dilated.  •  Stress Function: Left ventricular function post-stress is abnormal. Global function is moderately reduced. Post-stress ejection fraction is 32 %.      Cardiac Catheterization:    Cardiac Coronary Angiogram  05/10/2023    Diagnostic  Dominance: Right    No diagnostic findings have been documented.      HOLTER MONITOR: 24 HOUR/48 HOUR MONITORS  No results found for this or any previous visit.      AMB Extended Holter Monitor: Zio XT/AT or BioTel  No results found for this or any previous visit.      Cardiac CT:    CT Cardiac w Pulmonary Vein Mapping  08/15/2023    FINDINGS:     PULMONARY VEIN ANATOMY: Typical with two right and two left pulmonary veins.     APPROXIMATE MEASUREMENTS OF PULMONARY VEIN OSTIA:     Right superior: 18 mm.  Right inferior: 14 mm.     Left superior: 14 mm.  Left inferior: 10 mm.     CARDIAC STRUCTURES: Left chest pacer/ICD with intact leads. Otherwise unremarkable. No pericardial effusion.     GREAT VESSELS: Visualized thoracic aorta and central pulmonary arterial tree are within normal limits for the patient's age.     EXTRACARDIAC FINDINGS:  No significant extracardiac findings identified on limited imaging of the chest.     IMPRESSION:     Pulmonary venous anatomy as described above.       Cardioversion  08/15/2022    Result Text     Kiko Lao is a 61 y.o. y.o. male who follows with me in the office. He is a 62 yo male with persistent AF     Informed consent was obtained after indications, risks, and benefits were thoroughly reviewed.     The patient was identified in the OR. A time out procedure was performed.  Cardioversion leads  were placed in an all-apical fashion.  Continuous pulse oximetry and ECG were performed with blood pressure measurements at regular intervals.     The patient was sedated by the anesthesia service.     After adequate sedation, a TI was performed. Please see separate report for full details.  Briefly, the LV function was severely reduced, and there was no left atrial or left atrial appendage thrombus identified.     After the TI, adequate sedation was once again confirmed. The patient was cardioverted to sinus rhythm with a 200J biphasic shock.       There were no immediate complications.     Pre Cardioversion EKG  08/15/2022        Post Cardioversion EKG  08/15/2022          DEVICE CHECK:     Results for orders placed or performed in visit on 01/24/24   Cardiac EP device report    Narrative    MDT BI-V ICD/ ACTIVE SYSTEM IS MRI CONDITIONAL  CARELINK TRANSMISSION:  BATTERY VOLTAGE ADEQUATE (9.1 YR.).  AP 28.2% BP 96.5% VSRP 0.3%.  ALL LEAD PARAMETERS WITHIN NORMAL LIMITS.  1 VT-NS EPISODE WITH PAT ON EGM (7 @ 154 BPM).  27 AT/AF EPISODES WITH LONGEST 1 H 4 M, AND AT/AF BURDEN OF 1% SINCE 12/26/2023.  PATIENT TAKES XARELTO, DIGOXIN, METOPROLOL, AND AMIODARONE.  OPTI-VOL WITHIN NORMAL LIMITS.  NORMAL DEVICE FUNCTION.  RG            Code Status: [unfilled]  Advance Directive and Living Will:      Power of :    POLST:      Counseling / Coordination of Care  Very detailed discussion done with regards to management of AFib and heart failure      Felipe Martinez

## 2024-03-27 ENCOUNTER — TELEPHONE (OUTPATIENT)
Dept: NEUROLOGY | Facility: CLINIC | Age: 64
End: 2024-03-27

## 2024-04-04 ENCOUNTER — TELEPHONE (OUTPATIENT)
Dept: NEUROLOGY | Facility: CLINIC | Age: 64
End: 2024-04-04

## 2024-04-04 NOTE — TELEPHONE ENCOUNTER
Left message informing patient that. I needed to reschedule his appt.Daysie to Dr. Gunter being unavailable that day . I stated I do have earlier appt in [place]. When leaving the voice message phone was suddenly disconnected. Will send an message requesting patient to call back and schedule

## 2024-04-24 ENCOUNTER — REMOTE DEVICE CLINIC VISIT (OUTPATIENT)
Dept: CARDIOLOGY CLINIC | Facility: CLINIC | Age: 64
End: 2024-04-24
Payer: COMMERCIAL

## 2024-04-24 DIAGNOSIS — Z95.810 PRESENCE OF IMPLANTABLE CARDIOVERTER-DEFIBRILLATOR (ICD): Primary | ICD-10-CM

## 2024-04-24 PROCEDURE — 93296 REM INTERROG EVL PM/IDS: CPT | Performed by: INTERNAL MEDICINE

## 2024-04-24 PROCEDURE — 93295 DEV INTERROG REMOTE 1/2/MLT: CPT | Performed by: INTERNAL MEDICINE

## 2024-04-24 NOTE — PROGRESS NOTES
MDT BI-V ICD/ ACTIVE SYSTEM IS MRI CONDITIONAL   CARELINK TRANSMISSION:  BATTERY VOLTAGE ADEQUATE (8.6YR.).  AP 45.1%  97.2% VSRP 0.4%.  ALL AVAILABLE LEAD PARAMETERS WITHIN NORMAL LIMITS. SINCE 1/24/24 REMOTE: 2 VT-NS EPISODE WITH NSVT ON MOST RECENT EPISODE EGM, DURATION OF 8 BEATS @  BPM; OTHER EPISODE FOR PAT ON EGM, 6 @  BPM; 88 AT/AF EPISODES WITH MAX EPISODE DURATION  2HR, 41MINS. AT/AF BURDEN 1.8%. 1,030 V-SENSE EPISODES W/MARKERS ONLY SHOWING AF/RVR, FUSION;  PATIENT TAKES XARELTO, DIGOXIN, AMIODORONE, METOPROLOL SUCC. OPTI-VOL WITHIN NORMAL LIMITS.  NORMAL DEVICE FUNCTION.  ES

## 2024-05-22 DIAGNOSIS — I48.91 ATRIAL FIBRILLATION (HCC): ICD-10-CM

## 2024-05-22 DIAGNOSIS — K92.1 GASTROINTESTINAL HEMORRHAGE WITH MELENA: ICD-10-CM

## 2024-05-22 DIAGNOSIS — K27.9 PEPTIC ULCER: ICD-10-CM

## 2024-05-23 ENCOUNTER — CONSULT (OUTPATIENT)
Dept: NEUROLOGY | Facility: CLINIC | Age: 64
End: 2024-05-23
Payer: COMMERCIAL

## 2024-05-23 VITALS
HEART RATE: 74 BPM | HEIGHT: 74 IN | DIASTOLIC BLOOD PRESSURE: 62 MMHG | WEIGHT: 305.6 LBS | TEMPERATURE: 97.2 F | BODY MASS INDEX: 39.22 KG/M2 | SYSTOLIC BLOOD PRESSURE: 116 MMHG

## 2024-05-23 DIAGNOSIS — G56.03 BILATERAL CARPAL TUNNEL SYNDROME: ICD-10-CM

## 2024-05-23 DIAGNOSIS — E78.2 MIXED HYPERLIPIDEMIA: ICD-10-CM

## 2024-05-23 DIAGNOSIS — R55 POSTURAL DIZZINESS WITH PRESYNCOPE: ICD-10-CM

## 2024-05-23 DIAGNOSIS — R26.89 BALANCE PROBLEM: Primary | ICD-10-CM

## 2024-05-23 DIAGNOSIS — I48.0 PAF (PAROXYSMAL ATRIAL FIBRILLATION) (HCC): ICD-10-CM

## 2024-05-23 DIAGNOSIS — E55.9 VITAMIN D DEFICIENCY: ICD-10-CM

## 2024-05-23 DIAGNOSIS — G47.33 OBSTRUCTIVE SLEEP APNEA (ADULT) (PEDIATRIC): ICD-10-CM

## 2024-05-23 DIAGNOSIS — E53.8 VITAMIN B12 DEFICIENCY: ICD-10-CM

## 2024-05-23 DIAGNOSIS — R42 POSTURAL DIZZINESS WITH PRESYNCOPE: ICD-10-CM

## 2024-05-23 DIAGNOSIS — G62.9 POLYNEUROPATHY: ICD-10-CM

## 2024-05-23 PROCEDURE — 99245 OFF/OP CONSLTJ NEW/EST HI 55: CPT | Performed by: STUDENT IN AN ORGANIZED HEALTH CARE EDUCATION/TRAINING PROGRAM

## 2024-05-23 RX ORDER — PANTOPRAZOLE SODIUM 40 MG/1
TABLET, DELAYED RELEASE ORAL
Qty: 30 TABLET | Refills: 0 | Status: SHIPPED | OUTPATIENT
Start: 2024-05-23

## 2024-05-23 NOTE — PROGRESS NOTES
Review of Systems   Constitutional:  Negative for appetite change, fatigue and fever.   HENT: Negative.  Negative for hearing loss, tinnitus, trouble swallowing and voice change.    Eyes: Negative.  Negative for photophobia, pain and visual disturbance.   Respiratory: Negative.  Negative for shortness of breath.    Cardiovascular: Negative.  Negative for palpitations.   Gastrointestinal: Negative.  Negative for nausea and vomiting.   Endocrine: Negative.  Negative for cold intolerance.   Genitourinary: Negative.  Negative for dysuria, frequency and urgency.   Musculoskeletal:  Negative for back pain, gait problem, myalgias, neck pain and neck stiffness.   Skin: Negative.  Negative for rash.   Allergic/Immunologic: Negative.    Neurological: Negative.  Negative for dizziness, tremors, seizures, syncope, facial asymmetry, speech difficulty, weakness, light-headedness, numbness and headaches.        Tingling in feet daily vertigo at times better than last    Hematological: Negative.  Does not bruise/bleed easily.   Psychiatric/Behavioral: Negative.  Negative for confusion, hallucinations and sleep disturbance.    All other systems reviewed and are negative.
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
## 2024-05-23 NOTE — PATIENT INSTRUCTIONS
"-As stated, I do believe you have a peripheral neuropathy; see below. We are obtaining bloodwork, referring you to PT for balance therapy, and obtaining EMG/NCS studies to look at this  -Also obtaining EMG/NCS of your upper limbs to evaluate the carpal tunnel  -Recommend seeing sleep medicine to evaluate and treat the SRIKANTH  -Recommend speaking with cardiology regarding the positional lightheadedness. I am also obtaining a CT scan to look at the blood vessels in your head and neck to ensure there is no stenosis or shrinking of the vessels that could be contributing.  -See me back in ~3 months      Peripheral Neuropathy:  Peripheral neuropathy is a disorder in which the nerves are the only most regions of your body (most commonly toes and feet and sometimes fingers and hands, and what is termed a \"stocking glove distribution\") are damaged, resulting in sensory related symptoms.    --The most common symptom is difficulty with balance, as often the 1st nerves that are affected are those that can for a vibratory sensation as well as proprioception (knowing where your limbs are in space).  -Proprioception is one of the 3 essential contributors to maintaining balance, along with our inner ear and our site/review of the horizon.  When 1 of these 3 he is impacted,  our balance can then suffer.  -This is why balance difficulties in patients with peripheral neuropathy are commonly much worse in the dark, with eyes closed, or on uneven surfaces or steps.  --Additional symptoms can include numbness/tingling (will return paresthesia) or sometimes even a painful burning sensation in the regions most affected.    -There are numerous possible causes of peripheral neuropathy.  The most common identifiable causes diabetes mellitus (type I or II), although the second most common \"cause\" is what we call idiopathic.  This means we are either not sure what causes it, l or would like the testing to effectively determine what is causing it; " it is also suspected that it likely can occur with advancing age.  -Other potential causes include vitamin/mineral deficiencies/abnormalities, including vitamin B12 and vitamin D.  -Work-up often includes blood work to evaluate for reversible causes  -Work-up may also include something called an EMG/NCS, which is a study used to evaluate the overall health and function of the peripheral nerves and muscles.    -While in of itself it is fairly benign, the biggest risk associated with peripheral neuropathy is FALLS, which could then result in a broken bone, head trauma, or other injury that will severely impact function and quality of life.  -As there is as yet no therapy to directly target the peripheral neuropathy itself; therapy is thus geared towards symptomatic management and fall prevention.   -This will commonly include a referral to physical therapy for balance therapy, both for strengthening as well as to aid with gait stability and fall avoidance; they will also advise strategies on how to fall safely to avoid injury should a fall occur   -If you have symptoms of a burning/painful sensation related to the peripheral neuropathy, there are medications that can be utilized to reduce the severity.  These may include gabapentin, pregabalin, or sometimes topical creams/lotions.

## 2024-05-24 RX ORDER — RIVAROXABAN 20 MG/1
TABLET, FILM COATED ORAL
Qty: 90 TABLET | Refills: 1 | Status: SHIPPED | OUTPATIENT
Start: 2024-05-24

## 2024-05-29 ENCOUNTER — RA CDI HCC (OUTPATIENT)
Dept: OTHER | Facility: HOSPITAL | Age: 64
End: 2024-05-29

## 2024-06-04 ENCOUNTER — OFFICE VISIT (OUTPATIENT)
Dept: FAMILY MEDICINE CLINIC | Facility: CLINIC | Age: 64
End: 2024-06-04
Payer: COMMERCIAL

## 2024-06-04 VITALS
WEIGHT: 302.6 LBS | BODY MASS INDEX: 38.84 KG/M2 | HEART RATE: 83 BPM | DIASTOLIC BLOOD PRESSURE: 72 MMHG | TEMPERATURE: 97.8 F | SYSTOLIC BLOOD PRESSURE: 120 MMHG | OXYGEN SATURATION: 96 % | HEIGHT: 74 IN

## 2024-06-04 DIAGNOSIS — K27.9 PEPTIC ULCER: ICD-10-CM

## 2024-06-04 DIAGNOSIS — I50.23 ACUTE ON CHRONIC SYSTOLIC CHF (CONGESTIVE HEART FAILURE) (HCC): ICD-10-CM

## 2024-06-04 DIAGNOSIS — R29.6 MULTIPLE FALLS: Primary | ICD-10-CM

## 2024-06-04 DIAGNOSIS — I48.0 PAF (PAROXYSMAL ATRIAL FIBRILLATION) (HCC): ICD-10-CM

## 2024-06-04 DIAGNOSIS — Z00.00 ANNUAL PHYSICAL EXAM: ICD-10-CM

## 2024-06-04 DIAGNOSIS — I48.19 PERSISTENT ATRIAL FIBRILLATION (HCC): ICD-10-CM

## 2024-06-04 DIAGNOSIS — G47.33 OBSTRUCTIVE SLEEP APNEA: ICD-10-CM

## 2024-06-04 DIAGNOSIS — I42.8 NONISCHEMIC CARDIOMYOPATHY (HCC): ICD-10-CM

## 2024-06-04 DIAGNOSIS — E78.2 MIXED HYPERLIPIDEMIA: ICD-10-CM

## 2024-06-04 PROCEDURE — 99214 OFFICE O/P EST MOD 30 MIN: CPT | Performed by: FAMILY MEDICINE

## 2024-06-04 PROCEDURE — 99396 PREV VISIT EST AGE 40-64: CPT | Performed by: FAMILY MEDICINE

## 2024-06-04 NOTE — PROGRESS NOTES
Adult Annual Physical  Name: Kiko Lao      : 1960      MRN: [id number]  Encounter Provider: Rigoberto Hernandez DO  Encounter Date: 2024   Encounter department: [facility]    Assessment & Plan     1. Multiple falls  2. Persistent atrial fibrillation (HCC)  3. Nonischemic cardiomyopathy (HCC)  4. PAF (paroxysmal atrial fibrillation) (HCC)  5. Obstructive sleep apnea  6. Peptic ulcer  7. Acute on chronic systolic CHF (congestive heart failure) (HCC)  8. Annual physical exam  9. BMI 38.0-38.9,adult    Immunizations and preventive care screenings were discussed with patient today. Appropriate education was printed on patient's after visit summary.    Discussed risks and benefits of prostate cancer screening. We discussed the controversial history of PSA screening for prostate cancer in the [place] as well as the risk of over detection and over treatment of prostate cancer by way of PSA screening.  The patient understands that PSA blood testing is an imperfect way to screen for prostate cancer and that elevated PSA levels in the blood may also be caused by infection, inflammation, prostatic trauma or manipulation, urological procedures, or by benign prostatic enlargement.    The role of the digital rectal examination in prostate cancer screening was also discussed and I discussed with him that there is large interobserver variability in the findings of digital rectal examination.    Counseling:  Alcohol/drug use: discussed moderation in alcohol intake, the recommendations for healthy alcohol use, and avoidance of illicit drug use.  Dental Health: discussed importance of regular tooth brushing, flossing, and dental visits.  Injury prevention: discussed safety/seat belts, safety helmets, smoke detectors, carbon dioxide detectors, and smoking near bedding or upholstery.  Sexual health: discussed sexually transmitted diseases, partner selection, use of condoms, avoidance of  unintended pregnancy, and contraceptive alternatives.  Exercise: the importance of regular exercise/physical activity was discussed. Recommend exercise 3-5 times per week for at least 30 minutes.          History of Present Illness     Adult Annual Physical:  Patient presents for annual physical.     Diet and Physical Activity:  - Diet/Nutrition: well balanced diet.  - Exercise: 5-7 times a week on average and walking.    Depression Screening:  - PHQ-2 Score: 0    General Health:  - Sleep: sleeps well.  - Hearing: normal hearing bilateral ears.  - Vision: no vision problems.  - Dental: regular dental visits.    /GYN Health:    - History of STDs: no     Health:  - History of STDs: no.     Review of Systems   Constitutional:  Negative for activity change, chills, fatigue and fever.   HENT:  Negative for congestion, ear pain, sinus pressure and sore throat.    Eyes:  Negative for redness, itching and visual disturbance.   Respiratory:  Negative for cough and shortness of breath.    Cardiovascular:  Negative for chest pain and palpitations.   Gastrointestinal:  Negative for abdominal pain, diarrhea and nausea.   Endocrine: Negative for cold intolerance and heat intolerance.   Genitourinary:  Negative for dysuria, flank pain and frequency.   Musculoskeletal:  Negative for arthralgias, back pain, gait problem and myalgias.   Skin:  Negative for color change.   Allergic/Immunologic: Negative for environmental allergies.   Neurological:  Negative for dizziness, numbness and headaches.   Psychiatric/Behavioral:  Negative for behavioral problems and sleep disturbance.      Medical History Reviewed by provider this encounter:       Current Outpatient Medications on File Prior to Visit   Medication Sig Dispense Refill    amiodarone 100 mg tablet Take 1 tablet (100 mg total) by mouth daily 90 tablet 3    atorvastatin (LIPITOR) 40 mg tablet TAKE 1 TABLET DAILY WITH DINNER 90 tablet 3    digoxin (LANOXIN) 0.125 mg tablet Take  "1 tablet (125 mcg total) by mouth daily 90 tablet 2    fexofenadine (ALLEGRA) 180 MG tablet Take 1 tablet (180 mg total) by mouth in the morning. (Patient taking differently: Take 180 mg by mouth daily as needed) 30 tablet 3    furosemide (LASIX) 20 mg tablet TAKE 1 TABLET DAILY 90 tablet 3    losartan (COZAAR) 25 mg tablet Take 0.5 tablets (12.5 mg total) by mouth daily 45 tablet 2    metoprolol succinate (TOPROL-XL) 25 mg 24 hr tablet Take 1 tablet (25 mg total) by mouth daily 180 tablet 3    Multiple Vitamins-Minerals (MULTIVITAMIN ADULT EXTRA C PO) Take 1 capsule by mouth      pantoprazole (PROTONIX) 40 mg tablet TAKE 1 TABLET EVERY 12 HOURS 30 tablet 0    potassium chloride (Klor-Con M20) 20 mEq tablet TAKE 2 TABLETS IN THE MORNING AND 1 TABLET IN THE EVENING 270 tablet 3    rivaroxaban (Xarelto) 20 mg tablet TAKE 1 TABLET DAILY WITH BREAKFAST 90 tablet 1    Empagliflozin (Jardiance) 10 MG TABS tablet Take 1 tablet (10 mg total) by mouth every morning (Patient not taking: Reported on 2023) 90 tablet 5    [DISCONTINUED] Metoprolol Tartrate 75 MG TABS Take 1 tablet (75 mg total) by mouth every 12 (twelve) hours 60 tablet 3     No current facility-administered medications on file prior to visit.      Social History     Tobacco Use    Smoking status: Former     Types: Cigars     Start date:      Quit date:      Years since quittin.4    Smokeless tobacco: Former     Types: Chew   Vaping Use    Vaping status: Never Used   Substance and Sexual Activity    Alcohol use: Yes     Comment: 1 beer every 2-3 weeks    Drug use: No    Sexual activity: Yes     Partners: Female       Objective     /72 (BP Location: Left arm, Patient Position: Sitting, Cuff Size: Large)   Pulse 83   Temp 97.8 °F (36.6 °C) (Temporal)   Ht 6' 2\" (1.88 m)   Wt (!) 137 kg (302 lb 9.6 oz)   SpO2 96%   BMI 38.85 kg/m²     Physical Exam  Vitals reviewed.   Constitutional:       General: He is not in acute distress.     " Appearance: Normal appearance. He is well-developed.   HENT:      Head: Normocephalic and atraumatic.      Right Ear: Tympanic membrane, ear canal and external ear normal. There is no impacted cerumen.      Left Ear: Tympanic membrane, ear canal and external ear normal. There is no impacted cerumen.      Nose: Nose normal. No congestion or rhinorrhea.      Mouth/Throat:      Mouth: Mucous membranes are moist.      Pharynx: No oropharyngeal exudate or posterior oropharyngeal erythema.   Eyes:      General: No scleral icterus.        Right eye: No discharge.         Left eye: No discharge.      Extraocular Movements: Extraocular movements intact.      Conjunctiva/sclera: Conjunctivae normal.      Pupils: Pupils are equal, round, and reactive to light.   Neck:      Trachea: No tracheal deviation.   Cardiovascular:      Rate and Rhythm: Normal rate and regular rhythm.      Pulses: Normal pulses.           Dorsalis pedis pulses are 2+ on the right side and 2+ on the left side.        Posterior tibial pulses are 2+ on the right side and 2+ on the left side.      Heart sounds: Normal heart sounds. No murmur heard.     No friction rub. No gallop.   Pulmonary:      Effort: Pulmonary effort is normal. No respiratory distress.      Breath sounds: Normal breath sounds. No wheezing, rhonchi or rales.   Abdominal:      General: Bowel sounds are normal. There is no distension.      Palpations: Abdomen is soft.      Tenderness: There is no abdominal tenderness. There is no guarding or rebound.   Musculoskeletal:         General: Normal range of motion.      Cervical back: Normal range of motion and neck supple.      Right lower leg: No edema.      Left lower leg: No edema.   Lymphadenopathy:      Head:      Right side of head: No submental or submandibular adenopathy.      Left side of head: No submental or submandibular adenopathy.      Cervical: No cervical adenopathy.      Right cervical: No superficial, deep or posterior  cervical adenopathy.     Left cervical: No superficial, deep or posterior cervical adenopathy.   Skin:     General: Skin is warm and dry.      Findings: No erythema.   Neurological:      General: No focal deficit present.      Mental Status: He is alert and oriented to person, place, and time.      Cranial Nerves: No cranial nerve deficit.      Sensory: Sensation is intact. No sensory deficit.      Motor: Motor function is intact.   Psychiatric:         Attention and Perception: Attention and perception normal.         Mood and Affect: Mood is not anxious or depressed.         Speech: Speech normal.         Behavior: Behavior normal.         Thought Content: Thought content normal.         Judgment: Judgment normal.     Administrative Statements

## 2024-06-05 NOTE — PROGRESS NOTES
Assessment/Plan:   1. Persistent atrial fibrillation (HCC)/Acute on chronic systolic CHF (congestive heart failure) (HCC)/Nonischemic cardiomyopathy (HCC)  Patient following with cardiology regularly.  He is been having persistent symptoms.  He has been on treatment with digoxin, amiodarone, Lasix, losartan, metoprolol as well as the Xarelto.  He has been tolerating these medications very well.  Continue with his current treatment.    2. Mixed hyperlipidemia  Stable.  Continue with a strict low-fat/low-cholesterol as well as current treatment with atorvastatin.    3. Multiple falls  Unclear to exact cause of patient's multiple falls.  He does have a significant cardiac history however he is currently having a neurologic evaluation for any secondary causes of his syncope.    4. Peptic ulcer  Stable..  Continue with dietary trigger avoidance as well as current treatment with pantoprazole.  Follow-up in 6 months.             Diagnoses and all orders for this visit:    Multiple falls    Persistent atrial fibrillation (HCC)    Mixed hyperlipidemia    Nonischemic cardiomyopathy (HCC)    PAF (paroxysmal atrial fibrillation) (HCC)    Obstructive sleep apnea    Peptic ulcer    Acute on chronic systolic CHF (congestive heart failure) (HCC)    Annual physical exam    BMI 38.0-38.9,adult          Subjective:       Chief Complaint   Patient presents with    Physical Exam      Patient ID: Kiko Lao is a 63 y.o. male presenting for a follow-up on his chronic conditions.  He has persistent A-fib, dyslipidemia, multiple falls, nonischemic cardiomyopathy, sleep apnea as well as peptic ulcer disease.  He is been taking his medications regularly.  He denies adverse reactions with his medications.  He recently has been following with neurology in addition to cardiology.  He is having a neurologic evaluation to further assess any secondary causes of his syncopal episodes.    HPI    Review of Systems   Constitutional:  Negative for  activity change, chills, fatigue and fever.   HENT:  Negative for congestion, ear pain, sinus pressure and sore throat.    Eyes:  Negative for redness, itching and visual disturbance.   Respiratory:  Negative for cough and shortness of breath.    Cardiovascular:  Negative for chest pain and palpitations.   Gastrointestinal:  Negative for abdominal pain, diarrhea and nausea.   Endocrine: Negative for cold intolerance and heat intolerance.   Genitourinary:  Negative for dysuria, flank pain and frequency.   Musculoskeletal:  Negative for arthralgias, back pain, gait problem and myalgias.   Skin:  Negative for color change.   Allergic/Immunologic: Negative for environmental allergies.   Neurological:  Positive for syncope. Negative for dizziness, numbness and headaches.   Psychiatric/Behavioral:  Negative for behavioral problems and sleep disturbance.        The following portions of the patient's history were reviewed and updated as appropriate : past family history, past medical history, past social history and past surgical history.    Current Outpatient Medications:     amiodarone 100 mg tablet, Take 1 tablet (100 mg total) by mouth daily, Disp: 90 tablet, Rfl: 3    atorvastatin (LIPITOR) 40 mg tablet, TAKE 1 TABLET DAILY WITH DINNER, Disp: 90 tablet, Rfl: 3    digoxin (LANOXIN) 0.125 mg tablet, Take 1 tablet (125 mcg total) by mouth daily, Disp: 90 tablet, Rfl: 2    fexofenadine (ALLEGRA) 180 MG tablet, Take 1 tablet (180 mg total) by mouth in the morning. (Patient taking differently: Take 180 mg by mouth daily as needed), Disp: 30 tablet, Rfl: 3    furosemide (LASIX) 20 mg tablet, TAKE 1 TABLET DAILY, Disp: 90 tablet, Rfl: 3    losartan (COZAAR) 25 mg tablet, Take 0.5 tablets (12.5 mg total) by mouth daily, Disp: 45 tablet, Rfl: 2    metoprolol succinate (TOPROL-XL) 25 mg 24 hr tablet, Take 1 tablet (25 mg total) by mouth daily, Disp: 180 tablet, Rfl: 3    Multiple Vitamins-Minerals (MULTIVITAMIN ADULT EXTRA C  "PO), Take 1 capsule by mouth, Disp: , Rfl:     pantoprazole (PROTONIX) 40 mg tablet, TAKE 1 TABLET EVERY 12 HOURS, Disp: 30 tablet, Rfl: 0    potassium chloride (Klor-Con M20) 20 mEq tablet, TAKE 2 TABLETS IN THE MORNING AND 1 TABLET IN THE EVENING, Disp: 270 tablet, Rfl: 3    rivaroxaban (Xarelto) 20 mg tablet, TAKE 1 TABLET DAILY WITH BREAKFAST, Disp: 90 tablet, Rfl: 1    Empagliflozin (Jardiance) 10 MG TABS tablet, Take 1 tablet (10 mg total) by mouth every morning (Patient not taking: Reported on 11/27/2023), Disp: 90 tablet, Rfl: 5         Objective:         Vitals:    06/04/24 1330   BP: 120/72   BP Location: Left arm   Patient Position: Sitting   Cuff Size: Large   Pulse: 83   Temp: 97.8 °F (36.6 °C)   TempSrc: Temporal   SpO2: 96%   Weight: (!) 137 kg (302 lb 9.6 oz)   Height: 6' 2\" (1.88 m)     Physical Exam  Vitals reviewed.   Constitutional:       General: He is not in acute distress.     Appearance: Normal appearance. He is well-developed.   HENT:      Head: Normocephalic and atraumatic.      Right Ear: Tympanic membrane, ear canal and external ear normal. There is no impacted cerumen.      Left Ear: Tympanic membrane, ear canal and external ear normal. There is no impacted cerumen.      Nose: Nose normal. No congestion or rhinorrhea.      Mouth/Throat:      Mouth: Mucous membranes are moist.      Pharynx: No oropharyngeal exudate or posterior oropharyngeal erythema.   Eyes:      General: No scleral icterus.        Right eye: No discharge.         Left eye: No discharge.      Extraocular Movements: Extraocular movements intact.      Conjunctiva/sclera: Conjunctivae normal.      Pupils: Pupils are equal, round, and reactive to light.   Neck:      Trachea: No tracheal deviation.   Cardiovascular:      Rate and Rhythm: Normal rate and regular rhythm.      Pulses: Normal pulses.           Dorsalis pedis pulses are 2+ on the right side and 2+ on the left side.        Posterior tibial pulses are 2+ on the " right side and 2+ on the left side.      Heart sounds: Normal heart sounds. No murmur heard.     No friction rub. No gallop.   Pulmonary:      Effort: Pulmonary effort is normal. No respiratory distress.      Breath sounds: Normal breath sounds. No wheezing, rhonchi or rales.   Abdominal:      General: Bowel sounds are normal. There is no distension.      Palpations: Abdomen is soft.      Tenderness: There is no abdominal tenderness. There is no guarding or rebound.   Musculoskeletal:         General: Normal range of motion.      Cervical back: Normal range of motion and neck supple.      Right lower leg: No edema.      Left lower leg: No edema.   Lymphadenopathy:      Head:      Right side of head: No submental or submandibular adenopathy.      Left side of head: No submental or submandibular adenopathy.      Cervical: No cervical adenopathy.      Right cervical: No superficial, deep or posterior cervical adenopathy.     Left cervical: No superficial, deep or posterior cervical adenopathy.   Skin:     General: Skin is warm and dry.      Findings: No erythema.   Neurological:      General: No focal deficit present.      Mental Status: He is alert and oriented to person, place, and time.      Cranial Nerves: No cranial nerve deficit.      Sensory: Sensation is intact. No sensory deficit.      Motor: Motor function is intact.   Psychiatric:         Attention and Perception: Attention and perception normal.         Mood and Affect: Mood is not anxious or depressed.         Speech: Speech normal.         Behavior: Behavior normal.         Thought Content: Thought content normal.         Judgment: Judgment normal.

## 2024-06-07 ENCOUNTER — APPOINTMENT (OUTPATIENT)
Dept: LAB | Facility: CLINIC | Age: 64
End: 2024-06-07
Payer: COMMERCIAL

## 2024-06-07 DIAGNOSIS — R26.89 BALANCE PROBLEM: ICD-10-CM

## 2024-06-07 DIAGNOSIS — E55.9 VITAMIN D DEFICIENCY: ICD-10-CM

## 2024-06-07 DIAGNOSIS — E53.8 VITAMIN B12 DEFICIENCY: ICD-10-CM

## 2024-06-07 DIAGNOSIS — G62.9 POLYNEUROPATHY: ICD-10-CM

## 2024-06-07 LAB
25(OH)D3 SERPL-MCNC: 41.5 NG/ML (ref 30–100)
EST. AVERAGE GLUCOSE BLD GHB EST-MCNC: 105 MG/DL
HBA1C MFR BLD: 5.3 %
VIT B12 SERPL-MCNC: 263 PG/ML (ref 180–914)

## 2024-06-07 PROCEDURE — 82607 VITAMIN B-12: CPT

## 2024-06-07 PROCEDURE — 36415 COLL VENOUS BLD VENIPUNCTURE: CPT

## 2024-06-07 PROCEDURE — 84165 PROTEIN E-PHORESIS SERUM: CPT

## 2024-06-07 PROCEDURE — 83036 HEMOGLOBIN GLYCOSYLATED A1C: CPT

## 2024-06-07 PROCEDURE — 82306 VITAMIN D 25 HYDROXY: CPT

## 2024-06-10 LAB
ALBUMIN SERPL ELPH-MCNC: 3.59 G/DL (ref 3.2–5.1)
ALBUMIN SERPL ELPH-MCNC: 58.8 % (ref 48–70)
ALPHA1 GLOB SERPL ELPH-MCNC: 0.29 G/DL (ref 0.15–0.47)
ALPHA1 GLOB SERPL ELPH-MCNC: 4.8 % (ref 1.8–7)
ALPHA2 GLOB SERPL ELPH-MCNC: 0.63 G/DL (ref 0.42–1.04)
ALPHA2 GLOB SERPL ELPH-MCNC: 10.4 % (ref 5.9–14.9)
BETA GLOB ABNORMAL SERPL ELPH-MCNC: 0.45 G/DL (ref 0.31–0.57)
BETA1 GLOB SERPL ELPH-MCNC: 7.3 % (ref 4.7–7.7)
BETA2 GLOB SERPL ELPH-MCNC: 5.8 % (ref 3.1–7.9)
BETA2+GAMMA GLOB SERPL ELPH-MCNC: 0.35 G/DL (ref 0.2–0.58)
GAMMA GLOB ABNORMAL SERPL ELPH-MCNC: 0.79 G/DL (ref 0.4–1.66)
GAMMA GLOB SERPL ELPH-MCNC: 12.9 % (ref 6.9–22.3)
IGG/ALB SER: 1.43 {RATIO} (ref 1.1–1.8)
PROT PATTERN SERPL ELPH-IMP: ABNORMAL
PROT SERPL-MCNC: 6.1 G/DL (ref 6.4–8.2)

## 2024-06-10 PROCEDURE — 84165 PROTEIN E-PHORESIS SERUM: CPT | Performed by: PATHOLOGY

## 2024-06-11 ENCOUNTER — HOSPITAL ENCOUNTER (OUTPATIENT)
Dept: CT IMAGING | Facility: HOSPITAL | Age: 64
Discharge: HOME | End: 2024-06-11
Attending: STUDENT IN AN ORGANIZED HEALTH CARE EDUCATION/TRAINING PROGRAM
Payer: COMMERCIAL

## 2024-06-11 DIAGNOSIS — R55 POSTURAL DIZZINESS WITH PRESYNCOPE: ICD-10-CM

## 2024-06-11 DIAGNOSIS — I48.0 PAF (PAROXYSMAL ATRIAL FIBRILLATION) (HCC): ICD-10-CM

## 2024-06-11 DIAGNOSIS — R26.89 BALANCE PROBLEM: ICD-10-CM

## 2024-06-11 DIAGNOSIS — R42 POSTURAL DIZZINESS WITH PRESYNCOPE: ICD-10-CM

## 2024-06-11 PROCEDURE — 70496 CT ANGIOGRAPHY HEAD: CPT

## 2024-06-11 PROCEDURE — 70498 CT ANGIOGRAPHY NECK: CPT

## 2024-06-11 RX ADMIN — IOHEXOL 85 ML: 350 INJECTION, SOLUTION INTRAVENOUS at 16:03

## 2024-07-03 DIAGNOSIS — I42.8 NONISCHEMIC CARDIOMYOPATHY (HCC): ICD-10-CM

## 2024-07-03 RX ORDER — LOSARTAN POTASSIUM 25 MG/1
12.5 TABLET ORAL
Qty: 150 TABLET | Refills: 1 | Status: SHIPPED | OUTPATIENT
Start: 2024-07-03

## 2024-07-24 ENCOUNTER — REMOTE DEVICE CLINIC VISIT (OUTPATIENT)
Dept: CARDIOLOGY CLINIC | Facility: CLINIC | Age: 64
End: 2024-07-24
Payer: COMMERCIAL

## 2024-07-24 DIAGNOSIS — Z95.810 AICD (AUTOMATIC CARDIOVERTER/DEFIBRILLATOR) PRESENT: Primary | ICD-10-CM

## 2024-07-24 PROCEDURE — 93296 REM INTERROG EVL PM/IDS: CPT | Performed by: INTERNAL MEDICINE

## 2024-07-24 PROCEDURE — 93295 DEV INTERROG REMOTE 1/2/MLT: CPT | Performed by: INTERNAL MEDICINE

## 2024-07-24 NOTE — PROGRESS NOTES
"Results for orders placed or performed in visit on 07/24/24   Cardiac EP device report    Narrative    MDT BI-V ICD/ ACTIVE SYSTEM IS MRI CONDITIONAL  CARELINK TRANSMISSION: BATTERY STATUS \"8 YRS.\" AP 43% CRT PACING (BIV) 100% (LV) 0%. ALL AVAILABLE LEAD PARAMETERS WITHIN NORMAL LIMITS. 7 VHRS NOTED; NO THERAPIES GIVEN. 134 AT/AF NOTED, 2% BURDEN; LONGEST 2.11 HRS. VENT SENSING MARKERS NOTED-FUSION & RVR. AVAIL EGRAMS AS AF, PAT, PVCS & NSVT. EF 45% (ECHO 2023). OPTI-VOL WITHIN NORMAL LIMITS. NORMAL DEVICE FUNCTION. NC         "

## 2024-08-27 DIAGNOSIS — I50.9 CHF (CONGESTIVE HEART FAILURE) (HCC): ICD-10-CM

## 2024-08-27 RX ORDER — POTASSIUM CHLORIDE 1500 MG/1
TABLET, EXTENDED RELEASE ORAL
Qty: 270 TABLET | Refills: 1 | Status: SHIPPED | OUTPATIENT
Start: 2024-08-27

## 2024-09-05 ENCOUNTER — HOSPITAL ENCOUNTER (OUTPATIENT)
Dept: NON INVASIVE DIAGNOSTICS | Facility: HOSPITAL | Age: 64
Discharge: HOME | End: 2024-09-05
Attending: INTERNAL MEDICINE
Payer: COMMERCIAL

## 2024-09-05 VITALS
HEART RATE: 82 BPM | DIASTOLIC BLOOD PRESSURE: 65 MMHG | WEIGHT: 302 LBS | BODY MASS INDEX: 38.76 KG/M2 | HEIGHT: 74 IN | SYSTOLIC BLOOD PRESSURE: 127 MMHG

## 2024-09-05 DIAGNOSIS — I50.9 CONGESTIVE HEART FAILURE, UNSPECIFIED HF CHRONICITY, UNSPECIFIED HEART FAILURE TYPE (HCC): ICD-10-CM

## 2024-09-05 DIAGNOSIS — I48.0 PAF (PAROXYSMAL ATRIAL FIBRILLATION) (HCC): ICD-10-CM

## 2024-09-05 PROCEDURE — 93308 TTE F-UP OR LMTD: CPT

## 2024-09-05 PROCEDURE — 93325 DOPPLER ECHO COLOR FLOW MAPG: CPT | Performed by: INTERNAL MEDICINE

## 2024-09-05 PROCEDURE — 93308 TTE F-UP OR LMTD: CPT | Performed by: INTERNAL MEDICINE

## 2024-09-05 PROCEDURE — 93321 DOPPLER ECHO F-UP/LMTD STD: CPT | Performed by: INTERNAL MEDICINE

## 2024-09-05 PROCEDURE — 93325 DOPPLER ECHO COLOR FLOW MAPG: CPT

## 2024-09-05 PROCEDURE — 93321 DOPPLER ECHO F-UP/LMTD STD: CPT

## 2024-09-06 LAB
AORTIC VALVE MEAN VELOCITY: 11 M/S
APICAL FOUR CHAMBER EJECTION FRACTION: 50 %
AV LVOT MEAN GRADIENT: 2 MMHG
AV LVOT PEAK GRADIENT: 3 MMHG
AV MEAN GRADIENT: 5 MMHG
AV PEAK GRADIENT: 8 MMHG
AV VELOCITY RATIO: 0.61
BSA FOR ECHO PROCEDURE: 2.59 M2
DOP CALC AO PEAK VEL: 1.38 M/S
DOP CALC AO VTI: 29.72 CM
DOP CALC LVOT PEAK VEL VTI: 17.55 CM
DOP CALC LVOT PEAK VEL: 0.84 M/S
E WAVE DECELERATION TIME: 224 MS
E/A RATIO: 1.39
FRACTIONAL SHORTENING: 24 (ref 28–44)
INTERVENTRICULAR SEPTUM IN DIASTOLE (PARASTERNAL SHORT AXIS VIEW): 1 CM
INTERVENTRICULAR SEPTUM: 1 CM (ref 0.6–1.1)
LEFT INTERNAL DIMENSION IN SYSTOLE: 4.4 CM (ref 2.1–4)
LEFT VENTRICLE DIASTOLIC VOLUME (MOD BIPLANE): 135 ML
LEFT VENTRICLE DIASTOLIC VOLUME INDEX (MOD BIPLANE): 52.1 ML/M2
LEFT VENTRICLE SYSTOLIC VOLUME (MOD BIPLANE): 69 ML
LEFT VENTRICLE SYSTOLIC VOLUME INDEX (MOD BIPLANE): 26.6 ML/M2
LEFT VENTRICULAR INTERNAL DIMENSION IN DIASTOLE: 5.8 CM (ref 3.5–6)
LEFT VENTRICULAR POSTERIOR WALL IN END DIASTOLE: 1.2 CM
LEFT VENTRICULAR STROKE VOLUME: 83 ML
LV EF: 49 %
LVSV (TEICH): 83 ML
MV E'TISSUE VEL-LAT: 9 CM/S
MV E'TISSUE VEL-SEP: 10 CM/S
MV PEAK A VEL: 0.41 M/S
MV PEAK E VEL: 57 CM/S
SL CV PED ECHO LEFT VENTRICLE DIASTOLIC VOLUME (MOD BIPLANE) 2D: 169 ML
SL CV PED ECHO LEFT VENTRICLE SYSTOLIC VOLUME (MOD BIPLANE) 2D: 86 ML
TRICUSPID ANNULAR PLANE SYSTOLIC EXCURSION: 1.9 CM

## 2024-09-09 DIAGNOSIS — K92.1 GASTROINTESTINAL HEMORRHAGE WITH MELENA: ICD-10-CM

## 2024-09-09 DIAGNOSIS — K27.9 PEPTIC ULCER: ICD-10-CM

## 2024-09-09 RX ORDER — PANTOPRAZOLE SODIUM 40 MG/1
TABLET, DELAYED RELEASE ORAL
Qty: 60 TABLET | Refills: 5 | Status: SHIPPED | OUTPATIENT
Start: 2024-09-09

## 2024-10-07 ENCOUNTER — HOSPITAL ENCOUNTER (OUTPATIENT)
Dept: NEUROLOGY | Facility: CLINIC | Age: 64
Discharge: HOME | End: 2024-10-07
Payer: COMMERCIAL

## 2024-10-07 DIAGNOSIS — R26.89 BALANCE PROBLEM: ICD-10-CM

## 2024-10-07 DIAGNOSIS — G62.9 POLYNEUROPATHY: ICD-10-CM

## 2024-10-07 PROBLEM — G62.89 AXONAL SENSORIMOTOR NEUROPATHY: Status: ACTIVE | Noted: 2024-10-07

## 2024-10-07 PROBLEM — M54.16 RADICULOPATHY, LUMBAR REGION: Status: ACTIVE | Noted: 2024-10-07

## 2024-10-07 PROCEDURE — 95911 NRV CNDJ TEST 9-10 STUDIES: CPT | Performed by: PSYCHIATRY & NEUROLOGY

## 2024-10-07 PROCEDURE — 95886 MUSC TEST DONE W/N TEST COMP: CPT | Performed by: PSYCHIATRY & NEUROLOGY

## 2024-10-10 ENCOUNTER — HOSPITAL ENCOUNTER (OUTPATIENT)
Dept: NEUROLOGY | Facility: CLINIC | Age: 64
End: 2024-10-10
Payer: COMMERCIAL

## 2024-10-10 DIAGNOSIS — G56.03 BILATERAL CARPAL TUNNEL SYNDROME: ICD-10-CM

## 2024-10-10 PROCEDURE — 95886 MUSC TEST DONE W/N TEST COMP: CPT | Performed by: PSYCHIATRY & NEUROLOGY

## 2024-10-10 PROCEDURE — 95912 NRV CNDJ TEST 11-12 STUDIES: CPT | Performed by: PSYCHIATRY & NEUROLOGY

## 2024-10-24 ENCOUNTER — IN-CLINIC DEVICE VISIT (OUTPATIENT)
Dept: CARDIOLOGY CLINIC | Facility: CLINIC | Age: 64
End: 2024-10-24
Payer: COMMERCIAL

## 2024-10-24 DIAGNOSIS — I42.8 NONISCHEMIC CARDIOMYOPATHY (HCC): ICD-10-CM

## 2024-10-24 DIAGNOSIS — I48.0 PAF (PAROXYSMAL ATRIAL FIBRILLATION) (HCC): Primary | ICD-10-CM

## 2024-10-24 DIAGNOSIS — I50.23 ACUTE ON CHRONIC SYSTOLIC CHF (CONGESTIVE HEART FAILURE) (HCC): ICD-10-CM

## 2024-10-24 DIAGNOSIS — Z95.810 PRESENCE OF IMPLANTABLE CARDIOVERTER-DEFIBRILLATOR (ICD): ICD-10-CM

## 2024-10-24 PROCEDURE — 93284 PRGRMG EVAL IMPLANTABLE DFB: CPT | Performed by: INTERNAL MEDICINE

## 2024-10-24 NOTE — PROGRESS NOTES
Results for orders placed or performed in visit on 10/24/24   Cardiac EP device report    Narrative    MDT BI-V ICD/ ACTIVE SYSTEM IS MRI CONDITIONAL  DEVICE INTERROGATED IN THE [place] OFFICE. BATTERY VOLTAGE ADEQUATE (8.1 YRS).  AP 39.2%  97.2% (VSRP 0.6%).  ALL LEAD PARAMETERS WITHIN NORMAL LIMITS.  SINCE 7/24/2024  1 SUCCESSFULLY TREATED VT/VF EPISODE W/ATP, PT ASYMPTOMATIC.  57 DEVICE CLASSIFIED VT/NS EPISODES, AVAIL EGM'S SHOW PAT, RVR.  AVG -181 BPM.  2 DEV CLASS SVT EPISODES, AVAIIL EGM SUGG FOR AF, DURATION 39 MINS.  488 AT/AF EPISODES, AVAIL EGM'S SHOW AF,  LONGEST 3 HR 14 MINS.  PT TAKING XARELTO, DIG, METOPROLOL SUCC, AMIO.  EF 46% (ECHO 9/5/2024).   OPTI-VOL WITHIN NORMAL LIMITS.  NO PROGRAMMING CHANGES MADE TO DEVICE PARAMETERS.  NORMAL DEVICE FUNCTION.  PAS
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
## 2025-07-11 NOTE — ASSESSMENT & PLAN NOTE
· Increase metoprolol to 25 mg BID plus amiodarone  · Continue Xarelto 0 (no pain/absence of nonverbal indicators of pain)

## (undated) DEVICE — INTRO SHEATH 8FR 24CM ARROW-FLEX

## (undated) DEVICE — Device: Brand: WEBSTER

## (undated) DEVICE — INTRO SHEATH PEEL AWAY 9 FR

## (undated) DEVICE — GLIDESHEATH BASIC HYDROPHILIC COATED INTRODUCER SHEATH: Brand: GLIDESHEATH

## (undated) DEVICE — INTRO SHEATH PEEL AWAY 7FR

## (undated) DEVICE — CATH ABLATION CRYOCATH ARCTIC FRONT ADV PRO 28MM

## (undated) DEVICE — NEEDLE TRANSSEPTAL BRK-1 71CM

## (undated) DEVICE — Device: Brand: PROTRACK PIGTAIL WIRE

## (undated) DEVICE — CATH EP 5FR QUAD SUPREME CRD

## (undated) DEVICE — CATH COAXIAL UMBILICAL

## (undated) DEVICE — CATH EP  INQUIRY 6F 2-5-2MM  MED CRV STERABLE  DECAPOLAR 110CM

## (undated) DEVICE — GUIDEWIRE WHOLEY .035 145 CM FLOP STR TIP

## (undated) DEVICE — GUIDE SHEATH 9FR ATTAIN COMMAND 9FR EH CURVE

## (undated) DEVICE — PINNACLE INTRODUCER SHEATH: Brand: PINNACLE

## (undated) DEVICE — CABLE CATHETER ACHIEVE MAPPING

## (undated) DEVICE — GUIDEWIRE .035 260CM 3MM J TIP

## (undated) DEVICE — RADIFOCUS OPTITORQUE ANGIOGRAPHIC CATHETER: Brand: OPTITORQUE

## (undated) DEVICE — CATH ULTRASOUND ACUNAV ICE 8FR 90CM GE VIVID-I

## (undated) DEVICE — GUIDE SHEATH 7FR 90 D ATTAIN SELECT II SUREVALVE

## (undated) DEVICE — CABLE CRYOCATH ELECTRICAL UMBILICAL

## (undated) DEVICE — DGW .035 FC J3MM 150CM TEF: Brand: EMERALD

## (undated) DEVICE — GUIDE SHEATH SLO 8.5 FR

## (undated) DEVICE — RUNTHROUGH NS EXTRA FLOPPY PTCA GUIDEWIRE: Brand: RUNTHROUGH

## (undated) DEVICE — CATH ABLATION FLEXCATH ADVANCE OD12 FR STEERABLE

## (undated) DEVICE — REF PATCH ENSITE X

## (undated) DEVICE — CATH EP ACHIEVE 20 MM MAPPING LOOP